# Patient Record
Sex: FEMALE | Race: BLACK OR AFRICAN AMERICAN | Employment: OTHER | URBAN - METROPOLITAN AREA
[De-identification: names, ages, dates, MRNs, and addresses within clinical notes are randomized per-mention and may not be internally consistent; named-entity substitution may affect disease eponyms.]

---

## 2017-01-30 ENCOUNTER — HOSPITAL ENCOUNTER (OUTPATIENT)
Dept: LAB | Age: 52
Discharge: HOME | End: 2017-01-30
Attending: INTERNAL MEDICINE
Payer: COMMERCIAL

## 2017-01-30 DIAGNOSIS — I10 ESSENTIAL HYPERTENSION, BENIGN: ICD-10-CM

## 2017-01-30 DIAGNOSIS — R21 RASH, SKIN: ICD-10-CM

## 2017-01-30 DIAGNOSIS — I06.1 RHEUMATIC AORTIC INSUFFICIENCY: ICD-10-CM

## 2017-01-30 LAB
ANION GAP BLD CALC-SCNC: 7 MMOL/L
BASOPHILS # BLD AUTO: 0 K/UL (ref 0–0.2)
BASOPHILS # BLD: 0 % (ref 0–2)
BNP SERPL-MCNC: 156 PG/ML
BUN SERPL-MCNC: 5 MG/DL (ref 6–23)
CALCIUM SERPL-MCNC: 8.5 MG/DL (ref 8.3–10.4)
CHLORIDE SERPL-SCNC: 107 MMOL/L (ref 98–107)
CO2 SERPL-SCNC: 26 MMOL/L (ref 23–32)
CREAT SERPL-MCNC: 0.8 MG/DL (ref 0.6–1)
DIFFERENTIAL METHOD BLD: ABNORMAL
EOSINOPHIL # BLD: 0.1 K/UL (ref 0–0.8)
EOSINOPHIL NFR BLD: 2 % (ref 0.5–7.8)
ERYTHROCYTE [DISTWIDTH] IN BLOOD BY AUTOMATED COUNT: 12.4 % (ref 11.9–14.6)
GLUCOSE SERPL-MCNC: 104 MG/DL (ref 65–100)
HCT VFR BLD AUTO: 33.9 % (ref 35.8–46.3)
HGB BLD-MCNC: 11.3 G/DL (ref 11.7–15.4)
LYMPHOCYTES # BLD AUTO: 44 % (ref 13–44)
LYMPHOCYTES # BLD: 2.1 K/UL (ref 0.5–4.6)
MCH RBC QN AUTO: 32 PG (ref 26.1–32.9)
MCHC RBC AUTO-ENTMCNC: 33.3 G/DL (ref 31.4–35)
MCV RBC AUTO: 96 FL (ref 79.6–97.8)
MONOCYTES # BLD: 0.5 K/UL (ref 0.1–1.3)
MONOCYTES NFR BLD AUTO: 10 % (ref 4–12)
NEUTS SEG # BLD: 2.1 K/UL (ref 1.7–8.2)
NEUTS SEG NFR BLD AUTO: 44 % (ref 43–78)
PLATELET # BLD AUTO: 234 K/UL (ref 150–450)
PMV BLD AUTO: 10.1 FL (ref 10.8–14.1)
POTASSIUM SERPL-SCNC: 3.6 MMOL/L (ref 3.5–5.1)
RBC # BLD AUTO: 3.53 M/UL (ref 4.05–5.25)
SODIUM SERPL-SCNC: 140 MMOL/L (ref 136–145)
WBC # BLD AUTO: 4.8 K/UL (ref 4.3–11.1)

## 2017-01-30 PROCEDURE — 85025 COMPLETE CBC W/AUTO DIFF WBC: CPT | Performed by: INTERNAL MEDICINE

## 2017-01-30 PROCEDURE — 83880 ASSAY OF NATRIURETIC PEPTIDE: CPT | Performed by: INTERNAL MEDICINE

## 2017-01-30 PROCEDURE — 80048 BASIC METABOLIC PNL TOTAL CA: CPT | Performed by: INTERNAL MEDICINE

## 2017-01-30 PROCEDURE — 36415 COLL VENOUS BLD VENIPUNCTURE: CPT | Performed by: INTERNAL MEDICINE

## 2017-03-02 ENCOUNTER — HOSPITAL ENCOUNTER (OUTPATIENT)
Dept: SURGERY | Age: 52
Discharge: HOME | End: 2017-03-02
Payer: COMMERCIAL

## 2017-03-02 VITALS
HEIGHT: 64 IN | WEIGHT: 149 LBS | DIASTOLIC BLOOD PRESSURE: 73 MMHG | SYSTOLIC BLOOD PRESSURE: 120 MMHG | BODY MASS INDEX: 25.44 KG/M2 | TEMPERATURE: 97.7 F | OXYGEN SATURATION: 100 % | HEART RATE: 83 BPM | RESPIRATION RATE: 16 BRPM

## 2017-03-02 LAB
ANION GAP BLD CALC-SCNC: 11 MMOL/L (ref 7–16)
BUN SERPL-MCNC: 9 MG/DL (ref 6–23)
CALCIUM SERPL-MCNC: 8.9 MG/DL (ref 8.3–10.4)
CHLORIDE SERPL-SCNC: 102 MMOL/L (ref 98–107)
CO2 SERPL-SCNC: 28 MMOL/L (ref 21–32)
CREAT SERPL-MCNC: 0.79 MG/DL (ref 0.6–1)
ERYTHROCYTE [DISTWIDTH] IN BLOOD BY AUTOMATED COUNT: 12.8 % (ref 11.9–14.6)
GLUCOSE SERPL-MCNC: 84 MG/DL (ref 65–100)
HCT VFR BLD AUTO: 36.9 % (ref 35.8–46.3)
HGB BLD-MCNC: 12.1 G/DL (ref 11.7–15.4)
MCH RBC QN AUTO: 30.8 PG (ref 26.1–32.9)
MCHC RBC AUTO-ENTMCNC: 32.8 G/DL (ref 31.4–35)
MCV RBC AUTO: 93.9 FL (ref 79.6–97.8)
PLATELET # BLD AUTO: 297 K/UL (ref 150–450)
PMV BLD AUTO: 10.1 FL (ref 10.8–14.1)
POTASSIUM SERPL-SCNC: 3.8 MMOL/L (ref 3.5–5.1)
RBC # BLD AUTO: 3.93 M/UL (ref 4.05–5.25)
SODIUM SERPL-SCNC: 141 MMOL/L (ref 136–145)
WBC # BLD AUTO: 5 K/UL (ref 4.3–11.1)

## 2017-03-02 PROCEDURE — 80048 BASIC METABOLIC PNL TOTAL CA: CPT | Performed by: UROLOGY

## 2017-03-02 PROCEDURE — 85027 COMPLETE CBC AUTOMATED: CPT | Performed by: UROLOGY

## 2017-03-02 RX ORDER — HYDROCODONE BITARTRATE AND ACETAMINOPHEN 7.5; 325 MG/1; MG/1
1 TABLET ORAL
COMMUNITY
End: 2017-04-12 | Stop reason: ALTCHOICE

## 2017-03-02 RX ORDER — GABAPENTIN 400 MG/1
400 CAPSULE ORAL 3 TIMES DAILY
COMMUNITY
End: 2018-05-10 | Stop reason: SDUPTHER

## 2017-03-02 RX ORDER — BUDESONIDE AND FORMOTEROL FUMARATE DIHYDRATE 160; 4.5 UG/1; UG/1
1 AEROSOL RESPIRATORY (INHALATION) 2 TIMES DAILY
COMMUNITY
End: 2018-12-21 | Stop reason: SDUPTHER

## 2017-03-02 NOTE — PERIOP NOTES
Patient verified name, , and surgery as listed in [facility]. TYPE  CASE:1B  Orders per surgeon: were Received  Labs per surgeon:cbc, bmp  Labs per anesthesia protocol: no additional  EKG  :  Ekg (reviewed with Dr Georgean Gottron), echo and cardiac clearance and last office note all on chart for review. Patient with hx of moderate mitral valve regurge and abnormal ekg that is being followed by Dr Lynda Garay. Dr Lynda Garay gave cardiac clearance for this procedure. Patient provided with handouts including guide to surgery , transfusions, pain management and hand hygiene for the family and community. Pt verbalizes understanding of all pre-op instructions . Instructed that family must be present in building at all times. Hibiclens and instructions given per hospital policy. Instructed patient to continue  previous medications as prescribed prior to surgery and  to take the following medications the day of surgery according to anesthesia guidelines : symbicort, klonopin if needed, prozac, gabapentin, hydrocodone if needed, nifedipine, omeprazole, spiriva       Original medication prescription bottles were not  visualized during patient appointment. Continue all previous medications unless otherwise directed. Instructed patient to hold  the following medications prior to surgery: lasix and aldactone on am of surgery only. Patient verbalized understanding of all instructions and provided all medical/health information to the best of their ability.

## 2017-03-05 ENCOUNTER — ANESTHESIA EVENT (OUTPATIENT)
Dept: SURGERY | Age: 52
End: 2017-03-05
Payer: COMMERCIAL

## 2017-03-06 ENCOUNTER — HOSPITAL ENCOUNTER (OUTPATIENT)
Age: 52
Setting detail: OUTPATIENT SURGERY
Discharge: HOME | End: 2017-03-06
Attending: UROLOGY | Admitting: UROLOGY
Payer: COMMERCIAL

## 2017-03-06 ENCOUNTER — ANESTHESIA (OUTPATIENT)
Dept: SURGERY | Age: 52
End: 2017-03-06
Payer: COMMERCIAL

## 2017-03-06 VITALS
TEMPERATURE: 98.3 F | RESPIRATION RATE: 16 BRPM | BODY MASS INDEX: 25.44 KG/M2 | HEART RATE: 85 BPM | DIASTOLIC BLOOD PRESSURE: 80 MMHG | SYSTOLIC BLOOD PRESSURE: 141 MMHG | HEIGHT: 64 IN | WEIGHT: 149 LBS | OXYGEN SATURATION: 92 %

## 2017-03-06 LAB — POTASSIUM BLD-SCNC: 3.7 MMOL/L (ref 3.5–5.1)

## 2017-03-06 PROCEDURE — 84132 ASSAY OF SERUM POTASSIUM: CPT

## 2017-03-06 PROCEDURE — 74011000250 HC RX REV CODE- 250: Performed by: UROLOGY

## 2017-03-06 PROCEDURE — 77030008703 HC TU ET UNCUF COVD -A: Performed by: NURSE ANESTHETIST, CERTIFIED REGISTERED

## 2017-03-06 PROCEDURE — 74011250636 HC RX REV CODE- 250/636: Performed by: ANESTHESIOLOGY

## 2017-03-06 PROCEDURE — 77030008771 HC TU NG SALEM SUMP -A: Performed by: NURSE ANESTHETIST, CERTIFIED REGISTERED

## 2017-03-06 PROCEDURE — 76210000006 HC OR PH I REC 0.5 TO 1 HR: Performed by: UROLOGY

## 2017-03-06 PROCEDURE — 74011250636 HC RX REV CODE- 250/636

## 2017-03-06 PROCEDURE — 74011250637 HC RX REV CODE- 250/637: Performed by: UROLOGY

## 2017-03-06 PROCEDURE — 74011250637 HC RX REV CODE- 250/637: Performed by: ANESTHESIOLOGY

## 2017-03-06 PROCEDURE — 76060000033 HC ANESTHESIA 1 TO 1.5 HR: Performed by: UROLOGY

## 2017-03-06 PROCEDURE — 77030018832 HC SOL IRR H20 ICUM -A: Performed by: UROLOGY

## 2017-03-06 PROCEDURE — 74011250636 HC RX REV CODE- 250/636: Performed by: UROLOGY

## 2017-03-06 PROCEDURE — 77030020782 HC GWN BAIR PAWS FLX 3M -B: Performed by: NURSE ANESTHETIST, CERTIFIED REGISTERED

## 2017-03-06 PROCEDURE — 74011000250 HC RX REV CODE- 250

## 2017-03-06 PROCEDURE — 76210000021 HC REC RM PH II 0.5 TO 1 HR: Performed by: UROLOGY

## 2017-03-06 PROCEDURE — 77030019927 HC TBNG IRR CYSTO BAXT -A: Performed by: UROLOGY

## 2017-03-06 PROCEDURE — 76010000149 HC OR TIME 1 TO 1.5 HR: Performed by: UROLOGY

## 2017-03-06 PROCEDURE — 77030032490 HC SLV COMPR SCD KNE COVD -B: Performed by: UROLOGY

## 2017-03-06 RX ORDER — ATROPA BELLADONNA AND OPIUM 16.2; 6 MG/1; MG/1
SUPPOSITORY RECTAL AS NEEDED
Status: DISCONTINUED | OUTPATIENT
Start: 2017-03-06 | End: 2017-03-06 | Stop reason: HOSPADM

## 2017-03-06 RX ORDER — SUCCINYLCHOLINE CHLORIDE 20 MG/ML
INJECTION INTRAMUSCULAR; INTRAVENOUS AS NEEDED
Status: DISCONTINUED | OUTPATIENT
Start: 2017-03-06 | End: 2017-03-06 | Stop reason: HOSPADM

## 2017-03-06 RX ORDER — GLYCOPYRROLATE 0.2 MG/ML
INJECTION INTRAMUSCULAR; INTRAVENOUS AS NEEDED
Status: DISCONTINUED | OUTPATIENT
Start: 2017-03-06 | End: 2017-03-06 | Stop reason: HOSPADM

## 2017-03-06 RX ORDER — FENTANYL CITRATE 50 UG/ML
100 INJECTION, SOLUTION INTRAMUSCULAR; INTRAVENOUS ONCE
Status: DISCONTINUED | OUTPATIENT
Start: 2017-03-06 | End: 2017-03-06 | Stop reason: HOSPADM

## 2017-03-06 RX ORDER — OXYCODONE HYDROCHLORIDE 5 MG/1
5 TABLET ORAL
Status: DISCONTINUED | OUTPATIENT
Start: 2017-03-06 | End: 2017-03-07 | Stop reason: HOSPADM

## 2017-03-06 RX ORDER — BUPIVACAINE HYDROCHLORIDE 5 MG/ML
INJECTION, SOLUTION EPIDURAL; INTRACAUDAL AS NEEDED
Status: DISCONTINUED | OUTPATIENT
Start: 2017-03-06 | End: 2017-03-06 | Stop reason: HOSPADM

## 2017-03-06 RX ORDER — SODIUM CHLORIDE, SODIUM LACTATE, POTASSIUM CHLORIDE, CALCIUM CHLORIDE 600; 310; 30; 20 MG/100ML; MG/100ML; MG/100ML; MG/100ML
75 INJECTION, SOLUTION INTRAVENOUS CONTINUOUS
Status: DISCONTINUED | OUTPATIENT
Start: 2017-03-06 | End: 2017-03-07 | Stop reason: HOSPADM

## 2017-03-06 RX ORDER — FAMOTIDINE 20 MG/1
20 TABLET, FILM COATED ORAL ONCE
Status: COMPLETED | OUTPATIENT
Start: 2017-03-06 | End: 2017-03-06

## 2017-03-06 RX ORDER — HYDROMORPHONE HYDROCHLORIDE 1 MG/ML
1 INJECTION, SOLUTION INTRAMUSCULAR; INTRAVENOUS; SUBCUTANEOUS
Status: COMPLETED | OUTPATIENT
Start: 2017-03-06 | End: 2017-03-06

## 2017-03-06 RX ORDER — NEOSTIGMINE METHYLSULFATE 1 MG/ML
INJECTION, SOLUTION INTRAVENOUS AS NEEDED
Status: DISCONTINUED | OUTPATIENT
Start: 2017-03-06 | End: 2017-03-06 | Stop reason: HOSPADM

## 2017-03-06 RX ORDER — SCOLOPAMINE TRANSDERMAL SYSTEM 1 MG/1
1.5 PATCH, EXTENDED RELEASE TRANSDERMAL ONCE
Status: DISCONTINUED | OUTPATIENT
Start: 2017-03-06 | End: 2017-03-07 | Stop reason: HOSPADM

## 2017-03-06 RX ORDER — HYDROCODONE BITARTRATE AND ACETAMINOPHEN 5; 325 MG/1; MG/1
1-2 TABLET ORAL
Qty: 25 TAB | Refills: 0 | Status: SHIPPED | OUTPATIENT
Start: 2017-03-06 | End: 2017-04-12 | Stop reason: ALTCHOICE

## 2017-03-06 RX ORDER — HYDROCODONE BITARTRATE AND ACETAMINOPHEN 5; 325 MG/1; MG/1
2 TABLET ORAL AS NEEDED
Status: DISCONTINUED | OUTPATIENT
Start: 2017-03-06 | End: 2017-03-07 | Stop reason: HOSPADM

## 2017-03-06 RX ORDER — CEFAZOLIN SODIUM IN 0.9 % NACL 2 G/50 ML
2 INTRAVENOUS SOLUTION, PIGGYBACK (ML) INTRAVENOUS ONCE
Status: COMPLETED | OUTPATIENT
Start: 2017-03-06 | End: 2017-03-06

## 2017-03-06 RX ORDER — LIDOCAINE HYDROCHLORIDE 10 MG/ML
0.1 INJECTION INFILTRATION; PERINEURAL AS NEEDED
Status: DISCONTINUED | OUTPATIENT
Start: 2017-03-06 | End: 2017-03-06 | Stop reason: HOSPADM

## 2017-03-06 RX ORDER — PROMETHAZINE HYDROCHLORIDE 25 MG/ML
INJECTION, SOLUTION INTRAMUSCULAR; INTRAVENOUS AS NEEDED
Status: DISCONTINUED | OUTPATIENT
Start: 2017-03-06 | End: 2017-03-06 | Stop reason: HOSPADM

## 2017-03-06 RX ORDER — DEXAMETHASONE SODIUM PHOSPHATE 4 MG/ML
INJECTION, SOLUTION INTRA-ARTICULAR; INTRALESIONAL; INTRAMUSCULAR; INTRAVENOUS; SOFT TISSUE AS NEEDED
Status: DISCONTINUED | OUTPATIENT
Start: 2017-03-06 | End: 2017-03-06 | Stop reason: HOSPADM

## 2017-03-06 RX ORDER — PROPOFOL 10 MG/ML
INJECTION, EMULSION INTRAVENOUS AS NEEDED
Status: DISCONTINUED | OUTPATIENT
Start: 2017-03-06 | End: 2017-03-06 | Stop reason: HOSPADM

## 2017-03-06 RX ORDER — LIDOCAINE HYDROCHLORIDE 20 MG/ML
INJECTION, SOLUTION EPIDURAL; INFILTRATION; INTRACAUDAL; PERINEURAL AS NEEDED
Status: DISCONTINUED | OUTPATIENT
Start: 2017-03-06 | End: 2017-03-06 | Stop reason: HOSPADM

## 2017-03-06 RX ORDER — FENTANYL CITRATE 50 UG/ML
INJECTION, SOLUTION INTRAMUSCULAR; INTRAVENOUS AS NEEDED
Status: DISCONTINUED | OUTPATIENT
Start: 2017-03-06 | End: 2017-03-06 | Stop reason: HOSPADM

## 2017-03-06 RX ORDER — HYDROMORPHONE HYDROCHLORIDE 2 MG/ML
0.5 INJECTION, SOLUTION INTRAMUSCULAR; INTRAVENOUS; SUBCUTANEOUS
Status: COMPLETED | OUTPATIENT
Start: 2017-03-06 | End: 2017-03-06

## 2017-03-06 RX ORDER — ROCURONIUM BROMIDE 10 MG/ML
INJECTION, SOLUTION INTRAVENOUS AS NEEDED
Status: DISCONTINUED | OUTPATIENT
Start: 2017-03-06 | End: 2017-03-06 | Stop reason: HOSPADM

## 2017-03-06 RX ORDER — SODIUM CHLORIDE, SODIUM LACTATE, POTASSIUM CHLORIDE, CALCIUM CHLORIDE 600; 310; 30; 20 MG/100ML; MG/100ML; MG/100ML; MG/100ML
75 INJECTION, SOLUTION INTRAVENOUS CONTINUOUS
Status: DISCONTINUED | OUTPATIENT
Start: 2017-03-06 | End: 2017-03-06 | Stop reason: HOSPADM

## 2017-03-06 RX ORDER — MIDAZOLAM HYDROCHLORIDE 1 MG/ML
5 INJECTION, SOLUTION INTRAMUSCULAR; INTRAVENOUS ONCE
Status: DISCONTINUED | OUTPATIENT
Start: 2017-03-06 | End: 2017-03-06 | Stop reason: HOSPADM

## 2017-03-06 RX ORDER — MIDAZOLAM HYDROCHLORIDE 1 MG/ML
2 INJECTION, SOLUTION INTRAMUSCULAR; INTRAVENOUS
Status: DISCONTINUED | OUTPATIENT
Start: 2017-03-06 | End: 2017-03-06 | Stop reason: HOSPADM

## 2017-03-06 RX ADMIN — HYDROCODONE BITARTRATE AND ACETAMINOPHEN 2 TABLET: 5; 325 TABLET ORAL at 18:27

## 2017-03-06 RX ADMIN — PROPOFOL 160 MG: 10 INJECTION, EMULSION INTRAVENOUS at 15:28

## 2017-03-06 RX ADMIN — FENTANYL CITRATE 100 MCG: 50 INJECTION, SOLUTION INTRAMUSCULAR; INTRAVENOUS at 15:43

## 2017-03-06 RX ADMIN — FAMOTIDINE 20 MG: 20 TABLET ORAL at 13:38

## 2017-03-06 RX ADMIN — HYDROMORPHONE HYDROCHLORIDE 0.5 MG: 2 INJECTION, SOLUTION INTRAMUSCULAR; INTRAVENOUS; SUBCUTANEOUS at 17:20

## 2017-03-06 RX ADMIN — PROMETHAZINE HYDROCHLORIDE 6.25 MG: 25 INJECTION, SOLUTION INTRAMUSCULAR; INTRAVENOUS at 15:38

## 2017-03-06 RX ADMIN — CEFAZOLIN 2 G: 1 INJECTION, POWDER, FOR SOLUTION INTRAMUSCULAR; INTRAVENOUS; PARENTERAL at 15:20

## 2017-03-06 RX ADMIN — GLYCOPYRROLATE 0.6 MG: 0.2 INJECTION INTRAMUSCULAR; INTRAVENOUS at 16:07

## 2017-03-06 RX ADMIN — DEXAMETHASONE SODIUM PHOSPHATE 4 MG: 4 INJECTION, SOLUTION INTRA-ARTICULAR; INTRALESIONAL; INTRAMUSCULAR; INTRAVENOUS; SOFT TISSUE at 15:20

## 2017-03-06 RX ADMIN — HYDROMORPHONE HYDROCHLORIDE 0.5 MG: 2 INJECTION, SOLUTION INTRAMUSCULAR; INTRAVENOUS; SUBCUTANEOUS at 17:02

## 2017-03-06 RX ADMIN — SUCCINYLCHOLINE CHLORIDE 160 MG: 20 INJECTION INTRAMUSCULAR; INTRAVENOUS at 15:28

## 2017-03-06 RX ADMIN — HYDROMORPHONE HYDROCHLORIDE 0.5 MG: 2 INJECTION, SOLUTION INTRAMUSCULAR; INTRAVENOUS; SUBCUTANEOUS at 17:27

## 2017-03-06 RX ADMIN — SODIUM CHLORIDE, SODIUM LACTATE, POTASSIUM CHLORIDE, AND CALCIUM CHLORIDE: 600; 310; 30; 20 INJECTION, SOLUTION INTRAVENOUS at 16:00

## 2017-03-06 RX ADMIN — HYDROMORPHONE HYDROCHLORIDE 0.5 MG: 2 INJECTION, SOLUTION INTRAMUSCULAR; INTRAVENOUS; SUBCUTANEOUS at 16:55

## 2017-03-06 RX ADMIN — ROCURONIUM BROMIDE 10 MG: 10 INJECTION, SOLUTION INTRAVENOUS at 15:28

## 2017-03-06 RX ADMIN — ROCURONIUM BROMIDE 15 MG: 10 INJECTION, SOLUTION INTRAVENOUS at 15:44

## 2017-03-06 RX ADMIN — LIDOCAINE HYDROCHLORIDE 100 MG: 20 INJECTION, SOLUTION EPIDURAL; INFILTRATION; INTRACAUDAL; PERINEURAL at 15:28

## 2017-03-06 RX ADMIN — SODIUM CHLORIDE, SODIUM LACTATE, POTASSIUM CHLORIDE, AND CALCIUM CHLORIDE 75 ML/HR: 600; 310; 30; 20 INJECTION, SOLUTION INTRAVENOUS at 13:38

## 2017-03-06 RX ADMIN — NEOSTIGMINE METHYLSULFATE 4 MG: 1 INJECTION, SOLUTION INTRAVENOUS at 16:07

## 2017-03-06 RX ADMIN — FENTANYL CITRATE 100 MCG: 50 INJECTION, SOLUTION INTRAMUSCULAR; INTRAVENOUS at 15:27

## 2017-03-06 RX ADMIN — HYDROMORPHONE HYDROCHLORIDE 1 MG: 1 INJECTION, SOLUTION INTRAMUSCULAR; INTRAVENOUS; SUBCUTANEOUS at 15:06

## 2017-03-06 NOTE — ANESTHESIA PREPROCEDURE EVALUATION
Anesthetic History               Review of Systems / Medical History  Patient summary reviewed, nursing notes reviewed and pertinent labs reviewed    Pulmonary    COPD (Chronic bronchitis)      Smoker  Asthma        Neuro/Psych              Cardiovascular    Hypertension  Valvular problems/murmurs (Aortic regurg secondary to rheumatic heart dz)            Exercise tolerance: >4 METS  Comments: TTE Aug 2016: Moderate aortic regurgitation.   LVEF 60%   GI/Hepatic/Renal     GERD           Endo/Other        Arthritis     Other Findings            Physical Exam    Airway  Mallampati: I  TM Distance: 4 - 6 cm  Neck ROM: normal range of motion   Mouth opening: Normal     Cardiovascular    Rhythm: regular  Rate: normal    Murmur: Grade 2, Aortic area     Dental    Dentition: Edentulous     Pulmonary  Breath sounds clear to auscultation               Abdominal  GI exam deferred       Other Findings            Anesthetic Plan    ASA: 3  Anesthesia type: general            Anesthetic plan and risks discussed with: Patient

## 2017-03-06 NOTE — BRIEF OP NOTE
BRIEF OPERATIVE NOTE    Date of Procedure: 3/6/2017   Preoperative Diagnosis: Cystitis, interstitial [N30.10]  Postoperative Diagnosis: Cystitis, interstitial [N30.10]    Procedure(s):  CYSTOSCOPY WITH HYDRODISTENTION OF BLADDER  Surgeon(s) and Role:     * Lm Eisenberg MD - Primary            Surgical Staff:  Circ-1: Avtar Allison RN  Event Time In   Incision Start 1552   Incision Close 1611     Anesthesia: General   Estimated Blood Loss: none  Specimens: * No specimens in log *   Findings: see op note   Complications: none  Implants: * No implants in log *

## 2017-03-06 NOTE — IP AVS SNAPSHOT
[address] [address] 
[phone] Patient: Javier Lu MRN: [id number] :1965 You are allergic to the following Allergen Reactions Aspirin Other (comments) Inflames IBS per pt Bentyl (Dicyclomine) Itching Toradol (Ketorolac) Hives Ultram (Tramadol) Nausea and Vomiting Zofran (Ondansetron Hcl (Pf)) Nausea and Vomiting Recent Documentation Height Weight BMI OB Status Smoking Status 1.626 m 67.6 kg 25.58 kg/m2 Hysterectomy Former Smoker Emergency Contacts Name Discharge Info Relation Home Work Mobile Anastasia Chapa DISCHARGE CAREGIVER [3] Parent [1] [phone] About your hospitalization You were [facility] on:  2017 [facility] on:  2017 Unit phone number:  [phone] Why you were hospitalized Your primary diagnosis was:  Not on File Providers Seen During Your Hospitalizations Provider Role Specialty Primary office phone Esperanza Jones MD Attending Provider Urology [phone] Your Primary Care Physician (PCP) Primary Care Physician Office Phone Office Fax 611 Henrik Aldana [facility] [phone] Follow-up Information Follow up With Details Comments Contact Jenn Jones MD Call on 3/7/2017 Call tomorrow to schedule a follow-up appointment to see Dr. Sahra Woodruff Nurse Practitioner for ONE MONTH from today. [address] [id number] [phone] Current Discharge Medication List  
  
CONTINUE these medications which have CHANGED Dose & Instructions Dispensing Information Comments Morning Noon Evening Bedtime * HYDROcodone-acetaminophen 7.5-325 mg per tablet Commonly known as:  Lenon Gauze  
 What changed:  Another medication with the same name was added. Make sure you understand how and when to take each. Your next dose is: Today, Tomorrow Other:  _________ Dose:  1 Tab Take 1 Tab by mouth every six (6) hours as needed for Pain. Refills:  0  
     
   
   
   
  
 * HYDROcodone-acetaminophen 5-325 mg per tablet Commonly known as:  Yaneli Mura What changed: You were already taking a medication with the same name, and this prescription was added. Make sure you understand how and when to take each. Your next dose is: Today, Tomorrow Other:  _________ Dose:  1-2 Tab Take 1-2 Tabs by mouth every four (4) hours as needed for Pain. Max Daily Amount: 12 Tabs. Quantity:  25 Tab Refills:  0  
     
   
   
   
  
 NIFEdipine ER 60 mg ER tablet Commonly known as:  PROCARDIA XL What changed:  when to take this Your next dose is: Today, Tomorrow Other:  _________ Dose:  60 mg Take 1 Tab by mouth daily. Quantity:  30 Tab Refills:  11 * Notice: This list has 2 medication(s) that are the same as other medications prescribed for you. Read the directions carefully, and ask your doctor or other care provider to review them with you. CONTINUE these medications which have NOT CHANGED Dose & Instructions Dispensing Information Comments Morning Noon Evening Bedtime  
 amitriptyline 100 mg tablet Commonly known as:  ELAVIL Your next dose is: Today, Tomorrow Other:  _________ Dose:  100 mg Take 1 Tab by mouth nightly. Quantity:  30 Tab Refills:  5  
     
   
   
   
  
 clonazePAM 0.5 mg tablet Commonly known as:  Melburn End Your next dose is: Today, Tomorrow Other:  _________ Dose:  0.5 mg Take 0.5 mg by mouth as needed. Refills:  0  
     
   
   
   
  
 estradiol 2 mg tablet Commonly known as:  ESTRACE  
   
 Your next dose is: Today, Tomorrow Other:  _________ Dose:  2 mg Take 2 mg by mouth every morning. Refills:  0 FLUoxetine 10 mg tablet Commonly known as:  PROzac Your next dose is: Today, Tomorrow Other:  _________ Dose:  10 mg Take 10 mg by mouth every morning. Refills:  0  
     
   
   
   
  
 furosemide 40 mg tablet Commonly known as:  LASIX Your next dose is: Today, Tomorrow Other:  _________ Dose:  40 mg Take 1 Tab by mouth two (2) times a day. Quantity:  180 Tab Refills:  3  
     
   
   
   
  
 gabapentin 400 mg capsule Commonly known as:  NEURONTIN Your next dose is: Today, Tomorrow Other:  _________ Dose:  400 mg Take 400 mg by mouth two (2) times a day. Refills:  0  
     
   
   
   
  
 nitroglycerin 0.4 mg SL tablet Commonly known as:  NITROSTAT Your next dose is: Today, Tomorrow Other:  _________ Dose:  0.4 mg  
1 Tab by SubLINGual route every five (5) minutes as needed for Chest Pain. Quantity:  1 Bottle Refills:  4 PriLOSEC 20 mg capsule Generic drug:  omeprazole Your next dose is: Today, Tomorrow Other:  _________ Dose:  20 mg Take 20 mg by mouth every morning. Refills:  0  
     
   
   
   
  
 promethazine 25 mg tablet Commonly known as:  PHENERGAN Your next dose is: Today, Tomorrow Other:  _________ Dose:  25 mg Take 1 Tab by mouth every six (6) hours as needed. Quantity:  12 Tab Refills:  0 SPIRIVA WITH HANDIHALER 18 mcg inhalation capsule Generic drug:  tiotropium Your next dose is: Today, Tomorrow Other:  _________ Dose:  1 Cap Take 1 Cap by inhalation daily. Refills:  0  
     
   
   
   
  
 spironolactone 25 mg tablet Commonly known as:  ALDACTONE  
   
 Your next dose is: Today, Tomorrow Other:  _________ Dose:  25 mg Take 1 Tab by mouth two (2) times a day. Quantity:  180 Tab Refills:  3 SYMBICORT 160-4.5 mcg/actuation HFA inhaler Generic drug:  budesonide-formoterol Your next dose is: Today, Tomorrow Other:  _________ Dose:  1 Puff Take 1 Puff by inhalation two (2) times a day. Indications: BRONCHOSPASM PREVENTION WITH COPD Refills:  0 Where to Get Your Medications Information on where to get these meds will be given to you by the nurse or doctor. ! Ask your nurse or doctor about these medications HYDROcodone-acetaminophen 5-325 mg per tablet Discharge Instructions Cystoscopy: What to Expect at [facility] Your Recovery A cystoscopy is a procedure that lets a doctor look inside of the bladder and the urethra. The urethra is the tube that carries urine from the bladder to outside the body. The doctor uses a thin, lighted tube called a cystoscope to look for kidney or bladder stones, tumors, bleeding, or infection. After the cystoscopy, your urethra may be sore at first, and it may burn when you urinate for the first few days after the procedure. You may feel the need to urinate more often, and your urine may be pink. These symptoms should get better in 1 or 2 days. You will probably be able to go back to work or most of your usual activities in 1 or 2 days. This care sheet gives you a general idea about how long it will take for you to recover. But each person recovers at a different pace. Follow the steps below to get better as quickly as possible. How can you care for yourself at home? Activity · Rest when you feel tired. Getting enough sleep will help you recover. · Try to walk each day. Start by walking a little more than you did the day before. Bit by bit, increase the amount you walk.  Walking boosts blood flow and helps prevent pneumonia and constipation. · Avoid strenuous activities, such as bicycle riding, jogging, weight lifting, or aerobic exercise, until your doctor says it is okay. · Ask your doctor when you can drive again. · Most people are able to return to work within 1 or 2 days after the procedure. · You may shower and take baths as usual. 
· Ask your doctor when it is okay for you to have sex. Diet · You can eat your normal diet. If your stomach is upset, try bland, low-fat foods like plain rice, broiled chicken, toast, and yogurt. · Drink plenty of fluids (unless your doctor tells you not to). Medicines · Take pain medicines exactly as directed. ¨ If the doctor gave you a prescription medicine for pain, take it as prescribed. ¨ If you are not taking a prescription pain medicine, ask your doctor if you can take an over-the-counter medicine. · If you think your pain medicine is making you sick to your stomach: 
¨ Take your medicine after meals (unless your doctor has told you not to). ¨ Ask your doctor for a different pain medicine. · If your doctor prescribed antibiotics, take them as directed. Do not stop taking them just because you feel better. You need to take the full course of antibiotics. Follow-up care is a key part of your treatment and safety. Be sure to make and go to all appointments, and call your doctor if you are having problems. It's also a good idea to know your test results and keep a list of the medicines you take. When should you call for help? Call 911 anytime you think you may need emergency care. For example, call if: 
· You passed out (lost consciousness). · You have severe trouble breathing. · You have sudden chest pain and shortness of breath, or you cough up blood. · You have severe belly pain. Call your doctor now or seek immediate medical care if: 
· You are sick to your stomach or cannot keep fluids down. · Your urine is still red or you see blood clots after you have urinated several times. · You have trouble passing urine or stool, especially if you have pain or swelling in your lower belly. · You have signs of a blood clot, such as: 
¨ Pain in your calf, back of the knee, thigh, or groin. ¨ Redness and swelling in your leg or groin. · You develop a fever or severe chills. · You have pain in your back just below your rib cage. This is called flank pain. Watch closely for changes in your health, and be sure to contact your doctor if: 
· You have pain or burning when you urinate. A burning feeling is normal for a day or two after the test, but call if it does not get better. · You have a frequent urge to urinate but can pass only small amounts of urine. · Your urine is pink, red, or cloudy, or smells bad. It is normal for the urine to have a pinkish color for a few days after the test, but call if it does not get better. Where can you learn more? Go to http://maria alejandra-nereyda.info/. Enter Z913 in the search box to learn more about \"Cystoscopy: What to Expect at Home. \" Current as of: August 12, 2016 Content Version: 11.1 © 3308-6716 [employer]. Care instructions adapted under license by [employer] (which disclaims liability or warranty for this information). If you have questions about a medical condition or this instruction, always ask your healthcare professional. [place] [address] any warranty or liability for your use of this information. After general anesthesia or intravenous sedation, for 24 hours or while taking prescription Narcotics: · Limit your activities · Do not drive and operate hazardous machinery · Do not make important personal or business decisions · Do  not drink alcoholic beverages · If you have not urinated within 8 hours after discharge, please contact your surgeon on call. *  Please give a list of your current medications to your Primary Care Provider. *  Please update this list whenever your medications are discontinued, doses are 
    changed, or new medications (including over-the-counter products) are added. *  Please carry medication information at all times in case of emergency situations. These are general instructions for a healthy lifestyle: No smoking/ No tobacco products/ Avoid exposure to second hand smoke Surgeon General's Warning:  Quitting smoking now greatly reduces serious risk to your health. Obesity, smoking, and sedentary lifestyle greatly increases your risk for illness A healthy diet, regular physical exercise & weight monitoring are important for maintaining a healthy lifestyle You may be retaining fluid if you have a history of heart failure or if you experience any of the following symptoms:  Weight gain of 3 pounds or more overnight or 5 pounds in a week, increased swelling in our hands or feet or shortness of breath while lying flat in bed. Please call your doctor as soon as you notice any of these symptoms; do not wait until your next office visit. Recognize signs and symptoms of STROKE: 
 
F-face looks uneven A-arms unable to move or move unevenly S-speech slurred or non-existent T-time-call 911 as soon as signs and symptoms begin-DO NOT go Back to bed or wait to see if you get better-TIME IS BRAIN. Discharge Orders None Introducing [facility]! Megan Berry introduces [employer] patient portal. Now you can access parts of your medical record, email your doctor's office, and request medication refills online. 1. In your internet browser, go to [email or web address] 2. Click on the First Time User? Click Here link in the Sign In box. You will see the New Member Sign Up page. 3. Enter your [employer] Access Code exactly as it appears below.  You will not need to use this code after youve completed the sign-up process. If you do not sign up before the expiration date, you must request a new code. · [employer] Access Code: [email or web address] Expires: 4/27/2017  1:33 PM 
 
4. Enter the last four digits of your Social Security Number (xxxx) and Date of Birth (mm/dd/yyyy) as indicated and click Submit. You will be taken to the next sign-up page. 5. Create a [employer] ID ID. This will be your [employer] login ID and cannot be changed, so think of one that is secure and easy to remember. 6. Create a [employer] password. You can change your password at any time. 7. Enter your Password Reset Question and Answer. This can be used at a later time if you forget your password. 8. Enter your e-mail address. You will receive e-mail notification when new information is available in [address] 9. Click Sign Up. You can now view and download portions of your medical record. 10. Click the Download Summary menu link to download a portable copy of your medical information. If you have questions, please visit the Frequently Asked Questions section of the [employer] website. Remember, [employer] is NOT to be used for urgent needs. For medical emergencies, dial 911. Now available from your iPhone and Android! General Information Please provide this summary of care documentation to your next provider. Patient Signature:  ____________________________________________________________ Date:  ____________________________________________________________  
  
Ambrosio Leblanc Provider Signature:  ____________________________________________________________ Date:  ____________________________________________________________

## 2017-03-06 NOTE — ANESTHESIA POSTPROCEDURE EVALUATION
Post-Anesthesia Evaluation and Assessment    Patient: Heather Street MRN: [id number]  SSN: [id number]    YOB: 1965  Age: 54 y.o. Sex: female       Cardiovascular Function/Vital Signs  Visit Vitals    /76    Pulse 84    Temp 36.6 °C (97.9 °F)    Resp 20    Ht 5' 4\" (1.626 m)    Wt 67.6 kg (149 lb)    SpO2 94%    BMI 25.58 kg/m2       Patient is status post general anesthesia for Procedure(s):  CYSTOSCOPY WITH HYDRODISTENTION OF BLADDER. Nausea/Vomiting: None    Postoperative hydration reviewed and adequate. Pain:  Pain Scale 1: FLACC (03/06/17 1629)  Pain Intensity 1: 0 (03/06/17 1629)   Managed    Neurological Status:   Neuro (WDL): Exceptions to WDL (03/06/17 1629)  Neuro  Neurologic State: Lethargic (03/06/17 1629)  LUE Motor Response: Purposeful (03/06/17 1629)  LLE Motor Response: Purposeful (03/06/17 1629)  RUE Motor Response: Purposeful (03/06/17 1629)  RLE Motor Response: Purposeful (03/06/17 1629)   At baseline    Mental Status and Level of Consciousness: Arousable    Pulmonary Status:   O2 Device: Nasal cannula (03/06/17 1629)   Adequate oxygenation and airway patent    Complications related to anesthesia: None    Post-anesthesia assessment completed.  No concerns    Signed By: Garcia Long MD     March 6, 2017

## 2017-03-06 NOTE — DISCHARGE INSTRUCTIONS
Cystoscopy: What to Expect at [address] [facility]    A cystoscopy is a procedure that lets a doctor look inside of the bladder and the urethra. The urethra is the tube that carries urine from the bladder to outside the body. The doctor uses a thin, lighted tube called a cystoscope to look for kidney or bladder stones, tumors, bleeding, or infection. After the cystoscopy, your urethra may be sore at first, and it may burn when you urinate for the first few days after the procedure. You may feel the need to urinate more often, and your urine may be pink. These symptoms should get better in 1 or 2 days. You will probably be able to go back to work or most of your usual activities in 1 or 2 days. This care sheet gives you a general idea about how long it will take for you to recover. But each person recovers at a different pace. Follow the steps below to get better as quickly as possible. How can you care for yourself at home? Activity  · Rest when you feel tired. Getting enough sleep will help you recover. · Try to walk each day. Start by walking a little more than you did the day before. Bit by bit, increase the amount you walk. Walking boosts blood flow and helps prevent pneumonia and constipation. · Avoid strenuous activities, such as bicycle riding, jogging, weight lifting, or aerobic exercise, until your doctor says it is okay. · Ask your doctor when you can drive again. · Most people are able to return to work within 1 or 2 days after the procedure. · You may shower and take baths as usual.  · Ask your doctor when it is okay for you to have sex. Diet  · You can eat your normal diet. If your stomach is upset, try bland, low-fat foods like plain rice, broiled chicken, toast, and yogurt. · Drink plenty of fluids (unless your doctor tells you not to). Medicines  · Take pain medicines exactly as directed. ¨ If the doctor gave you a prescription medicine for pain, take it as prescribed.   ¨ If you are not taking a prescription pain medicine, ask your doctor if you can take an over-the-counter medicine. · If you think your pain medicine is making you sick to your stomach:  ¨ Take your medicine after meals (unless your doctor has told you not to). ¨ Ask your doctor for a different pain medicine. · If your doctor prescribed antibiotics, take them as directed. Do not stop taking them just because you feel better. You need to take the full course of antibiotics. Follow-up care is a key part of your treatment and safety. Be sure to make and go to all appointments, and call your doctor if you are having problems. It's also a good idea to know your test results and keep a list of the medicines you take. When should you call for help? Call 911 anytime you think you may need emergency care. For example, call if:  · You passed out (lost consciousness). · You have severe trouble breathing. · You have sudden chest pain and shortness of breath, or you cough up blood. · You have severe belly pain. Call your doctor now or seek immediate medical care if:  · You are sick to your stomach or cannot keep fluids down. · Your urine is still red or you see blood clots after you have urinated several times. · You have trouble passing urine or stool, especially if you have pain or swelling in your lower belly. · You have signs of a blood clot, such as:  ¨ Pain in your calf, back of the knee, thigh, or groin. ¨ Redness and swelling in your leg or groin. · You develop a fever or severe chills. · You have pain in your back just below your rib cage. This is called flank pain. Watch closely for changes in your health, and be sure to contact your doctor if:  · You have pain or burning when you urinate. A burning feeling is normal for a day or two after the test, but call if it does not get better. · You have a frequent urge to urinate but can pass only small amounts of urine. · Your urine is pink, red, or cloudy, or smells bad. It is normal for the urine to have a pinkish color for a few days after the test, but call if it does not get better. Where can you learn more? Go to http://maria alejandra-nereyda.info/. Enter T650 in the search box to learn more about \"Cystoscopy: What to Expect at Home. \"  Current as of: August 12, 2016  Content Version: 11.1  © 0239-6611 [employer]. Care instructions adapted under license by [employer] (which disclaims liability or warranty for this information). If you have questions about a medical condition or this instruction, always ask your healthcare professional. [address] any warranty or liability for your use of this information. After general anesthesia or intravenous sedation, for 24 hours or while taking prescription Narcotics:  · Limit your activities  · Do not drive and operate hazardous machinery  · Do not make important personal or business decisions  · Do  not drink alcoholic beverages  · If you have not urinated within 8 hours after discharge, please contact your surgeon on call. *  Please give a list of your current medications to your Primary Care Provider. *  Please update this list whenever your medications are discontinued, doses are      changed, or new medications (including over-the-counter products) are added. *  Please carry medication information at all times in case of emergency situations. These are general instructions for a healthy lifestyle:  No smoking/ No tobacco products/ Avoid exposure to second hand smoke  Surgeon General's Warning:  Quitting smoking now greatly reduces serious risk to your health.   Obesity, smoking, and sedentary lifestyle greatly increases your risk for illness  A healthy diet, regular physical exercise & weight monitoring are important for maintaining a healthy lifestyle    You may be retaining fluid if you have a history of heart failure or if you experience any of the following symptoms:  Weight gain of 3 pounds or more overnight or 5 pounds in a week, increased swelling in our hands or feet or shortness of breath while lying flat in bed. Please call your doctor as soon as you notice any of these symptoms; do not wait until your next office visit. Recognize signs and symptoms of STROKE:    F-face looks uneven    A-arms unable to move or move unevenly    S-speech slurred or non-existent    T-time-call 911 as soon as signs and symptoms begin-DO NOT go       Back to bed or wait to see if you get better-TIME IS BRAIN.

## 2017-03-07 NOTE — OP NOTES
[address] [address]   OPERATIVE REPORT       Name:  Kimani Oshea   MR#:  [id number]   :  1965   Account #:  [de-identified]   Date of Adm:  2017       DATE OF SURGERY: 2017    PREOPERATIVE DIAGNOSIS: Pelvic pain. POSTOPERATIVE DIAGNOSIS: Interstitial cystitis. NAME OF PROCEDURE: Cystoscopy and hydrodistention of the   bladder. SURGEON: Zenon Augustine. Leslie Walls MD.    ANESTHESIA: General.    FINDINGS: Moderate glomerulations noted on the floor and lateral   walls after hydrodistention. DESCRIPTION OF PROCEDURE: The patient was given a general   anesthetic, placed in the dorsal lithotomy position. A B and O   suppository was placed in the rectum. She was prepped and draped   in sterile fashion. Examination shows normal external genitalia. A 25-Urdu cystoscope was passed in the bladder using video   camera and 30 degree lens. The bladder appears normal. The mucosa showed no abnormalities. Both ureteral orifices appeared normal.      The bladder was distended with the irrigation bag held 80 cm   anterior to the level of the bladder. It was held distended for   8 minutes and then allowed to drain. The capacity was   approximately 900 mL under anesthesia. The bladder was inspected after drainage. There was no evidence   of any trauma. There were glomerulations noted on the floor and   the posterolateral walls. This is consistent with interstitial   cystitis. Marcaine 30 mL was instilled in the bladder and the scope was   removed. She was taken to the recovery room in stable condition. PLAN: She will be discharged home and return to the office in 1   month for followup.         MD Liliana Gandhi / Vy Medina   D:  2017   16:28   T:  2017   11:23   Job #:  [id number]

## 2017-04-12 ENCOUNTER — HOSPITAL ENCOUNTER (OUTPATIENT)
Age: 52
Setting detail: OBSERVATION
Discharge: HOME | End: 2017-04-15
Attending: EMERGENCY MEDICINE | Admitting: INTERNAL MEDICINE
Payer: COMMERCIAL

## 2017-04-12 ENCOUNTER — APPOINTMENT (OUTPATIENT)
Dept: GENERAL RADIOLOGY | Age: 52
End: 2017-04-12
Attending: EMERGENCY MEDICINE
Payer: COMMERCIAL

## 2017-04-12 DIAGNOSIS — I21.4 NON-ST ELEVATION MYOCARDIAL INFARCTION (NSTEMI) (HCC): Primary | ICD-10-CM

## 2017-04-12 LAB
ALBUMIN SERPL BCP-MCNC: 3.5 G/DL (ref 3.5–5)
ALBUMIN/GLOB SERPL: 0.9 {RATIO} (ref 1.2–3.5)
ALP SERPL-CCNC: 62 U/L (ref 50–136)
ALT SERPL-CCNC: 24 U/L (ref 12–65)
ANION GAP BLD CALC-SCNC: 7 MMOL/L (ref 7–16)
AST SERPL W P-5'-P-CCNC: 40 U/L (ref 15–37)
BASOPHILS # BLD AUTO: 0 K/UL (ref 0–0.2)
BASOPHILS # BLD: 0 % (ref 0–2)
BILIRUB SERPL-MCNC: 0.4 MG/DL (ref 0.2–1.1)
BNP SERPL-MCNC: 31 PG/ML
BUN SERPL-MCNC: 5 MG/DL (ref 6–23)
CALCIUM SERPL-MCNC: 9.1 MG/DL (ref 8.3–10.4)
CHLORIDE SERPL-SCNC: 106 MMOL/L (ref 98–107)
CO2 SERPL-SCNC: 26 MMOL/L (ref 21–32)
CREAT SERPL-MCNC: 0.88 MG/DL (ref 0.6–1)
D DIMER PPP FEU-MCNC: <0.19 UG/ML(FEU)
DIFFERENTIAL METHOD BLD: ABNORMAL
EOSINOPHIL # BLD: 0.1 K/UL (ref 0–0.8)
EOSINOPHIL NFR BLD: 1 % (ref 0.5–7.8)
ERYTHROCYTE [DISTWIDTH] IN BLOOD BY AUTOMATED COUNT: 12.5 % (ref 11.9–14.6)
GLOBULIN SER CALC-MCNC: 3.7 G/DL (ref 2.3–3.5)
GLUCOSE SERPL-MCNC: 77 MG/DL (ref 65–100)
HCT VFR BLD AUTO: 34.2 % (ref 35.8–46.3)
HGB BLD-MCNC: 11.6 G/DL (ref 11.7–15.4)
IMM GRANULOCYTES # BLD: 0 K/UL (ref 0–0.5)
IMM GRANULOCYTES NFR BLD AUTO: 0.2 % (ref 0–5)
LYMPHOCYTES # BLD AUTO: 36 % (ref 13–44)
LYMPHOCYTES # BLD: 2.3 K/UL (ref 0.5–4.6)
MCH RBC QN AUTO: 31.8 PG (ref 26.1–32.9)
MCHC RBC AUTO-ENTMCNC: 33.9 G/DL (ref 31.4–35)
MCV RBC AUTO: 93.7 FL (ref 79.6–97.8)
MONOCYTES # BLD: 0.4 K/UL (ref 0.1–1.3)
MONOCYTES NFR BLD AUTO: 6 % (ref 4–12)
NEUTS SEG # BLD: 3.5 K/UL (ref 1.7–8.2)
NEUTS SEG NFR BLD AUTO: 57 % (ref 43–78)
PLATELET # BLD AUTO: 238 K/UL (ref 150–450)
PMV BLD AUTO: 10 FL (ref 10.8–14.1)
POTASSIUM SERPL-SCNC: 4.1 MMOL/L (ref 3.5–5.1)
PROT SERPL-MCNC: 7.2 G/DL (ref 6.3–8.2)
RBC # BLD AUTO: 3.65 M/UL (ref 4.05–5.25)
SODIUM SERPL-SCNC: 139 MMOL/L (ref 136–145)
TROPONIN I BLD-MCNC: 0.01 NG/ML (ref 0–0.08)
TROPONIN I BLD-MCNC: 0.45 NG/ML (ref 0–0.08)
TROPONIN I SERPL-MCNC: 0.02 NG/ML (ref 0.02–0.05)
WBC # BLD AUTO: 6.2 K/UL (ref 4.3–11.1)

## 2017-04-12 PROCEDURE — 85379 FIBRIN DEGRADATION QUANT: CPT | Performed by: EMERGENCY MEDICINE

## 2017-04-12 PROCEDURE — 96375 TX/PRO/DX INJ NEW DRUG ADDON: CPT | Performed by: EMERGENCY MEDICINE

## 2017-04-12 PROCEDURE — 96366 THER/PROPH/DIAG IV INF ADDON: CPT | Performed by: EMERGENCY MEDICINE

## 2017-04-12 PROCEDURE — 74011250636 HC RX REV CODE- 250/636: Performed by: EMERGENCY MEDICINE

## 2017-04-12 PROCEDURE — 93005 ELECTROCARDIOGRAM TRACING: CPT | Performed by: EMERGENCY MEDICINE

## 2017-04-12 PROCEDURE — 96365 THER/PROPH/DIAG IV INF INIT: CPT | Performed by: EMERGENCY MEDICINE

## 2017-04-12 PROCEDURE — 71020 XR CHEST PA LAT: CPT

## 2017-04-12 PROCEDURE — 96376 TX/PRO/DX INJ SAME DRUG ADON: CPT | Performed by: EMERGENCY MEDICINE

## 2017-04-12 PROCEDURE — 99285 EMERGENCY DEPT VISIT HI MDM: CPT | Performed by: EMERGENCY MEDICINE

## 2017-04-12 PROCEDURE — 84484 ASSAY OF TROPONIN QUANT: CPT | Performed by: EMERGENCY MEDICINE

## 2017-04-12 PROCEDURE — 83880 ASSAY OF NATRIURETIC PEPTIDE: CPT | Performed by: EMERGENCY MEDICINE

## 2017-04-12 PROCEDURE — 84484 ASSAY OF TROPONIN QUANT: CPT

## 2017-04-12 PROCEDURE — 96374 THER/PROPH/DIAG INJ IV PUSH: CPT | Performed by: EMERGENCY MEDICINE

## 2017-04-12 PROCEDURE — 80053 COMPREHEN METABOLIC PANEL: CPT | Performed by: EMERGENCY MEDICINE

## 2017-04-12 PROCEDURE — 81003 URINALYSIS AUTO W/O SCOPE: CPT | Performed by: EMERGENCY MEDICINE

## 2017-04-12 PROCEDURE — 85025 COMPLETE CBC W/AUTO DIFF WBC: CPT | Performed by: EMERGENCY MEDICINE

## 2017-04-12 PROCEDURE — 94760 N-INVAS EAR/PLS OXIMETRY 1: CPT | Performed by: EMERGENCY MEDICINE

## 2017-04-12 RX ORDER — MORPHINE SULFATE 4 MG/ML
4 INJECTION, SOLUTION INTRAMUSCULAR; INTRAVENOUS
Status: COMPLETED | OUTPATIENT
Start: 2017-04-12 | End: 2017-04-12

## 2017-04-12 RX ORDER — ONDANSETRON 2 MG/ML
4 INJECTION INTRAMUSCULAR; INTRAVENOUS
Status: COMPLETED | OUTPATIENT
Start: 2017-04-12 | End: 2017-04-12

## 2017-04-12 RX ORDER — HEPARIN SODIUM 5000 [USP'U]/ML
4000 INJECTION, SOLUTION INTRAVENOUS; SUBCUTANEOUS
Status: COMPLETED | OUTPATIENT
Start: 2017-04-12 | End: 2017-04-12

## 2017-04-12 RX ORDER — DIAZEPAM 10 MG/2ML
5 INJECTION INTRAMUSCULAR
Status: COMPLETED | OUTPATIENT
Start: 2017-04-12 | End: 2017-04-12

## 2017-04-12 RX ORDER — HEPARIN SODIUM 5000 [USP'U]/100ML
12-25 INJECTION, SOLUTION INTRAVENOUS
Status: DISCONTINUED | OUTPATIENT
Start: 2017-04-12 | End: 2017-04-13

## 2017-04-12 RX ORDER — OXYCODONE AND ACETAMINOPHEN 7.5; 325 MG/1; MG/1
1 TABLET ORAL
Qty: 18 TAB | Refills: 0 | Status: SHIPPED | OUTPATIENT
Start: 2017-04-12 | End: 2017-10-06

## 2017-04-12 RX ADMIN — DIAZEPAM 5 MG: 5 INJECTION, SOLUTION INTRAMUSCULAR; INTRAVENOUS at 19:15

## 2017-04-12 RX ADMIN — HEPARIN SODIUM 4000 UNITS: 5000 INJECTION, SOLUTION INTRAVENOUS; SUBCUTANEOUS at 23:27

## 2017-04-12 RX ADMIN — ONDANSETRON 4 MG: 2 INJECTION INTRAMUSCULAR; INTRAVENOUS at 23:27

## 2017-04-12 RX ADMIN — MORPHINE SULFATE 4 MG: 4 INJECTION, SOLUTION INTRAMUSCULAR; INTRAVENOUS at 18:05

## 2017-04-12 RX ADMIN — HEPARIN SODIUM AND DEXTROSE 12 UNITS/KG/HR: 5000; 5 INJECTION INTRAVENOUS at 23:28

## 2017-04-12 RX ADMIN — MORPHINE SULFATE 4 MG: 4 INJECTION, SOLUTION INTRAMUSCULAR; INTRAVENOUS at 23:28

## 2017-04-12 RX ADMIN — ONDANSETRON 4 MG: 2 INJECTION INTRAMUSCULAR; INTRAVENOUS at 19:50

## 2017-04-12 NOTE — ED TRIAGE NOTES
Per patient chest pain for the past three days that radiates through her left arm that is intermittent in nature. Patient complains of nausea, and shortness of breath upon laying back.

## 2017-04-12 NOTE — DISCHARGE INSTRUCTIONS
Chest Pain: Care Instructions  Your Care Instructions  There are many things that can cause chest pain. Some are not serious and will get better on their own in a few days. But some kinds of chest pain need more testing and treatment. Your doctor may have recommended a follow-up visit in the next 8 to 12 hours. If you are not getting better, you may need more tests or treatment. Even though your doctor has released you, you still need to watch for any problems. The doctor carefully checked you, but sometimes problems can develop later. If you have new symptoms or if your symptoms do not get better, get medical care right away. If you have worse or different chest pain or pressure that lasts more than 5 minutes or you passed out (lost consciousness), call 911 or seek other emergency help right away. A medical visit is only one step in your treatment. Even if you feel better, you still need to do what your doctor recommends, such as going to all suggested follow-up appointments and taking medicines exactly as directed. This will help you recover and help prevent future problems. How can you care for yourself at home? · Rest until you feel better. · Take your medicine exactly as prescribed. Call your doctor if you think you are having a problem with your medicine. · Do not drive after taking a prescription pain medicine. When should you call for help? Call 911 if:  · You passed out (lost consciousness). · You have severe difficulty breathing. · You have symptoms of a heart attack. These may include:  ¨ Chest pain or pressure, or a strange feeling in your chest.  ¨ Sweating. ¨ Shortness of breath. ¨ Nausea or vomiting. ¨ Pain, pressure, or a strange feeling in your back, neck, jaw, or upper belly or in one or both shoulders or arms. ¨ Lightheadedness or sudden weakness. ¨ A fast or irregular heartbeat.   After you call 911, the  may tell you to chew 1 adult-strength or 2 to 4 low-dose aspirin. Wait for an ambulance. Do not try to drive yourself. Call your doctor today if:  · You have any trouble breathing. · Your chest pain gets worse. · You are dizzy or lightheaded, or you feel like you may faint. · You are not getting better as expected. · You are having new or different chest pain. Where can you learn more? Go to http://maria alejandra-nereyda.info/. Enter A120 in the search box to learn more about \"Chest Pain: Care Instructions. \"  Current as of: May 27, 2016  Content Version: 11.2  © 7135-7326 [employer]. Care instructions adapted under license by [employer] (which disclaims liability or warranty for this information). If you have questions about a medical condition or this instruction, always ask your healthcare professional. [address] any warranty or liability for your use of this information.

## 2017-04-12 NOTE — ED PROVIDER NOTES
HPI Comments: Presents with complaint of left arm pain that radiates into her left-sided chest for 3 days. Patient is tearful and holding her left arm. She reports she had to lay flat on the ground to get comfortable. She states that she called her cardiologist's office and was told to come here. She reports that she took some nitroglycerin but it did not help. She reports nothing makes it better or worse. Patient is a 54 y.o. female presenting with chest pain. The history is provided by the patient. Chest Pain (Angina)    This is a new problem. The current episode started more than 2 days ago. The problem has been gradually worsening. The problem occurs constantly. The pain is associated with normal activity. The pain is present in the left side. The pain is severe. The quality of the pain is described as stabbing and sharp. The pain radiates to the left arm. The symptoms are aggravated by certain positions and palpation. Associated symptoms include nausea and shortness of breath. Pertinent negatives include no cough, no diaphoresis, no exertional chest pressure, no fever, no leg pain and no vomiting. She has tried nitroglycerin for the symptoms. The treatment provided no relief. Risk factors include smoking/tobacco exposure and cardiac disease. Procedural history includes cardiac catheterization. Pertinent negatives include no cardiac stents.        Past Medical History:   Diagnosis Date    Anemia     Arrhythmia     palpitations, moderate mitral valve regurge    Arthritis     lower back osteo    Asthma     uses inhalers    Cardiomyopathy     due to murmur (patient states leaky valve)    Chronic pain     stomach 8 yrs    COPD     bronchitis chronic controlled by inhalers    Depression     controlled      Diverticulitis     GERD (gastroesophageal reflux disease)     fair control with med    Heart murmur     Hypertension     IBS (irritable bowel syndrome)     IC (interstitial cystitis)     Insomnia     Mitral valve regurgitation, rheumatic     followed Dr. Mita Roche Palpitations 5/16/2016    Psychiatric disorder     anxiety    Rheumatic aortic insufficiency 5/16/2016    Smoker     has been quit for 2 months    Tobacco abuse disorder 5/16/2016    Vitamin D deficiency        Past Surgical History:   Procedure Laterality Date    BIOPSY OF BREAST, INCISIONAL      lymph node , left, patient states her bx neg, but high risk    COLONOSCOPY      8 polyps removed, diverticulitis    CYSTOSCOPY  8-23-11    bladder dilitation    EGD      HX APPENDECTOMY  2006    HX HEART CATHETERIZATION  5/27/2011    no stents    HX LAP CHOLECYSTECTOMY  6/6/2011    HX NATASHA AND BSO  2004    fibroid tumors, hyst    HX UROLOGICAL      cystoscopy         Family History:   Problem Relation Age of Onset    Heart Failure Father 76     chf    Heart Disease Father     Diabetes Sister     Heart Disease Maternal Grandfather     Diabetes Maternal Grandfather     Heart Disease Paternal Grandfather     Cancer Maternal Uncle      prostate       Social History     Social History    Marital status:      Spouse name: N/A    Number of children: N/A    Years of education: N/A     Occupational History    Not on file. Social History Main Topics    Smoking status: Former Smoker     Packs/day: 0.25     Years: 25.00     Quit date: 11/30/2016    Smokeless tobacco: Never Used    Alcohol use No    Drug use: No    Sexual activity: Not on file     Other Topics Concern    Not on file     Social History Narrative         ALLERGIES: Aspirin; Bentyl [dicyclomine]; Toradol [ketorolac]; Ultram [tramadol]; and Zofran [ondansetron hcl (pf)]    Review of Systems   Constitutional: Negative for diaphoresis and fever. Respiratory: Positive for shortness of breath. Negative for cough. Cardiovascular: Positive for chest pain. Gastrointestinal: Positive for nausea. Negative for vomiting.    All other systems reviewed and are negative. Vitals:    04/12/17 1638   BP: 139/90   Pulse: (!) 107   Resp: 16   Temp: 98 °F (36.7 °C)   SpO2: 100%   Weight: 68.9 kg (152 lb)   Height: 5' 4\" (1.626 m)            Physical Exam   Constitutional: She is oriented to person, place, and time. She appears well-developed and well-nourished. She appears distressed. HENT:   Head: Normocephalic and atraumatic. Neck: Normal range of motion. Neck supple. Cardiovascular: Normal rate and regular rhythm. Pulmonary/Chest: Effort normal and breath sounds normal. No respiratory distress. She has no wheezes. She has no rales. She exhibits tenderness. Abdominal: Soft. She exhibits no distension. There is no tenderness. There is no rebound and no guarding. Musculoskeletal: Normal range of motion. She exhibits no edema. Neurological: She is alert and oriented to person, place, and time. No cranial nerve deficit. Skin: Skin is warm and dry. No rash noted. She is not diaphoretic. No erythema. Psychiatric: She has a normal mood and affect. Her behavior is normal.   Nursing note and vitals reviewed. MDM  Number of Diagnoses or Management Options  Non-ST elevation myocardial infarction (NSTEMI) [facility]): new and requires workup  Diagnosis management comments: EKG shows some worsening T-wave inversion. She felt somewhat better after 4 of morphine. Given all of her lab work up is negative, her symptoms are atypical (constant, worse with position, reproducible) and her heart cath in 2011 showed no disease and she sees cardiology because of rheumatic fever and aortic valve disease I think that she can go home with pain medication if her second enzyme is negative and that this pain is likely musculoskeletal.  She requested Percocet and not hydrocodone for discharge. Patient had increased chest pain and EKG is obtained demonstrating some dynamic changes in the lateral and inferior leads.   Discussed with cardiology who evaluated the EKGs and believes this secondary to LVH. They recommend a third troponin. If negative will discharge    Third troponin positive with continued EKG changes and worsening chest pain. We'll admit to cardiology for further evaluation and treatment. Heparin started in the emergency department. Amount and/or Complexity of Data Reviewed  Clinical lab tests: ordered and reviewed  Tests in the radiology section of CPT®: ordered and reviewed (Xr Chest Pa Lat    Result Date: 4/12/2017  History: chest pain Two views chest Findings: The lungs are well expanded and clear.  The cardiac silhouette, and mediastinal contour, and osseous structures are normal.     Impression: Unremarkable two-view chest.     )  Review and summarize past medical records: yes  Independent visualization of images, tracings, or specimens: yes    Risk of Complications, Morbidity, and/or Mortality  Presenting problems: high  Diagnostic procedures: moderate  Management options: moderate    Patient Progress  Patient progress: improved    ED Course       Procedures

## 2017-04-13 LAB
APTT PPP: 99.1 SEC (ref 23.5–31.7)
ATRIAL RATE: 85 BPM
ATRIAL RATE: 95 BPM
CALCULATED P AXIS, ECG09: 58 DEGREES
CALCULATED P AXIS, ECG09: 78 DEGREES
CALCULATED R AXIS, ECG10: 66 DEGREES
CALCULATED R AXIS, ECG10: 73 DEGREES
CALCULATED T AXIS, ECG11: -33 DEGREES
CALCULATED T AXIS, ECG11: -99 DEGREES
CK SERPL-CCNC: 2411 U/L (ref 21–215)
CK SERPL-CCNC: 880 U/L (ref 21–215)
DIAGNOSIS, 93000: NORMAL
DIAGNOSIS, 93000: NORMAL
P-R INTERVAL, ECG05: 128 MS
P-R INTERVAL, ECG05: 130 MS
Q-T INTERVAL, ECG07: 374 MS
Q-T INTERVAL, ECG07: 384 MS
QRS DURATION, ECG06: 78 MS
QRS DURATION, ECG06: 86 MS
QTC CALCULATION (BEZET), ECG08: 445 MS
QTC CALCULATION (BEZET), ECG08: 482 MS
TROPONIN I SERPL-MCNC: 5.16 NG/ML (ref 0.02–0.05)
TROPONIN I SERPL-MCNC: >40 NG/ML (ref 0.02–0.05)
VENTRICULAR RATE, ECG03: 85 BPM
VENTRICULAR RATE, ECG03: 95 BPM

## 2017-04-13 PROCEDURE — 94760 N-INVAS EAR/PLS OXIMETRY 1: CPT

## 2017-04-13 PROCEDURE — 74011000250 HC RX REV CODE- 250: Performed by: INTERNAL MEDICINE

## 2017-04-13 PROCEDURE — 74011250636 HC RX REV CODE- 250/636

## 2017-04-13 PROCEDURE — 36415 COLL VENOUS BLD VENIPUNCTURE: CPT | Performed by: INTERNAL MEDICINE

## 2017-04-13 PROCEDURE — 77030015766

## 2017-04-13 PROCEDURE — 94640 AIRWAY INHALATION TREATMENT: CPT

## 2017-04-13 PROCEDURE — 74011250636 HC RX REV CODE- 250/636: Performed by: INTERNAL MEDICINE

## 2017-04-13 PROCEDURE — 99218 HC RM OBSERVATION: CPT

## 2017-04-13 PROCEDURE — 93454 CORONARY ARTERY ANGIO S&I: CPT

## 2017-04-13 PROCEDURE — 77030004534 HC CATH ANGI DX INFN CARD -A

## 2017-04-13 PROCEDURE — C8929 TTE W OR WO FOL WCON,DOPPLER: HCPCS

## 2017-04-13 PROCEDURE — 99152 MOD SED SAME PHYS/QHP 5/>YRS: CPT

## 2017-04-13 PROCEDURE — 77030029997 HC DEV COM RDL R BND TELE -B

## 2017-04-13 PROCEDURE — 74011636320 HC RX REV CODE- 636/320: Performed by: INTERNAL MEDICINE

## 2017-04-13 PROCEDURE — 99153 MOD SED SAME PHYS/QHP EA: CPT

## 2017-04-13 PROCEDURE — 96366 THER/PROPH/DIAG IV INF ADDON: CPT

## 2017-04-13 PROCEDURE — 84484 ASSAY OF TROPONIN QUANT: CPT | Performed by: INTERNAL MEDICINE

## 2017-04-13 PROCEDURE — 74011250637 HC RX REV CODE- 250/637: Performed by: NURSE PRACTITIONER

## 2017-04-13 PROCEDURE — 96376 TX/PRO/DX INJ SAME DRUG ADON: CPT

## 2017-04-13 PROCEDURE — 82550 ASSAY OF CK (CPK): CPT | Performed by: INTERNAL MEDICINE

## 2017-04-13 PROCEDURE — C1769 GUIDE WIRE: HCPCS

## 2017-04-13 PROCEDURE — 74011250637 HC RX REV CODE- 250/637: Performed by: INTERNAL MEDICINE

## 2017-04-13 PROCEDURE — C1894 INTRO/SHEATH, NON-LASER: HCPCS

## 2017-04-13 PROCEDURE — 85730 THROMBOPLASTIN TIME PARTIAL: CPT | Performed by: INTERNAL MEDICINE

## 2017-04-13 RX ORDER — HEPARIN SODIUM 200 [USP'U]/100ML
25 INJECTION, SOLUTION INTRAVENOUS CONTINUOUS
Status: DISCONTINUED | OUTPATIENT
Start: 2017-04-13 | End: 2017-04-13

## 2017-04-13 RX ORDER — PROMETHAZINE HYDROCHLORIDE 25 MG/1
25 TABLET ORAL
Status: DISCONTINUED | OUTPATIENT
Start: 2017-04-13 | End: 2017-04-15 | Stop reason: HOSPADM

## 2017-04-13 RX ORDER — FLUOXETINE 10 MG/1
10 CAPSULE ORAL DAILY
Status: DISCONTINUED | OUTPATIENT
Start: 2017-04-13 | End: 2017-04-15 | Stop reason: HOSPADM

## 2017-04-13 RX ORDER — SODIUM CHLORIDE 0.9 % (FLUSH) 0.9 %
5-10 SYRINGE (ML) INJECTION EVERY 8 HOURS
Status: DISCONTINUED | OUTPATIENT
Start: 2017-04-13 | End: 2017-04-15 | Stop reason: HOSPADM

## 2017-04-13 RX ORDER — LIDOCAINE HYDROCHLORIDE 20 MG/ML
1-20 INJECTION, SOLUTION INFILTRATION; PERINEURAL ONCE
Status: COMPLETED | OUTPATIENT
Start: 2017-04-13 | End: 2017-04-13

## 2017-04-13 RX ORDER — ACETAMINOPHEN 325 MG/1
650 TABLET ORAL
Status: DISCONTINUED | OUTPATIENT
Start: 2017-04-13 | End: 2017-04-15 | Stop reason: HOSPADM

## 2017-04-13 RX ORDER — ALBUTEROL SULFATE 2.5 MG/.5ML
2.5 SOLUTION RESPIRATORY (INHALATION)
Status: DISCONTINUED | OUTPATIENT
Start: 2017-04-13 | End: 2017-04-15 | Stop reason: HOSPADM

## 2017-04-13 RX ORDER — MIDAZOLAM HYDROCHLORIDE 1 MG/ML
1-6 INJECTION, SOLUTION INTRAMUSCULAR; INTRAVENOUS
Status: DISCONTINUED | OUTPATIENT
Start: 2017-04-13 | End: 2017-04-13

## 2017-04-13 RX ORDER — AMITRIPTYLINE HYDROCHLORIDE 25 MG/1
100 TABLET, FILM COATED ORAL
Status: DISCONTINUED | OUTPATIENT
Start: 2017-04-13 | End: 2017-04-15 | Stop reason: HOSPADM

## 2017-04-13 RX ORDER — GABAPENTIN 400 MG/1
400 CAPSULE ORAL 2 TIMES DAILY
Status: DISCONTINUED | OUTPATIENT
Start: 2017-04-13 | End: 2017-04-15 | Stop reason: HOSPADM

## 2017-04-13 RX ORDER — SODIUM CHLORIDE 0.9 % (FLUSH) 0.9 %
5-10 SYRINGE (ML) INJECTION AS NEEDED
Status: DISCONTINUED | OUTPATIENT
Start: 2017-04-13 | End: 2017-04-15 | Stop reason: HOSPADM

## 2017-04-13 RX ORDER — MORPHINE SULFATE 2 MG/ML
1 INJECTION, SOLUTION INTRAMUSCULAR; INTRAVENOUS
Status: DISCONTINUED | OUTPATIENT
Start: 2017-04-13 | End: 2017-04-14

## 2017-04-13 RX ORDER — NITROGLYCERIN 0.4 MG/1
0.4 TABLET SUBLINGUAL
Status: DISCONTINUED | OUTPATIENT
Start: 2017-04-13 | End: 2017-04-15 | Stop reason: HOSPADM

## 2017-04-13 RX ORDER — BUDESONIDE 0.5 MG/2ML
500 INHALANT ORAL
Status: DISCONTINUED | OUTPATIENT
Start: 2017-04-13 | End: 2017-04-15 | Stop reason: HOSPADM

## 2017-04-13 RX ORDER — SODIUM CHLORIDE 9 MG/ML
75 INJECTION, SOLUTION INTRAVENOUS CONTINUOUS
Status: DISCONTINUED | OUTPATIENT
Start: 2017-04-13 | End: 2017-04-14

## 2017-04-13 RX ORDER — CLONAZEPAM 1 MG/1
0.5 TABLET ORAL
Status: DISCONTINUED | OUTPATIENT
Start: 2017-04-13 | End: 2017-04-15 | Stop reason: HOSPADM

## 2017-04-13 RX ORDER — FENTANYL CITRATE 50 UG/ML
25-100 INJECTION, SOLUTION INTRAMUSCULAR; INTRAVENOUS
Status: DISCONTINUED | OUTPATIENT
Start: 2017-04-13 | End: 2017-04-13

## 2017-04-13 RX ORDER — SPIRONOLACTONE 25 MG/1
25 TABLET ORAL 2 TIMES DAILY
Status: DISCONTINUED | OUTPATIENT
Start: 2017-04-13 | End: 2017-04-14

## 2017-04-13 RX ORDER — OXYCODONE AND ACETAMINOPHEN 7.5; 325 MG/1; MG/1
1 TABLET ORAL
Status: DISCONTINUED | OUTPATIENT
Start: 2017-04-13 | End: 2017-04-15 | Stop reason: HOSPADM

## 2017-04-13 RX ORDER — NIFEDIPINE 30 MG/1
60 TABLET, EXTENDED RELEASE ORAL DAILY
Status: DISCONTINUED | OUTPATIENT
Start: 2017-04-13 | End: 2017-04-14

## 2017-04-13 RX ORDER — LOSARTAN POTASSIUM 25 MG/1
25 TABLET ORAL DAILY
COMMUNITY
End: 2017-04-15

## 2017-04-13 RX ORDER — PANTOPRAZOLE SODIUM 40 MG/1
40 TABLET, DELAYED RELEASE ORAL
Status: DISCONTINUED | OUTPATIENT
Start: 2017-04-13 | End: 2017-04-15 | Stop reason: HOSPADM

## 2017-04-13 RX ADMIN — Medication 1 MG: at 11:16

## 2017-04-13 RX ADMIN — Medication 1 MG: at 06:54

## 2017-04-13 RX ADMIN — HEPARIN SODIUM 2 ML: 10000 INJECTION, SOLUTION INTRAVENOUS; SUBCUTANEOUS at 08:56

## 2017-04-13 RX ADMIN — Medication 10 ML: at 02:14

## 2017-04-13 RX ADMIN — AMITRIPTYLINE HYDROCHLORIDE 100 MG: 25 TABLET, FILM COATED ORAL at 02:12

## 2017-04-13 RX ADMIN — FLUOXETINE 10 MG: 10 CAPSULE ORAL at 11:19

## 2017-04-13 RX ADMIN — IOPAMIDOL 50 ML: 755 INJECTION, SOLUTION INTRAVENOUS at 08:59

## 2017-04-13 RX ADMIN — ALBUTEROL SULFATE 2.5 MG: 2.5 SOLUTION RESPIRATORY (INHALATION) at 04:34

## 2017-04-13 RX ADMIN — HEPARIN SODIUM 25 ML/HR: 200 INJECTION, SOLUTION INTRAVENOUS at 08:16

## 2017-04-13 RX ADMIN — AMITRIPTYLINE HYDROCHLORIDE 100 MG: 25 TABLET, FILM COATED ORAL at 22:56

## 2017-04-13 RX ADMIN — ALBUTEROL SULFATE 2.5 MG: 2.5 SOLUTION RESPIRATORY (INHALATION) at 20:04

## 2017-04-13 RX ADMIN — SPIRONOLACTONE 25 MG: 25 TABLET, FILM COATED ORAL at 16:47

## 2017-04-13 RX ADMIN — HEPARIN SODIUM 2 ML: 10000 INJECTION, SOLUTION INTRAVENOUS; SUBCUTANEOUS at 08:41

## 2017-04-13 RX ADMIN — Medication 10 ML: at 16:50

## 2017-04-13 RX ADMIN — MIDAZOLAM HYDROCHLORIDE 1 MG: 1 INJECTION, SOLUTION INTRAMUSCULAR; INTRAVENOUS at 08:35

## 2017-04-13 RX ADMIN — BUDESONIDE 500 MCG: 0.5 INHALANT RESPIRATORY (INHALATION) at 20:04

## 2017-04-13 RX ADMIN — GABAPENTIN 400 MG: 400 CAPSULE ORAL at 11:19

## 2017-04-13 RX ADMIN — FENTANYL CITRATE 25 MCG: 50 INJECTION, SOLUTION INTRAMUSCULAR; INTRAVENOUS at 08:35

## 2017-04-13 RX ADMIN — GABAPENTIN 400 MG: 400 CAPSULE ORAL at 16:47

## 2017-04-13 RX ADMIN — LIDOCAINE HYDROCHLORIDE 60 MG: 20 INJECTION, SOLUTION INFILTRATION; PERINEURAL at 08:41

## 2017-04-13 RX ADMIN — OXYCODONE HYDROCHLORIDE AND ACETAMINOPHEN 1 TABLET: 7.5; 325 TABLET ORAL at 16:47

## 2017-04-13 RX ADMIN — PERFLUTREN 1 ML: 6.52 INJECTION, SUSPENSION INTRAVENOUS at 08:00

## 2017-04-13 RX ADMIN — PROMETHAZINE HYDROCHLORIDE 25 MG: 25 TABLET ORAL at 02:12

## 2017-04-13 RX ADMIN — OXYCODONE HYDROCHLORIDE AND ACETAMINOPHEN 1 TABLET: 7.5; 325 TABLET ORAL at 22:56

## 2017-04-13 RX ADMIN — SODIUM CHLORIDE 75 ML/HR: 900 INJECTION, SOLUTION INTRAVENOUS at 07:29

## 2017-04-13 RX ADMIN — NITROGLYCERIN 1 INCH: 20 OINTMENT TOPICAL at 06:51

## 2017-04-13 RX ADMIN — Medication 1 MG: at 02:13

## 2017-04-13 RX ADMIN — SPIRONOLACTONE 25 MG: 25 TABLET, FILM COATED ORAL at 11:19

## 2017-04-13 RX ADMIN — NIFEDIPINE 60 MG: 30 TABLET, FILM COATED, EXTENDED RELEASE ORAL at 11:19

## 2017-04-13 NOTE — ED NOTES
TRANSFER - OUT REPORT:    Verbal report given to [place], RN  on Irieno Oh  being transferred to [facility] for routine progression of care       Report consisted of patients Situation, Background, Assessment and   Recommendations(SBAR). Information from the following report(s) SBAR, ED Summary, [place] and Recent Results was reviewed with the receiving nurse. Lines:       Opportunity for questions and clarification was provided.       Patient transported with:   Monitor  O2 @ 2 liters  Registered Nurse

## 2017-04-13 NOTE — ED NOTES
Patient awake, A&O x3. Patient states she has had left sided chest pain that radiates down the left since Saturday. She states left arm is numb/tingling near her hand. Pain is 10/10, patient remains tearful. Patient complains of some SOB. Also complains of nausea, denies vomiting and diarrhea.

## 2017-04-13 NOTE — PROCEDURES
[address] 46       Name:  Romy Vincent   MR#:  [id number]   :  1965   Account #:  [de-identified]   Date of Adm:  2017       DATE OF PROCEDURE: 2017    REFERRING PHYSICIAN: Kristie Oviedo MD    PRIMARY CARE PHYSICIAN: Gini King MD    REASON FOR PROCEDURE: Substernal chest pain with ST segment   depression inferolaterally, T-wave inversions, and an elevated   troponin. PROCEDURE PERFORMED: Left heart catheterization with coronary   angiography. TOTAL CONTRAST: 50 mL of Isovue. CONSCIOUS SEDATION: The patient was sedated with 1 mg Versed and   25 mcg fentanyl, and monitored throughout the procedure for   about 30 minutes. PROCEDURE TECHNIQUE: After informed consent was obtained, the   patient was brought to the cath lab, prepped and draped in the   usual fashion. A 6-Monegasque [facility] Marcos was inserted in the right   radial artery using a micropuncture modified Seldinger   technique. Left heart catheterization was performed using   standard 5-Monegasque JR4 and Tiger catheters. Upon attempting to   cross the aortic valve, the patient had severe radial artery   spasm with severe pain in her forearm. We crossed the valve with   the wire; however, we were unable to advance the pigtail due to   severe pain due to spasm and immobility of the catheter. We   slowly withdrew the catheter without complication, placed a   multipurpose catheter into the aortic root; however, again, we   were unable to advance the catheter due to spasm and severe   pain. The procedure was aborted with persistent radial spasm,   despite 2 cocktail administrations. The catheters were withdrawn   without complication. Echocardiography will be obtained to   further assess the patient's aortic valve, aortic regurgitation,   left ventricular size and function, and aortic root pathology. PRESSURE RESULTS: Left ventricle 130/78.     CORONARY ANATOMY: The left main is normal, dividing into an LAD   and circumflex in the usual fashion. The LAD wraps around the apex of the left ventricle and supplies   a moderate-sized first diagonal and a couple of very small mid   vessel diagonals. There are minimal luminal irregularities at   most in the mid LAD contained within a segment of   intramyocardial muscle bridge. However, in systole there is only   a 30% compression and in diastole there is less than 10%   residual compression. The remainder of the LAD and diagonals   have, at most, minimal irregularities. The circumflex is a large, but nondominant system consisting of   several small obtuse marginal branches and a large   posterolateral obtuse marginal branch. The entire circumflex   appears angiographically normal.    The right coronary is a moderate caliber, anatomically dominant   vessel supplying a moderate caliber posterior descending branch   and a very small rudimentary posterolateral system. The entire   right coronary appears angiographically normal.    CONCLUSIONS   1. Minimal coronary irregularities at most.   2. Normal variant mid left anterior descending intramyocardial   muscle bridge without hemodynamic compromise. 3. Radial artery spasm and significant discomfort prevented   assessment of left ventricle or aortic size due to spasm. Echocardiography will be ordered.         Teto Denny MD      ATS / LE   D:  04/13/2017   09:08   T:  04/13/2017   09:27   Job #:  [id number]

## 2017-04-13 NOTE — ROUTINE PROCESS
TRANSFER - OUT REPORT:    Verbal report given to RN (name) on Dung Vernon  being transferred to [facility]U (unit) for routine progression of care       Report consisted of patients Situation, Background, Assessment and   Recommendations(SBAR). Information from the following report(s) Procedure Summary, MAR and Recent Results was reviewed with the receiving nurse. Lines:   Peripheral IV 04/13/17 Left Antecubital (Active)   Site Assessment Clean, dry, & intact 4/13/2017  7:15 AM   Phlebitis Assessment 0 4/13/2017  7:15 AM   Infiltration Assessment 0 4/13/2017  7:15 AM   Dressing Status Clean, dry, & intact 4/13/2017  7:15 AM   Dressing Type Transparent;Tape 4/13/2017  7:15 AM   Hub Color/Line Status Patent; Infusing 4/13/2017  7:15 AM   Alcohol Cap Used No 4/13/2017  7:15 AM        Opportunity for questions and clarification was provided.       Patient transported with:   [facility] w/ Dr David Bui  No interventions  R band to right radial w/ 8 mls at 0900  Versed 1 mg IV  Fentanyl 25 mcg IV

## 2017-04-13 NOTE — PROGRESS NOTES
TRANSFER - IN REPORT:    Verbal report received from NATE Tavarez on Eh Calderon being received from ER for routine progression of care      Report consisted of patients Situation, Background, Assessment and Recommendations(SBAR). Information from the following report(s) ED Summary, MAR, Recent Results and Cardiac Rhythm SR was reviewed with the receiving nurse. Opportunity for questions and clarification was provided. Assessment completed upon patients arrival to unit and care assumed. Heart monitor placed. VS and weight taken.

## 2017-04-13 NOTE — H&P
[facility] 67   HISTORY AND PHYSICAL       Name:  Minesh Mckeon   MR#:  [id number]   :  1965   Account #:  [de-identified]   Date of Adm:  2017       REASON FOR ADMISSION: Chest discomfort. HISTORY OF PRESENT ILLNESS: This is a 70-year-old [facility]   American female with no significant prior history of coronary   disease, who presented to the emergency room with chest   discomfort that she states started earlier this morning. Initially noted over the weekend intermittently, but since   she presented at this time, it has been ongoing for over the   last 12 hours. Was noted to have somewhat dynamic ST-segment   changes on her EKG and her troponin trend was mildly elevated,   and hence a cardiology evaluation was requested. She does have a   reported history of possible rheumatic mitral valve disease;   however, last noted echo from  reports normal mitral valve   and some moderate aortic regurgitation along with preserved left   ventricular function. The patient reports the pain is a   combination of sharp and stabbing sensation that radiates up to   her shoulder and the left arm. Denies any associated symptoms. Denies any definite exacerbating or alleviating factors. She   does have a prior history of chronic chest discomfort, per   records, but reports that this is somewhat different than her   prior chest discomfort. Denies any dyspnea at this time. PAST MEDICAL HISTORY   1. History of preserved EF and moderate aortic insufficiency,   echo also noted with left ventricular hypertrophy in  at   [facility]. 2. Noted normal coronary angiogram in . 3. Hypertension. 4. Hyperlipidemia. 5. History of prior chronic chest pain, per records. 6. Prior history of cocaine abuse. 7. Prior history of tobacco abuse. HOME MEDICATIONS   From last outpatient evaluation:   1. Fluoxetine 10 mg daily. 2. Nitroglycerin as needed. 3. Nifedipine extended release 60 mg daily. 4. Nadolol 40 mg daily. 5. Aldactone 25 mg twice a day. 6. Lasix 40 mg twice a day. 7. Klonopin 0.5 mg as needed. 8. Norco 5/325 mg every 6 hours as needed. 9. Phenergan 25 mg every 6 hours as needed. 10. Elavil 100 mg nightly. 11. Neurontin 100 mg 3 times a day. 12. Ditropan 5 mg 3 times a day. 13. Prilosec 20 mg daily. 14. Elmiron 100 mg capsule 3 times a day. 15. Spiriva 18 mcg inhalational daily. 16. Estradiol 2 mg every morning. ALLERGIES   LISTED TO:   1. ASPIRIN. 2. BENTYL. 3. TORADOL. R Barbara Anthony 89. FAMILY HISTORY: Positive for coronary disease in her dad at the   age of 61. Otherwise, no history of sudden cardiac death or   cardiomyopathy. SOCIAL HISTORY: Positive for a prior history of tobacco abuse;   the patient states she quit smoking about 3-4 months ago; prior   to that, has about a 20-pack-year smoking history. Also, a prior   history of cocaine abuse, but she states she has not used it   since 2005. REVIEW OF SYSTEMS: No fevers or chills. No visual changes or any   hearing abnormalities. Chest discomfort as stated in the HPI. No   bowel or bladder issues. No neurological symptoms. Rest per HPI. All other systems reviewed and are negative. PHYSICAL EXAMINATION   VITAL SIGNS: Temperature 98, heart rate 82, blood pressure   140/78, respiratory rate 18, saturating 100% on 2 liters nasal   cannula. GENERAL: Alert and oriented, in no acute respiratory distress. NECK: Supple with no lymphadenopathy, no JVD appreciated. HEENT: Atraumatic, normocephalic. Mucous membranes moist.   Oropharynx clear. CARDIOVASCULAR: S1 S2. Regular rate and rhythm. Low-grade   diastolic murmur heard at the sternal border. LUNGS: Clear to auscultation bilaterally. ABDOMEN: Soft and nondistended. Bowel sounds present. EXTREMITIES: No clubbing, cyanosis, or edema. NEUROLOGIC: No focal cranial nerve deficits.    VASCULAR: Pulses 2+ and symmetrical in her dorsalis pedis and   radial artery. No carotid bruits heard. LABORATORY DATA: Troponin first set 0.01, second set at 0.45. WBC 6.2, hemoglobin 11.6, hematocrit 34.2, platelets 348. D-  dimer negative at less than 0.19. Sodium 139, potassium 4.1,   chloride 106, CO2 is 26, BUN 5, creatinine 0.8. AST mildly   elevated at 40, ALT 24. EKG is reviewed with noted LVH criteria   and secondary ST-T wave changes; however, one of the patient's   EKGs with some more pronounced ST segment depression in the   inferior and anterolateral leads. IMPRESSION   1. Chest discomfort with some more pronounced transient ST-  segment changes and mildly elevated troponin. 2. Prior reported history of some chronic chest pain. 3. History of moderate aortic regurgitation. 4. Hypertension with hypertensive heart disease. 5. Hyperlipidemia. 6. Prior history of tobacco abuse. 7. Prior history of cocaine abuse. RECOMMENDATIONS: The patient's symptoms are somewhat atypical;   however, she is noted to have somewhat dynamic ST-segment   changes along with a mildly elevated troponin. She has currently   been started on a heparin drip. Will continue to trend cardiac   enzymes. Likely plan on a coronary angiogram in the morning. Some of her EKG changes are likely LVH related; however, there   was noted to be some ST-segment changes that were more   pronounced transiently as stated. Plan on checking a   transthoracic echo in the morning to assess her aortic   regurgitation as well. Otherwise, will continue her risk factor   modification, plan further recommendations pending clinical   course.         MD IVAN Motta / YOSSI   D:  04/13/2017   01:07   T:  04/13/2017   01:42   Job #:  [id number]

## 2017-04-13 NOTE — PROGRESS NOTES
Bedside and Verbal shift change report given to Rosemary Gamble RN (oncoming nurse) by self (offgoing nurse). Report included the following information SBAR, Kardex, MAR and Recent Results.

## 2017-04-13 NOTE — PROGRESS NOTES
Radial compression band removed at 1110 after slowly reducing air from 4 cc to zero as per hospital protocol. No bleeding or hematoma noted. 2 x 2 gauze with tegaderm placed over puncture site. The affected extremity is warm and dry to the touch. Frequent vital signs documented per flowheet. Patient instructed to call if any bleeding noted on gauze. Patient verbalized understanding the nursing instructions.

## 2017-04-13 NOTE — PROGRESS NOTES
Critical Result Troponin >40.00 and CK 2411 have been reviewed by Jigar Samuels NP. Pt has had LHC with no intervention and no new orders have been received at this time.

## 2017-04-13 NOTE — PROCEDURES
Brief Cardiac Procedure Note    Patient: Corey Abraham MRN: [id number]  SSN: [id number]    YOB: 1965  Age: 54 y.o. Sex: female      Date of Procedure: 4/13/2017     Pre-procedure Diagnosis: Chest pain CCS Class III    Post-procedure Diagnosis: Non-cardiac Chest Pain    Procedure: Left Heart Catheterization    Brief Description of Procedure: CORS ONLY- RADIAL SPASM SEVERE, NO V-GRAM     Performed By: Michael Chavez MD     Assistants: NONE    Anesthesia: Moderate Sedation    Estimated Blood Loss: Less than 10 mL      Specimens: None    Implants: None    Findings:   NO LV- RADIAL SPASM DESPITE 2 COCKTAILS, COULDN'T MOVE CATHETER  CORS NORMAL    CHECK ECHO FOR TAKOTSUBO, ASSESS AV/ASC AO.  IF NORMAL, CHECK CTA CHEST FOR PE. Complications: None    Recommendations: Continue medical therapy.     Signed By: Michael Chavez MD     April 13, 2017

## 2017-04-14 LAB
AMPHET UR QL SCN: NEGATIVE
ATRIAL RATE: 90 BPM
BARBITURATES UR QL SCN: NEGATIVE
BENZODIAZ UR QL: POSITIVE
CALCULATED P AXIS, ECG09: 70 DEGREES
CALCULATED R AXIS, ECG10: 51 DEGREES
CALCULATED T AXIS, ECG11: -129 DEGREES
CANNABINOIDS UR QL SCN: NEGATIVE
CHOLEST SERPL-MCNC: 145 MG/DL
COCAINE UR QL SCN: POSITIVE
DIAGNOSIS, 93000: NORMAL
HDLC SERPL-MCNC: 104 MG/DL (ref 40–60)
HDLC SERPL: 1.4 {RATIO}
LDLC SERPL CALC-MCNC: 28.8 MG/DL
LIPID PROFILE,FLP: ABNORMAL
METHADONE UR QL: NEGATIVE
OPIATES UR QL: POSITIVE
P-R INTERVAL, ECG05: 130 MS
PCP UR QL: NEGATIVE
Q-T INTERVAL, ECG07: 446 MS
QRS DURATION, ECG06: 78 MS
QTC CALCULATION (BEZET), ECG08: 545 MS
TRIGL SERPL-MCNC: 61 MG/DL (ref 35–150)
VENTRICULAR RATE, ECG03: 90 BPM
VLDLC SERPL CALC-MCNC: 12.2 MG/DL (ref 6–23)

## 2017-04-14 PROCEDURE — 94640 AIRWAY INHALATION TREATMENT: CPT

## 2017-04-14 PROCEDURE — 80307 DRUG TEST PRSMV CHEM ANLYZR: CPT | Performed by: INTERNAL MEDICINE

## 2017-04-14 PROCEDURE — 74011000250 HC RX REV CODE- 250: Performed by: INTERNAL MEDICINE

## 2017-04-14 PROCEDURE — 74011250636 HC RX REV CODE- 250/636: Performed by: INTERNAL MEDICINE

## 2017-04-14 PROCEDURE — 74011250637 HC RX REV CODE- 250/637: Performed by: NURSE PRACTITIONER

## 2017-04-14 PROCEDURE — 99218 HC RM OBSERVATION: CPT

## 2017-04-14 PROCEDURE — 93005 ELECTROCARDIOGRAM TRACING: CPT | Performed by: INTERNAL MEDICINE

## 2017-04-14 PROCEDURE — 36415 COLL VENOUS BLD VENIPUNCTURE: CPT | Performed by: INTERNAL MEDICINE

## 2017-04-14 PROCEDURE — 94760 N-INVAS EAR/PLS OXIMETRY 1: CPT

## 2017-04-14 PROCEDURE — 74011250637 HC RX REV CODE- 250/637: Performed by: INTERNAL MEDICINE

## 2017-04-14 PROCEDURE — 96361 HYDRATE IV INFUSION ADD-ON: CPT

## 2017-04-14 PROCEDURE — 80061 LIPID PANEL: CPT | Performed by: INTERNAL MEDICINE

## 2017-04-14 RX ORDER — LISINOPRIL 5 MG/1
2.5 TABLET ORAL DAILY
Status: DISCONTINUED | OUTPATIENT
Start: 2017-04-14 | End: 2017-04-14

## 2017-04-14 RX ORDER — CARVEDILOL 3.12 MG/1
3.12 TABLET ORAL 2 TIMES DAILY WITH MEALS
Status: DISCONTINUED | OUTPATIENT
Start: 2017-04-14 | End: 2017-04-14

## 2017-04-14 RX ADMIN — AMITRIPTYLINE HYDROCHLORIDE 100 MG: 25 TABLET, FILM COATED ORAL at 21:27

## 2017-04-14 RX ADMIN — OXYCODONE HYDROCHLORIDE AND ACETAMINOPHEN 1 TABLET: 7.5; 325 TABLET ORAL at 23:24

## 2017-04-14 RX ADMIN — SPIRONOLACTONE 25 MG: 25 TABLET, FILM COATED ORAL at 08:52

## 2017-04-14 RX ADMIN — NIFEDIPINE 60 MG: 30 TABLET, FILM COATED, EXTENDED RELEASE ORAL at 08:52

## 2017-04-14 RX ADMIN — ALBUTEROL SULFATE 2.5 MG: 2.5 SOLUTION RESPIRATORY (INHALATION) at 08:56

## 2017-04-14 RX ADMIN — BUDESONIDE 500 MCG: 0.5 INHALANT RESPIRATORY (INHALATION) at 20:30

## 2017-04-14 RX ADMIN — ALBUTEROL SULFATE 2.5 MG: 2.5 SOLUTION RESPIRATORY (INHALATION) at 14:57

## 2017-04-14 RX ADMIN — GABAPENTIN 400 MG: 400 CAPSULE ORAL at 08:52

## 2017-04-14 RX ADMIN — ALBUTEROL SULFATE 2.5 MG: 2.5 SOLUTION RESPIRATORY (INHALATION) at 02:04

## 2017-04-14 RX ADMIN — GABAPENTIN 400 MG: 400 CAPSULE ORAL at 17:18

## 2017-04-14 RX ADMIN — LISINOPRIL 2.5 MG: 5 TABLET ORAL at 09:01

## 2017-04-14 RX ADMIN — OXYCODONE HYDROCHLORIDE AND ACETAMINOPHEN 1 TABLET: 7.5; 325 TABLET ORAL at 18:41

## 2017-04-14 RX ADMIN — OXYCODONE HYDROCHLORIDE AND ACETAMINOPHEN 1 TABLET: 7.5; 325 TABLET ORAL at 09:00

## 2017-04-14 RX ADMIN — Medication 10 ML: at 13:34

## 2017-04-14 RX ADMIN — TIOTROPIUM BROMIDE 18 MCG: 18 CAPSULE ORAL; RESPIRATORY (INHALATION) at 08:57

## 2017-04-14 RX ADMIN — CARVEDILOL 3.12 MG: 3.12 TABLET, FILM COATED ORAL at 09:01

## 2017-04-14 RX ADMIN — FLUOXETINE 10 MG: 10 CAPSULE ORAL at 08:52

## 2017-04-14 RX ADMIN — CLONAZEPAM 0.5 MG: 1 TABLET ORAL at 12:54

## 2017-04-14 RX ADMIN — SODIUM CHLORIDE 250 ML: 900 INJECTION, SOLUTION INTRAVENOUS at 19:00

## 2017-04-14 RX ADMIN — BUDESONIDE 500 MCG: 0.5 INHALANT RESPIRATORY (INHALATION) at 08:56

## 2017-04-14 RX ADMIN — ALBUTEROL SULFATE 2.5 MG: 2.5 SOLUTION RESPIRATORY (INHALATION) at 20:30

## 2017-04-14 RX ADMIN — Medication 5 ML: at 21:29

## 2017-04-14 RX ADMIN — SPIRONOLACTONE 25 MG: 25 TABLET, FILM COATED ORAL at 17:18

## 2017-04-14 RX ADMIN — SODIUM CHLORIDE 500 ML: 900 INJECTION, SOLUTION INTRAVENOUS at 13:20

## 2017-04-14 NOTE — PROGRESS NOTES
Patient again requesting to see MD due to not receiving pain medication because of low BP (see previous progress notes). Explicate orders to hold narcotic pain medication for SBP<90. Patient current BP 77/48. M.  Tarsha Melgar NP notified of patient request.

## 2017-04-14 NOTE — PROGRESS NOTES
BP 79/41. Pain medications still being held per MD order. Patient not happy. Stated, \"you need to do something so I can get my pain medications. \"  Patient refusing all non-narcotic pain medications.

## 2017-04-14 NOTE — PROGRESS NOTES
Verbal order from Reji Contreras RN to d/c all IV pain medications due to condition of patient heart.   Ok to give PO pain medication if SBP >90

## 2017-04-14 NOTE — PROGRESS NOTES
Patient reported to nurse by PCT that she was crying in bed. Patient stated that her heart feels like it is bursting from. her chest.  Patient was given 0.5mg of Klonopin. Patient stated she didn't need anything for anxiety, she wants IV pain medication. Patient SBP in the mid 80's and it was explained to her that she can't have any pain medication at this time due to her low blood pressure. Patient stated that she wants to see her doctor now. Dr. Misael La paged.

## 2017-04-14 NOTE — PROGRESS NOTES
Bedside and Verbal shift change report given to Viviane Pineda RN (oncoming nurse) by self Micheline rendon). Report included the following information SBAR, Kardex, MAR and Recent Results.

## 2017-04-14 NOTE — PROGRESS NOTES
[facility] CARDIOLOGY PROGRESS NOTE           4/14/2017 8:13 AM    Admit Date: 4/12/2017      Subjective:   She complains of low level chest pain today. Cath showed minimal nonobstructive CAD, no LV gram due to radial artery spasm. Echo confirms picture of Takotsubo CM with apical akinesis. LV EF 25-30% with moderate to severe AR. There is a history of cocaine abuse, so beta blockers were not given. Drug screen positive for cocaine. ROS:  Cardiovascular:  As noted above    Objective:      Vitals:    04/13/17 2155 04/14/17 0140 04/14/17 0204 04/14/17 0603   BP: 104/66 95/59  93/57   Pulse: (!) 110 100  99   Resp: 19 18  19   Temp: 98.6 °F (37 °C) 98.2 °F (36.8 °C)  97 °F (36.1 °C)   SpO2: 98% 97% 98% 97%   Weight:  64.1 kg (141 lb 3.3 oz)     Height:           Physical Exam:  General-No Acute Distress  Neck- supple, no JVD  CV- regular rate and rhythm no MRG  Lung- clear bilaterally  Abd- soft, nontender, nondistended  Ext- no edema bilaterally. Skin- warm and dry    Data Review:   Recent Labs      04/14/17   0455  04/13/17   1049  04/13/17   0531  04/12/17   1640   NA   --    --    --   139   K   --    --    --   4.1   BUN   --    --    --   5*   CREA   --    --    --   0.88   GLU   --    --    --   77   WBC   --    --    --   6.2   HGB   --    --    --   11.6*   HCT   --    --    --   34.2*   PLT   --    --    --   238   TROIQ   --   >40.00*  5.16*  0.02   CHOL  145   --    --    --    TGL  61   --    --    --    LDLC  28.8   --    --    --    HDL  104*   --    --    --        Assessment/Plan:   Takotsubo CM with severe LV dysfunction. Hx of drug abuse. Moderate to severe AR. Comment - Add Lisinopril. Drug screen positive for cocaine - so no beta blocker for now.          Tony Vicente MD  4/14/2017 8:13 AM

## 2017-04-14 NOTE — PROGRESS NOTES
Spoke with Dr. Fay Amezcua, he is in [facility]. Stated klonopin was appropriate and no IV pain meds for now. Juan Miguel Herrmann NP to see the patient. Get a 12 lead ekg.

## 2017-04-15 VITALS
TEMPERATURE: 97 F | DIASTOLIC BLOOD PRESSURE: 61 MMHG | WEIGHT: 155.1 LBS | HEART RATE: 90 BPM | SYSTOLIC BLOOD PRESSURE: 98 MMHG | OXYGEN SATURATION: 95 % | RESPIRATION RATE: 17 BRPM | HEIGHT: 64 IN | BODY MASS INDEX: 26.48 KG/M2

## 2017-04-15 PROBLEM — I51.81 TAKOTSUBO CARDIOMYOPATHY: Status: ACTIVE | Noted: 2017-04-15

## 2017-04-15 LAB — TROPONIN I SERPL-MCNC: 15.2 NG/ML (ref 0.02–0.05)

## 2017-04-15 PROCEDURE — 94760 N-INVAS EAR/PLS OXIMETRY 1: CPT

## 2017-04-15 PROCEDURE — 74011250637 HC RX REV CODE- 250/637: Performed by: INTERNAL MEDICINE

## 2017-04-15 PROCEDURE — 99218 HC RM OBSERVATION: CPT

## 2017-04-15 PROCEDURE — 74011250637 HC RX REV CODE- 250/637: Performed by: NURSE PRACTITIONER

## 2017-04-15 PROCEDURE — 74011000250 HC RX REV CODE- 250: Performed by: INTERNAL MEDICINE

## 2017-04-15 PROCEDURE — 84484 ASSAY OF TROPONIN QUANT: CPT | Performed by: INTERNAL MEDICINE

## 2017-04-15 PROCEDURE — 36415 COLL VENOUS BLD VENIPUNCTURE: CPT | Performed by: INTERNAL MEDICINE

## 2017-04-15 PROCEDURE — 94640 AIRWAY INHALATION TREATMENT: CPT

## 2017-04-15 RX ADMIN — ALBUTEROL SULFATE 2.5 MG: 2.5 SOLUTION RESPIRATORY (INHALATION) at 07:56

## 2017-04-15 RX ADMIN — BUDESONIDE 500 MCG: 0.5 INHALANT RESPIRATORY (INHALATION) at 07:56

## 2017-04-15 RX ADMIN — OXYCODONE HYDROCHLORIDE AND ACETAMINOPHEN 1 TABLET: 7.5; 325 TABLET ORAL at 04:39

## 2017-04-15 RX ADMIN — OXYCODONE HYDROCHLORIDE AND ACETAMINOPHEN 1 TABLET: 7.5; 325 TABLET ORAL at 09:03

## 2017-04-15 RX ADMIN — FLUOXETINE 10 MG: 10 CAPSULE ORAL at 09:03

## 2017-04-15 RX ADMIN — GABAPENTIN 400 MG: 400 CAPSULE ORAL at 09:03

## 2017-04-15 RX ADMIN — TIOTROPIUM BROMIDE 18 MCG: 18 CAPSULE ORAL; RESPIRATORY (INHALATION) at 07:59

## 2017-04-15 RX ADMIN — PANTOPRAZOLE SODIUM 40 MG: 40 TABLET, DELAYED RELEASE ORAL at 09:06

## 2017-04-15 NOTE — PROGRESS NOTES
Spoke with Dr. Nabeel Quick about patient requesting estradiol that she takes at home. No orders received for medication at this time.

## 2017-04-15 NOTE — PROGRESS NOTES
Patient stated that she has a ride that is coming for her and when it gets here she is leaving if discharge instructions are ready or not. Feng Payton NP notified. Telephone instructions that if patient leaves before discharge instructions that she may sign out AMA.

## 2017-04-15 NOTE — PROGRESS NOTES
Patient at the nurses station dressed with all her belongings and stated that she is leaving. Patient again instructed that her discharge order has not been placed by the doctor and that if she leaves, she will be leaving against medical device. Patient again stated that she is leaving. Patient was given [facility] paperwork which she signed and then left the floor. Patient removed her own peripheral IV and would not permit the nurse to assess.

## 2017-04-15 NOTE — PROGRESS NOTES
[facility] CARDIOLOGY PROGRESS NOTE           4/15/2017 10:10 AM    Admit Date: 4/12/2017      Subjective:   No o/e; no CP. Wants to go home    ROS:  Cardiovascular:  As noted above    Objective:      Vitals:    04/14/17 2320 04/15/17 0119 04/15/17 0433 04/15/17 0908   BP: 92/43 90/51 105/57 98/61   Pulse:  93 89 90   Resp:  18 18 17   Temp:  97.4 °F (36.3 °C) 97.1 °F (36.2 °C) 97 °F (36.1 °C)   SpO2:  95% 96% 95%   Weight:   70.4 kg (155 lb 1.6 oz)    Height:           Physical Exam:  General-No Acute Distress  Neck- supple, no JVD  CV- regular rate and rhythm no MRG  Lung- clear bilaterally  Abd- soft, nontender, nondistended  Ext- no edema bilaterally. Skin- warm and dry    Data Review:   Recent Labs      04/15/17   0523  04/14/17   0455  04/13/17   1049   04/12/17   1640   NA   --    --    --    --   139   K   --    --    --    --   4.1   BUN   --    --    --    --   5*   CREA   --    --    --    --   0.88   GLU   --    --    --    --   77   WBC   --    --    --    --   6.2   HGB   --    --    --    --   11.6*   HCT   --    --    --    --   34.2*   PLT   --    --    --    --   238   TROIQ  15.20*   --   >40.00*   < >  0.02   CHOL   --   145   --    --    --    TGL   --   61   --    --    --    LDLC   --   28.8   --    --    --    HDL   --   104*   --    --    --     < > = values in this interval not displayed. Assessment/Plan:     Active Problems:    Chest pain (2/4/2012)      Cocaine abuse (11/11/2013)      Takotsubo cardiomyopathy (4/15/2017)      Counseled on drug use; denies any cocaine use since 2005 although UDS positive for cocaine. Hold of BB for now with uncertain duration of last use with cocaine. Ideally low dose ACEI but defer with lower BPs and can re-assess as outpt. Plan home today. Would not restart nifedipine from home list and plan LV dysfn meds as tolerated. Plan TC visit with Dr Anusha Cruz as outpt to re-assess options.      Maddison Miller MD  4/15/2017 10:10 AM

## 2017-04-15 NOTE — DISCHARGE SUMMARY
[facility] Cardiology Discharge Summary     Patient ID:  Reji Linares  [id number]  29 y.o.  1965    Admit date: 4/12/2017    Discharge date:  04/15/2017     Admitting Physician: Tavo Nolasco MD     Discharge Physician: Lisa Le NP/Dr. Flory Moreno     Admission Diagnoses: chest pain    Discharge Diagnoses:    Diagnosis    Takotsubo cardiomyopathy    Blood in stool    Cough    Rheumatic aortic insufficiency    Palpitations    Cocaine abuse    Esophageal reflux    Depression    Rheumatic disease of mitral valve    DDD (degenerative disc disease)    Interstitial cystitis    Chest pain    Essential hypertension, benign       Cardiology Procedures this admission:  Diagnostic left heart catheterization  EchoCardiogram  Consults: None    Hospital Course: Patient presented to the ER with c/o intermittent chest pain x 12 hours. Troponin levels were elevated peaking at >40. Urine tox screen was possible for benzos. Cocaine and opiates. EKG showed ST depression inferolaterally and t wave inversion. Patient underwent cardiac catheterization by Dr. Sania Rosales. Patient was found to have minimal coronary irregularities. Patient tolerated the procedure well and was taken to the telemetry floor for recovery. Echocardiogram showed EF of 25-30% with moderate to severe AR confirming Takotsubo CM. The morning of discharge, patient was up feeling well without any complaints of chest pain or shortness of breath. Patient's right radial cath site was clean, dry and intact without hematoma or bruit. Patient's labs were WNL. Patient was seen and examined by Dr. Flory Moreno and determined stable and ready for discharge. Patient counciled on the need for drug cessation. NO BB at discharge with UDS +cocaine, and no ACE with low BP  The patient will follow up with [facility] Cardiology -- Dr. Moni Ferrara as TC 7.     **Patient refused to wait to be given discharge instructions stating she was leaving and didn't have time to wait. Advised that she has medication changes, but she insists she is leaving. She signed AMA papers prior to discharge. She was not given script for percocet. The script was shredded. Echo results are as follows:    Left ventricle: Systolic function was moderately to markedly reduced. Ejection fraction was estimated in the range of 25 % to 30 %. LV wall motion  consistent with Takotsubo Stress Induced Cardiomyopathy pattern. There was  akinesis of the apical anterior, mid anteroseptal, mid inferoseptal, apical  inferior, mid anterolateral, apical septal, apical lateral, and apical   Wall(s). Wall thickness was at the upper limits of normal. Doppler parameters were  consistent with mild diastolic dysfunction (grade 1). -  Right ventricle: There was no pulmonary artery hypertension.  -  Inferior vena cava, hepatic veins: Respirophasic changes in dimension were  absent. -  Aortic valve: There was moderate to severe regurgitation. -  Mitral valve: There was mild regurgitation. -  Tricuspid valve: There was mild regurgitation. DISPOSITION: The patient is being discharged home in stable condition on a low saturated fat, low cholesterol and low salt diet. The patient is instructed to advance activities as tolerated to the limit of fatigue or shortness of breath. The patient is instructed to avoid all heavy lifting, straining, stooping or squatting for 3-5 days. The patient is instructed to watch the cath site for bleeding/oozing; if seen, the patient is instructed to apply firm pressure with a clean cloth and call [facility]iology at [phone]. The patient is instructed to watch for signs of infection which include: increasing area of redness, fever/hot to touch or purulent drainage at the catheterization site. The patient is instructed not to soak in a bathtub for 7-10 days, but is cleared to shower.  The patient is instructed to call the office or return to the ER for immediate evaluation for any shortness of breath or chest pain not relieved by NTG. Discharge Exam:   Visit Vitals    BP 98/61 (BP 1 Location: Right arm, BP Patient Position: At rest)    Pulse 90    Temp 97 °F (36.1 °C)    Resp 17    Ht 5' 4\" (1.626 m)    Wt 70.4 kg (155 lb 1.6 oz)    SpO2 95%    BMI 26.62 kg/m2     Patient has been seen by Dr. Nettie Robles: see his progress note for exam details. Recent Results (from the past 24 hour(s))   EKG, 12 LEAD, SUBSEQUENT    Collection Time: 04/14/17  1:18 PM   Result Value Ref Range    Ventricular Rate 90 BPM    Atrial Rate 90 BPM    P-R Interval 130 ms    QRS Duration 78 ms    Q-T Interval 446 ms    QTC Calculation (Bezet) 545 ms    Calculated P Axis 70 degrees    Calculated R Axis 51 degrees    Calculated T Axis -129 degrees    Diagnosis       Normal sinus rhythm  Possible Left atrial enlargement  Anterolateral infarct , age undetermined  Marked T wave abnormality, consider inferior ischemia  Prolonged QT  Abnormal ECG    Confirmed by ST DONALD LEE MD (), ARNOLD SOTELO ([id number]) on 4/14/2017 4:42:27 PM     TROPONIN I    Collection Time: 04/15/17  5:23 AM   Result Value Ref Range    Troponin-I, Qt. 15.20 (HH) 0.02 - 0.05 NG/ML         Patient Instructions:     Discharge Medication List as of 4/15/2017 10:53 AM      START taking these medications    Details   oxyCODONE-acetaminophen (PERCOCET) 7.5-325 mg per tablet Take 1 Tab by mouth every four (4) hours as needed for Pain. Max Daily Amount: 6 Tabs., Print, Disp-18 Tab, R-0         CONTINUE these medications which have NOT CHANGED    Details   gabapentin (NEURONTIN) 400 mg capsule Take 400 mg by mouth two (2) times a day., Historical Med      budesonide-formoterol (SYMBICORT) 160-4.5 mcg/actuation HFA inhaler Take 1 Puff by inhalation two (2) times a day.  Indications: BRONCHOSPASM PREVENTION WITH COPD, Historical Med      FLUoxetine (PROZAC) 10 mg tablet Take 10 mg by mouth every morning., Historical Med nitroglycerin (NITROSTAT) 0.4 mg SL tablet 1 Tab by SubLINGual route every five (5) minutes as needed for Chest Pain., Normal, Disp-1 Bottle, R-4      NIFEdipine ER (PROCARDIA XL) 60 mg ER tablet Take 1 Tab by mouth daily. , Normal, Disp-30 Tab, R-11      spironolactone (ALDACTONE) 25 mg tablet Take 1 Tab by mouth two (2) times a day., Print, Disp-180 Tab, R-3      furosemide (LASIX) 40 mg tablet Take 1 Tab by mouth two (2) times a day., Print, Disp-180 Tab, R-3      clonazePAM (KLONOPIN) 0.5 mg tablet Take 0.5 mg by mouth as needed., Historical Med      promethazine (PHENERGAN) 25 mg tablet Take 1 Tab by mouth every six (6) hours as needed. , Print, Disp-12 Tab, R-0      amitriptyline (ELAVIL) 100 mg tablet Take 1 Tab by mouth nightly., Normal, Disp-30 Tab, R-5      omeprazole (PRILOSEC) 20 mg capsule Take 20 mg by mouth every morning. Historical Med, 20 mg      tiotropium (SPIRIVA WITH HANDIHALER) 18 mcg inhalation capsule Not intended for PRN useTake 1 Cap by inhalation daily. Historical Med, 1 Cap      estradiol (ESTRACE) 2 mg tablet Take 2 mg by mouth every morning. Historical Med, 2 mg         STOP taking these medications       losartan (COZAAR) 25 mg tablet Comments:   Reason for Stopping:         HYDROcodone-acetaminophen (NORCO) 5-325 mg per tablet Comments:   Reason for Stopping:         HYDROcodone-acetaminophen (Raina Gowers) 7.5-325 mg per tablet Comments:   Reason for Stopping:                   Signed:  Dayana Fernandez NP  4/15/2017  10:46 AM    I have personally seen and examined patient and agree with above assessment. I agree and confirm with findings.  See my progress note for more details     Faustino Sanchez MD  4/16/2017  11:23 AM

## 2017-08-09 ENCOUNTER — HOSPITAL ENCOUNTER (EMERGENCY)
Age: 52
Discharge: HOME | End: 2017-08-09
Attending: STUDENT IN AN ORGANIZED HEALTH CARE EDUCATION/TRAINING PROGRAM
Payer: COMMERCIAL

## 2017-08-09 VITALS
OXYGEN SATURATION: 100 % | HEIGHT: 65 IN | WEIGHT: 150 LBS | SYSTOLIC BLOOD PRESSURE: 106 MMHG | TEMPERATURE: 98.6 F | RESPIRATION RATE: 18 BRPM | DIASTOLIC BLOOD PRESSURE: 78 MMHG | BODY MASS INDEX: 24.99 KG/M2 | HEART RATE: 89 BPM

## 2017-08-09 DIAGNOSIS — T14.8XXA BRUISING: Primary | ICD-10-CM

## 2017-08-09 LAB
BASOPHILS # BLD AUTO: 0 K/UL (ref 0–0.2)
BASOPHILS # BLD: 1 % (ref 0–2)
DIFFERENTIAL METHOD BLD: ABNORMAL
EOSINOPHIL # BLD: 0 K/UL (ref 0–0.8)
EOSINOPHIL NFR BLD: 1 % (ref 0.5–7.8)
ERYTHROCYTE [DISTWIDTH] IN BLOOD BY AUTOMATED COUNT: 13 % (ref 11.9–14.6)
HCT VFR BLD AUTO: 29.7 % (ref 35.8–46.3)
HGB BLD-MCNC: 10 G/DL (ref 11.7–15.4)
IMM GRANULOCYTES # BLD: 0 K/UL (ref 0–0.5)
IMM GRANULOCYTES NFR BLD AUTO: 0 % (ref 0–5)
LYMPHOCYTES # BLD AUTO: 46 % (ref 13–44)
LYMPHOCYTES # BLD: 1.9 K/UL (ref 0.5–4.6)
MCH RBC QN AUTO: 31.3 PG (ref 26.1–32.9)
MCHC RBC AUTO-ENTMCNC: 33.7 G/DL (ref 31.4–35)
MCV RBC AUTO: 92.8 FL (ref 79.6–97.8)
MONOCYTES # BLD: 0.3 K/UL (ref 0.1–1.3)
MONOCYTES NFR BLD AUTO: 7 % (ref 4–12)
NEUTS SEG # BLD: 1.8 K/UL (ref 1.7–8.2)
NEUTS SEG NFR BLD AUTO: 45 % (ref 43–78)
PLATELET # BLD AUTO: 212 K/UL (ref 150–450)
PMV BLD AUTO: 9.9 FL (ref 10.8–14.1)
RBC # BLD AUTO: 3.2 M/UL (ref 4.05–5.25)
WBC # BLD AUTO: 4 K/UL (ref 4.3–11.1)

## 2017-08-09 PROCEDURE — 85025 COMPLETE CBC W/AUTO DIFF WBC: CPT | Performed by: STUDENT IN AN ORGANIZED HEALTH CARE EDUCATION/TRAINING PROGRAM

## 2017-08-09 PROCEDURE — 99283 EMERGENCY DEPT VISIT LOW MDM: CPT | Performed by: STUDENT IN AN ORGANIZED HEALTH CARE EDUCATION/TRAINING PROGRAM

## 2017-08-09 RX ORDER — CLOPIDOGREL BISULFATE 75 MG/1
TABLET ORAL
COMMUNITY
End: 2017-10-06

## 2017-08-09 RX ORDER — PRAVASTATIN SODIUM 10 MG/1
40 TABLET ORAL
COMMUNITY
End: 2018-08-07 | Stop reason: CLARIF

## 2017-08-09 NOTE — ED TRIAGE NOTES
\"I have 3 spots on my legs. They are black. I take Plavix and they have scared me .  I looked at them and thought what the world\"

## 2017-08-09 NOTE — ED PROVIDER NOTES
HPI Comments: 53-year-old female patient presents with reports of spots on her right lower extremity associated with pain to palpation. ppatient states these areas popped up earlier this evening. She denies any obvious trauma to the area. She associates some discomfort with palpation of the area and states \"there is a not there\". 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 She voices concern that the Plavix may have caused these areas deformed. She denies any swelling in the lower extremities or similar areas elsewhere in her body at this time. She denies history of similar symptoms in the past.    Patient is a 54 y.o. female presenting with leg pain. The history is provided by the patient. No  was used. Leg Pain    This is a new problem. The current episode started yesterday. The problem occurs constantly. The problem has not changed since onset. The pain is present in the right upper leg and right lower leg. The quality of the pain is described as aching. The pain is at a severity of 3/10. The pain is mild. Pertinent negatives include no numbness, full range of motion, no stiffness, no tingling, no itching, no back pain and no neck pain. She has tried nothing for the symptoms. There has been no history of extremity trauma.         Past Medical History:   Diagnosis Date    Anemia     Arrhythmia     palpitations, moderate mitral valve regurge    Arthritis     lower back osteo    Asthma     uses inhalers    Cardiomyopathy     due to murmur (patient states leaky valve)    Chronic pain     stomach 8 yrs    COPD     bronchitis chronic controlled by inhalers    Depression     controlled      Diverticulitis     GERD (gastroesophageal reflux disease)     fair control with med    Heart murmur     Hypertension     IBS (irritable bowel syndrome)     IC (interstitial cystitis)     Insomnia  Mitral valve regurgitation, rheumatic     followed Dr. Anna Baumann Palpitations 5/16/2016    Psychiatric disorder     anxiety    Rheumatic aortic insufficiency 5/16/2016    Smoker     has been quit for 2 months    Tobacco abuse disorder 5/16/2016    Vitamin D deficiency        Past Surgical History:   Procedure Laterality Date    BIOPSY OF BREAST, INCISIONAL      lymph node , left, patient states her bx neg, but high risk    COLONOSCOPY      8 polyps removed, diverticulitis    CYSTOSCOPY  8-23-11    bladder dilitation    EGD      HX APPENDECTOMY  2006    HX HEART CATHETERIZATION  5/27/2011    no stents    HX LAP CHOLECYSTECTOMY  6/6/2011    HX NATASHA AND BSO  2004    fibroid tumors, hyst    HX UROLOGICAL      cystoscopy         Family History:   Problem Relation Age of Onset    Heart Failure Father 76     chf    Heart Disease Father     Diabetes Sister     Heart Disease Maternal Grandfather     Diabetes Maternal Grandfather     Heart Disease Paternal Grandfather     Cancer Maternal Uncle      prostate       Social History     Social History    Marital status:      Spouse name: N/A    Number of children: N/A    Years of education: N/A     Occupational History    Not on file. Social History Main Topics    Smoking status: Former Smoker     Packs/day: 0.25     Years: 25.00     Quit date: 11/30/2016    Smokeless tobacco: Never Used    Alcohol use No    Drug use: No    Sexual activity: Not on file     Other Topics Concern    Not on file     Social History Narrative         ALLERGIES: Aspirin; Bentyl [dicyclomine]; Toradol [ketorolac]; Ultram [tramadol]; and Zofran [ondansetron hcl (pf)]    Review of Systems   Constitutional: Negative for chills, diaphoresis and fever. HENT: Negative for congestion, sneezing and sore throat. Eyes: Negative for visual disturbance. Respiratory: Negative for cough, chest tightness, shortness of breath and wheezing.     Cardiovascular: Negative for chest pain and leg swelling. Gastrointestinal: Negative for abdominal pain, blood in stool, diarrhea, nausea and vomiting. Endocrine: Negative for polyuria. Genitourinary: Negative for difficulty urinating, dysuria, flank pain, hematuria and urgency. Musculoskeletal: Negative for back pain, myalgias, neck pain, neck stiffness and stiffness. Skin: Negative for color change, itching and rash. Neurological: Negative for dizziness, tingling, syncope, speech difficulty, weakness, light-headedness, numbness and headaches. Psychiatric/Behavioral: Negative for behavioral problems. All other systems reviewed and are negative. Vitals:    08/09/17 0224   BP: 101/56   Pulse: 100   Resp: 16   Temp: 98 °F (36.7 °C)   SpO2: 100%   Weight: 68 kg (150 lb)   Height: 5' 5\" (1.651 m)            Physical Exam   Constitutional: She is oriented to person, place, and time. She appears well-developed and well-nourished. No distress. Alert and oriented to person, place and time. No acute distress. Speaks in clear, fluent sentences. HENT:   Head: Normocephalic and atraumatic. Right Ear: External ear normal.   Nose: Nose normal.   Eyes: Conjunctivae and EOM are normal. Pupils are equal, round, and reactive to light. Neck: Normal range of motion. Neck supple. No JVD present. No tracheal deviation present. Cardiovascular: Normal rate, regular rhythm, S1 normal, S2 normal, normal heart sounds and intact distal pulses. Exam reveals no gallop, no distant heart sounds and no friction rub. No murmur heard. Pulmonary/Chest: Effort normal and breath sounds normal. No accessory muscle usage or stridor. No tachypnea and no bradypnea. No respiratory distress. She has no decreased breath sounds. She has no wheezes. She has no rhonchi. She has no rales. She exhibits no tenderness. Abdominal: Soft. Normal appearance. She exhibits no distension and no mass. There is no hepatosplenomegaly, splenomegaly or hepatomegaly. There is no tenderness. There is no rigidity, no rebound, no guarding, no CVA tenderness, no tenderness at McBurney's point and negative Robles's sign. Musculoskeletal: Normal range of motion. She exhibits no edema, tenderness or deformity. Neurological: She is alert and oriented to person, place, and time. No cranial nerve deficit. Skin: Skin is warm and dry. No rash noted. She is not diaphoretic. There are several oval-appearing areas of ecchymosis noted to the medial aspect of the right thigh and  Right shin. Subjective pain on palpation though there is no visible or palpable fluid collection, abscess or area of fluctuance. No other focal findings. Psychiatric: She has a normal mood and affect. Her behavior is normal.   Nursing note and vitals reviewed. MDM  Number of Diagnoses or Management Options  Bruising: new and requires workup  Diagnosis management comments: CBC results are unremarkable. Appear similar to previous results with review of patient's chart. No evidence of infectious etiology. Lesions consistent with ecchymosis, I see no indication for further  Imaging or laboratory evaluation at this time and advised patient to monitor the area and follow-up with her primary care physician.        Amount and/or Complexity of Data Reviewed  Clinical lab tests: ordered and reviewed  Review and summarize past medical records: yes    Risk of Complications, Morbidity, and/or Mortality  Presenting problems: moderate  Diagnostic procedures: low  Management options: moderate    Patient Progress  Patient progress: stable    ED Course       Procedures

## 2017-08-09 NOTE — DISCHARGE INSTRUCTIONS
Bruises: Care Instructions  Your Care Instructions    Bruises occur when small blood vessels under the skin tear or rupture, most often from a twist, bump, or fall. Blood leaks into tissues under the skin and causes a black-and-blue spot that often turns colors, including purplish black, reddish blue, or yellowish green, as the bruise heals. Bruises hurt, but most are not serious and will go away on their own within 2 to 4 weeks. Sometimes, gravity causes them to spread down the body. A leg bruise usually will take longer to heal than a bruise on the face or arms. Follow-up care is a key part of your treatment and safety. Be sure to make and go to all appointments, and call your doctor if you are having problems. Its also a good idea to know your test results and keep a list of the medicines you take. How can you care for yourself at home? · Take pain medicines exactly as directed. ¨ If the doctor gave you a prescription medicine for pain, take it as prescribed. ¨ If you are not taking a prescription pain medicine, ask your doctor if you can take an over-the-counter medicine. · Put ice or a cold pack on the area for 10 to 20 minutes at a time. Put a thin cloth between the ice and your skin. · If you can, prop up the bruised area on pillows as much as possible for the next few days. Try to keep the bruise above the level of your heart. When should you call for help? Call your doctor now or seek immediate medical care if:  · You have signs of infection, such as:  ¨ Increased pain, swelling, warmth, or redness. ¨ Red streaks leading from the bruise. ¨ Pus draining from the bruise. ¨ A fever. · You have a bruise on your leg and signs of a blood clot, such as:  ¨ Increasing redness and swelling along with warmth, tenderness, and pain in the bruised area. ¨ Pain in your calf, back of the knee, thigh, or groin. ¨ Redness and swelling in your leg or groin. · Your pain gets worse.   Watch closely for changes in your health, and be sure to contact your doctor if:  · You do not get better as expected. Where can you learn more? Go to http://maria alejandra-nereyda.info/. Enter (36) 824-888 in the search box to learn more about \"Bruises: Care Instructions. \"  Current as of: March 20, 2017  Content Version: 11.3  © 8557-0577 [employer]. Care instructions adapted under license by [employer] (which disclaims liability or warranty for this information). If you have questions about a medical condition or this instruction, always ask your healthcare professional. [place] [address] any warranty or liability for your use of this information.

## 2017-10-06 ENCOUNTER — HOSPITAL ENCOUNTER (OUTPATIENT)
Dept: LAB | Age: 52
Discharge: HOME | End: 2017-10-06
Payer: COMMERCIAL

## 2017-10-06 DIAGNOSIS — G90.01 CAROTIDYNIA: ICD-10-CM

## 2017-10-06 LAB — ERYTHROCYTE [SEDIMENTATION RATE] IN BLOOD: 29 MM/HR

## 2017-10-06 PROCEDURE — 85652 RBC SED RATE AUTOMATED: CPT | Performed by: INTERNAL MEDICINE

## 2017-10-06 PROCEDURE — 36415 COLL VENOUS BLD VENIPUNCTURE: CPT | Performed by: INTERNAL MEDICINE

## 2017-11-02 VITALS
RESPIRATION RATE: 20 BRPM | HEART RATE: 100 BPM | WEIGHT: 150 LBS | OXYGEN SATURATION: 98 % | DIASTOLIC BLOOD PRESSURE: 68 MMHG | TEMPERATURE: 97.8 F | BODY MASS INDEX: 25.61 KG/M2 | HEIGHT: 64 IN | SYSTOLIC BLOOD PRESSURE: 104 MMHG

## 2017-11-02 PROCEDURE — 75810000275 HC EMERGENCY DEPT VISIT NO LEVEL OF CARE: Performed by: EMERGENCY MEDICINE

## 2017-11-03 ENCOUNTER — HOSPITAL ENCOUNTER (EMERGENCY)
Age: 52
Discharge: HOME | End: 2017-11-03
Attending: EMERGENCY MEDICINE
Payer: COMMERCIAL

## 2017-11-03 NOTE — ED TRIAGE NOTES
Pt arrives to ED c/o increased leg swelling. States hx of chf. States increased swelling over last week. Denies diuretic use at home. Denies sob, chest pain.

## 2017-11-25 ENCOUNTER — APPOINTMENT (OUTPATIENT)
Dept: GENERAL RADIOLOGY | Age: 52
DRG: 194 | End: 2017-11-25
Attending: EMERGENCY MEDICINE
Payer: COMMERCIAL

## 2017-11-25 ENCOUNTER — HOSPITAL ENCOUNTER (INPATIENT)
Age: 52
LOS: 9 days | Discharge: HOME | DRG: 194 | End: 2017-12-04
Attending: EMERGENCY MEDICINE | Admitting: INTERNAL MEDICINE
Payer: COMMERCIAL

## 2017-11-25 DIAGNOSIS — R05.9 COUGH: ICD-10-CM

## 2017-11-25 DIAGNOSIS — I05.9 RHEUMATIC DISEASE OF MITRAL VALVE: ICD-10-CM

## 2017-11-25 DIAGNOSIS — J18.9 PNEUMONIA OF RIGHT MIDDLE LOBE DUE TO INFECTIOUS ORGANISM: Primary | ICD-10-CM

## 2017-11-25 DIAGNOSIS — J18.9 COMMUNITY ACQUIRED PNEUMONIA, UNSPECIFIED LATERALITY: ICD-10-CM

## 2017-11-25 DIAGNOSIS — J81.0 ACUTE PULMONARY EDEMA (HCC): ICD-10-CM

## 2017-11-25 DIAGNOSIS — F17.210 CIGARETTE NICOTINE DEPENDENCE WITHOUT COMPLICATION: Chronic | ICD-10-CM

## 2017-11-25 DIAGNOSIS — F17.213 CIGARETTE NICOTINE DEPENDENCE WITH WITHDRAWAL: Chronic | ICD-10-CM

## 2017-11-25 DIAGNOSIS — I06.1 RHEUMATIC AORTIC INSUFFICIENCY: ICD-10-CM

## 2017-11-25 DIAGNOSIS — J44.1 COPD EXACERBATION (HCC): ICD-10-CM

## 2017-11-25 DIAGNOSIS — R91.8 BILATERAL PULMONARY INFILTRATES ON CXR: ICD-10-CM

## 2017-11-25 DIAGNOSIS — J96.01 ACUTE RESPIRATORY FAILURE WITH HYPOXIA (HCC): ICD-10-CM

## 2017-11-25 DIAGNOSIS — I51.81 TAKOTSUBO CARDIOMYOPATHY: ICD-10-CM

## 2017-11-25 DIAGNOSIS — F14.11 HX OF COCAINE ABUSE (HCC): ICD-10-CM

## 2017-11-25 PROBLEM — F17.200 NICOTINE DEPENDENCE: Status: ACTIVE | Noted: 2017-11-25

## 2017-11-25 LAB
ALBUMIN SERPL-MCNC: 3 G/DL (ref 3.5–5)
ALBUMIN/GLOB SERPL: 0.6 {RATIO} (ref 1.2–3.5)
ALP SERPL-CCNC: 160 U/L (ref 50–136)
ALT SERPL-CCNC: 38 U/L (ref 12–65)
ANION GAP SERPL CALC-SCNC: 12 MMOL/L (ref 7–16)
AST SERPL-CCNC: 70 U/L (ref 15–37)
BASOPHILS # BLD: 0 K/UL (ref 0–0.2)
BASOPHILS NFR BLD: 0 % (ref 0–2)
BILIRUB SERPL-MCNC: 0.4 MG/DL (ref 0.2–1.1)
BNP SERPL-MCNC: 151 PG/ML
BUN SERPL-MCNC: 7 MG/DL (ref 6–23)
CALCIUM SERPL-MCNC: 8.9 MG/DL (ref 8.3–10.4)
CHLORIDE SERPL-SCNC: 104 MMOL/L (ref 98–107)
CO2 SERPL-SCNC: 25 MMOL/L (ref 21–32)
CREAT SERPL-MCNC: 0.72 MG/DL (ref 0.6–1)
DIFFERENTIAL METHOD BLD: ABNORMAL
EOSINOPHIL # BLD: 0 K/UL (ref 0–0.8)
EOSINOPHIL NFR BLD: 0 % (ref 0.5–7.8)
ERYTHROCYTE [DISTWIDTH] IN BLOOD BY AUTOMATED COUNT: 13 % (ref 11.9–14.6)
GLOBULIN SER CALC-MCNC: 4.9 G/DL (ref 2.3–3.5)
GLUCOSE SERPL-MCNC: 133 MG/DL (ref 65–100)
HCT VFR BLD AUTO: 30.7 % (ref 35.8–46.3)
HGB BLD-MCNC: 10.2 G/DL (ref 11.7–15.4)
IMM GRANULOCYTES # BLD: 0 K/UL (ref 0–0.5)
IMM GRANULOCYTES NFR BLD AUTO: 0 % (ref 0–5)
LACTATE BLD-SCNC: 2 MMOL/L (ref 0.5–1.9)
LYMPHOCYTES # BLD: 0.7 K/UL (ref 0.5–4.6)
LYMPHOCYTES NFR BLD: 6 % (ref 13–44)
MCH RBC QN AUTO: 30.5 PG (ref 26.1–32.9)
MCHC RBC AUTO-ENTMCNC: 33.2 G/DL (ref 31.4–35)
MCV RBC AUTO: 91.9 FL (ref 79.6–97.8)
MONOCYTES # BLD: 0.4 K/UL (ref 0.1–1.3)
MONOCYTES NFR BLD: 4 % (ref 4–12)
NEUTS SEG # BLD: 9.9 K/UL (ref 1.7–8.2)
NEUTS SEG NFR BLD: 90 % (ref 43–78)
PLATELET # BLD AUTO: 481 K/UL (ref 150–450)
PMV BLD AUTO: 8.9 FL (ref 10.8–14.1)
POTASSIUM SERPL-SCNC: 2.9 MMOL/L (ref 3.5–5.1)
PROCALCITONIN SERPL-MCNC: 0.1 NG/ML
PROT SERPL-MCNC: 7.9 G/DL (ref 6.3–8.2)
RBC # BLD AUTO: 3.34 M/UL (ref 4.05–5.25)
SODIUM SERPL-SCNC: 141 MMOL/L (ref 136–145)
TROPONIN I SERPL-MCNC: <0.02 NG/ML (ref 0.02–0.05)
WBC # BLD AUTO: 11 K/UL (ref 4.3–11.1)

## 2017-11-25 PROCEDURE — 74011250636 HC RX REV CODE- 250/636: Performed by: EMERGENCY MEDICINE

## 2017-11-25 PROCEDURE — 74011250637 HC RX REV CODE- 250/637: Performed by: INTERNAL MEDICINE

## 2017-11-25 PROCEDURE — 85025 COMPLETE CBC W/AUTO DIFF WBC: CPT | Performed by: EMERGENCY MEDICINE

## 2017-11-25 PROCEDURE — 65270000029 HC RM PRIVATE

## 2017-11-25 PROCEDURE — 71010 XR CHEST PORT: CPT

## 2017-11-25 PROCEDURE — 83605 ASSAY OF LACTIC ACID: CPT

## 2017-11-25 PROCEDURE — 80053 COMPREHEN METABOLIC PANEL: CPT | Performed by: EMERGENCY MEDICINE

## 2017-11-25 PROCEDURE — 74011000258 HC RX REV CODE- 258: Performed by: EMERGENCY MEDICINE

## 2017-11-25 PROCEDURE — 83880 ASSAY OF NATRIURETIC PEPTIDE: CPT | Performed by: EMERGENCY MEDICINE

## 2017-11-25 PROCEDURE — 74011250636 HC RX REV CODE- 250/636: Performed by: INTERNAL MEDICINE

## 2017-11-25 PROCEDURE — 96365 THER/PROPH/DIAG IV INF INIT: CPT | Performed by: EMERGENCY MEDICINE

## 2017-11-25 PROCEDURE — 96375 TX/PRO/DX INJ NEW DRUG ADDON: CPT | Performed by: EMERGENCY MEDICINE

## 2017-11-25 PROCEDURE — 84484 ASSAY OF TROPONIN QUANT: CPT | Performed by: EMERGENCY MEDICINE

## 2017-11-25 PROCEDURE — 87040 BLOOD CULTURE FOR BACTERIA: CPT | Performed by: EMERGENCY MEDICINE

## 2017-11-25 PROCEDURE — 74011250637 HC RX REV CODE- 250/637: Performed by: EMERGENCY MEDICINE

## 2017-11-25 PROCEDURE — 84484 ASSAY OF TROPONIN QUANT: CPT | Performed by: INTERNAL MEDICINE

## 2017-11-25 PROCEDURE — 93005 ELECTROCARDIOGRAM TRACING: CPT | Performed by: EMERGENCY MEDICINE

## 2017-11-25 PROCEDURE — 84145 PROCALCITONIN (PCT): CPT | Performed by: EMERGENCY MEDICINE

## 2017-11-25 PROCEDURE — 99284 EMERGENCY DEPT VISIT MOD MDM: CPT | Performed by: EMERGENCY MEDICINE

## 2017-11-25 RX ORDER — GABAPENTIN 400 MG/1
400 CAPSULE ORAL 2 TIMES DAILY
Status: DISCONTINUED | OUTPATIENT
Start: 2017-11-26 | End: 2017-12-04 | Stop reason: HOSPADM

## 2017-11-25 RX ORDER — MORPHINE SULFATE 10 MG/ML
2 INJECTION, SOLUTION INTRAMUSCULAR; INTRAVENOUS
Status: DISPENSED | OUTPATIENT
Start: 2017-11-25 | End: 2017-11-26

## 2017-11-25 RX ORDER — LEVOFLOXACIN 5 MG/ML
750 INJECTION, SOLUTION INTRAVENOUS EVERY 24 HOURS
Status: DISCONTINUED | OUTPATIENT
Start: 2017-11-25 | End: 2017-11-27

## 2017-11-25 RX ORDER — ENOXAPARIN SODIUM 100 MG/ML
40 INJECTION SUBCUTANEOUS EVERY 24 HOURS
Status: DISCONTINUED | OUTPATIENT
Start: 2017-11-25 | End: 2017-12-04 | Stop reason: HOSPADM

## 2017-11-25 RX ORDER — HYDROCODONE BITARTRATE AND ACETAMINOPHEN 5; 325 MG/1; MG/1
1 TABLET ORAL
Status: DISCONTINUED | OUTPATIENT
Start: 2017-11-25 | End: 2017-11-27

## 2017-11-25 RX ORDER — IBUPROFEN 200 MG
1 TABLET ORAL EVERY 24 HOURS
Status: DISCONTINUED | OUTPATIENT
Start: 2017-11-25 | End: 2017-12-04 | Stop reason: HOSPADM

## 2017-11-25 RX ORDER — ONDANSETRON 2 MG/ML
4 INJECTION INTRAMUSCULAR; INTRAVENOUS
Status: COMPLETED | OUTPATIENT
Start: 2017-11-25 | End: 2017-11-25

## 2017-11-25 RX ORDER — PANTOPRAZOLE SODIUM 40 MG/1
40 TABLET, DELAYED RELEASE ORAL DAILY
Status: ON HOLD | COMMUNITY
End: 2017-12-04

## 2017-11-25 RX ORDER — SODIUM CHLORIDE 0.9 % (FLUSH) 0.9 %
5-10 SYRINGE (ML) INJECTION EVERY 8 HOURS
Status: DISCONTINUED | OUTPATIENT
Start: 2017-11-25 | End: 2017-12-04 | Stop reason: HOSPADM

## 2017-11-25 RX ORDER — ACETAMINOPHEN 325 MG/1
650 TABLET ORAL
Status: DISCONTINUED | OUTPATIENT
Start: 2017-11-25 | End: 2017-12-04 | Stop reason: HOSPADM

## 2017-11-25 RX ORDER — POTASSIUM CHLORIDE 20 MEQ/1
40 TABLET, EXTENDED RELEASE ORAL
Status: COMPLETED | OUTPATIENT
Start: 2017-11-25 | End: 2017-11-25

## 2017-11-25 RX ORDER — AMITRIPTYLINE HYDROCHLORIDE 50 MG/1
100 TABLET, FILM COATED ORAL
Status: DISCONTINUED | OUTPATIENT
Start: 2017-11-26 | End: 2017-12-04 | Stop reason: HOSPADM

## 2017-11-25 RX ORDER — SODIUM CHLORIDE 0.9 % (FLUSH) 0.9 %
5-10 SYRINGE (ML) INJECTION AS NEEDED
Status: DISCONTINUED | OUTPATIENT
Start: 2017-11-25 | End: 2017-12-04 | Stop reason: HOSPADM

## 2017-11-25 RX ORDER — HYDROCODONE BITARTRATE AND HOMATROPINE METHYLBROMIDE 1.5; 5 MG/5ML; MG/5ML
5 SYRUP ORAL
Status: DISCONTINUED | OUTPATIENT
Start: 2017-11-25 | End: 2017-11-26

## 2017-11-25 RX ORDER — MORPHINE SULFATE 2 MG/ML
2 INJECTION, SOLUTION INTRAMUSCULAR; INTRAVENOUS
Status: DISCONTINUED | OUTPATIENT
Start: 2017-11-25 | End: 2017-11-25 | Stop reason: SDUPTHER

## 2017-11-25 RX ORDER — GABAPENTIN 400 MG/1
400 CAPSULE ORAL 2 TIMES DAILY
Status: DISCONTINUED | OUTPATIENT
Start: 2017-11-26 | End: 2017-11-25

## 2017-11-25 RX ADMIN — CEFTRIAXONE SODIUM 2 G: 2 INJECTION, POWDER, FOR SOLUTION INTRAMUSCULAR; INTRAVENOUS at 17:59

## 2017-11-25 RX ADMIN — ENOXAPARIN SODIUM 40 MG: 40 INJECTION SUBCUTANEOUS at 21:04

## 2017-11-25 RX ADMIN — MORPHINE SULFATE 2 MG: 2 INJECTION, SOLUTION INTRAMUSCULAR; INTRAVENOUS at 18:12

## 2017-11-25 RX ADMIN — METHYLPREDNISOLONE SODIUM SUCCINATE 60 MG: 125 INJECTION, POWDER, FOR SOLUTION INTRAMUSCULAR; INTRAVENOUS at 21:29

## 2017-11-25 RX ADMIN — HYDROCODONE BITARTRATE AND ACETAMINOPHEN 1 TABLET: 5; 325 TABLET ORAL at 21:02

## 2017-11-25 RX ADMIN — ONDANSETRON 4 MG: 2 INJECTION INTRAMUSCULAR; INTRAVENOUS at 18:12

## 2017-11-25 RX ADMIN — GABAPENTIN 400 MG: 400 CAPSULE ORAL at 23:44

## 2017-11-25 RX ADMIN — Medication 10 ML: at 21:05

## 2017-11-25 RX ADMIN — HYDROCODONE BITARTRATE AND HOMATROPINE METHYLBROMIDE 5 ML: 5; 1.5 SOLUTION ORAL at 21:28

## 2017-11-25 RX ADMIN — AZITHROMYCIN MONOHYDRATE 500 MG: 500 INJECTION, POWDER, LYOPHILIZED, FOR SOLUTION INTRAVENOUS at 19:58

## 2017-11-25 RX ADMIN — LEVOFLOXACIN 750 MG: 5 INJECTION, SOLUTION INTRAVENOUS at 21:05

## 2017-11-25 RX ADMIN — AMITRIPTYLINE HYDROCHLORIDE 100 MG: 50 TABLET, FILM COATED ORAL at 23:44

## 2017-11-25 RX ADMIN — POTASSIUM CHLORIDE 40 MEQ: 20 TABLET, EXTENDED RELEASE ORAL at 19:16

## 2017-11-25 NOTE — IP AVS SNAPSHOT
[address] 
[phone] Patient: Godfrey Amato MRN: [id number] :[id number] About your hospitalization You were admitted on:  2017 You last received care in the:  [facility] You were discharged on:  2017 Why you were hospitalized Your primary diagnosis was:  Acute Respiratory Failure With Hypoxia (Hcc) Your diagnoses also included:  Copd Exacerbation (Hcc), Rheumatic Disease Of Mitral Valve, Rheumatic Aortic Insufficiency, Cough, Bilateral Pulmonary Infiltrates On Cxr, Nicotine Dependence, Takotsubo Cardiomyopathy, Hx Of Cocaine Abuse, Acute Pulmonary Edema (Hcc), Chronic Respiratory Failure (Hcc) Things You Need To Do (next 8 weeks) Tuesday Dec 05, 2017 ECHOCARDIOGRAM with JEAN ECHO 65 at  8:30 AM  
Where:  [facility] ([address] [place] [address]) Wednesday Dec 06, 2017 Follow up with Monie Burks MD  
at 28 at the [facility] location Phone:  [phone] Where:  [address] [place] [address], [address] [place] [address] Way [address] Friday Dec 15, 2017 Office Visit with Rabia Kang MD at  3:30 PM  
Where:  [address] ([address] [place] [address]) Discharge Orders None A check sabas indicates which time of day the medication should be taken. My Medications STOP taking these medications ALDACTONE 25 mg tablet Generic drug:  spironolactone  
   
  
 lisinopril 40 mg tablet Commonly known as:  PRINIVIL, ZESTRIL  
   
  
 nadolol 80 mg tablet Commonly known as:  CORGARD TAKE these medications as instructed Instructions Each Dose to Equal  
 Morning Noon Evening Bedtime  
 albuterol 1.25 mg/3 mL Nebu Commonly known as:  ACCUNEB  
   
 1.25 mg by Nebulization route three (3) times daily.   
 1.25 mg  
    
   
   
   
  
 amitriptyline 100 mg tablet Commonly known as:  ELAVIL Take 1 Tab by mouth nightly. 100 mg  
    
   
   
   
  
  
 ammonium lactate 12 % lotion Commonly known as:  LAC-HYDRIN Apply  to affected area as needed. rub in to affected area well  
     
   
   
   
  
 clonazePAM 0.5 mg tablet Commonly known as:  Elta Halsted Take 0.5 mg by mouth as needed. 0.5 mg  
    
   
   
   
  
 cyclobenzaprine 10 mg tablet Commonly known as:  FLEXERIL Take  by mouth two (2) times daily as needed for Muscle Spasm(s). estradiol 2 mg tablet Commonly known as:  ESTRACE Take 2 mg by mouth every morning. Indications: one tablet daily for three weeks, 1 week off  
 2 mg FLUoxetine 10 mg tablet Commonly known as:  PROzac Take 20 mg by mouth every morning. 20 mg  
    
   
   
   
  
 furosemide 40 mg tablet Commonly known as:  LASIX Take 1 Tab by mouth daily. 40 mg  
    
  
   
   
   
  
 gabapentin 400 mg capsule Commonly known as:  NEURONTIN Take 400 mg by mouth three (3) times daily. 400 mg  
    
   
   
   
  
 * NORCO 7.5-325 mg per tablet Generic drug:  HYDROcodone-acetaminophen Take 1 Tab by mouth once over twenty-four (24) hours. 1 Tab  
    
   
   
   
  
 * HYDROcodone-acetaminophen 7.5-325 mg per tablet Commonly known as:  Castillo Micky Take 1 Tab by mouth every six (6) hours as needed. Max Daily Amount: 4 Tabs. 1 Tab NIFEdipine ER 60 mg ER tablet Commonly known as:  PROCARDIA XL Take 1 Tab by mouth daily. 60 mg  
    
   
   
   
  
 nitroglycerin 0.4 mg SL tablet Commonly known as:  NITROSTAT  
   
 1 Tab by SubLINGual route every five (5) minutes as needed for Chest Pain. 0.4 mg  
    
   
   
   
  
 oxybutynin 5 mg tablet Commonly known as:  MWARBWKF Take 5 mg by mouth two (2) times a day. 5 mg PRADAXA 150 mg capsule Generic drug:  dabigatran etexilate Take 150 mg by mouth two (2) times a day. 150 mg  
    
   
   
   
  
 pravastatin 10 mg tablet Commonly known as:  PRAVACHOL Take  by mouth nightly. PriLOSEC 20 mg capsule Generic drug:  omeprazole Take 20 mg by mouth daily. 20 mg  
    
   
   
   
  
 promethazine 25 mg tablet Commonly known as:  PHENERGAN Take 1 Tab by mouth every six (6) hours as needed. 25 mg  
    
   
   
   
  
 SPIRIVA WITH HANDIHALER 18 mcg inhalation capsule Generic drug:  tiotropium Take 1 Cap by inhalation daily. 1 Cap SYMBICORT 160-4.5 mcg/actuation Hfaa Generic drug:  budesonide-formoterol Take 1 Puff by inhalation two (2) times a day. Indications: BRONCHOSPASM PREVENTION WITH COPD  
 1 Puff * Notice: This list has 2 medication(s) that are the same as other medications prescribed for you. Read the directions carefully, and ask your doctor or other care provider to review them with you. Where to Get Your Medications Information on where to get these meds will be given to you by the nurse or doctor. ! Ask your nurse or doctor about these medications  
  furosemide 40 mg tablet HYDROcodone-acetaminophen 7.5-325 mg per tablet Discharge Instructions Oxygen Therapy: Care Instructions Your Care Instructions Oxygen therapy helps you get more oxygen into your lungs and bloodstream. You may use it if you have a disease that makes it hard to breathe, such as COPD, pulmonary fibrosis (scarring of the lungs), or heart failure. Oxygen therapy can make it easier for you to breathe and can reduce your heart's workload. Some people need extra oxygen all the time. Others need it from time to time throughout the day or overnight.  A doctor will prescribe how much oxygen you need and how often to use it. To breathe the oxygen, most people use a nasal cannula (say \"TRUMAN-rosalesh-angelic\"). This is a thin tube with two prongs that fit just inside your nose. People who need a lot of oxygen may need to use a mask that fits over the nose and mouth. Follow-up care is a key part of your treatment and safety. Be sure to make and go to all appointments, and call your doctor if you are having problems. It's also a good idea to know your test results and keep a list of the medicines you take. How can you care for yourself at home? To help yourself · Using oxygen may dry out your nose or lips. Use water-based lubricants on your lips or nostrils. Do not use an oil-based product like petroleum jelly. · If you use a nasal cannula, the tubing may rub under your nostrils and around your ears. To keep your skin from getting sore, tuck some gauze under the tubing. Use a water-based lotion on rubbed areas. · Do not use alcohol or take drugs that relax you, because they will slow your breathing rate. · Keep track of how much oxygen is in the tank, and reorder before it runs out. If a holiday is coming up or you expect bad weather, order in advance or make your regular order larger. · You may need extra oxygen when you travel to high altitudes or travel by plane. Ask your doctor about this. · If you are getting oxygen directly to your windpipe through an opening in your neck, your doctor will teach you how to care for the equipment. To make sure oxygen is flowing · Check the flow by holding your mask or cannula up to your ear and listening for the \"hiss\" of airflow. · If you have a nasal cannula, dip the prongs in a glass of water. If you see bubbles, oxygen is coming through. · Check your pressure gauge or contents indicator. · If you use an oxygen concentrator, make sure it is turned on and plugged in. If you use a cylinder, make sure the valve is open. · Look for kinks, blockages, or water in the tubing. Be sure the tubing is connected to the oxygen source. · Do not change your oxygen flow rate. Your doctor sets this at the correct level. Higher flow rates usually do not help and can increase the risk of harmful carbon dioxide buildup in the blood. To be safe · Do not leave cords or tubing running across an area where you or someone else may trip on it. · Do not let oxygen containers get hot. Store them in a cool place where there is airflow. Do not leave them in a car trunk or a hot vehicle. · Keep oxygen containers upright. Make sure they do not fall over and get damaged. Try securing the tanks in a sturdy container or securing them with a rope or a chain. · Watch for signs of oxygen leaks. If you hear a loud hissing from your container or if it empties too fast, stay away from the container. Open windows right away and call the company that brought the oxygen system to your home. · Do not use oxygen around anything that could spark or easily cause a fire. ¨ Do not smoke or let others smoke while you are using oxygen. Put up \"no smoking\" signs in your home. ¨ Do not use oxygen near open flames, such as candles, fireplaces, gas stoves, or hot water heaters. Do not use it near electric razors, hair dryers, heating pads, or anything that may spark. ¨ Keep a working fire extinguisher in your home where it is easy to get to. ¨ If a fire starts, turn off the oxygen right away and leave the house. ¨ If you have an oxygen concentrator, do not use it if the cord looks damaged. Do not use an extension cord to plug it in. Do not plug it into an outlet that has other appliances plugged into it. To care for the equipment · Follow the directions that come with the equipment for using and caring for it. · Wash your cannula or mask with a liquid soap and warm water 1 or 2 times a week. Replace them every 2 to 4 weeks. · If you have a cold, change the nasal prongs when your cold symptoms are done. · If you have an oxygen concentrator, unplug the unit and wipe down the cabinet with a damp cloth daily. Clean the air filter at least 2 times a week. Where can you learn more? Go to http://maria alejandra-nereyda.info/. Enter E117 in the search box to learn more about \"Oxygen Therapy: Care Instructions. \" Current as of: May 12, 2017 Content Version: 11.4 © 4924-5290 [employer]. Care instructions adapted under license by [employer] (which disclaims liability or warranty for this information). If you have questions about a medical condition or this instruction, always ask your healthcare professional. [address] any warranty or liability for your use of this information. Pulmonary Edema: Care Instructions Your Care Instructions Pulmonary edema is the buildup of fluid in the lungs. It usually occurs when the heart does not pump blood through the body properly. Pulmonary edema can also be caused by another disease, such as liver or kidney failure. It can also happen at high altitudes, from a poisoning, or as a result of a near-drowning. If you have fluid in your lungs, you may have trouble breathing, be restless, have a fast heart rate, or cough up foamy pink fluid. Breathing problems may be worse when you lie down. Follow-up care is a key part of your treatment and safety. Be sure to make and go to all appointments, and call your doctor if you are having problems. It's also a good idea to know your test results and keep a list of the medicines you take. How can you care for yourself at home? Medicines ? · Take your medicines exactly as prescribed. Call your doctor if you think you are having a problem with your medicine. ? · Review all of your regular medicines with your doctor.  Do not take any vitamins, over-the-counter medicines, or herbal products without talking to your doctor first.  
Diet ? · Eat a balanced diet. Make an appointment with a dietitian if you have questions about what type of diet might be best for you. ? · Do not eat more than 2,000 milligrams (mg) of sodium each day. That is less than 1 teaspoon of salt a day, including all the salt you eat in prepared or packaged foods. ¨ Do not add salt while you are cooking or at the table. Flavor with garlic, lemon juice, onion, vinegar, herbs, and spices instead of salt. ¨ Eat fewer processed foods and foods from restaurants, including fast food. ¨ Use fresh or frozen foods instead of canned. ¨ Count and record how much sodium you eat each day. Check food labels for sodium. ¨ Ask your doctor before using salt substitutes that have potassium, such as Lite Salt. ? Lifestyle ? · Stay out of air pollution; smog; cold, dry air; hot, humid air; and high altitudes. ? · Learn breathing methods that help the airflow in and out of your lungs. ? · Take rest breaks often. Schedule short rest breaks when doing housework and other activities. An occupational or physical therapist can help you find ways to do everyday activities with less effort. ? · Start light exercise if your doctor says it is okay. Try to stay as active as possible. If you have not exercised in the past, start out slowly. Walking is a good way to start. ? · Get enough rest at night. Sleeping with 1 or 2 pillows under your upper body and head may help you breathe easier at night. ? · Discuss rehabilitation with your doctor. Find out what programs are available in your area. ? · Do not smoke or use other tobacco products. Smoking can make your condition worse. If you need help quitting, talk to your doctor about stop-smoking programs and medicines. These can increase your chances of quitting for good. ? · Do not use alcohol or illegal drugs. When should you call for help? Call 911 anytime you think you may need emergency care. For example, call if: 
? · You have severe trouble breathing. ? · You passed out (lost consciousness). ? · You have symptoms of a heart attack. These may include: ¨ Chest pain or pressure, or a strange feeling in the chest. 
¨ Sweating. ¨ Shortness of breath. ¨ Nausea or vomiting. ¨ Pain, pressure, or a strange feeling in the back, neck, jaw, or upper belly or in one or both shoulders or arms. ¨ Lightheadedness or sudden weakness. ¨ A fast or irregular heartbeat. Pain that spreads from the chest to the neck, jaw, or one or both shoulders or arms. ? After you call 911, the  may tell you to chew 1 adult-strength or 2 to 4 low-dose aspirin. Wait for an ambulance. Do not try to drive yourself. ?Call your doctor now or seek immediate medical care if: 
? · You have trouble breathing or have wheezing that is getting worse. ? · You are coughing more deeply or more often. ? · You cough up blood. ? · You get a fever. ? · You have more swelling in your legs or belly. ? · Your symptoms are getting worse. ? Watch closely for changes in your health, and be sure to contact your doctor if you have any problems. Where can you learn more? Go to http://maria alejandra-nereyda.info/. Enter H710 in the search box to learn more about \"Pulmonary Edema: Care Instructions. \" Current as of: May 12, 2017 Content Version: 11.4 © 1733-8388 [employer] tweens [employer]td. Care instructions adapted under license by [employer] (which disclaims liability or warranty for this information). If you have questions about a medical condition or this instruction, always ask your healthcare professional. [address] any warranty or liability for your use of this information. Learning About Broken Heart Syndrome What is broken heart syndrome? With broken heart syndrome, the heart has trouble pumping blood normally. A chamber of the heart swells up like a small balloon. Broken heart syndrome is also called stress-induced cardiomyopathy or takotsubo cardiomyopathy (say \"Nadine cordeor-oh-sm-GMD-hb-thee\"). Broken heart syndrome causes the same symptoms as a heart attack, but it's not a heart attack. Some of the most common symptoms are: 
· Sudden chest pain. · Shortness of breath. · Fainting. Other symptoms may include: · A pounding or fast heartbeat. · Nausea. · Vomiting. A heart attack happens when one or more of the coronary arteries is blocked. These arteries supply the heart muscle with blood. When blood flow is blocked, part of the heart muscle may be permanently damaged. But in broken heart syndrome, the arteries are not blocked. There is usually no permanent damage to the heart. Broken heart syndrome is often triggered by great emotional stress, such as grief after losing a loved one. It can also be triggered by physical stress, such as being in the hospital for surgery. Sometimes it's not known what triggers broken heart syndrome. How is broken heart syndrome diagnosed? Because symptoms are the same as a heart attack, you probably had tests to make sure you did not have a heart attack. These tests include: · Blood tests, to look for damage to the heart muscle. · Imaging tests such as an X-ray or an echocardiogram (ultrasound). These tests can show if a heart chamber has swelled up. They can also show if your heart is pumping normally. · An electrocardiogram (EKG), to measure your heart's electrical activity. · A cardiac catheterization. Results from the other tests may have looked like you had a heart attack. If so, you probably had a cardiac catheterization. This test lets your doctor look at the coronary arteries that supply blood to your heart.  It helps to confirm that your coronary arteries are not blocked and that you did not have a heart attack. How is it treated? You may be in intensive care for a short time. You may stay in the hospital for a few days. After you leave the hospital, you may have some more tests. These tests are to check how well your heart is pumping blood. You will likely take medicines for a short time to help your heart muscle recover. These may include medicines that make it easier for your heart to pump blood. Some people may need to take medicines long-term. What can you expect when you have broken heart syndrome? In most people, the heart starts pumping normally again within a few days or weeks. For some people, it can take several months to return to normal. 
There is usually no permanent damage to the heart. Most people who have an episode of broken heart syndrome don't have another. But there is a small chance that broken heart syndrome can happen again. Sometimes the condition can lead to more serious problems such as heart failure or heart rhythm problems. Follow-up care is a key part of your treatment and safety. Be sure to make and go to all appointments, and call your doctor if you are having problems. It's also a good idea to know your test results and keep a list of the medicines you take. Where can you learn more? Go to http://maria alejandra-nereyda.info/. Enter V810 in the search box to learn more about \"Learning About Broken Heart Syndrome. \" Current as of: September 21, 2016 Content Version: 11.4 © 0951-0366 [employer]street. Care instructions adapted under license by [employer] (which disclaims liability or warranty for this information). If you have questions about a medical condition or this instruction, always ask your healthcare professional. [place] [address] any warranty or liability for your use of this information. DISCHARGE SUMMARY from Nurse PATIENT INSTRUCTIONS: 
 
 
F-face looks uneven A-arms unable to move or move unevenly S-speech slurred or non-existent T-time-call 911 as soon as signs and symptoms begin-DO NOT go Back to bed or wait to see if you get better-TIME IS BRAIN. Warning Signs of HEART ATTACK Call 911 if you have these symptoms: 
? Chest discomfort. Most heart attacks involve discomfort in the center of the chest that lasts more than a few minutes, or that goes away and comes back. It can feel like uncomfortable pressure, squeezing, fullness, or pain. ? Discomfort in other areas of the upper body. Symptoms can include pain or discomfort in one or both arms, the back, neck, jaw, or stomach. ? Shortness of breath with or without chest discomfort. ? Other signs may include breaking out in a cold sweat, nausea, or lightheadedness. Don't wait more than five minutes to call [address]! Fast action can save your life. Calling 911 is almost always the fastest way to get lifesaving treatment. Emergency Medical Services staff can begin treatment when they arrive  up to an hour sooner than if someone gets to the hospital by car. The discharge information has been reviewed with the patient. The patient verbalized understanding. Discharge medications reviewed with the patient and appropriate educational materials and side effects teaching were provided. ___________________________________________________________________________________________________________________________________ HotPadshart Announcement We are excited to announce that we are making your provider's discharge notes available to you in [employer]. You will see these notes when they are completed and signed by the physician that discharged you from your recent hospital stay.   If you have any questions or concerns about any information you see in HotPadshart, please call the [facility] Department where you were seen or reach out to your Primary Care Provider for more information about your plan of care. Introducing [facility]! [facility] introduces XRONet patient portal. Now you can access parts of your medical record, email your doctor's office, and request medication refills online. 1. In your internet browser, go to [email or web address]/Gro [email or web address] 2. Click on the First Time User? Click Here link in the Sign In box. You will see the New Member Sign Up page. 3. Enter your XRONet Access Code exactly as it appears below. You will not need to use this code after youve completed the sign-up process. If you do not sign up before the expiration date, you must request a new code. · XRONet Access Code: [email or web address] Expires: 1/31/2018  7:39 AM 
 
4. Enter the last four digits of your Social Security Number (xxxx) and Date of Birth (mm/dd/yyyy) as indicated and click Submit. You will be taken to the next sign-up page. 5. Create a XRONet ID. This will be your XRONet login ID and cannot be changed, so think of one that is secure and easy to remember. 6. Create a XRONet password. You can change your password at any time. 7. Enter your Password Reset Question and Answer. This can be used at a later time if you forget your password. 8. Enter your e-mail address. You will receive e-mail notification when new information is available in [address] 9. Click Sign Up. You can now view and download portions of your medical record. 10. Click the Download Summary menu link to download a portable copy of your medical information. If you have questions, please visit the Frequently Asked Questions section of the XRONet website. Remember, XRONet is NOT to be used for urgent needs. For medical emergencies, dial 911. Now available from your iPhone and Android! Providers Seen During Your Hospitalization Provider Specialty Primary office phone Rui Fernando MD Emergency Medicine [phone] Chung Slater MD Pulmonary Disease [phone] Your Primary Care Physician (PCP) Primary Care Physician Office Phone Office Fax Mikki Treviño  You are allergic to the following Allergen Reactions Aspirin Other (comments) Inflames IBS per pt Bentyl (Dicyclomine) Itching Oxycodone-Acetaminophen Itching Pt states not allergic. 5/22/2011 Allergy removed per request of NATE Olvera Toradol (Ketorolac) Hives Ultram (Tramadol) Nausea and Vomiting Zofran (Ondansetron Hcl (Pf)) Nausea and Vomiting Recent Documentation Height Weight BMI OB Status Smoking Status 1.626 m 63.5 kg 24.03 kg/m2 Hysterectomy Former Smoker Emergency Contacts Name Discharge Info Relation Home Work Mobile Anastasia Chpaa DISCHARGE CAREGIVER [3] Parent [1] [phone] Patient Belongings The following personal items are in your possession at time of discharge: 
  Dental Appliances: Lowers, Uppers, With patient  Visual Aid: None      Home Medications: Kept at bedside   Jewelry: None  Clothing: None    Other Valuables: None Please provide this summary of care documentation to your next provider. Signatures-by signing, you are acknowledging that this After Visit Summary has been reviewed with you and you have received a copy. Patient Signature:  ____________________________________________________________ Date:  ____________________________________________________________  
  
Jackie Griffin Provider Signature:  ____________________________________________________________ Date:  ____________________________________________________________

## 2017-11-25 NOTE — ED PROVIDER NOTES
HPI Comments: Patient is a 49 yo female with SOB. States cough and SOB gradually worsening over the past month. States cough productive of yellow/green flem (non-bloody). No chest pain, no abdominal pain, no further complaints. Sent from urgent care for pneumonia by CXR. Patient is a 50 y.o. female presenting with shortness of breath. The history is provided by the patient. No  was used. Shortness of Breath   Associated symptoms include cough. Pertinent negatives include no fever, no headaches, no rhinorrhea, no sore throat, no neck pain, no chest pain, no vomiting, no abdominal pain, no rash and no leg swelling.         Past Medical History:   Diagnosis Date    Anemia     Arrhythmia     palpitations, moderate mitral valve regurge    Arthritis     lower back osteo    Asthma     uses inhalers    Cardiomyopathy     due to murmur (patient states leaky valve)    Chronic pain     stomach 8 yrs    COPD     bronchitis chronic controlled by inhalers    Depression     controlled      Diverticulitis     GERD (gastroesophageal reflux disease)     fair control with med    Heart murmur     Hypertension     IBS (irritable bowel syndrome)     IC (interstitial cystitis)     Insomnia     Mitral valve regurgitation, rheumatic     followed Dr. Naldo Babcock Palpitations 5/16/2016    Psychiatric disorder     anxiety    Rheumatic aortic insufficiency 5/16/2016    Smoker     has been quit for 2 months    Tobacco abuse disorder 5/16/2016    Vitamin D deficiency        Past Surgical History:   Procedure Laterality Date    BIOPSY OF BREAST, INCISIONAL      lymph node , left, patient states her bx neg, but high risk    COLONOSCOPY      8 polyps removed, diverticulitis    CYSTOSCOPY  8-23-11    bladder dilitation    EGD      HX APPENDECTOMY  2006    HX HEART CATHETERIZATION  5/27/2011    no stents    HX LAP CHOLECYSTECTOMY  6/6/2011    HX NATASHA AND BSO  2004    fibroid tumors, hyst    HX UROLOGICAL      cystoscopy         Family History:   Problem Relation Age of Onset    Heart Failure Father 76     chf    Heart Disease Father     Diabetes Sister     Heart Disease Maternal Grandfather     Diabetes Maternal Grandfather     Heart Disease Paternal Grandfather     Cancer Maternal Uncle      prostate       Social History     Social History    Marital status:      Spouse name: N/A    Number of children: N/A    Years of education: N/A     Occupational History    Not on file. Social History Main Topics    Smoking status: Former Smoker     Packs/day: 0.25     Years: 25.00     Quit date: 11/30/2016    Smokeless tobacco: Never Used    Alcohol use No    Drug use: No    Sexual activity: Not on file     Other Topics Concern    Not on file     Social History Narrative         ALLERGIES: Aspirin; Bentyl [dicyclomine]; Toradol [ketorolac]; Ultram [tramadol]; and Zofran [ondansetron hcl (pf)]    Review of Systems   Constitutional: Negative for chills and fever. HENT: Negative for rhinorrhea and sore throat. Eyes: Negative for visual disturbance. Respiratory: Positive for cough and shortness of breath. Cardiovascular: Negative for chest pain and leg swelling. Gastrointestinal: Negative for abdominal pain, diarrhea, nausea and vomiting. Genitourinary: Negative for dysuria. Musculoskeletal: Negative for back pain and neck pain. Skin: Negative for rash. Neurological: Negative for weakness and headaches. Psychiatric/Behavioral: The patient is not nervous/anxious. Vitals:    11/25/17 1710 11/25/17 1733   BP: 129/67 127/75   Pulse: (!) 120 (!) 113   Resp: 26    Temp: 99 °F (37.2 °C)    SpO2: (!) 85% 95%   Weight: 68 kg (150 lb)    Height: 5' 4\" (1.626 m)             Physical Exam   Constitutional: She is oriented to person, place, and time. She appears well-developed and well-nourished. HENT:   Head: Normocephalic.    Right Ear: External ear normal.   Left Ear: External ear normal.   Eyes: Conjunctivae and EOM are normal. Pupils are equal, round, and reactive to light. Neck: Normal range of motion. Neck supple. No tracheal deviation present. Cardiovascular: Normal rate, regular rhythm, normal heart sounds and intact distal pulses. No murmur heard. Pulmonary/Chest: She is in respiratory distress. She has wheezes. She has rales. Abdominal: Soft. There is no tenderness. Musculoskeletal: Normal range of motion. Neurological: She is alert and oriented to person, place, and time. No cranial nerve deficit. Skin: No rash noted. Nursing note and vitals reviewed.        MDM  Number of Diagnoses or Management Options     Amount and/or Complexity of Data Reviewed  Clinical lab tests: ordered and reviewed  Tests in the radiology section of CPT®: ordered and reviewed  Tests in the medicine section of CPT®: ordered and reviewed  Review and summarize past medical records: yes  Discuss the patient with other providers: yes (Pulmonology, Dr. Rosa M Jones)    Risk of Complications, Morbidity, and/or Mortality  Presenting problems: high  Diagnostic procedures: high  Management options: high    Patient Progress  Patient progress: stable    ED Course       Procedures  Recent Results (from the past 12 hour(s))   POC LACTIC ACID    Collection Time: 11/25/17  5:24 PM   Result Value Ref Range    Lactic Acid (POC) 2.0 (H) 0.5 - 1.9 mmol/L   CBC WITH AUTOMATED DIFF    Collection Time: 11/25/17  5:25 PM   Result Value Ref Range    WBC 11.0 4.3 - 11.1 K/uL    RBC 3.34 (L) 4.05 - 5.25 M/uL    HGB 10.2 (L) 11.7 - 15.4 g/dL    HCT 30.7 (L) 35.8 - 46.3 %    MCV 91.9 79.6 - 97.8 FL    MCH 30.5 26.1 - 32.9 PG    MCHC 33.2 31.4 - 35.0 g/dL    RDW 13.0 11.9 - 14.6 %    PLATELET 305 (H) 965 - 450 K/uL    MPV 8.9 (L) 10.8 - 14.1 FL    DF AUTOMATED      NEUTROPHILS 90 (H) 43 - 78 %    LYMPHOCYTES 6 (L) 13 - 44 %    MONOCYTES 4 4.0 - 12.0 %    EOSINOPHILS 0 (L) 0.5 - 7.8 %    BASOPHILS 0 0.0 - 2.0 % IMMATURE GRANULOCYTES 0 0.0 - 5.0 %    ABS. NEUTROPHILS 9.9 (H) 1.7 - 8.2 K/UL    ABS. LYMPHOCYTES 0.7 0.5 - 4.6 K/UL    ABS. MONOCYTES 0.4 0.1 - 1.3 K/UL    ABS. EOSINOPHILS 0.0 0.0 - 0.8 K/UL    ABS. BASOPHILS 0.0 0.0 - 0.2 K/UL    ABS. IMM.  GRANS. 0.0 0.0 - 0.5 K/UL     CXR: Right sided infiltrate      49 yo female with pneumonia:     O2, cultures, abx, admit pulmonary

## 2017-11-25 NOTE — ED NOTES
Patient sent from urgent care with Dx RLL pneumonia w/ pleural effusion. Patient reports 2 week history of cough with rust colored sputum. Patient very anxious at time of triage with pressured speech. Patient RA O2 sat 85%. Patient with barking cough. Patient to ED with CD of CXR from urgent care. BHARAT = 2 due to diagnosed pneumonia, hypoxia on RA.

## 2017-11-26 LAB
ALBUMIN SERPL-MCNC: 2.2 G/DL (ref 3.5–5)
ALBUMIN/GLOB SERPL: 0.5 {RATIO} (ref 1.2–3.5)
ALP SERPL-CCNC: 152 U/L (ref 50–136)
ALT SERPL-CCNC: 37 U/L (ref 12–65)
ANION GAP SERPL CALC-SCNC: 7 MMOL/L (ref 7–16)
AST SERPL-CCNC: 59 U/L (ref 15–37)
ATRIAL RATE: 115 BPM
BASOPHILS # BLD: 0 K/UL (ref 0–0.2)
BASOPHILS NFR BLD: 0 % (ref 0–2)
BILIRUB SERPL-MCNC: 0.2 MG/DL (ref 0.2–1.1)
BNP SERPL-MCNC: 277 PG/ML
BUN SERPL-MCNC: 9 MG/DL (ref 6–23)
CALCIUM SERPL-MCNC: 8.5 MG/DL (ref 8.3–10.4)
CALCULATED P AXIS, ECG09: 71 DEGREES
CALCULATED R AXIS, ECG10: 12 DEGREES
CALCULATED T AXIS, ECG11: 41 DEGREES
CHLORIDE SERPL-SCNC: 107 MMOL/L (ref 98–107)
CO2 SERPL-SCNC: 27 MMOL/L (ref 21–32)
CREAT SERPL-MCNC: 0.58 MG/DL (ref 0.6–1)
DIAGNOSIS, 93000: NORMAL
DIFFERENTIAL METHOD BLD: ABNORMAL
EOSINOPHIL # BLD: 0 K/UL (ref 0–0.8)
EOSINOPHIL NFR BLD: 0 % (ref 0.5–7.8)
ERYTHROCYTE [DISTWIDTH] IN BLOOD BY AUTOMATED COUNT: 12.9 % (ref 11.9–14.6)
GLOBULIN SER CALC-MCNC: 4.3 G/DL (ref 2.3–3.5)
GLUCOSE SERPL-MCNC: 134 MG/DL (ref 65–100)
HCT VFR BLD AUTO: 28.7 % (ref 35.8–46.3)
HGB BLD-MCNC: 9.3 G/DL (ref 11.7–15.4)
IMM GRANULOCYTES # BLD: 0 K/UL (ref 0–0.5)
IMM GRANULOCYTES NFR BLD AUTO: 0 % (ref 0–5)
LYMPHOCYTES # BLD: 0.5 K/UL (ref 0.5–4.6)
LYMPHOCYTES NFR BLD: 6 % (ref 13–44)
MCH RBC QN AUTO: 30 PG (ref 26.1–32.9)
MCHC RBC AUTO-ENTMCNC: 32.4 G/DL (ref 31.4–35)
MCV RBC AUTO: 92.6 FL (ref 79.6–97.8)
MONOCYTES # BLD: 0.2 K/UL (ref 0.1–1.3)
MONOCYTES NFR BLD: 2 % (ref 4–12)
NEUTS SEG # BLD: 8.8 K/UL (ref 1.7–8.2)
NEUTS SEG NFR BLD: 92 % (ref 43–78)
P-R INTERVAL, ECG05: 154 MS
PLATELET # BLD AUTO: 450 K/UL (ref 150–450)
PMV BLD AUTO: 8.9 FL (ref 10.8–14.1)
POTASSIUM SERPL-SCNC: 4 MMOL/L (ref 3.5–5.1)
PROT SERPL-MCNC: 6.5 G/DL (ref 6.3–8.2)
Q-T INTERVAL, ECG07: 384 MS
QRS DURATION, ECG06: 78 MS
QTC CALCULATION (BEZET), ECG08: 531 MS
RBC # BLD AUTO: 3.1 M/UL (ref 4.05–5.25)
SODIUM SERPL-SCNC: 141 MMOL/L (ref 136–145)
TROPONIN I SERPL-MCNC: <0.02 NG/ML (ref 0.02–0.05)
VENTRICULAR RATE, ECG03: 115 BPM
WBC # BLD AUTO: 9.5 K/UL (ref 4.3–11.1)

## 2017-11-26 PROCEDURE — 77010033678 HC OXYGEN DAILY

## 2017-11-26 PROCEDURE — 85025 COMPLETE CBC W/AUTO DIFF WBC: CPT | Performed by: INTERNAL MEDICINE

## 2017-11-26 PROCEDURE — 74011250636 HC RX REV CODE- 250/636: Performed by: INTERNAL MEDICINE

## 2017-11-26 PROCEDURE — 83880 ASSAY OF NATRIURETIC PEPTIDE: CPT | Performed by: INTERNAL MEDICINE

## 2017-11-26 PROCEDURE — 74011000250 HC RX REV CODE- 250: Performed by: INTERNAL MEDICINE

## 2017-11-26 PROCEDURE — 94760 N-INVAS EAR/PLS OXIMETRY 1: CPT

## 2017-11-26 PROCEDURE — 74011250637 HC RX REV CODE- 250/637: Performed by: NURSE PRACTITIONER

## 2017-11-26 PROCEDURE — 74011250637 HC RX REV CODE- 250/637: Performed by: INTERNAL MEDICINE

## 2017-11-26 PROCEDURE — 36415 COLL VENOUS BLD VENIPUNCTURE: CPT | Performed by: INTERNAL MEDICINE

## 2017-11-26 PROCEDURE — 77030013140 HC MSK NEB VYRM -A

## 2017-11-26 PROCEDURE — 94640 AIRWAY INHALATION TREATMENT: CPT

## 2017-11-26 PROCEDURE — 99232 SBSQ HOSP IP/OBS MODERATE 35: CPT | Performed by: INTERNAL MEDICINE

## 2017-11-26 PROCEDURE — 65270000029 HC RM PRIVATE

## 2017-11-26 PROCEDURE — 80053 COMPREHEN METABOLIC PANEL: CPT | Performed by: INTERNAL MEDICINE

## 2017-11-26 RX ORDER — CLONAZEPAM 0.5 MG/1
0.5 TABLET ORAL
Status: DISCONTINUED | OUTPATIENT
Start: 2017-11-26 | End: 2017-11-27

## 2017-11-26 RX ORDER — ONDANSETRON 2 MG/ML
4 INJECTION INTRAMUSCULAR; INTRAVENOUS
Status: DISCONTINUED | OUTPATIENT
Start: 2017-11-26 | End: 2017-12-04 | Stop reason: HOSPADM

## 2017-11-26 RX ORDER — ALBUTEROL SULFATE 0.83 MG/ML
2.5 SOLUTION RESPIRATORY (INHALATION)
Status: DISCONTINUED | OUTPATIENT
Start: 2017-11-26 | End: 2017-11-27

## 2017-11-26 RX ORDER — LORAZEPAM 2 MG/ML
0.5 INJECTION INTRAMUSCULAR ONCE
Status: COMPLETED | OUTPATIENT
Start: 2017-11-26 | End: 2017-11-26

## 2017-11-26 RX ORDER — GUAIFENESIN 600 MG/1
1200 TABLET, EXTENDED RELEASE ORAL EVERY 12 HOURS
Status: DISCONTINUED | OUTPATIENT
Start: 2017-11-26 | End: 2017-12-04 | Stop reason: HOSPADM

## 2017-11-26 RX ADMIN — GUAIFENESIN 1200 MG: 600 TABLET, EXTENDED RELEASE ORAL at 15:15

## 2017-11-26 RX ADMIN — Medication 5 ML: at 05:51

## 2017-11-26 RX ADMIN — METHYLPREDNISOLONE SODIUM SUCCINATE 60 MG: 125 INJECTION, POWDER, FOR SOLUTION INTRAMUSCULAR; INTRAVENOUS at 15:14

## 2017-11-26 RX ADMIN — ONDANSETRON 4 MG: 2 INJECTION INTRAMUSCULAR; INTRAVENOUS at 03:31

## 2017-11-26 RX ADMIN — HYDROCODONE BITARTRATE AND HOMATROPINE METHYLBROMIDE 5 ML: 5; 1.5 SOLUTION ORAL at 03:21

## 2017-11-26 RX ADMIN — HYDROCODONE BITARTRATE AND ACETAMINOPHEN 1 TABLET: 5; 325 TABLET ORAL at 20:57

## 2017-11-26 RX ADMIN — ALBUTEROL SULFATE 2.5 MG: 2.5 SOLUTION RESPIRATORY (INHALATION) at 16:11

## 2017-11-26 RX ADMIN — GABAPENTIN 400 MG: 400 CAPSULE ORAL at 07:27

## 2017-11-26 RX ADMIN — HYDROCODONE BITARTRATE AND HOMATROPINE METHYLBROMIDE 5 ML: 5; 1.5 SOLUTION ORAL at 07:26

## 2017-11-26 RX ADMIN — HYDROCODONE BITARTRATE AND ACETAMINOPHEN 1 TABLET: 5; 325 TABLET ORAL at 07:26

## 2017-11-26 RX ADMIN — HYDROCODONE BITARTRATE AND ACETAMINOPHEN 1 TABLET: 5; 325 TABLET ORAL at 11:26

## 2017-11-26 RX ADMIN — HYDROCODONE BITARTRATE AND HOMATROPINE METHYLBROMIDE 5 ML: 5; 1.5 SOLUTION ORAL at 11:26

## 2017-11-26 RX ADMIN — Medication 10 ML: at 20:33

## 2017-11-26 RX ADMIN — ALBUTEROL SULFATE 2.5 MG: 2.5 SOLUTION RESPIRATORY (INHALATION) at 20:03

## 2017-11-26 RX ADMIN — LORAZEPAM 0.5 MG: 2 INJECTION INTRAMUSCULAR; INTRAVENOUS at 20:28

## 2017-11-26 RX ADMIN — ENOXAPARIN SODIUM 40 MG: 40 INJECTION SUBCUTANEOUS at 20:27

## 2017-11-26 RX ADMIN — AMITRIPTYLINE HYDROCHLORIDE 100 MG: 50 TABLET, FILM COATED ORAL at 20:57

## 2017-11-26 RX ADMIN — GABAPENTIN 400 MG: 400 CAPSULE ORAL at 15:16

## 2017-11-26 RX ADMIN — Medication 10 ML: at 07:27

## 2017-11-26 RX ADMIN — LEVOFLOXACIN 750 MG: 5 INJECTION, SOLUTION INTRAVENOUS at 20:29

## 2017-11-26 RX ADMIN — METHYLPREDNISOLONE SODIUM SUCCINATE 60 MG: 125 INJECTION, POWDER, FOR SOLUTION INTRAMUSCULAR; INTRAVENOUS at 05:51

## 2017-11-26 RX ADMIN — HYDROCODONE BITARTRATE AND ACETAMINOPHEN 1 TABLET: 5; 325 TABLET ORAL at 15:14

## 2017-11-26 RX ADMIN — GUAIFENESIN 1200 MG: 600 TABLET, EXTENDED RELEASE ORAL at 20:28

## 2017-11-26 NOTE — PROGRESS NOTES
.Primary Nurse Richie Alonso RN and Jason Gaming RN performed a dual skin assessment on this patient No impairment noted  Silverio score is 22

## 2017-11-26 NOTE — PROGRESS NOTES
Patient continually calling for cough and pain medication. Informed patient medication as needed, every 4 hours. Patient O2 on 2LNC 91%. Patient very talkative, appears anxious. NAD noted. Will continue to monitor.

## 2017-11-26 NOTE — H&P
HISTORY AND PHYSICAL      Nicolas Maurizio Oh    11/25/2017    Date of Admission:  11/25/2017    The patient's chart is reviewed and the patient is discussed with the staff. Subjective:     Patient is a 50 y.o. female presents with complaints of a frequent productive cough and increasing SOB of approximately one week duration. She is unable to expectorate the sputum. She also has associated wheezing with her symptoms frequently. She admits to smoking up to one pack per day of cigarettes for 20 years. She admits to pleurtic chest and back pain. She presently denies fevers, chills, and hemoptysis. Review of Systems: Negative except as per HPI. Patient Active Problem List   Diagnosis Code    Chest pain R07.9    Essential hypertension, benign I10    Esophageal reflux K21.9    Depression F32.9    Rheumatic disease of mitral valve I05.9    DDD (degenerative disc disease) [id number]    Interstitial cystitis N30.10    Cocaine abuse F14.10    Rheumatic aortic insufficiency I06.1    Palpitations R00.2    Cough R05    Bronchitis, chronic, mucopurulent ([address] J41.1    Blood in stool K92.1    Takotsubo cardiomyopathy I51.81    Carotidynia G90.01    COPD exacerbation ([address] J44.1    CAP (community acquired pneumonia) J18.9    Nicotine dependence F17.200       Prior to Admission Medications   Prescriptions Last Dose Informant Patient Reported? Taking? FLUoxetine (PROZAC) 10 mg tablet   Yes No   Sig: Take 10 mg by mouth every morning. NIFEdipine ER (PROCARDIA XL) 60 mg ER tablet   No No   Sig: Take 1 Tab by mouth daily. Patient taking differently: Take 60 mg by mouth every morning. amitriptyline (ELAVIL) 100 mg tablet   No No   Sig: Take 1 Tab by mouth nightly. budesonide-formoterol (SYMBICORT) 160-4.5 mcg/actuation HFA inhaler   Yes No   Sig: Take 1 Puff by inhalation two (2) times a day.  Indications: BRONCHOSPASM PREVENTION WITH COPD   clonazePAM (KLONOPIN) 0.5 mg tablet   Yes No   Sig: Take 0.5 mg by mouth as needed. estradiol (ESTRACE) 2 mg tablet  Self Yes No   Sig: Take 2 mg by mouth every morning.   gabapentin (NEURONTIN) 400 mg capsule   Yes No   Sig: Take 400 mg by mouth two (2) times a day. nitroglycerin (NITROSTAT) 0.4 mg SL tablet   No No   Si Tab by SubLINGual route every five (5) minutes as needed for Chest Pain.   pantoprazole (PROTONIX) 40 mg tablet   Yes Yes   Sig: Take 40 mg by mouth daily. pravastatin (PRAVACHOL) 10 mg tablet   Yes No   Sig: Take  by mouth nightly. promethazine (PHENERGAN) 25 mg tablet   No No   Sig: Take 1 Tab by mouth every six (6) hours as needed. tiotropium (SPIRIVA WITH HANDIHALER) 18 mcg inhalation capsule  Self Yes No   Sig: Take 1 Cap by inhalation daily.       Facility-Administered Medications: None       Past Medical History:   Diagnosis Date    Anemia     Arrhythmia     palpitations, moderate mitral valve regurge    Arthritis     lower back osteo    Asthma     uses inhalers    Cardiomyopathy     due to murmur (patient states leaky valve)    Chronic pain     stomach 8 yrs    COPD     bronchitis chronic controlled by inhalers    Depression     controlled      Diverticulitis     GERD (gastroesophageal reflux disease)     fair control with med    Heart murmur     Hypertension     IBS (irritable bowel syndrome)     IC (interstitial cystitis)     Insomnia     Mitral valve regurgitation, rheumatic     followed Dr. Zoya Moreno Palpitations 2016    Psychiatric disorder     anxiety    Rheumatic aortic insufficiency 2016    Smoker     has been quit for 2 months    Tobacco abuse disorder 2016    Vitamin D deficiency      Past Surgical History:   Procedure Laterality Date    BIOPSY OF BREAST, INCISIONAL      lymph node , left, patient states her bx neg, but high risk    COLONOSCOPY      8 polyps removed, diverticulitis    CYSTOSCOPY  11    bladder dilitation    EGD      HX APPENDECTOMY 2006    HX HEART CATHETERIZATION  5/27/2011    no stents    HX LAP CHOLECYSTECTOMY  6/6/2011    HX NATASHA AND BSO  2004    fibroid tumors, hyst    HX UROLOGICAL      cystoscopy     Social History     Social History    Marital status:      Spouse name: N/A    Number of children: N/A    Years of education: N/A     Occupational History    Not on file.      Social History Main Topics    Smoking status: Former Smoker     Packs/day: 0.25     Years: 25.00     Quit date: 11/30/2016    Smokeless tobacco: Never Used    Alcohol use No    Drug use: No    Sexual activity: Not on file     Other Topics Concern    Not on file     Social History Narrative     Family History   Problem Relation Age of Onset    Heart Failure Father 76     chf    Heart Disease Father     Diabetes Sister     Heart Disease Maternal Grandfather     Diabetes Maternal Grandfather     Heart Disease Paternal Grandfather     Cancer Maternal Uncle      prostate     Allergies   Allergen Reactions    Aspirin Other (comments)     Inflames IBS per pt    Bentyl [Dicyclomine] Itching    Toradol [Ketorolac] Hives    Ultram [Tramadol] Nausea and Vomiting    Zofran [Ondansetron Hcl (Pf)] Nausea and Vomiting       Current Facility-Administered Medications   Medication Dose Route Frequency    sodium chloride 0.9 % bolus infusion 1,000 mL  1,000 mL IntraVENous ONCE    morphine injection 2 mg  2 mg IntraVENous NOW    sodium chloride (NS) flush 5-10 mL  5-10 mL IntraVENous Q8H    sodium chloride (NS) flush 5-10 mL  5-10 mL IntraVENous PRN    methylPREDNISolone (PF) (SOLU-MEDROL) injection 60 mg  60 mg IntraVENous Q8H    levoFLOXacin (LEVAQUIN) 750 mg in D5W IVPB  750 mg IntraVENous Q24H    acetaminophen (TYLENOL) tablet 650 mg  650 mg Oral Q4H PRN    HYDROcodone-acetaminophen (NORCO) 5-325 mg per tablet 1 Tab  1 Tab Oral Q4H PRN    nicotine (NICODERM CQ) 21 mg/24 hr patch 1 Patch  1 Patch TransDERmal Q24H    enoxaparin (LOVENOX) injection 40 mg  40 mg SubCUTAneous Q24H    HYDROcodone-homatropine (HYCODAN) 5-1.5 mg/5 mL (5 mL) syrup 5 mL  5 mL Oral Q4H PRN           Objective:     Vitals:    11/25/17 1733 11/25/17 1900 11/25/17 1930 11/25/17 2049   BP: 127/75 122/77  119/72   Pulse: (!) 113  (!) 113 (!) 102   Resp:   22 20   Temp:    97.6 °F (36.4 °C)   SpO2: 95% 91% 93% 95%   Weight:       Height:           PHYSICAL EXAM     Constitutional:  the patient is well developed and in no acute distress  EENMT:  Sclera clear, pupils equal, oral mucosa moist  Respiratory: Right basilar crackles; diminished breath sounds bilaterally. Cardiovascular:  RRR without M,G,R  Gastrointestinal: soft and non-tender; with positive bowel sounds. Musculoskeletal: warm without cyanosis. There is 2+ bilateral lower leg edema. Skin:  no jaundice or rashes, no wounds   Neurologic: no gross neuro deficits     Psychiatric:  alert and oriented x 3    CXR:      Recent Labs      11/25/17   1725   WBC  11.0   HGB  10.2*   HCT  30.7*   PLT  481*     Recent Labs      11/25/17   1725   NA  141   K  2.9*   CL  104   GLU  133*   CO2  25   BUN  7   CREA  0.72   CA  8.9   TROIQ  <0.02*   ALB  3.0*   TBILI  0.4   ALT  38   SGOT  70*     No results for input(s): PH, PCO2, PO2, HCO3 in the last 72 hours. No results for input(s): LCAD, LAC in the last 72 hours.     Assessment:  (Medical Decision Making)     Hospital Problems  Date Reviewed: 2/24/2017          Codes Class Noted POA    * (Principal)COPD exacerbation ([facility] [address] ICD-10-CM: J44.1  ICD-9-CM: 491.21  11/25/2017 Unknown        CAP (community acquired pneumonia) ICD-10-CM: J18.9  ICD-9-CM: 486  11/25/2017 Unknown        Nicotine dependence ICD-10-CM: F17.200  ICD-9-CM: 305.1  11/25/2017 Unknown        Cough ICD-10-CM: R05  ICD-9-CM: 786.2  6/7/2016 Yes        Rheumatic aortic insufficiency ICD-10-CM: I06.1  ICD-9-CM: 395.1  5/16/2016 Yes        Esophageal reflux ICD-10-CM: K21.9  ICD-9-CM: 530.81  9/3/2013 Yes Rheumatic disease of mitral valve ICD-10-CM: I05.9  ICD-9-CM: 394.9  9/3/2013 Yes              Plan:  (Medical Decision Making)     --Will admit for further medical management  --Start supplemental O2; adjust rate as needed to keep O2 sats > 90%  --Start Duonebs q 6 hours  --Start IV solumedrol 60mg q 8 hours  --Start levofloxacin IV  --Check serial cardiac isoenzymes, BNP    More than 50% of the time documented was spent in face-to-face contact with the patient and in the care of the patient on the floor/unit where the patient is located.     Denisha Lawrence MD

## 2017-11-26 NOTE — PROGRESS NOTES
Pt. Arrived on 8th flr accompanied by staff, pt alert and oriented x4, pt requesting pain and cough medicine. Pt has been oriented to her new environment. Respirations even and unlabored. No c/o CP, palpitations or SOB. Encouraged to call for needs. Assessment completed. Will continue to monitor.

## 2017-11-26 NOTE — PROGRESS NOTES
Problem: Nutrition Deficit  Goal: *Optimize nutritional status  Nutrition  Reason for assessment: Referral received from nursing admission Malnutrition Screening Tool for recently lost unsure amount of weight without trying and eating poorly due to decreased appetite. Assessment:   Diet order(s): cardiac  Food/Nutrition Patient History:  Patient reports that her weight fluctuates greatly, stating that it gets as low as < 140 pounds and will get up to ~157 pounds. She states that when she feels sick she is unable to eat as much but when she is on steroids she has a great appetite. She reports eating 100% of breakfast this morning and is seen eating a breakfast sandwich from [employer] (?) and multiple packs of anayeli crackers and a large jar of peanutbutter at bedside. She states that she is going to eat that later. Poor dentition noted but she denies needing softer items. Anthropometrics:Height: 5' 4\" (162.6 cm),  Weight: 66.2 kg (146 lb), Weight Source: Standing scale (comment), Body mass index is 25.06 kg/(m^2). BMI class of overweight. WT / BMI 11/26/2017 11/2/2017 10/6/2017 8/9/2017   WEIGHT 146 lb 150 lb 140 lb 150 lb     WT / BMI 4/24/2017 4/15/2017 3/6/2017 3/2/2017   WEIGHT 150 lb 155 lb 1.6 oz 149 lb 149 lb     WT / BMI 2/24/2017 1/30/2017 10/24/2016   WEIGHT 151 lb 154 lb 155 lb   Patient's weight ranges between 140 pounds to 155 pounds over the past ~1 year. She has had a recent 4 pound, 2.6% clinically insignificant weight loss this month. Macronutrient needs:  EER:  0827-0810 kcal /day (20-25 kcal/kg listed BW)  EPR:  44-55 grams protein/day (0.8-1 grams/kg IBW)  Intake/Comparative Standards: Per RD meal rounds: eating a breakfast biscuit from [employer] (?). Average intake for past 1 day(s)/1 recorded meal(s): 75%. This potentially meets ~100% of kcal and ~100% of protein needs    Nutrition Diagnosis: No nutrition diagnosis at this time.      Intervention:  Meals and snacks: Continue current diet. May have double protein portions. Nutrition Supplement Therapy: None at this time. Discharge nutrition plan: Too soon to determine.     Kimberly Feldman [address], [address] [place] [address], [address] [place] [address], [phone]

## 2017-11-26 NOTE — PROGRESS NOTES
Problem: Falls - Risk of  Goal: *Absence of Falls  Document Joselito Fall Risk and appropriate interventions in the flowsheet.    Outcome: Progressing Towards Goal  Fall Risk Interventions:  Mobility Interventions: Patient to call before getting OOB    Mentation Interventions: Adequate sleep, hydration, pain control    Medication Interventions: Patient to call before getting OOB    Elimination Interventions: Call light in reach    History of Falls Interventions: Evaluate medications/consider consulting pharmacy, Consult care management for discharge planning

## 2017-11-26 NOTE — PROGRESS NOTES
Reassessment unchanged. Patient sitting up in bed, lab personnel present. NAD noted. Will continue to monitor.

## 2017-11-26 NOTE — ED NOTES
TRANSFER - OUT REPORT:    Verbal report given to Anushka (name) on Darby Gomes  being transferred to [phone] (unit) for routine progression of care       Report consisted of patients Situation, Background, Assessment and   Recommendations(SBAR). Information from the following report(s) SBAR was reviewed with the receiving nurse. Lines:   Peripheral IV 11/25/17 Right Wrist (Active)   Site Assessment Clean, dry, & intact 11/25/2017  5:34 PM   Phlebitis Assessment 0 11/25/2017  5:34 PM   Infiltration Assessment 0 11/25/2017  5:34 PM   Hub Color/Line Status Pink 11/25/2017  5:34 PM   Alcohol Cap Used No 11/25/2017  5:34 PM        Opportunity for questions and clarification was provided.       Patient transported with:   [employer]

## 2017-11-26 NOTE — PROGRESS NOTES
AM assessment completed and documented, see docflow. Patient A/Ox3, resting quietly in bed. NAD noted at present. Respirations even and non labored on 2LNC. Patient verbalized understanding to call for needs. Call light within reach. Will continue to monitor.

## 2017-11-26 NOTE — PROGRESS NOTES
Latisha Chapa-Lewis  Admission Date: 11/25/2017             Daily Progress Note: 11/26/2017    The patient's chart is reviewed and the patient is discussed with the staff. Patient is a 50 y.o. female presents with complaints of a frequent productive cough and increasing SOB of approximately one week duration. She is unable to expectorate the sputum. She also has associated wheezing with her symptoms frequently. She admits to smoking up to one pack per day of cigarettes for 20 years. She admits to pleurtic chest and back pain. She presently denies fevers, chills, and hemoptysis    Subjective:     Pt reports she has coughing \"fits\" that may last up to 30 minutes. Feels chest congestion but having a hard time producing sputum. If productive, it's thick \"tamera\" colored. Still feels SOB  On 3L/nc    Pleuritic CP ongoing.       Current Facility-Administered Medications   Medication Dose Route Frequency    ondansetron (ZOFRAN) injection 4 mg  4 mg IntraVENous Q6H PRN    sodium chloride (NS) flush 5-10 mL  5-10 mL IntraVENous Q8H    sodium chloride (NS) flush 5-10 mL  5-10 mL IntraVENous PRN    methylPREDNISolone (PF) (SOLU-MEDROL) injection 60 mg  60 mg IntraVENous Q8H    levoFLOXacin (LEVAQUIN) 750 mg in D5W IVPB  750 mg IntraVENous Q24H    acetaminophen (TYLENOL) tablet 650 mg  650 mg Oral Q4H PRN    HYDROcodone-acetaminophen (NORCO) 5-325 mg per tablet 1 Tab  1 Tab Oral Q4H PRN    nicotine (NICODERM CQ) 21 mg/24 hr patch 1 Patch  1 Patch TransDERmal Q24H    enoxaparin (LOVENOX) injection 40 mg  40 mg SubCUTAneous Q24H    HYDROcodone-homatropine (HYCODAN) 5-1.5 mg/5 mL (5 mL) syrup 5 mL  5 mL Oral Q4H PRN    amitriptyline (ELAVIL) tablet 100 mg  100 mg Oral QHS    gabapentin (NEURONTIN) capsule 400 mg  400 mg Oral BID       Review of Systems  Constitutional: negative for fever, chills, sweats  +malaise  Cardiovascular: negative for palpitations, syncope, edema  +chest pain  Gastrointestinal:  negative for dysphagia, reflux, vomiting, diarrhea, abdominal pain, or melena  Neurologic:  negative for focal weakness, numbness, headache    Objective:     Vitals:    11/26/17 0627 11/26/17 0734 11/26/17 1045 11/26/17 1143   BP:  112/69  118/71   Pulse:  (!) 102  100   Resp:  20 20 24   Temp:  98.2 °F (36.8 °C)  97.9 °F (36.6 °C)   SpO2:  90% 91% 90%   Weight: 146 lb (66.2 kg)      Height:         Intake and Output:   11/24 1901 - 11/26 0700  In: 1000 [P.O.:1000]  Out: 400 [Urine:400]       Physical Exam:   Constitution:  the patient is well developed and in no acute distress  EENMT:  Sclera clear, pupils equal, oral mucosa moist  Respiratory: Coarse; Bibasilar crackles, diminished in the bases. Decreased air entry. O2 3L/nc. Frequent moist cough noted  Cardiovascular:  RRR without M,G,R  Gastrointestinal: soft and non-tender; with positive bowel sounds. Musculoskeletal: warm without cyanosis. There is no lower leg edema.   Skin:  no jaundice or rashes, no wounds   Neurologic: no gross neuro deficits     Psychiatric:  alert and oriented x 3    CXR:       LAB     Recent Labs      11/26/17   0526  11/25/17   1725   WBC  9.5  11.0   HGB  9.3*  10.2*   HCT  28.7*  30.7*   PLT  450  481*     Recent Labs      11/26/17   0526  11/25/17   2350  11/25/17   1725   NA  141   --   141   K  4.0   --   2.9*   CL  107   --   104   CO2  27   --   25   GLU  134*   --   133*   BUN  9   --   7   CREA  0.58*   --   0.72   CA  8.5   --   8.9   TROIQ  <0.02*  <0.02*  <0.02*   ALB  2.2*   --   3.0*   TBILI  0.2   --   0.4   ALT  37   --   38   SGOT  59*   --   70*             Assessment:  (Medical Decision Making)     Hospital Problems  Date Reviewed: 2/24/2017          Codes Class Noted POA    * (Principal)[facility] ICD-10-CM: J44.1  ICD-9-CM: 491.21  11/25/2017 Unknown    IV steroids, nebs, O2    CAP (community acquired pneumonia) ICD-10-CM: J18.9  ICD-9-CM: 585  11/25/2017 Unknown    IV levaquin    Nicotine dependence ICD-10-CM: F17.200  ICD-9-CM: 305.1  11/25/2017 Unknown    Smoking cessation recommended    Cough ICD-10-CM: R05  ICD-9-CM: 786.2  6/7/2016 Yes        Rheumatic aortic insufficiency ICD-10-CM: I06.1  ICD-9-CM: 395.1  5/16/2016 Yes        Esophageal reflux ICD-10-CM: K21.9  ICD-9-CM: 530.81  9/3/2013 Yes        Rheumatic disease of mitral valve ICD-10-CM: I05.9  ICD-9-CM: 394.9  9/3/2013 Yes              Plan:  (Medical Decision Making)     --Blood cultures x2 negative x11 hours  --Wean O2 as tolerated  --Duonebs  --IV solumedrol 60mg q 8 hours;  --Levofloxacin IV Day 2  --Troponins neg x3; Cp is pleuritic in nature  --Add mucinex    More than 50% of the time documented was spent in face-to-face contact with the patient and in the care of the patient on the floor/unit where the patient is located. [address] Vora NP     Lungs:  Fairly clear  Heart:  RRR with no Murmur/Rubs/Gallops    Additional Comments:  Says she is unable to expectorate sputum. Wanting pain meds frequently per nurse. Change Duoneb to albuterol and add mucinex. Also try Vibralung. Can wean steroids. I have spoken with and examined the patient. I agree with the above assessment and plan as documented.     Darren Collins MD

## 2017-11-26 NOTE — PROGRESS NOTES
[facility] TRANSFER - IN REPORT:    Verbal report received from [address] on Eamon Oh  being received from ER for routine progression of care      Report consisted of patients Situation, Background, Assessment and   Recommendations(SBAR). Information from the following report(s) SBAR, Kardex, ED Summary and MAR was reviewed with the receiving nurse. Opportunity for questions and clarification was provided. Assessment completed upon patients arrival to unit and care assumed.

## 2017-11-26 NOTE — PROGRESS NOTES
Hycodan 5ml given PO and Norco 5mg given PO for c/o chronic left side headache 08/10 pain and  for cough. Will continue to monitor.

## 2017-11-27 ENCOUNTER — APPOINTMENT (OUTPATIENT)
Dept: GENERAL RADIOLOGY | Age: 52
DRG: 194 | End: 2017-11-27
Attending: NURSE PRACTITIONER
Payer: COMMERCIAL

## 2017-11-27 PROBLEM — F17.200 NICOTINE DEPENDENCE: Chronic | Status: ACTIVE | Noted: 2017-11-25

## 2017-11-27 PROBLEM — R09.02 HYPOXEMIA: Status: ACTIVE | Noted: 2017-11-27

## 2017-11-27 LAB
ARTERIAL PATENCY WRIST A: ABNORMAL
ARTERIAL PATENCY WRIST A: POSITIVE
BASE EXCESS BLDA CALC-SCNC: 2.4 MMOL/L (ref 0–3)
BASE EXCESS BLDA CALC-SCNC: 3 MMOL/L (ref 0–3)
BDY SITE: ABNORMAL
BDY SITE: ABNORMAL
COHGB MFR BLD: 0.6 % (ref 0.5–1.5)
COHGB MFR BLD: 0.7 % (ref 0.5–1.5)
DO-HGB BLD-MCNC: 10 % (ref 0–5)
DO-HGB BLD-MCNC: 12 % (ref 0–5)
FIO2 ON VENT: 80 %
GAS FLOW.O2 O2 DELIVERY SYS: 15 L/MIN
GAS FLOW.O2 O2 DELIVERY SYS: 15 L/MIN
HCO3 BLDA-SCNC: 27 MMOL/L (ref 22–26)
HCO3 BLDA-SCNC: 27 MMOL/L (ref 22–26)
HGB BLDMV-MCNC: 10.3 GM/DL (ref 11.7–15)
HGB BLDMV-MCNC: 10.4 GM/DL (ref 11.7–15)
METHGB MFR BLD: 0.1 % (ref 0–1.5)
METHGB MFR BLD: 0.2 % (ref 0–1.5)
OXYHGB MFR BLDA: 87.1 % (ref 94–97)
OXYHGB MFR BLDA: 89.3 % (ref 94–97)
PCO2 BLDA: 38 MMHG (ref 35–45)
PCO2 BLDA: 39 MMHG (ref 35–45)
PH BLDA: 7.45 [PH] (ref 7.35–7.45)
PH BLDA: 7.47 [PH] (ref 7.35–7.45)
PO2 BLDA: 53 MMHG (ref 80–105)
PO2 BLDA: 56 MMHG (ref 80–105)
SAO2 % BLD: 88 % (ref 92–98.5)
SAO2 % BLD: 90 % (ref 92–98.5)
SERVICE CMNT-IMP: ABNORMAL
VENTILATION MODE VENT: ABNORMAL
VENTILATION MODE VENT: ABNORMAL

## 2017-11-27 PROCEDURE — 74011250636 HC RX REV CODE- 250/636: Performed by: INTERNAL MEDICINE

## 2017-11-27 PROCEDURE — 82803 BLOOD GASES ANY COMBINATION: CPT

## 2017-11-27 PROCEDURE — 36600 WITHDRAWAL OF ARTERIAL BLOOD: CPT

## 2017-11-27 PROCEDURE — 74011000250 HC RX REV CODE- 250: Performed by: INTERNAL MEDICINE

## 2017-11-27 PROCEDURE — 94760 N-INVAS EAR/PLS OXIMETRY 1: CPT

## 2017-11-27 PROCEDURE — 71010 XR CHEST SNGL V: CPT

## 2017-11-27 PROCEDURE — 94640 AIRWAY INHALATION TREATMENT: CPT

## 2017-11-27 PROCEDURE — 77010033678 HC OXYGEN DAILY

## 2017-11-27 PROCEDURE — 74011250637 HC RX REV CODE- 250/637: Performed by: NURSE PRACTITIONER

## 2017-11-27 PROCEDURE — 65270000029 HC RM PRIVATE

## 2017-11-27 PROCEDURE — 74011250637 HC RX REV CODE- 250/637: Performed by: INTERNAL MEDICINE

## 2017-11-27 PROCEDURE — 93306 TTE W/DOPPLER COMPLETE: CPT

## 2017-11-27 PROCEDURE — 99232 SBSQ HOSP IP/OBS MODERATE 35: CPT | Performed by: INTERNAL MEDICINE

## 2017-11-27 RX ORDER — FAMOTIDINE 20 MG/1
20 TABLET, FILM COATED ORAL 2 TIMES DAILY
Status: DISCONTINUED | OUTPATIENT
Start: 2017-11-27 | End: 2017-11-27

## 2017-11-27 RX ORDER — CLONAZEPAM 0.5 MG/1
0.5 TABLET ORAL 3 TIMES DAILY
Status: DISCONTINUED | OUTPATIENT
Start: 2017-11-28 | End: 2017-11-28

## 2017-11-27 RX ORDER — PANTOPRAZOLE SODIUM 40 MG/1
40 TABLET, DELAYED RELEASE ORAL
Status: DISCONTINUED | OUTPATIENT
Start: 2017-11-27 | End: 2017-12-04 | Stop reason: HOSPADM

## 2017-11-27 RX ORDER — CLONAZEPAM 0.5 MG/1
0.5 TABLET ORAL
Status: DISCONTINUED | OUTPATIENT
Start: 2017-11-27 | End: 2017-11-27

## 2017-11-27 RX ORDER — FLUOXETINE 10 MG/1
10 CAPSULE ORAL DAILY
Status: DISCONTINUED | OUTPATIENT
Start: 2017-11-27 | End: 2017-12-04 | Stop reason: HOSPADM

## 2017-11-27 RX ORDER — HYDROCODONE BITARTRATE AND HOMATROPINE METHYLBROMIDE 1.5; 5 MG/5ML; MG/5ML
5 SYRUP ORAL
Status: DISCONTINUED | OUTPATIENT
Start: 2017-11-27 | End: 2017-11-28

## 2017-11-27 RX ORDER — ALBUTEROL SULFATE 0.83 MG/ML
2.5 SOLUTION RESPIRATORY (INHALATION)
Status: DISCONTINUED | OUTPATIENT
Start: 2017-11-28 | End: 2017-11-30

## 2017-11-27 RX ORDER — GUAIFENESIN 100 MG/5ML
100 SOLUTION ORAL
Status: DISCONTINUED | OUTPATIENT
Start: 2017-11-27 | End: 2017-12-04 | Stop reason: HOSPADM

## 2017-11-27 RX ADMIN — HYDROCODONE BITARTRATE AND HOMATROPINE METHYLBROMIDE 5 ML: 5; 1.5 SOLUTION ORAL at 12:28

## 2017-11-27 RX ADMIN — ALBUTEROL SULFATE 2.5 MG: 2.5 SOLUTION RESPIRATORY (INHALATION) at 11:10

## 2017-11-27 RX ADMIN — LEVOFLOXACIN 750 MG: 500 TABLET, FILM COATED ORAL at 20:52

## 2017-11-27 RX ADMIN — AMITRIPTYLINE HYDROCHLORIDE 100 MG: 50 TABLET, FILM COATED ORAL at 20:53

## 2017-11-27 RX ADMIN — CLONAZEPAM 0.5 MG: 0.5 TABLET ORAL at 23:41

## 2017-11-27 RX ADMIN — METHYLPREDNISOLONE SODIUM SUCCINATE 60 MG: 125 INJECTION, POWDER, FOR SOLUTION INTRAMUSCULAR; INTRAVENOUS at 23:40

## 2017-11-27 RX ADMIN — GUAIFENESIN 1200 MG: 600 TABLET, EXTENDED RELEASE ORAL at 08:20

## 2017-11-27 RX ADMIN — HYDROCODONE BITARTRATE AND HOMATROPINE METHYLBROMIDE 5 ML: 5; 1.5 SOLUTION ORAL at 21:59

## 2017-11-27 RX ADMIN — Medication 10 ML: at 04:34

## 2017-11-27 RX ADMIN — HYDROCODONE BITARTRATE AND ACETAMINOPHEN 1 TABLET: 5; 325 TABLET ORAL at 04:33

## 2017-11-27 RX ADMIN — CLONAZEPAM 0.5 MG: 0.5 TABLET ORAL at 08:20

## 2017-11-27 RX ADMIN — ALBUTEROL SULFATE 2.5 MG: 2.5 SOLUTION RESPIRATORY (INHALATION) at 23:00

## 2017-11-27 RX ADMIN — METHYLPREDNISOLONE SODIUM SUCCINATE 60 MG: 125 INJECTION, POWDER, FOR SOLUTION INTRAMUSCULAR; INTRAVENOUS at 07:25

## 2017-11-27 RX ADMIN — FLUOXETINE 10 MG: 10 CAPSULE ORAL at 08:20

## 2017-11-27 RX ADMIN — ALBUTEROL SULFATE 2.5 MG: 2.5 SOLUTION RESPIRATORY (INHALATION) at 07:20

## 2017-11-27 RX ADMIN — GABAPENTIN 400 MG: 400 CAPSULE ORAL at 08:20

## 2017-11-27 RX ADMIN — Medication 10 ML: at 17:49

## 2017-11-27 RX ADMIN — GABAPENTIN 400 MG: 400 CAPSULE ORAL at 17:45

## 2017-11-27 RX ADMIN — Medication 10 ML: at 20:57

## 2017-11-27 RX ADMIN — HYDROCODONE BITARTRATE AND HOMATROPINE METHYLBROMIDE 5 ML: 5; 1.5 SOLUTION ORAL at 08:20

## 2017-11-27 RX ADMIN — METHYLPREDNISOLONE SODIUM SUCCINATE 60 MG: 125 INJECTION, POWDER, FOR SOLUTION INTRAMUSCULAR; INTRAVENOUS at 17:45

## 2017-11-27 RX ADMIN — ALBUTEROL SULFATE 2.5 MG: 2.5 SOLUTION RESPIRATORY (INHALATION) at 15:10

## 2017-11-27 RX ADMIN — PANTOPRAZOLE SODIUM 40 MG: 40 TABLET, DELAYED RELEASE ORAL at 21:59

## 2017-11-27 RX ADMIN — ENOXAPARIN SODIUM 40 MG: 40 INJECTION SUBCUTANEOUS at 20:52

## 2017-11-27 RX ADMIN — HYDROCODONE BITARTRATE AND HOMATROPINE METHYLBROMIDE 5 ML: 5; 1.5 SOLUTION ORAL at 17:44

## 2017-11-27 RX ADMIN — GUAIFENESIN 100 MG: 200 SOLUTION ORAL at 04:52

## 2017-11-27 RX ADMIN — CLONAZEPAM 0.5 MG: 0.5 TABLET ORAL at 17:44

## 2017-11-27 NOTE — PROGRESS NOTES
Patient's mother request that patient have a [facility] Consult and to follow up at [address].

## 2017-11-27 NOTE — PROGRESS NOTES
Called to pt's room due to sob. Pt sat was 81% on 10LPM HFNC. I increased her to 15LPM sat increased to 95%. Pt was crying and seemed anxious.

## 2017-11-27 NOTE — PROGRESS NOTES
Problem: Gas Exchange - Impaired  Goal: *Absence of hypoxia  Outcome: Not Progressing Towards Goal  60% O2 sat on 2L NC placed on NRB, O2 sat now 90%

## 2017-11-27 NOTE — PROGRESS NOTES
Norco 5/325 po given for back pain. Patient decided to take Robitussin 100mg/5ml po PRN for cough.  (had previously refused)

## 2017-11-27 NOTE — PROGRESS NOTES
Received call from MD regarding patient's request. Jerry Parks to tell patient that CXR looks a little worse, but a Bronchoscopy is planned for the AM.

## 2017-11-27 NOTE — PROGRESS NOTES
Patient heard from nurses station crying and yelling out various words. Observed patient sitting on side of bed crying. Patient yelling loudly \"they discontinued my Hycodan, what is wrong with with them, I need new doctors now! I'm just sitting here not coughing up anything, I could be at home doing this. I'm sick of these doctors and nurses. I need something done now! \" Patient observed to be in an anxious state with bilateral hand tremors. Exploratory wheezing noted with a productive cough of which she swallows secretions but denies coughing up secretions. Informed patient that the on call MD would be paged. As this nurse left the room patient got up and slammed the door. On call MD paged.

## 2017-11-27 NOTE — PROGRESS NOTES
Returned call received from Dr. Tl Smith. New order for one time dose of Ativan 0.5mg IV. Nurse to update Dr. Tl Smith with effectiveness of medication.

## 2017-11-27 NOTE — PROGRESS NOTES
Patient complaining of SOB, coughing. RT giving a breathing TX and Vibralung. Informed Renetta,NP and received orders for a blood gas and CXR.

## 2017-11-27 NOTE — PROGRESS NOTES
Nick Oh  Admission Date: 11/25/2017             Daily Progress Note: 11/27/2017     Patient is a 50 y.o. female presents with complaints of a frequent productive cough and increasing SOB of approximately one week duration. She is unable to expectorate the sputum. She also has associated wheezing with her symptoms frequently. She admits to smoking up to one pack per day of cigarettes for 20 years. She admits to pleurtic chest and back pain. She presently denies fevers, chills, and hemoptysis. + drug screen in April with cocaine, benzos and opiates. No screen ordered this admission.     Subjective:     Hycodan stopped yesterday and having coughing episode  Asking for hycodan, muscle relaxer, home prozac, home klonopin and home phenergan supp     Review of Systems  Constitutional: negative for fevers, chills and sweats  Respiratory: positive for cough or wheezing    Current Facility-Administered Medications   Medication Dose Route Frequency    guaiFENesin (ROBITUSSIN) 100 mg/5 mL oral liquid 100 mg  100 mg Oral Q4H PRN    ondansetron (ZOFRAN) injection 4 mg  4 mg IntraVENous Q6H PRN    guaiFENesin ER (MUCINEX) tablet 1,200 mg  1,200 mg Oral Q12H    albuterol (PROVENTIL VENTOLIN) nebulizer solution 2.5 mg  2.5 mg Nebulization QID RT    methylPREDNISolone (PF) (SOLU-MEDROL) injection 60 mg  60 mg IntraVENous BID    clonazePAM (KlonoPIN) tablet 0.5 mg  0.5 mg Oral QHS PRN    sodium chloride (NS) flush 5-10 mL  5-10 mL IntraVENous Q8H    sodium chloride (NS) flush 5-10 mL  5-10 mL IntraVENous PRN    levoFLOXacin (LEVAQUIN) 750 mg in D5W IVPB  750 mg IntraVENous Q24H    acetaminophen (TYLENOL) tablet 650 mg  650 mg Oral Q4H PRN    HYDROcodone-acetaminophen (NORCO) 5-325 mg per tablet 1 Tab  1 Tab Oral Q4H PRN    nicotine (NICODERM CQ) 21 mg/24 hr patch 1 Patch  1 Patch TransDERmal Q24H    enoxaparin (LOVENOX) injection 40 mg  40 mg SubCUTAneous Q24H    amitriptyline (ELAVIL) tablet 100 mg 100 mg Oral QHS    gabapentin (NEURONTIN) capsule 400 mg  400 mg Oral BID         Objective:     Vitals:    11/26/17 1947 11/27/17 0027 11/27/17 0720 11/27/17 0727   BP: 115/72 129/77     Pulse: 89 (!) 109     Resp: 22      Temp: 98.2 °F (36.8 °C)      SpO2:   (!) 60% (!) 80%   Weight:       Height:         Intake and Output:   11/25 1901 - 11/27 0700  In: 1370 [P.O.:1370]  Out: 400 [Urine:400]       Physical Exam:          Constitutional: the patient is well develop, anxious  HEENT: no teeth  Lungs: diminished bilateral bases on NC  Cardiovascular: RRR without M,G,R  Abd/GI: soft and non-tender; with positive bowel sounds. Ext: warm without cyanosis. There is no lower leg edema. Musculoskeletal: moves all four extremities with equal strength  Skin: no jaundice or rashes, no wounds   Neuro: no gross neuro deficits       Lines/Drains: IV  Nutrition: regular    CHEST XRAY:       LAB  Recent Labs      11/26/17   0526  11/25/17   1725   WBC  9.5  11.0   HGB  9.3*  10.2*   HCT  28.7*  30.7*   PLT  450  481*     Recent Labs      11/26/17   1406  11/26/17   0526  11/25/17   2350  11/25/17   1725   NA   --   141   --   141   K   --   4.0   --   2.9*   CL   --   107   --   104   CO2   --   27   --   25   GLU   --   134*   --   133*   BUN   --   9   --   7   CREA   --   0.58*   --   0.72   TROIQ  <0.02*  <0.02*  <0.02*  <0.02*     Echocardiogram  SUMMARY:    -  Left ventricle: Systolic function was moderately to markedly reduced. Ejection fraction was estimated in the range of 25 % to 30 %. LV wall motion  consistent with Takotsubo Stress Induced Cardiomyopathy pattern. There was  akinesis of the apical anterior, mid anteroseptal, mid inferoseptal, apical  inferior, mid anterolateral, apical septal, apical lateral, and apical   Wall(s). Wall thickness was at the upper limits of normal. Doppler parameters were  consistent with mild diastolic dysfunction (grade 1). -  Right ventricle:  There was no pulmonary artery hypertension.    -  Inferior vena cava, hepatic veins: Respirophasic changes in dimension were  absent. -  Aortic valve: There was moderate to severe regurgitation. -  Mitral valve: There was mild regurgitation. -  Tricuspid valve: There was mild regurgitation. Assessment:     Patient Active Problem List   Diagnosis Code    Chest pain R07.9    Essential hypertension, benign I10    Esophageal reflux K21.9    Depression F32.9    Rheumatic disease of mitral valve I05.9    DDD (degenerative disc disease) [id number]    Interstitial cystitis N30.10    Cocaine abuse F14.10    Rheumatic aortic insufficiency I06.1    Palpitations R00.2    Cough R05    Bronchitis, chronic, mucopurulent ([facility] J41.1    Blood in stool K92.1    Takotsubo cardiomyopathy I51.81    Carotidynia G90.01    COPD exacerbation ([facility] J44.1    CAP (community acquired pneumonia) J18.9    Nicotine dependence F17.200       Plan     Hospital Problems  Date Reviewed: 2/24/2017          Codes Class Noted POA    * (Principal)COPD exacerbation ([facility] ICD-10-CM: J44.1  ICD-9-CM: 491.21  11/25/2017 Unknown    On spriva, albuterol and symbicort O/P    CAP (community acquired pneumonia) ICD-10-CM: J18.9  ICD-9-CM: 486  11/25/2017 Unknown    On abx    Nicotine dependence ICD-10-CM: F17.200  ICD-9-CM: 305.1  11/25/2017 Unknown    1 ppd    Cough ICD-10-CM: R05  ICD-9-CM: 786.2  6/7/2016 Yes    No cough at bedside    Rheumatic aortic insufficiency ICD-10-CM: I06.1  ICD-9-CM: 395.1  5/16/2016 Yes        Esophageal reflux ICD-10-CM: K21.9  ICD-9-CM: 530.81  9/3/2013 Yes        Rheumatic disease of mitral valve ICD-10-CM: I05.9  ICD-9-CM: 394.9  9/3/2013 Yes              -- Breathing treatment given, oxygen increased with low sats  -- EF in April was 25-30% with Aortic regurgitation- moderate to severe.  Repeat here  -- nicoderm, on solumedrol 60 mg Q 12 hours  -- followed by pulmonology at [facility]  -- review home meds and restart home prozac and klonopin. Stop Norco and add hycodan back. zofran ordered. -- she takes her O2 off and is watching TV/Eating comfortably     Mono Chance NP    More than 50% of time documented was spent in face-to-face contact with the patient and in the care of the patient on the floor/unit where the patient is located. Lungs:  Exp. wheezes  Heart:  RRR with no Murmur/Rubs/Gallops    Additional Comments:  Anxious,  Check cxr, she keeps taking O2 off    I have spoken with and examined the patient. I agree with the above assessment and plan as documented.     Yumiko Rodriguez MD

## 2017-11-27 NOTE — PROGRESS NOTES
Patient requesting hycodan for c/o cough. Patient is aware that hycodan is no longer ordered. Voices that she needs a new set of doctors and hates this hospital. Patient stated \"I'm going to call medicaid and tell them to not pay this place. I should have went to [facility] and if I don't get what I want I will. \"  Dr. Neena rodriguez.

## 2017-11-27 NOTE — PHYSICIAN ADVISORY
Letter of Determination: Inpatient Status Appropriate    This patient was originally hospitalized as Inpatient status on 11/25/2017 for acute exacerbation of chronic obstructive pulmonary disease. This patient is appropriate for Inpatient Admission in accordance with CMS regulation Section 43 .3. Specifically, patient's stay is expected to be more than Two Midnights and was medically necessary. The patient's stay was medically necessary based on continued hypoxemia with oxygen saturation to 90% and respiratory rate to 24 breaths per minute despite 12 liters of oxygen by nasal canula. Laboratory studies were significant for an Arterial Blood Gas demonstrating a PO2 of 56 mmHg, and oxygen saturation of 89% on 15 liters of oxygen. Consistent with CMS guidelines, patient meets for inpatient status. It is our recommendation that this patient's hospitalization status should be INPATIENT status.      The final decision regarding the patient's hospitalization status depends on the attending physician's judgement.     Christiana Eubanks MD, TIFFANY,   Physician Andrea Katz.

## 2017-11-27 NOTE — PROGRESS NOTES
Brief visit in [facility].     Kodi Wahl, staff [address], [address] [facility]  /   [email or web address]

## 2017-11-27 NOTE — PROGRESS NOTES
Spoke with Dr. Naye Stephenson regarding follow-up of effectiveness of Ativan 0.5 mg IV of which was effective. Informed him that patient has a history of taking klonopin 0.5 mg po PRN (according to hospital records). Patient voiced that she has mental health issues and has to go to [facility] and takes klonopin 0.5 mg po on a daily basis. New order received for klonopin 0.5 mg po every HS PRN.

## 2017-11-27 NOTE — PROGRESS NOTES
Paged MD per patient's request. Patient wanting to know about results of CXR and \"feels that she is not improving\".

## 2017-11-27 NOTE — PROGRESS NOTES
Care Management Interventions  PCP Verified by CM: Yes  Current Support Network: Lives Alone  Confirm Follow Up Transport: Friends  Plan discussed with Pt/Family/Caregiver: Yes  Freedom of Choice Offered: Yes  Discharge Location  Discharge Placement: Home   SW dc screening. Pt is alert and oriented in all spheres. Lives alone. Independent with ambulation and ADLs. No home 02. Friends drive to medical appts. SW doesn't anticipate any dc needs but will continue to follow.

## 2017-11-27 NOTE — PROGRESS NOTES
RN called to bedside. Patient upset, stating \"I need a doctor up here now. I hurt. I am out of breath, he needs to see me. I need my Hycodan. I need to get this stuff out of me\". Informed patient doctors had already made rounds and she was seen. Attempted to educate patient of why Mucinex was added and Hycodan was discontinued. Patient's O2 on 2LNC 92%. Patient states, she is swelling. Noted no edema in BUE/BLE at present. Patient appears to be in no distress, despite what patient states. Will continue to monitor.

## 2017-11-27 NOTE — PROGRESS NOTES
A couple of staff members came to me with concerns that this patient was filming them as they were working with her during an episode of respiratory distress. I explained to this patient that this was against hospital policy and a HIPPA violation. She agreed to pull up her photos for me to view and she did indeed have a video of herself, mostly, but I could see staff members in the video, too. She agreed to delete this video and I watched her do so. She verbalized understanding that her care during her stay here is top priority and she agreed not to film staff members in the future.

## 2017-11-27 NOTE — PROGRESS NOTES
Patient has been resting peacefully with no further agitation or c/o discomfort. No c/o coughing or shortness of breath.

## 2017-11-28 ENCOUNTER — APPOINTMENT (OUTPATIENT)
Dept: GENERAL RADIOLOGY | Age: 52
DRG: 194 | End: 2017-11-28
Attending: INTERNAL MEDICINE
Payer: COMMERCIAL

## 2017-11-28 PROBLEM — J96.01 ACUTE RESPIRATORY FAILURE WITH HYPOXIA (HCC): Status: ACTIVE | Noted: 2017-11-27

## 2017-11-28 PROBLEM — R91.8 BILATERAL PULMONARY INFILTRATES ON CXR: Status: ACTIVE | Noted: 2017-11-25

## 2017-11-28 PROBLEM — F14.11 HX OF COCAINE ABUSE (HCC): Status: ACTIVE | Noted: 2017-11-28

## 2017-11-28 LAB
AMPHET UR QL SCN: NEGATIVE
ANION GAP SERPL CALC-SCNC: 12 MMOL/L (ref 7–16)
ARTERIAL PATENCY WRIST A: POSITIVE
BARBITURATES UR QL SCN: NEGATIVE
BASE EXCESS BLDA CALC-SCNC: 3.7 MMOL/L (ref 0–3)
BDY SITE: ABNORMAL
BENZODIAZ UR QL: NEGATIVE
BNP SERPL-MCNC: 330 PG/ML
BUN SERPL-MCNC: 14 MG/DL (ref 6–23)
CALCIUM SERPL-MCNC: 8.7 MG/DL (ref 8.3–10.4)
CANNABINOIDS UR QL SCN: NEGATIVE
CHLORIDE SERPL-SCNC: 104 MMOL/L (ref 98–107)
CO2 SERPL-SCNC: 27 MMOL/L (ref 21–32)
COCAINE UR QL SCN: NEGATIVE
COHGB MFR BLD: 0 % (ref 0.5–1.5)
CREAT SERPL-MCNC: 0.74 MG/DL (ref 0.6–1)
DO-HGB BLD-MCNC: 8 % (ref 0–5)
ERYTHROCYTE [SEDIMENTATION RATE] IN BLOOD: 120 MM/HR (ref 0–30)
FIO2 ON VENT: 100 %
GAS FLOW.O2 O2 DELIVERY SYS: 50 L/MIN
GLUCOSE SERPL-MCNC: 107 MG/DL (ref 65–100)
HCO3 BLDA-SCNC: 27 MMOL/L (ref 22–26)
HGB BLDMV-MCNC: 10.6 GM/DL (ref 11.7–15)
HIV1 P24 AG SERPL QL IA: NONREACTIVE
HIV1+2 AB SERPL QL IA: NONREACTIVE
LDH SERPL L TO P-CCNC: 686 U/L (ref 100–190)
MAGNESIUM SERPL-MCNC: 1.9 MG/DL (ref 1.8–2.4)
METHADONE UR QL: NEGATIVE
METHGB MFR BLD: 0.9 % (ref 0–1.5)
OPIATES UR QL: POSITIVE
OXYHGB MFR BLDA: 91.6 % (ref 94–97)
PCO2 BLDA: 36 MMHG (ref 35–45)
PCP UR QL: NEGATIVE
PH BLDA: 7.49 [PH] (ref 7.35–7.45)
PHOSPHATE SERPL-MCNC: 4 MG/DL (ref 2.5–4.5)
PO2 BLDA: 64 MMHG (ref 80–105)
POTASSIUM SERPL-SCNC: 3.5 MMOL/L (ref 3.5–5.1)
PROCALCITONIN SERPL-MCNC: 0.2 NG/ML
SAO2 % BLD: 92 % (ref 92–98.5)
SODIUM SERPL-SCNC: 143 MMOL/L (ref 136–145)

## 2017-11-28 PROCEDURE — 74011250636 HC RX REV CODE- 250/636: Performed by: INTERNAL MEDICINE

## 2017-11-28 PROCEDURE — 84100 ASSAY OF PHOSPHORUS: CPT | Performed by: INTERNAL MEDICINE

## 2017-11-28 PROCEDURE — 94660 CPAP INITIATION&MGMT: CPT

## 2017-11-28 PROCEDURE — 99292 CRITICAL CARE ADDL 30 MIN: CPT | Performed by: INTERNAL MEDICINE

## 2017-11-28 PROCEDURE — 87389 HIV-1 AG W/HIV-1&-2 AB AG IA: CPT | Performed by: INTERNAL MEDICINE

## 2017-11-28 PROCEDURE — 65620000000 HC RM CCU GENERAL

## 2017-11-28 PROCEDURE — 74011250637 HC RX REV CODE- 250/637: Performed by: NURSE PRACTITIONER

## 2017-11-28 PROCEDURE — 84145 PROCALCITONIN (PCT): CPT | Performed by: INTERNAL MEDICINE

## 2017-11-28 PROCEDURE — 74011250637 HC RX REV CODE- 250/637: Performed by: INTERNAL MEDICINE

## 2017-11-28 PROCEDURE — 80307 DRUG TEST PRSMV CHEM ANLYZR: CPT | Performed by: INTERNAL MEDICINE

## 2017-11-28 PROCEDURE — 74011000258 HC RX REV CODE- 258: Performed by: INTERNAL MEDICINE

## 2017-11-28 PROCEDURE — 83735 ASSAY OF MAGNESIUM: CPT | Performed by: INTERNAL MEDICINE

## 2017-11-28 PROCEDURE — P9047 ALBUMIN (HUMAN), 25%, 50ML: HCPCS | Performed by: NURSE PRACTITIONER

## 2017-11-28 PROCEDURE — 77030019605

## 2017-11-28 PROCEDURE — 74011250636 HC RX REV CODE- 250/636: Performed by: NURSE PRACTITIONER

## 2017-11-28 PROCEDURE — 80048 BASIC METABOLIC PNL TOTAL CA: CPT | Performed by: INTERNAL MEDICINE

## 2017-11-28 PROCEDURE — 94640 AIRWAY INHALATION TREATMENT: CPT

## 2017-11-28 PROCEDURE — 74011000250 HC RX REV CODE- 250: Performed by: INTERNAL MEDICINE

## 2017-11-28 PROCEDURE — 36600 WITHDRAWAL OF ARTERIAL BLOOD: CPT

## 2017-11-28 PROCEDURE — 99291 CRITICAL CARE FIRST HOUR: CPT | Performed by: INTERNAL MEDICINE

## 2017-11-28 PROCEDURE — 77030034849

## 2017-11-28 PROCEDURE — 71010 XR CHEST PORT: CPT

## 2017-11-28 PROCEDURE — 83880 ASSAY OF NATRIURETIC PEPTIDE: CPT | Performed by: INTERNAL MEDICINE

## 2017-11-28 PROCEDURE — 85652 RBC SED RATE AUTOMATED: CPT | Performed by: INTERNAL MEDICINE

## 2017-11-28 PROCEDURE — 36415 COLL VENOUS BLD VENIPUNCTURE: CPT | Performed by: INTERNAL MEDICINE

## 2017-11-28 PROCEDURE — 83615 LACTATE (LD) (LDH) ENZYME: CPT | Performed by: INTERNAL MEDICINE

## 2017-11-28 PROCEDURE — 82803 BLOOD GASES ANY COMBINATION: CPT

## 2017-11-28 RX ORDER — ALBUMIN HUMAN 250 G/1000ML
12.5 SOLUTION INTRAVENOUS EVERY 12 HOURS
Status: DISCONTINUED | OUTPATIENT
Start: 2017-11-28 | End: 2017-11-29

## 2017-11-28 RX ORDER — MORPHINE SULFATE 2 MG/ML
1 INJECTION, SOLUTION INTRAMUSCULAR; INTRAVENOUS
Status: DISCONTINUED | OUTPATIENT
Start: 2017-11-28 | End: 2017-12-04 | Stop reason: HOSPADM

## 2017-11-28 RX ORDER — LIDOCAINE HYDROCHLORIDE 40 MG/ML
SOLUTION TOPICAL AS NEEDED
Status: DISCONTINUED | OUTPATIENT
Start: 2017-11-28 | End: 2017-11-28

## 2017-11-28 RX ORDER — FUROSEMIDE 10 MG/ML
40 INJECTION INTRAMUSCULAR; INTRAVENOUS EVERY 12 HOURS
Status: COMPLETED | OUTPATIENT
Start: 2017-11-28 | End: 2017-11-29

## 2017-11-28 RX ORDER — HYDROCODONE BITARTRATE AND HOMATROPINE METHYLBROMIDE 1.5; 5 MG/5ML; MG/5ML
5 SYRUP ORAL
Status: DISCONTINUED | OUTPATIENT
Start: 2017-11-28 | End: 2017-12-04 | Stop reason: HOSPADM

## 2017-11-28 RX ORDER — FUROSEMIDE 10 MG/ML
40 INJECTION INTRAMUSCULAR; INTRAVENOUS EVERY 12 HOURS
Status: DISCONTINUED | OUTPATIENT
Start: 2017-11-28 | End: 2017-11-28 | Stop reason: SDUPTHER

## 2017-11-28 RX ORDER — MORPHINE SULFATE 2 MG/ML
2 INJECTION, SOLUTION INTRAMUSCULAR; INTRAVENOUS
Status: DISCONTINUED | OUTPATIENT
Start: 2017-11-28 | End: 2017-11-28

## 2017-11-28 RX ORDER — LIDOCAINE HYDROCHLORIDE 40 MG/ML
SOLUTION TOPICAL AS NEEDED
Status: DISCONTINUED | OUTPATIENT
Start: 2017-11-28 | End: 2017-12-04 | Stop reason: HOSPADM

## 2017-11-28 RX ORDER — FUROSEMIDE 10 MG/ML
40 INJECTION INTRAMUSCULAR; INTRAVENOUS ONCE
Status: COMPLETED | OUTPATIENT
Start: 2017-11-28 | End: 2017-11-28

## 2017-11-28 RX ORDER — MORPHINE SULFATE 2 MG/ML
2 INJECTION, SOLUTION INTRAMUSCULAR; INTRAVENOUS ONCE
Status: COMPLETED | OUTPATIENT
Start: 2017-11-28 | End: 2017-11-28

## 2017-11-28 RX ORDER — CLONAZEPAM 0.5 MG/1
0.5 TABLET ORAL
Status: DISCONTINUED | OUTPATIENT
Start: 2017-11-28 | End: 2017-12-04 | Stop reason: HOSPADM

## 2017-11-28 RX ADMIN — MORPHINE SULFATE 2 MG: 2 INJECTION, SOLUTION INTRAMUSCULAR; INTRAVENOUS at 08:53

## 2017-11-28 RX ADMIN — Medication 10 ML: at 21:29

## 2017-11-28 RX ADMIN — GUAIFENESIN 100 MG: 200 SOLUTION ORAL at 17:06

## 2017-11-28 RX ADMIN — FUROSEMIDE 40 MG: 10 INJECTION, SOLUTION INTRAMUSCULAR; INTRAVENOUS at 21:28

## 2017-11-28 RX ADMIN — METHYLPREDNISOLONE SODIUM SUCCINATE 60 MG: 125 INJECTION, POWDER, FOR SOLUTION INTRAMUSCULAR; INTRAVENOUS at 23:31

## 2017-11-28 RX ADMIN — FUROSEMIDE 40 MG: 10 INJECTION, SOLUTION INTRAMUSCULAR; INTRAVENOUS at 00:44

## 2017-11-28 RX ADMIN — Medication 10 ML: at 05:26

## 2017-11-28 RX ADMIN — GABAPENTIN 400 MG: 400 CAPSULE ORAL at 08:31

## 2017-11-28 RX ADMIN — METHYLPREDNISOLONE SODIUM SUCCINATE 60 MG: 125 INJECTION, POWDER, FOR SOLUTION INTRAMUSCULAR; INTRAVENOUS at 05:26

## 2017-11-28 RX ADMIN — ALBUMIN (HUMAN) 12.5 G: 0.25 INJECTION, SOLUTION INTRAVENOUS at 11:08

## 2017-11-28 RX ADMIN — ALBUMIN (HUMAN) 12.5 G: 0.25 INJECTION, SOLUTION INTRAVENOUS at 21:33

## 2017-11-28 RX ADMIN — FLUOXETINE 10 MG: 10 CAPSULE ORAL at 08:31

## 2017-11-28 RX ADMIN — ALBUTEROL SULFATE 2.5 MG: 2.5 SOLUTION RESPIRATORY (INHALATION) at 20:25

## 2017-11-28 RX ADMIN — FUROSEMIDE 40 MG: 10 INJECTION, SOLUTION INTRAMUSCULAR; INTRAVENOUS at 10:11

## 2017-11-28 RX ADMIN — GUAIFENESIN 1200 MG: 600 TABLET, EXTENDED RELEASE ORAL at 21:28

## 2017-11-28 RX ADMIN — SULFAMETHOXAZOLE AND TRIMETHOPRIM 340 MG: 80; 16 INJECTION, SOLUTION, CONCENTRATE INTRAVENOUS at 16:35

## 2017-11-28 RX ADMIN — GUAIFENESIN 1200 MG: 600 TABLET, EXTENDED RELEASE ORAL at 08:31

## 2017-11-28 RX ADMIN — SULFAMETHOXAZOLE AND TRIMETHOPRIM 340 MG: 80; 16 INJECTION, SOLUTION, CONCENTRATE INTRAVENOUS at 10:11

## 2017-11-28 RX ADMIN — GABAPENTIN 400 MG: 400 CAPSULE ORAL at 18:13

## 2017-11-28 RX ADMIN — ALBUTEROL SULFATE 2.5 MG: 2.5 SOLUTION RESPIRATORY (INHALATION) at 04:48

## 2017-11-28 RX ADMIN — ALBUTEROL SULFATE 2.5 MG: 2.5 SOLUTION RESPIRATORY (INHALATION) at 23:51

## 2017-11-28 RX ADMIN — HYDROCODONE BITARTRATE AND HOMATROPINE METHYLBROMIDE 5 ML: 5; 1.5 SOLUTION ORAL at 11:13

## 2017-11-28 RX ADMIN — METHYLPREDNISOLONE SODIUM SUCCINATE 60 MG: 125 INJECTION, POWDER, FOR SOLUTION INTRAMUSCULAR; INTRAVENOUS at 18:13

## 2017-11-28 RX ADMIN — HYDROCODONE BITARTRATE AND HOMATROPINE METHYLBROMIDE 5 ML: 5; 1.5 SOLUTION ORAL at 07:26

## 2017-11-28 RX ADMIN — ENOXAPARIN SODIUM 40 MG: 40 INJECTION SUBCUTANEOUS at 19:45

## 2017-11-28 RX ADMIN — SULFAMETHOXAZOLE AND TRIMETHOPRIM 340 MG: 80; 16 INJECTION, SOLUTION, CONCENTRATE INTRAVENOUS at 21:46

## 2017-11-28 RX ADMIN — MORPHINE SULFATE 2 MG: 2 INJECTION, SOLUTION INTRAMUSCULAR; INTRAVENOUS at 00:49

## 2017-11-28 RX ADMIN — CLONAZEPAM 0.5 MG: 0.5 TABLET ORAL at 21:42

## 2017-11-28 RX ADMIN — HYDROCODONE BITARTRATE AND HOMATROPINE METHYLBROMIDE 5 ML: 5; 1.5 SOLUTION ORAL at 19:45

## 2017-11-28 RX ADMIN — ALBUTEROL SULFATE 2.5 MG: 2.5 SOLUTION RESPIRATORY (INHALATION) at 11:27

## 2017-11-28 RX ADMIN — PANTOPRAZOLE SODIUM 40 MG: 40 TABLET, DELAYED RELEASE ORAL at 08:31

## 2017-11-28 RX ADMIN — METHYLPREDNISOLONE SODIUM SUCCINATE 60 MG: 125 INJECTION, POWDER, FOR SOLUTION INTRAMUSCULAR; INTRAVENOUS at 11:08

## 2017-11-28 RX ADMIN — ALBUTEROL SULFATE 2.5 MG: 2.5 SOLUTION RESPIRATORY (INHALATION) at 07:34

## 2017-11-28 RX ADMIN — MORPHINE SULFATE 1 MG: 2 INJECTION, SOLUTION INTRAMUSCULAR; INTRAVENOUS at 22:35

## 2017-11-28 RX ADMIN — AMITRIPTYLINE HYDROCHLORIDE 100 MG: 50 TABLET, FILM COATED ORAL at 21:28

## 2017-11-28 RX ADMIN — ALBUTEROL SULFATE 2.5 MG: 2.5 SOLUTION RESPIRATORY (INHALATION) at 15:37

## 2017-11-28 RX ADMIN — Medication 10 ML: at 13:48

## 2017-11-28 RX ADMIN — MORPHINE SULFATE 1 MG: 2 INJECTION, SOLUTION INTRAMUSCULAR; INTRAVENOUS at 18:24

## 2017-11-28 RX ADMIN — SULFAMETHOXAZOLE AND TRIMETHOPRIM 340 MG: 80; 16 INJECTION, SOLUTION, CONCENTRATE INTRAVENOUS at 02:43

## 2017-11-28 NOTE — PROGRESS NOTES
Patient now agreeable to BiPAP. Dr. Mai Cedillo and RT notified. RT to place patient on BiPAP per Dr. Mai Cedillo.

## 2017-11-28 NOTE — PROGRESS NOTES
Patient was on 100% optiflow earlier and ABG with PO2 at 64. Since then agrees to use BIPAP and now down to 50% and looks comfortable. Is diuresing well currently. Will check another BNP. Likely can change back to optiflow later today with meals. HIV is negative currently. Will check BNP today. Check urine drug screen. Denies use of illicit meds and previously was positive. procal previously was 0.1 will check again today. EF yesterday was 45-50%. Did see Dr. Carlitos Schmidt in the past, will f/u labs for now and see how condition is. Hold off for cardiology for now. Blood pressure 125/68, pulse (!) 105, temperature 98.1 °F (36.7 °C), resp. rate (!) 60, height 5' 4\" (1.626 m), weight 154 lb 5.2 oz (70 kg), SpO2 93 %. CV: S1 and S2 audible  Lung noted only slightly coarse, no crackles.  No wheezing    Discussed care with respiratory, nursing as well  Time spent -- 20 minutes    Alejandrina Ball MD

## 2017-11-28 NOTE — PROGRESS NOTES
Pt refusing all respiratory options. Called Dr. Choudhary Last he is coming to talk to her. Pt current O2 sat 87%. Will continue to monitor pt.

## 2017-11-28 NOTE — ADT AUTH CERT NOTES
LOC:Acute Adult-General Medical (11/26/2017) by Radha Martin RN        Review Entered Review Status       11/27/2017 In Primary       Details         REVIEW SUMMARY     Patient: Ivan Tang  Review Number: [id number]  Review Status: In Primary     Condition Specific: Yes     Condition Level Of Care Code: ACUTE  Condition Level Of Care Description: Acute        OUTCOMES  Outcome Type: Primary           REVIEW DETAILS     Service Date: 11/26/2017  Admit Date: 11/25/2017  Product: [facility] Rom[facility] Adult  Subset: General Medical      (Symptom or finding within 24h)         (Excludes PO medications unless noted)          [X] Select Day, One:              [X] Episode Day 2,  One:                  [X] ACUTE, >= One:                      [X] Respiratory, One:                          [X] Partial responder, not clinically stable for discharge and requires continued stay, >= One:                          ~--Admin, IQ Admin Admin on 11- 03:32 PM--~                          Pt reports she has coughing \"fits\" that may last up to 30 minutes. Feels chest congestion but having a hard time producing sputum.    If productive, it's thick \"tamera\" colored.                              Still feels SOB                          On 3L/nc                              Pleuritic CP ongoing.                              Plan:  (Medical Decision Making)                              --Blood cultures x2 negative x11 hours                          --Wean O2 as tolerated                          --Duonebs                          --IV solumedrol 60mg q 8 hours;                          --Levofloxacin IV Day 2                          --Troponins neg x3; Cp is pleuritic in nature                          --Add mucinex                              Temp 98.1, , /73, RR 24, spo2 96% 3L O2 NC,                               Troponin <0.02, Glu 134, Crit 0.58, AST 59, Alk Phos 152, H/H 9.3/28.7Neut 92,      Albuterol neb tx x2, Elavil po, lovenox, Neurontin po, mucinex po, Levaquin IV, solu-medrol IV x2, Zofran IV, Ativan IV, norco po x4, hycodan susp x3,                                               [X] T > 99.4°F(37.4°C) PO and new onset, >= One:                                  [X] Culture pending <= 2d                                  ~--Admin, IQ Admin Admin on 11- 03:32 PM--~                                  Blood culture drawn, sent and pending results.                 Version: InterQual® 2016. 3  InterQual® and [employer]  © [employer] and/or one of its [place]. All Rights Reserved. CPT only © 2015 American Medical Association. All Rights Reserved.

## 2017-11-28 NOTE — PROGRESS NOTES
TRANSFER - IN REPORT:    Verbal report received from Chapito SULLIVAN(name) on Kacy Oh  being received from 822(unit) for routine progression of care   Report consisted of patients Situation, Background, Assessment and   Recommendations(SBAR). Information from the following report(s) Kardex, Intake/Output, MAR and Recent Results was reviewed with the receiving nurse. Opportunity for questions and clarification was provided. Assessment completed upon patients arrival to unit and care assumed.

## 2017-11-28 NOTE — PROGRESS NOTES
Patient placed on BIPAP, 18/8. FIO2 50% per Dr. Mayito Pennington. Patient tolerating well and will wean as tolerated.

## 2017-11-28 NOTE — PROGRESS NOTES
Initial visit was made by .  welcomed pt to DT and provided pastoral greetings and conversation. Pt appeared anxious and was speaking with nurse when  arrived. Prayer was offered and given.

## 2017-11-28 NOTE — PROGRESS NOTES
Pt has been resting quietly since admission to the Unit. Pt had one coughing spell presently. Sat sis not drop, but pt was anxious. Easily clams. CXR done and am labs.

## 2017-11-28 NOTE — PROGRESS NOTES
TRANSFER - OUT REPORT:    Verbal report given to Gilberto Ingram RN (name) on Ward Fraser  being transferred to CCU (unit) for change in patient condition(pt hypoxic)       Report consisted of patients Situation, Background, Assessment and   Recommendations(SBAR). Information from the following report(s) SBAR, Kardex, ED Summary, Intake/Output and MAR was reviewed with the receiving nurse. Lines:   Peripheral IV 11/27/17 Left; Lower Forearm (Active)   Site Assessment Clean, dry, & intact 11/27/2017  7:09 PM   Phlebitis Assessment 0 11/27/2017  7:09 PM   Infiltration Assessment 0 11/27/2017  7:09 PM   Dressing Status Clean, dry, & intact 11/27/2017  7:09 PM   Dressing Type Transparent 11/27/2017  7:09 PM   Hub Color/Line Status Pink 11/27/2017  7:09 PM   Alcohol Cap Used No 11/27/2017  7:09 PM        Opportunity for questions and clarification was provided.       Patient transported with:   O2 @ 15 liters  Patients medications from home  Registered Nurse

## 2017-11-28 NOTE — PROGRESS NOTES
Orders received for 2 mg IV morphine 40 mg of IV lasix and andino insertion from Dr. Radha Flores. Will continue to monitor pt.

## 2017-11-28 NOTE — INTERDISCIPLINARY ROUNDS
Interdisciplinary team rounds were held 11/28/2017 with the following team members:Care Management, Nursing, Nurse Practitioner, Nutrition, Pharmacy, Physician, Respiratory Therapy and Clinical Coordinator and the patient. Plan of care discussed. See clinical pathway and/or care plan for interventions and desired outcomes.

## 2017-11-28 NOTE — PROGRESS NOTES
Date of Outreach Update:  Joseph Steward was seen and assessed. Respiratory distress and tachypnea/labored breathing with O2 saturation of 84%. Respiratory therapist present. Per Dr. Chilango Espana verbal order place andino and administer IV morphine and IV lasix now. Primary RN at bedside. MEWS Score: 2 (11/27/17 1919)  Vitals:    11/28/17 0045 11/28/17 0109 11/28/17 0115 11/28/17 0129   BP:    135/85   Pulse:    (!) 119   Resp:    22   Temp:       SpO2: (!) 86% (!) 88% 90% 94%   Weight:       Height:             Pain Assessment  Pain Intensity 1: 0 (11/27/17 1909)  Pain Location 1: Back, Chest  Pain Intervention(s) 1: Medication (see MAR)  Patient Stated Pain Goal: 0    Will continue to follow up per outreach protocol.     Signed By:   Michael Schultz RN    November 28, 2017 1:37 AM

## 2017-11-28 NOTE — PROGRESS NOTES
Bedside report  received from [facility]. Assessment completed. Bed is in low and locked position with floor free of objects. Patient AxOx3, and resting quietly in bed. No needs noted at present. Respirations even and non labored. Verbalized understanding to call for needs. Call light within reach. Will continue to monitor pt.

## 2017-11-28 NOTE — PROGRESS NOTES
Critical Care Daily Progress Note: 11/28/2017    Terence Oh   Admission Date: 11/25/2017         The patient's chart is reviewed and the patient is discussed with the staff. Patient is a 52 y.o. female presents with complaints of a frequent productive cough and increasing SOB of approximately one week duration. She is unable to expectorate the sputum. She also has associated wheezing with her symptoms frequently. She admits to smoking up to one pack per day of cigarettes for 20 years. She admits to pleurtic chest and back pain. She presently denies fevers, chills, and hemoptysis. + drug screen in April with cocaine, benzos and opiates. No screen ordered this admission. Subjective:     I was called to see the patient because of increasing oxygen requirement and ongoing oxygen desaturation. The patient also getting agitated and refused to keep her mask on or accept any trial of any other method such as OptiFlow or even CPAP. Her symptoms and respiratory status seems to be worsening throughout the day since admission. Her chest x-ray today showed definite worsening in diffuse bilateral infiltrate. Her echo findings also was noted and suggests significant cardiomyopathy.     Current Facility-Administered Medications   Medication Dose Route Frequency    lidocaine (XYLOCAINE) 4 % (40 mg/mL) topical solution   Aerosolization PRN    clonazePAM (KlonoPIN) tablet 0.5 mg  0.5 mg Oral BID PRN    morphine injection 2 mg  2 mg IntraVENous Q4H PRN    trimethoprim-sulfamethoxazole (BACTRIM) 340 mg in dextrose 5% 250 mL  340 mg IntraVENous Q6H    guaiFENesin (ROBITUSSIN) 100 mg/5 mL oral liquid 100 mg  100 mg Oral Q4H PRN    FLUoxetine (PROzac) capsule 10 mg  10 mg Oral DAILY    HYDROcodone-homatropine (HYCODAN) 5-1.5 mg/5 mL (5 mL) syrup 5 mL  5 mL Oral Q4H PRN    pantoprazole (PROTONIX) tablet 40 mg  40 mg Oral ACB    albuterol (PROVENTIL VENTOLIN) nebulizer solution 2.5 mg  2.5 mg Nebulization Q4H RT    methylPREDNISolone (PF) (SOLU-MEDROL) injection 60 mg  60 mg IntraVENous Q6H    ondansetron (ZOFRAN) injection 4 mg  4 mg IntraVENous Q6H PRN    guaiFENesin ER (MUCINEX) tablet 1,200 mg  1,200 mg Oral Q12H    sodium chloride (NS) flush 5-10 mL  5-10 mL IntraVENous Q8H    sodium chloride (NS) flush 5-10 mL  5-10 mL IntraVENous PRN    acetaminophen (TYLENOL) tablet 650 mg  650 mg Oral Q4H PRN    nicotine (NICODERM CQ) 21 mg/24 hr patch 1 Patch  1 Patch TransDERmal Q24H    enoxaparin (LOVENOX) injection 40 mg  40 mg SubCUTAneous Q24H    amitriptyline (ELAVIL) tablet 100 mg  100 mg Oral QHS    gabapentin (NEURONTIN) capsule 400 mg  400 mg Oral BID       Review of Systems   Unobtainable due to patient status. Objective:     Vitals:    11/28/17 0115 11/28/17 0129 11/28/17 0157 11/28/17 0218   BP:  135/85 132/82    Pulse:  (!) 119 (!) 118    Resp:  22 (!) 34    Temp:   98 °F (36.7 °C)    SpO2: 90% 94% 93% 94%   Weight:       Height:           Intake and Output:   11/26 0701 - 11/27 1900  In: 850 [P.O.:850]  Out: -   11/27 1901 - 11/28 0700  In: 510 [P.O.:510]  Out: 1025 [Urine:1025]    Physical Exam:          Constitutional:  the patient is well developed and in moderate acute distress  EENMT:  Sclera clear, pupils equal, oral mucosa moist  Respiratory: Bilateral crackles and wheezing  Cardiovascular: Tachycardic  Gastrointestinal: soft and non-tender; with positive bowel sounds. Musculoskeletal: warm without cyanosis. There is no lower leg edema. Skin:  no jaundice or rashes, no wounds   Neurologic: no gross neuro deficits     Psychiatric:  alert and agitated    LINES: None    DRIPS:   None    CXR:           LAB  No results for input(s): GLUCPOC in the last 72 hours.     No lab exists for component: [address]      11/26/17   0526  11/25/17   1725   WBC  9.5  11.0   HGB  9.3*  10.2*   HCT  28.7*  30.7*   PLT  450  481*     Recent Labs      11/26/17   1406  11/26/17   0526 11/25/17   2350  11/25/17   1725   NA   --   141   --   141   K   --   4.0   --   2.9*   CL   --   107   --   104   CO2   --   27   --   25   GLU   --   134*   --   133*   BUN   --   9   --   7   CREA   --   0.58*   --   0.72   CA   --   8.5   --   8.9   TROIQ  <0.02*  <0.02*  <0.02*  <0.02*   ALB   --   2.2*   --   3.0*   TBILI   --   0.2   --   0.4   ALT   --   37   --   38   SGOT   --   59*   --   70*     Recent Labs      11/27/17   2318  11/27/17   0740   PH  7.47*  7.45   PCO2  38  39   PO2  53*  56*   HCO3  27*  27*     No results for input(s): LCAD, LAC in the last 72 hours. Assessment:  (Medical Decision Making)     Hospital Problems  Date Reviewed: 2/24/2017          Codes Class Noted POA    Hx of cocaine abuse ICD-10-CM: Z87.898  ICD-9-CM: 305.63  11/28/2017 Yes    No history of HIV testing is noted in the chart    * (Principal)Acute respiratory failure with hypoxia ([facility] [address] ICD-10-CM: J96.01  ICD-9-CM: 518.81  11/27/2017 Yes    Significantly worse with profound hypoxemia. This is almost certainly related to her worsening bilateral infiltrates    COPD exacerbation ([address] ICD-10-CM: J44.1  ICD-9-CM: 491.21  11/25/2017 Yes    Need to optimize her treatment with IV steroid and bronchodilators    Bilateral pulmonary infiltrates on CXR ICD-10-CM: R91.8  ICD-9-CM: 793.19  11/25/2017 Yes    Etiology is not definitely clear. We need to rule out acute pulmonary edema or opportunistic infection such as pneumocystis pneumonia in the setting of HIV infection.     Nicotine dependence (Chronic) ICD-10-CM: R56.119  ICD-9-CM: 305.1  11/25/2017 Yes    Currently on nicotine replacement    Takotsubo cardiomyopathy ICD-10-CM: I51.81  ICD-9-CM: 429.83  4/15/2017 Yes    Suspect she has an element of pulmonary edema based on her x-ray    Cough ICD-10-CM: R05  ICD-9-CM: 786.2  6/7/2016 Yes    Related to her parenchymal disease or CHF    Rheumatic aortic insufficiency ICD-10-CM: I06.1  ICD-9-CM: 395.1  5/16/2016 Yes Esophageal reflux (Chronic) ICD-10-CM: K21.9  ICD-9-CM: 530.81  9/3/2013 Yes        Rheumatic disease of mitral valve ICD-10-CM: I05.9  ICD-9-CM: 394.9  9/3/2013 Yes              Plan:  (Medical Decision Making)     --IV Lasix  --Optimize oxygenation by 100% double flow since she has refused CPAP or BiPAP at this point. If the patient agreed then CPAP or BiPAP would be equally effective  --Increase IV steroid  --Increased bronchodilators  --Check serum HIV, LDH, sed rate  --Stop oral Levaquin  --Start IV Bactrim empirically for pneumocystis pneumonia  --Cancel bronchoscopy at this point due to progressive respiratory failure and profound hypoxemia    Patient clearly much more critical at this point    Total critical care time spent with the patient is 75 minutes so far    More than 50% of the time documented was spent in face-to-face contact with the patient and in the care of the patient on the floor/unit where the patient is located.     Ana Canchola MD

## 2017-11-28 NOTE — PROGRESS NOTES
Date of Outreach Update:  Brandin Oh O2 saturation has improved to 92%. Tachypnea and tachycardia continues.  notified and verbal order to transfer to CCU asap. Clinical coordinator Luis A Cota RN notified. Mrs. Oh to be transferred to room 3301. MEWS Score: 2 (11/27/17 1919)  Vitals:    11/28/17 0045 11/28/17 0109 11/28/17 0115 11/28/17 0129   BP:    135/85   Pulse:    (!) 119   Resp:    22   Temp:       SpO2: (!) 86% (!) 88% 90% 94%   Weight:       Height:             Pain Assessment  Pain Intensity 1: 0 (11/27/17 1909)  Pain Location 1: Back, Chest  Pain Intervention(s) 1: Medication (see MAR)  Patient Stated Pain Goal: 0      Previous Outreach assessment has been reviewed. There have been no significant clinical changes since the completion of the last dated Outreach assessment. Will continue to follow up per outreach protocol.     Signed By:   Kia Bates RN    November 28, 2017 1:40 AM

## 2017-11-28 NOTE — PROGRESS NOTES
Pt's SAT's are in the mid 80's on NRB/flushed. Pt refused CPAP and HHFNC. Talked with  and he is aware with the pts refusal for all respiratory options.

## 2017-11-28 NOTE — PROGRESS NOTES
Dr. Mayito Pennington notified of patient's ABG results. Per, Dr. Mayito Pennington, patient needs to be placed on BiPAP. Patient has told RT and this RN that she does not want BiPAP and \"would rather be intubated. \" Discussed with Dr. Mayito Pennington and orders received. Dr. Mayito Pennington to discuss with patient shortly. Patient continues to wake easily and is frequently seen playing on cell phone and taking photos of herself.

## 2017-11-28 NOTE — PROGRESS NOTES
Patient with c/o headache. When offered tylenol, patient states \"I don't take any over-the-counter meds because they upset my IBS. \" When asked what she takes for headaches at home, she states that she uses morphine patches for pain control at home and that sometime takes lortabs but they don't work. Patient then reports pleuritic chest pain 8/10. PRN morphine given as ordered.

## 2017-11-28 NOTE — PROGRESS NOTES
Pt having trouble breathing O2 sats in 80's on 12L hi flow nasal canula called respiratory and placed pt on NRB 15L O2 sats initially went up to 98% but when RT rechecked the pt a few minutes later sats had dropped back down. Called Dr. Vitaliy Roche orders placed by Dr. Vitaliy Roche. Will continue to monitor pt.

## 2017-11-28 NOTE — PROGRESS NOTES
Pt transferred to 3301 per bed from 822. Rt at bedside placed pt on opti ashely with a mask. CHG bath given, no skin breakdown noted. allevyn placed on sacrum. Pt placed on monitor. Pt reminded not to get up without calling. Call bell in reach. Good blood return from iv site to left wrist. Don draining pale yellow urine. HR in 110's. Pt anxious. Given reassurance. Pt given her cell phone. Afebrile.

## 2017-11-28 NOTE — PROGRESS NOTES
Pt extremely agitated refused some of her medications pt wants Protonix instead of Pepcid will get order changed.

## 2017-11-29 ENCOUNTER — APPOINTMENT (OUTPATIENT)
Dept: GENERAL RADIOLOGY | Age: 52
DRG: 194 | End: 2017-11-29
Attending: NURSE PRACTITIONER
Payer: COMMERCIAL

## 2017-11-29 LAB
ANION GAP SERPL CALC-SCNC: 10 MMOL/L (ref 7–16)
ARTERIAL PATENCY WRIST A: POSITIVE
BASE EXCESS BLDA CALC-SCNC: 5.3 MMOL/L (ref 0–3)
BDY SITE: ABNORMAL
BUN SERPL-MCNC: 22 MG/DL (ref 6–23)
CALCIUM SERPL-MCNC: 9.3 MG/DL (ref 8.3–10.4)
CHLORIDE SERPL-SCNC: 99 MMOL/L (ref 98–107)
CO2 SERPL-SCNC: 29 MMOL/L (ref 21–32)
COHGB MFR BLD: 0.3 % (ref 0.5–1.5)
CREAT SERPL-MCNC: 0.95 MG/DL (ref 0.6–1)
DO-HGB BLD-MCNC: 9 % (ref 0–5)
FIO2 ON VENT: 50 %
GLUCOSE SERPL-MCNC: 162 MG/DL (ref 65–100)
HCO3 BLDA-SCNC: 29 MMOL/L (ref 22–26)
HGB BLDMV-MCNC: 10.8 GM/DL (ref 11.7–15)
MAGNESIUM SERPL-MCNC: 2.1 MG/DL (ref 1.8–2.4)
METHGB MFR BLD: 1 % (ref 0–1.5)
OXYHGB MFR BLDA: 89.5 % (ref 94–97)
PCO2 BLDA: 41 MMHG (ref 35–45)
PH BLDA: 7.48 [PH] (ref 7.35–7.45)
PHOSPHATE SERPL-MCNC: 3.1 MG/DL (ref 2.5–4.5)
PO2 BLDA: 60 MMHG (ref 80–105)
POTASSIUM SERPL-SCNC: 4 MMOL/L (ref 3.5–5.1)
SAO2 % BLD: 91 % (ref 92–98.5)
SODIUM SERPL-SCNC: 138 MMOL/L (ref 136–145)
VENTILATION MODE VENT: ABNORMAL

## 2017-11-29 PROCEDURE — 74011250636 HC RX REV CODE- 250/636: Performed by: INTERNAL MEDICINE

## 2017-11-29 PROCEDURE — 74011250636 HC RX REV CODE- 250/636: Performed by: NURSE PRACTITIONER

## 2017-11-29 PROCEDURE — 84100 ASSAY OF PHOSPHORUS: CPT | Performed by: INTERNAL MEDICINE

## 2017-11-29 PROCEDURE — 74011250637 HC RX REV CODE- 250/637: Performed by: INTERNAL MEDICINE

## 2017-11-29 PROCEDURE — 36600 WITHDRAWAL OF ARTERIAL BLOOD: CPT

## 2017-11-29 PROCEDURE — 74011000250 HC RX REV CODE- 250: Performed by: INTERNAL MEDICINE

## 2017-11-29 PROCEDURE — P9047 ALBUMIN (HUMAN), 25%, 50ML: HCPCS | Performed by: NURSE PRACTITIONER

## 2017-11-29 PROCEDURE — 77010033711 HC HIGH FLOW OXYGEN

## 2017-11-29 PROCEDURE — 80048 BASIC METABOLIC PNL TOTAL CA: CPT | Performed by: INTERNAL MEDICINE

## 2017-11-29 PROCEDURE — 74011250637 HC RX REV CODE- 250/637: Performed by: NURSE PRACTITIONER

## 2017-11-29 PROCEDURE — 94640 AIRWAY INHALATION TREATMENT: CPT

## 2017-11-29 PROCEDURE — 82803 BLOOD GASES ANY COMBINATION: CPT

## 2017-11-29 PROCEDURE — 65620000000 HC RM CCU GENERAL

## 2017-11-29 PROCEDURE — 71010 XR CHEST SNGL V: CPT

## 2017-11-29 PROCEDURE — 74011000258 HC RX REV CODE- 258: Performed by: INTERNAL MEDICINE

## 2017-11-29 PROCEDURE — 83735 ASSAY OF MAGNESIUM: CPT | Performed by: INTERNAL MEDICINE

## 2017-11-29 PROCEDURE — 94660 CPAP INITIATION&MGMT: CPT

## 2017-11-29 PROCEDURE — 99233 SBSQ HOSP IP/OBS HIGH 50: CPT | Performed by: INTERNAL MEDICINE

## 2017-11-29 PROCEDURE — 77030020120 HC VLV RESP PEP HI -B

## 2017-11-29 RX ADMIN — HYDROCODONE BITARTRATE AND HOMATROPINE METHYLBROMIDE 5 ML: 5; 1.5 SOLUTION ORAL at 04:00

## 2017-11-29 RX ADMIN — ALBUMIN (HUMAN) 12.5 G: 0.25 INJECTION, SOLUTION INTRAVENOUS at 09:01

## 2017-11-29 RX ADMIN — METHYLPREDNISOLONE SODIUM SUCCINATE 60 MG: 125 INJECTION, POWDER, FOR SOLUTION INTRAMUSCULAR; INTRAVENOUS at 11:47

## 2017-11-29 RX ADMIN — GUAIFENESIN 1200 MG: 600 TABLET, EXTENDED RELEASE ORAL at 21:20

## 2017-11-29 RX ADMIN — ALBUTEROL SULFATE 2.5 MG: 2.5 SOLUTION RESPIRATORY (INHALATION) at 08:33

## 2017-11-29 RX ADMIN — MORPHINE SULFATE 1 MG: 2 INJECTION, SOLUTION INTRAMUSCULAR; INTRAVENOUS at 05:47

## 2017-11-29 RX ADMIN — SULFAMETHOXAZOLE AND TRIMETHOPRIM 340 MG: 80; 16 INJECTION, SOLUTION, CONCENTRATE INTRAVENOUS at 10:32

## 2017-11-29 RX ADMIN — GABAPENTIN 400 MG: 400 CAPSULE ORAL at 17:27

## 2017-11-29 RX ADMIN — PANTOPRAZOLE SODIUM 40 MG: 40 TABLET, DELAYED RELEASE ORAL at 09:01

## 2017-11-29 RX ADMIN — FUROSEMIDE 40 MG: 10 INJECTION, SOLUTION INTRAMUSCULAR; INTRAVENOUS at 09:01

## 2017-11-29 RX ADMIN — ONDANSETRON 4 MG: 2 INJECTION INTRAMUSCULAR; INTRAVENOUS at 14:18

## 2017-11-29 RX ADMIN — Medication 10 ML: at 21:21

## 2017-11-29 RX ADMIN — SULFAMETHOXAZOLE AND TRIMETHOPRIM 340 MG: 80; 16 INJECTION, SOLUTION, CONCENTRATE INTRAVENOUS at 05:07

## 2017-11-29 RX ADMIN — MORPHINE SULFATE 1 MG: 2 INJECTION, SOLUTION INTRAMUSCULAR; INTRAVENOUS at 02:05

## 2017-11-29 RX ADMIN — GABAPENTIN 400 MG: 400 CAPSULE ORAL at 09:01

## 2017-11-29 RX ADMIN — MORPHINE SULFATE 1 MG: 2 INJECTION, SOLUTION INTRAMUSCULAR; INTRAVENOUS at 11:01

## 2017-11-29 RX ADMIN — GUAIFENESIN 1200 MG: 600 TABLET, EXTENDED RELEASE ORAL at 09:01

## 2017-11-29 RX ADMIN — ALBUTEROL SULFATE 2.5 MG: 2.5 SOLUTION RESPIRATORY (INHALATION) at 19:10

## 2017-11-29 RX ADMIN — Medication 10 ML: at 05:07

## 2017-11-29 RX ADMIN — FLUOXETINE 10 MG: 10 CAPSULE ORAL at 09:01

## 2017-11-29 RX ADMIN — Medication 10 ML: at 13:26

## 2017-11-29 RX ADMIN — ONDANSETRON 4 MG: 2 INJECTION INTRAMUSCULAR; INTRAVENOUS at 00:14

## 2017-11-29 RX ADMIN — MORPHINE SULFATE 1 MG: 2 INJECTION, SOLUTION INTRAMUSCULAR; INTRAVENOUS at 22:50

## 2017-11-29 RX ADMIN — ONDANSETRON 4 MG: 2 INJECTION INTRAMUSCULAR; INTRAVENOUS at 05:47

## 2017-11-29 RX ADMIN — MORPHINE SULFATE 1 MG: 2 INJECTION, SOLUTION INTRAMUSCULAR; INTRAVENOUS at 15:05

## 2017-11-29 RX ADMIN — HYDROCODONE BITARTRATE AND HOMATROPINE METHYLBROMIDE 5 ML: 5; 1.5 SOLUTION ORAL at 11:46

## 2017-11-29 RX ADMIN — METHYLPREDNISOLONE SODIUM SUCCINATE 60 MG: 125 INJECTION, POWDER, FOR SOLUTION INTRAMUSCULAR; INTRAVENOUS at 05:07

## 2017-11-29 RX ADMIN — ALBUTEROL SULFATE 2.5 MG: 2.5 SOLUTION RESPIRATORY (INHALATION) at 12:06

## 2017-11-29 RX ADMIN — ALBUTEROL SULFATE 2.5 MG: 2.5 SOLUTION RESPIRATORY (INHALATION) at 16:18

## 2017-11-29 RX ADMIN — HYDROCODONE BITARTRATE AND HOMATROPINE METHYLBROMIDE 5 ML: 5; 1.5 SOLUTION ORAL at 21:20

## 2017-11-29 RX ADMIN — FUROSEMIDE 40 MG: 10 INJECTION, SOLUTION INTRAMUSCULAR; INTRAVENOUS at 21:20

## 2017-11-29 RX ADMIN — ENOXAPARIN SODIUM 40 MG: 40 INJECTION SUBCUTANEOUS at 19:16

## 2017-11-29 RX ADMIN — AMITRIPTYLINE HYDROCHLORIDE 100 MG: 50 TABLET, FILM COATED ORAL at 21:20

## 2017-11-29 RX ADMIN — MORPHINE SULFATE 1 MG: 2 INJECTION, SOLUTION INTRAMUSCULAR; INTRAVENOUS at 19:16

## 2017-11-29 RX ADMIN — ALBUTEROL SULFATE 2.5 MG: 2.5 SOLUTION RESPIRATORY (INHALATION) at 23:10

## 2017-11-29 NOTE — PROGRESS NOTES
Critical Care Daily Progress Note: 11/29/2017    Jose Oh   Admission Date: 11/25/2017         The patient's chart is reviewed and the patient is discussed with the staff. Patient is a 52 y.o. female presents with complaints of a frequent productive cough and increasing SOB of approximately one week duration. She is unable to expectorate the sputum. She also has associated wheezing with her symptoms frequently. She admits to smoking up to one pack per day of cigarettes for 20 years. She admits to pleurtic chest and back pain. She presently denies fevers, chills, and hemoptysis. + drug screen in April with cocaine, benzos and opiates. No screen ordered this admission.        Subjective:     Feels better on optiflow    Current Facility-Administered Medications   Medication Dose Route Frequency    lidocaine (XYLOCAINE) 4 % (40 mg/mL) topical solution   Aerosolization PRN    clonazePAM (KlonoPIN) tablet 0.5 mg  0.5 mg Oral BID PRN    trimethoprim-sulfamethoxazole (BACTRIM) 340 mg in dextrose 5% 250 mL  340 mg IntraVENous Q6H    albumin human 25% (BUMINATE) solution 12.5 g  12.5 g IntraVENous Q12H    furosemide (LASIX) injection 40 mg  40 mg IntraVENous Q12H    HYDROcodone-homatropine (HYCODAN) 5-1.5 mg/5 mL (5 mL) syrup 5 mL  5 mL Oral Q8H PRN    morphine injection 1 mg  1 mg IntraVENous Q4H PRN    guaiFENesin (ROBITUSSIN) 100 mg/5 mL oral liquid 100 mg  100 mg Oral Q4H PRN    FLUoxetine (PROzac) capsule 10 mg  10 mg Oral DAILY    pantoprazole (PROTONIX) tablet 40 mg  40 mg Oral ACB    albuterol (PROVENTIL VENTOLIN) nebulizer solution 2.5 mg  2.5 mg Nebulization Q4H RT    methylPREDNISolone (PF) (SOLU-MEDROL) injection 60 mg  60 mg IntraVENous Q6H    ondansetron (ZOFRAN) injection 4 mg  4 mg IntraVENous Q6H PRN    guaiFENesin ER (MUCINEX) tablet 1,200 mg  1,200 mg Oral Q12H    sodium chloride (NS) flush 5-10 mL  5-10 mL IntraVENous Q8H    sodium chloride (NS) flush 5-10 mL  5-10 mL IntraVENous PRN    acetaminophen (TYLENOL) tablet 650 mg  650 mg Oral Q4H PRN    nicotine (NICODERM CQ) 21 mg/24 hr patch 1 Patch  1 Patch TransDERmal Q24H    enoxaparin (LOVENOX) injection 40 mg  40 mg SubCUTAneous Q24H    amitriptyline (ELAVIL) tablet 100 mg  100 mg Oral QHS    gabapentin (NEURONTIN) capsule 400 mg  400 mg Oral BID       Review of Systems   Unobtainable due to patient status. Objective:     Vitals:    11/29/17 1201 11/29/17 1204 11/29/17 1206 11/29/17 1231   BP: 122/70   112/63   Pulse: (!) 101 (!) 101  (!) 108   Resp: (!) 32 25  28   Temp:       SpO2:  93% 93% 97%   Weight:       Height:           Intake and Output:   11/27 1901 - 11/29 0700  In: 8802 [P.O.:1170; I.V.:1600]  Out: 9953 [Urine:5125]  11/29 0701 - 11/29 1900  In: 65 [P.O.:360; I.V.:300]  Out: 1000 [Urine:1000]    Physical Exam:          Constitutional:  the patient is well developed and in moderate acute distress  EENMT:  Sclera clear, pupils equal, oral mucosa moist  Respiratory: Bilateral crackles and wheezing  Cardiovascular: no mm 1+ to trace ankle edema(per patient gets worse in the evenings at home)  Gastrointestinal: soft and non-tender; with positive bowel sounds. Musculoskeletal: warm without cyanosis. There is no lower leg edema. Skin:  no jaundice or rashes, no wounds   Neurologic: no gross neuro deficits     Psychiatric:  alert and agitated    LINES:peripheral IV    DRIPS:   None    CXR:         2D Echo   SUMMARY:    -  Left ventricle: LV wall motion consistent with Takotsubo Stress Induced  Cardiomyopathy pattern (04-13-17). Systolic function was mildly reduced. Ejection fraction was estimated in the range of 45 % to 50 %. There was  akinesis of the apical wall(s). Doppler parameters were consistent with mild  diastolic dysfunction (grade 1). -  Tricuspid valve: There was mild regurgitation. LAB  No results for input(s): GLUCPOC in the last 72 hours.     No lab exists for component: [place] No results for input(s): WBC, HGB, HCT, PLT, INR, HGBEXT, HCTEXT, PLTEXT, HGBEXT, HCTEXT, PLTEXT in the last 72 hours. No lab exists for component: [place]  Recent Labs      11/29/17   0405  11/28/17   0540  11/26/17   1406   NA  138  143   --    K  4.0  3.5   --    CL  99  104   --    CO2  29  27   --    GLU  162*  107*   --    BUN  22  14   --    CREA  0.95  0.74   --    MG  2.1  1.9   --    PHOS  3.1  4.0   --    CA  9.3  8.7   --    TROIQ   --    --   <0.02*     Recent Labs      11/29/17   0845  11/28/17   0855  11/27/17   2318   PH  7.48*  7.49*  7.47*   PCO2  41  36  38   PO2  60*  64*  53*   HCO3  29*  27*  27*     No results for input(s): LCAD, LAC in the last 72 hours. Assessment:  (Medical Decision Making)     Patient Active Problem List   Diagnosis Code    Chest pain R07.9    Essential hypertension, benign I10    Esophageal reflux K21.9    Depression F32.9    Rheumatic disease of mitral valve I05.9    DDD (degenerative disc disease) FWY1997    Interstitial cystitis N30.10    Cocaine abuse F14.10    Rheumatic aortic insufficiency I06.1    Palpitations R00.2    Cough R05    Bronchitis, chronic, mucopurulent (HCC) J41.1    Blood in stool K92.1    Takotsubo cardiomyopathy I51.81    Carotidynia G90.01    COPD exacerbation ([facility] [address] J44.1    Bilateral pulmonary infiltrates on CXR R91.8    Nicotine dependence F17.200    Acute respiratory failure with hypoxia (HCC) J96.01    Hx of cocaine abuse Z87.898           Plan:  (Medical Decision Making)     Hospital Problems  Date Reviewed: 2/24/2017          Codes Class Noted POA    Hx of cocaine abuse ICD-10-CM: Z87.898  ICD-9-CM: 305.63  11/28/2017 Yes    No history of HIV testing is noted in the chart- HIV screen negative- stop bactrim    * (Principal)Acute respiratory failure with hypoxia (HCC) ICD-10-CM: J96.01  ICD-9-CM: 518.81  11/27/2017 Yes    Significantly worse with profound hypoxemia.   This is almost certainly related to her worsening bilateral infiltrates and likely due to CHF and pulmonary edema and has improved on CXR considerably with lasix    COPD exacerbation [facility]) ICD-10-CM: J44.1  ICD-9-CM: 491.21  11/25/2017 Yes    On  IV steroid and bronchodilators   Although a smoker, her current symptoms are more likely related to CHF and cardiac dysfunction rather than COPD- she will need PFT as OP to look for COPD  PFT from 10/16 at [facility]:  No Obstructive ventilatory defect, no BD response  No indication of restrictive physiology via spirometry  However, No formal lung volumes for evaluation  Mild Diffusion Impairement    Marilia Shah, DO   Result Narrative   Pulmonary Function Test Results    Spirometry:  FVC: 2.93 L% Pred: 76 %  FEV1: 2.37 L% Pred: 78 %  FEV1/FVC: 81 %  FEF25%-75%: 2.58 L/sec% Pred: 87 %    Spirometry (post):  FVC (post): 3.14 L (4 puffs of albuterol )% Pred: 82 %  FEV1 (post): 2.33 L% Pred: 76 %  FEV1/FVC (post): 74 %  RNN43-20% (post): 1.99 L/sec% Pred: 67 %    Diffusion Capacity:  DLCO: 16.2 mL/mmHg/min% Pred: 68 %   Will stop steroids and use BD PRN      Bilateral pulmonary infiltrates on CXR ICD-10-CM: R91.8  ICD-9-CM: 793.19  11/25/2017 Yes    Etiology is likely pulmonary edema- see above- continue lasix IV    Nicotine dependence (Chronic) ICD-10-CM: F17.200  ICD-9-CM: 305.1  11/25/2017 Yes    Currently on nicotine replacement    Takotsubo cardiomyopathy ICD-10-CM: I51.81  ICD-9-CM: 429.83  4/15/2017 Yes    Suspect she has  pulmonary edema based on her x-ray    Cough ICD-10-CM: R05  ICD-9-CM: 786.2  6/7/2016 Yes    Related to r CHF    Rheumatic aortic insufficiency ICD-10-CM: I06.1  ICD-9-CM: 395.1  5/16/2016 Yes        Esophageal reflux (Chronic) ICD-10-CM: K21.9  ICD-9-CM: 530.81  9/3/2013 Yes        Rheumatic disease of mitral valve ICD-10-CM: I05.9  ICD-9-CM: 394.9  9/3/2013 Yes                More than 50% of the time documented was spent in face-to-face contact with the patient and in the care of the patient on the floor/unit where the patient is located.     Umu Hammond MD

## 2017-11-29 NOTE — PROGRESS NOTES
Pt coughing and getting herself agitated. Pt given much reassurance. Pt given hycodan for cough. Pt calming. RT made aware that pt would like her non rebreather mask on with the BIPAP. RT to bedside.

## 2017-11-29 NOTE — INTERDISCIPLINARY ROUNDS
Interdisciplinary team rounds were held 11/29/2017 with the following team members:Care Management, Nursing, Nurse Practitioner, Nutrition, Palliative Care, Pastoral Care, Pharmacy, Physician, Respiratory Therapy and Clinical Coordinator and the patient. Plan of care discussed. See clinical pathway and/or care plan for interventions and desired outcomes.

## 2017-11-29 NOTE — PROGRESS NOTES
Bedside shift change report given to Alan RN (oncoming nurse) by Gerald Isbell RN (offgoing nurse). Report included the following information SBAR, Kardex, Procedure Summary, Intake/Output, MAR, Accordion, Recent Results, Med Rec Status, Cardiac Rhythm NSR and Alarm Parameters .

## 2017-11-29 NOTE — PROGRESS NOTES
O2 sat in 90's with BIPAP. Pt asking for cough medication. Hycodan 5 ml po given. Pt c/o being cold. Room temp adjusted and warm blanket placed on pt.

## 2017-11-29 NOTE — PROGRESS NOTES
Pt request pain medication and nausea medication. Pt given morphine sulfate and zofran as requested.

## 2017-11-29 NOTE — PROGRESS NOTES
Pt coughing, pulled off BiPAP mask , c/o pain in her side with coughing. Pt given morphine sulfate 1 mg iv for pain.

## 2017-11-29 NOTE — PROGRESS NOTES
Pt given zofran for nausea. Pt remains awake watching movies on her cell phone. Vital signs stable. On BIPAP.

## 2017-11-29 NOTE — PROGRESS NOTES
Pt not tolerating being off BIPAP. Sat in low 80's when sleeping. Pt having coughing spell. Pt given morphine sulfate 1 mg iv for head and rib pain. RT at bedside to place pt back on BIPAP.

## 2017-11-29 NOTE — PROGRESS NOTES
Pt given CHG bath with linen change. All medications given. Pt given klonopin for anxiety. RT at bedside to place pt on BIPAP.

## 2017-11-30 PROBLEM — J81.0 ACUTE PULMONARY EDEMA (HCC): Status: ACTIVE | Noted: 2017-11-30

## 2017-11-30 PROBLEM — J44.1 COPD EXACERBATION (HCC): Status: RESOLVED | Noted: 2017-11-25 | Resolved: 2017-11-30

## 2017-11-30 LAB
ANION GAP SERPL CALC-SCNC: 10 MMOL/L (ref 7–16)
ARTERIAL PATENCY WRIST A: POSITIVE
ATRIAL RATE: 98 BPM
BACTERIA SPEC CULT: NORMAL
BACTERIA SPEC CULT: NORMAL
BASE EXCESS BLDA CALC-SCNC: 5.3 MMOL/L (ref 0–3)
BDY SITE: ABNORMAL
BUN SERPL-MCNC: 24 MG/DL (ref 6–23)
CALCIUM SERPL-MCNC: 9.3 MG/DL (ref 8.3–10.4)
CALCULATED P AXIS, ECG09: 61 DEGREES
CALCULATED R AXIS, ECG10: 0 DEGREES
CALCULATED T AXIS, ECG11: 41 DEGREES
CHLORIDE SERPL-SCNC: 97 MMOL/L (ref 98–107)
CO2 SERPL-SCNC: 31 MMOL/L (ref 21–32)
COHGB MFR BLD: 0.2 % (ref 0.5–1.5)
CREAT SERPL-MCNC: 0.86 MG/DL (ref 0.6–1)
DIAGNOSIS, 93000: NORMAL
DO-HGB BLD-MCNC: 1 % (ref 0–5)
FIO2 ON VENT: 60 %
GAS FLOW.O2 O2 DELIVERY SYS: 45 L/MIN
GLUCOSE BLD STRIP.AUTO-MCNC: 133 MG/DL (ref 65–100)
GLUCOSE SERPL-MCNC: 109 MG/DL (ref 65–100)
HCO3 BLDA-SCNC: 30 MMOL/L (ref 22–26)
HGB BLDMV-MCNC: 11.2 GM/DL (ref 11.7–15)
MAGNESIUM SERPL-MCNC: 2.1 MG/DL (ref 1.8–2.4)
METHGB MFR BLD: 1.1 % (ref 0–1.5)
OXYHGB MFR BLDA: 98.1 % (ref 94–97)
P-R INTERVAL, ECG05: 110 MS
PCO2 BLDA: 42 MMHG (ref 35–45)
PH BLDA: 7.47 [PH] (ref 7.35–7.45)
PHOSPHATE SERPL-MCNC: 3.2 MG/DL (ref 2.5–4.5)
PO2 BLDA: 181 MMHG (ref 80–105)
POTASSIUM SERPL-SCNC: 3.6 MMOL/L (ref 3.5–5.1)
Q-T INTERVAL, ECG07: 386 MS
QRS DURATION, ECG06: 88 MS
QTC CALCULATION (BEZET), ECG08: 492 MS
SAO2 % BLD: 99 % (ref 92–98.5)
SERVICE CMNT-IMP: NORMAL
SERVICE CMNT-IMP: NORMAL
SODIUM SERPL-SCNC: 138 MMOL/L (ref 136–145)
TROPONIN I SERPL-MCNC: <0.02 NG/ML (ref 0.02–0.05)
VENTILATION MODE VENT: ABNORMAL
VENTRICULAR RATE, ECG03: 98 BPM

## 2017-11-30 PROCEDURE — 65620000000 HC RM CCU GENERAL

## 2017-11-30 PROCEDURE — 99232 SBSQ HOSP IP/OBS MODERATE 35: CPT | Performed by: INTERNAL MEDICINE

## 2017-11-30 PROCEDURE — 82803 BLOOD GASES ANY COMBINATION: CPT

## 2017-11-30 PROCEDURE — 74011250637 HC RX REV CODE- 250/637: Performed by: INTERNAL MEDICINE

## 2017-11-30 PROCEDURE — 77010033711 HC HIGH FLOW OXYGEN

## 2017-11-30 PROCEDURE — 93005 ELECTROCARDIOGRAM TRACING: CPT | Performed by: NURSE PRACTITIONER

## 2017-11-30 PROCEDURE — 84484 ASSAY OF TROPONIN QUANT: CPT | Performed by: INTERNAL MEDICINE

## 2017-11-30 PROCEDURE — 74011250637 HC RX REV CODE- 250/637: Performed by: NURSE PRACTITIONER

## 2017-11-30 PROCEDURE — 74011250636 HC RX REV CODE- 250/636: Performed by: INTERNAL MEDICINE

## 2017-11-30 PROCEDURE — 84100 ASSAY OF PHOSPHORUS: CPT | Performed by: INTERNAL MEDICINE

## 2017-11-30 PROCEDURE — 36600 WITHDRAWAL OF ARTERIAL BLOOD: CPT

## 2017-11-30 PROCEDURE — 82962 GLUCOSE BLOOD TEST: CPT

## 2017-11-30 PROCEDURE — 94640 AIRWAY INHALATION TREATMENT: CPT

## 2017-11-30 PROCEDURE — 74011000250 HC RX REV CODE- 250: Performed by: INTERNAL MEDICINE

## 2017-11-30 PROCEDURE — 80048 BASIC METABOLIC PNL TOTAL CA: CPT | Performed by: INTERNAL MEDICINE

## 2017-11-30 PROCEDURE — 36415 COLL VENOUS BLD VENIPUNCTURE: CPT | Performed by: INTERNAL MEDICINE

## 2017-11-30 PROCEDURE — 94660 CPAP INITIATION&MGMT: CPT

## 2017-11-30 PROCEDURE — 83735 ASSAY OF MAGNESIUM: CPT | Performed by: INTERNAL MEDICINE

## 2017-11-30 RX ORDER — ALBUTEROL SULFATE 0.83 MG/ML
2.5 SOLUTION RESPIRATORY (INHALATION)
Status: DISCONTINUED | OUTPATIENT
Start: 2017-11-30 | End: 2017-12-04 | Stop reason: HOSPADM

## 2017-11-30 RX ORDER — FUROSEMIDE 10 MG/ML
20 INJECTION INTRAMUSCULAR; INTRAVENOUS DAILY
Status: DISCONTINUED | OUTPATIENT
Start: 2017-11-30 | End: 2017-12-02

## 2017-11-30 RX ADMIN — MORPHINE SULFATE 1 MG: 2 INJECTION, SOLUTION INTRAMUSCULAR; INTRAVENOUS at 13:54

## 2017-11-30 RX ADMIN — Medication 10 ML: at 05:45

## 2017-11-30 RX ADMIN — AMITRIPTYLINE HYDROCHLORIDE 100 MG: 50 TABLET, FILM COATED ORAL at 21:00

## 2017-11-30 RX ADMIN — ALBUTEROL SULFATE 2.5 MG: 2.5 SOLUTION RESPIRATORY (INHALATION) at 07:21

## 2017-11-30 RX ADMIN — GUAIFENESIN 1200 MG: 600 TABLET, EXTENDED RELEASE ORAL at 20:55

## 2017-11-30 RX ADMIN — ENOXAPARIN SODIUM 40 MG: 40 INJECTION SUBCUTANEOUS at 18:54

## 2017-11-30 RX ADMIN — MORPHINE SULFATE 1 MG: 2 INJECTION, SOLUTION INTRAMUSCULAR; INTRAVENOUS at 09:10

## 2017-11-30 RX ADMIN — MORPHINE SULFATE 1 MG: 2 INJECTION, SOLUTION INTRAMUSCULAR; INTRAVENOUS at 22:49

## 2017-11-30 RX ADMIN — Medication 10 ML: at 13:58

## 2017-11-30 RX ADMIN — ONDANSETRON 4 MG: 2 INJECTION INTRAMUSCULAR; INTRAVENOUS at 17:10

## 2017-11-30 RX ADMIN — GABAPENTIN 400 MG: 400 CAPSULE ORAL at 09:10

## 2017-11-30 RX ADMIN — HYDROCODONE BITARTRATE AND HOMATROPINE METHYLBROMIDE 5 ML: 5; 1.5 SOLUTION ORAL at 07:51

## 2017-11-30 RX ADMIN — FUROSEMIDE 20 MG: 10 INJECTION, SOLUTION INTRAMUSCULAR; INTRAVENOUS at 13:54

## 2017-11-30 RX ADMIN — GUAIFENESIN 1200 MG: 600 TABLET, EXTENDED RELEASE ORAL at 09:10

## 2017-11-30 RX ADMIN — FLUOXETINE 10 MG: 10 CAPSULE ORAL at 09:10

## 2017-11-30 RX ADMIN — MORPHINE SULFATE 1 MG: 2 INJECTION, SOLUTION INTRAMUSCULAR; INTRAVENOUS at 18:54

## 2017-11-30 RX ADMIN — GABAPENTIN 400 MG: 400 CAPSULE ORAL at 17:10

## 2017-11-30 RX ADMIN — ONDANSETRON 4 MG: 2 INJECTION INTRAMUSCULAR; INTRAVENOUS at 09:49

## 2017-11-30 RX ADMIN — HYDROCODONE BITARTRATE AND HOMATROPINE METHYLBROMIDE 5 ML: 5; 1.5 SOLUTION ORAL at 18:46

## 2017-11-30 RX ADMIN — PANTOPRAZOLE SODIUM 40 MG: 40 TABLET, DELAYED RELEASE ORAL at 09:10

## 2017-11-30 RX ADMIN — ALBUTEROL SULFATE 2.5 MG: 2.5 SOLUTION RESPIRATORY (INHALATION) at 11:05

## 2017-11-30 RX ADMIN — MORPHINE SULFATE 1 MG: 2 INJECTION, SOLUTION INTRAMUSCULAR; INTRAVENOUS at 03:50

## 2017-11-30 RX ADMIN — ALBUTEROL SULFATE 2.5 MG: 2.5 SOLUTION RESPIRATORY (INHALATION) at 03:59

## 2017-11-30 RX ADMIN — Medication 10 ML: at 20:57

## 2017-11-30 NOTE — PROGRESS NOTES
Pt called me to room d/t being \"clammy. \"  BGL assessed. Pt c/o chest pain increasing with inspiration and coughing.

## 2017-11-30 NOTE — CDMP QUERY
Please clarify if this patient is being treated/managed for:    ACUTE DIASTOLIC CHF in the setting of acute pulmonary edema requiring treatment with IV lasix. =>Other Explanation of clinical findings  =>Unable to Determine (no explanation of clinical findings)    The medical record reflects the following:    Risk Factors: h/o cocaine abuse, rheumatic valvular disease    Clinical Indicators: acute pulmonary edema, Grade 1 DD on echo     Treatment: IV Lasix, Optiflow     Please clarify and document your clinical opinion in the progress notes and discharge summary including the definitive and/or presumptive diagnosis, (suspected or probable), related to the above clinical findings. Please include clinical findings supporting your diagnosis.     Thanks,  Albert Ecueda RN, [address] [place] [address] Documentation Management Program  [phone]

## 2017-11-30 NOTE — PROGRESS NOTES
A follow up visit was made to the patient. Support and prayer were provided. Patient was asleep.       [employer]

## 2017-11-30 NOTE — PROGRESS NOTES
Bedside shift change report given to Jolly Stanley RN (oncoming nurse) by NATE Phillips (offgoing nurse). Report included the following information SBAR, Kardex, Procedure Summary, Intake/Output, MAR, Accordion, Recent Results, Med Rec Status, Cardiac Rhythm NSR to ST and Alarm Parameters .

## 2017-11-30 NOTE — PROGRESS NOTES
Sergio ChapaLewis  Admission Date: 11/25/2017             Daily Progress Note: 11/30/2017    The patient's chart is reviewed and the patient is discussed with the staff. Patient is a 50 y.o. female presents with complaints of a frequent productive cough and increasing SOB. She reports issues started with increased LE about a month ago. CXR with B infiltrates. Initially suspected infection vs edema. Placed on bactrim to cover PJP. HIV negative and bactrim off with no immunosuppression. CXR cleared with lasix and overall more c/w pulmonary edema. H/o cocaine + UDS. This time just opioids. BNP mildly elevated, troponin negative. H/o Takosubo. Subjective:     Patient continues to be on optiflow. Currently at 60% but with sats %. I turned down to 40% gradually and maintaining 97-99%. C/o some increased chest pain with cough.      Current Facility-Administered Medications   Medication Dose Route Frequency    albuterol (PROVENTIL VENTOLIN) nebulizer solution 2.5 mg  2.5 mg Nebulization Q4H PRN    furosemide (LASIX) injection 20 mg  20 mg IntraVENous DAILY    lidocaine (XYLOCAINE) 4 % (40 mg/mL) topical solution   Aerosolization PRN    clonazePAM (KlonoPIN) tablet 0.5 mg  0.5 mg Oral BID PRN    HYDROcodone-homatropine (HYCODAN) 5-1.5 mg/5 mL (5 mL) syrup 5 mL  5 mL Oral Q8H PRN    morphine injection 1 mg  1 mg IntraVENous Q4H PRN    guaiFENesin (ROBITUSSIN) 100 mg/5 mL oral liquid 100 mg  100 mg Oral Q4H PRN    FLUoxetine (PROzac) capsule 10 mg  10 mg Oral DAILY    pantoprazole (PROTONIX) tablet 40 mg  40 mg Oral ACB    ondansetron (ZOFRAN) injection 4 mg  4 mg IntraVENous Q6H PRN    guaiFENesin ER (MUCINEX) tablet 1,200 mg  1,200 mg Oral Q12H    sodium chloride (NS) flush 5-10 mL  5-10 mL IntraVENous Q8H    sodium chloride (NS) flush 5-10 mL  5-10 mL IntraVENous PRN    acetaminophen (TYLENOL) tablet 650 mg  650 mg Oral Q4H PRN    nicotine (NICODERM CQ) 21 mg/24 hr patch 1 Patch  1 Patch TransDERmal Q24H    enoxaparin (LOVENOX) injection 40 mg  40 mg SubCUTAneous Q24H    amitriptyline (ELAVIL) tablet 100 mg  100 mg Oral QHS    gabapentin (NEURONTIN) capsule 400 mg  400 mg Oral BID       Review of Systems  Constitutional: negative for fever, chills, sweats  Cardiovascular:  Chest pain, LE edema. negative for palpitations, syncope  Gastrointestinal: negative for dysphagia, reflux, vomiting, diarrhea, abdominal pain, or melena  Neurologic: negative for focal weakness, numbness, headache    Objective:     Vitals:    11/30/17 1201 11/30/17 1210 11/30/17 1221 11/30/17 1301   BP: 111/84  111/64 118/70   Pulse: 98   100   Resp: (!) 31   24   Temp:  97.4 °F (36.3 °C)     SpO2: 94%   96%   Weight:       Height:         Intake and Output:   11/28 1901 - 11/30 0700  In: 2330 [P.O.:1380; I.V.:950]  Out: 3725 [KNZNE:1783]  11/30 0701 - 11/30 1900  In: -   Out: 350 [Urine:350]    Physical Exam:   Constitution:  the patient is well developed and in no acute distress  EENMT:  Sclera clear, pupils equal, oral mucosa moist  Respiratory: CTA B, no w/r/r  Cardiovascular:  RRR without M,G,R  Gastrointestinal: soft and non-tender; with positive bowel sounds. Musculoskeletal: warm without cyanosis. There is trace B lower leg edema. Skin:  no jaundice or rashes, no wounds   Neurologic: no gross neuro deficits     Psychiatric:  alert and oriented x ppt    CHEST XRAY:   11/29/17      CXR 11/28/17      LAB  No lab exists for component: GLPOC   No results for input(s): WBC, HGB, HCT, PLT, INR, HGBEXT, HCTEXT, PLTEXT, HGBEXT, HCTEXT, PLTEXT in the last 72 hours.     No lab exists for component: Makayla Side  Recent Labs      11/30/17   0510  11/29/17   0405  11/28/17   0540   NA  138  138  143   K  3.6  4.0  3.5   CL  97*  99  104   CO2  31  29  27   GLU  109*  162*  107*   BUN  24*  22  14   CREA  0.86  0.95  0.74   MG  2.1  2.1  1.9   CA  9.3  9.3  8.7   PHOS  3.2  3.1  4.0     Recent Labs 11/30/17   0400  11/29/17   0845  11/28/17   0855   PH  7.47*  7.48*  7.49*   PCO2  42  41  36   PO2  181*  60*  64*   HCO3  30*  29*  27*       MICRO:  Blood - negative  Pct - 0.2      Assessment:  (Medical Decision Making)     Hospital Problems  Date Reviewed: 2/24/2017          Codes Class Noted POA    Acute pulmonary edema ([facility].) ICD-10-CM: J81.0  ICD-9-CM: 518.4  11/30/2017 Unknown        Hx of cocaine abuse ICD-10-CM: Z87.898  ICD-9-CM: 305.63  11/28/2017 Yes        * (Principal)Acute respiratory failure with hypoxia ([facility].) ICD-10-CM: J96.01  ICD-9-CM: 518.81  11/27/2017 Yes        Bilateral pulmonary infiltrates on CXR ICD-10-CM: R91.8  ICD-9-CM: 793.19  11/25/2017 Yes        Nicotine dependence (Chronic) ICD-10-CM: F17.200  ICD-9-CM: 305.1  11/25/2017 Yes        Takotsubo cardiomyopathy ICD-10-CM: I51.81  ICD-9-CM: 429.83  4/15/2017 Yes        Cough ICD-10-CM: R05  ICD-9-CM: 786.2  6/7/2016 Yes        Rheumatic aortic insufficiency ICD-10-CM: I06.1  ICD-9-CM: 395.1  5/16/2016 Yes        Esophageal reflux (Chronic) ICD-10-CM: K21.9  ICD-9-CM: 530.81  9/3/2013 Yes        Rheumatic disease of mitral valve ICD-10-CM: I05.9  ICD-9-CM: 394.9  9/3/2013 Yes            Patient admitted with cough and respiratory failure. Unclear cause at first. Now seems most likely due to acute pulmonary edema from heart failure. Off all abx. CXR clearing with diuresis. Ongoing hypoxia      Plan:  (Medical Decision Making)   -weaned to 40% fio2 today.   -will add scheduled lasix, 20mg IV daily and monitor I/O and daily BMP's. -off abx.   -off steroids. No signs of COPD on [facility] PFT's.   -change albuterol from scheduled to prn.   -some increase in chest pain today. Suspect musculoskeletal but will repeat troponin x 1 now.        Jacquelin Pompa MD

## 2017-12-01 LAB
ANION GAP SERPL CALC-SCNC: 10 MMOL/L (ref 7–16)
BUN SERPL-MCNC: 25 MG/DL (ref 6–23)
CALCIUM SERPL-MCNC: 9.3 MG/DL (ref 8.3–10.4)
CHLORIDE SERPL-SCNC: 102 MMOL/L (ref 98–107)
CO2 SERPL-SCNC: 29 MMOL/L (ref 21–32)
CREAT SERPL-MCNC: 0.75 MG/DL (ref 0.6–1)
GLUCOSE SERPL-MCNC: 107 MG/DL (ref 65–100)
POTASSIUM SERPL-SCNC: 3.7 MMOL/L (ref 3.5–5.1)
SODIUM SERPL-SCNC: 141 MMOL/L (ref 136–145)

## 2017-12-01 PROCEDURE — 99232 SBSQ HOSP IP/OBS MODERATE 35: CPT | Performed by: INTERNAL MEDICINE

## 2017-12-01 PROCEDURE — 74011250637 HC RX REV CODE- 250/637: Performed by: INTERNAL MEDICINE

## 2017-12-01 PROCEDURE — 36415 COLL VENOUS BLD VENIPUNCTURE: CPT | Performed by: INTERNAL MEDICINE

## 2017-12-01 PROCEDURE — 97165 OT EVAL LOW COMPLEX 30 MIN: CPT

## 2017-12-01 PROCEDURE — 74011250636 HC RX REV CODE- 250/636: Performed by: INTERNAL MEDICINE

## 2017-12-01 PROCEDURE — 77010033678 HC OXYGEN DAILY

## 2017-12-01 PROCEDURE — 74011250637 HC RX REV CODE- 250/637: Performed by: NURSE PRACTITIONER

## 2017-12-01 PROCEDURE — 65620000000 HC RM CCU GENERAL

## 2017-12-01 PROCEDURE — 80048 BASIC METABOLIC PNL TOTAL CA: CPT | Performed by: INTERNAL MEDICINE

## 2017-12-01 RX ADMIN — PANTOPRAZOLE SODIUM 40 MG: 40 TABLET, DELAYED RELEASE ORAL at 09:06

## 2017-12-01 RX ADMIN — ENOXAPARIN SODIUM 40 MG: 40 INJECTION SUBCUTANEOUS at 19:25

## 2017-12-01 RX ADMIN — MORPHINE SULFATE 1 MG: 2 INJECTION, SOLUTION INTRAMUSCULAR; INTRAVENOUS at 14:33

## 2017-12-01 RX ADMIN — MORPHINE SULFATE 1 MG: 2 INJECTION, SOLUTION INTRAMUSCULAR; INTRAVENOUS at 18:42

## 2017-12-01 RX ADMIN — ONDANSETRON 4 MG: 2 INJECTION INTRAMUSCULAR; INTRAVENOUS at 14:34

## 2017-12-01 RX ADMIN — GUAIFENESIN 100 MG: 200 SOLUTION ORAL at 17:18

## 2017-12-01 RX ADMIN — HYDROCODONE BITARTRATE AND HOMATROPINE METHYLBROMIDE 5 ML: 5; 1.5 SOLUTION ORAL at 01:34

## 2017-12-01 RX ADMIN — GABAPENTIN 400 MG: 400 CAPSULE ORAL at 18:41

## 2017-12-01 RX ADMIN — HYDROCODONE BITARTRATE AND HOMATROPINE METHYLBROMIDE 5 ML: 5; 1.5 SOLUTION ORAL at 14:34

## 2017-12-01 RX ADMIN — MORPHINE SULFATE 1 MG: 2 INJECTION, SOLUTION INTRAMUSCULAR; INTRAVENOUS at 03:31

## 2017-12-01 RX ADMIN — FLUOXETINE 10 MG: 10 CAPSULE ORAL at 09:06

## 2017-12-01 RX ADMIN — Medication 10 ML: at 09:06

## 2017-12-01 RX ADMIN — MORPHINE SULFATE 1 MG: 2 INJECTION, SOLUTION INTRAMUSCULAR; INTRAVENOUS at 23:43

## 2017-12-01 RX ADMIN — MORPHINE SULFATE 1 MG: 2 INJECTION, SOLUTION INTRAMUSCULAR; INTRAVENOUS at 08:41

## 2017-12-01 RX ADMIN — GABAPENTIN 400 MG: 400 CAPSULE ORAL at 09:06

## 2017-12-01 RX ADMIN — Medication 10 ML: at 14:00

## 2017-12-01 RX ADMIN — CLONAZEPAM 0.5 MG: 0.5 TABLET ORAL at 09:58

## 2017-12-01 RX ADMIN — AMITRIPTYLINE HYDROCHLORIDE 100 MG: 50 TABLET, FILM COATED ORAL at 21:11

## 2017-12-01 RX ADMIN — Medication 10 ML: at 21:11

## 2017-12-01 RX ADMIN — FUROSEMIDE 20 MG: 10 INJECTION, SOLUTION INTRAMUSCULAR; INTRAVENOUS at 09:06

## 2017-12-01 RX ADMIN — GUAIFENESIN 1200 MG: 600 TABLET, EXTENDED RELEASE ORAL at 21:11

## 2017-12-01 RX ADMIN — GUAIFENESIN 1200 MG: 600 TABLET, EXTENDED RELEASE ORAL at 09:06

## 2017-12-01 RX ADMIN — HYDROCODONE BITARTRATE AND HOMATROPINE METHYLBROMIDE 5 ML: 5; 1.5 SOLUTION ORAL at 21:11

## 2017-12-01 NOTE — PROGRESS NOTES
Armand ChapaJoshua  Admission Date: 11/25/2017             Daily Progress Note: 12/1/2017    The patient's chart is reviewed and the patient is discussed with the staff. Patient is a 50 y.o. female presents with complaints of a frequent productive cough and increasing SOB. She reports issues started with increased LE about a month ago. CXR with B infiltrates. Initially suspected infection vs edema. Placed on bactrim to cover PJP. HIV negative and bactrim off with no immunosuppression. CXR cleared with lasix and overall more c/w pulmonary edema. H/o cocaine + UDS. This time just opioids. BNP mildly elevated, troponin negative. H/o Takosubo. Subjective:     Patient down to 4L O2 today. Continues to have some cough and chest discomfort. No new issues today.        Current Facility-Administered Medications   Medication Dose Route Frequency    albuterol (PROVENTIL VENTOLIN) nebulizer solution 2.5 mg  2.5 mg Nebulization Q4H PRN    furosemide (LASIX) injection 20 mg  20 mg IntraVENous DAILY    lidocaine (XYLOCAINE) 4 % (40 mg/mL) topical solution   Aerosolization PRN    clonazePAM (KlonoPIN) tablet 0.5 mg  0.5 mg Oral BID PRN    HYDROcodone-homatropine (HYCODAN) 5-1.5 mg/5 mL (5 mL) syrup 5 mL  5 mL Oral Q8H PRN    morphine injection 1 mg  1 mg IntraVENous Q4H PRN    guaiFENesin (ROBITUSSIN) 100 mg/5 mL oral liquid 100 mg  100 mg Oral Q4H PRN    FLUoxetine (PROzac) capsule 10 mg  10 mg Oral DAILY    pantoprazole (PROTONIX) tablet 40 mg  40 mg Oral ACB    ondansetron (ZOFRAN) injection 4 mg  4 mg IntraVENous Q6H PRN    guaiFENesin ER (MUCINEX) tablet 1,200 mg  1,200 mg Oral Q12H    sodium chloride (NS) flush 5-10 mL  5-10 mL IntraVENous Q8H    sodium chloride (NS) flush 5-10 mL  5-10 mL IntraVENous PRN    acetaminophen (TYLENOL) tablet 650 mg  650 mg Oral Q4H PRN    nicotine (NICODERM CQ) 21 mg/24 hr patch 1 Patch  1 Patch TransDERmal Q24H    enoxaparin (LOVENOX) injection 40 mg  40 mg SubCUTAneous Q24H    amitriptyline (ELAVIL) tablet 100 mg  100 mg Oral QHS    gabapentin (NEURONTIN) capsule 400 mg  400 mg Oral BID       Review of Systems  Constitutional: negative for fever, chills, sweats  Cardiovascular:  Chest pain, LE edema. negative for palpitations, syncope  Gastrointestinal: negative for dysphagia, reflux, vomiting, diarrhea, abdominal pain, or melena  Neurologic: negative for focal weakness, numbness, headache    Objective:     Vitals:    12/01/17 0559 12/01/17 0600 12/01/17 0743 12/01/17 1052   BP:  118/75  110/72   Pulse: (!) 106 (!) 106  (!) 105   Resp: (!) 43 (!) 43  (!) 36   Temp:  98.3 °F (36.8 °C) 98.6 °F (37 °C)    SpO2: 94% 97%  96%   Weight:       Height:         Intake and Output:   11/29 1901 - 12/01 0700  In: 360 [P.O.:360]  Out: 2825 [Urine:2825]  12/01 0701 - 12/01 1900  In: -   Out: 1200 [Urine:1200]    Physical Exam:   Constitution:  the patient is well developed and in no acute distress  EENMT:  Sclera clear, pupils equal, oral mucosa moist  Respiratory: CTA B, no w/r/r  Cardiovascular:  RRR without M,G,R  Gastrointestinal: soft and non-tender; with positive bowel sounds. Musculoskeletal: warm without cyanosis. There is trace B lower leg edema. Skin:  no jaundice or rashes, no wounds   Neurologic: no gross neuro deficits     Psychiatric:  alert and oriented x ppt    CHEST XRAY:   11/29/17      CXR 11/28/17      LAB  No lab exists for component: GLPOC   No results for input(s): WBC, HGB, HCT, PLT, INR, HGBEXT, HCTEXT, PLTEXT, HGBEXT, HCTEXT, PLTEXT in the last 72 hours.     No lab exists for component: Wilmar Hernandez  Recent Labs      12/01/17   0719  11/30/17   0510  11/29/17   0405   NA  141  138  138   K  3.7  3.6  4.0   CL  102  97*  99   CO2  29  31  29   GLU  107*  109*  162*   BUN  25*  24*  22   CREA  0.75  0.86  0.95   MG   --   2.1  2.1   CA  9.3  9.3  9.3   PHOS   --   3.2  3.1     Recent Labs      11/30/17   0400  11/29/17   0845   PH 7.47*  7.48*   PCO2  42  41   PO2  181*  60*   HCO3  30*  29*       MICRO:  Blood - negative  Pct - 0.2      Assessment:  (Medical Decision Making)     Hospital Problems  Date Reviewed: 2/24/2017          Codes Class Noted POA    Acute pulmonary edema ([facility] ICD-10-CM: J81.0  ICD-9-CM: 518.4  11/30/2017 Unknown        Hx of cocaine abuse ICD-10-CM: Z87.898  ICD-9-CM: 305.63  11/28/2017 Yes        * (Principal)Acute respiratory failure with hypoxia ([facility] ICD-10-CM: J96.01  ICD-9-CM: 518.81  11/27/2017 Yes        Bilateral pulmonary infiltrates on CXR ICD-10-CM: R91.8  ICD-9-CM: 793.19  11/25/2017 Yes        Nicotine dependence (Chronic) ICD-10-CM: F17.200  ICD-9-CM: 305.1  11/25/2017 Yes        Takotsubo cardiomyopathy ICD-10-CM: I51.81  ICD-9-CM: 429.83  4/15/2017 Yes        Cough ICD-10-CM: R05  ICD-9-CM: 786.2  6/7/2016 Yes        Rheumatic aortic insufficiency ICD-10-CM: I06.1  ICD-9-CM: 395.1  5/16/2016 Yes        Esophageal reflux (Chronic) ICD-10-CM: K21.9  ICD-9-CM: 530.81  9/3/2013 Yes        Rheumatic disease of mitral valve ICD-10-CM: I05.9  ICD-9-CM: 394.9  9/3/2013 Yes            Patient admitted with cough and respiratory failure. Unclear cause at first. Now seems most likely due to acute pulmonary edema from heart failure. Off all abx. CXR clearing with diuresis. Improving hypoxia      Plan:  (Medical Decision Making)   -weaned to NC today  -will continue scheduled lasix, 20mg IV daily and monitor I/O and daily BMP's. -off abx.   -off steroids. No signs of COPD on [facility] PFT's.   -transfer to floor.        Shelley Morrison MD

## 2017-12-01 NOTE — PROGRESS NOTES
Problem: Nutrition Deficit  Goal: *Optimize nutritional status  Nutrition F/U:  Assessment:  Weight 71.1 kg (CCU bed 12/1/17), edema - none reported. The patient has been eating most of her meals and she has a plethora of snacks on her bedside table. She reports that she has no difficulty chewing or swallowing despite her poor dentition. Revised Macronutrient Needs:  EER:  8340-8351 kcal /day (20-25 kcal/kg listed BW)  EPR:  55-65 grams protein/day (1-1.2 grams/kg IBW)  Intake/Comparative Standards: This patient's average meal intake of cardiac diet for the past 4 recorded days/7 meals: 82%. This potentially meets 100% of calorie and >100% of protein needs. Intervention:   Meals and Snacks: Cardiac. Coordination of Nutrition Care by a Nutrition Professional: AM CCU rounds, collaboration with NATE Hoover. Nutrition Discharge Plan: Too soon to determine. Tyler Chaney.  [facility]  [phone]

## 2017-12-01 NOTE — PROGRESS NOTES
Problem: Self Care Deficits Care Plan (Adult)  Goal: *Acute Goals and Plan of Care (Insert Text)    OCCUPATIONAL THERAPY: Initial Assessment and Discharge 12/1/2017  INPATIENT: Hospital Day: 7  Payor: [employer] [place] / Product Type: Managed Care Medicaid /      NAME/AGE/GENDER: Nate Oh is a 50 y.o. female   PRIMARY DIAGNOSIS:  Ineffective airway clearance [R06.89] Acute respiratory failure with hypoxia (HCC) Acute respiratory failure with hypoxia (HCC)  Procedure(s) (LRB):  BRONCHOSCOPY (N/A)  BRONCHIAL ALVEOLAR LAVAGE (N/A)     ICD-10: Treatment Diagnosis:    · Generalized Muscle Weakness (M62.81)   Precautions/Allergies:     Aspirin; Bentyl [dicyclomine]; Oxycodone-acetaminophen; Toradol [ketorolac]; Ultram [tramadol]; and Zofran [ondansetron hcl (pf)]      ASSESSMENT:     Ms. Britni Gonzalez presents supine in bed. Pt is oriented x 4, requires encouragement but is agreeable to OT assessment. Pt demonstrates independent bed mobility, transfers to chair, and is independent with ADLs post setup. She exhibits demanding and manipulative behavior. OT is not indicated in the acute setting, will D/C. Pt left sitting in chair with posey pad and all needs in reach, RN aware. This section established at most recent assessment                 OCCUPATIONAL PROFILE AND HISTORY:   History of Present Injury/Illness (Reason for Referral):  See H&P  Past Medical History/Comorbidities:   Ms. Britni Gonzalez  has a past medical history of Anemia; Arrhythmia; Arthritis; Asthma; Cardiomyopathy; Chronic pain; COPD; Depression; Diverticulitis; GERD (gastroesophageal reflux disease); Heart murmur; Hypertension; IBS (irritable bowel syndrome); IC (interstitial cystitis); Insomnia; Mitral valve regurgitation, rheumatic; Palpitations (5/16/2016); Psychiatric disorder; Rheumatic aortic insufficiency (5/16/2016); Smoker; Tobacco abuse disorder (5/16/2016); and Vitamin D deficiency.  She also has no past medical history of DEMENTIA; Endocrine disease; Infectious disease; Neurological disorder; or Sleep disorder.   Ms. Rosemary Vega  has a past surgical history that includes egd; colonoscopy; cystoscopy (8-23-11); heart catheterization (5/27/2011); appendectomy (2006); lap cholecystectomy (6/6/2011); sara and bso (2004); biopsy of breast, incisional; and urological.  Social History/Living Environment:   Home Environment: Private residence  # Steps to Enter: 5  One/Two Story Residence: One story  Living Alone: No  Support Systems: Family member(s)  Patient Expects to be Discharged to[de-identified] Private residence  Current DME Used/Available at Home: None  Prior Level of Function/Work/Activity:  IND     Number of Personal Factors/Comorbidities that affect the Plan of Care: Brief history (0):  LOW COMPLEXITY   ASSESSMENT OF OCCUPATIONAL PERFORMANCE[de-identified]   Activities of Daily Living:           Basic ADLs (From Assessment) Complex ADLs (From Assessment)   Basic ADL  Feeding: Independent  Oral Facial Hygiene/Grooming: Independent  Bathing: Modified independent  Upper Body Dressing: Independent  Lower Body Dressing: Modified independent  Toileting: Independent     Grooming/Bathing/Dressing Activities of Daily Living     Cognitive Retraining  Safety/Judgement: Fall prevention                 Functional Transfers  Toilet Transfer : Supervision  Tub Transfer: Supervision     Bed/Mat Mobility  Rolling: Independent  Supine to Sit: Independent  Sit to Supine: Independent  Sit to Stand: Modified independent  Bed to Chair: Modified independent  Scooting: Independent       Most Recent Physical Functioning:   Gross Assessment:  AROM: Within functional limits  Strength: Generally decreased, functional               Posture:     Balance:  Sitting: Intact  Standing: Intact Bed Mobility:  Rolling: Independent  Supine to Sit: Independent  Sit to Supine: Independent  Scooting: Independent  Wheelchair Mobility:     Transfers:  Sit to Stand: Modified independent  Stand to Sit: Modified independent  Bed to Chair: Modified independent                Patient Vitals for the past 6 hrs:   BP SpO2 Pulse   17 1052 110/72 96 % (!) 105       Mental Status  Neurologic State: Alert  Orientation Level: Oriented X4  Cognition: Decreased attention/concentration, Decreased command following, Other (comment) (By choice)  Perception: Appears intact  Perseveration: No perseveration noted  Safety/Judgement: Fall prevention                          Physical Skills Involved:  1. Activity Tolerance Cognitive Skills Affected (resulting in the inability to perform in a timely and safe manner):  1. Perception  2. Sustained Attention  3. Divided Attention  4. Comprehension Psychosocial Skills Affected:  1. Habits/Routines  2. Environmental Adaptation  3. Social Interaction  4. Emotional Regulation  5. Self-Awareness  6. Awareness of Others  7. Social Roles   Number of elements that affect the Plan of Care: 5+:  HIGH COMPLEXITY   CLINICAL DECISION MAKIN[address] [place] [id number] AM-PAC 6 Clicks   Daily Activity Inpatient Short Form  How much help from another person does the patient currently need. .. Total A Lot A Little None   1. Putting on and taking off regular lower body clothing? [] 1   [] 2   [] 3   [x] 4   2. Bathing (including washing, rinsing, drying)? [] 1   [] 2   [] 3   [x] 4   3. Toileting, which includes using toilet, bedpan or urinal?   [] 1   [] 2   [] 3   [x] 4   4. Putting on and taking off regular upper body clothing? [] 1   [] 2   [] 3   [x] 4   5. Taking care of personal grooming such as brushing teeth? [] 1   [] 2   [] 3   [x] 4   6. Eating meals? [] 1   [] 2   [] 3   [x] 4   © , Trustees of [address] [place] [address], under license to [employer].  All rights reserved      Score:  Initial: 24 Most Recent: X (Date: -- )    Interpretation of Tool:  Represents activities that are increasingly more difficult (i.e. Bed mobility, Transfers, Gait).   Score 24 23 22-20 19-15 14-10 9-7 6     Modifier CH CI CJ CK CL CM CN      ?  Self Care:     - CURRENT STATUS: CH - 0% impaired, limited or restricted    - GOAL STATUS: CH - 0% impaired, limited or restricted    - D/C STATUS:  CH - 0% impaired, limited or restricted  Payor: [facility] [address] / [place] / Product Type: Managed Care Medicaid /         Use of outcome tool(s) and clinical judgement create a POC that gives a: LOW COMPLEXITY         TREATMENT:   (In addition to Assessment/Re-Assessment sessions the following treatments were rendered)     Pre-treatment Symptoms/Complaints:    Pain: Initial:   Pain Intensity 1: 0  Post Session:  same     Assessment/Reassessment only, no treatment provided today    Braces/Orthotics/Lines/Etc:   · IV  · andino catheter  · O2 Device: Heated, Hi flow nasal cannula, Humidifier  Treatment/Session Assessment:    · Response to Treatment:  D/C  · Interdisciplinary Collaboration:   o Physical Therapist  o Occupational Therapist  o Registered Nurse  · After treatment position/precautions:   o Up in chair  o Bed alarm/tab alert on  o Bed/Chair-wheels locked  o Call light within reach  o RN notified     Total Treatment Duration:  OT Patient Time In/Time Out  Time In: 1018  Time Out: 700 CHARMAINE Darby/L

## 2017-12-01 NOTE — PROGRESS NOTES
Pt coughing. Pt's cough is dry, hacking, and forced. Oxygen sats noted. 1730:  Pt's oxygen increased to 10LHFNC    1750: Pt currently on 15L HFNC. Pt in no distress. Dr. Mikala Lizarraga notified. Transfer cancelled at this time.

## 2017-12-01 NOTE — PROGRESS NOTES
PT Note:  PT orders received and chart review initiated. Patient sitting edge of bed on presentation. Attempted evaluation. Patient refused stating she just wants to go home. Observed patient independently perform sit to supine. Will attempt evaluation again another time/day as schedule permits, if patient continues to refuse will discontinue.   Rajani Solis, PT, DPT

## 2017-12-02 ENCOUNTER — APPOINTMENT (OUTPATIENT)
Dept: GENERAL RADIOLOGY | Age: 52
DRG: 194 | End: 2017-12-02
Attending: INTERNAL MEDICINE
Payer: COMMERCIAL

## 2017-12-02 LAB
ANION GAP SERPL CALC-SCNC: 12 MMOL/L (ref 7–16)
BUN SERPL-MCNC: 21 MG/DL (ref 6–23)
CALCIUM SERPL-MCNC: 8.8 MG/DL (ref 8.3–10.4)
CHLORIDE SERPL-SCNC: 101 MMOL/L (ref 98–107)
CO2 SERPL-SCNC: 26 MMOL/L (ref 21–32)
CREAT SERPL-MCNC: 0.61 MG/DL (ref 0.6–1)
GLUCOSE SERPL-MCNC: 107 MG/DL (ref 65–100)
POTASSIUM SERPL-SCNC: 4 MMOL/L (ref 3.5–5.1)
SODIUM SERPL-SCNC: 139 MMOL/L (ref 136–145)

## 2017-12-02 PROCEDURE — 74011250637 HC RX REV CODE- 250/637: Performed by: INTERNAL MEDICINE

## 2017-12-02 PROCEDURE — 77010033678 HC OXYGEN DAILY

## 2017-12-02 PROCEDURE — 74011250636 HC RX REV CODE- 250/636: Performed by: INTERNAL MEDICINE

## 2017-12-02 PROCEDURE — 97161 PT EVAL LOW COMPLEX 20 MIN: CPT

## 2017-12-02 PROCEDURE — 36415 COLL VENOUS BLD VENIPUNCTURE: CPT | Performed by: INTERNAL MEDICINE

## 2017-12-02 PROCEDURE — 99232 SBSQ HOSP IP/OBS MODERATE 35: CPT | Performed by: INTERNAL MEDICINE

## 2017-12-02 PROCEDURE — 74011250637 HC RX REV CODE- 250/637: Performed by: NURSE PRACTITIONER

## 2017-12-02 PROCEDURE — 80048 BASIC METABOLIC PNL TOTAL CA: CPT | Performed by: INTERNAL MEDICINE

## 2017-12-02 PROCEDURE — 71010 XR CHEST PORT: CPT

## 2017-12-02 PROCEDURE — 65660000000 HC RM CCU STEPDOWN

## 2017-12-02 RX ORDER — FUROSEMIDE 10 MG/ML
40 INJECTION INTRAMUSCULAR; INTRAVENOUS DAILY
Status: DISCONTINUED | OUTPATIENT
Start: 2017-12-03 | End: 2017-12-03

## 2017-12-02 RX ORDER — ESTRADIOL 1 MG/1
2 TABLET ORAL DAILY
Status: DISCONTINUED | OUTPATIENT
Start: 2017-12-02 | End: 2017-12-04 | Stop reason: HOSPADM

## 2017-12-02 RX ORDER — FUROSEMIDE 10 MG/ML
20 INJECTION INTRAMUSCULAR; INTRAVENOUS ONCE
Status: COMPLETED | OUTPATIENT
Start: 2017-12-02 | End: 2017-12-02

## 2017-12-02 RX ADMIN — FUROSEMIDE 20 MG: 10 INJECTION, SOLUTION INTRAMUSCULAR; INTRAVENOUS at 15:38

## 2017-12-02 RX ADMIN — GABAPENTIN 400 MG: 400 CAPSULE ORAL at 08:40

## 2017-12-02 RX ADMIN — Medication 10 ML: at 14:10

## 2017-12-02 RX ADMIN — FLUOXETINE 10 MG: 10 CAPSULE ORAL at 08:40

## 2017-12-02 RX ADMIN — ONDANSETRON 4 MG: 2 INJECTION INTRAMUSCULAR; INTRAVENOUS at 21:04

## 2017-12-02 RX ADMIN — GABAPENTIN 400 MG: 400 CAPSULE ORAL at 20:57

## 2017-12-02 RX ADMIN — MORPHINE SULFATE 1 MG: 2 INJECTION, SOLUTION INTRAMUSCULAR; INTRAVENOUS at 07:39

## 2017-12-02 RX ADMIN — PANTOPRAZOLE SODIUM 40 MG: 40 TABLET, DELAYED RELEASE ORAL at 08:40

## 2017-12-02 RX ADMIN — MORPHINE SULFATE 1 MG: 2 INJECTION, SOLUTION INTRAMUSCULAR; INTRAVENOUS at 20:54

## 2017-12-02 RX ADMIN — GUAIFENESIN 1200 MG: 600 TABLET, EXTENDED RELEASE ORAL at 08:40

## 2017-12-02 RX ADMIN — FUROSEMIDE 20 MG: 10 INJECTION, SOLUTION INTRAMUSCULAR; INTRAVENOUS at 08:40

## 2017-12-02 RX ADMIN — ENOXAPARIN SODIUM 40 MG: 40 INJECTION SUBCUTANEOUS at 20:54

## 2017-12-02 RX ADMIN — AMITRIPTYLINE HYDROCHLORIDE 100 MG: 50 TABLET, FILM COATED ORAL at 20:59

## 2017-12-02 RX ADMIN — ONDANSETRON 4 MG: 2 INJECTION INTRAMUSCULAR; INTRAVENOUS at 14:08

## 2017-12-02 RX ADMIN — HYDROCODONE BITARTRATE AND HOMATROPINE METHYLBROMIDE 5 ML: 5; 1.5 SOLUTION ORAL at 14:08

## 2017-12-02 RX ADMIN — HYDROCODONE BITARTRATE AND HOMATROPINE METHYLBROMIDE 5 ML: 5; 1.5 SOLUTION ORAL at 05:38

## 2017-12-02 RX ADMIN — Medication 10 ML: at 05:38

## 2017-12-02 RX ADMIN — ESTRADIOL 2 MG: 1 TABLET ORAL at 12:23

## 2017-12-02 RX ADMIN — MORPHINE SULFATE 1 MG: 2 INJECTION, SOLUTION INTRAMUSCULAR; INTRAVENOUS at 16:40

## 2017-12-02 RX ADMIN — Medication 10 ML: at 21:13

## 2017-12-02 RX ADMIN — ONDANSETRON 4 MG: 2 INJECTION INTRAMUSCULAR; INTRAVENOUS at 03:35

## 2017-12-02 RX ADMIN — GUAIFENESIN 1200 MG: 600 TABLET, EXTENDED RELEASE ORAL at 20:57

## 2017-12-02 RX ADMIN — GUAIFENESIN 100 MG: 200 SOLUTION ORAL at 03:30

## 2017-12-02 RX ADMIN — MORPHINE SULFATE 1 MG: 2 INJECTION, SOLUTION INTRAMUSCULAR; INTRAVENOUS at 12:23

## 2017-12-02 RX ADMIN — MORPHINE SULFATE 1 MG: 2 INJECTION, SOLUTION INTRAMUSCULAR; INTRAVENOUS at 03:30

## 2017-12-02 NOTE — PROGRESS NOTES
Pt demanding pain medication for pain 9/10 to left chest.  Pt states, \"It's time for my pain medicine. Hurry up, you're late. \"

## 2017-12-02 NOTE — PROGRESS NOTES
Pt found in floor. Pt able to pick herself up and get back in bed without assistance. Pt reports she was going to bathroom and felt like she \"could make it herself. \". Reinforced to patient to ALWAYS use call light and wait for assistance. Pt denies injury. Dr. Juan Lopez notified.

## 2017-12-02 NOTE — PROGRESS NOTES
Pt reports that she has a \"spot\" on her left lower lung that she sees Dr. Cora Cartagena at [facility] for this.   She reports Dr. Cora Cartagena is a thoracic oncologist.

## 2017-12-02 NOTE — PROGRESS NOTES
Dr. Selene Siemens at bedside to see patient, mentioned  possible need for fluid restriction, although not sure how compliant patient will be with restriction. Dr. Selene Siemens ordered 1L-1.5L fluid restriction. Patient educated on fluid restriction. Education will need to be reinforced.

## 2017-12-02 NOTE — PROGRESS NOTES
Pt coughing. Cough is dry, hacking, and forced. Pt anxious and restless. Pt reports that she is sweating but she is not hot. No visible sweating noted. Pt reports nausea. Medicated as ordered.

## 2017-12-02 NOTE — PROGRESS NOTES
Arrives to rm [id number] via [facility] from unit, will orient to staff and primary nursing, encouraged to call for assist, remote tele in place, see strips, pt very anxious and emotional, will observe, 02 in place HF 4L NC

## 2017-12-02 NOTE — PROGRESS NOTES
TRANSFER - OUT REPORT:    Verbal report given to Ashli Ramirez RN(name) on Sharif Oh  being transferred to 8th floor(unit) for routine progression of care       Report consisted of patients Situation, Background, Assessment and   Recommendations(SBAR). Information from the following report(s) SBAR, Kardex, Procedure Summary, Intake/Output, MAR, Accordion, Recent Results, Med Rec Status and Cardiac Rhythm ST was reviewed with the receiving nurse. Lines:   Peripheral IV 11/27/17 Left; Lower Forearm (Active)   Site Assessment Clean, dry, & intact 12/1/2017  7:26 PM   Phlebitis Assessment 0 12/1/2017  7:26 PM   Infiltration Assessment 0 12/1/2017  7:26 PM   Dressing Status Clean, dry, & intact 12/1/2017  7:26 PM   Dressing Type Transparent;Tape 12/1/2017  7:26 PM   Hub Color/Line Status Pink;Patent 12/1/2017  7:26 PM   Alcohol Cap Used No 11/30/2017  7:02 PM       Peripheral IV 11/28/17 Right Arm (Active)   Site Assessment Clean, dry, & intact 12/1/2017  7:26 PM   Phlebitis Assessment 0 12/1/2017  7:26 PM   Infiltration Assessment 0 12/1/2017  7:26 PM   Dressing Status Clean, dry, & intact 12/1/2017  7:26 PM   Dressing Type Transparent;Tape 12/1/2017  7:26 PM   Hub Color/Line Status Pink;Patent 12/1/2017  7:26 PM   Alcohol Cap Used No 11/30/2017  7:02 PM        Opportunity for questions and clarification was provided.       Patient transported with:   O2 @ 5 liters

## 2017-12-02 NOTE — PROGRESS NOTES
[facility]  [facility] Date: 11/25/2017             Daily Progress Note: 12/2/2017    The patient's chart is reviewed and the patient is discussed with the staff. Patient is a 50 y.o. female presents with complaints of a frequent productive cough and increasing SOB. She reports issues started with increased LE about a month ago. CXR with B infiltrates. Initially suspected infection vs edema. Placed on bactrim to cover PJP. HIV negative and bactrim off with no immunosuppression. CXR cleared with lasix and overall more c/w pulmonary edema. H/o cocaine + UDS. This time just opioids. BNP mildly elevated, troponin negative. H/o Takosubo. Subjective:     Patient was to transfer out yesterday but had to be stopped due to worsening hypoxia, up to 10L. Repeat CXR showing some worsening pulmonary edema. Patient back down to 4L O2 today.      Current Facility-Administered Medications   Medication Dose Route Frequency    estradiol (ESTRACE) tablet 2 mg  2 mg Oral DAILY    [START ON 12/3/2017] furosemide (LASIX) injection 40 mg  40 mg IntraVENous DAILY    furosemide (LASIX) injection 20 mg  20 mg IntraVENous ONCE    albuterol (PROVENTIL VENTOLIN) nebulizer solution 2.5 mg  2.5 mg Nebulization Q4H PRN    lidocaine (XYLOCAINE) 4 % (40 mg/mL) topical solution   Aerosolization PRN    clonazePAM (KlonoPIN) tablet 0.5 mg  0.5 mg Oral BID PRN    HYDROcodone-homatropine (HYCODAN) 5-1.5 mg/5 mL (5 mL) syrup 5 mL  5 mL Oral Q8H PRN    morphine injection 1 mg  1 mg IntraVENous Q4H PRN    guaiFENesin (ROBITUSSIN) 100 mg/5 mL oral liquid 100 mg  100 mg Oral Q4H PRN    FLUoxetine (PROzac) capsule 10 mg  10 mg Oral DAILY    pantoprazole (PROTONIX) tablet 40 mg  40 mg Oral ACB    ondansetron (ZOFRAN) injection 4 mg  4 mg IntraVENous Q6H PRN    guaiFENesin ER (MUCINEX) tablet 1,200 mg  1,200 mg Oral Q12H    sodium chloride (NS) flush 5-10 mL  5-10 mL IntraVENous Q8H    sodium chloride (NS) flush 5-10 mL  5-10 mL IntraVENous PRN    acetaminophen (TYLENOL) tablet 650 mg  650 mg Oral Q4H PRN    nicotine (NICODERM CQ) 21 mg/24 hr patch 1 Patch  1 Patch TransDERmal Q24H    enoxaparin (LOVENOX) injection 40 mg  40 mg SubCUTAneous Q24H    amitriptyline (ELAVIL) tablet 100 mg  100 mg Oral QHS    gabapentin (NEURONTIN) capsule 400 mg  400 mg Oral BID       Review of Systems  Constitutional: negative for fever, chills, sweats  Cardiovascular: Pain with cough. LE edema. negative for palpitations, syncope  Gastrointestinal: negative for dysphagia, reflux, vomiting, diarrhea, abdominal pain, or melena  Neurologic: negative for focal weakness, numbness, headache    Objective:     Vitals:    12/02/17 1201 12/02/17 1225 12/02/17 1329 12/02/17 1401   BP: 129/65  125/75 116/72   Pulse: (!) 120  (!) 122 (!) 115   Resp: (!) 33  (!) 48 (!) 34   Temp:  97.6 °F (36.4 °C)     SpO2: 93%  100% 100%   Weight:       Height:         Intake and Output:   11/30 1901 - 12/02 0700  In: 240 [P.O.:240]  Out: 1850 [Urine:1850]       Physical Exam:   Constitution:  the patient is well developed and in no acute distress  EENMT:  Sclera clear, pupils equal, oral mucosa moist  Respiratory: CTA B, no w/r/r  Cardiovascular:  RRR without M,G,R  Gastrointestinal: soft and non-tender; with positive bowel sounds. Musculoskeletal: warm without cyanosis. There is trace B lower leg edema. Skin:  no jaundice or rashes, no wounds   Neurologic: no gross neuro deficits     Psychiatric:  alert and oriented x ppt    CHEST XRAY:   12/2/17  IMPRESSION: Pulmonary edema worse in the interval.          LAB  No lab exists for component: GLPOC   No results for input(s): WBC, HGB, HCT, PLT, INR, HGBEXT, HCTEXT, PLTEXT, HGBEXT, HCTEXT, PLTEXT in the last 72 hours.     No lab exists for component: Yessica Browning  Recent Labs      12/02/17   0820  12/01/17   0719  11/30/17   0510   NA  139  141  138   K  4.0  3.7  3.6   CL  101  102  97*   CO2  26  29 31   GLU  107*  107*  109*   BUN  21  25*  24*   CREA  0.61  0.75  0.86   MG   --    --   2.1   CA  8.8  9.3  9.3   PHOS   --    --   3.2     Recent Labs      11/30/17   0400   PH  7.47*   PCO2  42   PO2  181*   HCO3  30*       MICRO:  Blood - negative  Pct - 0.2      Assessment:  (Medical Decision Making)     Hospital Problems  Date Reviewed: 2/24/2017          Codes Class Noted POA    Acute pulmonary edema (RUST 75.) ICD-10-CM: J81.0  ICD-9-CM: 518.4  11/30/2017 Unknown        Hx of cocaine abuse ICD-10-CM: Z87.898  ICD-9-CM: 305.63  11/28/2017 Yes        * (Principal)Acute respiratory failure with hypoxia (RUST 75.) ICD-10-CM: J96.01  ICD-9-CM: 518.81  11/27/2017 Yes        Bilateral pulmonary infiltrates on CXR ICD-10-CM: R91.8  ICD-9-CM: 793.19  11/25/2017 Yes        Nicotine dependence (Chronic) ICD-10-CM: F17.200  ICD-9-CM: 305.1  11/25/2017 Yes        Takotsubo cardiomyopathy ICD-10-CM: I51.81  ICD-9-CM: 429.83  4/15/2017 Yes        Cough ICD-10-CM: R05  ICD-9-CM: 786.2  6/7/2016 Yes        Rheumatic aortic insufficiency ICD-10-CM: I06.1  ICD-9-CM: 395.1  5/16/2016 Yes        Esophageal reflux (Chronic) ICD-10-CM: K21.9  ICD-9-CM: 530.81  9/3/2013 Yes        Rheumatic disease of mitral valve ICD-10-CM: I05.9  ICD-9-CM: 394.9  9/3/2013 Yes            Patient admitted with cough and respiratory failure. Unclear cause at first. Now seems most likely due to acute pulmonary edema from heart failure. Off all abx. CXR clearing with diuresis. Worse yesterday. Plan:  (Medical Decision Making)   -weaned back to NC.   -increase lasix to 40mg IV daily, additional 20mg now. -follow strict I/o. Patient with multiple drink bottles at bedside. Try to limit to 1000-1500cc intake a day. -off abx.   -off steroids. No signs of COPD on [facility] PFT's.   -transfer to floor.        Jacquelin Pompa MD

## 2017-12-02 NOTE — PROGRESS NOTES
Problem: Mobility Impaired (Adult and Pediatric)  Goal: *Therapy Goal (Edit Goal, Insert Text)  STG:  (1.)Ms. Oh will move from supine to sit and sit to supine , scoot up and down and roll side to side with MODIFIED INDEPENDENCE within 4 day(s). (2.)Ms. Oh will transfer from bed to chair and chair to bed with MODIFIED INDEPENDENCE using the least restrictive device within 4 day(s). (3.)Ms. Oh will ambulate with MODIFIED INDEPENDENCE for 200 feet with the least restrictive device within 4 day(s). LTG:  (1.)Ms. Oh will move from supine to sit and sit to supine , scoot up and down and roll side to side in bed with INDEPENDENT within 7 day(s). (2.)Ms. Oh will transfer from bed to chair and chair to bed with INDEPENDENT using the least restrictive device within 7 day(s). (3.)Ms. Oh will ambulate with INDEPENDENT for 500 feet with the least restrictive device within 7 day(s). ________________________________________________________________________________________________       PHYSICAL THERAPY: Initial Assessment, Treatment Day: Day of Assessment 12/2/2017  INPATIENT: Hospital Day: 8  Payor: ABSOLUTE TOTAL CARE / [place] / Product Type: Managed Care Medicaid /      NAME/AGE/GENDER: Raina Oh is a 50 y.o. female   PRIMARY DIAGNOSIS: Ineffective airway clearance [R06.89] Acute respiratory failure with hypoxia (HCC) Acute respiratory failure with hypoxia (HCC)  Procedure(s) (LRB):  BRONCHOSCOPY (N/A)  BRONCHIAL ALVEOLAR LAVAGE (N/A)     ICD-10: Treatment Diagnosis:   · Other lack of cordination (R27.8)   Precaution/Allergies:  Aspirin; Bentyl [dicyclomine]; Oxycodone-acetaminophen; Toradol [ketorolac]; Ultram [tramadol]; and Zofran [ondansetron hcl (pf)]      ASSESSMENT:     Ms. Yovanny Suarez presents with decreased mobility and transfers with the above diagnosis.   She is presenting with sufficient mobility and strength today for SBA x 1 for all transfers and mobility. She then is able to ambulate with SBA x 1 in the room with close guard assistance returning to bed per her preference for 15 feet in the room without assistive devices. She is impulsive at times with mild decreased safety. Skilled PT is indicated for her functional mobility deficits. This section established at most recent assessment   PROBLEM LIST (Impairments causing functional limitations):  1. Decreased Strength  2. Decreased ADL/Functional Activities  3. Decreased Transfer Abilities  4. Decreased Ambulation Ability/Technique  5. Decreased Balance  6. Decreased Flexibility/Joint Mobility  7. Decreased [place] with Home Exercise Program  8. Decreased Cognition   INTERVENTIONS PLANNED: (Benefits and precautions of physical therapy have been discussed with the patient.)  1. Balance Exercise  2. Bed Mobility  3. Family Education  4. Home Exercise Program (HEP)  5. Neuromuscular Re-education/Strengthening  6. Range of Motion (ROM)  7. Therapeutic Activites  8. Therapeutic Exercise/Strengthening     TREATMENT PLAN: Frequency/Duration: up to 3-5 x's per week for duration of acute care episode while functionally skilled appropriate. Rehabilitation Potential For Stated Goals: Good     RECOMMENDED REHABILITATION/EQUIPMENT: (at time of discharge pending progress): Due to the probability of continued deficits (see above) this patient will likely need continued skilled physical therapy after discharge. Equipment:    None at this time              HISTORY:   History of Present Injury/Illness (Reason for Referral):  Patient is a 50 y.o. female presents with complaints of a frequent productive cough and increasing SOB of approximately one week duration. She is unable to expectorate the sputum. She also has associated wheezing with her symptoms frequently. She admits to smoking up to one pack per day of cigarettes for 20 years.  She admits to pleurtic chest and back pain. She presently denies fevers, chills, and hemoptysis.     Past Medical History/Comorbidities:   Ms. Yelitza Arellano  has a past medical history of Anemia; Arrhythmia; Arthritis; Asthma; Cardiomyopathy; Chronic pain; COPD; Depression; Diverticulitis; GERD (gastroesophageal reflux disease); Heart murmur; Hypertension; IBS (irritable bowel syndrome); IC (interstitial cystitis); Insomnia; Mitral valve regurgitation, rheumatic; Palpitations (5/16/2016); Psychiatric disorder; Rheumatic aortic insufficiency (5/16/2016); Smoker; Tobacco abuse disorder (5/16/2016); and Vitamin D deficiency. She also has no past medical history of DEMENTIA; Endocrine disease; Infectious disease; Neurological disorder; or Sleep disorder. Ms. Yelitza Arellano  has a past surgical history that includes egd; colonoscopy; cystoscopy (8-23-11); heart catheterization (5/27/2011); appendectomy (2006); lap cholecystectomy (6/6/2011); sara and bso (2004); biopsy of breast, incisional; and urological.  Social History/Living Environment:   Home Environment: Private residence  # Steps to Enter: 5  One/Two Story Residence: One story  Living Alone: No  Support Systems: Family member(s)  Patient Expects to be Discharged to[de-identified] Private residence  Current DME Used/Available at Home: None  Prior Level of Function/Work/Activity:  She had been independent per her report with all functional mobility. Dominant Side:         RIGHT  Personal Factors:          Sex:  female        Age:  50 y.o.    Number of Personal Factors/Comorbidities that affect the Plan of Care: 1-2: MODERATE COMPLEXITY   EXAMINATION:   Most Recent Physical Functioning:   Gross Assessment:  AROM: Generally decreased, functional  Strength: Generally decreased, functional  Coordination: Generally decreased, functional  Tone: Normal  Sensation: Intact               Posture:  Posture (WDL): Exceptions to WDL  Posture Assessment: Cervical, Forward head  Balance:  Sitting: Impaired  Sitting - Static: Fair (occasional)  Sitting - Dynamic: Fair (occasional)  Standing: Impaired  Standing - Static: Fair  Standing - Dynamic : Fair Bed Mobility:  Rolling: Stand-by asssistance  Supine to Sit: Stand-by asssistance  Sit to Supine: Stand-by asssistance  Scooting: Stand-by asssistance  Wheelchair Mobility:     Transfers:  Sit to Stand: Stand-by asssistance  Stand to Sit: Stand-by asssistance  Bed to Chair: Stand-by asssistance  Gait:     Base of Support: Center of gravity altered  Speed/[place]: Delayed; Fluctuations; Pace decreased (<100 feet/min); Shuffled; Slow  Step Length: Left shortened;Right shortened  Swing Pattern: Left asymmetrical;Right asymmetrical  Stance: Left decreased;Right decreased;Time;Weight shift  Gait Abnormalities: Decreased step clearance;Shuffling gait; Steppage gait; Step to gait;Trunk sway increased  Distance (ft): 15 Feet (ft)  Assistive Device:  (no assistive device required at this time.  )  Ambulation - Level of Assistance: Stand-by asssistance  Interventions: Manual cues; Safety awareness training; Tactile cues; Verbal cues; Visual/Demos      Body Structures Involved:  1. Bones  2. Joints  3. Muscles  4. Ligaments Body Functions Affected:  1. Neuromusculoskeletal  2. Movement Related  3. Skin Related  4. Metobolic/Endocrine Activities and Participation Affected:  1. Learning and Applying Knowledge  2. General Tasks and Demands  3. Mobility  4. Interpersonal Interactions and Relationships  5. Community, Social and [place]   Number of elements that affect the Plan of Care: 1-2: LOW COMPLEXITY   CLINICAL PRESENTATION:   Presentation: Stable and uncomplicated: LOW COMPLEXITY   CLINICAL DECISION MAKIN [facility] Inpatient Short Form  How much difficulty does the patient currently have. .. Unable A Lot A Little None   1. Turning over in bed (including adjusting bedclothes, sheets and blankets)? [] 1   [] 2   [x] 3   [] 4   2.   Sitting down on and standing up from a chair with arms ( e.g., wheelchair, bedside commode, etc.)   [] 1   [] 2   [x] 3   [] 4   3. Moving from lying on back to sitting on the side of the bed? [] 1   [] 2   [x] 3   [] 4   How much help from another person does the patient currently need. .. Total A Lot A Little None   4. Moving to and from a bed to a chair (including a wheelchair)? [] 1   [] 2   [x] 3   [] 4   5. Need to walk in hospital room? [] 1   [] 2   [x] 3   [] 4   6. Climbing 3-5 steps with a railing? [] 1   [] 2   [x] 3   [] 4   © 2007, Trustees of [address] [place] [address] [address] [id number], under license to [employer]. All rights reserved      Score:  Initial: 18 Most Recent: X (Date: -- )    Interpretation of Tool:  Represents activities that are increasingly more difficult (i.e. Bed mobility, Transfers, Gait). Score 24 23 22-20 19-15 14-10 9-7 6     Modifier CH CI CJ CK CL CM CN      ? Mobility - Walking and Moving Around:     - CURRENT STATUS: CK - 40%-59% impaired, limited or restricted    - GOAL STATUS: CJ - 20%-39% impaired, limited or restricted    - D/C STATUS:  ---------------To be determined---------------  Payor: ABSOLUTE TOTAL CARE / [place] / Product Type: Managed Care Medicaid /      Medical Necessity:     · Patient demonstrates good rehab potential due to higher previous functional level. Reason for Services/Other Comments:  · Patient continues to require skilled intervention due to medical complications, patient unable to attend/participate in therapy as expected and debility and decreased mobility. Use of outcome tool(s) and clinical judgement create a POC that gives a: Clear prediction of patient's progress: LOW COMPLEXITY            TREATMENT:   (In addition to Assessment/Re-Assessment sessions the following treatments were rendered)   Pre-treatment Symptoms/Complaints:  No specific complaints of pain during PT evaluation .   Pain: Initial: 0/10      Post Session:  0/10 Assessment/Reassessment only, no treatment provided today    Braces/Orthotics/Lines/Etc:   · IV  · andino catheter  · O2 Device: Nasal cannula  Treatment/Session Assessment:    · Response to Treatment:  Initial evaluation only with brief and mild coughing post treatment. · Interdisciplinary Collaboration:   o Physical Therapist  o Registered Nurse  · After treatment position/precautions:   o Supine in bed  o Bed alarm/tab alert on  o Bed/Chair-wheels locked  o Bed in low position  o Call light within reach  o RN notified  o Side rails x 3   · Compliance with Program/Exercises: Will assess as treatment progresses. · Recommendations/Intent for next treatment session: \"Next visit will focus on advancements to more challenging activities, reduction in assistance provided and transfers, ambulation and mobility\".   Total Treatment Duration:  PT Patient Time In/Time Out  Time In: 1205  Time Out: [facility]

## 2017-12-02 NOTE — PROGRESS NOTES
Pt agitated because she has not received her Estradiol while in hospital.  Will clarify with Dr. Leonard Mckeon when he rounds. Pt restless and agitated. Brother at bedside.

## 2017-12-03 LAB
ANION GAP SERPL CALC-SCNC: 11 MMOL/L (ref 7–16)
BUN SERPL-MCNC: 22 MG/DL (ref 6–23)
CALCIUM SERPL-MCNC: 9.5 MG/DL (ref 8.3–10.4)
CHLORIDE SERPL-SCNC: 97 MMOL/L (ref 98–107)
CO2 SERPL-SCNC: 29 MMOL/L (ref 21–32)
CREAT SERPL-MCNC: 0.79 MG/DL (ref 0.6–1)
ERYTHROCYTE [DISTWIDTH] IN BLOOD BY AUTOMATED COUNT: 13.3 % (ref 11.9–14.6)
GLUCOSE SERPL-MCNC: 98 MG/DL (ref 65–100)
HCT VFR BLD AUTO: 35.2 % (ref 35.8–46.3)
HGB BLD-MCNC: 11.2 G/DL (ref 11.7–15.4)
MCH RBC QN AUTO: 30.1 PG (ref 26.1–32.9)
MCHC RBC AUTO-ENTMCNC: 31.8 G/DL (ref 31.4–35)
MCV RBC AUTO: 94.6 FL (ref 79.6–97.8)
PLATELET # BLD AUTO: 511 K/UL (ref 150–450)
PMV BLD AUTO: 9.1 FL (ref 10.8–14.1)
POTASSIUM SERPL-SCNC: 4.3 MMOL/L (ref 3.5–5.1)
RBC # BLD AUTO: 3.72 M/UL (ref 4.05–5.25)
SODIUM SERPL-SCNC: 137 MMOL/L (ref 136–145)
WBC # BLD AUTO: 11.8 K/UL (ref 4.3–11.1)

## 2017-12-03 PROCEDURE — 74011250636 HC RX REV CODE- 250/636: Performed by: INTERNAL MEDICINE

## 2017-12-03 PROCEDURE — 85027 COMPLETE CBC AUTOMATED: CPT | Performed by: INTERNAL MEDICINE

## 2017-12-03 PROCEDURE — 74011250637 HC RX REV CODE- 250/637: Performed by: NURSE PRACTITIONER

## 2017-12-03 PROCEDURE — 80048 BASIC METABOLIC PNL TOTAL CA: CPT | Performed by: INTERNAL MEDICINE

## 2017-12-03 PROCEDURE — 77010033678 HC OXYGEN DAILY

## 2017-12-03 PROCEDURE — 99232 SBSQ HOSP IP/OBS MODERATE 35: CPT | Performed by: INTERNAL MEDICINE

## 2017-12-03 PROCEDURE — 74011250637 HC RX REV CODE- 250/637: Performed by: INTERNAL MEDICINE

## 2017-12-03 PROCEDURE — 36415 COLL VENOUS BLD VENIPUNCTURE: CPT | Performed by: INTERNAL MEDICINE

## 2017-12-03 PROCEDURE — 65660000000 HC RM CCU STEPDOWN

## 2017-12-03 PROCEDURE — 77030021668 HC NEB PREFIL KT VYRM -A

## 2017-12-03 PROCEDURE — 94760 N-INVAS EAR/PLS OXIMETRY 1: CPT

## 2017-12-03 RX ORDER — FUROSEMIDE 10 MG/ML
40 INJECTION INTRAMUSCULAR; INTRAVENOUS 2 TIMES DAILY
Status: DISCONTINUED | OUTPATIENT
Start: 2017-12-03 | End: 2017-12-04 | Stop reason: HOSPADM

## 2017-12-03 RX ADMIN — HYDROCODONE BITARTRATE AND HOMATROPINE METHYLBROMIDE 5 ML: 5; 1.5 SOLUTION ORAL at 03:31

## 2017-12-03 RX ADMIN — ENOXAPARIN SODIUM 40 MG: 40 INJECTION SUBCUTANEOUS at 19:53

## 2017-12-03 RX ADMIN — AMITRIPTYLINE HYDROCHLORIDE 100 MG: 50 TABLET, FILM COATED ORAL at 19:56

## 2017-12-03 RX ADMIN — MORPHINE SULFATE 1 MG: 2 INJECTION, SOLUTION INTRAMUSCULAR; INTRAVENOUS at 07:36

## 2017-12-03 RX ADMIN — GUAIFENESIN 1200 MG: 600 TABLET, EXTENDED RELEASE ORAL at 19:55

## 2017-12-03 RX ADMIN — FLUOXETINE 10 MG: 10 CAPSULE ORAL at 08:23

## 2017-12-03 RX ADMIN — Medication 10 ML: at 20:01

## 2017-12-03 RX ADMIN — Medication 5 ML: at 13:45

## 2017-12-03 RX ADMIN — ONDANSETRON 4 MG: 2 INJECTION INTRAMUSCULAR; INTRAVENOUS at 14:45

## 2017-12-03 RX ADMIN — ESTRADIOL 2 MG: 1 TABLET ORAL at 08:23

## 2017-12-03 RX ADMIN — FUROSEMIDE 40 MG: 10 INJECTION, SOLUTION INTRAMUSCULAR; INTRAVENOUS at 08:23

## 2017-12-03 RX ADMIN — GUAIFENESIN 1200 MG: 600 TABLET, EXTENDED RELEASE ORAL at 08:23

## 2017-12-03 RX ADMIN — MORPHINE SULFATE 1 MG: 2 INJECTION, SOLUTION INTRAMUSCULAR; INTRAVENOUS at 19:46

## 2017-12-03 RX ADMIN — Medication 5 ML: at 05:36

## 2017-12-03 RX ADMIN — GABAPENTIN 400 MG: 400 CAPSULE ORAL at 08:22

## 2017-12-03 RX ADMIN — FUROSEMIDE 40 MG: 10 INJECTION, SOLUTION INTRAMUSCULAR; INTRAVENOUS at 17:53

## 2017-12-03 RX ADMIN — MORPHINE SULFATE 1 MG: 2 INJECTION, SOLUTION INTRAMUSCULAR; INTRAVENOUS at 11:36

## 2017-12-03 RX ADMIN — PANTOPRAZOLE SODIUM 40 MG: 40 TABLET, DELAYED RELEASE ORAL at 05:35

## 2017-12-03 RX ADMIN — HYDROCODONE BITARTRATE AND HOMATROPINE METHYLBROMIDE 5 ML: 5; 1.5 SOLUTION ORAL at 15:50

## 2017-12-03 RX ADMIN — MORPHINE SULFATE 1 MG: 2 INJECTION, SOLUTION INTRAMUSCULAR; INTRAVENOUS at 03:11

## 2017-12-03 RX ADMIN — MORPHINE SULFATE 1 MG: 2 INJECTION, SOLUTION INTRAMUSCULAR; INTRAVENOUS at 15:43

## 2017-12-03 RX ADMIN — GABAPENTIN 400 MG: 400 CAPSULE ORAL at 17:52

## 2017-12-03 RX ADMIN — ONDANSETRON 4 MG: 2 INJECTION INTRAMUSCULAR; INTRAVENOUS at 03:14

## 2017-12-03 NOTE — PROGRESS NOTES
[facility] 81 CRITICAL CARE OUTREACH NURSE PROGRESS REPORT      SUBJECTIVE: Called to assess patient secondary to transfer from CCU. MEWS Score: 3 (12/02/17 1923)  Vitals:    12/02/17 1500 12/02/17 1602 12/02/17 1715 12/02/17 1923   BP: 107/83 124/84 115/76 109/75   Pulse: (!) 123 (!) 134 (!) 128 (!) 120   Resp:  24 24 19   Temp:   99 °F (37.2 °C) 99.4 °F (37.4 °C)   SpO2: 92%  91% 90%   Weight:       Height:          EKG: Pt off monitor. LAB DATA:    Recent Labs      12/02/17   0820  12/01/17   0719  11/30/17   0510   NA  139  141  138   K  4.0  3.7  3.6   CL  101  102  97*   CO2  26  29  31   AGAP  12  10  10   GLU  107*  107*  109*   BUN  21  25*  24*   CREA  0.61  0.75  0.86   GFRAA  >60  >60  >60   GFRNA  >60  >60  >60   CA  8.8  9.3  9.3   MG   --    --   2.1   PHOS   --    --   3.2        No results for input(s): WBC, HGB, HCT, PLT, HGBEXT, HCTEXT, PLTEXT in the last 72 hours. OBJECTIVE: On arrival to room, I found patient to be resting in bed, watching videos on cell phone. General appearance: alert, cooperative, no distress, appears stated age. Pt with no complaints, denies pain/sob. Pain Assessment  Pain Intensity 1: 9 (12/02/17 1636)  Pain Location 1: Chest  Pain Intervention(s) 1: Medication (see MAR)  Patient Stated Pain Goal: 0                                 ASSESSMENT:  Pt resting in bed NAD. Respirations even and unlabored. Pt with no complaints at this time. PLAN:  Continue to follow per outreach protocol.

## 2017-12-03 NOTE — PROGRESS NOTES
Pt called the cardiologist on call stating \" I'm in the hospital and I'm not feeling any better\". The MD on call states he doesn't have a stat consult on the patient so he could not understand why pt was calling. Spoke with pt and she states she wants to go to another hospital because she is not getting better and no one is doing anything to fix her. This nurse informed the pt that the rounding MD will be made aware of her concerns in the morning. Will continue to monitor.

## 2017-12-03 NOTE — PROGRESS NOTES
Johan Oh  Admission Date: 11/25/2017             Daily Progress Note: 12/3/2017    The patient's chart is reviewed and the patient is discussed with the staff. Patient is a 50 y.o. female presents with complaints of a frequent productive cough and increasing SOB. She reports issues started with increased LE about a month ago. CXR with B infiltrates. Initially suspected infection vs edema. Placed on bactrim to cover PJP. HIV negative and bactrim off with no immunosuppression. CXR cleared with lasix and overall more c/w pulmonary edema. H/o cocaine + UDS. This time just opioids. BNP mildly elevated, troponin negative. H/o Takosubo. Subjective:     Now on 5lpm of O2. Tearful and wants more medicine for pain. Says she hasn't done cocaine in a year. Wants a bronchoscopy because she has a cough.     Current Facility-Administered Medications   Medication Dose Route Frequency    estradiol (ESTRACE) tablet 2 mg  2 mg Oral DAILY    furosemide (LASIX) injection 40 mg  40 mg IntraVENous DAILY    albuterol (PROVENTIL VENTOLIN) nebulizer solution 2.5 mg  2.5 mg Nebulization Q4H PRN    lidocaine (XYLOCAINE) 4 % (40 mg/mL) topical solution   Aerosolization PRN    clonazePAM (KlonoPIN) tablet 0.5 mg  0.5 mg Oral BID PRN    HYDROcodone-homatropine (HYCODAN) 5-1.5 mg/5 mL (5 mL) syrup 5 mL  5 mL Oral Q8H PRN    morphine injection 1 mg  1 mg IntraVENous Q4H PRN    guaiFENesin (ROBITUSSIN) 100 mg/5 mL oral liquid 100 mg  100 mg Oral Q4H PRN    FLUoxetine (PROzac) capsule 10 mg  10 mg Oral DAILY    pantoprazole (PROTONIX) tablet 40 mg  40 mg Oral ACB    ondansetron (ZOFRAN) injection 4 mg  4 mg IntraVENous Q6H PRN    guaiFENesin ER (MUCINEX) tablet 1,200 mg  1,200 mg Oral Q12H    sodium chloride (NS) flush 5-10 mL  5-10 mL IntraVENous Q8H    sodium chloride (NS) flush 5-10 mL  5-10 mL IntraVENous PRN    acetaminophen (TYLENOL) tablet 650 mg  650 mg Oral Q4H PRN    nicotine (NICODERM CQ) 21 mg/24 hr patch 1 Patch  1 Patch TransDERmal Q24H    enoxaparin (LOVENOX) injection 40 mg  40 mg SubCUTAneous Q24H    amitriptyline (ELAVIL) tablet 100 mg  100 mg Oral QHS    gabapentin (NEURONTIN) capsule 400 mg  400 mg Oral BID       Review of Systems  Constitutional: negative for fever, chills, sweats  Cardiovascular: Pain with cough. LE edema. negative for palpitations, syncope  Gastrointestinal: negative for dysphagia, reflux, vomiting, diarrhea, abdominal pain, or melena  Neurologic: negative for focal weakness, numbness, headache    Objective:     Vitals:    12/03/17 0312 12/03/17 0726 12/03/17 0833 12/03/17 1140   BP: 112/82 104/69  104/67   Pulse: (!) 109 (!) 108  (!) 113   Resp: 18 17 17   Temp: 98.7 °F (37.1 °C) 97.5 °F (36.4 °C)  97.8 °F (36.6 °C)   SpO2: 90% 94% 93% 99%   Weight: 140 lb (63.5 kg)      Height:         Intake and Output:   12/01 1901 - 12/03 0700  In: 480 [P.O.:480]  Out: -   12/03 0701 - 12/03 1900  In: 118 [P.O.:118]  Out: -     Physical Exam:   Constitution:  the patient is well developed and in no acute distress  EENMT:  Sclera clear, pupils equal, oral mucosa moist  Respiratory: CTA B, no w/r/r  Cardiovascular:  RRR without M,G,R  Gastrointestinal: soft and non-tender; with positive bowel sounds. Musculoskeletal: warm without cyanosis. There is trace B lower leg edema.   Skin:  no jaundice or rashes, no wounds   Neurologic: no gross neuro deficits     Psychiatric:  alert and oriented x ppt    CHEST XRAY:   12/2/17  IMPRESSION: Pulmonary edema worse in the interval.          LAB  No lab exists for component: [place]   Recent Labs      12/03/17 0423   WBC  11.8*   HGB  11.2*   HCT  35.2*   PLT  511*     Recent Labs      12/03/17   0423  12/02/17   0820  12/01/17   0719   NA  137  139  141   K  4.3  4.0  3.7   CL  97*  101  102   CO2  29  26  29   GLU  98  107*  107*   BUN  22  21  25*   CREA  0.79  0.61  0.75   CA  9.5  8.8  9.3     No results for input(s): PH, PCO2, PO2, HCO3 in the last 72 hours. MICRO:  Blood - negative  Pct - 0.2      Assessment:  (Medical Decision Making)     Hospital Problems  Date Reviewed: 2/24/2017          Codes Class Noted POA    Acute pulmonary edema ([facility] ICD-10-CM: J81.0  ICD-9-CM: 518.4  11/30/2017 Unknown    Lasix     Hx of cocaine abuse ICD-10-CM: Z87.898  ICD-9-CM: 305.63  11/28/2017 Yes    utox neg this admission    * (Principal)Acute respiratory failure with hypoxia ([facility] ICD-10-CM: J96.01  ICD-9-CM: 518.81  11/27/2017 Yes    Wean O2 as able    Bilateral pulmonary infiltrates on CXR ICD-10-CM: R91.8  ICD-9-CM: 793.19  11/25/2017 Yes    Thought pulmonary edema    Nicotine dependence (Chronic) ICD-10-CM: F17.200  ICD-9-CM: 305.1  11/25/2017 Yes        Takotsubo cardiomyopathy ICD-10-CM: I51.81  ICD-9-CM: 429.83  4/15/2017 Yes    EF now in 45s. Cough ICD-10-CM: R05  ICD-9-CM: 786.2  6/7/2016 Yes        Rheumatic aortic insufficiency ICD-10-CM: I06.1  ICD-9-CM: 395.1  5/16/2016 Yes        Esophageal reflux (Chronic) ICD-10-CM: K21.9  ICD-9-CM: 530.81  9/3/2013 Yes        Rheumatic disease of mitral valve ICD-10-CM: I05.9  ICD-9-CM: 394.9  9/3/2013 Yes            Patient admitted with cough and respiratory failure. Unclear cause at first. Now seems most likely due to acute pulmonary edema from heart failure. Off all abx. CXR clearing with diuresis. Worse yesterday. Plan:  (Medical Decision Making)   -weaned back to NC.   -lasix 40mg bid. F/u BMP tomorrow  -follow strict I/o. Patient with multiple drink bottles at bedside. Try to limit to 1000-1500cc intake a day. -off abx.        Hari Daniels MD

## 2017-12-03 NOTE — PROGRESS NOTES
Bedside report received from [facility], shift assessment completed; pt in bed eating; call light within reach; will continue to monitor

## 2017-12-03 NOTE — PROGRESS NOTES
Date of Outreach Update:  Dale Antunez was seen and assessed. MEWS Score: 3 (12/02/17 2215)  Vitals:    12/02/17 1602 12/02/17 1715 12/02/17 1923 12/02/17 2215   BP: 124/84 115/76 109/75 110/74   Pulse: (!) 134 (!) 128 (!) 120 (!) 111   Resp: 24 24 19 18   Temp:  99 °F (37.2 °C) 99.4 °F (37.4 °C) 99.1 °F (37.3 °C)   SpO2:  91% 90% 99%   Weight:       Height:             Pain Assessment  Pain Intensity 1: 0 (12/02/17 2120)  Pain Location 1: Chest  Pain Intervention(s) 1: Medication (see MAR)  Patient Stated Pain Goal: 0      Previous Outreach assessment has been reviewed. There have been no significant clinical changes since the completion of the last dated Outreach assessment. Will continue to follow up per outreach protocol.     Signed By:   Agnieszka Guajardo RN    December 3, 2017 1:21 AM

## 2017-12-03 NOTE — PROGRESS NOTES
Patient transferred from ICU. She is independent at baseline. Currently on O2 at 4 Lpm. Case Management will follow for discharge planning needs. Care Management Interventions  PCP Verified by CM:  Yes  Transition of Care Consult (CM Consult): Discharge Planning  Discharge Durable Medical Equipment: No  Physical Therapy Consult: Yes  Occupational Therapy Consult: Yes  Speech Therapy Consult: No  Current Support Network: Lives Alone, Own Home  Confirm Follow Up Transport: Friends  Plan discussed with Pt/Family/Caregiver: Yes  Freedom of Choice Offered: Yes  Discharge Location  Discharge Placement: Home

## 2017-12-03 NOTE — PROGRESS NOTES
Assessment completed. Lung sounds are clear, respirations are even and unlabored. Patient is alert and oriented. Safety measures in place.

## 2017-12-03 NOTE — PROGRESS NOTES
Report received from [address] [place] [address] [email or web address].

## 2017-12-04 VITALS
HEIGHT: 64 IN | BODY MASS INDEX: 23.9 KG/M2 | DIASTOLIC BLOOD PRESSURE: 68 MMHG | OXYGEN SATURATION: 97 % | WEIGHT: 140 LBS | TEMPERATURE: 97.7 F | HEART RATE: 110 BPM | SYSTOLIC BLOOD PRESSURE: 105 MMHG | RESPIRATION RATE: 17 BRPM

## 2017-12-04 PROBLEM — I06.1 RHEUMATIC AORTIC INSUFFICIENCY: Chronic | Status: ACTIVE | Noted: 2017-11-25

## 2017-12-04 PROBLEM — J96.10 CHRONIC RESPIRATORY FAILURE (HCC): Status: ACTIVE | Noted: 2017-12-04

## 2017-12-04 PROBLEM — I51.81 TAKOTSUBO CARDIOMYOPATHY: Chronic | Status: ACTIVE | Noted: 2017-11-25

## 2017-12-04 PROBLEM — I05.9 RHEUMATIC DISEASE OF MITRAL VALVE: Chronic | Status: ACTIVE | Noted: 2017-11-25

## 2017-12-04 PROBLEM — R05.9 COUGH: Chronic | Status: ACTIVE | Noted: 2017-11-25

## 2017-12-04 PROBLEM — K21.9 ESOPHAGEAL REFLUX: Chronic | Status: ACTIVE | Noted: 2017-11-25

## 2017-12-04 LAB
ANION GAP SERPL CALC-SCNC: 10 MMOL/L (ref 7–16)
BUN SERPL-MCNC: 23 MG/DL (ref 6–23)
CALCIUM SERPL-MCNC: 9.1 MG/DL (ref 8.3–10.4)
CHLORIDE SERPL-SCNC: 99 MMOL/L (ref 98–107)
CO2 SERPL-SCNC: 28 MMOL/L (ref 21–32)
CREAT SERPL-MCNC: 0.75 MG/DL (ref 0.6–1)
GLUCOSE SERPL-MCNC: 89 MG/DL (ref 65–100)
POTASSIUM SERPL-SCNC: 4.6 MMOL/L (ref 3.5–5.1)
SODIUM SERPL-SCNC: 137 MMOL/L (ref 136–145)

## 2017-12-04 PROCEDURE — 74011250637 HC RX REV CODE- 250/637: Performed by: NURSE PRACTITIONER

## 2017-12-04 PROCEDURE — 74011250636 HC RX REV CODE- 250/636: Performed by: INTERNAL MEDICINE

## 2017-12-04 PROCEDURE — 99239 HOSP IP/OBS DSCHRG MGMT >30: CPT | Performed by: INTERNAL MEDICINE

## 2017-12-04 PROCEDURE — 94761 N-INVAS EAR/PLS OXIMETRY MLT: CPT

## 2017-12-04 PROCEDURE — 77010033678 HC OXYGEN DAILY

## 2017-12-04 PROCEDURE — 74011250637 HC RX REV CODE- 250/637: Performed by: INTERNAL MEDICINE

## 2017-12-04 PROCEDURE — 80048 BASIC METABOLIC PNL TOTAL CA: CPT | Performed by: INTERNAL MEDICINE

## 2017-12-04 PROCEDURE — 36415 COLL VENOUS BLD VENIPUNCTURE: CPT | Performed by: INTERNAL MEDICINE

## 2017-12-04 RX ORDER — NADOLOL 80 MG/1
80 TABLET ORAL DAILY
COMMUNITY
End: 2017-12-04

## 2017-12-04 RX ORDER — OXYBUTYNIN CHLORIDE 5 MG/1
5 TABLET ORAL 2 TIMES DAILY
COMMUNITY
End: 2019-01-25 | Stop reason: SDUPTHER

## 2017-12-04 RX ORDER — CYCLOBENZAPRINE HCL 10 MG
TABLET ORAL
COMMUNITY
End: 2018-03-02

## 2017-12-04 RX ORDER — OMEPRAZOLE 20 MG/1
20 CAPSULE, DELAYED RELEASE ORAL DAILY
COMMUNITY
End: 2017-12-18

## 2017-12-04 RX ORDER — FUROSEMIDE 40 MG/1
40 TABLET ORAL DAILY
Qty: 30 TAB | Refills: 1 | Status: SHIPPED | OUTPATIENT
Start: 2017-12-04 | End: 2018-10-04

## 2017-12-04 RX ORDER — AMMONIUM LACTATE 12 G/100G
LOTION TOPICAL AS NEEDED
COMMUNITY
End: 2019-09-16

## 2017-12-04 RX ORDER — DABIGATRAN ETEXILATE 150 MG/1
150 CAPSULE ORAL 2 TIMES DAILY
COMMUNITY
End: 2017-12-18 | Stop reason: SDUPTHER

## 2017-12-04 RX ORDER — ALBUTEROL SULFATE 1.25 MG/3ML
1.25 SOLUTION RESPIRATORY (INHALATION) 3 TIMES DAILY
COMMUNITY
End: 2018-08-29

## 2017-12-04 RX ORDER — SPIRONOLACTONE 25 MG/1
TABLET ORAL 2 TIMES DAILY
COMMUNITY
End: 2017-12-04

## 2017-12-04 RX ORDER — HYDROCODONE BITARTRATE AND ACETAMINOPHEN 7.5; 325 MG/1; MG/1
1 TABLET ORAL
Status: DISCONTINUED | OUTPATIENT
Start: 2017-12-04 | End: 2017-12-04

## 2017-12-04 RX ORDER — HYDROCODONE BITARTRATE AND ACETAMINOPHEN 7.5; 325 MG/1; MG/1
1 TABLET ORAL
COMMUNITY
End: 2017-12-18 | Stop reason: SDUPTHER

## 2017-12-04 RX ORDER — LISINOPRIL 40 MG/1
40 TABLET ORAL DAILY
COMMUNITY
End: 2017-12-04

## 2017-12-04 RX ORDER — HYDROCODONE BITARTRATE AND ACETAMINOPHEN 7.5; 325 MG/1; MG/1
1 TABLET ORAL
Status: DISCONTINUED | OUTPATIENT
Start: 2017-12-04 | End: 2017-12-04 | Stop reason: HOSPADM

## 2017-12-04 RX ORDER — HYDROCODONE BITARTRATE AND ACETAMINOPHEN 7.5; 325 MG/1; MG/1
1 TABLET ORAL
Qty: 15 TAB | Refills: 0 | Status: SHIPPED | OUTPATIENT
Start: 2017-12-04 | End: 2018-08-07 | Stop reason: CLARIF

## 2017-12-04 RX ADMIN — CLONAZEPAM 0.5 MG: 0.5 TABLET ORAL at 10:49

## 2017-12-04 RX ADMIN — ESTRADIOL 2 MG: 1 TABLET ORAL at 08:22

## 2017-12-04 RX ADMIN — GUAIFENESIN 1200 MG: 600 TABLET, EXTENDED RELEASE ORAL at 08:22

## 2017-12-04 RX ADMIN — ONDANSETRON 4 MG: 2 INJECTION INTRAMUSCULAR; INTRAVENOUS at 04:17

## 2017-12-04 RX ADMIN — GABAPENTIN 400 MG: 400 CAPSULE ORAL at 08:22

## 2017-12-04 RX ADMIN — PANTOPRAZOLE SODIUM 40 MG: 40 TABLET, DELAYED RELEASE ORAL at 05:58

## 2017-12-04 RX ADMIN — MORPHINE SULFATE 1 MG: 2 INJECTION, SOLUTION INTRAMUSCULAR; INTRAVENOUS at 04:17

## 2017-12-04 RX ADMIN — Medication 5 ML: at 05:58

## 2017-12-04 RX ADMIN — FLUOXETINE 10 MG: 10 CAPSULE ORAL at 08:25

## 2017-12-04 RX ADMIN — MORPHINE SULFATE 1 MG: 2 INJECTION, SOLUTION INTRAMUSCULAR; INTRAVENOUS at 08:23

## 2017-12-04 RX ADMIN — MORPHINE SULFATE 1 MG: 2 INJECTION, SOLUTION INTRAMUSCULAR; INTRAVENOUS at 12:06

## 2017-12-04 RX ADMIN — HYDROCODONE BITARTRATE AND HOMATROPINE METHYLBROMIDE 5 ML: 5; 1.5 SOLUTION ORAL at 04:16

## 2017-12-04 RX ADMIN — FUROSEMIDE 40 MG: 10 INJECTION, SOLUTION INTRAMUSCULAR; INTRAVENOUS at 08:22

## 2017-12-04 NOTE — PROGRESS NOTES
Respirations are even and unlabored. Patient is alert and oriented. Bowel sounds are active. Safety measures in place.

## 2017-12-04 NOTE — PROGRESS NOTES
Respirations are even and unlabored. Patient is alert and oriented. Nasal cannula at 5L. Safety measures in place.

## 2017-12-04 NOTE — PROGRESS NOTES
Patient will be discharging home today. She will require home O2 setup for use with exertion. Home oxygen has been arranged through [facility]  and a tank will be delivered to her room prior to discharge for transport home. No other discharge planning needs identified at this time. Case Management will remain available to assist as needed. Care Management Interventions  PCP Verified by CM:  Yes  Transition of Care Consult (CM Consult): Discharge Planning  Discharge Durable Medical Equipment: No  Physical Therapy Consult: Yes  Occupational Therapy Consult: Yes  Speech Therapy Consult: No  Current Support Network: Lives Alone, Own Home  Confirm Follow Up Transport: Friends  Plan discussed with Pt/Family/Caregiver: Yes  Freedom of Choice Offered: Yes  Discharge Location  Discharge Placement: Home

## 2017-12-04 NOTE — ADT AUTH CERT NOTES
LOC:Acute Adult-General Medical (12/3/2017) by Marlon Montoya        Review Entered Review Status       12/4/2017 In Primary       Details         REVIEW SUMMARY     Patient: Dee Mitchell  Review Number: [id number]  Review Status: In Primary     Condition Specific: Yes     Condition Level Of Care Code: ACUTE  Condition Level Of Care Description: Acute        OUTCOMES  Outcome Type: Primary           REVIEW DETAILS     Service Date: 12/03/2017  Admit Date: 11/25/2017  Product: Dierdre Redo Adult  Subset: General Medical      (Symptom or finding within 24h)         (Excludes PO medications unless noted)          [X] Select Day, One:              [X] Episode Day 3-X, One:                  [X] ACUTE, >= One:                      [X] Respiratory, One:                          [X] Partial responder, not clinically stable for discharge and requires continued stay, >= One:                              [X] O2 sat <= 91%(0.91) and < baseline requiring supplemental oxygen                              ~--Admin, IQ Admin Admin on 12- 02:05 PM--~                              Daily Progress Note: 12/3/2017                              Now on 5lpm of O2. Tearful and wants more medicine for pain. Says she hasn't done cocaine in a year. Wants a bronchoscopy because she has a cough. EXAM: Respiratory: CTA B, no w/r/r. Musculoskeletal: warm without cyanosis. There is trace B lower leg edema. T 97.8, /67, P 113-118, R 18, 02 SAT 93% 5L HI FLOW NC                                  WBC 11.8, RBC 3.72, HGB 11.2, HCT 35.2, , CL 97,                                       Plan:  (Medical Decision Making)                              -weaned back to NC.                               -lasix 40mg bid. F/u BMP tomorrow                              -follow strict I/o. Patient with multiple drink bottles at bedside. Try to limit to 1000-1500cc intake a day.                                -off abx.                                   RT--RESPIRATORY THERAPY MISCELLANEOUS Vibralung w/ neb tx Routine TID RT,  LOVENOX SC Q 2 4HRS, LASIX 40 MG IV BID, MORPHINE IV Q 4 HRS PRN X 5 (10X/24 HRS), ZOFRAN IV Q 4 HRS PRN X 2 (5X/24 HRS),                      Version: InterQual® 2016. 3  InterQual® and [employer]  © The [facility] and/or one of its [place]. All Rights Reserved. CPT only © 2015 American Medical Association. All Rights Reserved.                  LOC:Acute Adult-General Medical (12/2/2017) by Dallin Hogan        Review Entered Review Status       12/4/2017 In Primary       Details         REVIEW SUMMARY     Patient: Del Gamboa  Review Number: [id number]  Review Status: In Primary     Condition Specific: Yes     Condition Level Of Care Code: ACUTE  Condition Level Of Care Description: Acute        OUTCOMES  Outcome Type: Primary           REVIEW DETAILS     Service Date: 12/02/2017  Admit Date: 11/25/2017  Product: Gabrielle Dafne Adult  Subset: General Medical      (Symptom or finding within 24h)         (Excludes PO medications unless noted)          [X] Select Day, One:              [X] Episode Day 3-X, One:                  [X] ACUTE, >= One:                      [X] Respiratory, One:                          [X] Partial responder, not clinically stable for discharge and requires continued stay, >= One:                              [X] O2 sat <= 91%(0.91) and < baseline requiring supplemental oxygen                              ~--Admin, IQ Admin Admin on 12- 01:25 PM--~                              Daily Progress Note: 12/2/2017                                  Patient was to transfer out yesterday but had to be stopped due to worsening hypoxia, up to 10L. Repeat CXR showing some worsening pulmonary edema. Patient back down to 4L O2 today.   T 97, /68, P 121, R 24-48, 02 SAT down to 90%                                  GLUC 107 CXR: Pulmonary edema worse in the interval.                                      Plan:  (Medical Decision Making)                              -weaned back to NC.                               -increase lasix to 40mg IV daily, additional 20mg now. -follow strict I/o. Patient with multiple drink bottles at bedside. Try to limit to 1000-1500cc intake a day. -off abx.                               -off steroids. No signs of COPD on [facility] PFT's.                               -transfer to floor.                                       LOVENBOX SC Q 24 HRS, MORPHINE  IV Q 4 HRS PRN X5, ZOFRAN IV Q 6 HRS PRN X3, LASIX 20 MG IV X 2                     Version: InterQual® 2016. 3  InterQual® and [employer]  © The [employer] and/or one of its [place]. All Rights Reserved. CPT only © 2015 American Medical Association. All Rights Reserved.                  LOC:Acute Adult-General Medical (12/1/2017) by Fili Devlin        Review Entered Review Status       12/4/2017 In Primary       Details         REVIEW SUMMARY     Patient: Naresh Sparks  Review Number: [id number]  Review Status:  In Primary     Condition Specific: Yes     Condition Level Of Care Code: ACUTE  Condition Level Of Care Description: Acute        OUTCOMES  Outcome Type: Primary           REVIEW DETAILS     Service Date: 12/01/2017  Admit Date: 11/25/2017  Product: Sue Duran Adult  Subset: General Medical      (Symptom or finding within 24h)         (Excludes PO medications unless noted)          [X] Select Day, One:              [X] Episode Day 3-X, One:                  [X] ACUTE, >= One:                      [X] Respiratory, One:                          [X] Partial responder, not clinically stable for discharge and requires continued stay, >= One:                              [X] O2 sat <= 91%(0.91) and < baseline requiring supplemental oxygen ~--Admin,  Pin Randolph Thanh on 12- 01:19 PM--~                               Daily Progress Note: 12/1/2017                              Patient down to 4L O2 today. Continues to have some cough and chest discomfort. No new issues today. EXAM:  Musculoskeletal: warm without cyanosis. There is trace B lower leg edema. T 98.3, /75, P 106, R 43, 02 SAT 97% 4L NC                              GLUC 107, BUN 25,                                   Assessment:                              ACUTE RESP FAILURE W/HYPOXIA, ACUTE  PULM EDEMA, BILAT PULM INFILTRATES ON CXR, HX COCAINE ABUSE, RHEUMAIC AORTIC INSUFFICIENCY, ESOPHAGEAL REFLUX, RHEUMATIC DISEASE MITRAL VALVE                              Plan:  (Medical Decision Making)                              -weaned to NC today                              -will continue scheduled lasix, 20mg IV daily and monitor I/O and daily BMP's. -off abx.                               -off steroids. No signs of COPD on [facility] PFT's.                               -transfer to floor.                          Version: [employer] (EDC) 2016. 3  [employer] ([employer]) and [employer]  © The [facility] and/or one of its [place]. All Rights Reserved. CPT only © 2015 American Medical Association. All Rights Reserved.                  LOC:Acute Adult-General Medical (11/30/2017) by Mariana Ellison RN        Review Entered Review Status       11/30/2017 In Primary       Details         REVIEW SUMMARY     Patient: Phill Meyers  Review Number: [id number]  Review Status:  In Primary     Condition Specific: Yes     Condition Level Of Care Code: ACUTE  Condition Level Of Care Description: Acute        OUTCOMES  Outcome Type: Primary           REVIEW DETAILS     Service Date: 11/30/2017  Product: Knome Adult  Subset: General Medical      (Symptom or finding within 24h)         (Excludes PO medications unless noted)          [X] Select Day, One:              [X] Episode Day 3-X, One:                  [X] ACUTE, >= One:                      [X] Respiratory, One:                          [X] Partial responder, not clinically stable for discharge and requires continued stay, >= One:                              [X] O2 sat <= 91%(0.91) and < baseline requiring supplemental oxygen                              ~--Admin, IQ Admin Admin on 11- 03:19 PM--~                              BP:                111/84                                   111/64            118/70                              Pulse:           98                                                                 100                              Resp:            (!) 31                                                             24                              Temp:                                  97.4 °F (36.3 °C)                                                                            SpO2:           94%                                                                                                    Subjective:                                                Patient continues to be on optiflow. Currently at 60% but with sats %. I turned down to 40% gradually and maintaining 97-99%. C/o some increased chest pain with cough.                                   Plan:  (Medical Decision Making)                                                    -weaned to 40% fio2 today.                               -will add scheduled lasix, 20mg IV daily and monitor I/O and daily BMP's. -off abx.                               -off steroids. No signs of COPD on [facility] PFT's.                               -change albuterol from scheduled to prn.                               -some increase in chest pain today.  Suspect musculoskeletal but will repeat troponin x 1 now.                                   MEDS:  ELEVAL 100MG PO QHS, LOVENOX 40MG SC Q24H, PROZAC 10MG PO QD, LASIX 20MG IV QD, MUCINEX 1200MG PO Q12H, HYCODAN5 PO X 1, MS 1MG IV X 3, ZOFRAN 4MG IV X 1, PROTONIX 40MG PO QAM, ALBUTEROL NEBS Q4H                 Version: InterQual® 2016. 3  InterQual® and [employer]  © The [employer] and/or one of its [place]. All Rights Reserved. CPT only © 2015 American Medical Association. All Rights Reserved.                  LOC:Acute Adult-General Medical (11/29/2017) by Sandra Bergman RN        Review Entered Review Status       11/29/2017 In Primary       Details         REVIEW SUMMARY     Patient: Sascha rCaig  Review Number: [id number]  Review Status:  In Primary     Condition Specific: Yes     Condition Level Of Care Code: ACUTE  Condition Level Of Care Description: Acute        OUTCOMES  Outcome Type: Primary           REVIEW DETAILS     Service Date: 11/29/2017  Admit Date: 11/25/2017  Product: Michaela Reyes Adult  Subset: General Medical      (Symptom or finding within 24h)         (Excludes PO medications unless noted)          [X] Select Day, One:              [X] Episode Day 3-X, One:                  [X] ACUTE, >= One:                      [X] Respiratory, One:                          [X] Partial responder, not clinically stable for discharge and requires continued stay, >= One:                          ~--Admin, IQ Admin Admin on 11- 03:04 PM--~                          PULM                          Plan:  (Medical Decision Making)  [facility] Problems                      Date Reviewed: 2/24/2017                                                                                                           Codes             Class              Noted              POA                                      Hx of cocaine abuse                 ICD-10-CM: Z87.898                          ICD-9-CM: 305.63                                             11/28/2017                  Yes                            No history of HIV testing is noted in the chart- HIV screen negative- stop bactrim                                      * (Principal)Acute respiratory failure with hypoxia (HCC)             ICD-10-CM: J96.01                          ICD-9-CM: 518.81                                             11/27/2017                  Yes                                      Significantly worse with profound hypoxemia.   This is almost certainly related to her worsening bilateral infiltrates and likely due to CHF and pulmonary edema and has improved on CXR considerably with lasix                                      COPD exacerbation ([address]                   ICD-10-CM: J44.1                          ICD-9-CM: 491.21                                             11/25/2017                  Yes                                      On  IV steroid and bronchodilators                           Although a smoker, her current symptoms are more likely related to CHF and cardiac dysfunction rather than COPD- she will need PFT as OP to look for COPD                          P[facility] 10/[facility]:                              No Obstructive ventilatory defect, no BD response                          No indication of restrictive physiology via spirometry                          However, No formal lung volumes for evaluation                          Mild Diffusion Impairement                                               ~--Admin, IQ Admin Admin on 11- 03:04 PM--~                          CXR: Improving consolidation                                  ~--Admin, IQ Admin Admin on 11- 03:03 PM--~                          Labs: ABG: ph 7.48, po2 60, bicarb 29, 50% FIO2                                  ~--Admin, IQ Admin Admin on 11- 03:02 PM--~                          VS: , RR 28, 97%, 112/63                                          [X] T > 99.4°F(37.4°C) PO and new onset, >= One:                                  [X] Culture pending <= 2d                                  ~--Admin, IQ Admin Admin on 11- 03:07 PM--~                                  cult pending                 Version: InterQual® 2016. 3  InterQual® and [employer]  © [address] and/or one of its [place]. All Rights Reserved. CPT only © 2015 American Medical Association. All Rights Reserved.                  LOC:Acute Adult-Extended Stay (11/28/2017) by Mitra Angel RN        Review Entered Review Status       11/28/2017 In Primary       Details         REVIEW SUMMARY     Patient: Mary Shultz  Review Number: [id number]  Review Status: In Primary     Condition Specific: Yes     Condition Level Of Care Code: ACUTE  Condition Level Of Care Description: Acute        OUTCOMES  Outcome Type: Primary           REVIEW DETAILS     Service Date: 11/28/2017  Admit Date: 11/25/2017  Product: Quincy Sable Adult  Subset: Extended Stay      (Symptom or finding within 24h)         (Excludes PO medications unless noted)          Select Level of Care, One:              [ ] ACUTE, >= One:                  [ ] Respiratory, One:                      [X] Non-responder, not clinically stable for discharge and does not meet partial responder criteria (refer for secondary review)                      ~--Admin, IQ Admin Admin on 11- 01:58 PM--~                      PULMONARY:                      BIPAP and now down to 50% and looks comfortable. Is diuresing well currently. Will check another BNP. Likely can change back to optiflow later today with meals. HIV is negative currently. Will check BNP today. Check urine drug screen. Denies use of illicit meds and previously was positive. procal previously was 0.1 will check again today. EF yesterday was 45-50%. Did see Dr. Beulah Howell in the past, will f/u labs for now and see how condition is.  Hold off for cardiology for now.                          Blood pressure 125/68, pulse (!) 105, temperature 98.1 °F (36.7 °C), resp. rate (!) 60, height 5' 4\" (1.626 m), weight 154 lb 5.2 oz (70 kg), SpO2 93 %.                                 ~--Admin, IQ Admin Admin on 11- 01:58 PM--~                      Meds: buminate 12.5 g iv q 12 h, albuterol neb q 4 h, Elavil q hs, lovenox sc, Lasix 40 mg iv q 12 h, mucinex q 12 h, solumedrol 60 mg iv q 6 h, Lasix 40 mg iv once, morphine 2 mg iv once & prn x 1                              ~--Admin, IQ Admin Admin on 11- 01:56 PM--~                      VS: HR 96, RR 33, 96% on bipap @ 100% FIO2, 137/78                              ~--Admin, IQ Admin Admin on 11- 01:55 PM--~                      Labs: ABG: ph 7.49, po2 64, bicarb 27, 92% on FIO2 100, sed rate, auto 120,                  Version: [employer] 2016. 3  [employer]® and [employer]  © The [employer] and/or one of its [place]. All Rights Reserved. CPT only © 2015 American Medical Association. All Rights Reserved.                  LOC:Acute Adult-General Medical (11/27/2017) by Teena Ramírez RN        Review Entered Review Status       11/27/2017 In Primary       Details         REVIEW SUMMARY     Patient: Lee Ann Nolasco  Review Number: [id number]  Review Status:  In Primary     Condition Specific: Yes     Condition Level Of Care Code: ACUTE  Condition Level Of Care Description: Acute        OUTCOMES  Outcome Type: Primary           REVIEW DETAILS     Service Date: 11/27/2017  Admit Date: 11/25/2017  Product: Mara Romero Adult  Subset: General Medical      (Symptom or finding within 24h)         (Excludes PO medications unless noted)          [X] Select Day, One:              [X] Episode Day 3-X, One:                  [X] ACUTE, >= One:                      [X] Respiratory, One:                      ~--Admin, IQ Admin Admin on 11- 03:49 PM--~                      Pulmonology Note:                       Patient is a 50 y.o. female presents with complaints of a frequent productive cough and increasing SOB of approximately one week duration. She is unable to expectorate the sputum. She also has associated wheezing with her symptoms frequently. She admits to smoking up to one pack per day of cigarettes for 20 years. She admits to pleurtic chest and back pain. She presently denies fevers, chills, and hemoptysis. + drug screen in April with cocaine, benzos and opiates. No screen ordered this admission.                         Subjective:                          Hycodan stopped yesterday and having coughing episode                      Asking for hycodan, muscle relaxer, home prozac, home klonopin and home phenergan supp                           -- Breathing treatment given, oxygen increased with low sats                      -- EF in April was 25-30% with Aortic regurgitation- moderate to severe. Repeat here                      -- nicoderm, on solumedrol 60 mg Q 12 hours                      -- followed by pulmonology at [facility]                      -- review home meds and restart home prozac and klonopin. Stop Norco and add hycodan back. zofran ordered.                        -- she takes her O2 off and is watching TV/Eating comfortably                                   T 98, , /80, RR 22, O2 60% ON 2L NC, 80% ON 6L NC, 93% ON 12L NC                          ABG PO2 56, O2 90, ART HGB O2 89,                               Albuterol neb tx x3, Klonopin po, Prozac po, Neurontin po, mucinex po, solu-medrol IV, norco po, hycodan susp x2,                                               [X] Partial responder, not clinically stable for discharge and requires continued stay, >= One:                              [X] O2 sat <= 91%(0.91) and < baseline requiring supplemental oxygen                              ~--Admin, IQ Admin Admin on 11- 03:49 PM--~                              Respiratory Note:     Called to pt's room due to sob. Pt sat was 81% on 10LPM HFNC. I increased her to 15LPM sat increased to 95%. Pt was crying and seemed anxious.                    Version: InterCrestockal® 2016. 3  InterCrest[employer]® and [address]  © The [employer] and/or one of its [place]. All Rights Reserved. CPT only © 2015 American Medical Association. All Rights Reserved.

## 2017-12-04 NOTE — PROGRESS NOTES
[facility] 81 CRITICAL CARE OUTREACH NURSE PROGRESS REPORT      SUBJECTIVE: Called to assess patient secondary to outreach protocol. MEWS Score: 3 (12/03/17 2024)  Vitals:    12/03/17 1140 12/03/17 1459 12/03/17 2024 12/04/17 0727   BP: 104/67 117/66 96/67 109/71   Pulse: (!) 113 (!) 118 (!) 110 (!) 111   Resp: 17 18 18 17   Temp: 97.8 °F (36.6 °C) 98.8 °F (37.1 °C) 98.3 °F (36.8 °C) 98.2 °F (36.8 °C)   SpO2: 99% 97% 96% 96%   Weight:       Height:              LAB DATA:    Recent Labs      12/04/17   0517  12/03/17   0423  12/02/17   0820   NA  137  137  139   K  4.6  4.3  4.0   CL  99  97*  101   CO2  28  29  26   AGAP  10  11  12   GLU  89  98  107*   BUN  23  22  21   CREA  0.75  0.79  0.61   GFRAA  >60  >60  >60   GFRNA  >60  >60  >60   CA  9.1  9.5  8.8        Recent Labs      12/03/17   0423   WBC  11.8*   HGB  11.2*   HCT  35.2*   PLT  511*          OBJECTIVE: On arrival to room, I found patient to be resting in bed. Pain Assessment  Pain Intensity 1: 0 (12/04/17 0200)  Pain Location 1: Chest  Pain Intervention(s) 1: Medication (see MAR)  Patient Stated Pain Goal: 0                                 ASSESSMENT:  Pt in bed, AXO, family at bedside. O2 sat acceptable, no respiratory distress observed. Pt currently in NAD. No immediate concerns at this time. PLAN:        Will continue to follow up per outreach protocol.     Signed By:   Jayme Vivar RN    December 4, 2017 8:51 AM

## 2017-12-04 NOTE — DISCHARGE SUMMARY
Discharge Note    Tg Oh  Admission date:  11/25/2017  Discharge date:  12/4/2017     Admitting Diagnosis:  Ineffective airway clearance [R06.89]    Discharge Diagnoses:   Hospital Problems  Date Reviewed: 12/4/2017          Codes Class Noted POA    Chronic respiratory failure ([facility] ICD-10-CM: J96.10  ICD-9-CM: 518.83  12/4/2017 Unknown    Overview Signed 12/4/2017 10:40 AM by Benjie Renee NP     Secondary to CHF and Takotsubo              Acute pulmonary edema ([facility] ICD-10-CM: J81.0  ICD-9-CM: 518.4  11/30/2017 No        Hx of cocaine abuse ICD-10-CM: Z87.898  ICD-9-CM: 305.63  11/28/2017 Yes        * (Principal)Acute respiratory failure with hypoxia ([facility] ICD-10-CM: J96.01  ICD-9-CM: 518.81  11/27/2017 Yes        Rheumatic disease of mitral valve (Chronic) ICD-10-CM: I05.9  ICD-9-CM: 394.9  11/25/2017 Yes        Rheumatic aortic insufficiency (Chronic) ICD-10-CM: I06.1  ICD-9-CM: 395.1  11/25/2017 Yes        Cough (Chronic) ICD-10-CM: R05  ICD-9-CM: 786.2  11/25/2017 Yes        Takotsubo cardiomyopathy (Chronic) ICD-10-CM: I51.81  ICD-9-CM: 429.83  11/25/2017 Yes        Bilateral pulmonary infiltrates on CXR ICD-10-CM: R91.8  ICD-9-CM: 793.19  11/25/2017 Yes        Nicotine dependence (Chronic) ICD-10-CM: D64.045  ICD-9-CM: 305.1  11/25/2017 Yes              Consultants:    Studies/Procedures:  CXR    ECHO:   -  Left ventricle: LV wall motion consistent with Takotsubo Stress Induced Cardiomyopathy pattern (04-13-17). Systolic function was mildly reduced. EF estimated 45 % to 50 %. There was akinesis of the apical wall(s). Doppler parameters were consistent with mild diastolic dysfunction (grade 1). -  Tricuspid valve: There was mild regurgitation. Condition on Discharge:  stable    Disposition:  home      Hospital course:  52 y.o. female admitted the floor with complaints of a frequent productive cough and increasing SOB. Reported issues started with increased LE about a month ago. H/o cocaine + UDS. This time just opioids. BNP mildly elevated, troponin negative. H/o Takosubo. Was treated with IV steroids, nebs and given narcotics for musculoskeletal chest pain and Klonopin for anxiety. She developed worsening hypoxia, CXR with B infiltrates initially suspected infection vs edema. Was placed on Bactrim to cover PJP. HIV was negative and Bactrim stopped with no immunosuppression. CXR cleared with lasix and overall more c/w pulmonary edema. with diffuse bilateral infiltrates and was moved to the ICU for BIPAP. Repeat ECHO with EF 45-50%. Improved and was movign tot he floor but again developed hypoxia. CXR worsening and restarted on IV Lasix with improvement. Was moved to the floor and is now ready for discharge home. Has required narcotics for ongoing pain. Room air ambulating sat dropped and now requiring NC 3L with exertion. Fluid restriction was stressed to her but despite had multiple drink containers on her bedside table. Will follow up with cardiology and her PCP in 1 week. Will prescribe Norco 7.5 #15 tablets with no refills and further prescriptions to be obtained with PCP. Physical Exam:   Constitution:  the patient is well developed and in no acute distress  EENMT:  Sclera clear, pupils equal, oral mucosa moist  Respiratory: rare crackles, no wheezing  Cardiovascular:  RRR without M,G,R  Gastrointestinal: soft and non-tender; with positive bowel sounds. Musculoskeletal: warm without cyanosis. There is no lower leg edema.   Skin:  no jaundice or rashes, no wounds   Neurologic: no gross neuro deficits     Psychiatric:  alert and oriented x 3    LAB  Recent Labs      12/03/17   0423   WBC  11.8*   HGB  11.2*   HCT  35.2*   PLT  511*     Recent Labs      12/04/17   0517  12/03/17   0423  12/02/17   0820   NA  137  137  139   K  4.6  4.3  4.0   CL  99  97*  101   CO2  28  29  26   BUN  23  22  21   CREA  0.75  0.79  0.61     No results for input(s): PH, PCO2, PO2, HCO3 in the last 72 hours. Discharge Medications:   Current Discharge Medication List      START taking these medications    Details   furosemide (LASIX) 40 mg tablet Take 1 Tab by mouth daily. Qty: 30 Tab, Refills: 1      !! HYDROcodone-acetaminophen (NORCO) 7.5-325 mg per tablet Take 1 Tab by mouth every six (6) hours as needed. Max Daily Amount: 4 Tabs. Qty: 15 Tab, Refills: 0       !! - Potential duplicate medications found. Please discuss with provider. CONTINUE these medications which have NOT CHANGED    Details   omeprazole (PRILOSEC) 20 mg capsule Take 20 mg by mouth daily. albuterol (ACCUNEB) 1.25 mg/3 mL nebu 1.25 mg by Nebulization route three (3) times daily. oxybutynin (DITROPAN) 5 mg tablet Take 5 mg by mouth two (2) times a day. dabigatran etexilate (PRADAXA) 150 mg capsule Take 150 mg by mouth two (2) times a day.      !! HYDROcodone-acetaminophen (NORCO) 7.5-325 mg per tablet Take 1 Tab by mouth once over twenty-four (24) hours. ammonium lactate (LAC-HYDRIN) 12 % lotion Apply  to affected area as needed. rub in to affected area well      cyclobenzaprine (FLEXERIL) 10 mg tablet Take  by mouth two (2) times daily as needed for Muscle Spasm(s). pravastatin (PRAVACHOL) 10 mg tablet Take  by mouth nightly.      gabapentin (NEURONTIN) 400 mg capsule Take 400 mg by mouth three (3) times daily. budesonide-formoterol (SYMBICORT) 160-4.5 mcg/actuation HFA inhaler Take 1 Puff by inhalation two (2) times a day. Indications: BRONCHOSPASM PREVENTION WITH COPD      FLUoxetine (PROZAC) 10 mg tablet Take 20 mg by mouth every morning. nitroglycerin (NITROSTAT) 0.4 mg SL tablet 1 Tab by SubLINGual route every five (5) minutes as needed for Chest Pain. Qty: 1 Bottle, Refills: 4    Associated Diagnoses: Essential hypertension, benign      NIFEdipine ER (PROCARDIA XL) 60 mg ER tablet Take 1 Tab by mouth daily.   Qty: 30 Tab, Refills: 11    Associated Diagnoses: Essential hypertension, benign; Rheumatic aortic insufficiency      clonazePAM (KLONOPIN) 0.5 mg tablet Take 0.5 mg by mouth as needed. promethazine (PHENERGAN) 25 mg tablet Take 1 Tab by mouth every six (6) hours as needed. Qty: 12 Tab, Refills: 0      amitriptyline (ELAVIL) 100 mg tablet Take 1 Tab by mouth nightly. Qty: 30 Tab, Refills: 5      tiotropium (SPIRIVA WITH HANDIHALER) 18 mcg inhalation capsule Take 1 Cap by inhalation daily. estradiol (ESTRACE) 2 mg tablet Take 2 mg by mouth every morning. Indications: one tablet daily for three weeks, 1 week off       !! - Potential duplicate medications found. Please discuss with provider. STOP taking these medications       lisinopril (PRINIVIL, ZESTRIL) 40 mg tablet Comments:   Reason for Stopping:         spironolactone (ALDACTONE) 25 mg tablet Comments:   Reason for Stopping:         nadolol (CORGARD) 80 mg tablet Comments:   Reason for Stopping: Followup/Outpt Studies:  --Follow up appointment with [address] [place] [address] [facility] in 1 week. Continue lasix 40mg daily. --Follow up with PCP in 1 week at [address] [place] [id number] arranged with [facility] for 3L with activity. --Total discharge greater than 30 minutes in duration. More than 50% of the time documented was spent in face-to-face contact with the patient and in the care of the patient on the floor/unit where the patient is located. Karthik Márquez NP  Lungs:  clear  Heart:  RRR with no Murmur/Rubs/Gallops    Additional Comments:  Needs O2  3 L with ambulation, none at rest    I have spoken with and examined the patient. I agree with the above assessment and plan as documented.     Susu Zhu MD

## 2017-12-04 NOTE — DISCHARGE INSTRUCTIONS
Oxygen Therapy: Care Instructions  Your Care Instructions    Oxygen therapy helps you get more oxygen into your lungs and bloodstream. You may use it if you have a disease that makes it hard to breathe, such as COPD, pulmonary fibrosis (scarring of the lungs), or heart failure. Oxygen therapy can make it easier for you to breathe and can reduce your heart's workload. Some people need extra oxygen all the time. Others need it from time to time throughout the day or overnight. A doctor will prescribe how much oxygen you need and how often to use it. To breathe the oxygen, most people use a nasal cannula (say \"TRUMAN-yuh-angelic\"). This is a thin tube with two prongs that fit just inside your nose. People who need a lot of oxygen may need to use a mask that fits over the nose and mouth. Follow-up care is a key part of your treatment and safety. Be sure to make and go to all appointments, and call your doctor if you are having problems. It's also a good idea to know your test results and keep a list of the medicines you take. How can you care for yourself at home? To help yourself  · Using oxygen may dry out your nose or lips. Use water-based lubricants on your lips or nostrils. Do not use an oil-based product like petroleum jelly. · If you use a nasal cannula, the tubing may rub under your nostrils and around your ears. To keep your skin from getting sore, tuck some gauze under the tubing. Use a water-based lotion on rubbed areas. · Do not use alcohol or take drugs that relax you, because they will slow your breathing rate. · Keep track of how much oxygen is in the tank, and reorder before it runs out. If a holiday is coming up or you expect bad weather, order in advance or make your regular order larger. · You may need extra oxygen when you travel to high altitudes or travel by plane. Ask your doctor about this.   · If you are getting oxygen directly to your windpipe through an opening in your neck, your doctor will teach you how to care for the equipment. To make sure oxygen is flowing  · Check the flow by holding your mask or cannula up to your ear and listening for the \"hiss\" of airflow. · If you have a nasal cannula, dip the prongs in a glass of water. If you see bubbles, oxygen is coming through. · Check your pressure gauge or contents indicator. · If you use an oxygen concentrator, make sure it is turned on and plugged in. If you use a cylinder, make sure the valve is open. · Look for kinks, blockages, or water in the tubing. Be sure the tubing is connected to the oxygen source. · Do not change your oxygen flow rate. Your doctor sets this at the correct level. Higher flow rates usually do not help and can increase the risk of harmful carbon dioxide buildup in the blood. To be safe  · Do not leave cords or tubing running across an area where you or someone else may trip on it. · Do not let oxygen containers get hot. Store them in a cool place where there is airflow. Do not leave them in a car trunk or a hot vehicle. · Keep oxygen containers upright. Make sure they do not fall over and get damaged. Try securing the tanks in a sturdy container or securing them with a rope or a chain. · Watch for signs of oxygen leaks. If you hear a loud hissing from your container or if it empties too fast, stay away from the container. Open windows right away and call the company that brought the oxygen system to your home. · Do not use oxygen around anything that could spark or easily cause a fire. ¨ Do not smoke or let others smoke while you are using oxygen. Put up \"no smoking\" signs in your home. ¨ Do not use oxygen near open flames, such as candles, fireplaces, gas stoves, or hot water heaters. Do not use it near electric razors, hair dryers, heating pads, or anything that may spark. ¨ Keep a working fire extinguisher in your home where it is easy to get to.   ¨ If a fire starts, turn off the oxygen right away and leave the house. ¨ If you have an oxygen concentrator, do not use it if the cord looks damaged. Do not use an extension cord to plug it in. Do not plug it into an outlet that has other appliances plugged into it. To care for the equipment  · Follow the directions that come with the equipment for using and caring for it. · Wash your cannula or mask with a liquid soap and warm water 1 or 2 times a week. Replace them every 2 to 4 weeks. · If you have a cold, change the nasal prongs when your cold symptoms are done. · If you have an oxygen concentrator, unplug the unit and wipe down the cabinet with a damp cloth daily. Clean the air filter at least 2 times a week. Where can you learn more? Go to http://maria alejandra-nereyda.info/. Enter E117 in the search box to learn more about \"Oxygen Therapy: Care Instructions. \"  Current as of: May 12, 2017  Content Version: 11.4  © 5904-0821 [employer]. Care instructions adapted under license by [employer] (which disclaims liability or warranty for this information). If you have questions about a medical condition or this instruction, always ask your healthcare professional. [place] [address] any warranty or liability for your use of this information. Pulmonary Edema: Care Instructions  Your Care Instructions    Pulmonary edema is the buildup of fluid in the lungs. It usually occurs when the heart does not pump blood through the body properly. Pulmonary edema can also be caused by another disease, such as liver or kidney failure. It can also happen at high altitudes, from a poisoning, or as a result of a near-drowning. If you have fluid in your lungs, you may have trouble breathing, be restless, have a fast heart rate, or cough up foamy pink fluid. Breathing problems may be worse when you lie down. Follow-up care is a key part of your treatment and safety.  Be sure to make and go to all appointments, and call your doctor if you are having problems. It's also a good idea to know your test results and keep a list of the medicines you take. How can you care for yourself at home? Medicines  ? · Take your medicines exactly as prescribed. Call your doctor if you think you are having a problem with your medicine. ? · Review all of your regular medicines with your doctor. Do not take any vitamins, over-the-counter medicines, or herbal products without talking to your doctor first.   Diet  ? · Eat a balanced diet. Make an appointment with a dietitian if you have questions about what type of diet might be best for you. ? · Do not eat more than 2,000 milligrams (mg) of sodium each day. That is less than 1 teaspoon of salt a day, including all the salt you eat in prepared or packaged foods. ¨ Do not add salt while you are cooking or at the table. Flavor with garlic, lemon juice, onion, vinegar, herbs, and spices instead of salt. ¨ Eat fewer processed foods and foods from restaurants, including fast food. ¨ Use fresh or frozen foods instead of canned. ¨ Count and record how much sodium you eat each day. Check food labels for sodium. ¨ Ask your doctor before using salt substitutes that have potassium, such as Lite Salt. ? Lifestyle  ? · Stay out of air pollution; smog; cold, dry air; hot, humid air; and high altitudes. ? · Learn breathing methods that help the airflow in and out of your lungs. ? · Take rest breaks often. Schedule short rest breaks when doing housework and other activities. An occupational or physical therapist can help you find ways to do everyday activities with less effort. ? · Start light exercise if your doctor says it is okay. Try to stay as active as possible. If you have not exercised in the past, start out slowly. Walking is a good way to start. ? · Get enough rest at night. Sleeping with 1 or 2 pillows under your upper body and head may help you breathe easier at night.    ? · Discuss rehabilitation with your doctor. Find out what programs are available in your area. ? · Do not smoke or use other tobacco products. Smoking can make your condition worse. If you need help quitting, talk to your doctor about stop-smoking programs and medicines. These can increase your chances of quitting for good. ? · Do not use alcohol or illegal drugs. When should you call for help? Call 911 anytime you think you may need emergency care. For example, call if:  ? · You have severe trouble breathing. ? · You passed out (lost consciousness). ? · You have symptoms of a heart attack. These may include:  ¨ Chest pain or pressure, or a strange feeling in the chest.  ¨ Sweating. ¨ Shortness of breath. ¨ Nausea or vomiting. ¨ Pain, pressure, or a strange feeling in the back, neck, jaw, or upper belly or in one or both shoulders or arms. ¨ Lightheadedness or sudden weakness. ¨ A fast or irregular heartbeat. Pain that spreads from the chest to the neck, jaw, or one or both shoulders or arms. ? After you call 911, the  may tell you to chew 1 adult-strength or 2 to 4 low-dose aspirin. Wait for an ambulance. Do not try to drive yourself. ?Call your doctor now or seek immediate medical care if:  ? · You have trouble breathing or have wheezing that is getting worse. ? · You are coughing more deeply or more often. ? · You cough up blood. ? · You get a fever. ? · You have more swelling in your legs or belly. ? · Your symptoms are getting worse. ? Watch closely for changes in your health, and be sure to contact your doctor if you have any problems. Where can you learn more? Go to http://maria alejandra-nereyda.info/. Enter O896 in the search box to learn more about \"Pulmonary Edema: Care Instructions. \"  Current as of: May 12, 2017  Content Version: 11.4  © 3588-8495 [employer].  Care instructions adapted under license by [employer] (which disclaims liability or warranty for this information). If you have questions about a medical condition or this instruction, always ask your healthcare professional. [address] any warranty or liability for your use of this information. Learning About Broken Heart Syndrome  What is broken heart syndrome? With broken heart syndrome, the heart has trouble pumping blood normally. A chamber of the heart swells up like a small balloon. Broken heart syndrome is also called stress-induced cardiomyopathy or takotsubo cardiomyopathy (say \"JZWJ-im-fwg-micaela doveymk-rpb-fk-gx-RPT-ii-thee\"). Broken heart syndrome causes the same symptoms as a heart attack, but it's not a heart attack. Some of the most common symptoms are:  · Sudden chest pain. · Shortness of breath. · Fainting. Other symptoms may include:  · A pounding or fast heartbeat. · Nausea. · Vomiting. A heart attack happens when one or more of the coronary arteries is blocked. These arteries supply the heart muscle with blood. When blood flow is blocked, part of the heart muscle may be permanently damaged. But in broken heart syndrome, the arteries are not blocked. There is usually no permanent damage to the heart. Broken heart syndrome is often triggered by great emotional stress, such as grief after losing a loved one. It can also be triggered by physical stress, such as being in the hospital for surgery. Sometimes it's not known what triggers broken heart syndrome. How is broken heart syndrome diagnosed? Because symptoms are the same as a heart attack, you probably had tests to make sure you did not have a heart attack. These tests include:  · Blood tests, to look for damage to the heart muscle. · Imaging tests such as an X-ray or an echocardiogram (ultrasound). These tests can show if a heart chamber has swelled up. They can also show if your heart is pumping normally. · An electrocardiogram (EKG), to measure your heart's electrical activity.   · A cardiac catheterization. Results from the other tests may have looked like you had a heart attack. If so, you probably had a cardiac catheterization. This test lets your doctor look at the coronary arteries that supply blood to your heart. It helps to confirm that your coronary arteries are not blocked and that you did not have a heart attack. How is it treated? You may be in intensive care for a short time. You may stay in the hospital for a few days. After you leave the hospital, you may have some more tests. These tests are to check how well your heart is pumping blood. You will likely take medicines for a short time to help your heart muscle recover. These may include medicines that make it easier for your heart to pump blood. Some people may need to take medicines long-term. What can you expect when you have broken heart syndrome? In most people, the heart starts pumping normally again within a few days or weeks. For some people, it can take several months to return to normal.  There is usually no permanent damage to the heart. Most people who have an episode of broken heart syndrome don't have another. But there is a small chance that broken heart syndrome can happen again. Sometimes the condition can lead to more serious problems such as heart failure or heart rhythm problems. Follow-up care is a key part of your treatment and safety. Be sure to make and go to all appointments, and call your doctor if you are having problems. It's also a good idea to know your test results and keep a list of the medicines you take. Where can you learn more? Go to http://maria alejandra-nereyda.info/. Enter V810 in the search box to learn more about \"Learning About Broken Heart Syndrome. \"  Current as of: September 21, 2016  Content Version: 11.4  © 1118-6463 [employer] Care instructions adapted under license by [employer] (which disclaims liability or warranty for this information). If you have questions about a medical condition or this instruction, always ask your healthcare professional. [address] any warranty or liability for your use of this information. DISCHARGE SUMMARY from Nurse    PATIENT INSTRUCTIONS:    After general anesthesia or intravenous sedation, for 24 hours or while taking prescription Narcotics:  · Limit your activities  · Do not drive and operate hazardous machinery  · Do not make important personal or business decisions  · Do  not drink alcoholic beverages  · If you have not urinated within 8 hours after discharge, please contact your surgeon on call. Report the following to your surgeon:  · Excessive pain, swelling, redness or odor of or around the surgical area  · Temperature over 100.5  · Nausea and vomiting lasting longer than 4 hours or if unable to take medications  · Any signs of decreased circulation or nerve impairment to extremity: change in color, persistent  numbness, tingling, coldness or increase pain  · Any questions    What to do at Home:*  Please give a list of your current medications to your Primary Care Provider. *  Please update this list whenever your medications are discontinued, doses are      changed, or new medications (including over-the-counter products) are added. *  Please carry medication information at all times in case of emergency situations. These are general instructions for a healthy lifestyle:    No smoking/ No tobacco products/ Avoid exposure to second hand smoke  Surgeon General's Warning:  Quitting smoking now greatly reduces serious risk to your health.     Obesity, smoking, and sedentary lifestyle greatly increases your risk for illness    A healthy diet, regular physical exercise & weight monitoring are important for maintaining a healthy lifestyle    You may be retaining fluid if you have a history of heart failure or if you experience any of the following symptoms:  Weight gain of 3 pounds or more overnight or 5 pounds in a week, increased swelling in our hands or feet or shortness of breath while lying flat in bed. Please call your doctor as soon as you notice any of these symptoms; do not wait until your next office visit. Recognize signs and symptoms of STROKE:    F-face looks uneven    A-arms unable to move or move unevenly    S-speech slurred or non-existent    T-time-call 911 as soon as signs and symptoms begin-DO NOT go       Back to bed or wait to see if you get better-TIME IS BRAIN. Warning Signs of HEART ATTACK     Call 911 if you have these symptoms:   Chest discomfort. Most heart attacks involve discomfort in the center of the chest that lasts more than a few minutes, or that goes away and comes back. It can feel like uncomfortable pressure, squeezing, fullness, or pain.  Discomfort in other areas of the upper body. Symptoms can include pain or discomfort in one or both arms, the back, neck, jaw, or stomach.  Shortness of breath with or without chest discomfort.  Other signs may include breaking out in a cold sweat, nausea, or lightheadedness. Don't wait more than five minutes to call 911 - MINUTES MATTER! Fast action can save your life. Calling 911 is almost always the fastest way to get lifesaving treatment. Emergency Medical Services staff can begin treatment when they arrive -- up to an hour sooner than if someone gets to the hospital by car. The discharge information has been reviewed with the patient. The patient verbalized understanding. Discharge medications reviewed with the patient and appropriate educational materials and side effects teaching were provided.   ___________________________________________________________________________________________________________________________________

## 2017-12-04 NOTE — PROGRESS NOTES
Assessment completed. Pt sitting up in chair. C/o of generalized pain and requesting morphine. No s/s of distress. Able to carry on conversation without having to stop w/o any grimacing of SOB. RR even and unlabored. On 2.5 L via NC. On IV lasixs and aware of strict I&O's. AAO x 4. Aware to call for assistance when needed. Bed locked, in low position, call light in reach. Will monitor.

## 2017-12-04 NOTE — PROGRESS NOTES
Discharge instructions and prescriptions provided and explained to the pt. Med side effect sheet reviewed. Opportunity for questions provided. Pt leaving floor via wheelchair.

## 2017-12-04 NOTE — PROGRESS NOTES
RA sat fluctuating from 88%-91% while sitting in bed. Pt required 5 L while ambulating in hallway to maintain a sat >90%. Pt denies SOB and does not appear to be in any distress. Dr. Angela Irving made aware.

## 2017-12-04 NOTE — PROGRESS NOTES
PT note: PT checked with RN regarding PT treatment. RN relates ambulated with pt this am on 5 L/min and did well with activity. Per chart and RN, pt to be discharged later this afternoon to home. PT will check back later as time allows. Thank you.   Kesha Hodges, PT  12/4/2017

## 2017-12-04 NOTE — ADT AUTH CERT NOTES
LOC:Acute Adult-General Medical (11/29/2017) by Dino Chaidez RN        Review Entered Review Status       11/29/2017 In Primary       Details         REVIEW SUMMARY     Patient: Abhinav Espana  Review Number: [id number]  Review Status:  In Primary     Condition Specific: Yes     Condition Level Of Care Code: ACUTE  Condition Level Of Care Description: Acute        OUTCOMES  Outcome Type: Primary           REVIEW DETAILS     Service Date: 11/29/2017  Admit Date: 11/25/2017  Product: [facility] Adult  Subset: General Medical      (Symptom or finding within 24h)         (Excludes PO medications unless noted)          [X] Select Day, One:              [X] Episode Day 3-X, One:                  [X] ACUTE, >= One:                      [X] Respiratory, One:                          [X] Partial responder, not clinically stable for discharge and requires continued stay, >= One:                          ~--Admin, IQ Admin Admin on 11- 03:04 PM--~                          PULM                          Plan:  (Medical Decision Making)  [facility] Problems                      Date Reviewed: 2/24/2017                                      Sonia Edwards                                            Codes             Class              Noted              POA                                      Hx of cocaine abuse                 ICD-10-CM: Z87.898                          ICD-9-CM: 305.63                                             11/28/2017                  Yes                                      No history of HIV testing is noted in the chart- HIV screen negative- stop bactrim                                      * (Principal)Acute respiratory failure with hypoxia ([facility] [address]             ICD-10-CM: J96.01                          ICD-9-CM: 518.81                                             11/27/2017                  Yes                                      Significantly worse with profound hypoxemia. This is almost certainly related to her worsening bilateral infiltrates and likely due to CHF and pulmonary edema and has improved on CXR considerably with lasix                                      COPD exacerbation ([address]                   ICD-10-CM: J44.1                          ICD-9-CM: 491.21                                             11/25/2017                  Yes                                      On  IV steroid and bronchodilators                           Although a smoker, her current symptoms are more likely related to CHF and cardiac dysfunction rather than COPD- she will need PFT as OP to look for COPD                          PFT [facility] 10/[facility] at [facility]:                              No Obstructive ventilatory defect, no BD response                          No indication of restrictive physiology via spirometry                          However, No formal lung volumes for evaluation                          Mild Diffusion Impairement                                               ~--Admin, IQ Admin Admin on 11- 03:04 PM--~                          CXR: Improving consolidation                                  ~--Admin, IQ Admin Admin on 11- 03:03 PM--~                          Labs: ABG: ph 7.48, po2 60, bicarb 29, 50% FIO2                                  ~--Admin, IQ Admin Admin on 11- 03:02 PM--~                          VS: , RR 28, 97%, 112/63                                          [X] T > 99.4°F(37.4°C) PO and new onset, >= One:                                  [X] Culture pending <= 2d                                  ~--Admin, IQ Admin Admin on 11- 03:07 PM--~                                  cult pending                 Version: InterQual® 2016. 3  InterQual® and [employer]  © [employer] and/or one of its [place]. All Rights Reserved. CPT only © 2015 American Medical Association. All Rights Reserved.

## 2017-12-04 NOTE — PROGRESS NOTES
[facility]  [facility] Date: 11[facility]/2017             Daily Progress Note: 12/4/2017    The patient's chart is reviewed and the patient is discussed with the staff.    52 y.o. female presents with complaints of a frequent productive cough and increasing SOB. Reported issues started with increased LE about a month ago. CXR with B infiltrates initially suspected infection vs edema. Placed on bactrim to cover PJP. HIV negative and bactrim off with no immunosuppression. CXR cleared with lasix and overall more c/w pulmonary edema. H/o cocaine + UDS. This time just opioids. BNP mildly elevated, troponin negative. H/o Takosubo. Subjective:     Sitting up in bed, complaints of shortness of breath with activity. Occasional cough, nonproductive. Very talkative. Very active in the bed and moving without complaints. Taking Morphine IV for pain.     Current Facility-Administered Medications   Medication Dose Route Frequency    furosemide (LASIX) injection 40 mg  40 mg IntraVENous BID    estradiol (ESTRACE) tablet 2 mg  2 mg Oral DAILY    albuterol (PROVENTIL VENTOLIN) nebulizer solution 2.5 mg  2.5 mg Nebulization Q4H PRN    lidocaine (XYLOCAINE) 4 % (40 mg/mL) topical solution   Aerosolization PRN    clonazePAM (KlonoPIN) tablet 0.5 mg  0.5 mg Oral BID PRN    HYDROcodone-homatropine (HYCODAN) 5-1.5 mg/5 mL (5 mL) syrup 5 mL  5 mL Oral Q8H PRN    morphine injection 1 mg  1 mg IntraVENous Q4H PRN    guaiFENesin (ROBITUSSIN) 100 mg/5 mL oral liquid 100 mg  100 mg Oral Q4H PRN    FLUoxetine (PROzac) capsule 10 mg  10 mg Oral DAILY    pantoprazole (PROTONIX) tablet 40 mg  40 mg Oral ACB    ondansetron (ZOFRAN) injection 4 mg  4 mg IntraVENous Q6H PRN    guaiFENesin ER (MUCINEX) tablet 1,200 mg  1,200 mg Oral Q12H    sodium chloride (NS) flush 5-10 mL  5-10 mL IntraVENous Q8H    sodium chloride (NS) flush 5-10 mL  5-10 mL IntraVENous PRN    acetaminophen (TYLENOL) tablet 650 mg 650 mg Oral Q4H PRN    nicotine (NICODERM CQ) 21 mg/24 hr patch 1 Patch  1 Patch TransDERmal Q24H    enoxaparin (LOVENOX) injection 40 mg  40 mg SubCUTAneous Q24H    amitriptyline (ELAVIL) tablet 100 mg  100 mg Oral QHS    gabapentin (NEURONTIN) capsule 400 mg  400 mg Oral BID       Review of Systems  Constitutional: negative for fever, chills, sweats  Cardiovascular: negative for chest pain, palpitations, syncope, edema  Gastrointestinal:  negative for dysphagia, reflux, vomiting, diarrhea, abdominal pain, or melena  Neurologic:  negative for focal weakness, numbness, headache    Objective:     Vitals:    12/03/17 0833 12/03/17 1140 12/03/17 1459 12/03/17 2024   BP:  104/67 117/66 96/67   Pulse:  (!) 113 (!) 118 (!) 110   Resp:  17 18 18   Temp:  97.8 °F (36.6 °C) 98.8 °F (37.1 °C) 98.3 °F (36.8 °C)   SpO2: 93% 99% 97% 96%   Weight:       Height:         Intake and Output:   12/02 1901 - 12/04 0700  In: 0 [P.O.:358]  Out: 900 [Urine:900]       Physical Exam:   Constitution:  the patient is well developed and in no acute distress, NC 5L, sat 96%  EENMT:  Sclera clear, pupils equal, oral mucosa moist  Respiratory: rare crackles, no wheezing  Cardiovascular:  RRR without M,G,R  Gastrointestinal: soft and non-tender; with positive bowel sounds. Musculoskeletal: warm without cyanosis. There is no lower leg edema. Skin:  no jaundice or rashes, no wounds   Neurologic: no gross neuro deficits     Psychiatric:  alert and oriented x 3    CXR: None today    CXR 12/2/17:          LAB  No results for input(s): GLUCPOC in the last 72 hours.     No lab exists for component: [place]   Recent Labs      12/03/17   0423   WBC  11.8*   HGB  11.2*   HCT  35.2*   PLT  511*     Recent Labs      12/04/17   0517  12/03/17   0423  12/02/17   0820   NA  137  137  139   K  4.6  4.3  4.0   CL  99  97*  101   CO2  28  29  26   GLU  89  98  107*   BUN  23  22  21   CREA  0.75  0.79  0.61   CA  9.1  9.5  8.8     No results for input(s): PH, PCO2, PO2, HCO3 in the last 72 hours. No results for input(s): LCAD, LAC in the last 72 hours. Assessment:  (Medical Decision Making)     Hospital Problems  Date Reviewed: 12/4/2017          Codes Class Noted POA    [facility] ([facility] ICD-10-CM: J81.0  ICD-9-CM: 518.4  11/30/2017 Unknown    IV Lasix    Hx of cocaine abuse ICD-10-CM: Z87.898  ICD-9-CM: 305.63  11/28/2017 Yes    chronic    * (Principal)Acute respiratory failure with hypoxia ([facility] ICD-10-CM: J96.01  ICD-9-CM: 518.81  11/27/2017 Yes    On NC 5L    Esophageal reflux (Chronic) ICD-10-CM: K21.9  ICD-9-CM: 530.81  11/25/2017 Yes    chronic    Rheumatic disease of mitral valve (Chronic) ICD-10-CM: I05.9  ICD-9-CM: 394.9  11/25/2017 Yes    chronic    Rheumatic aortic insufficiency (Chronic) ICD-10-CM: I06.1  ICD-9-CM: 395.1  11/25/2017 Yes    chronic    Cough (Chronic) ICD-10-CM: R05  ICD-9-CM: 786.2  11/25/2017 Yes    nonproductive    Takotsubo cardiomyopathy (Chronic) ICD-10-CM: I51.81  ICD-9-CM: 429.83  11/25/2017 Yes    Recent ECHO with improved LV    Bilateral pulmonary infiltrates on CXR ICD-10-CM: R91.8  ICD-9-CM: 793.19  11/25/2017 Yes    Felt volume overload    Nicotine dependence (Chronic) ICD-10-CM: G92.074  ICD-9-CM: 305.1  11/25/2017 Yes    chronic          Plan:  (Medical Decision Making)     --Lasix 40mg IV BID--urine output 900ml recorded. Stressed limiting PO fluids. --Consider weaning to PO Laxix  --Wean off IV Morphine to [place] for left rib pain. --Wean O2 as tolerated--was not on prior to admission    More than 50% of the time documented was spent in face-to-face contact with the patient and in the care of the patient on the floor/unit where the patient is located. Lazaro Chaudhari NP  Lungs:  Basilar crackles  Heart:  RRR with no Murmur/Rubs/Gallops    Additional Comments:  Wean O2, hopefully home soon    I have spoken with and examined the patient. I agree with the above assessment and plan as documented.     Glorious Cast Gina Mcmillan MD

## 2017-12-04 NOTE — PROGRESS NOTES
Oxygen Qualifier       Room air: SpO2 with O2 and liter flow   Resting SpO2  90%     Ambulating SpO2  84%  85% on 1lnc   87% on 2lnc   91% on 3lnc       Completed by:    Pierce Montanez RT

## 2017-12-05 NOTE — ADT AUTH CERT NOTES
LOC:Acute Adult-General Medical (12/3/2017) by No Antonio        Review Entered Review Status       12/4/2017 In Primary       Details         REVIEW SUMMARY     Patient: Rochelle Poole  Review Number: [id number]  Review Status: In Primary     Condition Specific: Yes     Condition Level Of Care Code: ACUTE  Condition Level Of Care Description: Acute        OUTCOMES  Outcome Type: Primary           REVIEW DETAILS     Service Date: 12/03/2017  Admit Date: 11/25/2017  Product: [facility] Adult  Subset: General Medical      (Symptom or finding within 24h)         (Excludes PO medications unless noted)          [X] Select Day, One:              [X] Episode Day 3-X, One:                  [X] ACUTE, >= One:                      [X] Respiratory, One:                          [X] Partial responder, not clinically stable for discharge and requires continued stay, >= One:                              [X] O2 sat <= 91%(0.91) and < baseline requiring supplemental oxygen                              ~--Admin, IQ Admin Admin on 12- 02:05 PM--~                              Daily Progress Note: 12/3/2017                              Now on 5lpm of O2. Tearful and wants more medicine for pain. Says she hasn't done cocaine in a year. Wants a bronchoscopy because she has a cough. EXAM: Respiratory: CTA B, no w/r/r. Musculoskeletal: warm without cyanosis. There is trace B lower leg edema. T 97.8, /67, P 113-118, R 18, 02 SAT 93% 5L HI FLOW NC                                  WBC 11.8, RBC 3.72, HGB 11.2, HCT 35.2, , CL 97,                                       Plan:  (Medical Decision Making)                              -weaned back to NC.                               -lasix 40mg bid. F/u BMP tomorrow                              -follow strict I/o. Patient with multiple drink bottles at bedside. Try to limit to 1000-1500cc intake a day.                                -off abx.                                   RT--RESPIRATORY THERAPY MISCELLANEOUS Vibralung w/ neb tx Routine TID RT,  LOVENOX SC Q 2 4HRS, LASIX 40 MG IV BID, MORPHINE IV Q 4 HRS PRN X 5 (10X/24 HRS), ZOFRAN IV Q 4 HRS PRN X 2 (5X/24 HRS),                      Version: [employer]® 2016. 3  InterQual® and [employer]  © The [facility] and/or one of its [place]. All Rights Reserved. CPT only © 2015 American Medical Association. All Rights Reserved.                  LOC:Acute Adult-General Medical (12/2/2017) by Gregorio Varma        Review Entered Review Status       12/4/2017 In Primary       Details         REVIEW SUMMARY     Patient: Hiral Villar  Review Number: [id number]  Review Status: In Primary     Condition Specific: Yes     Condition Level Of Care Code: ACUTE  Condition Level Of Care Description: Acute        OUTCOMES  Outcome Type: Primary           REVIEW DETAILS     Service Date: 12/02/2017  Admit Date: 11/25/2017  Product: Davy Ivans Adult  Subset: General Medical      (Symptom or finding within 24h)         (Excludes PO medications unless noted)          [X] Select Day, One:              [X] Episode Day 3-X, One:                  [X] ACUTE, >= One:                      [X] Respiratory, One:                          [X] Partial responder, not clinically stable for discharge and requires continued stay, >= One:                              [X] O2 sat <= 91%(0.91) and < baseline requiring supplemental oxygen                              ~--Admin, IQ Admin Admin on 12- 01:25 PM--~                              Daily Progress Note: 12/2/2017                                  Patient was to transfer out yesterday but had to be stopped due to worsening hypoxia, up to 10L. Repeat CXR showing some worsening pulmonary edema. Patient back down to 4L O2 today.   T 97, /68, P 121, R 24-48, 02 SAT down to 90%                                  GLUC 107 CXR: Pulmonary edema worse in the interval.                                      Plan:  (Medical Decision Making)                              -weaned back to NC.                               -increase lasix to 40mg IV daily, additional 20mg now. -follow strict I/o. Patient with multiple drink bottles at bedside. Try to limit to 1000-1500cc intake a day. -off abx.                               -off steroids. No signs of COPD on [facility] PFT's.                               -transfer to floor.                                       LOVENBOX SC Q 24 HRS, MORPHINE  IV Q 4 HRS PRN X5, ZOFRAN IV Q 6 HRS PRN X3, LASIX 20 MG IV X 2                     Version: InterQual® 2016. 3  InterQual® and [employer]  © The [employer] and/or one of its [place]. All Rights Reserved. CPT only © 2015 American Medical Association. All Rights Reserved.                  LOC:Acute Adult-General Medical (12/1/2017) by Vivian Rodriguez        Review Entered Review Status       12/4/2017 In Primary       Details         REVIEW SUMMARY     Patient: Roxy Cruz  Review Number: [id number]  Review Status:  In Primary     Condition Specific: Yes     Condition Level Of Care Code: ACUTE  Condition Level Of Care Description: Acute        OUTCOMES  Outcome Type: Primary           REVIEW DETAILS     Service Date: 12/01/2017  Admit Date: 11/25/2017  Product: [employer] Adult  Subset: General Medical      (Symptom or finding within 24h)         (Excludes PO medications unless noted)          [X] Select Day, One:              [X] Episode Day 3-X, One:                  [X] ACUTE, >= One:                      [X] Respiratory, One:                          [X] Partial responder, not clinically stable for discharge and requires continued stay, >= One:                              [X] O2 sat <= 91%(0.91) and < baseline requiring supplemental oxygen ~--Admin,  [place] Than[place] on 12- 01:19 PM--~                               Daily Progress Note: 12/1/2017                              Patient down to 4L O2 today. Continues to have some cough and chest discomfort. No new issues today. EXAM:  Musculoskeletal: warm without cyanosis. There is trace B lower leg edema. T 98.3, /75, P 106, R 43, 02 SAT 97% 4L NC                              GLUC 107, BUN 25,                                   Assessment:                              ACUTE RESP FAILURE W/HYPOXIA, ACUTE  PULM EDEMA, BILAT PULM INFILTRATES ON CXR, HX COCAINE ABUSE, RHEUMAIC AORTIC INSUFFICIENCY, ESOPHAGEAL REFLUX, RHEUMATIC DISEASE MITRAL VALVE                              Plan:  (Medical Decision Making)                              -weaned to NC today                              -will continue scheduled lasix, 20mg IV daily and monitor I/O and daily BMP's. -off abx.                               -off steroids. No signs of COPD on [facility] PFT's.                               -transfer to floor.                          Version: [employer] 2016. 3  [employer] Revenue® and [employer]  © The [employer] and/or one of its [place]. All Rights Reserved. CPT only © 2015 American Medical Association. All Rights Reserved.

## 2018-01-12 PROBLEM — F33.9 RECURRENT DEPRESSION (HCC): Status: ACTIVE | Noted: 2018-01-12

## 2018-01-15 ENCOUNTER — HOSPITAL ENCOUNTER (OUTPATIENT)
Dept: SURGERY | Age: 53
Discharge: HOME | End: 2018-01-15
Payer: COMMERCIAL

## 2018-01-15 VITALS
TEMPERATURE: 97.4 F | HEART RATE: 100 BPM | SYSTOLIC BLOOD PRESSURE: 113 MMHG | RESPIRATION RATE: 16 BRPM | BODY MASS INDEX: 26.73 KG/M2 | OXYGEN SATURATION: 99 % | WEIGHT: 156.56 LBS | HEIGHT: 64 IN | DIASTOLIC BLOOD PRESSURE: 69 MMHG

## 2018-01-15 LAB
ANION GAP SERPL CALC-SCNC: 7 MMOL/L (ref 7–16)
BUN SERPL-MCNC: 15 MG/DL (ref 6–23)
CALCIUM SERPL-MCNC: 9 MG/DL (ref 8.3–10.4)
CHLORIDE SERPL-SCNC: 106 MMOL/L (ref 98–107)
CO2 SERPL-SCNC: 30 MMOL/L (ref 21–32)
CREAT SERPL-MCNC: 1.04 MG/DL (ref 0.6–1)
ERYTHROCYTE [DISTWIDTH] IN BLOOD BY AUTOMATED COUNT: 15.1 % (ref 11.9–14.6)
GLUCOSE SERPL-MCNC: 97 MG/DL (ref 65–100)
HCT VFR BLD AUTO: 35.4 % (ref 35.8–46.3)
HGB BLD-MCNC: 11 G/DL (ref 11.7–15.4)
MCH RBC QN AUTO: 29.7 PG (ref 26.1–32.9)
MCHC RBC AUTO-ENTMCNC: 31.1 G/DL (ref 31.4–35)
MCV RBC AUTO: 95.7 FL (ref 79.6–97.8)
PLATELET # BLD AUTO: 386 K/UL (ref 150–450)
PMV BLD AUTO: 9.3 FL (ref 10.8–14.1)
POTASSIUM SERPL-SCNC: 3.6 MMOL/L (ref 3.5–5.1)
RBC # BLD AUTO: 3.7 M/UL (ref 4.05–5.25)
SODIUM SERPL-SCNC: 143 MMOL/L (ref 136–145)
WBC # BLD AUTO: 4.8 K/UL (ref 4.3–11.1)

## 2018-01-15 PROCEDURE — 85027 COMPLETE CBC AUTOMATED: CPT | Performed by: UROLOGY

## 2018-01-15 PROCEDURE — 80048 BASIC METABOLIC PNL TOTAL CA: CPT | Performed by: UROLOGY

## 2018-01-15 RX ORDER — FLUOXETINE HYDROCHLORIDE 20 MG/1
20 CAPSULE ORAL DAILY
COMMUNITY
End: 2018-03-23 | Stop reason: SDUPTHER

## 2018-01-15 NOTE — PERIOP NOTES
Patient verified name, , and surgery as listed in [facility]. Patient provided medical/health information and PTA medications to the best of their ability. Pt remained on her phone during the entire assessment. TYPE  CASE:1b  Orders per surgeon: Received per telephone order for labs. Need written orders   Labs per surgeon:cbc, bmp. Results: pending  Labs per anesthesia protocol: none  EKG  :  None per grid. Most recent ekg and echo to desk for review per anesthesia and approved per Dr. Malathi Cool. Patient provided with and instructed on education handouts including Guide to Surgery, blood transfusions, pain management, and hand hygiene for the family and community, and [facility] brochure. Soraida mist soap and instructions given per hospital policy. Instructed patient to continue previous medications as prescribed prior to surgery unless otherwise directed and to take the following medications the day of surgery according to anesthesia guidelines : albuterol nebulizer, symbicort, klonopin, estradiol, prozac, gabapentin, norco if needed, procardia, protonix. Use spiriva  . Instructed patient to hold  the following medications: eliquis as of 18 with clearance to chart. Original medication prescription bottles not visualized during patient appointment. Patient teach back successful and patient demonstrates knowledge of instruction.

## 2018-01-15 NOTE — PERIOP NOTES
Recent Results (from the past 12 hour(s))   CBC W/O DIFF    Collection Time: 01/15/18 11:52 AM   Result Value Ref Range    WBC 4.8 4.3 - 11.1 K/uL    RBC 3.70 (L) 4.05 - 5.25 M/uL    HGB 11.0 (L) 11.7 - 15.4 g/dL    HCT 35.4 (L) 35.8 - 46.3 %    MCV 95.7 79.6 - 97.8 FL    MCH 29.7 26.1 - 32.9 PG    MCHC 31.1 (L) 31.4 - 35.0 g/dL    RDW 15.1 (H) 11.9 - 14.6 %    PLATELET 391 521 - 557 K/uL    MPV 9.3 (L) 10.8 - 58.4 FL   METABOLIC PANEL, BASIC    Collection Time: 01/15/18 11:52 AM   Result Value Ref Range    Sodium 143 136 - 145 mmol/L    Potassium 3.6 3.5 - 5.1 mmol/L    Chloride 106 98 - 107 mmol/L    CO2 30 21 - 32 mmol/L    Anion gap 7 7 - 16 mmol/L    Glucose 97 65 - 100 mg/dL    BUN 15 6 - 23 MG/DL    Creatinine 1.04 (H) 0.6 - 1.0 MG/DL    GFR est AA >60 >60 ml/min/1.73m2    GFR est non-AA 60 (L) >60 ml/min/1.73m2    Calcium 9.0 8.3 - 10.4 MG/DL

## 2018-01-17 ENCOUNTER — ANESTHESIA EVENT (OUTPATIENT)
Dept: SURGERY | Age: 53
End: 2018-01-17
Payer: COMMERCIAL

## 2018-01-18 ENCOUNTER — HOSPITAL ENCOUNTER (OUTPATIENT)
Age: 53
Setting detail: OUTPATIENT SURGERY
Discharge: HOME | End: 2018-01-18
Attending: UROLOGY | Admitting: UROLOGY
Payer: COMMERCIAL

## 2018-01-18 ENCOUNTER — ANESTHESIA (OUTPATIENT)
Dept: SURGERY | Age: 53
End: 2018-01-18
Payer: COMMERCIAL

## 2018-01-18 VITALS
SYSTOLIC BLOOD PRESSURE: 127 MMHG | DIASTOLIC BLOOD PRESSURE: 75 MMHG | RESPIRATION RATE: 15 BRPM | WEIGHT: 156.56 LBS | HEIGHT: 64 IN | BODY MASS INDEX: 26.73 KG/M2 | TEMPERATURE: 98.5 F | HEART RATE: 91 BPM | OXYGEN SATURATION: 92 %

## 2018-01-18 DIAGNOSIS — N30.10 INTERSTITIAL CYSTITIS: Primary | ICD-10-CM

## 2018-01-18 LAB — POTASSIUM BLD-SCNC: 4 MMOL/L (ref 3.5–5.1)

## 2018-01-18 PROCEDURE — 76060000032 HC ANESTHESIA 0.5 TO 1 HR: Performed by: UROLOGY

## 2018-01-18 PROCEDURE — 77030019927 HC TBNG IRR CYSTO BAXT -A: Performed by: UROLOGY

## 2018-01-18 PROCEDURE — 74011250636 HC RX REV CODE- 250/636: Performed by: ANESTHESIOLOGY

## 2018-01-18 PROCEDURE — 74011250636 HC RX REV CODE- 250/636

## 2018-01-18 PROCEDURE — 77030032490 HC SLV COMPR SCD KNE COVD -B: Performed by: UROLOGY

## 2018-01-18 PROCEDURE — 76010000138 HC OR TIME 0.5 TO 1 HR: Performed by: UROLOGY

## 2018-01-18 PROCEDURE — 84132 ASSAY OF SERUM POTASSIUM: CPT

## 2018-01-18 PROCEDURE — 77030008477 HC STYL SATN SLP COVD -A: Performed by: ANESTHESIOLOGY

## 2018-01-18 PROCEDURE — 77030018832 HC SOL IRR H20 ICUM -A: Performed by: UROLOGY

## 2018-01-18 PROCEDURE — 74011000250 HC RX REV CODE- 250

## 2018-01-18 PROCEDURE — 74011000250 HC RX REV CODE- 250: Performed by: UROLOGY

## 2018-01-18 PROCEDURE — 77030008703 HC TU ET UNCUF COVD -A: Performed by: ANESTHESIOLOGY

## 2018-01-18 PROCEDURE — 74011250637 HC RX REV CODE- 250/637: Performed by: UROLOGY

## 2018-01-18 PROCEDURE — 76210000006 HC OR PH I REC 0.5 TO 1 HR: Performed by: UROLOGY

## 2018-01-18 PROCEDURE — 74011250637 HC RX REV CODE- 250/637: Performed by: ANESTHESIOLOGY

## 2018-01-18 PROCEDURE — 76210000020 HC REC RM PH II FIRST 0.5 HR: Performed by: UROLOGY

## 2018-01-18 PROCEDURE — 74011250636 HC RX REV CODE- 250/636: Performed by: UROLOGY

## 2018-01-18 RX ORDER — FENTANYL CITRATE 50 UG/ML
100 INJECTION, SOLUTION INTRAMUSCULAR; INTRAVENOUS ONCE
Status: DISCONTINUED | OUTPATIENT
Start: 2018-01-18 | End: 2018-01-18 | Stop reason: HOSPADM

## 2018-01-18 RX ORDER — ROCURONIUM BROMIDE 10 MG/ML
INJECTION, SOLUTION INTRAVENOUS AS NEEDED
Status: DISCONTINUED | OUTPATIENT
Start: 2018-01-18 | End: 2018-01-18 | Stop reason: HOSPADM

## 2018-01-18 RX ORDER — PROPOFOL 10 MG/ML
INJECTION, EMULSION INTRAVENOUS AS NEEDED
Status: DISCONTINUED | OUTPATIENT
Start: 2018-01-18 | End: 2018-01-18 | Stop reason: HOSPADM

## 2018-01-18 RX ORDER — OXYCODONE AND ACETAMINOPHEN 5; 325 MG/1; MG/1
1-2 TABLET ORAL
Qty: 20 TAB | Refills: 0 | Status: SHIPPED | OUTPATIENT
Start: 2018-01-18 | End: 2018-03-03

## 2018-01-18 RX ORDER — BUPIVACAINE HYDROCHLORIDE 5 MG/ML
INJECTION, SOLUTION EPIDURAL; INTRACAUDAL AS NEEDED
Status: DISCONTINUED | OUTPATIENT
Start: 2018-01-18 | End: 2018-01-18 | Stop reason: HOSPADM

## 2018-01-18 RX ORDER — SODIUM CHLORIDE 0.9 % (FLUSH) 0.9 %
5-10 SYRINGE (ML) INJECTION AS NEEDED
Status: DISCONTINUED | OUTPATIENT
Start: 2018-01-18 | End: 2018-01-18 | Stop reason: HOSPADM

## 2018-01-18 RX ORDER — FENTANYL CITRATE 50 UG/ML
INJECTION, SOLUTION INTRAMUSCULAR; INTRAVENOUS AS NEEDED
Status: DISCONTINUED | OUTPATIENT
Start: 2018-01-18 | End: 2018-01-18 | Stop reason: HOSPADM

## 2018-01-18 RX ORDER — CEFAZOLIN SODIUM/WATER 2 G/20 ML
2 SYRINGE (ML) INTRAVENOUS
Status: COMPLETED | OUTPATIENT
Start: 2018-01-18 | End: 2018-01-18

## 2018-01-18 RX ORDER — SODIUM CHLORIDE, SODIUM LACTATE, POTASSIUM CHLORIDE, CALCIUM CHLORIDE 600; 310; 30; 20 MG/100ML; MG/100ML; MG/100ML; MG/100ML
100 INJECTION, SOLUTION INTRAVENOUS CONTINUOUS
Status: DISCONTINUED | OUTPATIENT
Start: 2018-01-18 | End: 2018-01-18 | Stop reason: HOSPADM

## 2018-01-18 RX ORDER — OXYCODONE HYDROCHLORIDE 5 MG/1
10 TABLET ORAL
Status: DISCONTINUED | OUTPATIENT
Start: 2018-01-18 | End: 2018-01-18 | Stop reason: HOSPADM

## 2018-01-18 RX ORDER — SODIUM CHLORIDE 0.9 % (FLUSH) 0.9 %
5-10 SYRINGE (ML) INJECTION EVERY 8 HOURS
Status: DISCONTINUED | OUTPATIENT
Start: 2018-01-18 | End: 2018-01-18 | Stop reason: HOSPADM

## 2018-01-18 RX ORDER — HYDROMORPHONE HYDROCHLORIDE 2 MG/ML
1 INJECTION, SOLUTION INTRAMUSCULAR; INTRAVENOUS; SUBCUTANEOUS ONCE
Status: COMPLETED | OUTPATIENT
Start: 2018-01-18 | End: 2018-01-18

## 2018-01-18 RX ORDER — ACETAMINOPHEN 500 MG
1000 TABLET ORAL ONCE
Status: DISCONTINUED | OUTPATIENT
Start: 2018-01-18 | End: 2018-01-18 | Stop reason: HOSPADM

## 2018-01-18 RX ORDER — MIDAZOLAM HYDROCHLORIDE 1 MG/ML
2 INJECTION, SOLUTION INTRAMUSCULAR; INTRAVENOUS ONCE
Status: DISCONTINUED | OUTPATIENT
Start: 2018-01-18 | End: 2018-01-18 | Stop reason: HOSPADM

## 2018-01-18 RX ORDER — ATROPA BELLADONNA AND OPIUM 16.2; 6 MG/1; MG/1
SUPPOSITORY RECTAL AS NEEDED
Status: DISCONTINUED | OUTPATIENT
Start: 2018-01-18 | End: 2018-01-18 | Stop reason: HOSPADM

## 2018-01-18 RX ORDER — SUCCINYLCHOLINE CHLORIDE 20 MG/ML
INJECTION INTRAMUSCULAR; INTRAVENOUS AS NEEDED
Status: DISCONTINUED | OUTPATIENT
Start: 2018-01-18 | End: 2018-01-18 | Stop reason: HOSPADM

## 2018-01-18 RX ORDER — ONDANSETRON 2 MG/ML
INJECTION INTRAMUSCULAR; INTRAVENOUS AS NEEDED
Status: DISCONTINUED | OUTPATIENT
Start: 2018-01-18 | End: 2018-01-18 | Stop reason: HOSPADM

## 2018-01-18 RX ORDER — LIDOCAINE HYDROCHLORIDE 10 MG/ML
0.1 INJECTION INFILTRATION; PERINEURAL AS NEEDED
Status: DISCONTINUED | OUTPATIENT
Start: 2018-01-18 | End: 2018-01-18 | Stop reason: HOSPADM

## 2018-01-18 RX ORDER — FLUMAZENIL 0.1 MG/ML
0.2 INJECTION INTRAVENOUS
Status: DISCONTINUED | OUTPATIENT
Start: 2018-01-18 | End: 2018-01-18 | Stop reason: HOSPADM

## 2018-01-18 RX ORDER — HYDROMORPHONE HYDROCHLORIDE 2 MG/ML
0.5 INJECTION, SOLUTION INTRAMUSCULAR; INTRAVENOUS; SUBCUTANEOUS
Status: DISCONTINUED | OUTPATIENT
Start: 2018-01-18 | End: 2018-01-18 | Stop reason: HOSPADM

## 2018-01-18 RX ORDER — DIPHENHYDRAMINE HYDROCHLORIDE 50 MG/ML
12.5 INJECTION, SOLUTION INTRAMUSCULAR; INTRAVENOUS
Status: DISCONTINUED | OUTPATIENT
Start: 2018-01-18 | End: 2018-01-18 | Stop reason: HOSPADM

## 2018-01-18 RX ORDER — OXYCODONE HYDROCHLORIDE 5 MG/1
5 TABLET ORAL
Status: DISCONTINUED | OUTPATIENT
Start: 2018-01-18 | End: 2018-01-18 | Stop reason: HOSPADM

## 2018-01-18 RX ORDER — NALOXONE HYDROCHLORIDE 0.4 MG/ML
0.2 INJECTION, SOLUTION INTRAMUSCULAR; INTRAVENOUS; SUBCUTANEOUS AS NEEDED
Status: DISCONTINUED | OUTPATIENT
Start: 2018-01-18 | End: 2018-01-18 | Stop reason: HOSPADM

## 2018-01-18 RX ORDER — LIDOCAINE HYDROCHLORIDE 20 MG/ML
INJECTION, SOLUTION EPIDURAL; INFILTRATION; INTRACAUDAL; PERINEURAL AS NEEDED
Status: DISCONTINUED | OUTPATIENT
Start: 2018-01-18 | End: 2018-01-18 | Stop reason: HOSPADM

## 2018-01-18 RX ORDER — MIDAZOLAM HYDROCHLORIDE 1 MG/ML
2 INJECTION, SOLUTION INTRAMUSCULAR; INTRAVENOUS
Status: DISCONTINUED | OUTPATIENT
Start: 2018-01-18 | End: 2018-01-18 | Stop reason: HOSPADM

## 2018-01-18 RX ADMIN — SODIUM CHLORIDE, SODIUM LACTATE, POTASSIUM CHLORIDE, AND CALCIUM CHLORIDE 100 ML/HR: 600; 310; 30; 20 INJECTION, SOLUTION INTRAVENOUS at 09:57

## 2018-01-18 RX ADMIN — HYDROMORPHONE HYDROCHLORIDE 1 MG: 2 INJECTION, SOLUTION INTRAMUSCULAR; INTRAVENOUS; SUBCUTANEOUS at 10:22

## 2018-01-18 RX ADMIN — SUCCINYLCHOLINE CHLORIDE 100 MG: 20 INJECTION INTRAMUSCULAR; INTRAVENOUS at 11:34

## 2018-01-18 RX ADMIN — ONDANSETRON 4 MG: 2 INJECTION INTRAMUSCULAR; INTRAVENOUS at 11:59

## 2018-01-18 RX ADMIN — FENTANYL CITRATE 50 MCG: 50 INJECTION, SOLUTION INTRAMUSCULAR; INTRAVENOUS at 11:45

## 2018-01-18 RX ADMIN — SODIUM CHLORIDE, SODIUM LACTATE, POTASSIUM CHLORIDE, AND CALCIUM CHLORIDE: 600; 310; 30; 20 INJECTION, SOLUTION INTRAVENOUS at 11:29

## 2018-01-18 RX ADMIN — LIDOCAINE HYDROCHLORIDE 50 MG: 20 INJECTION, SOLUTION EPIDURAL; INFILTRATION; INTRACAUDAL; PERINEURAL at 11:34

## 2018-01-18 RX ADMIN — PROPOFOL 200 MG: 10 INJECTION, EMULSION INTRAVENOUS at 11:34

## 2018-01-18 RX ADMIN — Medication 2 G: at 11:43

## 2018-01-18 RX ADMIN — ROCURONIUM BROMIDE 5 MG: 10 INJECTION, SOLUTION INTRAVENOUS at 11:34

## 2018-01-18 RX ADMIN — OXYCODONE HYDROCHLORIDE 10 MG: 5 TABLET ORAL at 12:42

## 2018-01-18 RX ADMIN — HYDROMORPHONE HYDROCHLORIDE 0.5 MG: 2 INJECTION, SOLUTION INTRAMUSCULAR; INTRAVENOUS; SUBCUTANEOUS at 12:35

## 2018-01-18 RX ADMIN — HYDROMORPHONE HYDROCHLORIDE 0.5 MG: 2 INJECTION, SOLUTION INTRAMUSCULAR; INTRAVENOUS; SUBCUTANEOUS at 12:42

## 2018-01-18 RX ADMIN — GABAPENTIN 400 MG: 300 CAPSULE ORAL at 09:56

## 2018-01-18 NOTE — DISCHARGE INSTRUCTIONS
Bladder Augmentation: What to Expect at Home  Your Recovery  Bladder augmentation is surgery to make the bladder larger and improve its ability to stretch. After surgery, your bladder should be able to hold more urine. After surgery, you may feel weak and tired at first. You will probably feel some pain or cramping in your lower belly and need pain medicine for a week or two. Try to avoid heavy lifting and strenuous activities that might put extra pressure on your bladder while you recover. This care sheet gives you a general idea about how long it will take for you to recover. But each person recovers at a different pace. Follow the steps below to get better as quickly as possible. How can you care for yourself at home? Activity  1. Rest when you feel tired. Getting enough sleep will help you recover. 2. Try to walk each day. Start by walking a little more than you did the day before. Bit by bit, increase the amount you walk. Walking boosts blood flow and helps prevent pneumonia and constipation. 3. Avoid strenuous activities, such as bicycle riding, jogging, weight lifting, or aerobic exercise, for 6 to 8 weeks or until your doctor says it is okay. 4. For 6 to 8 weeks or until your doctor says it is okay, avoid lifting anything that would make you strain. This may include a child, heavy grocery bags and milk containers, a heavy briefcase or backpack, cat litter or dog food bags, or a vacuum . 5. Ask your doctor when you can drive again. 6. You will probably need to take 72 hours off from work. It depends on the type of work you do and how you feel. 7. You may shower as usual. Pat the cut (incision) dry. Do not take a bath until your doctor tells you it is okay. 8. Ask your doctor when it is okay for you to have sex. Diet  1. You can eat your normal diet. If your stomach is upset, try bland, low-fat foods like plain rice, broiled chicken, toast, and yogurt.   2. Drink plenty of fluids (unless your doctor tells you not to). 3. You may notice that your bowel movements are not regular right after your surgery. This is common. Try to avoid constipation and straining with bowel movements. You may want to take a fiber supplement every day. If you have not had a bowel movement after a couple of days, ask your doctor about taking a mild laxative. Medicines  1. Your doctor will tell you if and when you can restart your medicines. He or she will also give you instructions about taking any new medicines. 2. If you take blood thinners, such as warfarin (Coumadin), clopidogrel (Plavix), or aspirin, be sure to talk to your doctor. He or she will tell you if and when to start taking those medicines again. Make sure that you understand exactly what your doctor wants you to do. 3. Be safe with medicines. Take pain medicines exactly as directed. 1. If the doctor gave you a prescription medicine for pain, take it as prescribed. 2. If you are not taking a prescription pain medicine, ask your doctor if you can take an over-the-counter medicine. 4. If you think your pain medicine is making you sick to your stomach:  1. Take your medicine after meals (unless your doctor has told you not to). 2. Ask your doctor for a different pain medicine. 5. If your doctor prescribed antibiotics, take them as directed. Do not stop taking them just because you feel better. You need to take the full course of antibiotics. Other instructions  · If your doctor has told you to flush your bladder, follow his or her instructions on how to do this. Follow-up care is a key part of your treatment and safety. Be sure to make and go to all appointments, and call your doctor if you are having problems. It's also a good idea to know your test results and keep a list of the medicines you take. When should you call for help? Call 911 anytime you think you may need emergency care. For example, call if:  · You passed out (lost consciousness).   · You have severe trouble breathing. · You have sudden chest pain and shortness of breath, or you cough up blood. · You have severe pain in your belly. Call your doctor now or seek immediate medical care if:  · You have bright red vaginal bleeding that soaks one or more pads in an hour, or you have large clots. · You are sick to your stomach or cannot keep fluids down. · You have pain that does not get better after you take pain medicine. · You have loose stitches, or your incision comes open. · Your incision is bleeding. · You have vaginal discharge that has increased in amount or smells bad. · Your catheters come out. · Your catheters are not draining urine, even after you flush your bladder. · You have signs of infection, such as:  ¨ Increased pain, swelling, warmth, or redness. ¨ Red streaks leading from the incision. ¨ Pus draining from the incision. ¨ A fever. · You have clots of blood in your urine. · You have trouble passing urine or stool, especially if you have pain or swelling in your lower belly. · You have new or worse pain when you urinate. · You have pain in your back just below your rib cage. This is called flank pain. Watch closely for changes in your health, and be sure to contact your doctor if:  You do not have a bowel movement after taking a laxative. After general anesthesia or intravenous sedation, for 24 hours or while taking prescription Narcotics:  · Limit your activities  · Do not drive and operate hazardous machinery  · Do not make important personal or business decisions  · Do  not drink alcoholic beverages  · If you have not urinated within 8 hours after discharge, please contact your surgeon on call. *  Please give a list of your current medications to your Primary Care Provider. *  Please update this list whenever your medications are discontinued, doses are      changed, or new medications (including over-the-counter products) are added.   *  Please carry medication information at all times in case of emergency situations. These are general instructions for a healthy lifestyle:  No smoking/ No tobacco products/ Avoid exposure to second hand smoke  Surgeon General's Warning:  Quitting smoking now greatly reduces serious risk to your health. Obesity, smoking, and sedentary lifestyle greatly increases your risk for illness  A healthy diet, regular physical exercise & weight monitoring are important for maintaining a healthy lifestyle    You may be retaining fluid if you have a history of heart failure or if you experience any of the following symptoms:  Weight gain of 3 pounds or more overnight or 5 pounds in a week, increased swelling in our hands or feet or shortness of breath while lying flat in bed. Please call your doctor as soon as you notice any of these symptoms; do not wait until your next office visit. Recognize signs and symptoms of STROKE:    F-face looks uneven  A-arms unable to move or move unevenly  S-speech slurred or non-existent  T-time-call 911 as soon as signs and symptoms begin-DO NOT go       Back to bed or wait to see if you get better-TIME IS BRAIN.

## 2018-01-18 NOTE — OP NOTES
[address] REPORT    Cassidy Done  MR#: [id number]  : 1965  ACCOUNT #: [[id number]]   DATE OF SERVICE: 2018    PREOPERATIVE DIAGNOSIS:  Interstitial cystitis. POSTOPERATIVE DIAGNOSIS:  Interstitial cystitis. PROCEDURE:  Cystoscopy and hydrodistention of the bladder. SURGEON:   Mayur Rousseau., MD    FINDINGS:  Bladder capacity of 1000 mL, no glomerulations noted after hydrodistention. INDICATION FOR PROCEDURE:  The patient has history of pelvic pain and urinary symptoms with previous hydrodistention showing glomerulations, consistent with interstitial cystitis. She has worsening symptoms and brought in at this time for repeat hydrodistention. DESCRIPTION OF PROCEDURE:  The patient was given a general anesthetic, placed in the dorsal lithotomy position. A B and O suppository was placed in the rectum. She was prepped and draped in sterile fashion. I inserted a 25-Icelandic scope into the urethra using the video camera and 30-degree lens. The bladder mucosa appeared normal.  Both ureteral orifices were visualized and appeared to be in the normal position. The bladder was distended with the irrigation bag held 80 cm anterior to the level of the bladder. The bladder was distended for 8 minutes and then allowed to drain. I inspected the bladder after hydrodistention. The capacity was 1000 mL. There were no glomerulations noted. We then instilled Marcaine into the bladder. The patient tolerated this well. She will be discharged home. Return to the office in one month. ESTIMATED BLOOD LOSS:  Zero. ANESTHESIA:    General.    IMPLANTS:  None. COMPLICATIONS:  None. SPECIMENS REMOVED:  none.       Jose Jenkins MD       [facility] [place]  D: 2018 12:27     T: 2018 13:16  JOB #: [id number]

## 2018-01-18 NOTE — IP AVS SNAPSHOT
[address] [address] 
[phone] Patient: Dina Avalos MRN: [id number] :1965 About your hospitalization You were admitted on:  2018 You last received care in the:  [facility] PACU You were discharged on:  2018 Why you were hospitalized Your primary diagnosis was:  Not on File Follow-up Information Follow up With Details Comments Contact Info Rajat Gilliland MD Follow up on 2018 10:50 am [address] [address] 
[phone] Aniya Hodge MD   [address] [place] [address] 
[phone] Your Scheduled Appointments 2018 10:50 AM EST Office Visit with Rajat Gilliland MD  
[facility] 52 ([address]) [address] [address]  
[phone] Discharge Orders None A check sabas indicates which time of day the medication should be taken. My Medications START taking these medications Instructions Each Dose to Equal  
 Morning Noon Evening Bedtime * oxyCODONE-acetaminophen 5-325 mg per tablet Commonly known as:  PERCOCET Your last dose was: Your next dose is: Take 1-2 Tabs by mouth every four (4) hours as needed for Pain. Max Daily Amount: 12 Tabs. 1-2 Tab * oxyCODONE-acetaminophen 5-325 mg per tablet Commonly known as:  PERCOCET Your last dose was: Your next dose is: Take 1-2 Tabs by mouth every four (4) hours as needed for Pain. Max Daily Amount: 12 Tabs. 1-2 Tab * Notice: This list has 2 medication(s) that are the same as other medications prescribed for you. Read the directions carefully, and ask your doctor or other care provider to review them with you. CONTINUE taking these medications Instructions Each Dose to Equal  
 Morning Noon Evening Bedtime  
 albuterol 1.25 mg/3 mL Nebu Commonly known as:  Lestine Mindi Your last dose was: Your next dose is: 1.25 mg by Nebulization route three (3) times daily. Indications: use on the [place] 1.25 mg  
    
   
   
   
  
 amitriptyline 100 mg tablet Commonly known as:  ELAVIL Your last dose was: Your next dose is: Take 1 Tab by mouth nightly. 100 mg  
    
   
   
   
  
 ammonium lactate 12 % lotion Commonly known as:  LAC-HYDRIN Your last dose was: Your next dose is:    
   
   
 Apply  to affected area as needed. rub in to affected area well  
     
   
   
   
  
 apixaban 5 mg tablet Commonly known as:  Armand Grimes Your last dose was: Your next dose is: Take 1 Tab by mouth two (2) times a day. 5 mg  
    
   
   
   
  
 clonazePAM 0.5 mg tablet Commonly known as:  Marlon Radish Your last dose was: Your next dose is: Take 0.5 mg by mouth three (3) times daily. Indications: take on the dos  
 0.5 mg  
    
   
   
   
  
 cyclobenzaprine 10 mg tablet Commonly known as:  FLEXERIL Your last dose was: Your next dose is: Take  by mouth two (2) times daily as needed for Muscle Spasm(s). estradiol 2 mg tablet Commonly known as:  ESTRACE Your last dose was: Your next dose is: Take 2 mg by mouth daily. Indications: take on the [place] 2 mg  
    
   
   
   
  
 furosemide 40 mg tablet Commonly known as:  LASIX Your last dose was: Your next dose is: Take 1 Tab by mouth daily. 40 mg  
    
   
   
   
  
 gabapentin 400 mg capsule Commonly known as:  NEURONTIN Your last dose was: Your next dose is: Take 400 mg by mouth three (3) times daily. Indications: take on the [facility] 400 mg HYDROcodone-acetaminophen 7.5-325 mg per tablet Commonly known as:  Jade Draper Your last dose was: Your next dose is: Take 1 Tab by mouth every six (6) hours as needed. Max Daily Amount: 4 Tabs. 1 Tab Wtbyil-Jkdpe-F. Blue-Sal-NaPhos 120-0.12-10.8 mg Tab Commonly known as:  [employer] Your last dose was: Your next dose is: Take 1 Tab by mouth four (4) times daily as needed. Prn dysuria 1 Tab NIFEdipine ER 60 mg ER tablet Commonly known as:  PROCARDIA XL Your last dose was: Your next dose is: Take 1 Tab by mouth daily. 60 mg  
    
   
   
   
  
 nitroglycerin 0.4 mg SL tablet Commonly known as:  NITROSTAT Your last dose was: Your next dose is:    
   
   
 1 Tab by SubLINGual route every five (5) minutes as needed for Chest Pain. 0.4 mg  
    
   
   
   
  
 oxybutynin 5 mg tablet Commonly known as:  VUXGOFRT Your last dose was: Your next dose is: Take 5 mg by mouth two (2) times a day. Indications: take on the [facility] 5 mg Oxygen Your last dose was: Your next dose is:    
   
   
 nightly. Indications: 3l/min at hs and prn during the day  
     
   
   
   
  
 pravastatin 10 mg tablet Commonly known as:  PRAVACHOL Your last dose was: Your next dose is: Take 10 mg by mouth nightly. 10 mg  
    
   
   
   
  
 promethazine 25 mg tablet Commonly known as:  PHENERGAN Your last dose was: Your next dose is: Take 1 Tab by mouth every six (6) hours as needed. 25 mg PROTONIX PO Your last dose was: Your next dose is: Take 1 Cap by mouth daily. Indications: am, take on the dos.  does not know the dosage 1 Cap PROzac 20 mg capsule Generic drug:  FLUoxetine Your last dose was: Your next dose is: Take 20 mg by mouth daily. Indications: am- take on the [facility] 20 mg  
    
   
   
   
  
 SPIRIVA WITH HANDIHALER 18 mcg inhalation capsule Generic drug:  tiotropium Your last dose was: Your next dose is: Take 1 Cap by inhalation daily. Indications: BRONCHOSPASM PREVENTION WITH COPD, am- use on the [facility] 1 Cap SYMBICORT 160-4.5 mcg/actuation Hfaa Generic drug:  budesonide-formoterol Your last dose was: Your next dose is: Take 1 Puff by inhalation two (2) times a day. Indications: BRONCHOSPASM PREVENTION WITH COPD, use on the [facility] 1 Puff Where to Get Your Medications Information on where to get these meds will be given to you by the nurse or doctor. ! Ask your nurse or doctor about these medications  
  oxyCODONE-acetaminophen 5-325 mg per tablet  
 oxyCODONE-acetaminophen 5-325 mg per tablet Discharge Instructions Bladder Augmentation: What to Expect at [facility] Your Recovery Bladder augmentation is surgery to make the bladder larger and improve its ability to stretch. After surgery, your bladder should be able to hold more urine. After surgery, you may feel weak and tired at first. You will probably feel some pain or cramping in your lower belly and need pain medicine for a week or two. Try to avoid heavy lifting and strenuous activities that might put extra pressure on your bladder while you recover. This care sheet gives you a general idea about how long it will take for you to recover. But each person recovers at a different pace. Follow the steps below to get better as quickly as possible. How can you care for yourself at home? Activity 1. Rest when you feel tired. Getting enough sleep will help you recover. 2. Try to walk each day. Start by walking a little more than you did the day before. Bit by bit, increase the amount you walk. Walking boosts blood flow and helps prevent pneumonia and constipation. 3. Avoid strenuous activities, such as bicycle riding, jogging, weight lifting, or aerobic exercise, for 6 to 8 weeks or until your doctor says it is okay. 4. For 6 to 8 weeks or until your doctor says it is okay, avoid lifting anything that would make you strain. This may include a child, heavy grocery bags and milk containers, a heavy briefcase or backpack, cat litter or dog food bags, or a vacuum . 5. Ask your doctor when you can drive again. 6. You will probably need to take 72 hours off from work. It depends on the type of work you do and how you feel. 7. You may shower as usual. Pat the cut (incision) dry. Do not take a bath until your doctor tells you it is okay. 8. Ask your doctor when it is okay for you to have sex. Diet 1. You can eat your normal diet. If your stomach is upset, try bland, low-fat foods like plain rice, broiled chicken, toast, and yogurt. 2. Drink plenty of fluids (unless your doctor tells you not to). 3. You may notice that your bowel movements are not regular right after your surgery. This is common. Try to avoid constipation and straining with bowel movements. You may want to take a fiber supplement every day. If you have not had a bowel movement after a couple of days, ask your doctor about taking a mild laxative. Medicines 1. Your doctor will tell you if and when you can restart your medicines. He or she will also give you instructions about taking any new medicines. 2. If you take blood thinners, such as warfarin (Coumadin), clopidogrel (Plavix), or aspirin, be sure to talk to your doctor. He or she will tell you if and when to start taking those medicines again.  Make sure that you understand exactly what your doctor wants you to do. 3. Be safe with medicines. Take pain medicines exactly as directed. 1. If the doctor gave you a prescription medicine for pain, take it as prescribed. 2. If you are not taking a prescription pain medicine, ask your doctor if you can take an over-the-counter medicine. 4. If you think your pain medicine is making you sick to your stomach: 
1. Take your medicine after meals (unless your doctor has told you not to). 2. Ask your doctor for a different pain medicine. 5. If your doctor prescribed antibiotics, take them as directed. Do not stop taking them just because you feel better. You need to take the full course of antibiotics. Other instructions · If your doctor has told you to flush your bladder, follow his or her instructions on how to do this. Follow-up care is a key part of your treatment and safety. Be sure to make and go to all appointments, and call your doctor if you are having problems. It's also a good idea to know your test results and keep a list of the medicines you take. When should you call for help? Call 911 anytime you think you may need emergency care. For example, call if: 
· You passed out (lost consciousness). · You have severe trouble breathing. · You have sudden chest pain and shortness of breath, or you cough up blood. · You have severe pain in your belly. Call your doctor now or seek immediate medical care if: 
· You have bright red vaginal bleeding that soaks one or more pads in an hour, or you have large clots. · You are sick to your stomach or cannot keep fluids down. · You have pain that does not get better after you take pain medicine. · You have loose stitches, or your incision comes open. · Your incision is bleeding. · You have vaginal discharge that has increased in amount or smells bad. · Your catheters come out. · Your catheters are not draining urine, even after you flush your bladder. · You have signs of infection, such as: 
¨ Increased pain, swelling, warmth, or redness. ¨ Red streaks leading from the incision. ¨ Pus draining from the incision. ¨ A fever. · You have clots of blood in your urine. · You have trouble passing urine or stool, especially if you have pain or swelling in your lower belly. · You have new or worse pain when you urinate. · You have pain in your back just below your rib cage. This is called flank pain. Watch closely for changes in your health, and be sure to contact your doctor if: You do not have a bowel movement after taking a laxative. After general anesthesia or intravenous sedation, for 24 hours or while taking prescription Narcotics: · Limit your activities · Do not drive and operate hazardous machinery · Do not make important personal or business decisions · Do  not drink alcoholic beverages · If you have not urinated within 8 hours after discharge, please contact your surgeon on call. *  Please give a list of your current medications to your Primary Care Provider. *  Please update this list whenever your medications are discontinued, doses are 
    changed, or new medications (including over-the-counter products) are added. *  Please carry medication information at all times in case of emergency situations. These are general instructions for a healthy lifestyle: No smoking/ No tobacco products/ Avoid exposure to second hand smoke Surgeon General's Warning:  Quitting smoking now greatly reduces serious risk to your health. Obesity, smoking, and sedentary lifestyle greatly increases your risk for illness A healthy diet, regular physical exercise & weight monitoring are important for maintaining a healthy lifestyle You may be retaining fluid if you have a history of heart failure or if you experience any of the following symptoms:  Weight gain of 3 pounds or more overnight or 5 pounds in a week, increased swelling in our hands or feet or shortness of breath while lying flat in bed. Please call your doctor as soon as you notice any of these symptoms; do not wait until your next office visit. Recognize signs and symptoms of STROKE: 
 
F-face looks uneven A-arms unable to move or move unevenly S-speech slurred or non-existent T-time-call 911 as soon as signs and symptoms begin-DO NOT go Back to bed or wait to see if you get better-TIME IS BRAIN. Introducing [address]! Sherri Alexis introduces [employer] patient portal. Now you can access parts of your medical record, email your doctor's office, and request medication refills online. 1. In your internet browser, go to [email or web address][email or web address]/"[email or web address] 2. Click on the First Time User? Click Here link in the Sign In box. You will see the New Member Sign Up page. 3. Enter your [employer] Access Code exactly as it appears below. You will not need to use this code after youve completed the sign-up process. If you do not sign up before the expiration date, you must request a new code. · [employer] Access Code: [email or web address] Expires: 1/31/2018  7:39 AM 
 
4. Enter the last four digits of your Social Security Number (xxxx) and Date of Birth (mm/dd/yyyy) as indicated and click Submit. You will be taken to the next sign-up page. 5. Create a [employer] ID. This will be your [employer] login ID and cannot be changed, so think of one that is secure and easy to remember. 6. Create a [employer] password. You can change your password at any time. 7. Enter your Password Reset Question and Answer. This can be used at a later time if you forget your password. 8. Enter your e-mail address. You will receive e-mail notification when new information is available in [address] 9. Click Sign Up. You can now view and download portions of your medical record. 10. Click the Download Summary menu link to download a portable copy of your medical information. If you have questions, please visit the Frequently Asked Questions section of the [employer] website. Remember, [employer] is NOT to be used for urgent needs. For medical emergencies, dial 911. Now available from your iPhone and Android! Providers Seen During Your Hospitalization Provider Specialty Primary office phone Melodie Mcgill MD Urology [phone] Your Primary Care Physician (PCP) Primary Care Physician Office Phone Office Fax Janene Civil  You are allergic to the following Allergen Reactions Aspirin Other (comments) Inflames IBS per pt Bentyl (Dicyclomine) Itching Toradol (Ketorolac) Hives Ultram (Tramadol) Nausea and Vomiting Zofran (Ondansetron Hcl (Pf)) Nausea and Vomiting Recent Documentation Height Weight BMI OB Status Smoking Status 1.626 m 71 kg 26.87 kg/m2 Hysterectomy Current Every Day Smoker Emergency Contacts Name Discharge Info Relation Home Work Mobile Anastasia Chapa DISCHARGE CAREGIVER [3] Parent [1] [phone] Patient Belongings The following personal items are in your possession at time of discharge: 
  Dental Appliances: None         Home Medications: None   Jewelry: Necklace, Bracelet  Clothing: Footwear, Jacket/Coat, Socks, Shorts, Pants, Undergarments Please provide this summary of care documentation to your next provider. Signatures-by signing, you are acknowledging that this After Visit Summary has been reviewed with you and you have received a copy. Patient Signature:  ____________________________________________________________ Date:  ____________________________________________________________  
  
Power Todd Provider Signature:  ____________________________________________________________ Date:  ____________________________________________________________

## 2018-01-18 NOTE — BRIEF OP NOTE
BRIEF OPERATIVE NOTE    Date of Procedure: 1/18/2018   Preoperative Diagnosis: Mixed incontinence [N39.46]  Interstitial cystitis [N30.10]  Postoperative Diagnosis: Pelvic Pain    Procedure(s):  CYSTOSCOPY WITH HYDRODISTENTION  Surgeon(s) and Role:     * Sasha Rousseau MD - Primary         Assistant Staff:       Surgical Staff:  Circ-1: Nay Rivera RN  Circ-Relief: Radhika Gupta RN  Event Time In   Incision Start 1153   Incision Close 1206     Anesthesia: General   Estimated Blood Loss: none  Specimens: * No specimens in log *   Findings: see op note   Complications: none  Implants: * No implants in log *

## 2018-01-18 NOTE — ANESTHESIA PREPROCEDURE EVALUATION
Anesthetic History   No history of anesthetic complications            Review of Systems / Medical History  Patient summary reviewed, nursing notes reviewed and pertinent labs reviewed    Pulmonary    COPD (Chronic bronchitis): mild      Shortness of breath (Requiring 3L O2 with exertion) and smoker  Asthma : well controlled       Neuro/Psych         Psychiatric history     Cardiovascular    Hypertension  Valvular problems/murmurs: aortic insufficiency    CHF (Early last year developed Takotsubo CM - most recent ECHO (12/17) EF 45%)        Exercise tolerance: <4 METS     GI/Hepatic/Renal     GERD: poorly controlled           Endo/Other        Arthritis     Other Findings   Comments: Admission 12/17 with pulm edema secondary to voln overload         Physical Exam    Airway  Mallampati: II  TM Distance: 4 - 6 cm  Neck ROM: normal range of motion   Mouth opening: Normal     Cardiovascular    Rhythm: regular  Rate: normal    Murmur: Grade 2, Aortic area     Dental    Dentition: Edentulous     Pulmonary  Breath sounds clear to auscultation               Abdominal  GI exam deferred       Other Findings            Anesthetic Plan    ASA: 3  Anesthesia type: general          Induction: Intravenous and RSI  Anesthetic plan and risks discussed with: Patient      Poorly controlled GERD.   Plan for GETA

## 2018-01-20 NOTE — ANESTHESIA POSTPROCEDURE EVALUATION
Post-Anesthesia Evaluation and Assessment    Patient: German Laurent MRN: [id number]  SSN: [id number]    YOB: 1965  Age: 50 y.o. Sex: female       Cardiovascular Function/Vital Signs  Visit Vitals    /75    Pulse 91    Temp 36.9 °C (98.5 °F)    Resp 15    Ht 5' 4\" (1.626 m)    Wt 71 kg (156 lb 9 oz)    SpO2 92%    BMI 26.87 kg/m2       Patient is status post general anesthesia for Procedure(s):  CYSTOSCOPY WITH HYDRODISTENTION. Nausea/Vomiting: None    Postoperative hydration reviewed and adequate. Pain:  Pain Scale 1: Numeric (0 - 10) (01/18/18 1303)  Pain Intensity 1: 3 (01/18/18 1303)   Managed    Neurological Status:   Neuro (WDL): Within Defined Limits (01/18/18 1303)  Neuro  Neurologic State: Alert (01/18/18 1303)  LUE Motor Response: Purposeful (01/18/18 1303)  LLE Motor Response: Purposeful (01/18/18 1303)  RUE Motor Response: Purposeful (01/18/18 1303)  RLE Motor Response: Purposeful (01/18/18 1303)   At baseline    Mental Status and Level of Consciousness: Arousable    Pulmonary Status:   O2 Device: Room air (01/18/18 1303)   Adequate oxygenation and airway patent    Complications related to anesthesia: None    Post-anesthesia assessment completed. No concerns. Doing well. No cardiac concerns.     Signed By: Mar Gomez MD     January 19, 2018

## 2018-01-24 ENCOUNTER — HOSPITAL ENCOUNTER (EMERGENCY)
Age: 53
Discharge: HOME | End: 2018-01-24
Attending: EMERGENCY MEDICINE
Payer: COMMERCIAL

## 2018-01-24 ENCOUNTER — APPOINTMENT (OUTPATIENT)
Dept: CT IMAGING | Age: 53
End: 2018-01-24
Attending: EMERGENCY MEDICINE
Payer: COMMERCIAL

## 2018-01-24 ENCOUNTER — APPOINTMENT (OUTPATIENT)
Dept: MRI IMAGING | Age: 53
End: 2018-01-24
Attending: EMERGENCY MEDICINE
Payer: COMMERCIAL

## 2018-01-24 ENCOUNTER — APPOINTMENT (OUTPATIENT)
Dept: GENERAL RADIOLOGY | Age: 53
End: 2018-01-24
Attending: EMERGENCY MEDICINE
Payer: COMMERCIAL

## 2018-01-24 VITALS
HEART RATE: 80 BPM | SYSTOLIC BLOOD PRESSURE: 117 MMHG | OXYGEN SATURATION: 98 % | HEIGHT: 64 IN | WEIGHT: 160 LBS | BODY MASS INDEX: 27.31 KG/M2 | DIASTOLIC BLOOD PRESSURE: 73 MMHG | RESPIRATION RATE: 18 BRPM | TEMPERATURE: 97.7 F

## 2018-01-24 DIAGNOSIS — G43.109 COMPLICATED MIGRAINE: Primary | ICD-10-CM

## 2018-01-24 DIAGNOSIS — H53.9 VISION CHANGES: ICD-10-CM

## 2018-01-24 LAB
ALBUMIN SERPL-MCNC: 3.7 G/DL (ref 3.5–5)
ALBUMIN/GLOB SERPL: 1 {RATIO} (ref 1.2–3.5)
ALP SERPL-CCNC: 84 U/L (ref 50–136)
ALT SERPL-CCNC: 24 U/L (ref 12–65)
ANION GAP SERPL CALC-SCNC: 12 MMOL/L (ref 7–16)
AST SERPL-CCNC: 27 U/L (ref 15–37)
ATRIAL RATE: 111 BPM
BASOPHILS # BLD: 0 K/UL (ref 0–0.2)
BASOPHILS NFR BLD: 0 % (ref 0–2)
BILIRUB SERPL-MCNC: 0.3 MG/DL (ref 0.2–1.1)
BUN SERPL-MCNC: 10 MG/DL (ref 6–23)
CALCIUM SERPL-MCNC: 9.3 MG/DL (ref 8.3–10.4)
CALCULATED P AXIS, ECG09: 77 DEGREES
CALCULATED R AXIS, ECG10: 9 DEGREES
CALCULATED T AXIS, ECG11: 35 DEGREES
CHLORIDE SERPL-SCNC: 108 MMOL/L (ref 98–107)
CO2 SERPL-SCNC: 20 MMOL/L (ref 21–32)
CREAT SERPL-MCNC: 0.83 MG/DL (ref 0.6–1)
DIAGNOSIS, 93000: NORMAL
DIFFERENTIAL METHOD BLD: ABNORMAL
EOSINOPHIL # BLD: 0 K/UL (ref 0–0.8)
EOSINOPHIL NFR BLD: 0 % (ref 0.5–7.8)
ERYTHROCYTE [DISTWIDTH] IN BLOOD BY AUTOMATED COUNT: 15 % (ref 11.9–14.6)
GLOBULIN SER CALC-MCNC: 3.8 G/DL (ref 2.3–3.5)
GLUCOSE SERPL-MCNC: 140 MG/DL (ref 65–100)
HCT VFR BLD AUTO: 35.2 % (ref 35.8–46.3)
HGB BLD-MCNC: 11.5 G/DL (ref 11.7–15.4)
IMM GRANULOCYTES # BLD: 0 K/UL (ref 0–0.5)
IMM GRANULOCYTES NFR BLD AUTO: 0 % (ref 0–5)
LYMPHOCYTES # BLD: 1 K/UL (ref 0.5–4.6)
LYMPHOCYTES NFR BLD: 15 % (ref 13–44)
MCH RBC QN AUTO: 30.7 PG (ref 26.1–32.9)
MCHC RBC AUTO-ENTMCNC: 32.7 G/DL (ref 31.4–35)
MCV RBC AUTO: 94.1 FL (ref 79.6–97.8)
MONOCYTES # BLD: 0.1 K/UL (ref 0.1–1.3)
MONOCYTES NFR BLD: 2 % (ref 4–12)
NEUTS SEG # BLD: 5.7 K/UL (ref 1.7–8.2)
NEUTS SEG NFR BLD: 83 % (ref 43–78)
P-R INTERVAL, ECG05: 120 MS
PLATELET # BLD AUTO: 328 K/UL (ref 150–450)
PMV BLD AUTO: 9.5 FL (ref 10.8–14.1)
POTASSIUM SERPL-SCNC: 4 MMOL/L (ref 3.5–5.1)
PROT SERPL-MCNC: 7.5 G/DL (ref 6.3–8.2)
Q-T INTERVAL, ECG07: 376 MS
QRS DURATION, ECG06: 80 MS
QTC CALCULATION (BEZET), ECG08: 511 MS
RBC # BLD AUTO: 3.74 M/UL (ref 4.05–5.25)
SODIUM SERPL-SCNC: 140 MMOL/L (ref 136–145)
TROPONIN I BLD-MCNC: 0 NG/ML (ref 0.02–0.05)
VENTRICULAR RATE, ECG03: 111 BPM
WBC # BLD AUTO: 6.8 K/UL (ref 4.3–11.1)

## 2018-01-24 PROCEDURE — 93005 ELECTROCARDIOGRAM TRACING: CPT | Performed by: EMERGENCY MEDICINE

## 2018-01-24 PROCEDURE — 72040 X-RAY EXAM NECK SPINE 2-3 VW: CPT

## 2018-01-24 PROCEDURE — 74011250636 HC RX REV CODE- 250/636: Performed by: EMERGENCY MEDICINE

## 2018-01-24 PROCEDURE — 84484 ASSAY OF TROPONIN QUANT: CPT

## 2018-01-24 PROCEDURE — 70450 CT HEAD/BRAIN W/O DYE: CPT

## 2018-01-24 PROCEDURE — 80053 COMPREHEN METABOLIC PANEL: CPT | Performed by: EMERGENCY MEDICINE

## 2018-01-24 PROCEDURE — 85025 COMPLETE CBC W/AUTO DIFF WBC: CPT | Performed by: EMERGENCY MEDICINE

## 2018-01-24 PROCEDURE — 96375 TX/PRO/DX INJ NEW DRUG ADDON: CPT | Performed by: EMERGENCY MEDICINE

## 2018-01-24 PROCEDURE — 71046 X-RAY EXAM CHEST 2 VIEWS: CPT

## 2018-01-24 PROCEDURE — 96374 THER/PROPH/DIAG INJ IV PUSH: CPT | Performed by: EMERGENCY MEDICINE

## 2018-01-24 PROCEDURE — 70551 MRI BRAIN STEM W/O DYE: CPT

## 2018-01-24 PROCEDURE — 99285 EMERGENCY DEPT VISIT HI MDM: CPT | Performed by: EMERGENCY MEDICINE

## 2018-01-24 RX ORDER — LORAZEPAM 2 MG/ML
1 INJECTION INTRAMUSCULAR AS NEEDED
Status: DISCONTINUED | OUTPATIENT
Start: 2018-01-24 | End: 2018-01-24 | Stop reason: HOSPADM

## 2018-01-24 RX ORDER — TETRACAINE HYDROCHLORIDE 5 MG/ML
1 SOLUTION OPHTHALMIC
Status: DISCONTINUED | OUTPATIENT
Start: 2018-01-24 | End: 2018-01-24 | Stop reason: HOSPADM

## 2018-01-24 RX ORDER — PROCHLORPERAZINE EDISYLATE 5 MG/ML
10 INJECTION INTRAMUSCULAR; INTRAVENOUS
Status: COMPLETED | OUTPATIENT
Start: 2018-01-24 | End: 2018-01-24

## 2018-01-24 RX ORDER — PREDNISONE 20 MG/1
40 TABLET ORAL DAILY
Qty: 10 TAB | Refills: 0 | Status: SHIPPED | OUTPATIENT
Start: 2018-01-24 | End: 2018-01-29

## 2018-01-24 RX ORDER — DIPHENHYDRAMINE HYDROCHLORIDE 50 MG/ML
25 INJECTION, SOLUTION INTRAMUSCULAR; INTRAVENOUS
Status: COMPLETED | OUTPATIENT
Start: 2018-01-24 | End: 2018-01-24

## 2018-01-24 RX ORDER — NALBUPHINE HYDROCHLORIDE 10 MG/ML
10 INJECTION, SOLUTION INTRAMUSCULAR; INTRAVENOUS; SUBCUTANEOUS
Status: COMPLETED | OUTPATIENT
Start: 2018-01-24 | End: 2018-01-24

## 2018-01-24 RX ADMIN — LORAZEPAM 1 MG: 2 INJECTION INTRAMUSCULAR; INTRAVENOUS at 11:23

## 2018-01-24 RX ADMIN — DIPHENHYDRAMINE HYDROCHLORIDE 25 MG: 50 INJECTION, SOLUTION INTRAMUSCULAR; INTRAVENOUS at 07:33

## 2018-01-24 RX ADMIN — NALBUPHINE HYDROCHLORIDE 10 MG: 10 INJECTION, SOLUTION INTRAMUSCULAR; INTRAVENOUS; SUBCUTANEOUS at 07:36

## 2018-01-24 RX ADMIN — PROCHLORPERAZINE EDISYLATE 10 MG: 5 INJECTION INTRAMUSCULAR; INTRAVENOUS at 07:35

## 2018-01-24 NOTE — ED NOTES
I have reviewed discharge instructions with the patient. The patient verbalized understanding. Patient left ED via Discharge Method: ambulatory to Home with family. Opportunity for questions and clarification provided. Patient given 1 scripts. To continue your aftercare when you leave the hospital, you may receive an automated call from our care team to check in on how you are doing. This is a free service and part of our promise to provide the best care and service to meet your aftercare needs.  If you have questions, or wish to unsubscribe from this service please call [phone]. Thank you for Choosing our [facility] Emergency Department.

## 2018-01-24 NOTE — DISCHARGE INSTRUCTIONS
Migraine Headache: Care Instructions  Your Care Instructions  Migraines are painful, throbbing headaches that often start on one side of the head. They may cause nausea and vomiting and make you sensitive to light, sound, or smell. Without treatment, migraines can last from 4 hours to a few days. Medicines can help prevent migraines or stop them after they have started. Your doctor can help you find which ones work best for you. Follow-up care is a key part of your treatment and safety. Be sure to make and go to all appointments, and call your doctor if you are having problems. It's also a good idea to know your test results and keep a list of the medicines you take. How can you care for yourself at home? · Do not drive if you have taken a prescription pain medicine. · Rest in a quiet, dark room until your headache is gone. Close your eyes, and try to relax or go to sleep. Don't watch TV or read. · Put a cold, moist cloth or cold pack on the painful area for 10 to 20 minutes at a time. Put a thin cloth between the cold pack and your skin. · Use a warm, moist towel or a heating pad set on low to relax tight shoulder and neck muscles. · Have someone gently massage your neck and shoulders. · Take your medicines exactly as prescribed. Call your doctor if you think you are having a problem with your medicine. You will get more details on the specific medicines your doctor prescribes. · Be careful not to take pain medicine more often than the instructions allow. You could get worse or more frequent headaches when the medicine wears off. To prevent migraines  · Keep a headache diary so you can figure out what triggers your headaches. Avoiding triggers may help you prevent headaches. Record when each headache began, how long it lasted, and what the pain was like.  (Was it throbbing, aching, stabbing, or dull?) Write down any other symptoms you had with the headache, such as nausea, flashing lights or dark spots, or sensitivity to bright light or loud noise. Note if the headache occurred near your period. List anything that might have triggered the headache. Triggers may include certain foods (chocolate, cheese, wine) or odors, smoke, bright light, stress, or lack of sleep. · If your doctor has prescribed medicine for your migraines, take it as directed. You may have medicine that you take only when you get a migraine and medicine that you take all the time to help prevent migraines. ¨ If your doctor has prescribed medicine for when you get a headache, take it at the first sign of a migraine, unless your doctor has given you other instructions. ¨ If your doctor has prescribed medicine to prevent migraines, take it exactly as prescribed. Call your doctor if you think you are having a problem with your medicine. · Find healthy ways to deal with stress. Migraines are most common during or right after stressful times. Take time to relax before and after you do something that has caused a migraine in the past.  · Try to keep your muscles relaxed by keeping good posture. Check your jaw, face, neck, and shoulder muscles for tension. Try to relax them. When you sit at a desk, change positions often. And make sure to stretch for 30 seconds each hour. · Get plenty of sleep and exercise. · Eat meals on a regular schedule. Avoid foods and drinks that often trigger migraines. These include chocolate, alcohol (especially red wine and port), aspartame, monosodium glutamate (MSG), and some additives found in foods (such as hot dogs, grady, cold cuts, aged cheeses, and pickled foods). · Limit caffeine. Don't drink too much coffee, tea, or soda. But don't quit caffeine suddenly. That can also give you migraines. · Do not smoke or allow others to smoke around you. If you need help quitting, talk to your doctor about stop-smoking programs and medicines. These can increase your chances of quitting for good.   · If you are taking birth control pills or hormone therapy, talk to your doctor about whether they are triggering your migraines. When should you call for help? Call 911 anytime you think you may need emergency care. For example, call if:  ? · You have signs of a stroke. These may include:  ¨ Sudden numbness, paralysis, or weakness in your face, arm, or leg, especially on only one side of your body. ¨ Sudden vision changes. ¨ Sudden trouble speaking. ¨ Sudden confusion or trouble understanding simple statements. ¨ Sudden problems with walking or balance. ¨ A sudden, severe headache that is different from past headaches. ?Call your doctor now or seek immediate medical care if:  ? · You have new or worse nausea and vomiting. ? · You have a new or higher fever. ? · Your headache gets much worse. ? Watch closely for changes in your health, and be sure to contact your doctor if:  ? · You are not getting better after 2 days (48 hours). Where can you learn more? Go to http://maria alejandra-nereyda.info/. Enter L630 in the search box to learn more about \"Migraine Headache: Care Instructions. \"  Current as of: October 14, 2016  Content Version: 11.4  © 0568-3981 [employer]. Care instructions adapted under license by [employer] (which disclaims liability or warranty for this information). If you have questions about a medical condition or this instruction, always ask your healthcare professional. [place] [address] any warranty or liability for your use of this information.

## 2018-01-24 NOTE — ED PROVIDER NOTES
HPI Comments: 74-year-old female with multiple medical problems including prior non-ST elevation MI, cardiomyopathy, carotidynia, migraine, bilateral DVT, chronic pain, interstitial cystitis, hypertension, COPD presents with 1 week of intermittent left-sided neck pain radiating into left head and face  Symptoms have been more constant over the past 2 days. She states her left hand feels heavy and is tingling. She also feels off balance only with her left leg. She has had increased bilateral leg swelling and has difficulty putting on her boots. She reports double and sometimes \"triple vision\" over the past 2 days  She was seen at urgent care yesterday for the same and given a shot of steroids. She recently had a repeat carotid Doppler and echo last week by cardiologist, but has not yet received results. Receives daily Norco by primary care physician for chronic pain \"all over\". Ran out 2 days ago when symptoms worsened. Patient is a 50 y.o. female presenting with headaches. The history is provided by the patient. Headache    Associated symptoms include weakness. Pertinent negatives include no fever, no palpitations, no shortness of breath, no dizziness, no nausea and no vomiting.         Past Medical History:   Diagnosis Date    Anemia     Arrhythmia     palpitations, moderate mitral valve regurge    Arthritis     lower back osteo    Asthma     uses inhalers    Cardiomyopathy     due to murmur (patient states leaky valve)    CHF (congestive heart failure) (HCC)     Chronic pain     stomach 8 yrs    Coagulation defect ([address]     eliquis    COPD     bronchitis chronic controlled by inhalers    Decreased cardiac ejection fraction 11/27/2017    EF 45-50% per echo 11/27/17    Depression     controlled      Diverticulitis     GERD (gastroesophageal reflux disease)     fair control with med    Heart murmur     Hypertension     IBS (irritable bowel syndrome)     IC (interstitial cystitis)     Insomnia     Mitral valve regurgitation, rheumatic     followed Dr. Arminda Marte Palpitations 5/16/2016    Psychiatric disorder     anxiety    Requires oxygen therapy     3l/min at hs. prn during the days as needed    Rheumatic aortic insufficiency 5/16/2016    Smoker          Thromboembolus [facility])     bilateral legs    Tobacco abuse disorder 5/16/2016    Vitamin D deficiency        Past Surgical History:   Procedure Laterality Date    BIOPSY OF BREAST, INCISIONAL Left     lymph node , left, patient states her bx neg, but high risk    COLONOSCOPY      8 polyps removed, diverticulitis    CYSTOSCOPY  8-23-11    bladder dilitation    EGD      HX APPENDECTOMY  2006    HX HEART CATHETERIZATION  5/27/2011    no stents    HX LAP CHOLECYSTECTOMY  6/6/2011    HX NATASHA AND BSO  2004    fibroid tumors, hyst    HX UROLOGICAL      cystoscopy         Family History:   Problem Relation Age of Onset    Heart Failure Father 76     chf    Heart Disease Father     Diabetes Sister        Social History     Social History    Marital status: LEGALLY      Spouse name: N/A    Number of children: N/A    Years of education: N/A     Occupational History    Not on file. Social History Main Topics    Smoking status: Current Every Day Smoker     Packs/day: 1.00     Years: 25.00    Smokeless tobacco: Never Used      Comment: no cigarettes for 2 weeks     Alcohol use No    Drug use: No    Sexual activity: Not on file     Other Topics Concern    Not on file     Social History Narrative         ALLERGIES: Aspirin; Bentyl [dicyclomine]; Toradol [ketorolac]; Ultram [tramadol]; and Zofran [ondansetron hcl (pf)]    Review of Systems   Constitutional: Negative for chills and fever. HENT: Negative for congestion and hearing loss. Eyes: Positive for visual disturbance. Respiratory: Negative for cough and shortness of breath. Cardiovascular: Positive for leg swelling. Negative for chest pain and palpitations. Gastrointestinal: Negative for abdominal pain, diarrhea, nausea and vomiting. Genitourinary: Negative for dysuria. Musculoskeletal: Positive for arthralgias, myalgias and neck pain. Negative for neck stiffness. Skin: Negative for rash. Neurological: Positive for weakness, numbness and headaches. Negative for dizziness, seizures, syncope and speech difficulty. Psychiatric/Behavioral: Negative for confusion. The patient is nervous/anxious. Vitals:    01/24/18 0617   BP: 136/87   Pulse: (!) 120   Resp: 18   Temp: 97.7 °F (36.5 °C)   SpO2: 97%   Weight: 72.6 kg (160 lb)   Height: 5' 4\" (1.626 m)            Physical Exam   Constitutional: She is oriented to person, place, and time. She appears well-developed and well-nourished. HENT:   Head: Normocephalic and atraumatic. Right Ear: External ear normal.   Left Ear: External ear normal.   Nose: Left sinus exhibits maxillary sinus tenderness and frontal sinus tenderness. Mouth/Throat: Oropharynx is clear and moist.   Eyes: Conjunctivae, EOM and lids are normal. Pupils are equal, round, and reactive to light. Right conjunctiva is not injected. Left conjunctiva is not injected. Neck: Trachea normal and normal range of motion. Neck supple. Muscular tenderness present. No spinous process tenderness present. Carotid bruit is not present. No tracheal deviation, no erythema and normal range of motion present. Cardiovascular: Regular rhythm, normal heart sounds and intact distal pulses. Tachycardia present. Pulmonary/Chest: Effort normal and breath sounds normal. No stridor. No respiratory distress. She has no wheezes. Abdominal: Soft. Bowel sounds are normal. She exhibits no distension. There is no tenderness. Musculoskeletal: Normal range of motion. She exhibits no edema. Neurological: She is alert and oriented to person, place, and time. No cranial nerve deficit. She exhibits normal muscle tone.  Coordination normal.   Patient reports diminished sensation left arm and left leg. No noticeable deficits   Skin: Skin is warm and dry. Psychiatric: Judgment normal. Her mood appears anxious. Cognition and memory are normal.   Nursing note and vitals reviewed. MDM  Number of Diagnoses or Management Options  Diagnosis management comments: Parts of this document were created using dragon voice recognition software. The chart has been reviewed but errors may still be present. 7:58 AM  Reviewed chart. Patient has had left-sided carotodynia since at least October which is the reason for recent carotid Dopplers. Doppler showed no new narrowing. Prior c spine X-ray had showed some calcification near the carotid bulb.    9:11 AM  Patient reports improvement in headache. Still having intermittent left-sided numbness and weakness, but none currently. Has persistent left eye double and blurry vision. 20/40 vision on the left, 20/25 on the right. Had recent echo and carotid Dopplers. Has multiple risk factors for stroke. We'll obtain MRI.    2:21 PM  MRI unremarkable except for changes consistent with migraine. Likely complicated migraine. Bilateral eye pressures checked with Jaime-Pen and were 16. No tenderness to palpation of the globe. Advise close follow-up with ophthalmology. I discussed the results of all labs, procedures, radiographs, and treatments with the patient and available family. Treatment plan is agreed upon and the patient is ready for discharge. Questions about treatment in the ED and differential diagnosis of presenting condition were answered. Patient was given verbal discharge instructions including, but not limited to, importance of returning to the emergency department for any concern of worsening or continued symptoms. Instructions were given to follow up with a primary care provider or specialist within 1-2 days.   Adverse effects of medications, if prescribed, were discussed and patient was advised to refrain from significant physical activity until followed up by primary care physician and to not drive or operate heavy machinery after taking any sedating substances.            Amount and/or Complexity of Data Reviewed  Clinical lab tests: ordered and reviewed (Results for orders placed or performed during the hospital encounter of 01/24/18  -CBC WITH AUTOMATED DIFF       Result                                            Value                         Ref Range                       WBC                                               6.8                           4.3 - 11.1 K/uL                 RBC                                               3.74 (L)                      4.05 - 5.25 M/uL                HGB                                               11.5 (L)                      11.7 - 15.4 g/dL                HCT                                               35.2 (L)                      35.8 - 46.3 %                   MCV                                               94.1                          79.6 - 97.8 FL                  MCH                                               30.7                          26.1 - 32.9 PG                  MCHC                                              32.7                          31.4 - 35.0 g/dL                RDW                                               15.0 (H)                      11.9 - 14.6 %                   PLATELET                                          328                           150 - 450 K/uL                  MPV                                               9.5 (L)                       10.8 - 14.1 FL                  DF                                                AUTOMATED                                                     NEUTROPHILS                                       83 (H)                        43 - 78 %                       LYMPHOCYTES                                       15                            13 - 44 % MONOCYTES                                         2 (L)                         4.0 - 12.0 %                    EOSINOPHILS                                       0 (L)                         0.5 - 7.8 %                     BASOPHILS                                         0                             0.0 - 2.0 %                     IMMATURE GRANULOCYTES                             0                             0.0 - 5.0 %                     ABS. NEUTROPHILS                                  5.7                           1.7 - 8.2 K/UL                  ABS. LYMPHOCYTES                                  1.0                           0.5 - 4.6 K/UL                  ABS. MONOCYTES                                    0.1                           0.1 - 1.3 K/UL                  ABS. EOSINOPHILS                                  0.0                           0.0 - 0.8 K/UL                  ABS. BASOPHILS                                    0.0                           0.0 - 0.2 K/UL                  ABS. IMM.  GRANS.                                  0.0                           0.0 - 0.5 K/UL             -METABOLIC PANEL, COMPREHENSIVE       Result                                            Value                         Ref Range                       Sodium                                            140                           136 - 145 mmol/L                Potassium                                         4.0                           3.5 - 5.1 mmol/L                Chloride                                          108 (H)                       98 - 107 mmol/L                 CO2                                               20 (L)                        21 - 32 mmol/L                  Anion gap                                         12                            7 - 16 mmol/L                   Glucose                                           140 (H)                       65 - 100 mg/dL                  BUN 10                            6 - 23 MG/DL                    Creatinine                                        0.83                          0.6 - 1.0 MG/DL                 GFR est AA                                        >60                           >60 ml/min/1.73m2               GFR est non-AA                                    >60                           >60 ml/min/1.73m2               Calcium                                           9.3                           8.3 - 10.4 MG/DL                Bilirubin, total                                  0.3                           0.2 - 1.1 MG/DL                 ALT (SGPT)                                        24                            12 - 65 U/L                     AST (SGOT)                                        27                            15 - 37 U/L                     Alk. phosphatase                                  84                            50 - 136 U/L                    Protein, total                                    7.5                           6.3 - 8.2 g/dL                  Albumin                                           3.7                           3.5 - 5.0 g/dL                  Globulin                                          3.8 (H)                       2.3 - 3.5 g/dL                  A-G Ratio                                         1.0 (L)                       1.2 - 3.5                  -POC TROPONIN-I       Result                                            Value                         Ref Range                       Troponin-I (POC)                                  0 (L)                         0.02 - 0.05 ng/ml          -EKG, 12 LEAD, INITIAL       Result                                            Value                         Ref Range                       Ventricular Rate                                  111                           BPM                             Atrial Rate 111                           BPM                             P-R Interval                                      120                           ms                              QRS Duration                                      80                            ms                              Q-T Interval                                      376                           ms                              QTC Calculation (Bezet)                           511                           ms                              Calculated P Axis                                 77                            degrees                         Calculated R Axis                                 9                             degrees                         Calculated T Axis                                 35                            degrees                         Diagnosis                                                                                                   !! AGE AND GENDER SPECIFIC ECG ANALYSIS !! Sinus tachycardia   with short NE   Biatrial enlargement   Septal infarct (cited on or before 30-NOV-2017)   Abnormal ECG   When compared with ECG of 30-NOV-2017 13:03,   No significant change was found   Confirmed by Rah Tripathi MD (), LAZ ALBRIGHT ([id number]) on 1/24/2018 7:00:56 AM     )  Tests in the radiology section of CPT®: ordered and reviewed (Xr Chest Pa Lat    Result Date: 1/24/2018  Chest 2 views dated 1/24/2018 Comparison chest x-ray 12/3/2017 CLINICAL INFORMATION: Chest pain Heart is normal in size and mediastinum unremarkable. Lungs clear and pulmonary vascularity normal. No pleural effusion. IMPRESSION: No acute abnormality    Xr Spine Cerv Pa Lat [place] 3 V Max    Result Date: 1/24/2018  Cervical spine dated 1/24/2018 CLINICAL INFORMATION: Neck pain. No other clinical information provided Limited exam consists of AP, AP odontoid and lateral view alignment is unremarkable.  No fracture or vertebral compression. No disc space narrowing. No prevertebral soft tissue swelling. IMPRESSION: No acute bony abnormality    Mri Brain Wo Cont    Result Date: 1/24/2018  MRI BRAIN WITHOUT CONTRAST, 1/24/2018 HISTORY: 50year-old with severe headache and visual changes for 2 days. Loss of balance. Breast cancer. TECHNIQUE: Sagittal and axial T1-weighted, axial T2-weighted, axial and coronal FLAIR, axial T2-weighted gradient-echo, axial diffusion weighted images with ADC maps of the brain. COMPARISON: Head CT from the same day. FINDINGS: There is no acute infarction, acute intracranial hemorrhage, hydrocephalus, intra-axial mass, or abnormal extra-axial fluid collection. On the T2-weighted and FLAIR sequences, there are scattered hyperintense supratentorial white matter lesions. This is not an uncommon finding which may be present with asymptomatic patients, with migraine headaches or with mild chronic small vessel ischemic disease. Appropriate flow voids are present in the large dural venous sinuses, suggesting patency of these vessels. The cerebellar tonsils are in a normal position. IMPRESSION: 1. No acute intracranial findings. 2. Nonspecific white matter findings present as described. This pattern may be present with migraine headaches or mild chronic small vessel ischemic disease. Ct Head Wo Cont    Result Date: 1/24/2018  History: headache, left sided weakness, numbness, double vision Exam: CT head without contrast Technique: Thin section axial CT images were obtained from the skullbase through the vertex. Radiation dose reduction techniques were used for this study. Our CT scanners use one or all of the following: Automated exposure control, adjustment of the mA and/or kV according to patient size, use of iterative reconstruction. COMPARISON: 10/17/2012 Findings: The ventricles are normal in size, shape, and position. No new abnormal parenchymal density is present.  No extra-axial fluid collection is present. There is no mass or mass-effect. The basilar cisterns are patent. The paranasal sinuses and mastoid air cells are clear. Impression: Chronic appearing white matter change without acute intracranial abnormality.     )  Tests in the medicine section of CPT®: ordered and reviewed      ED Course       Procedures                       SUMMARY:    -  Left ventricle: LV wall motion consistent with Takotsubo Stress Induced  Cardiomyopathy pattern (17). Systolic function was mildly reduced. Ejection fraction was estimated in the range of 45 % to 50 %. There was  akinesis of the apical wall(s). Doppler parameters were consistent with mild  diastolic dysfunction (grade 1). -  Tricuspid valve: There was mild regurgitation.     SYSTEM MEASUREMENT TABLES    2D  Ao Diam: 2.9 cm  LA Diam: 3.1 cm  %FS: 39.7 %  IVSd: 1 cm  LVIDd: 4.9 cm  LVIDs: 2.9 cm  LVOT Diam: 2.1 cm  LVPWd: 1 cm  RVIDd: 2.9 cm    CW  TR Vmax: 3 m/s  TR maxP.8 mmHg    Prepared and signed by    Tarun Encinas MD [facility]  Signed [id number] 13:31:12

## 2018-01-24 NOTE — ED NOTES
Pt sleeping in bed, vital signs being monitored. Pt respirations even and unlabored. Pt denies any additional needs at this time. Bed locked and low.

## 2018-03-02 ENCOUNTER — HOSPITAL ENCOUNTER (EMERGENCY)
Age: 53
Discharge: HOME | End: 2018-03-02
Attending: EMERGENCY MEDICINE
Payer: COMMERCIAL

## 2018-03-02 VITALS
HEART RATE: 90 BPM | BODY MASS INDEX: 29.19 KG/M2 | TEMPERATURE: 98.4 F | RESPIRATION RATE: 20 BRPM | DIASTOLIC BLOOD PRESSURE: 68 MMHG | OXYGEN SATURATION: 97 % | WEIGHT: 171 LBS | HEIGHT: 64 IN | SYSTOLIC BLOOD PRESSURE: 120 MMHG

## 2018-03-02 DIAGNOSIS — M79.18 MUSCULOSKELETAL PAIN: Primary | ICD-10-CM

## 2018-03-02 LAB
ANION GAP SERPL CALC-SCNC: 11 MMOL/L (ref 7–16)
BASOPHILS # BLD: 0 K/UL (ref 0–0.2)
BASOPHILS NFR BLD: 0 % (ref 0–2)
BUN SERPL-MCNC: 9 MG/DL (ref 6–23)
CALCIUM SERPL-MCNC: 8.5 MG/DL (ref 8.3–10.4)
CHLORIDE SERPL-SCNC: 111 MMOL/L (ref 98–107)
CO2 SERPL-SCNC: 23 MMOL/L (ref 21–32)
CREAT SERPL-MCNC: 0.7 MG/DL (ref 0.6–1)
DIFFERENTIAL METHOD BLD: ABNORMAL
EOSINOPHIL # BLD: 0 K/UL (ref 0–0.8)
EOSINOPHIL NFR BLD: 1 % (ref 0.5–7.8)
ERYTHROCYTE [DISTWIDTH] IN BLOOD BY AUTOMATED COUNT: 14.9 % (ref 11.9–14.6)
GLUCOSE SERPL-MCNC: 82 MG/DL (ref 65–100)
HCT VFR BLD AUTO: 30.8 % (ref 35.8–46.3)
HGB BLD-MCNC: 9.7 G/DL (ref 11.7–15.4)
IMM GRANULOCYTES # BLD: 0 K/UL (ref 0–0.5)
IMM GRANULOCYTES NFR BLD AUTO: 0 % (ref 0–5)
LYMPHOCYTES # BLD: 1.7 K/UL (ref 0.5–4.6)
LYMPHOCYTES NFR BLD: 42 % (ref 13–44)
MCH RBC QN AUTO: 30.1 PG (ref 26.1–32.9)
MCHC RBC AUTO-ENTMCNC: 31.5 G/DL (ref 31.4–35)
MCV RBC AUTO: 95.7 FL (ref 79.6–97.8)
MONOCYTES # BLD: 0.4 K/UL (ref 0.1–1.3)
MONOCYTES NFR BLD: 10 % (ref 4–12)
NEUTS SEG # BLD: 1.9 K/UL (ref 1.7–8.2)
NEUTS SEG NFR BLD: 47 % (ref 43–78)
PLATELET # BLD AUTO: 286 K/UL (ref 150–450)
PMV BLD AUTO: 9.6 FL (ref 10.8–14.1)
POTASSIUM SERPL-SCNC: 3.8 MMOL/L (ref 3.5–5.1)
RBC # BLD AUTO: 3.22 M/UL (ref 4.05–5.25)
SODIUM SERPL-SCNC: 145 MMOL/L (ref 136–145)
WBC # BLD AUTO: 3.9 K/UL (ref 4.3–11.1)

## 2018-03-02 PROCEDURE — 96372 THER/PROPH/DIAG INJ SC/IM: CPT | Performed by: EMERGENCY MEDICINE

## 2018-03-02 PROCEDURE — 85025 COMPLETE CBC W/AUTO DIFF WBC: CPT | Performed by: EMERGENCY MEDICINE

## 2018-03-02 PROCEDURE — 99284 EMERGENCY DEPT VISIT MOD MDM: CPT | Performed by: EMERGENCY MEDICINE

## 2018-03-02 PROCEDURE — 74011250637 HC RX REV CODE- 250/637: Performed by: EMERGENCY MEDICINE

## 2018-03-02 PROCEDURE — 74011250636 HC RX REV CODE- 250/636: Performed by: EMERGENCY MEDICINE

## 2018-03-02 PROCEDURE — 80048 BASIC METABOLIC PNL TOTAL CA: CPT | Performed by: EMERGENCY MEDICINE

## 2018-03-02 RX ORDER — CYCLOBENZAPRINE HCL 10 MG
10 TABLET ORAL
Status: COMPLETED | OUTPATIENT
Start: 2018-03-02 | End: 2018-03-02

## 2018-03-02 RX ORDER — HYDROCODONE BITARTRATE AND ACETAMINOPHEN 5; 325 MG/1; MG/1
1 TABLET ORAL ONCE
Status: COMPLETED | OUTPATIENT
Start: 2018-03-02 | End: 2018-03-02

## 2018-03-02 RX ORDER — CYCLOBENZAPRINE HCL 10 MG
10 TABLET ORAL 2 TIMES DAILY
Qty: 10 TAB | Refills: 0 | Status: SHIPPED | OUTPATIENT
Start: 2018-03-02 | End: 2018-03-07

## 2018-03-02 RX ORDER — PROMETHAZINE HYDROCHLORIDE 25 MG/ML
25 INJECTION, SOLUTION INTRAMUSCULAR; INTRAVENOUS
Status: COMPLETED | OUTPATIENT
Start: 2018-03-02 | End: 2018-03-02

## 2018-03-02 RX ADMIN — HYDROCODONE BITARTRATE AND ACETAMINOPHEN 1 TABLET: 5; 325 TABLET ORAL at 15:17

## 2018-03-02 RX ADMIN — PROMETHAZINE HYDROCHLORIDE 25 MG: 25 INJECTION INTRAMUSCULAR; INTRAVENOUS at 15:58

## 2018-03-02 RX ADMIN — CYCLOBENZAPRINE HYDROCHLORIDE 10 MG: 10 TABLET, FILM COATED ORAL at 16:41

## 2018-03-02 NOTE — ED NOTES
Patient requesting pain medication. Patient states that the norco \"isn't working. I take more then that at home. \"

## 2018-03-02 NOTE — ED PROVIDER NOTES
HPI Comments: 73-year-old lady presents with concerns about left side pain and occasional tingling she says is off and on for the last few months. Patient says that she has had no weakness or speech problems. She says the majority of her pain is the left side of her head, left side of her neck, left posterior shoulder and down her left ribs. She says maybe a little bit of tingling in that left rib area and left neck area. She has had no fevers or chills and no  Vomiting or diarrhea. Patient is a 50 y.o. female presenting with flank pain. The history is provided by the patient. Flank Pain    Pertinent negatives include no chest pain, no fever, no headaches, no abdominal pain, no dysuria and no weakness.         Past Medical History:   Diagnosis Date    Anemia     Arrhythmia     palpitations, moderate mitral valve regurge    Arthritis     lower back osteo    Asthma     uses inhalers    Cardiomyopathy     due to murmur (patient states leaky valve)    CHF (congestive heart failure) (HCC)     Chronic pain     stomach 8 yrs    Coagulation defect ([address]     eliquis    COPD     bronchitis chronic controlled by inhalers    Decreased cardiac ejection fraction 11/27/2017    EF 45-50% per echo 11/27/17    Depression     controlled      Diverticulitis     GERD (gastroesophageal reflux disease)     fair control with med    Heart murmur     Hypertension     IBS (irritable bowel syndrome)     IC (interstitial cystitis)     Insomnia     Mitral valve regurgitation, rheumatic     followed Dr. Deja De La Vega Palpitations 5/16/2016    Psychiatric disorder     anxiety    Requires oxygen therapy     3l/min at hs. prn during the days as needed    Rheumatic aortic insufficiency 5/16/2016    Smoker          Thromboembolus ([address]     bilateral legs    Tobacco abuse disorder 5/16/2016    Vitamin D deficiency        Past Surgical History:   Procedure Laterality Date    BIOPSY OF BREAST, INCISIONAL Left     lymph node , left, patient states her bx neg, but high risk    COLONOSCOPY      8 polyps removed, diverticulitis    CYSTOSCOPY  8-23-11    bladder dilitation    EGD      HX APPENDECTOMY  2006    HX HEART CATHETERIZATION  5/27/2011    no stents    HX LAP CHOLECYSTECTOMY  6/6/2011    HX NATASHA AND BSO  2004    fibroid tumors, hyst    HX UROLOGICAL      cystoscopy         Family History:   Problem Relation Age of Onset    Heart Failure Father 76     chf    Heart Disease Father     Diabetes Sister        Social History     Social History    Marital status: LEGALLY      Spouse name: N/A    Number of children: N/A    Years of education: N/A     Occupational History    Not on file. Social History Main Topics    Smoking status: Current Every Day Smoker     Packs/day: 1.00     Years: 25.00    Smokeless tobacco: Never Used      Comment: no cigarettes for 2 weeks     Alcohol use No    Drug use: No    Sexual activity: Not on file     Other Topics Concern    Not on file     Social History Narrative         ALLERGIES: Aspirin; Bentyl [dicyclomine]; Toradol [ketorolac]; Ultram [tramadol]; and Zofran [ondansetron hcl (pf)]    Review of Systems   Constitutional: Negative for chills, diaphoresis and fever. HENT: Negative for congestion, rhinorrhea and sore throat. Eyes: Negative for redness and visual disturbance. Respiratory: Negative for cough, chest tightness, shortness of breath and wheezing. Cardiovascular: Negative for chest pain and palpitations. Gastrointestinal: Negative for abdominal pain, blood in stool, diarrhea, nausea and vomiting. Endocrine: Negative for polydipsia and polyuria. Genitourinary: Negative for dysuria and hematuria. Musculoskeletal: Positive for arthralgias and myalgias. Negative for neck stiffness. Pain on the left side   Skin: Negative for rash. Allergic/Immunologic: Negative for environmental allergies and food allergies.    Neurological: Negative for dizziness, weakness and headaches. Hematological: Negative for adenopathy. Does not bruise/bleed easily. Psychiatric/Behavioral: Negative for confusion and sleep disturbance. The patient is not nervous/anxious. Vitals:    03/02/18 1334   BP: 120/73   Pulse: 97   Resp: 20   Temp: 98.4 °F (36.9 °C)   SpO2: 99%   Weight: 77.6 kg (171 lb)   Height: 5' 4\" (1.626 m)            Physical Exam   Constitutional: She is oriented to person, place, and time. She appears well-developed and well-nourished. HENT:   Head: Normocephalic and atraumatic. Eyes: Conjunctivae and EOM are normal. Pupils are equal, round, and reactive to light. Neck: Normal range of motion. Cardiovascular: Normal rate and regular rhythm. Pulmonary/Chest: Effort normal and breath sounds normal. No respiratory distress. She has no wheezes. She has no rales. She exhibits no tenderness. Abdominal: Soft. Bowel sounds are normal. There is no rebound and no guarding. Musculoskeletal: Normal range of motion. She exhibits no edema or tenderness. Lymphadenopathy:     She has no cervical adenopathy. Neurological: She is alert and oriented to person, place, and time. Skin: Skin is warm and dry. Psychiatric: She has a normal mood and affect. Nursing note and vitals reviewed. MDM  Number of Diagnoses or Management Options  Diagnosis management comments: Patient's exam is unremarkable. She had a head CT back in January for these symptoms and that was unremarkable and do not think she needs a repeat head CT today. I will check her basic blood work.         ED Course       Procedures

## 2018-03-02 NOTE — ED NOTES
.I have reviewed discharge instructions with the patient. The patient verbalized understanding. Patient left ED via Discharge Method: ambulatory to Home with (self). Opportunity for questions and clarification provided. Patient given 1 scripts. No e-sign        To continue your aftercare when you leave the hospital, you may receive an automated call from our care team to check in on how you are doing. This is a free service and part of our promise to provide the best care and service to meet your aftercare needs.  If you have questions, or wish to unsubscribe from this service please call [phone]. Thank you for Choosing our [facility].

## 2018-03-03 ENCOUNTER — APPOINTMENT (OUTPATIENT)
Dept: GENERAL RADIOLOGY | Age: 53
End: 2018-03-03
Attending: EMERGENCY MEDICINE
Payer: COMMERCIAL

## 2018-03-03 ENCOUNTER — HOSPITAL ENCOUNTER (EMERGENCY)
Age: 53
Discharge: LEFT BEFORE DISCHARGE | End: 2018-03-03
Attending: EMERGENCY MEDICINE
Payer: COMMERCIAL

## 2018-03-03 VITALS
OXYGEN SATURATION: 98 % | TEMPERATURE: 98.6 F | DIASTOLIC BLOOD PRESSURE: 78 MMHG | WEIGHT: 172 LBS | BODY MASS INDEX: 29.37 KG/M2 | RESPIRATION RATE: 18 BRPM | SYSTOLIC BLOOD PRESSURE: 130 MMHG | HEART RATE: 89 BPM | HEIGHT: 64 IN

## 2018-03-03 DIAGNOSIS — G89.29 OTHER CHRONIC PAIN: Primary | ICD-10-CM

## 2018-03-03 LAB
ALBUMIN SERPL-MCNC: 3 G/DL (ref 3.5–5)
ALBUMIN/GLOB SERPL: 0.8 {RATIO} (ref 1.2–3.5)
ALP SERPL-CCNC: 64 U/L (ref 50–136)
ALT SERPL-CCNC: 19 U/L (ref 12–65)
ANION GAP SERPL CALC-SCNC: 10 MMOL/L (ref 7–16)
AST SERPL-CCNC: 39 U/L (ref 15–37)
BASOPHILS # BLD: 0 K/UL (ref 0–0.2)
BASOPHILS NFR BLD: 1 % (ref 0–2)
BILIRUB SERPL-MCNC: 0.1 MG/DL (ref 0.2–1.1)
BNP SERPL-MCNC: 80 PG/ML
BUN SERPL-MCNC: 11 MG/DL (ref 6–23)
CALCIUM SERPL-MCNC: 8 MG/DL (ref 8.3–10.4)
CHLORIDE SERPL-SCNC: 107 MMOL/L (ref 98–107)
CO2 SERPL-SCNC: 27 MMOL/L (ref 21–32)
CREAT SERPL-MCNC: 0.8 MG/DL (ref 0.6–1)
DIFFERENTIAL METHOD BLD: ABNORMAL
EOSINOPHIL # BLD: 0 K/UL (ref 0–0.8)
EOSINOPHIL NFR BLD: 1 % (ref 0.5–7.8)
ERYTHROCYTE [DISTWIDTH] IN BLOOD BY AUTOMATED COUNT: 14.5 % (ref 11.9–14.6)
ETHANOL SERPL-MCNC: <3 MG/DL
GLOBULIN SER CALC-MCNC: 3.7 G/DL (ref 2.3–3.5)
GLUCOSE SERPL-MCNC: 81 MG/DL (ref 65–100)
HCT VFR BLD AUTO: 32.2 % (ref 35.8–46.3)
HGB BLD-MCNC: 10.4 G/DL (ref 11.7–15.4)
IMM GRANULOCYTES # BLD: 0 K/UL (ref 0–0.5)
IMM GRANULOCYTES NFR BLD AUTO: 0 % (ref 0–5)
LYMPHOCYTES # BLD: 1.9 K/UL (ref 0.5–4.6)
LYMPHOCYTES NFR BLD: 46 % (ref 13–44)
MAGNESIUM SERPL-MCNC: 1.8 MG/DL (ref 1.8–2.4)
MCH RBC QN AUTO: 30.7 PG (ref 26.1–32.9)
MCHC RBC AUTO-ENTMCNC: 32.3 G/DL (ref 31.4–35)
MCV RBC AUTO: 95 FL (ref 79.6–97.8)
MONOCYTES # BLD: 0.4 K/UL (ref 0.1–1.3)
MONOCYTES NFR BLD: 9 % (ref 4–12)
NEUTS SEG # BLD: 1.8 K/UL (ref 1.7–8.2)
NEUTS SEG NFR BLD: 43 % (ref 43–78)
PLATELET # BLD AUTO: 334 K/UL (ref 150–450)
PMV BLD AUTO: 9.5 FL (ref 10.8–14.1)
POTASSIUM SERPL-SCNC: 3.8 MMOL/L (ref 3.5–5.1)
PROT SERPL-MCNC: 6.7 G/DL (ref 6.3–8.2)
RBC # BLD AUTO: 3.39 M/UL (ref 4.05–5.25)
SODIUM SERPL-SCNC: 144 MMOL/L (ref 136–145)
TROPONIN I BLD-MCNC: 0 NG/ML (ref 0.02–0.05)
WBC # BLD AUTO: 4.1 K/UL (ref 4.3–11.1)

## 2018-03-03 PROCEDURE — 93005 ELECTROCARDIOGRAM TRACING: CPT | Performed by: EMERGENCY MEDICINE

## 2018-03-03 PROCEDURE — 83735 ASSAY OF MAGNESIUM: CPT | Performed by: EMERGENCY MEDICINE

## 2018-03-03 PROCEDURE — 80053 COMPREHEN METABOLIC PANEL: CPT | Performed by: EMERGENCY MEDICINE

## 2018-03-03 PROCEDURE — 84484 ASSAY OF TROPONIN QUANT: CPT

## 2018-03-03 PROCEDURE — 71046 X-RAY EXAM CHEST 2 VIEWS: CPT

## 2018-03-03 PROCEDURE — 99283 EMERGENCY DEPT VISIT LOW MDM: CPT | Performed by: EMERGENCY MEDICINE

## 2018-03-03 PROCEDURE — 85025 COMPLETE CBC W/AUTO DIFF WBC: CPT | Performed by: EMERGENCY MEDICINE

## 2018-03-03 PROCEDURE — 80307 DRUG TEST PRSMV CHEM ANLYZR: CPT | Performed by: EMERGENCY MEDICINE

## 2018-03-03 PROCEDURE — 83880 ASSAY OF NATRIURETIC PEPTIDE: CPT | Performed by: EMERGENCY MEDICINE

## 2018-03-03 NOTE — ED PROVIDER NOTES
HPI Comments: Normal heart catheterization 11 months ago      Patient is a 50 y.o. female presenting with weakness and chest pain. The history is provided by the patient and the EMS personnel. Extremity Weakness    Associated symptoms include back pain. Pertinent negatives include no neck pain. Chest Pain (Angina)    This is a recurrent problem. The current episode started more than 2 days ago. The problem has been gradually worsening. The problem occurs constantly. The pain is associated with normal activity. The pain is present in the left side. The pain is moderate. The quality of the pain is described as dull and tightness. The pain radiates to the upper back and mid back. Associated symptoms include back pain, cough, dizziness, malaise/fatigue and weakness. Pertinent negatives include no abdominal pain, no claudication, no diaphoresis, no fever, no headaches, no irregular heartbeat, no leg pain, no orthopnea, no palpitations, no shortness of breath and no vomiting. Treatments tried: multiple home medications. The treatment provided no relief. Risk factors include smoking/tobacco exposure, cardiac disease, family history and stress.         Past Medical History:   Diagnosis Date    Anemia     Arrhythmia     palpitations, moderate mitral valve regurge    Arthritis     lower back osteo    Asthma     uses inhalers    Cardiomyopathy     due to murmur (patient states leaky valve)    CHF (congestive heart failure) (HCC)     Chronic pain     stomach 8 yrs    Coagulation defect ([address]     eliquis    COPD     bronchitis chronic controlled by inhalers    Decreased cardiac ejection fraction 11/27/2017    EF 45-50% per echo 11/27/17    Depression     controlled      Diverticulitis     GERD (gastroesophageal reflux disease)     fair control with med    Heart murmur     Hypertension     IBS (irritable bowel syndrome)     IC (interstitial cystitis)     Insomnia     Mitral valve regurgitation, rheumatic followed Dr. Nelson Gagnon Palpitations 5/16/2016    Psychiatric disorder     anxiety    Requires oxygen therapy     3l/min at hs. prn during the days as needed    Rheumatic aortic insufficiency 5/16/2016    Smoker          Thromboembolus [facility])     bilateral legs    Tobacco abuse disorder 5/16/2016    Vitamin D deficiency        Past Surgical History:   Procedure Laterality Date    BIOPSY OF BREAST, INCISIONAL Left     lymph node , left, patient states her bx neg, but high risk    COLONOSCOPY      8 polyps removed, diverticulitis    CYSTOSCOPY  8-23-11    bladder dilitation    EGD      HX APPENDECTOMY  2006    HX HEART CATHETERIZATION  5/27/2011    no stents    HX LAP CHOLECYSTECTOMY  6/6/2011    HX NATASHA AND BSO  2004    fibroid tumors, hyst    HX UROLOGICAL      cystoscopy         Family History:   Problem Relation Age of Onset    Heart Failure Father 76     chf    Heart Disease Father     Diabetes Sister        Social History     Social History    Marital status: LEGALLY      Spouse name: N/A    Number of children: N/A    Years of education: N/A     Occupational History    Not on file. Social History Main Topics    Smoking status: Current Every Day Smoker     Packs/day: 1.00     Years: 25.00    Smokeless tobacco: Never Used      Comment: no cigarettes for 2 weeks     Alcohol use No    Drug use: No    Sexual activity: Not on file     Other Topics Concern    Not on file     Social History Narrative         ALLERGIES: Aspirin; Bentyl [dicyclomine]; Toradol [ketorolac]; Ultram [tramadol]; and Zofran [ondansetron hcl (pf)]    Review of Systems   Constitutional: Positive for malaise/fatigue. Negative for chills, diaphoresis and fever. HENT: Negative for congestion, ear pain and rhinorrhea. Eyes: Negative for photophobia and discharge. Respiratory: Positive for cough and chest tightness. Negative for shortness of breath. Cardiovascular: Positive for chest pain.  Negative for palpitations, orthopnea and claudication. Gastrointestinal: Negative for abdominal pain, constipation, diarrhea and vomiting. Endocrine: Negative for cold intolerance and heat intolerance. Genitourinary: Negative for dysuria and flank pain. Musculoskeletal: Positive for back pain. Negative for arthralgias, myalgias and neck pain. Skin: Negative for rash and wound. Allergic/Immunologic: Negative for environmental allergies and food allergies. Neurological: Positive for dizziness and weakness. Negative for syncope and headaches. Hematological: Negative for adenopathy. Does not bruise/bleed easily. Psychiatric/Behavioral: Positive for dysphoric mood. The patient is nervous/anxious. All other systems reviewed and are negative. Vitals:    03/03/18 1743   BP: 130/78   Pulse: 89   Resp: 18   Temp: 98.6 °F (37 °C)   SpO2: 98%   Weight: 78 kg (172 lb)   Height: 5' 4\" (1.626 m)            Physical Exam   Constitutional: She is oriented to person, place, and time. She appears well-developed and well-nourished. She appears distressed. HENT:   Head: Normocephalic and atraumatic. Mouth/Throat: Oropharynx is clear and moist.   Eyes: Conjunctivae and EOM are normal. Pupils are equal, round, and reactive to light. Right eye exhibits no discharge. Left eye exhibits no discharge. No scleral icterus. Neck: Normal range of motion. Neck supple. No thyromegaly present. Cardiovascular: Normal rate, regular rhythm, normal heart sounds, intact distal pulses and normal pulses. No extrasystoles are present. PMI is not displaced. Exam reveals no gallop and no friction rub. No murmur heard. Pulmonary/Chest: Effort normal and breath sounds normal. No respiratory distress. She has no wheezes. She exhibits no tenderness. Abdominal: Soft. Normal appearance and bowel sounds are normal. She exhibits no distension. There is no hepatosplenomegaly. There is no tenderness. There is no rebound and no guarding.  No hernia. Musculoskeletal: Normal range of motion. She exhibits no edema or tenderness. Neurological: She is alert and oriented to person, place, and time. No cranial nerve deficit. She exhibits normal muscle tone. Skin: Skin is warm. No rash noted. She is not diaphoretic. No erythema. Psychiatric: Her speech is normal and behavior is normal. Judgment and thought content normal. Her mood appears anxious. Cognition and memory are normal.   Nursing note and vitals reviewed. MDM  Number of Diagnoses or Management Options  Other chronic pain: established and worsening  Diagnosis management comments: EKG revealed  Sinus rhythm, normal intervals, normal axis  No ectopy  No acute ischemic changes    Differential diagnosis  ACS, CHF, arthralgias, pneumonia, COPD    7:21 PM  Patient requesting pain medication. Patient was informed that as soon as she gave us a urine specimen we will go ahead and treat her pain. Patient became irate when she was informed this and amended to leave. At this juncture we have not identified any life-threatening conditions the patient is awake alert and able to make decisions for her own care. Patient will be signed out AGAINST MEDICAL ADVICE       Amount and/or Complexity of Data Reviewed  Clinical lab tests: ordered and reviewed  Review and summarize past medical records: yes    Risk of Complications, Morbidity, and/or Mortality  Presenting problems: moderate  Diagnostic procedures: moderate  General comments: Elements of this note have been dictated via voice recognition software. Text and phrases may be limited by the accuracy of the software. The chart has been reviewed, but errors may still be present. Patient Progress  Patient progress: other (comment) (Patient left AGAINST MEDICAL ADVICE prior to my reevaluation. )        ED Course       Procedures

## 2018-03-03 NOTE — ED TRIAGE NOTES
Pt was brought by ems from urgent care. bgl [id number]. Urgent care called ems for chest pain, but the pt states it is not chest pain. Pt states it is shoulder blade and down the left arm pain. Generalized weakness and fainting. Pt states she felt dizzy before fainting and lost consciousness.  Pt states she has felt the pain for 2-3 days

## 2018-03-04 LAB
ATRIAL RATE: 90 BPM
CALCULATED P AXIS, ECG09: 65 DEGREES
CALCULATED R AXIS, ECG10: -2 DEGREES
CALCULATED T AXIS, ECG11: 48 DEGREES
DIAGNOSIS, 93000: NORMAL
P-R INTERVAL, ECG05: 120 MS
Q-T INTERVAL, ECG07: 328 MS
QRS DURATION, ECG06: 88 MS
QTC CALCULATION (BEZET), ECG08: 401 MS
VENTRICULAR RATE, ECG03: 90 BPM

## 2018-03-04 NOTE — ED NOTES
Pt was requesting pain meds so this RN spoke with Dr. Saida Munguia who stated \"if she provides a urine sample then I will order something for pain. \"  This RN told the pt what Dr Saida Munguia said and pt replied \"I dont need to give yall no damn urine sample. Fuck this shit I am ready to go. I can sit at home in pain. Take this IV out. \"  This RN went back to Dr Saida Munguia and tolf him what that pts response was and he stated well she can sign out AMA.

## 2018-03-29 PROBLEM — J96.01 ACUTE RESPIRATORY FAILURE WITH HYPOXIA (HCC): Status: RESOLVED | Noted: 2017-11-27 | Resolved: 2018-03-29

## 2018-03-29 PROBLEM — R91.8 BILATERAL PULMONARY INFILTRATES ON CXR: Status: RESOLVED | Noted: 2017-11-25 | Resolved: 2018-03-29

## 2018-03-29 PROBLEM — R05.9 COUGH: Chronic | Status: RESOLVED | Noted: 2017-11-25 | Resolved: 2018-03-29

## 2018-05-01 ENCOUNTER — APPOINTMENT (OUTPATIENT)
Dept: GENERAL RADIOLOGY | Age: 53
End: 2018-05-01
Attending: EMERGENCY MEDICINE
Payer: COMMERCIAL

## 2018-05-01 ENCOUNTER — HOSPITAL ENCOUNTER (EMERGENCY)
Age: 53
Discharge: HOME | End: 2018-05-01
Attending: EMERGENCY MEDICINE
Payer: COMMERCIAL

## 2018-05-01 VITALS
WEIGHT: 170 LBS | HEART RATE: 100 BPM | OXYGEN SATURATION: 98 % | BODY MASS INDEX: 29.02 KG/M2 | SYSTOLIC BLOOD PRESSURE: 106 MMHG | DIASTOLIC BLOOD PRESSURE: 70 MMHG | HEIGHT: 64 IN | TEMPERATURE: 97.6 F | RESPIRATION RATE: 16 BRPM

## 2018-05-01 DIAGNOSIS — M25.511 ACUTE PAIN OF RIGHT SHOULDER: Primary | ICD-10-CM

## 2018-05-01 PROCEDURE — 73030 X-RAY EXAM OF SHOULDER: CPT

## 2018-05-01 PROCEDURE — 99283 EMERGENCY DEPT VISIT LOW MDM: CPT | Performed by: EMERGENCY MEDICINE

## 2018-05-01 RX ORDER — CYCLOBENZAPRINE HCL 10 MG
10 TABLET ORAL
Qty: 15 TAB | Refills: 0 | Status: SHIPPED | OUTPATIENT
Start: 2018-05-01 | End: 2018-10-18

## 2018-05-01 NOTE — DISCHARGE INSTRUCTIONS
Wear the sling as needed for pain relief. Follow-up with  Your primary care doctor and with an orthopedic doctor for further evaluation. .    Please return to the emergency department with any fevers, redness, swelling, worsening symptoms, or additional concerns.

## 2018-05-01 NOTE — ED NOTES
I have reviewed discharge instructions with the patient. The patient verbalized understanding. Patient left ED via Discharge Method: ambulatory to Home with patient. Opportunity for questions and clarification provided. Patient given 1 scripts. No e-sign. To continue your aftercare when you leave the hospital, you may receive an automated call from our care team to check in on how you are doing. This is a free service and part of our promise to provide the best care and service to meet your aftercare needs.  If you have questions, or wish to unsubscribe from this service please call [phone]. Thank you for Choosing our [facility].

## 2018-05-01 NOTE — ED PROVIDER NOTES
HPI Comments: 50year old lady presents with concerns about pain in her right shoulder she says it got worse over the past couple of days. She says that her pain is a sharp pain that prevents her from being able to fully move her shoulder. Pain is reported as an 8 out of 10 and says it does not radiate anywhere. Nothing seems to make it any better but motion makes it much worse. She denies any injury and says it does not feel  Swollen, hot, or red. Elements of this note were created using speech recognition software. As such, errors of speech recognition may be present. Patient is a 50 y.o. female presenting with arm pain. The history is provided by the patient.    Arm Pain           Past Medical History:   Diagnosis Date    Anemia     Arrhythmia     palpitations, moderate mitral valve regurge    Arthritis     lower back osteo    Asthma     uses inhalers    Cardiomyopathy     due to murmur (patient states leaky valve)    [facility]     Chronic pain     stomach 8 yrs    Coagulation defect ([address]     eliquis    COPD     bronchitis chronic controlled by inhalers    Decreased cardiac ejection fraction 11/27/2017    EF 45-50% per echo 11/27/17    Depression     controlled      Diverticulitis     GERD (gastroesophageal reflux disease)     fair control with med    Heart murmur     Hypertension     IBS (irritable bowel syndrome)     IC (interstitial cystitis)     Insomnia     Mitral valve regurgitation, rheumatic     followed Dr. Prasanna Heller Palpitations 5/16/2016    Psychiatric disorder     anxiety    Requires oxygen therapy     3l/min at hs. prn during the days as needed    Rheumatic aortic insufficiency 5/16/2016    Smoker          Thromboembolus ([address]     bilateral legs    Tobacco abuse disorder 5/16/2016    Vitamin D deficiency        Past Surgical History:   Procedure Laterality Date    BIOPSY OF BREAST, INCISIONAL Left     lymph node , left, patient states her bx neg, but high risk    COLONOSCOPY      8 polyps removed, diverticulitis    CYSTOSCOPY  8-23-11    bladder dilitation    EGD      HX APPENDECTOMY  2006    HX HEART CATHETERIZATION  5/27/2011    no stents    HX LAP CHOLECYSTECTOMY  6/6/2011    HX NATASHA AND BSO  2004    fibroid tumors, hyst    HX UROLOGICAL      cystoscopy         Family History:   Problem Relation Age of Onset    Heart Failure Father 76     chf    Heart Disease Father     Diabetes Sister     Thyroid Disease Sister        Social History     Social History    Marital status: LEGALLY      Spouse name: N/A    Number of children: N/A    Years of education: N/A     Occupational History    Not on file. Social History Main Topics    Smoking status: Former Smoker     Packs/day: 1.00     Years: 25.00     Quit date: 3/23/2018    Smokeless tobacco: Never Used      Comment: no cigarettes for 2 weeks     Alcohol use No    Drug use: No    Sexual activity: Not on file     Other Topics Concern    Not on file     Social History Narrative         ALLERGIES: Aspirin; Bentyl [dicyclomine]; Toradol [ketorolac]; Ultram [tramadol]; and Zofran [ondansetron hcl (pf)]    Review of Systems   Constitutional: Negative for chills and fever. Respiratory: Negative. Cardiovascular: Negative. Gastrointestinal: Negative. Musculoskeletal: Positive for arthralgias. Negative for joint swelling. Skin: Negative for color change and wound. Neurological: Negative. Vitals:    05/01/18 0815   BP: 105/67   Pulse: (!) 104   Resp: 16   Temp: 97.9 °F (36.6 °C)   SpO2: 98%   Weight: 77.1 kg (170 lb)   Height: 5' 4\" (1.626 m)            Physical Exam   Constitutional: She is oriented to person, place, and time. She appears well-developed and well-nourished. Pulmonary/Chest: Effort normal and breath sounds normal.   Musculoskeletal:   Patient has reduced passive range of motion in her shoulder. With  Abduction I can get her to about 30°. Extension gets to about 40°. And she has pain with any attempt at rotation. Neurological: She is alert and oriented to person, place, and time. Skin: Skin is warm and dry. Nursing note and vitals reviewed. MDM  Number of Diagnoses or Management Options  Diagnosis management comments: Patient's exam is not consistent with a septic arthritis. She presents more like a capsular injury or frozen shoulder. I will get an x-ray to evaluate for occult bony abnormality.         ED Course       Procedures

## 2018-05-01 NOTE — ED TRIAGE NOTES
Pt to ED with right shoulder joint pain. Unable to raise shoulder in triage. CMS is normal, no deformity or external injury noted.

## 2018-05-19 ENCOUNTER — TELEPHONE (OUTPATIENT)
Dept: CARDIOLOGY | Age: 53
End: 2018-05-19

## 2018-05-19 ENCOUNTER — APPOINTMENT (OUTPATIENT)
Dept: GENERAL RADIOLOGY | Age: 53
DRG: 663 | End: 2018-05-19
Attending: EMERGENCY MEDICINE
Payer: COMMERCIAL

## 2018-05-19 ENCOUNTER — APPOINTMENT (OUTPATIENT)
Dept: CT IMAGING | Age: 53
DRG: 663 | End: 2018-05-19
Attending: EMERGENCY MEDICINE
Payer: COMMERCIAL

## 2018-05-19 ENCOUNTER — HOSPITAL ENCOUNTER (INPATIENT)
Age: 53
LOS: 2 days | Discharge: LEFT BEFORE DISCHARGE | DRG: 663 | End: 2018-05-21
Attending: EMERGENCY MEDICINE | Admitting: INTERNAL MEDICINE
Payer: COMMERCIAL

## 2018-05-19 DIAGNOSIS — R60.9 PERIPHERAL EDEMA: ICD-10-CM

## 2018-05-19 DIAGNOSIS — K92.2 GASTROINTESTINAL HEMORRHAGE, UNSPECIFIED GASTROINTESTINAL HEMORRHAGE TYPE: ICD-10-CM

## 2018-05-19 DIAGNOSIS — D64.9 ANEMIA, UNSPECIFIED TYPE: Primary | ICD-10-CM

## 2018-05-19 PROBLEM — E78.5 HYPERLIPIDEMIA: Chronic | Status: ACTIVE | Noted: 2018-05-19
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
## 2018-05-19 PROBLEM — I51.89 DIASTOLIC DYSFUNCTION: Chronic | Status: ACTIVE | Noted: 2018-05-19

## 2018-05-19 PROBLEM — M25.512 LEFT SHOULDER PAIN: Chronic | Status: ACTIVE | Noted: 2018-05-19

## 2018-05-19 PROBLEM — J44.9 COPD (CHRONIC OBSTRUCTIVE PULMONARY DISEASE) (HCC): Chronic | Status: ACTIVE | Noted: 2018-05-19

## 2018-05-19 PROBLEM — J96.11 CHRONIC RESPIRATORY FAILURE WITH HYPOXIA (HCC): Chronic | Status: ACTIVE | Noted: 2018-05-19

## 2018-05-19 LAB
ALBUMIN SERPL-MCNC: 3.4 G/DL (ref 3.5–5)
ALBUMIN/GLOB SERPL: 0.9 {RATIO} (ref 1.2–3.5)
ALP SERPL-CCNC: 91 U/L (ref 50–136)
ALT SERPL-CCNC: 27 U/L (ref 12–65)
ANION GAP SERPL CALC-SCNC: 10 MMOL/L (ref 7–16)
AST SERPL-CCNC: 35 U/L (ref 15–37)
ATRIAL RATE: 104 BPM
BASOPHILS # BLD: 0 K/UL (ref 0–0.2)
BASOPHILS NFR BLD: 0 % (ref 0–2)
BILIRUB SERPL-MCNC: 0.2 MG/DL (ref 0.2–1.1)
BNP SERPL-MCNC: 66 PG/ML
BUN SERPL-MCNC: 14 MG/DL (ref 6–23)
CALCIUM SERPL-MCNC: 9.1 MG/DL (ref 8.3–10.4)
CALCULATED P AXIS, ECG09: 74 DEGREES
CALCULATED R AXIS, ECG10: 43 DEGREES
CALCULATED T AXIS, ECG11: 69 DEGREES
CHLORIDE SERPL-SCNC: 106 MMOL/L (ref 98–107)
CO2 SERPL-SCNC: 24 MMOL/L (ref 21–32)
CREAT SERPL-MCNC: 1.05 MG/DL (ref 0.6–1)
DIAGNOSIS, 93000: NORMAL
DIFFERENTIAL METHOD BLD: ABNORMAL
EOSINOPHIL # BLD: 0.1 K/UL (ref 0–0.8)
EOSINOPHIL NFR BLD: 1 % (ref 0.5–7.8)
ERYTHROCYTE [DISTWIDTH] IN BLOOD BY AUTOMATED COUNT: 13.9 % (ref 11.9–14.6)
FERRITIN SERPL-MCNC: 13 NG/ML (ref 8–388)
FOLATE SERPL-MCNC: 8.5 NG/ML (ref 3.1–17.5)
GLOBULIN SER CALC-MCNC: 3.8 G/DL (ref 2.3–3.5)
GLUCOSE SERPL-MCNC: 106 MG/DL (ref 65–100)
HCT VFR BLD AUTO: 24.4 % (ref 35.8–46.3)
HCT VFR BLD AUTO: 30.4 % (ref 35.8–46.3)
HGB BLD-MCNC: 7.6 G/DL (ref 11.7–15.4)
HGB BLD-MCNC: 9.5 G/DL (ref 11.7–15.4)
IMM GRANULOCYTES # BLD: 0 K/UL (ref 0–0.5)
IMM GRANULOCYTES NFR BLD AUTO: 0 % (ref 0–5)
IRON SATN MFR SERPL: 7 %
IRON SERPL-MCNC: 30 UG/DL (ref 35–150)
LYMPHOCYTES # BLD: 1.9 K/UL (ref 0.5–4.6)
LYMPHOCYTES NFR BLD: 31 % (ref 13–44)
MAGNESIUM SERPL-MCNC: 1.9 MG/DL (ref 1.8–2.4)
MCH RBC QN AUTO: 27.4 PG (ref 26.1–32.9)
MCHC RBC AUTO-ENTMCNC: 31.1 G/DL (ref 31.4–35)
MCV RBC AUTO: 88.1 FL (ref 79.6–97.8)
MONOCYTES # BLD: 0.5 K/UL (ref 0.1–1.3)
MONOCYTES NFR BLD: 8 % (ref 4–12)
NEUTS SEG # BLD: 3.7 K/UL (ref 1.7–8.2)
NEUTS SEG NFR BLD: 60 % (ref 43–78)
P-R INTERVAL, ECG05: 132 MS
PLATELET # BLD AUTO: 370 K/UL (ref 150–450)
PMV BLD AUTO: 8.8 FL (ref 10.8–14.1)
POTASSIUM SERPL-SCNC: 4.1 MMOL/L (ref 3.5–5.1)
PROT SERPL-MCNC: 7.2 G/DL (ref 6.3–8.2)
Q-T INTERVAL, ECG07: 358 MS
QRS DURATION, ECG06: 90 MS
QTC CALCULATION (BEZET), ECG08: 470 MS
RBC # BLD AUTO: 2.77 M/UL (ref 4.05–5.25)
SODIUM SERPL-SCNC: 140 MMOL/L (ref 136–145)
TIBC SERPL-MCNC: 439 UG/DL (ref 250–450)
TROPONIN I SERPL-MCNC: <0.02 NG/ML (ref 0.02–0.05)
TROPONIN I SERPL-MCNC: <0.02 NG/ML (ref 0.02–0.05)
VENTRICULAR RATE, ECG03: 104 BPM
VIT B12 SERPL-MCNC: 394 PG/ML (ref 193–986)
WBC # BLD AUTO: 6.1 K/UL (ref 4.3–11.1)

## 2018-05-19 PROCEDURE — 71045 X-RAY EXAM CHEST 1 VIEW: CPT

## 2018-05-19 PROCEDURE — 74011250636 HC RX REV CODE- 250/636: Performed by: INTERNAL MEDICINE

## 2018-05-19 PROCEDURE — 82607 VITAMIN B-12: CPT | Performed by: INTERNAL MEDICINE

## 2018-05-19 PROCEDURE — 74011000250 HC RX REV CODE- 250: Performed by: INTERNAL MEDICINE

## 2018-05-19 PROCEDURE — P9040 RBC LEUKOREDUCED IRRADIATED: HCPCS | Performed by: EMERGENCY MEDICINE

## 2018-05-19 PROCEDURE — 74011250637 HC RX REV CODE- 250/637: Performed by: INTERNAL MEDICINE

## 2018-05-19 PROCEDURE — 30233N1 TRANSFUSION OF NONAUTOLOGOUS RED BLOOD CELLS INTO PERIPHERAL VEIN, PERCUTANEOUS APPROACH: ICD-10-PCS | Performed by: EMERGENCY MEDICINE

## 2018-05-19 PROCEDURE — 74011250637 HC RX REV CODE- 250/637: Performed by: EMERGENCY MEDICINE

## 2018-05-19 PROCEDURE — 77030039270 HC TU BLD FLTR CARD -A

## 2018-05-19 PROCEDURE — 36430 TRANSFUSION BLD/BLD COMPNT: CPT

## 2018-05-19 PROCEDURE — 94640 AIRWAY INHALATION TREATMENT: CPT

## 2018-05-19 PROCEDURE — 70450 CT HEAD/BRAIN W/O DYE: CPT

## 2018-05-19 PROCEDURE — 83880 ASSAY OF NATRIURETIC PEPTIDE: CPT | Performed by: EMERGENCY MEDICINE

## 2018-05-19 PROCEDURE — 94664 DEMO&/EVAL PT USE INHALER: CPT

## 2018-05-19 PROCEDURE — 84484 ASSAY OF TROPONIN QUANT: CPT | Performed by: EMERGENCY MEDICINE

## 2018-05-19 PROCEDURE — 86923 COMPATIBILITY TEST ELECTRIC: CPT | Performed by: EMERGENCY MEDICINE

## 2018-05-19 PROCEDURE — 93005 ELECTROCARDIOGRAM TRACING: CPT | Performed by: EMERGENCY MEDICINE

## 2018-05-19 PROCEDURE — 85018 HEMOGLOBIN: CPT | Performed by: INTERNAL MEDICINE

## 2018-05-19 PROCEDURE — 99284 EMERGENCY DEPT VISIT MOD MDM: CPT | Performed by: EMERGENCY MEDICINE

## 2018-05-19 PROCEDURE — 36415 COLL VENOUS BLD VENIPUNCTURE: CPT | Performed by: INTERNAL MEDICINE

## 2018-05-19 PROCEDURE — 85025 COMPLETE CBC W/AUTO DIFF WBC: CPT | Performed by: EMERGENCY MEDICINE

## 2018-05-19 PROCEDURE — 82746 ASSAY OF FOLIC ACID SERUM: CPT | Performed by: INTERNAL MEDICINE

## 2018-05-19 PROCEDURE — 65270000029 HC RM PRIVATE

## 2018-05-19 PROCEDURE — P9016 RBC LEUKOCYTES REDUCED: HCPCS | Performed by: EMERGENCY MEDICINE

## 2018-05-19 PROCEDURE — 83540 ASSAY OF IRON: CPT | Performed by: INTERNAL MEDICINE

## 2018-05-19 PROCEDURE — 83735 ASSAY OF MAGNESIUM: CPT | Performed by: EMERGENCY MEDICINE

## 2018-05-19 PROCEDURE — 94760 N-INVAS EAR/PLS OXIMETRY 1: CPT

## 2018-05-19 PROCEDURE — 82728 ASSAY OF FERRITIN: CPT | Performed by: INTERNAL MEDICINE

## 2018-05-19 PROCEDURE — 93005 ELECTROCARDIOGRAM TRACING: CPT | Performed by: INTERNAL MEDICINE

## 2018-05-19 PROCEDURE — 86900 BLOOD TYPING SEROLOGIC ABO: CPT | Performed by: EMERGENCY MEDICINE

## 2018-05-19 PROCEDURE — C9113 INJ PANTOPRAZOLE SODIUM, VIA: HCPCS | Performed by: INTERNAL MEDICINE

## 2018-05-19 PROCEDURE — 80053 COMPREHEN METABOLIC PANEL: CPT | Performed by: EMERGENCY MEDICINE

## 2018-05-19 RX ORDER — NIFEDIPINE 30 MG/1
60 TABLET, EXTENDED RELEASE ORAL DAILY
Status: DISCONTINUED | OUTPATIENT
Start: 2018-05-19 | End: 2018-05-21 | Stop reason: HOSPADM

## 2018-05-19 RX ORDER — BUDESONIDE 0.5 MG/2ML
500 INHALANT ORAL
Status: DISCONTINUED | OUTPATIENT
Start: 2018-05-19 | End: 2018-05-21 | Stop reason: HOSPADM

## 2018-05-19 RX ORDER — PAROXETINE HYDROCHLORIDE 20 MG/1
20 TABLET, FILM COATED ORAL DAILY
Status: DISCONTINUED | OUTPATIENT
Start: 2018-05-19 | End: 2018-05-21 | Stop reason: HOSPADM

## 2018-05-19 RX ORDER — BUDESONIDE 0.5 MG/2ML
500 INHALANT ORAL
Status: DISCONTINUED | OUTPATIENT
Start: 2018-05-19 | End: 2018-05-19

## 2018-05-19 RX ORDER — ALBUTEROL SULFATE 0.83 MG/ML
2.5 SOLUTION RESPIRATORY (INHALATION)
Status: DISCONTINUED | OUTPATIENT
Start: 2018-05-19 | End: 2018-05-19

## 2018-05-19 RX ORDER — ACETAMINOPHEN 325 MG/1
650 TABLET ORAL
Status: DISCONTINUED | OUTPATIENT
Start: 2018-05-19 | End: 2018-05-21 | Stop reason: HOSPADM

## 2018-05-19 RX ORDER — AMITRIPTYLINE HYDROCHLORIDE 50 MG/1
100 TABLET, FILM COATED ORAL
Status: DISCONTINUED | OUTPATIENT
Start: 2018-05-19 | End: 2018-05-21 | Stop reason: HOSPADM

## 2018-05-19 RX ORDER — PROMETHAZINE HYDROCHLORIDE 25 MG/1
25 TABLET ORAL
Status: COMPLETED | OUTPATIENT
Start: 2018-05-19 | End: 2018-05-19

## 2018-05-19 RX ORDER — ALBUTEROL SULFATE 0.83 MG/ML
2.5 SOLUTION RESPIRATORY (INHALATION)
Status: DISCONTINUED | OUTPATIENT
Start: 2018-05-19 | End: 2018-05-21 | Stop reason: HOSPADM

## 2018-05-19 RX ORDER — BISACODYL 5 MG
10 TABLET, DELAYED RELEASE (ENTERIC COATED) ORAL
Status: COMPLETED | OUTPATIENT
Start: 2018-05-19 | End: 2018-05-19

## 2018-05-19 RX ORDER — SODIUM CHLORIDE 9 MG/ML
250 INJECTION, SOLUTION INTRAVENOUS AS NEEDED
Status: DISCONTINUED | OUTPATIENT
Start: 2018-05-19 | End: 2018-05-21 | Stop reason: HOSPADM

## 2018-05-19 RX ORDER — ALBUTEROL SULFATE 2.5 MG/.5ML
2.5 SOLUTION RESPIRATORY (INHALATION)
Status: DISCONTINUED | OUTPATIENT
Start: 2018-05-19 | End: 2018-05-21 | Stop reason: HOSPADM

## 2018-05-19 RX ORDER — HYDROCODONE BITARTRATE AND ACETAMINOPHEN 10; 325 MG/1; MG/1
1 TABLET ORAL
Status: COMPLETED | OUTPATIENT
Start: 2018-05-19 | End: 2018-05-19

## 2018-05-19 RX ORDER — HYDROCODONE BITARTRATE AND ACETAMINOPHEN 5; 325 MG/1; MG/1
1 TABLET ORAL
Status: DISCONTINUED | OUTPATIENT
Start: 2018-05-19 | End: 2018-05-19 | Stop reason: SDUPTHER

## 2018-05-19 RX ORDER — PANTOPRAZOLE SODIUM 40 MG/1
40 TABLET, DELAYED RELEASE ORAL
Status: COMPLETED | OUTPATIENT
Start: 2018-05-19 | End: 2018-05-19

## 2018-05-19 RX ORDER — METOLAZONE 10 MG/1
10 TABLET ORAL DAILY
Qty: 3 TAB | Refills: 0 | Status: SHIPPED | OUTPATIENT
Start: 2018-05-19 | End: 2018-05-19

## 2018-05-19 RX ORDER — ONDANSETRON 2 MG/ML
4 INJECTION INTRAMUSCULAR; INTRAVENOUS
Status: DISCONTINUED | OUTPATIENT
Start: 2018-05-19 | End: 2018-05-21 | Stop reason: HOSPADM

## 2018-05-19 RX ORDER — HYDROCODONE BITARTRATE AND ACETAMINOPHEN 5; 325 MG/1; MG/1
1 TABLET ORAL
Status: DISCONTINUED | OUTPATIENT
Start: 2018-05-19 | End: 2018-05-20

## 2018-05-19 RX ORDER — NALOXONE HYDROCHLORIDE 0.4 MG/ML
0.4 INJECTION, SOLUTION INTRAMUSCULAR; INTRAVENOUS; SUBCUTANEOUS AS NEEDED
Status: DISCONTINUED | OUTPATIENT
Start: 2018-05-19 | End: 2018-05-21 | Stop reason: HOSPADM

## 2018-05-19 RX ORDER — PRAVASTATIN SODIUM 20 MG/1
40 TABLET ORAL
Status: DISCONTINUED | OUTPATIENT
Start: 2018-05-19 | End: 2018-05-21 | Stop reason: HOSPADM

## 2018-05-19 RX ORDER — SODIUM CHLORIDE 0.9 % (FLUSH) 0.9 %
5-10 SYRINGE (ML) INJECTION EVERY 8 HOURS
Status: DISCONTINUED | OUTPATIENT
Start: 2018-05-20 | End: 2018-05-21 | Stop reason: HOSPADM

## 2018-05-19 RX ORDER — MORPHINE SULFATE 2 MG/ML
2 INJECTION, SOLUTION INTRAMUSCULAR; INTRAVENOUS
Status: DISCONTINUED | OUTPATIENT
Start: 2018-05-19 | End: 2018-05-20

## 2018-05-19 RX ORDER — OXYBUTYNIN CHLORIDE 5 MG/1
5 TABLET ORAL 2 TIMES DAILY
Status: DISCONTINUED | OUTPATIENT
Start: 2018-05-19 | End: 2018-05-21 | Stop reason: HOSPADM

## 2018-05-19 RX ORDER — SODIUM CHLORIDE 0.9 % (FLUSH) 0.9 %
5-10 SYRINGE (ML) INJECTION AS NEEDED
Status: DISCONTINUED | OUTPATIENT
Start: 2018-05-19 | End: 2018-05-21 | Stop reason: HOSPADM

## 2018-05-19 RX ORDER — POLYETHYLENE GLYCOL 3350 17 G/17G
255 POWDER, FOR SOLUTION ORAL ONCE
Status: COMPLETED | OUTPATIENT
Start: 2018-05-19 | End: 2018-05-19

## 2018-05-19 RX ORDER — FUROSEMIDE 40 MG/1
40 TABLET ORAL DAILY
Status: DISCONTINUED | OUTPATIENT
Start: 2018-05-19 | End: 2018-05-20

## 2018-05-19 RX ORDER — GABAPENTIN 400 MG/1
400 CAPSULE ORAL 3 TIMES DAILY
Status: DISCONTINUED | OUTPATIENT
Start: 2018-05-19 | End: 2018-05-21 | Stop reason: HOSPADM

## 2018-05-19 RX ORDER — FUROSEMIDE 10 MG/ML
40 INJECTION INTRAMUSCULAR; INTRAVENOUS ONCE
Status: COMPLETED | OUTPATIENT
Start: 2018-05-19 | End: 2018-05-19

## 2018-05-19 RX ADMIN — ALBUTEROL SULFATE 2.5 MG: 2.5 SOLUTION RESPIRATORY (INHALATION) at 19:30

## 2018-05-19 RX ADMIN — PANTOPRAZOLE SODIUM 40 MG: 40 TABLET, DELAYED RELEASE ORAL at 06:36

## 2018-05-19 RX ADMIN — MORPHINE SULFATE 2 MG: 2 INJECTION, SOLUTION INTRAMUSCULAR; INTRAVENOUS at 13:46

## 2018-05-19 RX ADMIN — FUROSEMIDE 40 MG: 10 INJECTION, SOLUTION INTRAMUSCULAR; INTRAVENOUS at 13:21

## 2018-05-19 RX ADMIN — POLYETHYLENE GLYCOL 3350 255 G: 17 POWDER, FOR SOLUTION ORAL at 13:29

## 2018-05-19 RX ADMIN — PAROXETINE HYDROCHLORIDE 20 MG: 20 TABLET, FILM COATED ORAL at 12:41

## 2018-05-19 RX ADMIN — BISACODYL 10 MG: 5 TABLET, COATED ORAL at 12:40

## 2018-05-19 RX ADMIN — BUDESONIDE 500 MCG: 0.5 INHALANT RESPIRATORY (INHALATION) at 19:30

## 2018-05-19 RX ADMIN — OXYBUTYNIN CHLORIDE 5 MG: 5 TABLET ORAL at 18:27

## 2018-05-19 RX ADMIN — SODIUM CHLORIDE 40 MG: 9 INJECTION INTRAMUSCULAR; INTRAVENOUS; SUBCUTANEOUS at 07:06

## 2018-05-19 RX ADMIN — NIFEDIPINE 60 MG: 30 TABLET, FILM COATED, EXTENDED RELEASE ORAL at 12:41

## 2018-05-19 RX ADMIN — GABAPENTIN 400 MG: 400 CAPSULE ORAL at 21:47

## 2018-05-19 RX ADMIN — HYDROCODONE BITARTRATE AND ACETAMINOPHEN 1 TABLET: 10; 325 TABLET ORAL at 06:36

## 2018-05-19 RX ADMIN — SODIUM CHLORIDE 40 MG: 9 INJECTION INTRAMUSCULAR; INTRAVENOUS; SUBCUTANEOUS at 21:47

## 2018-05-19 RX ADMIN — HYDROCODONE BITARTRATE AND ACETAMINOPHEN 1 TABLET: 5; 325 TABLET ORAL at 18:36

## 2018-05-19 RX ADMIN — PRAVASTATIN SODIUM 40 MG: 20 TABLET ORAL at 21:47

## 2018-05-19 RX ADMIN — AMITRIPTYLINE HYDROCHLORIDE 100 MG: 50 TABLET, FILM COATED ORAL at 21:47

## 2018-05-19 RX ADMIN — OXYBUTYNIN CHLORIDE 5 MG: 5 TABLET ORAL at 12:41

## 2018-05-19 RX ADMIN — MORPHINE SULFATE 2 MG: 2 INJECTION, SOLUTION INTRAMUSCULAR; INTRAVENOUS at 19:52

## 2018-05-19 RX ADMIN — PROMETHAZINE HYDROCHLORIDE 25 MG: 25 TABLET ORAL at 05:00

## 2018-05-19 RX ADMIN — GABAPENTIN 400 MG: 400 CAPSULE ORAL at 13:45

## 2018-05-19 NOTE — H&P
Hospitalist H&P Note     Admit Date:  2018  4:25 AM   Name:  Lorraine Chance   Age:  50 y.o.  :  1965   MRN:  [id number]   PCP:  Yvette Tesfaye MD  Treatment Team: Attending Provider: Kentrell Bosch MD; Primary Nurse: Jason City.; Primary Nurse: Abelardo Raza RN    HPI:     CC: shortness of breath       Ms. Yvrose Cline is a 49 yo female with PMH of takotsubo cardiomyopathy (EF 45-50%) , LVDD1,  DVT / PE on eliquis, COPD on 3 L O2 QHS/prn, evaluated with dyspnea, edema and falls who is found with symptomatic anemia HGB of 7.6. Prior HGB was 10.2018 and she does admit to melena over prior weeks. She is pending S GI evaluation with EGD for \"ulcers\"  She has current left UE sling due to \":frozen\" shoulder and is pending ortho consult and she did hit her head with a fall today, CT head pending. 2 units PRBC ordered in the ED    10 systems reviewed and negative except as noted in HPI. - has hearing and vision loss, chest pain, palpitations, joint pain, headaches, sweats and mood changes                Past Medical History:   Diagnosis Date    Anemia     Arrhythmia     palpitations, moderate mitral valve regurge    Arthritis     lower back osteo    Asthma     uses inhalers    Cardiomyopathy     due to murmur (patient states leaky valve)    CHF (congestive heart failure) (HCC)     Chronic pain     stomach 8 yrs    Coagulation defect ([address]     eliquis    COPD     bronchitis chronic controlled by inhalers    Decreased cardiac ejection fraction 2017    EF 45-50% per echo 17    Depression     controlled      Diverticulitis     GERD (gastroesophageal reflux disease)     fair control with med    Heart murmur     Hypertension     IBS (irritable bowel syndrome)     IC (interstitial cystitis)     Insomnia     Mitral valve regurgitation, rheumatic     followed Dr. Susie Portillo Palpitations 2016    Psychiatric disorder     anxiety    Requires oxygen therapy     3l/min at hs. prn during the days as needed    Rheumatic aortic insufficiency 5/16/2016    Smoker          Thromboembolus [facility])     bilateral legs    Tobacco abuse disorder 5/16/2016    Vitamin D deficiency       Past Surgical History:   Procedure Laterality Date    BIOPSY OF BREAST, INCISIONAL Left     lymph node , left, patient states her bx neg, but high risk    COLONOSCOPY      8 polyps removed, diverticulitis    CYSTOSCOPY  8-23-11    bladder dilitation    EGD      HX APPENDECTOMY  2006    HX HEART CATHETERIZATION  5/27/2011    no stents    HX LAP CHOLECYSTECTOMY  6/6/2011    HX NATASHA AND BSO  2004    fibroid tumors, hyst    HX UROLOGICAL      cystoscopy      Allergies   Allergen Reactions    Aspirin Other (comments)     Inflames IBS per pt    Bentyl [Dicyclomine] Itching    Toradol [Ketorolac] Hives    Ultram [Tramadol] Nausea and Vomiting    Zofran [Ondansetron Hcl (Pf)] Nausea and Vomiting      Social History   Substance Use Topics    Smoking status: Former Smoker     Packs/day: 1.00     Years: 25.00     Quit date: 3/23/2018    Smokeless tobacco: Never Used      Comment: no cigarettes for 2 weeks     Alcohol use No      Family History   Problem Relation Age of Onset    Heart Failure Father 76     chf    Heart Disease Father     Diabetes Sister     Thyroid Disease Sister       Immunization History   Administered Date(s) Administered    Influenza Vaccine 01/01/2012, 10/03/2013    ZZZ-RETIRED (DO NOT USE) Pneumococcal Vaccine (Unspecified Type) 02/06/2012     PTA Medications:  Prior to Admission Medications   Prescriptions Last Dose Informant Patient Reported? Taking? HYDROcodone-acetaminophen (NORCO) 7.5-325 mg per tablet   No No   Sig: Take 1 Tab by mouth every six (6) hours as needed. Max Daily Amount: 4 Tabs. Patient taking differently: Take 1 Tab by mouth every six (6) hours as needed. Indications: may take on the dos if needed   Svjuzw-Firnm-K. Blue-Sal-NaPhos (URIMAR-T) 120-0.12-10.8 mg tab   No No   Sig: Take 1 Tab by mouth four (4) times daily as needed. Prn dysuria   Patient taking differently: Take 1 Tab by mouth four (4) times daily as needed. Prn dysuria  Ordered by Dr Bharathi Mcintosh   NIFEdipine ER (PROCARDIA XL) 60 mg ER tablet   No No   Sig: Take 1 Tab by mouth daily. Patient taking differently: Take 60 mg by mouth daily. Indications: am- take on the dos   Oxygen   Yes No   Sig: nightly. Indications: 3l/min at hs and prn during the day   PANTOPRAZOLE SODIUM (PROTONIX PO)   Yes No   Sig: Take 40 mg by mouth daily. Indications: am, take on the dos. does not know the dosage   PARoxetine (PAXIL) 20 mg tablet   Yes No   Sig: Take 20 mg by mouth daily. VENTOLIN HFA 90 mcg/actuation inhaler   Yes No   Sig: Take 2 Puffs by inhalation every four (4) hours as needed. albuterol (ACCUNEB) 1.25 mg/3 mL nebu   Yes No   Si.25 mg by Nebulization route three (3) times daily. Indications: use on the dos   amitriptyline (ELAVIL) 100 mg tablet   No No   Sig: Take 1 Tab by mouth nightly. ammonium lactate (LAC-HYDRIN) 12 % lotion   Yes No   Sig: Apply  to affected area as needed. rub in to affected area well   apixaban (ELIQUIS) 5 mg tablet   No No   Sig: Take 1 Tab by mouth two (2) times a day. budesonide-formoterol (SYMBICORT) 160-4.5 mcg/actuation HFA inhaler   Yes No   Sig: Take 1 Puff by inhalation two (2) times a day. Indications: BRONCHOSPASM PREVENTION WITH COPD, use on the dos   cyclobenzaprine (FLEXERIL) 10 mg tablet   No No   Sig: Take 1 Tab by mouth three (3) times daily as needed for Muscle Spasm(s). diphenhydrAMINE (BANOPHEN) 50 mg capsule   Yes No   Sig: Take 50 mg by mouth every six (6) hours as needed. furosemide (LASIX) 40 mg tablet   No No   Sig: Take 1 Tab by mouth daily. Patient taking differently: Take 40 mg by mouth daily. Indications: am   gabapentin (NEURONTIN) 400 mg capsule   No No   Sig: Take 1 Cap by mouth three (3) times daily.  Indications: take on the dos   nitroglycerin (NITROSTAT) 0.4 mg SL tablet   No No   Si Tab by SubLINGual route every five (5) minutes as needed for Chest Pain. oxybutynin (DITROPAN) 5 mg tablet   Yes No   Sig: Take 5 mg by mouth two (2) times a day. Indications: take on the dos   pravastatin (PRAVACHOL) 10 mg tablet   Yes No   Sig: Take 40 mg by mouth nightly. promethazine (PHENERGAN) 25 mg tablet   No No   Sig: Take 1 Tab by mouth every six (6) hours as needed. tiotropium (SPIRIVA WITH HANDIHALER) 18 mcg inhalation capsule  Self Yes No   Sig: Take 1 Cap by inhalation daily. Indications: BRONCHOSPASM PREVENTION WITH COPD, am- use on the dos      Facility-Administered Medications: None       Objective:   Patient Vitals for the past 24 hrs:   Temp Pulse Resp BP SpO2   18 - - - - 98 %   18 98.1 °F (36.7 °C) (!) 109 20 117/76 98 %     Oxygen Therapy  O2 Sat (%): 98 % (18)  Pulse via Oximetry: 107 beats per minute (18)  O2 Device: Room air (18)  No intake or output data in the 24 hours ending 18    Physical Exam:  General:    Alert. No distress  Eyes:   Normal sclera. Extraocular movements intact. PERRLA  ENT:  Normocephalic, atraumatic. Moist mucous membranes  CV:   RRR. No m/r/g.. trace edema  Lungs:  CTAB. No wheezing, rhonchi, or rales. Abdomen: Soft, nontender, nondistended. Present BS  Extremities: Warm and dry. .  Neurologic:  grossly intact. Skin:     No rashes or jaundice. Normal coloration  Psych:  Normal mood and affect. I reviewed the labs, imaging, EKGs, telemetry, and other studies done this admission.   Data Review:   Recent Results (from the past 24 hour(s))   MAGNESIUM    Collection Time: 18  5:09 AM   Result Value Ref Range    Magnesium 1.9 1.8 - 2.4 mg/dL   CBC WITH AUTOMATED DIFF    Collection Time: 18  5:09 AM   Result Value Ref Range    WBC 6.1 4.3 - 11.1 K/uL    RBC 2.77 (L) 4.05 - 5.25 M/uL    HGB 7.6 (L) 11.7 - 15.4 g/dL    HCT 24.4 (L) 35.8 - 46.3 %    MCV 88.1 79.6 - 97.8 FL    MCH 27.4 26.1 - 32.9 PG    MCHC 31.1 (L) 31.4 - 35.0 g/dL    RDW 13.9 11.9 - 14.6 %    PLATELET 137 320 - 581 K/uL    MPV 8.8 (L) 10.8 - 14.1 FL    DF AUTOMATED      NEUTROPHILS 60 43 - 78 %    LYMPHOCYTES 31 13 - 44 %    MONOCYTES 8 4.0 - 12.0 %    EOSINOPHILS 1 0.5 - 7.8 %    BASOPHILS 0 0.0 - 2.0 %    IMMATURE GRANULOCYTES 0 0.0 - 5.0 %    ABS. NEUTROPHILS 3.7 1.7 - 8.2 K/UL    ABS. LYMPHOCYTES 1.9 0.5 - 4.6 K/UL    ABS. MONOCYTES 0.5 0.1 - 1.3 K/UL    ABS. EOSINOPHILS 0.1 0.0 - 0.8 K/UL    ABS. BASOPHILS 0.0 0.0 - 0.2 K/UL    ABS. IMM. GRANS. 0.0 0.0 - 0.5 K/UL   BNP    Collection Time: 05/19/18  5:09 AM   Result Value Ref Range    BNP 66 pg/mL   TROPONIN I    Collection Time: 05/19/18  5:09 AM   Result Value Ref Range    Troponin-I, Qt. <0.02 (L) 0.02 - 3.11 NG/ML   METABOLIC PANEL, COMPREHENSIVE    Collection Time: 05/19/18  5:09 AM   Result Value Ref Range    Sodium 140 136 - 145 mmol/L    Potassium 4.1 3.5 - 5.1 mmol/L    Chloride 106 98 - 107 mmol/L    CO2 24 21 - 32 mmol/L    Anion gap 10 7 - 16 mmol/L    Glucose 106 (H) 65 - 100 mg/dL    BUN 14 6 - 23 MG/DL    Creatinine 1.05 (H) 0.6 - 1.0 MG/DL    GFR est AA >60 >60 ml/min/1.73m2    GFR est non-AA 59 (L) >60 ml/min/1.73m2    Calcium 9.1 8.3 - 10.4 MG/DL    Bilirubin, total 0.2 0.2 - 1.1 MG/DL    ALT (SGPT) 27 12 - 65 U/L    AST (SGOT) 35 15 - 37 U/L    Alk. phosphatase 91 50 - 136 U/L    Protein, total 7.2 6.3 - 8.2 g/dL    Albumin 3.4 (L) 3.5 - 5.0 g/dL    Globulin 3.8 (H) 2.3 - 3.5 g/dL    A-G Ratio 0.9 (L) 1.2 - 3.5         All Micro Results     None          Other Studies:  Xr Chest Port    Result Date: 5/19/2018  EXAM:  XR CHEST PORT INDICATION:  cough COMPARISON:  3/3/2018 FINDINGS: A portable AP radiograph of the chest was obtained at 0446 hours. The patient is on a cardiac monitor. The lungs are clear.   The cardiac and mediastinal contours and pulmonary vascularity are normal. The bones and soft tissues are grossly within normal limits. IMPRESSION: Normal chest.       Assessment and Plan:     Hospital Problems as of 5/19/2018  Date Reviewed: 1/12/2018          Codes Class Noted - Resolved POA    * (Principal)Anemia ICD-10-CM: D64.9  ICD-9-CM: 285.9  5/19/2018 - Present Yes        COPD (chronic obstructive pulmonary disease) ([address] (Chronic) ICD-10-CM: J44.9  ICD-9-CM: 313  5/19/2018 - Present Yes        Chronic respiratory failure with hypoxia (HCC) (Chronic) ICD-10-CM: J96.11  ICD-9-CM: 518.83, 799.02  5/19/2018 - Present Yes        Hyperlipidemia (Chronic) ICD-10-CM: E78.5  ICD-9-CM: 272.4  5/19/2018 - Present Yes        Diastolic dysfunction (Chronic) ICD-10-CM: I51.9  ICD-9-CM: 429.9  5/19/2018 - Present Yes        Fall ICD-10-CM: [address]  ICD-9-CM: E888.9  5/19/2018 - Present Yes        Left shoulder pain (Chronic) ICD-10-CM: M25.512  ICD-9-CM: 719.41  5/19/2018 - Present Yes        DVT, recurrent, lower extremity, chronic, bilateral (HCC) ICD-10-CM: I82.503  ICD-9-CM: 453.50  3/23/2018 - Present Yes        Takotsubo cardiomyopathy (Chronic) ICD-10-CM: I51.81  ICD-9-CM: 429.83  11/25/2017 - Present Yes        Depression ICD-10-CM: F32.9  ICD-9-CM: 746  9/3/2013 - Present Yes              · Symptomatic anemia: transfuse 2 units PRBC with lasix 40 mg IV in between units, hold eliquis, trend HGB every 8 hours, GI consult for EGD thus NPO status  · Cardiomyopathy: continue oral lasix, trend troponin and EKG pending   · Chronic hypoxic respiratory failure: continue O2 QHS and prn   · COPD: continue nebulizers  · DVT: holding eliquis in light of anemia as above   · Fall: pending CT head in light of anticoagulation   · Left shoulder pain: has sling with pending outpatient ortho consult     Discharge planning:  PT, OT.  Case management   DVT ppx: SCD  Code status:  Full  Estimated LOS:  Greater than 2 midnights  Risk:  high  Care plan: mother Linda Delgadillo [phone]   Signed:  Nicky Woodward Bill Evangelista MD

## 2018-05-19 NOTE — TELEPHONE ENCOUNTER
Pt called with multiple complaints. Reported medication reaction to answering service. However, states concern is over the swelling in her legs. Reports she feels SOB and has BLE edema. States that she pressed in on her legs and it \"left a dent that took 15 min to go away. \" Reports this started on Sunday but she didn't call office this week until now. Also reports more upset and emotional -- on Paxil per OB/GYN and see mental health. States she thinks her HGB is low because she has been dizzy. Reports still taking Eliquis. Instructed to go to ED for evaluation. Pt v/u.

## 2018-05-19 NOTE — PROGRESS NOTES
Ms. Ioana Jimenez is a 51 yo female with PMH of takotsubo cardiomyopathy (EF 45-50%) , LVDD1,  DVT / PE on eliquis, COPD on 3 L O2 QHS/prn, evaluated with dyspnea, edema and falls who is found with symptomatic anemia HGB of 7.6. She reports pain in her right lower quadrant. She is pending colonoscopy and EGD on Monday. She denies any nausea or vomiting. AM labs ordered.

## 2018-05-19 NOTE — ED TRIAGE NOTES
Pt arrived via [facility], called out for \"leg pain\". \"My legs are real swole, I take my fluid medicine but it ain't helping. My stomach is swell up too. I have diverticulosis, but I don't have an infection since I'm on my medicine. My stomach swells every other day. \"

## 2018-05-19 NOTE — DISCHARGE INSTRUCTIONS
Leg and Ankle Edema: Care Instructions  Your Care Instructions  Swelling in the legs, ankles, and feet is called edema. It is common after you sit or stand for a while. Long plane flights or car rides often cause swelling in the legs and feet. You may also have swelling if you have to stand for long periods of time at your job. Problems with the veins in the legs (varicose veins) and changes in hormones can also cause swelling. Sometimes the swelling in the ankles and feet is caused by a more serious problem, such as heart failure, infection, blood clots, or liver or kidney disease. Follow-up care is a key part of your treatment and safety. Be sure to make and go to all appointments, and call your doctor if you are having problems. It's also a good idea to know your test results and keep a list of the medicines you take. How can you care for yourself at home? · If your doctor gave you medicine, take it as prescribed. Call your doctor if you think you are having a problem with your medicine. · Whenever you are resting, raise your legs up. Try to keep the swollen area higher than the level of your heart. · Take breaks from standing or sitting in one position. ¨ Walk around to increase the blood flow in your lower legs. ¨ Move your feet and ankles often while you stand, or tighten and relax your leg muscles. · Wear support stockings. Put them on in the morning, before swelling gets worse. · Eat a balanced diet. Lose weight if you need to. · Limit the amount of salt (sodium) in your diet. Salt holds fluid in the body and may increase swelling. When should you call for help? Call 911 anytime you think you may need emergency care. For example, call if:  ? · You have symptoms of a blood clot in your lung (called a pulmonary embolism). These may include:  ¨ Sudden chest pain. ¨ Trouble breathing. ¨ Coughing up blood.    ?Call your doctor now or seek immediate medical care if:  ? · You have signs of a blood clot, such as:  ¨ Pain in your calf, back of the knee, thigh, or groin. ¨ Redness and swelling in your leg or groin. ? · You have symptoms of infection, such as:  ¨ Increased pain, swelling, warmth, or redness. ¨ Red streaks or pus. ¨ A fever. ? Watch closely for changes in your health, and be sure to contact your doctor if:  ? · Your swelling is getting worse. ? · You have new or worsening pain in your legs. ? · You do not get better as expected. Where can you learn more? Go to http://maria alejandra-nereyad.info/. Enter Q528 in the search box to learn more about \"Leg and Ankle Edema: Care Instructions. \"  Current as of: March 20, 2017  Content Version: 11.4  © 7099-3511 [employer]. Care instructions adapted under license by [employer] (which disclaims liability or warranty for this information). If you have questions about a medical condition or this instruction, always ask your healthcare professional. [place] [address] any warranty or liability for your use of this information.

## 2018-05-19 NOTE — ED PROVIDER NOTES
HPI Comments: Patient presents with a multitude of complaints, first and foremost she mentions her leg edema which is admittedly very mild. When the hemoglobin is 7.6 eventually returns she initially denies blood in stool or black tarry stools. A few minutes later she then confides she has had some black stools on and off for the past few days. She takes Elequis, due to history of DVTs. States she cannot take any form of NSAID, not even baby aspirin. She was recently seen with shoulder pain and thought to have a frozen shoulder on the left. She is in a sling, reports she is doing range of motion exercises as she was instructed. Patient has not yet seen Dr. Noah Cota, from orthopedics for follow-up. She reported having history of anemia at the initial interview, and initially reported her productive cough as being Green, and thick. When asking her about blood in the stool, she then describes minor hemoptysis, and then changes again to say that prior to coming she had a coughing spell with. Hemoptysis. Patient does relate getting dizzy when she assumes a standing position after having been sitting or laying. States that she fell twice tonight. No loss of consciousness though she does have a mild headache        Patient is a 50 y.o. female presenting with leg pain. The history is provided by the patient. Leg Pain    This is a new problem. The current episode started more than 2 days ago. The problem occurs constantly. The problem has not changed since onset. The symptoms are aggravated by standing. She has tried nothing for the symptoms.         Past Medical History:   Diagnosis Date    Anemia     Arrhythmia     palpitations, moderate mitral valve regurge    Arthritis     lower back osteo    Asthma     uses inhalers    Cardiomyopathy     due to murmur (patient states leaky valve)    CHF (congestive heart failure) (HCC)     Chronic pain     stomach 8 yrs    Coagulation defect ([address]     eliquis    COPD     bronchitis chronic controlled by inhalers    Decreased cardiac ejection fraction 11/27/2017    EF 45-50% per echo 11/27/17    Depression     controlled      Diverticulitis     GERD (gastroesophageal reflux disease)     fair control with med    Heart murmur     Hypertension     IBS (irritable bowel syndrome)     IC (interstitial cystitis)     Insomnia     Mitral valve regurgitation, rheumatic     followed Dr. Eulogio Starr Palpitations 5/16/2016    Psychiatric disorder     anxiety    Requires oxygen therapy     3l/min at hs. prn during the days as needed    Rheumatic aortic insufficiency 5/16/2016    Smoker          Thromboembolus ([address]     bilateral legs    Tobacco abuse disorder 5/16/2016    Vitamin D deficiency        Past Surgical History:   Procedure Laterality Date    BIOPSY OF BREAST, INCISIONAL Left     lymph node , left, patient states her bx neg, but high risk    COLONOSCOPY      8 polyps removed, diverticulitis    CYSTOSCOPY  8-23-11    bladder dilitation    EGD      HX APPENDECTOMY  2006    HX HEART CATHETERIZATION  5/27/2011    no stents    HX LAP CHOLECYSTECTOMY  6/6/2011    HX NATASHA AND BSO  2004    fibroid tumors, hyst    HX UROLOGICAL      cystoscopy         Family History:   Problem Relation Age of Onset    Heart Failure Father 76     chf    Heart Disease Father     Diabetes Sister     Thyroid Disease Sister        Social History     Social History    Marital status: LEGALLY      Spouse name: N/A    Number of children: N/A    Years of education: N/A     Occupational History    Not on file.      Social History Main Topics    Smoking status: Former Smoker     Packs/day: 1.00     Years: 25.00     Quit date: 3/23/2018    Smokeless tobacco: Never Used      Comment: no cigarettes for 2 weeks     Alcohol use No    Drug use: No    Sexual activity: Not on file     Other Topics Concern    Not on file     Social History Narrative         ALLERGIES: Aspirin; Bentyl [dicyclomine]; Toradol [ketorolac]; Ultram [tramadol]; and Zofran [ondansetron hcl (pf)]    Review of Systems   Constitutional: Negative for chills and fever. HENT: Negative for rhinorrhea and sore throat. Eyes: Negative for discharge and redness. Respiratory: Positive for cough. Negative for shortness of breath and wheezing. Cardiovascular: Negative for chest pain. Gastrointestinal: Positive for abdominal pain, nausea and vomiting (post-tussive). Negative for anal bleeding and blood in stool. Skin: Negative for rash. Neurological: Positive for dizziness, light-headedness and headaches. All other systems reviewed and are negative. Vitals:    05/19/18 0426 05/19/18 0448   BP: 117/76    Pulse: (!) 109    Resp: 20    Temp: 98.1 °F (36.7 °C)    SpO2: 98% 98%   Weight: 77.1 kg (170 lb)    Height: 5' 4\" (1.626 m)             Physical Exam   Constitutional: She is oriented to person, place, and time. She appears well-developed and well-nourished. No distress. HENT:   Head: Normocephalic and atraumatic. Eyes: Pupils are equal, round, and reactive to light. Right eye exhibits no discharge. Left eye exhibits no discharge. No scleral icterus. Pale conjunctiva   Neck: Normal range of motion. Neck supple. Cardiovascular: Normal rate, regular rhythm and normal heart sounds. Exam reveals no gallop. No murmur heard. Pulmonary/Chest: Effort normal and breath sounds normal. No respiratory distress. She has no wheezes. She has no rales. Abdominal: Soft. There is no tenderness. There is no guarding. Genitourinary:   Genitourinary Comments: deferred   Musculoskeletal: Normal range of motion. She exhibits edema. She exhibits no tenderness or deformity. Trace edema to legs   Neurological: She is alert and oriented to person, place, and time. She exhibits normal muscle tone. cni 2-12 grossly   Skin: Skin is warm and dry. She is not diaphoretic.    Psychiatric: She has a normal mood and affect. Her behavior is normal.   Nursing note and vitals reviewed. MDM  Number of Diagnoses or Management Options  Acute bronchitis, unspecified organism:   Peripheral edema:   Diagnosis management comments: Medical decision making note:  Mild peripheral edema BNP is normal  Hemoglobin is 7.6 this is 3.9 g lower than it was in January  Patient on blood thinners, and relates a history of melena  Transfuse order placed, Protonix ordered,  Admit to hospitalist  We'll get a CAT scan of the head on the way upstairs just to have all face is covered that I think the likelihood of intracranial injury from her fall is very low. This concludes the \"medical decision making note\" part of this emergency department visit note.           ED Course       Procedures

## 2018-05-19 NOTE — PROGRESS NOTES
Patient being admitted to 216 from ER  Chaplains have followed since 2009.   Jose Francisco robertson  Parent - olivia  Prior code status with no directives on file  Recurrent depression 2018  Multiple medical issues    Augusto Graves, staff [address], [facility]  /   [email or web address]

## 2018-05-19 NOTE — CONSULTS
[facility] Consult Note       Primary GI Physician: Dr. Mariza Zuluaga  Referring Physician:  Dr. Seb Bennett    Consult Date:  5/19/2018    Admit Date:  5/19/2018    Chief Complaint:  GIB    Subjective:     History of Present Illness:  Patient is a 50 y.o. female with PMH of takotsubo cardiomyopathy (EF 45-50%) , LVDD1,  DVT / PE on eliquis, COPD on 3 L O2 QHS/prn, who is seen in consultation at the request of Dr. Seb Bennett for GIB with anemia and report of one week hx of melena. Pt is followed by Dr. Nic Portillo at [address] and has hx of GERD and Erwin's with recurrent reports of dysphagia. She was to have an EGD 4/30 but this was cancelled as pt complained of severe shoulder pain due to frozen shoulder which also complains of today. Pt had a colonoscopy showing tics and IH but with very poor prep in 2011 by [facility]. Colonoscopy was repeated June 2017 by Dr. Nic Portillo and again had stool in the rectum. Pt reports having increased lower extremity edema and falls recently. She also reports Cardiology changed her from Xarelto to Eliquis about 2 weeks ago. She has been taking Eliquis BID as directed and took it last night. She has been having black stools with increased frequency for the last week but did not think that meant she was bleeding. She has also had increased SOB and some substernal chest pain recently. Despite her significant frozen shoulder pain, pt denies NSAID use. She has not had vomiting but reports chronic nausea. She is on protonix at home. HGB 7.6 on arrival with orders for blood transfusion today.      PMH:  Past Medical History:   Diagnosis Date    Anemia     Arrhythmia     palpitations, moderate mitral valve regurge    Arthritis     lower back osteo    Asthma     uses inhalers    Cardiomyopathy     due to murmur (patient states leaky valve)    CHF (congestive heart failure) (HCC)     Chronic pain     stomach 8 yrs    Coagulation defect ([address]     eliquis    COPD     bronchitis chronic controlled by inhalers    Decreased cardiac ejection fraction 11/27/2017    EF 45-50% per echo 11/27/17    Depression     controlled      Diverticulitis     GERD (gastroesophageal reflux disease)     fair control with med    Heart murmur     Hypertension     IBS (irritable bowel syndrome)     IC (interstitial cystitis)     Insomnia     Mitral valve regurgitation, rheumatic     followed Dr. Bernadette Armendariz Palpitations 5/16/2016    Psychiatric disorder     anxiety    Requires oxygen therapy     3l/min at hs. prn during the days as needed    Rheumatic aortic insufficiency 5/16/2016    Smoker          Thromboembolus ([address]     bilateral legs    Tobacco abuse disorder 5/16/2016    Vitamin D deficiency        PSH:  Past Surgical History:   Procedure Laterality Date    BIOPSY OF BREAST, INCISIONAL Left     lymph node , left, patient states her bx neg, but high risk    COLONOSCOPY      8 polyps removed, diverticulitis    CYSTOSCOPY  8-23-11    bladder dilitation    EGD      HX APPENDECTOMY  2006    HX HEART CATHETERIZATION  5/27/2011    no stents    HX LAP CHOLECYSTECTOMY  6/6/2011    HX NATASHA AND BSO  2004    fibroid tumors, hyst    HX UROLOGICAL      cystoscopy       Allergies: Allergies   Allergen Reactions    Aspirin Other (comments)     Inflames IBS per pt    Bentyl [Dicyclomine] Itching    Toradol [Ketorolac] Hives    Ultram [Tramadol] Nausea and Vomiting    Zofran [Ondansetron Hcl (Pf)] Nausea and Vomiting       Home Medications:  Prior to Admission medications    Medication Sig Start Date End Date Taking? Authorizing Provider   apixaban (ELIQUIS) 5 mg tablet Take 1 Tab by mouth two (2) times a day. 5/17/18   Joe John MD   gabapentin (NEURONTIN) 400 mg capsule Take 1 Cap by mouth three (3) times daily. Indications: take on the [facility] 5/10/18   Yunior Garner MD   cyclobenzaprine (FLEXERIL) 10 mg tablet Take 1 Tab by mouth three (3) times daily as needed for Muscle Spasm(s).  5/1/18 Judy Munoz MD   diphenhydrAMINE [facility]) 50 mg capsule Take 50 mg by mouth every six (6) hours as needed. Historical Provider   PARoxetine (PAXIL) 20 mg tablet Take 20 mg by mouth daily. Historical Provider   VENTOLIN HFA 90 mcg/actuation inhaler Take 2 Puffs by inhalation every four (4) hours as needed. 3/6/18   Historical Provider   nitroglycerin (NITROSTAT) 0.4 mg SL tablet 1 Tab by SubLINGual route every five (5) minutes as needed for Chest Pain. 3/23/18   Martinez Chu MD   amitriptyline (ELAVIL) 100 mg tablet Take 1 Tab by mouth nightly. 3/23/18   Martinez Chu MD   Orukqp-Evltb-N. Blue-Sal-NaPhos (URIMAR-T) 120-0.12-10.8 mg tab Take 1 Tab by mouth four (4) times daily as needed. Prn dysuria  Patient taking differently: Take 1 Tab by mouth four (4) times daily as needed. Prn dysuria  Ordered by Dr Simón Washington 1/15/18   Wilmer Dimas MD   PANTOPRAZOLE SODIUM (PROTONIX PO) Take 40 mg by mouth daily. Indications: am, take on the dos. does not know the dosage    Historical Provider   Oxygen nightly. Indications: 3l/min at hs and prn during the day    Historical Provider   furosemide (LASIX) 40 mg tablet Take 1 Tab by mouth daily. Patient taking differently: Take 40 mg by mouth daily. Indications: am 12/4/17   Donato Sibley NP   HYDROcodone-acetaminophen [facility]) 7.5-325 mg per tablet Take 1 Tab by mouth every six (6) hours as needed. Max Daily Amount: 4 Tabs. Patient taking differently: Take 1 Tab by mouth every six (6) hours as needed. Indications: may take on the dos if needed 12/4/17   Donato Sibley NP   albuterol (ACCUNEB) 1.25 mg/3 mL nebu 1.25 mg by Nebulization route three (3) times daily. Indications: use on the [address] Provider   oxybutynin (DITROPAN) 5 mg tablet Take 5 mg by mouth two (2) times a day. Indications: take on the [address] Provider   ammonium lactate (LAC-HYDRIN) 12 % lotion Apply  to affected area as needed.  rub in to affected area well    Historical Provider   pravastatin (PRAVACHOL) 10 mg tablet Take 40 mg by mouth nightly. Phys MD Xena   budesonide-formoterol (SYMBICORT) 160-4.5 mcg/actuation HFA inhaler Take 1 Puff by inhalation two (2) times a day. Indications: BRONCHOSPASM PREVENTION WITH COPD, use on the dos    Historical Provider   NIFEdipine ER (PROCARDIA XL) 60 mg ER tablet Take 1 Tab by mouth daily. Patient taking differently: Take 60 mg by mouth daily. Indications: am- take on the [place] 1/30/17   Rhona Balbuena MD   promethazine (PHENERGAN) 25 mg tablet Take 1 Tab by mouth every six (6) hours as needed. 1/8/16   Giovanna Wall MD   tiotropium (SPIRIVA WITH HANDIHALER) 18 mcg inhalation capsule Take 1 Cap by inhalation daily. Indications: BRONCHOSPASM PREVENTION WITH COPD, am- use on the [employer] MD Xena       Hospital Medications:  Current Facility-Administered Medications   Medication Dose Route Frequency    0.9% sodium chloride infusion 250 mL  250 mL IntraVENous PRN    pantoprazole (PROTONIX) 40 mg in sodium chloride 0.9% 10 mL injection  40 mg IntraVENous Q12H    furosemide (LASIX) injection 40 mg  40 mg IntraVENous ONCE       Social History:  Social History   Substance Use Topics    Smoking status: Former Smoker     Packs/day: 1.00     Years: 25.00     Quit date: 3/23/2018    Smokeless tobacco: Never Used      Comment: no cigarettes for 2 weeks     Alcohol use No       POSITIVE HISTORY OF COCAINE use according to [facility] records. Family History:  Family History   Problem Relation Age of Onset    Heart Failure Father 76     chf    Heart Disease Father     Diabetes Sister     Thyroid Disease Sister        Review of Systems:  A detailed 10 system ROS is obtained, with pertinent positives as listed above. All others are negative.     Diet:  NPO    Objective:     Physical Exam:  Vitals:  Visit Vitals    /75    Pulse 94    Temp 98 °F (36.7 °C)    Resp 19    Ht 5' 4\" (1.626 m)    Wt 77.1 kg (170 lb)    SpO2 96%    BMI 29.18 kg/m2     Gen:  Pt is alert, cooperative, no acute distress  Skin:  Extremities and face reveal no rashes. HEENT: Sclerae anicteric. Extra-occular muscles are intact. No oral ulcers. No abnormal pigmentation of the lips. The neck is supple. Cardiovascular: Regular rate and rhythm. No murmurs, gallops, or rubs. Respiratory:  Comfortable breathing with no accessory muscle use. Clear breath sounds anteriorly with no wheezes, rales, or rhonchi. GI:  Abdomen nondistended, soft, and nontender. Normal active bowel sounds. No enlargement of the liver or spleen. No masses palpable. Rectal:  Laxity of sphincter tone, no evidence of red or black blood on my exam. Hard stool in the vault. Musculoskeletal:  No pitting edema of the lower legs. Neurological:  Gross memory appears intact. Patient is alert and oriented. Psychiatric:  Mood appears appropriate with judgement intact. Laboratory:    Recent Labs      05/19/18   0509   WBC  6.1   HGB  7.6*   HCT  24.4*   PLT  370   MCV  88.1   NA  140   K  4.1   CL  106   CO2  24   BUN  14   CREA  1.05*   CA  9.1   MG  1.9   GLU  106*   AP  91   SGOT  35   ALT  27   TBILI  0.2   ALB  3.4*   TP  7.2          Assessment:     Principal Problem:    Anemia (5/19/2018)    Active Problems:    Depression (9/3/2013)      Takotsubo cardiomyopathy (11/25/2017)      DVT, recurrent, lower extremity, chronic, bilateral (HCC) (3/23/2018)      COPD (chronic obstructive pulmonary disease) ([facility] (5/19/2018)      Chronic respiratory failure with hypoxia ([facility] (5/19/2018)      Hyperlipidemia (1/64/4426)      Diastolic dysfunction (9/06/0129)      Fall (5/19/2018)      Left shoulder pain (5/19/2018)    51 yo female who is seen in consultation with report of melena and drop in hgb to 7.6 with concern for GIB.  PMH of takotsubo cardiomyopathy (EF 45-50%) , LVDD1,  DVT / PE on eliquis, COPD on 3 L O2 QHS/prn, frozen shoulder with significant pain, IBS, Interstitial cystitis, history of cocaine use. She is followed by dr. Lenora Jordan with [facility] and had incomplete colonoscopy in 2011 and 2017 due to poor prep. She has hx of Erwin's and GERD and EGD scheduled for 4/30 cancelled due to reports of severe shoulder pain. Denies NSAID use. C/o nausea without vomiting. No evidence of bleeding on rectal exam. Hard stool suggests hx of constipation. Plan:     - agree with transfusion.  - PPI BID  - monitor H/H  - hold Eliquis  - due to lack of findings on rectal exam, endscopy is not felt to be urgent. If she is still inpatient on Monday, we will schedule EGD. Otherwise she can follow up with her regular GI Dr. Lenora Jordan with [facility]. Terrell Sun      Patient is seen and examined in collaboration with Dr. Cynthia Izaguirre. Assessment and plan as per Dr. Cynthia Izaguirre.

## 2018-05-19 NOTE — PROGRESS NOTES
Attempted to see pt for PT evaluation. Upon chart review, noted abnormally low values for Hg and Hct. Will hold PT evaluation until values normalize and as scheduling permits.     Elizbeth Opitz, PT

## 2018-05-19 NOTE — PROGRESS NOTES
Arrived to unit from ER this AM, 2 units of PRBCs given, no adverse reaction noted. Morphine 2mg IV given for left shoulder pain. Norco 5/325 x1 tab given once this shift. No BM this shift.

## 2018-05-19 NOTE — ED NOTES
TRANSFER - OUT REPORT:    Verbal report given to Liz Arnold RN on Mariza Novoa  being transferred to [facility] for routine progression of care       Report consisted of patients Situation, Background, Assessment and   Recommendations(SBAR). Information from the following report(s) SBAR, Kardex, ED Summary, [facility] and Recent Results was reviewed with the receiving nurse. Lines:   Peripheral IV 05/19/18 Right;Posterior Hand (Active)   Site Assessment Clean, dry, & intact 5/19/2018  6:50 AM   Phlebitis Assessment 0 5/19/2018  6:50 AM   Infiltration Assessment 0 5/19/2018  6:50 AM   Dressing Status Clean, dry, & intact 5/19/2018  6:50 AM        Opportunity for questions and clarification was provided.       Patient transported with:   [employer]

## 2018-05-20 LAB
ABO + RH BLD: NORMAL
ANION GAP SERPL CALC-SCNC: 11 MMOL/L (ref 7–16)
BASOPHILS # BLD: 0 K/UL (ref 0–0.2)
BASOPHILS NFR BLD: 0 % (ref 0–2)
BLD PROD TYP BPU: NORMAL
BLD PROD TYP BPU: NORMAL
BLOOD GROUP ANTIBODIES SERPL: NORMAL
BPU ID: NORMAL
BPU ID: NORMAL
BUN SERPL-MCNC: 15 MG/DL (ref 6–23)
CALCIUM SERPL-MCNC: 9.1 MG/DL (ref 8.3–10.4)
CHLORIDE SERPL-SCNC: 105 MMOL/L (ref 98–107)
CO2 SERPL-SCNC: 26 MMOL/L (ref 21–32)
CREAT SERPL-MCNC: 1.02 MG/DL (ref 0.6–1)
CROSSMATCH RESULT,%XM: NORMAL
CROSSMATCH RESULT,%XM: NORMAL
DIFFERENTIAL METHOD BLD: ABNORMAL
EOSINOPHIL # BLD: 0.1 K/UL (ref 0–0.8)
EOSINOPHIL NFR BLD: 1 % (ref 0.5–7.8)
ERYTHROCYTE [DISTWIDTH] IN BLOOD BY AUTOMATED COUNT: 16.7 % (ref 11.9–14.6)
GLUCOSE SERPL-MCNC: 108 MG/DL (ref 65–100)
HCT VFR BLD AUTO: 33.6 % (ref 35.8–46.3)
HCT VFR BLD AUTO: 34.9 % (ref 35.8–46.3)
HEMOCCULT STL QL: NEGATIVE
HGB BLD-MCNC: 10.8 G/DL (ref 11.7–15.4)
HGB BLD-MCNC: 11.3 G/DL (ref 11.7–15.4)
IMM GRANULOCYTES # BLD: 0 K/UL (ref 0–0.5)
IMM GRANULOCYTES NFR BLD AUTO: 0 % (ref 0–5)
LYMPHOCYTES # BLD: 1.8 K/UL (ref 0.5–4.6)
LYMPHOCYTES NFR BLD: 29 % (ref 13–44)
MCH RBC QN AUTO: 28.1 PG (ref 26.1–32.9)
MCHC RBC AUTO-ENTMCNC: 32.1 G/DL (ref 31.4–35)
MCV RBC AUTO: 87.3 FL (ref 79.6–97.8)
MONOCYTES # BLD: 0.5 K/UL (ref 0.1–1.3)
MONOCYTES NFR BLD: 9 % (ref 4–12)
NEUTS SEG # BLD: 3.9 K/UL (ref 1.7–8.2)
NEUTS SEG NFR BLD: 61 % (ref 43–78)
PLATELET # BLD AUTO: 364 K/UL (ref 150–450)
PMV BLD AUTO: 8.9 FL (ref 10.8–14.1)
POTASSIUM SERPL-SCNC: 3.9 MMOL/L (ref 3.5–5.1)
RBC # BLD AUTO: 3.85 M/UL (ref 4.05–5.25)
SODIUM SERPL-SCNC: 142 MMOL/L (ref 136–145)
SPECIMEN EXP DATE BLD: NORMAL
STATUS OF UNIT,%ST: NORMAL
STATUS OF UNIT,%ST: NORMAL
TROPONIN I SERPL-MCNC: <0.02 NG/ML (ref 0.02–0.05)
UNIT DIVISION, %UDIV: 0
UNIT DIVISION, %UDIV: 0
WBC # BLD AUTO: 6.3 K/UL (ref 4.3–11.1)

## 2018-05-20 PROCEDURE — 74011250637 HC RX REV CODE- 250/637: Performed by: INTERNAL MEDICINE

## 2018-05-20 PROCEDURE — 84484 ASSAY OF TROPONIN QUANT: CPT | Performed by: INTERNAL MEDICINE

## 2018-05-20 PROCEDURE — 85018 HEMOGLOBIN: CPT | Performed by: INTERNAL MEDICINE

## 2018-05-20 PROCEDURE — 36415 COLL VENOUS BLD VENIPUNCTURE: CPT | Performed by: INTERNAL MEDICINE

## 2018-05-20 PROCEDURE — 94640 AIRWAY INHALATION TREATMENT: CPT

## 2018-05-20 PROCEDURE — 94760 N-INVAS EAR/PLS OXIMETRY 1: CPT

## 2018-05-20 PROCEDURE — 80048 BASIC METABOLIC PNL TOTAL CA: CPT | Performed by: INTERNAL MEDICINE

## 2018-05-20 PROCEDURE — 82272 OCCULT BLD FECES 1-3 TESTS: CPT | Performed by: PHYSICIAN ASSISTANT

## 2018-05-20 PROCEDURE — 74011000250 HC RX REV CODE- 250: Performed by: INTERNAL MEDICINE

## 2018-05-20 PROCEDURE — 77030032490 HC SLV COMPR SCD KNE COVD -B

## 2018-05-20 PROCEDURE — 85025 COMPLETE CBC W/AUTO DIFF WBC: CPT | Performed by: INTERNAL MEDICINE

## 2018-05-20 PROCEDURE — 74011250636 HC RX REV CODE- 250/636: Performed by: INTERNAL MEDICINE

## 2018-05-20 PROCEDURE — 77010033678 HC OXYGEN DAILY

## 2018-05-20 PROCEDURE — 65270000029 HC RM PRIVATE

## 2018-05-20 RX ORDER — PANTOPRAZOLE SODIUM 40 MG/1
40 TABLET, DELAYED RELEASE ORAL
Status: DISCONTINUED | OUTPATIENT
Start: 2018-05-20 | End: 2018-05-21 | Stop reason: HOSPADM

## 2018-05-20 RX ORDER — BISACODYL 5 MG
10 TABLET, DELAYED RELEASE (ENTERIC COATED) ORAL
Status: COMPLETED | OUTPATIENT
Start: 2018-05-20 | End: 2018-05-20

## 2018-05-20 RX ORDER — SODIUM CHLORIDE 9 MG/ML
75 INJECTION, SOLUTION INTRAVENOUS CONTINUOUS
Status: DISCONTINUED | OUTPATIENT
Start: 2018-05-20 | End: 2018-05-20

## 2018-05-20 RX ORDER — POLYETHYLENE GLYCOL 3350 17 G/17G
255 POWDER, FOR SOLUTION ORAL ONCE
Status: COMPLETED | OUTPATIENT
Start: 2018-05-20 | End: 2018-05-20

## 2018-05-20 RX ORDER — HYDROCODONE BITARTRATE AND ACETAMINOPHEN 7.5; 325 MG/1; MG/1
1 TABLET ORAL
Status: DISCONTINUED | OUTPATIENT
Start: 2018-05-20 | End: 2018-05-21 | Stop reason: HOSPADM

## 2018-05-20 RX ADMIN — Medication 10 ML: at 14:36

## 2018-05-20 RX ADMIN — Medication 10 ML: at 23:04

## 2018-05-20 RX ADMIN — FUROSEMIDE 40 MG: 40 TABLET ORAL at 09:06

## 2018-05-20 RX ADMIN — SODIUM CHLORIDE 75 ML/HR: 900 INJECTION, SOLUTION INTRAVENOUS at 04:33

## 2018-05-20 RX ADMIN — BISACODYL 10 MG: 5 TABLET, COATED ORAL at 09:09

## 2018-05-20 RX ADMIN — MORPHINE SULFATE 2 MG: 2 INJECTION, SOLUTION INTRAMUSCULAR; INTRAVENOUS at 12:08

## 2018-05-20 RX ADMIN — NIFEDIPINE 60 MG: 30 TABLET, FILM COATED, EXTENDED RELEASE ORAL at 09:06

## 2018-05-20 RX ADMIN — BUDESONIDE 500 MCG: 0.5 INHALANT RESPIRATORY (INHALATION) at 07:51

## 2018-05-20 RX ADMIN — ALBUTEROL SULFATE 2.5 MG: 2.5 SOLUTION RESPIRATORY (INHALATION) at 19:30

## 2018-05-20 RX ADMIN — PANTOPRAZOLE SODIUM 40 MG: 40 TABLET, DELAYED RELEASE ORAL at 09:09

## 2018-05-20 RX ADMIN — ALBUTEROL SULFATE 2.5 MG: 2.5 SOLUTION RESPIRATORY (INHALATION) at 07:51

## 2018-05-20 RX ADMIN — GABAPENTIN 400 MG: 400 CAPSULE ORAL at 23:04

## 2018-05-20 RX ADMIN — HYDROCODONE BITARTRATE AND ACETAMINOPHEN 1 TABLET: 7.5; 325 TABLET ORAL at 15:35

## 2018-05-20 RX ADMIN — MORPHINE SULFATE 2 MG: 2 INJECTION, SOLUTION INTRAMUSCULAR; INTRAVENOUS at 03:31

## 2018-05-20 RX ADMIN — AMITRIPTYLINE HYDROCHLORIDE 100 MG: 50 TABLET, FILM COATED ORAL at 23:04

## 2018-05-20 RX ADMIN — GABAPENTIN 400 MG: 400 CAPSULE ORAL at 14:54

## 2018-05-20 RX ADMIN — OXYBUTYNIN CHLORIDE 5 MG: 5 TABLET ORAL at 09:06

## 2018-05-20 RX ADMIN — OXYBUTYNIN CHLORIDE 5 MG: 5 TABLET ORAL at 17:02

## 2018-05-20 RX ADMIN — ALBUTEROL SULFATE 2.5 MG: 2.5 SOLUTION RESPIRATORY (INHALATION) at 15:09

## 2018-05-20 RX ADMIN — GABAPENTIN 400 MG: 400 CAPSULE ORAL at 06:42

## 2018-05-20 RX ADMIN — BUDESONIDE 500 MCG: 0.5 INHALANT RESPIRATORY (INHALATION) at 19:30

## 2018-05-20 RX ADMIN — PRAVASTATIN SODIUM 40 MG: 20 TABLET ORAL at 23:04

## 2018-05-20 RX ADMIN — PAROXETINE HYDROCHLORIDE 20 MG: 20 TABLET, FILM COATED ORAL at 09:06

## 2018-05-20 RX ADMIN — HYDROCODONE BITARTRATE AND ACETAMINOPHEN 1 TABLET: 7.5; 325 TABLET ORAL at 23:56

## 2018-05-20 RX ADMIN — HYDROCODONE BITARTRATE AND ACETAMINOPHEN 1 TABLET: 5; 325 TABLET ORAL at 01:42

## 2018-05-20 RX ADMIN — Medication 10 ML: at 03:31

## 2018-05-20 RX ADMIN — POLYETHYLENE GLYCOL 3350 255 G: 17 POWDER, FOR SOLUTION ORAL at 14:55

## 2018-05-20 RX ADMIN — HYDROCODONE BITARTRATE AND ACETAMINOPHEN 1 TABLET: 5; 325 TABLET ORAL at 09:05

## 2018-05-20 NOTE — PROGRESS NOTES
Oxygen Qualifier       Room air: SpO2 with O2 and liter flow   Resting SpO2  85%  94% on 4 L   Ambulating SpO2  85% 89% on 4L  92% on 5L         Completed by:    Barbara Cummings, RT

## 2018-05-20 NOTE — PROGRESS NOTES
Problem: Falls - Risk of  Goal: *Absence of Falls  Document Joselito Fall Risk and appropriate interventions in the flowsheet.    Outcome: Progressing Towards Goal  Fall Risk Interventions:            Medication Interventions: Bed/chair exit alarm, Assess postural VS orthostatic hypotension, Evaluate medications/consider consulting pharmacy, Teach patient to arise slowly    Elimination Interventions: Call light in reach, Toileting schedule/hourly rounds

## 2018-05-20 NOTE — PROGRESS NOTES
Call to hospitalists because pt states she  \"hasn't voided since I've been up here. \" Pt received lasix between units of blood yesterday. Day nurse stated that she was unsure if pt vided because of pt's \"bizarre conversation. \"  Pt has drunk 1600cc of gatorade/bowel prep and still has not voided that I have seen. Attempted bladder scan which showed 18cc in bladder. She states that \"I have interstitial cystitis and dr. Naga Killian has to stretch my bladder. \" She has had 2 loose brown diarrheal stools. NS at 75cc/hr ordered for 12 hours; this was started. 0700-slept intermittently after pain meds. Awakened this am for am meds and pt was drowsy, acted  Confused, didn't remember I was just in there with medicines. Asks for pain meds every time someone is in room even though she had just been sleeping. END OF SHIFT NOTE:    INTAKE/OUTPUT  05/19 0701 - 05/20 0700  In: 374 [I.V.:374]  Out: 0   Voiding: NO  Catheter: NO  Drain:              Flatus: Patient does have flatus present. Stool:  2 occurrences. Loose brown large amts. Characteristics:       Emesis: 0 occurrences. Characteristics:        VITAL SIGNS  Patient Vitals for the past 12 hrs:   Temp Pulse Resp BP SpO2   05/20/18 0000 98.1 °F (36.7 °C) 72 18 124/72 97 %   05/19/18 1930 97.8 °F (36.6 °C) 93 18 109/71 96 %       Pain Assessment  Pain Intensity 1: 8 (05/20/18 0332)  Pain Location 1: Shoulder  Pain Intervention(s) 1: Medication (see MAR)       Ambulating  Yes    Shift report given to oncoming nurse at the bedside.     Aron Gongora RN

## 2018-05-20 NOTE — PROGRESS NOTES
Hospitalist Progress Note    Subjective:   Daily Progress Note: 5/20/2018 12:59 PM    Ms. Derrick Valdez is a 49 yo AAF, with recent dx of PE for which she is on eliquis, who presented 5/19 for weakness and shortness of breath found to be due to symptomatic anemia as hg was 7.6 (was 10.4 in March). Endorses melena.  eliquis held, given two units PRBCs and is undergoing prep for EGD/colonoscopy with GI tomorrow. CT head neg for acute abnormality, checked as she had a fall prior to presentation. Very flighty in speech/ideas with rapid/pressured speech. Asks for frequent pain medications. Hg now 10.8 after transfusion.     Current Facility-Administered Medications   Medication Dose Route Frequency    pantoprazole (PROTONIX) tablet 40 mg  40 mg Oral ACB    polyethylene glycol (MIRALAX) powder 255 g  255 g Oral ONCE    HYDROcodone-acetaminophen (NORCO) 7.5-325 mg per tablet 1 Tab  1 Tab Oral Q6H PRN    0.9% sodium chloride infusion 250 mL  250 mL IntraVENous PRN    albuterol CONCENTRATE 2.5mg/0.5 mL neb soln  2.5 mg Nebulization Q4H PRN    amitriptyline (ELAVIL) tablet 100 mg  100 mg Oral QHS    gabapentin (NEURONTIN) capsule 400 mg  400 mg Oral TID    NIFEdipine ER (PROCARDIA XL) tablet 60 mg  60 mg Oral DAILY    oxybutynin (DITROPAN) tablet 5 mg  5 mg Oral BID    PARoxetine (PAXIL) tablet 20 mg  20 mg Oral DAILY    pravastatin (PRAVACHOL) tablet 40 mg  40 mg Oral QHS    tiotropium (SPIRIVA) inhalation capsule 18 mcg  1 Cap Inhalation DAILY    sodium chloride (NS) flush 5-10 mL  5-10 mL IntraVENous Q8H    sodium chloride (NS) flush 5-10 mL  5-10 mL IntraVENous PRN    acetaminophen (TYLENOL) tablet 650 mg  650 mg Oral Q6H PRN    naloxone (NARCAN) injection 0.4 mg  0.4 mg IntraVENous PRN    ondansetron (ZOFRAN) injection 4 mg  4 mg IntraVENous Q4H PRN    budesonide (PULMICORT) 500 mcg/2 ml nebulizer suspension  500 mcg Nebulization BID RT    And    albuterol (PROVENTIL VENTOLIN) nebulizer solution 2.5 mg  2.5 mg Nebulization Q6HWA RT        Review of Systems  A comprehensive review of systems was negative except for that written in the HPI. Objective:     Visit Vitals    /84    Pulse 98    Temp 97.7 °F (36.5 °C)    Resp 16    Ht 5' 4\" (1.626 m)    Wt 79 kg (174 lb 3.2 oz)    SpO2 98%    BMI 29.9 kg/m2    O2 Flow Rate (L/min): 5 l/min (decreased to  3 lpm) O2 Device: Nasal cannula    Temp (24hrs), Av.8 °F (36.6 °C), Min:97.4 °F (36.3 °C), Max:98.5 °F (36.9 °C)          1901 -  0700  In: 1974 [P.O.:1600;  I.V.:374]  Out: 0     General: awake, alert, flight of ideas, pressured speech  Eyes; non icteric  Neck; supple  CV: RRR  Pulm; CTAB  Abd; soft, non tender    Additional comments:I reviewed the patient's new clinical lab test results. j    Data Review    Recent Results (from the past 24 hour(s))   HGB & HCT    Collection Time: 18  3:44 PM   Result Value Ref Range    HGB 9.5 (L) 11.7 - 15.4 g/dL    HCT 30.4 (L) 35.8 - 46.3 %   TROPONIN I    Collection Time: 18  3:44 PM   Result Value Ref Range    Troponin-I, Qt. <0.02 (L) 0.02 - 6.58 NG/ML   METABOLIC PANEL, BASIC    Collection Time: 18  5:26 AM   Result Value Ref Range    Sodium 142 136 - 145 mmol/L    Potassium 3.9 3.5 - 5.1 mmol/L    Chloride 105 98 - 107 mmol/L    CO2 26 21 - 32 mmol/L    Anion gap 11 7 - 16 mmol/L    Glucose 108 (H) 65 - 100 mg/dL    BUN 15 6 - 23 MG/DL    Creatinine 1.02 (H) 0.6 - 1.0 MG/DL    GFR est AA >60 >60 ml/min/1.73m2    GFR est non-AA >60 >60 ml/min/1.73m2    Calcium 9.1 8.3 - 10.4 MG/DL   CBC WITH AUTOMATED DIFF    Collection Time: 18  5:26 AM   Result Value Ref Range    WBC 6.3 4.3 - 11.1 K/uL    RBC 3.85 (L) 4.05 - 5.25 M/uL    HGB 10.8 (L) 11.7 - 15.4 g/dL    HCT 33.6 (L) 35.8 - 46.3 %    MCV 87.3 79.6 - 97.8 FL    MCH 28.1 26.1 - 32.9 PG    MCHC 32.1 31.4 - 35.0 g/dL    RDW 16.7 (H) 11.9 - 14.6 %    PLATELET 358 228 - 127 K/uL    MPV 8.9 (L) 10.8 - 14.1 FL    DF AUTOMATED NEUTROPHILS 61 43 - 78 %    LYMPHOCYTES 29 13 - 44 %    MONOCYTES 9 4.0 - 12.0 %    EOSINOPHILS 1 0.5 - 7.8 %    BASOPHILS 0 0.0 - 2.0 %    IMMATURE GRANULOCYTES 0 0.0 - 5.0 %    ABS. NEUTROPHILS 3.9 1.7 - 8.2 K/UL    ABS. LYMPHOCYTES 1.8 0.5 - 4.6 K/UL    ABS. MONOCYTES 0.5 0.1 - 1.3 K/UL    ABS. EOSINOPHILS 0.1 0.0 - 0.8 K/UL    ABS. BASOPHILS 0.0 0.0 - 0.2 K/UL    ABS. IMM. GRANS. 0.0 0.0 - 0.5 K/UL   TROPONIN I    Collection Time: 05/20/18  5:26 AM   Result Value Ref Range    Troponin-I, Qt. <0.02 (L) 0.02 - 0.05 NG/ML         Assessment/Plan:     Principal Problem:    Anemia (5/19/2018)    Active Problems:    Depression (9/3/2013)      Takotsubo cardiomyopathy (11/25/2017)      DVT, recurrent, lower extremity, chronic, bilateral ([address] (3/23/2018)      COPD (chronic obstructive pulmonary disease) ([facility] (5/19/2018)      Chronic respiratory failure with hypoxia ([address] (5/19/2018)      Hyperlipidemia (0/06/0352)      Diastolic dysfunction (2/11/6208)      Fall (5/19/2018)      Left shoulder pain (5/19/2018)      Q12 hg checks. CLD until midnight, then NPO. EGD and colonoscopy planned for tomorrow. Oxygen requirements increasing and patient with increased sleepiness/lethargy. Stop iv narcotics, use prn norco home dose. Stop lasix and IVFs for now.      Care Plan discussed with: Patient/Family and Nurse    Signed By: Joseph Haas MD     May 20, 2018

## 2018-05-20 NOTE — PROGRESS NOTES
Attempted to see pt but was told by RN to come back after she was given her pain medication. Will check back later if time and scheduling allows.   Santo Clark, PT, DPT

## 2018-05-20 NOTE — PROGRESS NOTES
5/20/2018    Admit Date: 5/19/2018    Subjective:   CHIEF COMPLAINT- anemia  HPI      Overall-stable           Diet-clear    Appetite-good     Nausea-no   Vomiting-no          Pain-no            BM-yes   Bleeding-no    Medications-reviewed and adjusted as appropriate   IV FLUIDS-reviewed      FAM HX-per H&P   SH-per H&P   tob-former            etoh-no      Past Medical History:   Diagnosis Date    Anemia     Arrhythmia     palpitations, moderate mitral valve regurge    Arthritis     lower back osteo    Asthma     uses inhalers    Cardiomyopathy     due to murmur (patient states leaky valve)    CHF (congestive heart failure) (HCC)     Chronic pain     stomach 8 yrs    Coagulation defect ([facility]     eliquis    COPD     bronchitis chronic controlled by inhalers    Decreased cardiac ejection fraction 11/27/2017    EF 45-50% per echo 11/27/17    Depression     controlled      Diverticulitis     GERD (gastroesophageal reflux disease)     fair control with med    Heart murmur     Hypertension     IBS (irritable bowel syndrome)     IC (interstitial cystitis)     Insomnia     Mitral valve regurgitation, rheumatic     followed Dr. Libertad Diaz Palpitations 5/16/2016    Psychiatric disorder     anxiety    Requires oxygen therapy     3l/min at hs. prn during the days as needed    Rheumatic aortic insufficiency 5/16/2016    Smoker          Thromboembolus ([facility]     bilateral legs    Tobacco abuse disorder 5/16/2016    Vitamin D deficiency                Past Surgical History:   Procedure Laterality Date    BIOPSY OF BREAST, INCISIONAL Left     lymph node , left, patient states her bx neg, but high risk    COLONOSCOPY      8 polyps removed, diverticulitis    CYSTOSCOPY  8-23-11    bladder dilitation    EGD      HX APPENDECTOMY  2006    HX HEART CATHETERIZATION  5/27/2011    no stents    HX LAP CHOLECYSTECTOMY  6/6/2011    HX NATASHA AND BSO  2004    fibroid tumors, hyst    HX UROLOGICAL      cystoscopy ROS--                 RESP-neg            CARDIAC-neg                       -neg             Further ROS as per PMH and PSH- see problem list                            Objective:     Visit Vitals    /79    Pulse 96    Temp 97.8 °F (36.6 °C)    Resp 18    Ht 5' 4\" (1.626 m)    Wt 79 kg (174 lb 3.2 oz)    SpO2 99%    BMI 29.9 kg/m2         Intake/Output Summary (Last 24 hours) at 05/20/18 0821  Last data filed at 05/20/18 0433   Gross per 24 hour   Intake             1974 ml   Output                0 ml   Net             1974 ml       EXAM:     NEURO-a&o    HEENT-wnl    LUNGS-clear       COR-rrr        ABD-soft , min tenderness, no rebound, good bs        EXT-no edema                         LABS-  Lab Results   Component Value Date/Time    WBC 6.3 05/20/2018 05:26 AM    RBC 3.85 (L) 05/20/2018 05:26 AM    HGB 10.8 (L) 05/20/2018 05:26 AM    HCT 33.6 (L) 05/20/2018 05:26 AM    PLATELET 501 19/25/9616 05:26 AM     Lab Results   Component Value Date/Time    Sodium 142 05/20/2018 05:26 AM    Potassium 3.9 05/20/2018 05:26 AM    Chloride 105 05/20/2018 05:26 AM    CO2 26 05/20/2018 05:26 AM    Anion gap 11 05/20/2018 05:26 AM    Glucose 108 (H) 05/20/2018 05:26 AM    BUN 15 05/20/2018 05:26 AM    Creatinine 1.02 (H) 05/20/2018 05:26 AM    GFR est AA >60 05/20/2018 05:26 AM    GFR est non-AA >60 05/20/2018 05:26 AM    Calcium 9.1 05/20/2018 05:26 AM    Magnesium 1.9 05/19/2018 05:09 AM    Phosphorus 3.2 11/30/2017 05:10 AM    Albumin 3.4 (L) 05/19/2018 05:09 AM    Bilirubin, total 0.2 05/19/2018 05:09 AM    Protein, total 7.2 05/19/2018 05:09 AM    Globulin 3.8 (H) 05/19/2018 05:09 AM    A-G Ratio 0.9 (L) 05/19/2018 05:09 AM    AST (SGOT) 35 05/19/2018 05:09 AM    ALT (SGPT) 27 05/19/2018 05:09 AM         TRANSFUSION- 2 units    Assessment:     Principal Problem:    Anemia (5/19/2018)    Active Problems:    Depression (9/3/2013)      Takotsubo cardiomyopathy (11/25/2017)      DVT, recurrent, lower extremity, chronic, bilateral ([facility] (3/23/2018)      COPD (chronic obstructive pulmonary disease) ([facility] [facility] (5/19/2018)      Chronic respiratory failure with hypoxia ([facility] [facility] (5/19/2018)      Hyperlipidemia (3/14/9579)      Diastolic dysfunction (8/34/6342)      Fall (5/19/2018)      Left shoulder pain (5/19/2018)    pt has started 2 day prep and had results  She will cont prep today  Feels better post transfusion    Plan:     egd and colo tomorrow      PT SEEN AND EXAMINED AND PLAN DISCUSSED AND IMPLEMENTED.   oBbby Merida MD

## 2018-05-21 ENCOUNTER — ANESTHESIA (OUTPATIENT)
Dept: ENDOSCOPY | Age: 53
DRG: 663 | End: 2018-05-21
Payer: COMMERCIAL

## 2018-05-21 ENCOUNTER — ANESTHESIA EVENT (OUTPATIENT)
Dept: ENDOSCOPY | Age: 53
DRG: 663 | End: 2018-05-21
Payer: COMMERCIAL

## 2018-05-21 VITALS
HEART RATE: 83 BPM | BODY MASS INDEX: 30.05 KG/M2 | OXYGEN SATURATION: 98 % | DIASTOLIC BLOOD PRESSURE: 50 MMHG | SYSTOLIC BLOOD PRESSURE: 94 MMHG | HEIGHT: 64 IN | RESPIRATION RATE: 16 BRPM | TEMPERATURE: 97 F | WEIGHT: 176 LBS

## 2018-05-21 LAB
ANION GAP SERPL CALC-SCNC: 9 MMOL/L (ref 7–16)
BUN SERPL-MCNC: 15 MG/DL (ref 6–23)
CALCIUM SERPL-MCNC: 8.8 MG/DL (ref 8.3–10.4)
CHLORIDE SERPL-SCNC: 108 MMOL/L (ref 98–107)
CO2 SERPL-SCNC: 26 MMOL/L (ref 21–32)
CREAT SERPL-MCNC: 0.87 MG/DL (ref 0.6–1)
GLUCOSE SERPL-MCNC: 99 MG/DL (ref 65–100)
HCT VFR BLD AUTO: 32.7 % (ref 35.8–46.3)
HGB BLD-MCNC: 10.1 G/DL (ref 11.7–15.4)
MAGNESIUM SERPL-MCNC: 2 MG/DL (ref 1.8–2.4)
PHOSPHATE SERPL-MCNC: 4.4 MG/DL (ref 2.5–4.5)
POTASSIUM SERPL-SCNC: 3.8 MMOL/L (ref 3.5–5.1)
SODIUM SERPL-SCNC: 143 MMOL/L (ref 136–145)

## 2018-05-21 PROCEDURE — 74011250637 HC RX REV CODE- 250/637: Performed by: INTERNAL MEDICINE

## 2018-05-21 PROCEDURE — 36415 COLL VENOUS BLD VENIPUNCTURE: CPT | Performed by: INTERNAL MEDICINE

## 2018-05-21 PROCEDURE — 83735 ASSAY OF MAGNESIUM: CPT | Performed by: INTERNAL MEDICINE

## 2018-05-21 PROCEDURE — 0DJ08ZZ INSPECTION OF UPPER INTESTINAL TRACT, VIA NATURAL OR ARTIFICIAL OPENING ENDOSCOPIC: ICD-10-PCS | Performed by: INTERNAL MEDICINE

## 2018-05-21 PROCEDURE — 76060000032 HC ANESTHESIA 0.5 TO 1 HR: Performed by: INTERNAL MEDICINE

## 2018-05-21 PROCEDURE — 74011000250 HC RX REV CODE- 250

## 2018-05-21 PROCEDURE — 80048 BASIC METABOLIC PNL TOTAL CA: CPT | Performed by: INTERNAL MEDICINE

## 2018-05-21 PROCEDURE — 84100 ASSAY OF PHOSPHORUS: CPT | Performed by: INTERNAL MEDICINE

## 2018-05-21 PROCEDURE — 94760 N-INVAS EAR/PLS OXIMETRY 1: CPT

## 2018-05-21 PROCEDURE — 94640 AIRWAY INHALATION TREATMENT: CPT

## 2018-05-21 PROCEDURE — 74011250636 HC RX REV CODE- 250/636

## 2018-05-21 PROCEDURE — 74011000250 HC RX REV CODE- 250: Performed by: INTERNAL MEDICINE

## 2018-05-21 PROCEDURE — 85014 HEMATOCRIT: CPT | Performed by: INTERNAL MEDICINE

## 2018-05-21 PROCEDURE — 77010033678 HC OXYGEN DAILY

## 2018-05-21 PROCEDURE — 76040000026: Performed by: INTERNAL MEDICINE

## 2018-05-21 PROCEDURE — 0DJD8ZZ INSPECTION OF LOWER INTESTINAL TRACT, VIA NATURAL OR ARTIFICIAL OPENING ENDOSCOPIC: ICD-10-PCS | Performed by: INTERNAL MEDICINE

## 2018-05-21 RX ORDER — PROPOFOL 10 MG/ML
INJECTION, EMULSION INTRAVENOUS AS NEEDED
Status: DISCONTINUED | OUTPATIENT
Start: 2018-05-21 | End: 2018-05-21 | Stop reason: HOSPADM

## 2018-05-21 RX ORDER — PROPOFOL 10 MG/ML
INJECTION, EMULSION INTRAVENOUS
Status: DISCONTINUED | OUTPATIENT
Start: 2018-05-21 | End: 2018-05-21 | Stop reason: HOSPADM

## 2018-05-21 RX ORDER — POLYETHYLENE GLYCOL 3350 17 G/17G
255 POWDER, FOR SOLUTION ORAL ONCE
Status: DISCONTINUED | OUTPATIENT
Start: 2018-05-21 | End: 2018-05-21 | Stop reason: HOSPADM

## 2018-05-21 RX ORDER — SODIUM CHLORIDE 0.9 % (FLUSH) 0.9 %
5-10 SYRINGE (ML) INJECTION AS NEEDED
Status: CANCELLED | OUTPATIENT
Start: 2018-05-21

## 2018-05-21 RX ORDER — LIDOCAINE HYDROCHLORIDE 20 MG/ML
INJECTION, SOLUTION EPIDURAL; INFILTRATION; INTRACAUDAL; PERINEURAL AS NEEDED
Status: DISCONTINUED | OUTPATIENT
Start: 2018-05-21 | End: 2018-05-21 | Stop reason: HOSPADM

## 2018-05-21 RX ORDER — SODIUM CHLORIDE, SODIUM LACTATE, POTASSIUM CHLORIDE, CALCIUM CHLORIDE 600; 310; 30; 20 MG/100ML; MG/100ML; MG/100ML; MG/100ML
100 INJECTION, SOLUTION INTRAVENOUS CONTINUOUS
Status: CANCELLED | OUTPATIENT
Start: 2018-05-21

## 2018-05-21 RX ORDER — BISACODYL 5 MG
20 TABLET, DELAYED RELEASE (ENTERIC COATED) ORAL DAILY PRN
Status: DISCONTINUED | OUTPATIENT
Start: 2018-05-21 | End: 2018-05-21 | Stop reason: HOSPADM

## 2018-05-21 RX ORDER — SODIUM CHLORIDE, SODIUM LACTATE, POTASSIUM CHLORIDE, CALCIUM CHLORIDE 600; 310; 30; 20 MG/100ML; MG/100ML; MG/100ML; MG/100ML
INJECTION, SOLUTION INTRAVENOUS
Status: DISCONTINUED | OUTPATIENT
Start: 2018-05-21 | End: 2018-05-21 | Stop reason: HOSPADM

## 2018-05-21 RX ADMIN — TIOTROPIUM BROMIDE 18 MCG: 18 CAPSULE ORAL; RESPIRATORY (INHALATION) at 07:27

## 2018-05-21 RX ADMIN — PROPOFOL 200 MCG/KG/MIN: 10 INJECTION, EMULSION INTRAVENOUS at 10:51

## 2018-05-21 RX ADMIN — PROPOFOL 40 MG: 10 INJECTION, EMULSION INTRAVENOUS at 10:51

## 2018-05-21 RX ADMIN — GABAPENTIN 400 MG: 400 CAPSULE ORAL at 06:27

## 2018-05-21 RX ADMIN — PANTOPRAZOLE SODIUM 40 MG: 40 TABLET, DELAYED RELEASE ORAL at 07:49

## 2018-05-21 RX ADMIN — LIDOCAINE HYDROCHLORIDE 40 MG: 20 INJECTION, SOLUTION EPIDURAL; INFILTRATION; INTRACAUDAL; PERINEURAL at 10:47

## 2018-05-21 RX ADMIN — HYDROCODONE BITARTRATE AND ACETAMINOPHEN 1 TABLET: 7.5; 325 TABLET ORAL at 08:02

## 2018-05-21 RX ADMIN — BUDESONIDE 500 MCG: 0.5 INHALANT RESPIRATORY (INHALATION) at 07:23

## 2018-05-21 RX ADMIN — SODIUM CHLORIDE, SODIUM LACTATE, POTASSIUM CHLORIDE, CALCIUM CHLORIDE: 600; 310; 30; 20 INJECTION, SOLUTION INTRAVENOUS at 10:47

## 2018-05-21 RX ADMIN — PROPOFOL 30 MG: 10 INJECTION, EMULSION INTRAVENOUS at 10:53

## 2018-05-21 RX ADMIN — PROPOFOL 30 MG: 10 INJECTION, EMULSION INTRAVENOUS at 10:55

## 2018-05-21 RX ADMIN — ALBUTEROL SULFATE 2.5 MG: 2.5 SOLUTION RESPIRATORY (INHALATION) at 07:24

## 2018-05-21 NOTE — PROGRESS NOTES
TRANSFER - IN REPORT:    Verbal report received from Jaelyn(name) on Wilmer Conn  being received from [facility](unit) for routine progression of care      Report consisted of patients Situation, Background, Assessment and   Recommendations(SBAR). Information from the following report(s) Kardex was reviewed with the receiving nurse. Opportunity for questions and clarification was provided. Assessment completed upon patients arrival to unit and care assumed.

## 2018-05-21 NOTE — PROGRESS NOTES
TRANSFER - IN REPORT:    Verbal report received from Boyle on [place] Speak  being received from GI Lab for ordered procedure      Report consisted of patients Situation, Background, Assessment and   Recommendations(SBAR). Information from the following report(s) Kardex was reviewed with the receiving nurse. Opportunity for questions and clarification was provided. Assessment completed upon patients arrival to unit and care assumed.

## 2018-05-21 NOTE — PROGRESS NOTES
TRANSFER - OUT REPORT:    Verbal report given to Tara Herman RN on Orvel Sabrina  being transferred to [facility] for routine post - op       Report consisted of patients Situation, Background, Assessment and   Recommendations(SBAR). Information from the following report(s) SBAR was reviewed with the receiving nurse. Lines:   Peripheral IV 05/21/18 Left Forearm (Active)   Site Assessment Clean, dry, & intact 5/21/2018  7:50 AM   Phlebitis Assessment 0 5/21/2018  7:50 AM   Infiltration Assessment 0 5/21/2018  7:50 AM   Dressing Status Clean, dry, & intact 5/21/2018  7:50 AM   Dressing Type Transparent 5/21/2018  7:50 AM   Hub Color/Line Status Capped 5/21/2018  7:50 AM        Opportunity for questions and clarification was provided.

## 2018-05-21 NOTE — INTERVAL H&P NOTE
H&P Update:  Ba Alvarado was seen and examined. History and physical has been reviewed. The patient has been examined. There have been no significant clinical changes since the completion of the originally dated History and Physical. Reports liquid stools and blood after bowel prep though her answers have varied and there were some chocolates noted at bedside. Also reports persistent  Right abdomen and flank pain not adequately controlled.      Signed By: Yamilet Jeronimo MD     May 21, 2018 10:40 AM

## 2018-05-21 NOTE — ANESTHESIA POSTPROCEDURE EVALUATION
Post-Anesthesia Evaluation and Assessment    Patient: Mariza Novoa MRN: [id number]  SSN: [id number]    YOB: 1965  Age: 50 y.o. Sex: female       Cardiovascular Function/Vital Signs  Visit Vitals    /57    Pulse 88    Temp 36.1 °C (97 °F)    Resp 16    Ht 5' 4\" (1.626 m)    Wt 79.8 kg (176 lb)    SpO2 98%    Breastfeeding No    BMI 30.21 kg/m2       Patient is status post total IV anesthesia anesthesia for Procedure(s):  ESOPHAGOGASTRODUODENOSCOPY (EGD)  COLONOSCOPY. Nausea/Vomiting: None    Postoperative hydration reviewed and adequate. Pain:  Pain Scale 1: Visual (05/21/18 1147)  Pain Intensity 1: 0 (05/21/18 1147)   Managed    Neurological Status: At baseline    Mental Status and Level of Consciousness: Alert and oriented     Pulmonary Status:   O2 Device: Room air (05/21/18 1147)   Adequate oxygenation and airway patent    Complications related to anesthesia: None    Post-anesthesia assessment completed.  No concerns    Signed By: Grace Lui MD     May 21, 2018

## 2018-05-21 NOTE — PROGRESS NOTES
END OF SHIFT NOTE:    INTAKE/OUTPUT     Voiding: YES  Catheter: NO  Drain:              Flatus: Patient does have flatus present. Stool:  2 occurrences. Characteristics: Large loose brown diarrheal stools. Not entirely clear drainage. Emesis: 0 occurrences. Characteristics:        VITAL SIGNS  Patient Vitals for the past 12 hrs:   Temp Pulse Resp BP SpO2   05/20/18 2301 97.8 °F (36.6 °C) 95 16 116/79 95 %   05/20/18 1934 98.2 °F (36.8 °C) 98 16 114/79 96 %   05/20/18 1930 - - - - 97 %       Pain Assessment  Pain Intensity 1: 9 (05/20/18 1535)  Pain Location 1: Generalized  Pain Intervention(s) 1: Medication (see MAR)       Ambulating  Yes    Shift report given to oncoming nurse at the bedside.     Ana Sacks, RN

## 2018-05-21 NOTE — PROGRESS NOTES
TRANSFER - OUT REPORT:    Verbal report given to Maureen Thee on [address]  being transferred to GI Lab for ordered procedure       Report consisted of patients Situation, Background, Assessment and   Recommendations(SBAR). Information from the following report(s) Kardex was reviewed with the receiving nurse. Lines:   Peripheral IV 05/21/18 Left Forearm (Active)   Site Assessment Clean, dry, & intact 5/21/2018  1:11 AM   Phlebitis Assessment 0 5/21/2018  1:11 AM   Infiltration Assessment 0 5/21/2018  1:11 AM   Dressing Status Clean, dry, & intact 5/21/2018  1:11 AM   Dressing Type Transparent 5/21/2018  1:11 AM   Hub Color/Line Status Capped 5/21/2018  1:11 AM        Opportunity for questions and clarification was provided.       Patient transported with:   [employer]

## 2018-05-21 NOTE — PROGRESS NOTES
Hospitalist Progress Note    Subjective:   Daily Progress Note: 5/21/2018 9:00 AM    Ms. Haylee Gamboa is a 51 yo AAF, with recent dx of PE for which she is on eliquis, who presented 5/19 for weakness and shortness of breath found to be due to symptomatic anemia as hg was 7.6 (was 10.4 in March). Endorses melena.  eliquis held, given two units PRBCs and is undergoing prep for EGD/colonoscopy with GI today. CT head neg for acute abnormality, checked as she had a fall prior to presentation. Very flighty in speech/ideas with rapid/pressured speech. Asks for frequent pain medications. had increased O2 requirements up to 5L NC yesterday so discontinued iv narcotics as patient states her pain is chronic. Hg stable since transfusion.    ROS not obtainable as all patient will do is yell about wanting iv pain meds and that she deserves them because she \"is in the hospital.\"     Current Facility-Administered Medications   Medication Dose Route Frequency    pantoprazole (PROTONIX) tablet 40 mg  40 mg Oral ACB    HYDROcodone-acetaminophen (NORCO) 7.5-325 mg per tablet 1 Tab  1 Tab Oral Q6H PRN    0.9% sodium chloride infusion 250 mL  250 mL IntraVENous PRN    albuterol CONCENTRATE 2.5mg/0.5 mL neb soln  2.5 mg Nebulization Q4H PRN    amitriptyline (ELAVIL) tablet 100 mg  100 mg Oral QHS    gabapentin (NEURONTIN) capsule 400 mg  400 mg Oral TID    NIFEdipine ER (PROCARDIA XL) tablet 60 mg  60 mg Oral DAILY    oxybutynin (DITROPAN) tablet 5 mg  5 mg Oral BID    PARoxetine (PAXIL) tablet 20 mg  20 mg Oral DAILY    pravastatin (PRAVACHOL) tablet 40 mg  40 mg Oral QHS    tiotropium (SPIRIVA) inhalation capsule 18 mcg  1 Cap Inhalation DAILY    sodium chloride (NS) flush 5-10 mL  5-10 mL IntraVENous Q8H    sodium chloride (NS) flush 5-10 mL  5-10 mL IntraVENous PRN    acetaminophen (TYLENOL) tablet 650 mg  650 mg Oral Q6H PRN    naloxone (NARCAN) injection 0.4 mg  0.4 mg IntraVENous PRN    ondansetron (ZOFRAN) injection 4 mg  4 mg IntraVENous Q4H PRN    budesonide (PULMICORT) 500 mcg/2 ml nebulizer suspension  500 mcg Nebulization BID RT    And    albuterol (PROVENTIL VENTOLIN) nebulizer solution 2.5 mg  2.5 mg Nebulization Q6HWA RT        Review of Systems  Review of systems not obtained due to patient factors.     Objective:     Visit Vitals    /69    Pulse 79    Temp 97.5 °F (36.4 °C)    Resp 18    Ht 5' 4\" (1.626 m)    Wt 79.8 kg (176 lb)    SpO2 100%    BMI 30.21 kg/m2    O2 Flow Rate (L/min): 2 l/min O2 Device: Nasal cannula    Temp (24hrs), Av.8 °F (36.6 °C), Min:97.5 °F (36.4 °C), Max:98.2 °F (36.8 °C)          1901 -  0700  In: 1600 [P.O.:1600]  Out: 100 [Urine:100]    General: awake, alert, oriented, disgruntled and angry  Eyes; non icteric, EOMI  Neck; supple  PULM: non labored, normal effort, no accessory muscle use  Abd; soft, non distended, active BS    Additional comments:I reviewed the patient's new clinical lab test results. j    Data Review    Recent Results (from the past 24 hour(s))   OCCULT BLOOD, STOOL    Collection Time: 18 12:05 PM   Result Value Ref Range    Occult blood, stool NEGATIVE  NEG     HGB & HCT    Collection Time: 18  7:23 PM   Result Value Ref Range    HGB 11.3 (L) 11.7 - 15.4 g/dL    HCT 34.9 (L) 35.8 - 37.6 %   METABOLIC PANEL, BASIC    Collection Time: 18  4:55 AM   Result Value Ref Range    Sodium 143 136 - 145 mmol/L    Potassium 3.8 3.5 - 5.1 mmol/L    Chloride 108 (H) 98 - 107 mmol/L    CO2 26 21 - 32 mmol/L    Anion gap 9 7 - 16 mmol/L    Glucose 99 65 - 100 mg/dL    BUN 15 6 - 23 MG/DL    Creatinine 0.87 0.6 - 1.0 MG/DL    GFR est AA >60 >60 ml/min/1.73m2    GFR est non-AA >60 >60 ml/min/1.73m2    Calcium 8.8 8.3 - 10.4 MG/DL   MAGNESIUM    Collection Time: 18  4:55 AM   Result Value Ref Range    Magnesium 2.0 1.8 - 2.4 mg/dL   PHOSPHORUS    Collection Time: 18  4:55 AM   Result Value Ref Range    Phosphorus 4.4 2.5 - 4.5 MG/DL   HGB & HCT    Collection Time: 05/21/18  4:55 AM   Result Value Ref Range    HGB 10.1 (L) 11.7 - 15.4 g/dL    HCT 32.7 (L) 35.8 - 46.3 %         Assessment/Plan:     Principal Problem:    Anemia (5/19/2018)    Active Problems:    Depression (9/3/2013)      Takotsubo cardiomyopathy (11/25/2017)      DVT, recurrent, lower extremity, chronic, bilateral ([facility] [address] (3/23/2018)      COPD (chronic obstructive pulmonary disease) ([facility] (5/19/2018)      Chronic respiratory failure with hypoxia ([facility] (5/19/2018)      Hyperlipidemia (9/91/0448)      Diastolic dysfunction (4/72/7393)      Fall (5/19/2018)      Left shoulder pain (5/19/2018)    Q12 hg checks. Stable since transfusion. EGD/colonoscopy today, if no active bleeding need to re introduce eliquis for PE. If tolerates eliquis with stable hg, may DC home. Discussed AGAIN that patient will not be getting IV narcotics. Has prn norco ordered. RN may allow patient to have heating pad.      Care Plan discussed with: Patient/Family and Nurse    Signed By: Patrick Chávez MD     May 21, 2018

## 2018-05-21 NOTE — PROCEDURES
OPERATIVE NOTE    Date of Procedure: 5/21/2018   Preoperative Diagnosis: melena, anemia (hgb stable after PRBC -off Eliquis), need to resume blood thinners  Postoperative Diagnosis: hiatal hernia, irregular Z-line. retained food debris in stomach - possible bezoar, no blood or coffee grounds seen  Colon with poor prep after 2 days with ? Compliance, no bleeding or melena seen. Left Colon Diverticulosis, Minimal internal hemorrhoids without stigmata of bleeding. Procedure(s):  ESOPHAGOGASTRODUODENOSCOPY (EGD)  COLONOSCOPY  Surgeon(s) and Role:     * Percy James MD - Primary  Surgical Staff:  Endoscopy RN-1: Ibrahima Xiong RN  Endoscopy RN-2: Dottie Kennedy RN  No case tracking events are documented in the log. Anesthesia: MAC   Estimated Blood Loss: Nil  Specimens: * No specimens in log *     Procedure #1:  After obtaining informed consent, the patient was placed in the left lateral position and received IV anesthesia. See anesthesia records for details. The endoscope was advanced under direct vision without difficulty. The esophagus, stomach (including retroflexed views) and duodenum were evaluated. The patient was then repositioned for the Colonoscopy. Findings:  OROPHARYNX:  Cords and Pyriform recesses appeared normal.  ESOPHAGUS:  The proximal, mid and distal esophagus appeared normal. The Z line wasirregular at 37 cms without evidence of esophagitis, ulcerations, stricture or definite Erwin's. STOMACH:  The fundus on antegrade and retroflexed views appeared normal apart from small hiatal hernia and laxity of LES. The body of the stomach showed food debris with possible food bezoar indicating delayed gastric emptying or non-compliance with liquid diet for Colonoscopy. The antrum and pylorus appeared normal.  DUODENUM:  The bulb and second portions appeared normal.    Recommendations:   Routine post endoscopy instructions. Aslo see Colonoscopy report.     Procedure #2:  After informed consent for both procedures, the patient was placed in the left lateral position and received iv anesthesia. See anesthesiology records for details. EGD was initially performed as described. Patient was re-positioned for Colonoscopy. A digital rectal exam was performed. The Colonoscope was inserted into the rectum and passed under direct vison to the cecum where the ileocecal valve and appendiceal orifice were identified. Colonoscope was then slowly withdrawn with inadequate evaluation of the mucosa secondary to retained stool. Retroflexion was performed in the rectum. Prep was poor and inadequate for screening or visualization of small or flat lesions. The patient was taken to the recovery area in stable condition. Findings:  RECTUM: Minimal internal hemorrhoids were noted without stigmata of bleeding. COLON: Mild diverticulosis was noted in the sigmoid and descending Colon. Large amount of retained fecal slurry and liquid stool with particulate debris was seen scattered throughout the colon. No fresh or old blood or melena was seen. Remainder of the Colon exam was grossly within normal limits with no evidence of inflammation or obstructive lesions. Small or flat lesions cannot be excluded due to limitations of this exam.   TERMINAL ILEUM: The terminal ileum was not entered. Recommendations:   Routine post colonoscopy instructions. Conservative management of diverticulosis and hemorrhoids. Repeat after additional day of liquid diet and bowel prep if she agrees. Minimize narcotics which delay gastric and colonic emptying. Complications: None.     Implants: * No implants in log *    Dale Combs MD

## 2018-05-21 NOTE — ANESTHESIA PREPROCEDURE EVALUATION
Anesthetic History   No history of anesthetic complications            Review of Systems / Medical History  Patient summary reviewed, nursing notes reviewed and pertinent labs reviewed    Pulmonary    COPD (Chronic bronchitis): mild      Shortness of breath (Requiring 3L O2 with exertion) and smoker  Asthma : well controlled       Neuro/Psych         Psychiatric history     Cardiovascular    Hypertension  Valvular problems/murmurs: aortic insufficiency    CHF (Early last year developed Takotsubo CM - most recent ECHO (12/17) EF 45%)  Dysrhythmias       Exercise tolerance: <4 METS     GI/Hepatic/Renal     GERD          Comments: GIB Endo/Other        Arthritis     Other Findings   Comments: Admission 12/17 with pulm edema secondary to voln overload           Physical Exam    Airway  Mallampati: II  TM Distance: 4 - 6 cm  Neck ROM: normal range of motion   Mouth opening: Normal     Cardiovascular    Rhythm: regular  Rate: normal    Murmur: Grade 2, Aortic area     Dental    Dentition: Edentulous     Pulmonary  Breath sounds clear to auscultation               Abdominal  GI exam deferred       Other Findings            Anesthetic Plan    ASA: 3  Anesthesia type: total IV anesthesia          Induction: Intravenous  Anesthetic plan and risks discussed with: Patient

## 2018-05-21 NOTE — PROGRESS NOTES
Patient resting on left side. Tap water enema administered per order. Instructed patient told hold for 15 minutes. Verbalized understanding and tolerated procedure.

## 2018-05-21 NOTE — PROGRESS NOTES
Upset over changes in her pain meds. Yelling out \"I need my Morphine! \". In the amount of time it took me to walk down the hills to get her a [place], she was sound asleep and stayed that way for 2 hours. Awakened at 2300 for hs meds and vital signs. Had been incontinent in  her sleep of large amt clear tan/brown watery stool. . She said she voided as well. Now yelling that she is \"pouring blood from her rectum. \" This is not the case. No blood on any tissue, brief,  or cloths in BR. No blood in commode. No blood on her skin. Assisted with bath. Back to bed with new IV started because she had loosed the old IV with her hand-wringing. Given Norco at [phone]. Bag of candy taken away from bed and put in closet due to NPO status for colonoscopy. 0100-talking on phone with family. HS O2 on. 0200-Sleeping.

## 2018-05-21 NOTE — H&P (VIEW-ONLY)
[facility] Consult Note       Primary GI Physician: Dr. Saritha Cunningham  Referring Physician:  Dr. Armand Allen    Consult Date:  5/19/2018    Admit Date:  5/19/2018    Chief Complaint:  GIB    Subjective:     History of Present Illness:  Patient is a 50 y.o. female with PMH of takotsubo cardiomyopathy (EF 45-50%) , LVDD1,  DVT / PE on eliquis, COPD on 3 L O2 QHS/prn, who is seen in consultation at the request of Dr. Armand Allen for GIB with anemia and report of one week hx of melena. Pt is followed by Dr. Estefania Benitez at [facility] and has hx of GERD and Erwin's with recurrent reports of dysphagia. She was to have an EGD 4/30 but this was cancelled as pt complained of severe shoulder pain due to frozen shoulder which also complains of today. Pt had a colonoscopy showing tics and IH but with very poor prep in 2011 by [facility]. Colonoscopy was repeated June 2017 by Dr. Estefania Benitez and again had stool in the rectum. Pt reports having increased lower extremity edema and falls recently. She also reports Cardiology changed her from Xarelto to Eliquis about 2 weeks ago. She has been taking Eliquis BID as directed and took it last night. She has been having black stools with increased frequency for the last week but did not think that meant she was bleeding. She has also had increased SOB and some substernal chest pain recently. Despite her significant frozen shoulder pain, pt denies NSAID use. She has not had vomiting but reports chronic nausea. She is on protonix at home. HGB 7.6 on arrival with orders for blood transfusion today.      PMH:  Past Medical History:   Diagnosis Date    Anemia     Arrhythmia     palpitations, moderate mitral valve regurge    Arthritis     lower back osteo    Asthma     uses inhalers    Cardiomyopathy     due to murmur (patient states leaky valve)    CHF (congestive heart failure) (HCC)     Chronic pain     stomach 8 yrs    Coagulation defect ([address]     eliquis    COPD     bronchitis chronic controlled by inhalers    Decreased cardiac ejection fraction 11/27/2017    EF 45-50% per echo 11/27/17    Depression     controlled      Diverticulitis     GERD (gastroesophageal reflux disease)     fair control with med    Heart murmur     Hypertension     IBS (irritable bowel syndrome)     IC (interstitial cystitis)     Insomnia     Mitral valve regurgitation, rheumatic     followed Dr. Bernadette Armendariz Palpitations 5/16/2016    Psychiatric disorder     anxiety    Requires oxygen therapy     3l/min at hs. prn during the days as needed    Rheumatic aortic insufficiency 5/16/2016    Smoker          Thromboembolus ([address]     bilateral legs    Tobacco abuse disorder 5/16/2016    Vitamin D deficiency        PSH:  Past Surgical History:   Procedure Laterality Date    BIOPSY OF BREAST, INCISIONAL Left     lymph node , left, patient states her bx neg, but high risk    COLONOSCOPY      8 polyps removed, diverticulitis    CYSTOSCOPY  8-23-11    bladder dilitation    EGD      HX APPENDECTOMY  2006    HX HEART CATHETERIZATION  5/27/2011    no stents    HX LAP CHOLECYSTECTOMY  6/6/2011    HX NATASHA AND BSO  2004    fibroid tumors, hyst    HX UROLOGICAL      cystoscopy       Allergies: Allergies   Allergen Reactions    Aspirin Other (comments)     Inflames IBS per pt    Bentyl [Dicyclomine] Itching    Toradol [Ketorolac] Hives    Ultram [Tramadol] Nausea and Vomiting    Zofran [Ondansetron Hcl (Pf)] Nausea and Vomiting       Home Medications:  Prior to Admission medications    Medication Sig Start Date End Date Taking? Authorizing Provider   apixaban (ELIQUIS) 5 mg tablet Take 1 Tab by mouth two (2) times a day. 5/17/18   Joe John MD   gabapentin (NEURONTIN) 400 mg capsule Take 1 Cap by mouth three (3) times daily. Indications: take on the [facility] 5/10/18   Yunior Garner MD   cyclobenzaprine (FLEXERIL) 10 mg tablet Take 1 Tab by mouth three (3) times daily as needed for Muscle Spasm(s).  5/1/18 Josh Solis MD   diphenhydrAMINE [facility]) 50 mg capsule Take 50 mg by mouth every six (6) hours as needed. Historical Provider   PARoxetine (PAXIL) 20 mg tablet Take 20 mg by mouth daily. Historical Provider   VENTOLIN HFA 90 mcg/actuation inhaler Take 2 Puffs by inhalation every four (4) hours as needed. 3/6/18   Historical Provider   nitroglycerin (NITROSTAT) 0.4 mg SL tablet 1 Tab by SubLINGual route every five (5) minutes as needed for Chest Pain. 3/23/18   Raphael Hodge MD   amitriptyline (ELAVIL) 100 mg tablet Take 1 Tab by mouth nightly. 3/23/18   Raphael Hodge MD   Lrwpta-Sstwt-I. Blue-Sal-NaPhos (URIMAR-T) 120-0.12-10.8 mg tab Take 1 Tab by mouth four (4) times daily as needed. Prn dysuria  Patient taking differently: Take 1 Tab by mouth four (4) times daily as needed. Prn dysuria  Ordered by Dr Celeste Valdes 1/15/18   Dona Almazan MD   PANTOPRAZOLE SODIUM (PROTONIX PO) Take 40 mg by mouth daily. Indications: am, take on the dos. does not know the dosage    Historical Provider   Oxygen nightly. Indications: 3l/min at hs and prn during the day    Historical Provider   furosemide (LASIX) 40 mg tablet Take 1 Tab by mouth daily. Patient taking differently: Take 40 mg by mouth daily. Indications: am 12/4/17   Raj Dwyer NP   HYDROcodone-acetaminophen [facility]) 7.5-325 mg per tablet Take 1 Tab by mouth every six (6) hours as needed. Max Daily Amount: 4 Tabs. Patient taking differently: Take 1 Tab by mouth every six (6) hours as needed. Indications: may take on the dos if needed 12/4/17   Raj Dwyer NP   albuterol (ACCUNEB) 1.25 mg/3 mL nebu 1.25 mg by Nebulization route three (3) times daily. Indications: use on the [employer] Provider   oxybutynin (DITROPAN) 5 mg tablet Take 5 mg by mouth two (2) times a day. Indications: take on the [address] Provider   ammonium lactate (LAC-HYDRIN) 12 % lotion Apply  to affected area as needed.  rub in to affected area well    Historical Provider   pravastatin (PRAVACHOL) 10 mg tablet Take 40 mg by mouth nightly. Phys MD Xena   budesonide-formoterol (SYMBICORT) 160-4.5 mcg/actuation HFA inhaler Take 1 Puff by inhalation two (2) times a day. Indications: BRONCHOSPASM PREVENTION WITH COPD, use on the dos    Historical Provider   NIFEdipine ER (PROCARDIA XL) 60 mg ER tablet Take 1 Tab by mouth daily. Patient taking differently: Take 60 mg by mouth daily. Indications: am- take on the [facility] 1/30/17   Betty Grier MD   promethazine (PHENERGAN) 25 mg tablet Take 1 Tab by mouth every six (6) hours as needed. 1/8/16   Isaura Ness MD   tiotropium (SPIRIVA WITH HANDIHALER) 18 mcg inhalation capsule Take 1 Cap by inhalation daily. Indications: BRONCHOSPASM PREVENTION WITH COPD, am- use on the [employer] MD Xena       Hospital Medications:  Current Facility-Administered Medications   Medication Dose Route Frequency    0.9% sodium chloride infusion 250 mL  250 mL IntraVENous PRN    pantoprazole (PROTONIX) 40 mg in sodium chloride 0.9% 10 mL injection  40 mg IntraVENous Q12H    furosemide (LASIX) injection 40 mg  40 mg IntraVENous ONCE       Social History:  Social History   Substance Use Topics    Smoking status: Former Smoker     Packs/day: 1.00     Years: 25.00     Quit date: 3/23/2018    Smokeless tobacco: Never Used      Comment: no cigarettes for 2 weeks     Alcohol use No       POSITIVE HISTORY OF COCAINE use according to [address] records. Family History:  Family History   Problem Relation Age of Onset    Heart Failure Father 76     chf    Heart Disease Father     Diabetes Sister     Thyroid Disease Sister        Review of Systems:  A detailed 10 system ROS is obtained, with pertinent positives as listed above. All others are negative.     Diet:  NPO    Objective:     Physical Exam:  Vitals:  Visit Vitals    /75    Pulse 94    Temp 98 °F (36.7 °C)    Resp 19    Ht 5' 4\" (1.626 m)    Wt 77.1 kg (170 lb)    SpO2 96%    BMI 29.18 kg/m2     Gen:  Pt is alert, cooperative, no acute distress  Skin:  Extremities and face reveal no rashes. HEENT: Sclerae anicteric. Extra-occular muscles are intact. No oral ulcers. No abnormal pigmentation of the lips. The neck is supple. Cardiovascular: Regular rate and rhythm. No murmurs, gallops, or rubs. Respiratory:  Comfortable breathing with no accessory muscle use. Clear breath sounds anteriorly with no wheezes, rales, or rhonchi. GI:  Abdomen nondistended, soft, and nontender. Normal active bowel sounds. No enlargement of the liver or spleen. No masses palpable. Rectal:  Laxity of sphincter tone, no evidence of red or black blood on my exam. Hard stool in the vault. Musculoskeletal:  No pitting edema of the lower legs. Neurological:  Gross memory appears intact. Patient is alert and oriented. Psychiatric:  Mood appears appropriate with judgement intact. Laboratory:    Recent Labs      05/19/18   0509   WBC  6.1   HGB  7.6*   HCT  24.4*   PLT  370   MCV  88.1   NA  140   K  4.1   CL  106   CO2  24   BUN  14   CREA  1.05*   CA  9.1   MG  1.9   GLU  106*   AP  91   SGOT  35   ALT  27   TBILI  0.2   ALB  3.4*   TP  7.2          Assessment:     Principal Problem:    Anemia (5/19/2018)    Active Problems:    Depression (9/3/2013)      Takotsubo cardiomyopathy (11/25/2017)      DVT, recurrent, lower extremity, chronic, bilateral (HCC) (3/23/2018)      COPD (chronic obstructive pulmonary disease) ([facility] (5/19/2018)      Chronic respiratory failure with hypoxia ([facility] (5/19/2018)      Hyperlipidemia (5/00/0466)      Diastolic dysfunction (8/51/7593)      Fall (5/19/2018)      Left shoulder pain (5/19/2018)    49 yo female who is seen in consultation with report of melena and drop in hgb to 7.6 with concern for GIB.  PMH of takotsubo cardiomyopathy (EF 45-50%) , LVDD1,  DVT / PE on eliquis, COPD on 3 L O2 QHS/prn, frozen shoulder with significant pain, IBS, Interstitial cystitis, history of cocaine use. She is followed by dr. Tanika Joyner with [facility] and had incomplete colonoscopy in 2011 and 2017 due to poor prep. She has hx of Erwin's and GERD and EGD scheduled for 4/30 cancelled due to reports of severe shoulder pain. Denies NSAID use. C/o nausea without vomiting. No evidence of bleeding on rectal exam. Hard stool suggests hx of constipation. Plan:     - agree with transfusion.  - PPI BID  - monitor H/H  - hold Eliquis  - due to lack of findings on rectal exam, endscopy is not felt to be urgent. If she is still inpatient on Monday, we will schedule EGD. Otherwise she can follow up with her regular GI Dr. Tanika Joyner with [facility]. Carla Hall, [address]      Patient is seen and examined in collaboration with Dr. Maria Elena Martinez. Assessment and plan as per Dr. Maria Elena Martinez.

## 2018-05-21 NOTE — PROGRESS NOTES
Patient returned from the GI Lab from her procedures eating chocolate candy. Stating that she completed her 2 day bowel prep and she wasn't going to repeat her procedures tomorrow. Instructed patient that she is ordered a clear liquids for today with another bowel prep ordered for today. Notified SHANNAN Harrison via phone that patient is eating chocolate candy and refusing to have her repeat GI procedures tomorrow. PA stated would be up to speak with patient shortly.

## 2018-05-21 NOTE — DISCHARGE SUMMARY
Physician Discharge Summary       Patient: Aramis Alfonso MRN: [id number]  SSN: [id number]    YOB: 1965  Age: 50 y.o. Sex: female    PCP: Pam Alcaraz MD    Allergies: Aspirin; Bentyl [dicyclomine]; Toradol [ketorolac]; Ultram [tramadol]; and Zofran [ondansetron hcl (pf)]    Admit date: 5/19/2018  Admitting Provider: Alessandro Interiano MD    Discharge date: 5/21/2018  Discharging Provider: Lenora Tabares MD    * Admission Diagnoses: Anemia  anemia    * Discharge Diagnoses:    Hospital Problems as of 5/21/2018  Date Reviewed: 1/12/2018          Codes Class Noted - Resolved POA    * (Principal)Anemia ICD-10-CM: D64.9  ICD-9-CM: 285.9  5/19/2018 - Present Yes        COPD (chronic obstructive pulmonary disease) ([facility] (Chronic) ICD-10-CM: J44.9  ICD-9-CM: 635  5/19/2018 - Present Yes        Chronic respiratory failure with hypoxia (HCC) (Chronic) ICD-10-CM: J96.11  ICD-9-CM: 518.83, 799.02  5/19/2018 - Present Yes        Hyperlipidemia (Chronic) ICD-10-CM: E78.5  ICD-9-CM: 272.4  5/19/2018 - Present Yes        Diastolic dysfunction (Chronic) ICD-10-CM: I51.9  ICD-9-CM: 429.9  5/19/2018 - Present Yes        Fall ICD-10-CM: Derinda Specking. Paul Keens  ICD-9-CM: E888.9  5/19/2018 - Present Yes        Left shoulder pain (Chronic) ICD-10-CM: M25.512  ICD-9-CM: 719.41  5/19/2018 - Present Yes        DVT, recurrent, lower extremity, chronic, bilateral (HCC) ICD-10-CM: I82.503  ICD-9-CM: 453.50  3/23/2018 - Present Yes        Takotsubo cardiomyopathy (Chronic) ICD-10-CM: I51.81  ICD-9-CM: 429.83  11/25/2017 - Present Yes        Depression ICD-10-CM: F32.9  ICD-9-CM: 025  9/3/2013 - Present Yes              * Hospital Course:     Ms. Jamie Jensen is a 49 yo AAF, with recent dx of PE for which she is on eliquis, who presented 5/19 for weakness and shortness of breath found to be due to symptomatic anemia as hg was 7.6 (was 10.4 in March).  Endorses melena.  eliquis held, given two units PRBCs and is undergoing prep for EGD/colonoscopy with GI today. CT head neg for acute abnormality, checked as she had a fall prior to presentation. López Wilson flighty in speech/ideas with rapid/pressured speech.  Asks for frequent pain medications.  had increased O2 requirements up to 5L NC yesterday so discontinued iv narcotics as patient states her pain is chronic. Hg stable since transfusion. ROS not obtainable as all patient will do is yell about wanting iv pain meds and that she deserves them because she \"is in the hospital.\"     Patient went down for EGD and colonoscopy. Poor bowel prep and still had food in her stomach (chocolates found in room with her earlier.)  GI recommended reprepping tonight and repeating procedures tomorrow. Patient told RN she would not have these done and left the hospital AMA before the RN could even page me to let me know. High risk patient because unsure if she has bleeding issue in her gi tract or if she knows she needs to be on her eliquis for PE.     * Procedures:   Procedure(s) with comments:  ESOPHAGOGASTRODUODENOSCOPY (EGD) - 08673  COLONOSCOPY - 47073    Consults: GI    Significant Diagnostic Studies:     Head CT     INDICATION:  Headache after falling, on blood thinners     Multiple axial images obtained through the brain without intravenous contrast.   Radiation dose reduction techniques were used for this study: All CT scans  performed at this facility use one or all of the following: Automated exposure  control, adjustment of the mA and/or kVp according to patient's size, iterative  reconstruction.     FINDINGS: No areas of abnormal attenuation are seen in the brain. There is no CT  evidence of acute hemorrhage or infarction. The ventricles are normal in size. There are no extra-axial fluid collections. No masses are seen. The sinuses are  clear. There are no bony lesions.     IMPRESSION  IMPRESSION: No CT evidence of acute intracranial abnormality.     * Discharge Condition: fair  * Disposition: Against Medical Advice    Discharge Medications:  Discharge Medication List as of 5/21/2018  1:16 PM          Follow-up Information     Follow up With Details Comments Contact Info    Ron Peres MD   [facility] [address] [place] [address]  [phone]            Signed:  Raza Galindo MD  5/21/2018  4:14 PM

## 2018-05-21 NOTE — PROGRESS NOTES
PT note: PT evaluation received and chart reviewed. Pt about to leave floor per RN for GI lab. Will attempt evaluation later as time and schedule allow. Thank you.    Douglas Cason, PT  5/21/2018

## 2018-05-21 NOTE — PROGRESS NOTES
Patient walked up to nurses station stating her mother is on her way to pick her up and take her home. Instructed patient that she would be leaving against medical advice and patient stated she understood and signed the AMA form.

## 2018-05-27 ENCOUNTER — APPOINTMENT (OUTPATIENT)
Dept: GENERAL RADIOLOGY | Age: 53
End: 2018-05-27
Attending: EMERGENCY MEDICINE
Payer: COMMERCIAL

## 2018-05-27 ENCOUNTER — HOSPITAL ENCOUNTER (EMERGENCY)
Age: 53
Discharge: HOME | End: 2018-05-27
Attending: EMERGENCY MEDICINE
Payer: COMMERCIAL

## 2018-05-27 VITALS
DIASTOLIC BLOOD PRESSURE: 68 MMHG | TEMPERATURE: 97.9 F | BODY MASS INDEX: 31.58 KG/M2 | HEIGHT: 64 IN | HEART RATE: 86 BPM | RESPIRATION RATE: 16 BRPM | WEIGHT: 185 LBS | SYSTOLIC BLOOD PRESSURE: 123 MMHG | OXYGEN SATURATION: 95 %

## 2018-05-27 DIAGNOSIS — R53.83 FATIGUE, UNSPECIFIED TYPE: Primary | ICD-10-CM

## 2018-05-27 LAB
ALBUMIN SERPL-MCNC: 3.6 G/DL (ref 3.5–5)
ALBUMIN/GLOB SERPL: 1 {RATIO} (ref 1.2–3.5)
ALP SERPL-CCNC: 87 U/L (ref 50–136)
ALT SERPL-CCNC: 38 U/L (ref 12–65)
ANION GAP SERPL CALC-SCNC: 9 MMOL/L (ref 7–16)
AST SERPL-CCNC: 47 U/L (ref 15–37)
BASOPHILS # BLD: 0 K/UL (ref 0–0.2)
BASOPHILS NFR BLD: 0 % (ref 0–2)
BILIRUB SERPL-MCNC: 0.3 MG/DL (ref 0.2–1.1)
BUN SERPL-MCNC: 12 MG/DL (ref 6–23)
CALCIUM SERPL-MCNC: 9.1 MG/DL (ref 8.3–10.4)
CHLORIDE SERPL-SCNC: 105 MMOL/L (ref 98–107)
CO2 SERPL-SCNC: 26 MMOL/L (ref 21–32)
CREAT SERPL-MCNC: 1.04 MG/DL (ref 0.6–1)
DIFFERENTIAL METHOD BLD: ABNORMAL
EOSINOPHIL # BLD: 0.1 K/UL (ref 0–0.8)
EOSINOPHIL NFR BLD: 2 % (ref 0.5–7.8)
ERYTHROCYTE [DISTWIDTH] IN BLOOD BY AUTOMATED COUNT: 15.7 % (ref 11.9–14.6)
GLOBULIN SER CALC-MCNC: 3.5 G/DL (ref 2.3–3.5)
GLUCOSE SERPL-MCNC: 91 MG/DL (ref 65–100)
HCT VFR BLD AUTO: 33.8 % (ref 35.8–46.3)
HGB BLD-MCNC: 10.8 G/DL (ref 11.7–15.4)
IMM GRANULOCYTES # BLD: 0 K/UL (ref 0–0.5)
IMM GRANULOCYTES NFR BLD AUTO: 0 % (ref 0–5)
LYMPHOCYTES # BLD: 2.2 K/UL (ref 0.5–4.6)
LYMPHOCYTES NFR BLD: 36 % (ref 13–44)
MCH RBC QN AUTO: 28 PG (ref 26.1–32.9)
MCHC RBC AUTO-ENTMCNC: 32 G/DL (ref 31.4–35)
MCV RBC AUTO: 87.6 FL (ref 79.6–97.8)
MONOCYTES # BLD: 0.4 K/UL (ref 0.1–1.3)
MONOCYTES NFR BLD: 7 % (ref 4–12)
NEUTS SEG # BLD: 3.4 K/UL (ref 1.7–8.2)
NEUTS SEG NFR BLD: 55 % (ref 43–78)
PLATELET # BLD AUTO: 293 K/UL (ref 150–450)
PMV BLD AUTO: 8.8 FL (ref 10.8–14.1)
POTASSIUM SERPL-SCNC: 3.7 MMOL/L (ref 3.5–5.1)
PROT SERPL-MCNC: 7.1 G/DL (ref 6.3–8.2)
RBC # BLD AUTO: 3.86 M/UL (ref 4.05–5.25)
SODIUM SERPL-SCNC: 140 MMOL/L (ref 136–145)
WBC # BLD AUTO: 6.1 K/UL (ref 4.3–11.1)

## 2018-05-27 PROCEDURE — 80053 COMPREHEN METABOLIC PANEL: CPT | Performed by: EMERGENCY MEDICINE

## 2018-05-27 PROCEDURE — 99282 EMERGENCY DEPT VISIT SF MDM: CPT | Performed by: EMERGENCY MEDICINE

## 2018-05-27 PROCEDURE — 74011250636 HC RX REV CODE- 250/636: Performed by: EMERGENCY MEDICINE

## 2018-05-27 PROCEDURE — 96372 THER/PROPH/DIAG INJ SC/IM: CPT | Performed by: EMERGENCY MEDICINE

## 2018-05-27 PROCEDURE — 71101 X-RAY EXAM UNILAT RIBS/CHEST: CPT

## 2018-05-27 PROCEDURE — 85025 COMPLETE CBC W/AUTO DIFF WBC: CPT | Performed by: EMERGENCY MEDICINE

## 2018-05-27 RX ORDER — ONDANSETRON 4 MG/1
4 TABLET, ORALLY DISINTEGRATING ORAL
Status: DISCONTINUED | OUTPATIENT
Start: 2018-05-27 | End: 2018-05-27

## 2018-05-27 RX ORDER — PROMETHAZINE HYDROCHLORIDE 25 MG/ML
25 INJECTION, SOLUTION INTRAMUSCULAR; INTRAVENOUS
Status: COMPLETED | OUTPATIENT
Start: 2018-05-27 | End: 2018-05-27

## 2018-05-27 RX ADMIN — PROMETHAZINE HYDROCHLORIDE 25 MG: 25 INJECTION INTRAMUSCULAR; INTRAVENOUS at 07:11

## 2018-05-27 NOTE — ED NOTES
Pt states she is allergic to zofran and that it causes hives and itches. I asked the pt about the nausea and vomiting we have listed as her reaction and she states it causes everything.

## 2018-05-27 NOTE — ED NOTES
Pt is aware of the need for urine and states she isn't able to urinate right now. Educate pt we will revisit the subject after she returns from radiology. Pt to radiology with tech via stretcher.

## 2018-05-27 NOTE — ED NOTES
Pt is resting on stretcher with eyes closed. Woke pt and re-educated about need for urine specimen. Educated pt on straight catheter as well and pt stated \"I ain't doing that. Y'all can get some pee from me before I leave. \"

## 2018-05-27 NOTE — ED NOTES
I have reviewed discharge instructions with the patient. The patient verbalized understanding. Patient left ED via Discharge Method: ambulatory to Home with  Self. Pt has friend coming to pick her up from the lobby. Opportunity for questions and clarification provided. Patient given 0 scripts. To continue your aftercare when you leave the hospital, you may receive an automated call from our care team to check in on how you are doing. This is a free service and part of our promise to provide the best care and service to meet your aftercare needs.  If you have questions, or wish to unsubscribe from this service please call [phone]. Thank you for Choosing our [facility] Emergency Department.

## 2018-05-27 NOTE — ED PROVIDER NOTES
HPI Comments: 56-year-old lady presents with concerns about feeling fatigued and dizzy. Patient says she has concerns that her hemoglobin might be low. She denies any specific fevers or chills and says she has had no vomiting or diarrhea. She said once she noted a little bit of blood on the toilet for  She has not had any dark or black stools. With respect to her dizziness she says she's had vertigo many times in the past  The admitted here for similar dizziness symptoms. She denies any weakness, numbness, tingling, speech problems, or balance issues. Review of her hospital discharge notes that the patient left AMA before she can be counseled by the doctor. Elements of this note were created using speech recognition software. As such, errors of speech recognition may be present. Patient is a 50 y.o. female presenting with fatigue. The history is provided by the patient. Fatigue   Pertinent negatives include no shortness of breath, no chest pain, no vomiting, no confusion, no headaches and no nausea.         Past Medical History:   Diagnosis Date    Anemia     Arrhythmia     palpitations, moderate mitral valve regurge    Arthritis     lower back osteo    Asthma     uses inhalers    Cardiomyopathy     due to murmur (patient states leaky valve)    CHF (congestive heart failure) (HCC)     Chronic pain     stomach 8 yrs    Coagulation defect ([address]     eliquis    COPD     bronchitis chronic controlled by inhalers    Decreased cardiac ejection fraction 11/27/2017    EF 45-50% per echo 11/27/17    Depression     controlled      Diverticulitis     GERD (gastroesophageal reflux disease)     fair control with med    Heart murmur     Hypertension     IBS (irritable bowel syndrome)     IC (interstitial cystitis)     Insomnia     Mitral valve regurgitation, rheumatic     followed Dr. Ruiz Cords Palpitations 5/16/2016    Psychiatric disorder     anxiety    Requires oxygen therapy     3l/min at hs. prn during the days as needed    Rheumatic aortic insufficiency 5/16/2016    Smoker          Thromboembolus [facility])     bilateral legs    Tobacco abuse disorder 5/16/2016    Vitamin D deficiency        Past Surgical History:   Procedure Laterality Date    BIOPSY OF BREAST, INCISIONAL Left     lymph node , left, patient states her bx neg, but high risk    COLONOSCOPY      8 polyps removed, diverticulitis    COLONOSCOPY N/A 5/21/2018    COLONOSCOPY performed by Eamon Brizuela MD at [address]  8-23-11    bladder dilitation    EGD      HX APPENDECTOMY  2006    HX HEART CATHETERIZATION  5/27/2011    no stents    HX LAP CHOLECYSTECTOMY  6/6/2011    HX NATASHA AND BSO  2004    fibroid tumors, hyst    HX UROLOGICAL      cystoscopy         Family History:   Problem Relation Age of Onset    Heart Failure Father 76     chf    Heart Disease Father     Diabetes Sister     Thyroid Disease Sister        Social History     Social History    Marital status: LEGALLY      Spouse name: N/A    Number of children: N/A    Years of education: N/A     Occupational History    Not on file. Social History Main Topics    Smoking status: Former Smoker     Packs/day: 1.00     Years: 25.00     Quit date: 3/23/2018    Smokeless tobacco: Never Used      Comment: no cigarettes for 2 weeks     Alcohol use No    Drug use: No    Sexual activity: Not on file     Other Topics Concern    Not on file     Social History Narrative         ALLERGIES: Aspirin; Bentyl [dicyclomine]; Toradol [ketorolac]; Ultram [tramadol]; and Zofran [ondansetron hcl (pf)]    Review of Systems   Constitutional: Positive for fatigue. Negative for chills, diaphoresis and fever. HENT: Negative for congestion, rhinorrhea and sore throat. Eyes: Negative for redness and visual disturbance. Respiratory: Negative for cough, chest tightness, shortness of breath and wheezing.     Cardiovascular: Negative for chest pain and palpitations. Gastrointestinal: Negative for abdominal pain, blood in stool, diarrhea, nausea and vomiting. Endocrine: Negative for polydipsia and polyuria. Genitourinary: Negative for dysuria and hematuria. Musculoskeletal: Negative for arthralgias, myalgias and neck stiffness. Skin: Negative for rash. Allergic/Immunologic: Negative for environmental allergies and food allergies. Neurological: Positive for dizziness. Negative for weakness and headaches. Hematological: Negative for adenopathy. Does not bruise/bleed easily. Psychiatric/Behavioral: Negative for confusion and sleep disturbance. The patient is not nervous/anxious. Vitals:    05/27/18 0617   BP: 123/69   Pulse: 95   Resp: 16   Temp: 97.9 °F (36.6 °C)   SpO2: 97%   Weight: 83.9 kg (185 lb)   Height: 5' 4\" (1.626 m)            Physical Exam   Constitutional: She is oriented to person, place, and time. She appears well-developed and well-nourished. HENT:   Head: Normocephalic and atraumatic. Eyes: Conjunctivae and EOM are normal. Pupils are equal, round, and reactive to light. Neck: Normal range of motion. Cardiovascular: Normal rate and regular rhythm. Pulmonary/Chest: Effort normal and breath sounds normal. No respiratory distress. She has no wheezes. She has no rales. She exhibits no tenderness. Abdominal: Soft. Bowel sounds are normal. There is no rebound and no guarding. Musculoskeletal: Normal range of motion. She exhibits no edema or tenderness. Lymphadenopathy:     She has no cervical adenopathy. Neurological: She is alert and oriented to person, place, and time. Skin: Skin is warm and dry. Psychiatric: She has a normal mood and affect. Nursing note and vitals reviewed. MDM  Number of Diagnoses or Management Options  Diagnosis management comments: I will check her hemoglobin level as well as her basic blood work. 7:57 AM  Hemoglobin and x-ray are unremarkable.   Patient says that she has been taking her Eliquis since she left the hospital.  I will discharge her home.         ED Course       Procedures

## 2018-05-27 NOTE — DISCHARGE INSTRUCTIONS
Return with any fevers, vomiting, difficult breathing, worsening symptoms, or additional concerns. Follow-up with your primary care doctor on Tuesday or Wednesday.

## 2018-05-27 NOTE — ED TRIAGE NOTES
Pt returns for complaint of weakness and blood in stool. States she was recently discharged with blood blank bracelet from this facility on her wrist. Also complains of abdominal pain.

## 2018-06-06 PROBLEM — Z66 DNR (DO NOT RESUSCITATE): Status: ACTIVE | Noted: 2017-05-28

## 2018-06-06 PROBLEM — N89.8 VAGINAL DISCHARGE: Status: ACTIVE | Noted: 2018-03-16

## 2018-06-06 PROBLEM — I24.9 ACS (ACUTE CORONARY SYNDROME) (HCC): Status: RESOLVED | Noted: 2017-05-28 | Resolved: 2018-06-06

## 2018-06-06 PROBLEM — N64.4 BREAST PAIN, LEFT: Status: ACTIVE | Noted: 2017-10-06

## 2018-06-06 PROBLEM — I99.9 COCAINE-INDUCED VASCULAR DISORDER (HCC): Status: ACTIVE | Noted: 2017-05-28

## 2018-06-06 PROBLEM — I10 HTN (HYPERTENSION): Status: ACTIVE | Noted: 2018-06-06

## 2018-06-06 PROBLEM — I24.9 ACUTE CORONARY SYNDROME (HCC): Status: RESOLVED | Noted: 2017-05-28 | Resolved: 2018-06-06

## 2018-06-06 PROBLEM — K22.70 BARRETT'S ESOPHAGUS WITHOUT DYSPLASIA: Status: ACTIVE | Noted: 2017-09-20

## 2018-06-06 PROBLEM — Z86.718 HISTORY OF DVT (DEEP VEIN THROMBOSIS): Status: ACTIVE | Noted: 2018-04-14

## 2018-06-06 PROBLEM — F14.988 COCAINE-INDUCED VASCULAR DISORDER (HCC): Status: ACTIVE | Noted: 2017-05-28

## 2018-06-06 PROBLEM — I21.4 NSTEMI (NON-ST ELEVATED MYOCARDIAL INFARCTION) (HCC): Status: ACTIVE | Noted: 2017-05-28

## 2018-06-06 PROBLEM — D62 ACUTE BLOOD LOSS ANEMIA: Status: RESOLVED | Noted: 2018-04-14 | Resolved: 2018-06-06

## 2018-06-06 PROBLEM — Z00.00 HEALTHCARE MAINTENANCE: Status: ACTIVE | Noted: 2018-03-16

## 2018-06-06 PROBLEM — I35.1 AORTIC VALVE REGURGITATION: Status: ACTIVE | Noted: 2018-06-06

## 2018-06-06 PROBLEM — I24.9 ACS (ACUTE CORONARY SYNDROME) (HCC): Status: ACTIVE | Noted: 2017-05-28

## 2018-06-06 PROBLEM — Z90.710 STATUS POST HYSTERECTOMY: Status: ACTIVE | Noted: 2018-03-16

## 2018-06-06 PROBLEM — N95.1 VASOMOTOR SYMPTOMS DUE TO MENOPAUSE: Status: ACTIVE | Noted: 2018-05-13

## 2018-06-06 PROBLEM — E89.40 SURGICAL MENOPAUSE: Status: ACTIVE | Noted: 2018-03-19

## 2018-06-06 PROBLEM — J81.0 ACUTE PULMONARY EDEMA (HCC): Status: RESOLVED | Noted: 2017-11-30 | Resolved: 2018-06-06

## 2018-06-06 PROBLEM — I24.9 ACUTE CORONARY SYNDROME (HCC): Status: ACTIVE | Noted: 2017-05-28

## 2018-06-06 PROBLEM — D62 ACUTE BLOOD LOSS ANEMIA: Status: ACTIVE | Noted: 2018-04-14

## 2018-06-06 PROBLEM — M79.604 RIGHT LEG PAIN: Status: ACTIVE | Noted: 2018-03-01

## 2018-06-06 PROBLEM — I34.0 MITRAL VALVE REGURGITATION: Status: ACTIVE | Noted: 2018-06-06

## 2018-06-16 ENCOUNTER — HOSPITAL ENCOUNTER (EMERGENCY)
Age: 53
Discharge: HOME | End: 2018-06-16
Attending: EMERGENCY MEDICINE
Payer: COMMERCIAL

## 2018-06-16 VITALS
SYSTOLIC BLOOD PRESSURE: 130 MMHG | BODY MASS INDEX: 30.73 KG/M2 | HEART RATE: 86 BPM | OXYGEN SATURATION: 99 % | RESPIRATION RATE: 16 BRPM | DIASTOLIC BLOOD PRESSURE: 76 MMHG | TEMPERATURE: 97.9 F | HEIGHT: 64 IN | WEIGHT: 180 LBS

## 2018-06-16 DIAGNOSIS — S91.012A LACERATION OF LEFT ANKLE, INITIAL ENCOUNTER: Primary | ICD-10-CM

## 2018-06-16 PROCEDURE — 77030002916 HC SUT ETHLN J&J -A

## 2018-06-16 PROCEDURE — 99284 EMERGENCY DEPT VISIT MOD MDM: CPT | Performed by: EMERGENCY MEDICINE

## 2018-06-16 PROCEDURE — 75810000293 HC SIMP/SUPERF WND  RPR: Performed by: EMERGENCY MEDICINE

## 2018-06-16 NOTE — DISCHARGE INSTRUCTIONS
Keep wound clean with mild soap and water  Keep pressure bandage on the leg  Elevate your leg to reduce bleeding  Stitches out in 1 week

## 2018-06-16 NOTE — ED NOTES
I have reviewed discharge instructions with the patient. The patient verbalized understanding. Patient left ED via Discharge Method: ambulatory to Home with mother  Opportunity for questions and clarification provided. Patient given 1 scripts. To continue your aftercare when you leave the hospital, you may receive an automated call from our care team to check in on how you are doing. This is a free service and part of our promise to provide the best care and service to meet your aftercare needs.  If you have questions, or wish to unsubscribe from this service please call [phone]. Thank you for Choosing our [facility] Emergency Department.

## 2018-06-16 NOTE — ED TRIAGE NOTES
Pt broke a mirror in her house and stepped on the glass. Now has lacs on left foot. 20left hand. 5mg morphine given in route.

## 2018-06-17 NOTE — ED PROVIDER NOTES
HPI Comments: Patient states she broke a mirror and it fell on her leg causing laceration. No other injury. She is anticoagulated secondary to a blood clot. Received Morphine 5 mg IV enroute by EMS. Patient is a 50 y.o. female presenting with skin laceration. The history is provided by the patient and medical records. Laceration    The incident occurred less than 1 hour ago. The laceration is located on the left leg and left foot. The laceration is 3 cm in size. The injury mechanism is broken glass. Foreign body present: no. The pain is mild. The pain has been constant since onset. Pertinent negatives include no numbness, no weakness, no loss of motion, no coolness and no discoloration.         Past Medical History:   Diagnosis Date    Anemia     Arrhythmia     palpitations, moderate mitral valve regurge    Arthritis     lower back osteo    Asthma     uses inhalers    Cardiomyopathy     due to murmur (patient states leaky valve)    CHF [facility]congestive [facility] failure[facility] ([facility])     Chronic pain     stomach 8 yrs    Coagulation defect ([address]     eliquis    COPD     bronchitis chronic controlled by inhalers    Decreased cardiac ejection fraction 11/27/2017    EF 45-50% per echo 11/27/17    Depression     controlled      Diverticulitis     GERD (gastroesophageal reflux disease)     fair control with med    Heart murmur     Hypertension     IBS (irritable bowel syndrome)     IC (interstitial cystitis)     Insomnia     Migraine with aura and without status migrainosus, not intractable 6/17/2016    Mitral valve regurgitation, rheumatic     followed Dr. Manzanares Score Palpitations 5/16/2016    Psychiatric disorder     anxiety    Requires oxygen therapy     3l/min at hs. prn during the days as needed    Rheumatic aortic insufficiency 5/16/2016    Smoker          Thromboembolus ([address]     bilateral legs    Tobacco abuse disorder 5/16/2016    Vitamin D deficiency        Past Surgical History:   Procedure Laterality Date    BIOPSY OF BREAST, INCISIONAL Left     lymph node , left, patient states her bx neg, but high risk    COLONOSCOPY      8 polyps removed, diverticulitis    COLONOSCOPY N/A 5/21/2018    COLONOSCOPY performed by Gilberto Jacome MD at [address]  8-23-11    bladder dilitation    EGD      HX APPENDECTOMY  2006    HX HEART CATHETERIZATION  5/27/2011    no stents    HX LAP CHOLECYSTECTOMY  6/6/2011    HX NATASHA AND BSO  2004    fibroid tumors, hyst    HX UROLOGICAL      cystoscopy         Family History:   Problem Relation Age of Onset    Heart Failure Father 76     chf    Heart Disease Father     Diabetes Sister     Thyroid Disease Sister        Social History     Social History    Marital status: LEGALLY      Spouse name: N/A    Number of children: N/A    Years of education: N/A     Occupational History    Not on file. Social History Main Topics    Smoking status: Former Smoker     Packs/day: 1.00     Years: 25.00     Quit date: 3/23/2018    Smokeless tobacco: Never Used      Comment: no cigarettes for 2 weeks     Alcohol use No    Drug use: No    Sexual activity: Not on file     Other Topics Concern    Not on file     Social History Narrative         ALLERGIES: Aspirin; Bentyl [dicyclomine]; Toradol [ketorolac]; Ultram [tramadol]; and Zofran [ondansetron hcl (pf)]    Review of Systems   Constitutional: Negative. HENT: Negative. Respiratory: Negative. Cardiovascular: Negative. Skin: Positive for wound. Neurological: Negative. Negative for weakness and numbness. Hematological: Bruises/bleeds easily. Vitals:    06/16/18 0700 06/16/18 0731 06/16/18 0900 06/16/18 1003   BP: 140/79 135/81 133/80 130/76   Pulse: 89 91 86 86   Resp:    16   Temp:       SpO2: 94% 92% 94% 99%   Weight:       Height:                Physical Exam   Constitutional: She appears well-developed and well-nourished. Sleeping.     HENT:   Head: Normocephalic and atraumatic. Cardiovascular: Normal rate, regular rhythm, normal heart sounds and intact distal pulses. Pulmonary/Chest: Effort normal and breath sounds normal.   Neurological: She exhibits normal muscle tone. Sleeping through majority of visit. When awake she asks for pain medication. Skin:   Left lower leg with several superficial linear scratches. No FB. 1 cm laceration lower third anterior tibial area  3 cm laceration anterior ankle area  Both are superficial but bleeding secondary to anticoagulation   Nursing note and vitals reviewed. [facility]      ED Course       Procedures      Lacerations left leg  Procedure note: wound repair. Cleaned with Betadine. Local anesthesia with 1 % xylocaine. Irrigated with NS. 1 suture in the 1 cm lac, 3 sutures in the 3 cm lac  With 4 - 0 ethilon. Tolerated well. No complications. Instructions discussed with patient  Suture removal in 7 days  Follow up with PCP  Return with new or worse symptoms.

## 2018-07-17 ENCOUNTER — APPOINTMENT (OUTPATIENT)
Dept: GENERAL RADIOLOGY | Age: 53
End: 2018-07-17
Attending: EMERGENCY MEDICINE
Payer: COMMERCIAL

## 2018-07-17 ENCOUNTER — HOSPITAL ENCOUNTER (EMERGENCY)
Age: 53
Discharge: HOME | End: 2018-07-17
Attending: EMERGENCY MEDICINE
Payer: COMMERCIAL

## 2018-07-17 VITALS
SYSTOLIC BLOOD PRESSURE: 135 MMHG | WEIGHT: 175 LBS | OXYGEN SATURATION: 99 % | RESPIRATION RATE: 18 BRPM | BODY MASS INDEX: 29.16 KG/M2 | HEIGHT: 65 IN | TEMPERATURE: 98.2 F | DIASTOLIC BLOOD PRESSURE: 81 MMHG | HEART RATE: 91 BPM

## 2018-07-17 DIAGNOSIS — R53.83 MALAISE AND FATIGUE: ICD-10-CM

## 2018-07-17 DIAGNOSIS — G89.29 OTHER CHRONIC PAIN: ICD-10-CM

## 2018-07-17 DIAGNOSIS — R53.81 MALAISE AND FATIGUE: ICD-10-CM

## 2018-07-17 DIAGNOSIS — J20.9 ACUTE BRONCHITIS, UNSPECIFIED ORGANISM: Primary | ICD-10-CM

## 2018-07-17 LAB
ALBUMIN SERPL-MCNC: 3.4 G/DL (ref 3.5–5)
ALBUMIN/GLOB SERPL: 1 {RATIO} (ref 1.2–3.5)
ALP SERPL-CCNC: 90 U/L (ref 50–136)
ALT SERPL-CCNC: 42 U/L (ref 12–65)
ANION GAP SERPL CALC-SCNC: 9 MMOL/L (ref 7–16)
AST SERPL-CCNC: 55 U/L (ref 15–37)
BASOPHILS # BLD: 0 K/UL (ref 0–0.2)
BASOPHILS NFR BLD: 0 % (ref 0–2)
BILIRUB SERPL-MCNC: 0.2 MG/DL (ref 0.2–1.1)
BNP SERPL-MCNC: 95 PG/ML
BUN SERPL-MCNC: 11 MG/DL (ref 6–23)
CALCIUM SERPL-MCNC: 8.7 MG/DL (ref 8.3–10.4)
CHLORIDE SERPL-SCNC: 106 MMOL/L (ref 98–107)
CO2 SERPL-SCNC: 24 MMOL/L (ref 21–32)
CREAT SERPL-MCNC: 1.04 MG/DL (ref 0.6–1)
DIFFERENTIAL METHOD BLD: ABNORMAL
EOSINOPHIL # BLD: 0.1 K/UL (ref 0–0.8)
EOSINOPHIL NFR BLD: 1 % (ref 0.5–7.8)
ERYTHROCYTE [DISTWIDTH] IN BLOOD BY AUTOMATED COUNT: 18.1 % (ref 11.9–14.6)
GLOBULIN SER CALC-MCNC: 3.4 G/DL (ref 2.3–3.5)
GLUCOSE SERPL-MCNC: 78 MG/DL (ref 65–100)
HCT VFR BLD AUTO: 30.6 % (ref 35.8–46.3)
HGB BLD-MCNC: 9.9 G/DL (ref 11.7–15.4)
IMM GRANULOCYTES # BLD: 0 K/UL (ref 0–0.5)
IMM GRANULOCYTES NFR BLD AUTO: 0 % (ref 0–5)
LYMPHOCYTES # BLD: 2.4 K/UL (ref 0.5–4.6)
LYMPHOCYTES NFR BLD: 39 % (ref 13–44)
MCH RBC QN AUTO: 28.5 PG (ref 26.1–32.9)
MCHC RBC AUTO-ENTMCNC: 32.4 G/DL (ref 31.4–35)
MCV RBC AUTO: 88.2 FL (ref 79.6–97.8)
MONOCYTES # BLD: 0.5 K/UL (ref 0.1–1.3)
MONOCYTES NFR BLD: 8 % (ref 4–12)
NEUTS SEG # BLD: 3.2 K/UL (ref 1.7–8.2)
NEUTS SEG NFR BLD: 52 % (ref 43–78)
PLATELET # BLD AUTO: 308 K/UL (ref 150–450)
PMV BLD AUTO: 9.6 FL (ref 10.8–14.1)
POTASSIUM SERPL-SCNC: 3.7 MMOL/L (ref 3.5–5.1)
PROT SERPL-MCNC: 6.8 G/DL (ref 6.3–8.2)
RBC # BLD AUTO: 3.47 M/UL (ref 4.05–5.25)
SODIUM SERPL-SCNC: 139 MMOL/L (ref 136–145)
WBC # BLD AUTO: 6.1 K/UL (ref 4.3–11.1)

## 2018-07-17 PROCEDURE — 71046 X-RAY EXAM CHEST 2 VIEWS: CPT

## 2018-07-17 PROCEDURE — 74011000250 HC RX REV CODE- 250: Performed by: EMERGENCY MEDICINE

## 2018-07-17 PROCEDURE — 80053 COMPREHEN METABOLIC PANEL: CPT | Performed by: EMERGENCY MEDICINE

## 2018-07-17 PROCEDURE — 74011250636 HC RX REV CODE- 250/636: Performed by: EMERGENCY MEDICINE

## 2018-07-17 PROCEDURE — 96374 THER/PROPH/DIAG INJ IV PUSH: CPT | Performed by: EMERGENCY MEDICINE

## 2018-07-17 PROCEDURE — 85025 COMPLETE CBC W/AUTO DIFF WBC: CPT | Performed by: EMERGENCY MEDICINE

## 2018-07-17 PROCEDURE — 99283 EMERGENCY DEPT VISIT LOW MDM: CPT | Performed by: EMERGENCY MEDICINE

## 2018-07-17 PROCEDURE — 83880 ASSAY OF NATRIURETIC PEPTIDE: CPT | Performed by: EMERGENCY MEDICINE

## 2018-07-17 RX ORDER — AMOXICILLIN AND CLAVULANATE POTASSIUM 875; 125 MG/1; MG/1
1 TABLET, FILM COATED ORAL 2 TIMES DAILY
Qty: 20 TAB | Refills: 0 | Status: SHIPPED | OUTPATIENT
Start: 2018-07-17 | End: 2018-07-27

## 2018-07-17 RX ORDER — BENZONATATE 100 MG/1
200 CAPSULE ORAL
Qty: 30 CAP | Refills: 0 | Status: SHIPPED | OUTPATIENT
Start: 2018-07-17 | End: 2018-07-24

## 2018-07-17 RX ADMIN — SODIUM CHLORIDE 12.5 MG: 9 INJECTION INTRAMUSCULAR; INTRAVENOUS; SUBCUTANEOUS at 06:57

## 2018-07-17 NOTE — ED NOTES
I have reviewed discharge instructions with the patient. The patient verbalized understanding. Patient left ED via Discharge Method: ambulatory to Home with friend. Opportunity for questions and clarification provided. Patient given 2 scripts. To continue your aftercare when you leave the hospital, you may receive an automated call from our care team to check in on how you are doing. This is a free service and part of our promise to provide the best care and service to meet your aftercare needs.  If you have questions, or wish to unsubscribe from this service please call [phone]. Thank you for Choosing our [facility] Emergency Department.

## 2018-07-17 NOTE — DISCHARGE INSTRUCTIONS
Bronchitis: Care Instructions  Your Care Instructions    Bronchitis is inflammation of the bronchial tubes, which carry air to the lungs. The tubes swell and produce mucus, or phlegm. The mucus and inflamed bronchial tubes make you cough. You may have trouble breathing. Most cases of bronchitis are caused by viruses like those that cause colds. Antibiotics usually do not help and they may be harmful. Bronchitis usually develops rapidly and lasts about 2 to 3 weeks in otherwise healthy people. Follow-up care is a key part of your treatment and safety. Be sure to make and go to all appointments, and call your doctor if you are having problems. It's also a good idea to know your test results and keep a list of the medicines you take. How can you care for yourself at home? · Take all medicines exactly as prescribed. Call your doctor if you think you are having a problem with your medicine. · Get some extra rest.  · Take an over-the-counter pain medicine, such as acetaminophen (Tylenol), ibuprofen (Advil, Motrin), or naproxen (Aleve) to reduce fever and relieve body aches. Read and follow all instructions on the label. · Do not take two or more pain medicines at the same time unless the doctor told you to. Many pain medicines have acetaminophen, which is Tylenol. Too much acetaminophen (Tylenol) can be harmful. · Take an over-the-counter cough medicine that contains dextromethorphan to help quiet a dry, hacking cough so that you can sleep. Avoid cough medicines that have more than one active ingredient. Read and follow all instructions on the label. · Breathe moist air from a humidifier, hot shower, or sink filled with hot water. The heat and moisture will thin mucus so you can cough it out. · Do not smoke. Smoking can make bronchitis worse. If you need help quitting, talk to your doctor about stop-smoking programs and medicines. These can increase your chances of quitting for good.   When should you call for help? Call 911 anytime you think you may need emergency care. For example, call if:    · You have severe trouble breathing.    Call your doctor now or seek immediate medical care if:    · You have new or worse trouble breathing.     · You cough up dark brown or bloody mucus (sputum).     · You have a new or higher fever.     · You have a new rash.    Watch closely for changes in your health, and be sure to contact your doctor if:    · You cough more deeply or more often, especially if you notice more mucus or a change in the color of your mucus.     · You are not getting better as expected. Where can you learn more? Go to http://maria alejandra-nreeyda.info/. Enter H333 in the search box to learn more about \"Bronchitis: Care Instructions. \"  Current as of: December 6, 2017  Content Version: 11.7  © 2062-7197 [employer]. Care instructions adapted under license by [employer] (which disclaims liability or warranty for this information). If you have questions about a medical condition or this instruction, always ask your healthcare professional. [address] any warranty or liability for your use of this information. Chronic Pain: Care Instructions  Your Care Instructions    Chronic pain is pain that lasts a long time (months or even years) and may or may not have a clear cause. It is different from acute pain, which usually does have a clear cause-like an injury or illness-and gets better over time. Chronic pain:  · Lasts over time but may vary from day to day. · Does not go away despite efforts to end it. · May disrupt your sleep and lead to fatigue. · May cause depression or anxiety. · May make your muscles tense, causing more pain. · Can disrupt your work, hobbies, home life, and relationships with friends and family. Chronic pain is a very real condition. It is not just in your head.  Treatment can help and usually includes several methods used together, such as medicines, physical therapy, exercise, and other treatments. Learning how to relax and changing negative thought patterns can also help you cope. Chronic pain is complex. Taking an active role in your treatment will help you better manage your pain. Tell your doctor if you have trouble dealing with your pain. You may have to try several things before you find what works best for you. Follow-up care is a key part of your treatment and safety. Be sure to make and go to all appointments, and call your doctor if you are having problems. It's also a good idea to know your test results and keep a list of the medicines you take. How can you care for yourself at home? · Pace yourself. Break up large jobs into smaller tasks. Save harder tasks for days when you have less pain, or go back and forth between hard tasks and easier ones. Take rest breaks. · Relax, and reduce stress. Relaxation techniques such as deep breathing or meditation can help. · Keep moving. Gentle, daily exercise can help reduce pain over the long run. Try low- or no-impact exercises such as walking, swimming, and stationary biking. Do stretches to stay flexible. · Try heat, cold packs, and massage. · Get enough sleep. Chronic pain can make you tired and drain your energy. Talk with your doctor if you have trouble sleeping because of pain. · Think positive. Your thoughts can affect your pain level. Do things that you enjoy to distract yourself when you have pain instead of focusing on the pain. See a movie, read a book, listen to music, or spend time with a friend. · If you think you are depressed, talk to your doctor about treatment. · Keep a daily pain diary. Record how your moods, thoughts, sleep patterns, activities, and medicine affect your pain. You may find that your pain is worse during or after certain activities or when you are feeling a certain emotion.  Having a record of your pain can help you and your doctor find the best ways to treat your pain. · Take pain medicines exactly as directed. ¨ If the doctor gave you a prescription medicine for pain, take it as prescribed. ¨ If you are not taking a prescription pain medicine, ask your doctor if you can take an over-the-counter medicine. Reducing constipation caused by pain medicine  · Include fruits, vegetables, beans, and whole grains in your diet each day. These foods are high in fiber. · Drink plenty of fluids, enough so that your urine is light yellow or clear like water. If you have kidney, heart, or liver disease and have to limit fluids, talk with your doctor before you increase the amount of fluids you drink. · If your doctor recommends it, get more exercise. Walking is a good choice. Bit by bit, increase the amount you walk every day. Try for at least 30 minutes on most days of the week. · Schedule time each day for a bowel movement. A daily routine may help. Take your time and do not strain when having a bowel movement. When should you call for help? Call your doctor now or seek immediate medical care if:    · Your pain gets worse or is out of control.     · You feel down or blue, or you do not enjoy things like you once did. You may be depressed, which is common in people with chronic pain. Depression can be treated.     · You have vomiting or cramps for more than 2 hours.    Watch closely for changes in your health, and be sure to contact your doctor if:    · You cannot sleep because of pain.     · You are very worried or anxious about your pain.     · You have trouble taking your pain medicine.     · You have any concerns about your pain medicine.     · You have trouble with bowel movements, such as:  ¨ No bowel movement in 3 days. ¨ Blood in the anal area, in your stool, or on the toilet paper. ¨ Diarrhea for more than 24 hours. Where can you learn more? Go to http://maria alejandra-nereyda.info/.   Enter N004 in the search box to learn more about \"Chronic Pain: Care Instructions. \"  Current as of: October 9, 2017  Content Version: 11.7  © 5379-3068 [employer]. Care instructions adapted under license by [employer] (which disclaims liability or warranty for this information). If you have questions about a medical condition or this instruction, always ask your healthcare professional. [address] any warranty or liability for your use of this information. Fatigue: Care Instructions  Your Care Instructions    Fatigue is a feeling of tiredness, exhaustion, or lack of energy. You may feel fatigue because of too much or not enough activity. It can also come from stress, lack of sleep, boredom, and poor diet. Many medical problems, such as viral infections, can cause fatigue. Emotional problems, especially depression, are often the cause of fatigue. Fatigue is most often a symptom of another problem. Treatment for fatigue depends on the cause. For example, if you have fatigue because you have a certain health problem, treating this problem also treats your fatigue. If depression or anxiety is the cause, treatment may help. Follow-up care is a key part of your treatment and safety. Be sure to make and go to all appointments, and call your doctor if you are having problems. It's also a good idea to know your test results and keep a list of the medicines you take. How can you care for yourself at home? · Get regular exercise. But don't overdo it. Go back and forth between rest and exercise. · Get plenty of rest.  · Eat a healthy diet. Do not skip meals, especially breakfast.  · Reduce your use of caffeine, tobacco, and alcohol. Caffeine is most often found in coffee, tea, cola drinks, and chocolate. · Limit medicines that can cause fatigue. This includes tranquilizers and cold and allergy medicines. When should you call for help?   Watch closely for changes in your health, and be sure to contact your doctor if:    · You have new symptoms such as fever or a rash.     · Your fatigue gets worse.     · You have been feeling down, depressed, or hopeless. Or you may have lost interest in things that you usually enjoy.     · You are not getting better as expected. Where can you learn more? Go to http://maria alejandra-nereyda.info/. Enter G262 in the search box to learn more about \"Fatigue: Care Instructions. \"  Current as of: November 20, 2017  Content Version: 11.7  © 1980-3205 [employer]. Care instructions adapted under license by [employer] (which disclaims liability or warranty for this information). If you have questions about a medical condition or this instruction, always ask your healthcare professional. [address] any warranty or liability for your use of this information.

## 2018-07-17 NOTE — ED NOTES
Report received from [facility], [facility]. Care assumed at this time.

## 2018-07-17 NOTE — ED TRIAGE NOTES
Pt arrives via [facility] complaining of cough that is producing blood for two days. Pt also states \"I felt my ear wet so I touched it, and then there was blood on my finger. \" Pt reports a headache accompanied with nausea. Pt reports chest pain that started earlier yesterday. Pt denies vomiting or diarrhea.

## 2018-07-17 NOTE — ED PROVIDER NOTES
HPI Comments: Patient presents to ER with multiple complaints. Reports she has been having cough productive of yellow  To bloody sputum for the past couple days. Reports generalized malaise and fatigue. For shortness of breath. Also reports headache and nausea. Reports generalized body aches. Denies any fevers. Patient is a 50 y.o. female presenting with cough. The history is provided by the patient. Cough   This is a new problem. The current episode started more than 2 days ago. The problem has not changed since onset. The cough is productive of blood-tinged sputum. There has been no fever. Associated symptoms include myalgias and shortness of breath. Pertinent negatives include no chest pain, no chills, no eye redness, no ear congestion and no headaches.         Past Medical History:   Diagnosis Date    Anemia     Arrhythmia     palpitations, moderate mitral valve regurge    Arthritis     lower back osteo    Asthma     uses inhalers    Cardiomyopathy     due to murmur (patient states leaky valve)    CHF (congestive heart failure) ([facility])     Chronic pain     stomach 8 yrs    Coagulation defect ([address]     eliquis    COPD     bronchitis chronic controlled by inhalers    Decreased cardiac ejection fraction 11/27/2017    EF 45-50% per echo 11/27/17    Depression     controlled      Diverticulitis     GERD (gastroesophageal reflux disease)     fair control with med    Heart murmur     Hypertension     IBS (irritable bowel syndrome)     IC (interstitial cystitis)     Insomnia     Migraine with aura and without status migrainosus, not intractable 6/17/2016    Mitral valve regurgitation, rheumatic     followed Dr. Spence Kayser Palpitations 5/16/2016    Psychiatric disorder     anxiety    Requires oxygen therapy     3l/min at hs. prn during the days as needed    Rheumatic aortic insufficiency 5/16/2016    Smoker          Thromboembolus ([address]     bilateral legs    Tobacco abuse disorder 5/16/2016  Vitamin D deficiency        Past Surgical History:   Procedure Laterality Date    BIOPSY OF BREAST, INCISIONAL Left     lymph node , left, patient states her bx neg, but high risk    COLONOSCOPY      8 polyps removed, diverticulitis    COLONOSCOPY N/A 5/21/2018    COLONOSCOPY performed by Shanti Prater MD at [address]  8-23-11    bladder dilitation    EGD      HX APPENDECTOMY  2006    HX HEART CATHETERIZATION  5/27/2011    no stents    HX LAP CHOLECYSTECTOMY  6/6/2011    HX NATASHA AND BSO  2004    fibroid tumors, hyst    HX UROLOGICAL      cystoscopy         Family History:   Problem Relation Age of Onset    Heart Failure Father 76     chf    Heart Disease Father     Diabetes Sister     Thyroid Disease Sister        Social History     Social History    Marital status: LEGALLY      Spouse name: N/A    Number of children: N/A    Years of education: N/A     Occupational History    Not on file. Social History Main Topics    Smoking status: Former Smoker     Packs/day: 1.00     Years: 25.00     Quit date: 3/23/2018    Smokeless tobacco: Never Used      Comment: no cigarettes for 2 weeks     Alcohol use No    Drug use: No    Sexual activity: Not on file     Other Topics Concern    Not on file     Social History Narrative         ALLERGIES: Aspirin; Bentyl [dicyclomine]; Toradol [ketorolac]; Ultram [tramadol]; and Zofran [ondansetron hcl (pf)]    Review of Systems   Constitutional: Positive for fatigue. Negative for chills and fever. HENT: Negative for congestion. Eyes: Negative for photophobia, redness and visual disturbance. Respiratory: Positive for cough and shortness of breath. Negative for chest tightness. Cardiovascular: Negative for chest pain and palpitations. Gastrointestinal: Negative for abdominal pain. Endocrine: Negative for polydipsia, polyphagia and polyuria. Genitourinary: Negative for decreased urine volume and dysuria. Musculoskeletal: Positive for myalgias. Negative for back pain and gait problem. Skin: Negative for pallor and rash. Allergic/Immunologic: Negative for food allergies and immunocompromised state. Neurological: Negative for seizures, numbness and headaches. Psychiatric/Behavioral: Negative for behavioral problems. All other systems reviewed and are negative. Vitals:    07/17/18 0616   BP: 129/70   Pulse: 97   Resp: 16   Temp: 98.3 °F (36.8 °C)   SpO2: 100%   Weight: 79.4 kg (175 lb)   Height: 5' 5\" (1.651 m)            Physical Exam   Constitutional: She is oriented to person, place, and time. She appears well-developed and well-nourished. HENT:   Head: Normocephalic and atraumatic. Mouth/Throat: Oropharynx is clear and moist.   Eyes: Conjunctivae and EOM are normal. Pupils are equal, round, and reactive to light. No scleral icterus. Neck: Normal range of motion. Neck supple. No tracheal deviation present. No thyromegaly present. Cardiovascular: Normal rate, regular rhythm and intact distal pulses. Exam reveals no friction rub. No murmur heard. Pulmonary/Chest: Effort normal and breath sounds normal. No respiratory distress. She has no wheezes. Abdominal: Soft. Bowel sounds are normal. She exhibits no distension. There is no tenderness. Musculoskeletal: Normal range of motion. She exhibits no edema or deformity. Neurological: She is alert and oriented to person, place, and time. She has normal reflexes. No cranial nerve deficit. Skin: Skin is warm and dry. No erythema. Nursing note and vitals reviewed. MDM  Number of Diagnoses or Management Options  Diagnosis management comments: Low suspicion for any emergent pathology. We'll obtain chest x-ray here as well as basic labs    8:23 AM  Labs fairly stable as well as chest x-ray shows no obvious fluid or pneumonia  Treatment for bronchitis.   Encouraged follow-up with primary care physician       Amount and/or Complexity of Data Reviewed  Clinical lab tests: ordered and reviewed  Tests in the radiology section of CPT®: ordered and reviewed    Risk of Complications, Morbidity, and/or Mortality  Presenting problems: moderate  Diagnostic procedures: low  Management options: low          ED Course       Procedures

## 2018-07-18 ENCOUNTER — HOSPITAL ENCOUNTER (EMERGENCY)
Age: 53
Discharge: HOME | End: 2018-07-18
Attending: EMERGENCY MEDICINE
Payer: COMMERCIAL

## 2018-07-18 ENCOUNTER — APPOINTMENT (OUTPATIENT)
Dept: GENERAL RADIOLOGY | Age: 53
End: 2018-07-18
Attending: EMERGENCY MEDICINE
Payer: COMMERCIAL

## 2018-07-18 VITALS
RESPIRATION RATE: 18 BRPM | DIASTOLIC BLOOD PRESSURE: 75 MMHG | SYSTOLIC BLOOD PRESSURE: 121 MMHG | HEIGHT: 65 IN | TEMPERATURE: 98.2 F | BODY MASS INDEX: 29.16 KG/M2 | OXYGEN SATURATION: 97 % | WEIGHT: 175 LBS | HEART RATE: 107 BPM

## 2018-07-18 DIAGNOSIS — S90.31XA CONTUSION OF RIGHT FOOT, INITIAL ENCOUNTER: Primary | ICD-10-CM

## 2018-07-18 PROCEDURE — 74011250637 HC RX REV CODE- 250/637: Performed by: EMERGENCY MEDICINE

## 2018-07-18 PROCEDURE — 73630 X-RAY EXAM OF FOOT: CPT

## 2018-07-18 RX ORDER — TRAMADOL HYDROCHLORIDE 50 MG/1
50 TABLET ORAL
Status: COMPLETED | OUTPATIENT
Start: 2018-07-18 | End: 2018-07-18

## 2018-07-18 RX ORDER — TRAMADOL HYDROCHLORIDE 50 MG/1
TABLET ORAL
Status: DISCONTINUED
Start: 2018-07-18 | End: 2018-07-18 | Stop reason: HOSPADM

## 2018-07-18 RX ADMIN — TRAMADOL HYDROCHLORIDE 50 MG: 50 TABLET, FILM COATED ORAL at 01:40

## 2018-07-18 NOTE — ED NOTES
Patient arrives via EMS from home. Patient fell going up the stairs at her home tonight. Patient reports last four toes on her right foot. EMS reports pt was ambulatory for approx 30 minutes at her home gathering her things before they could leave. Patient presents unable to bear weight on foot in ER.  VSS. No obvious bruising or deformity noted. Patient reports headache \"that won't ever go away. \"

## 2018-07-19 NOTE — DISCHARGE INSTRUCTIONS
Patient was given handwritten instructions for rest ice elevation and wearing postop shoe for 3-4 days.

## 2018-07-19 NOTE — ED PROVIDER NOTES
HPI Comments: Patient is poor historian. Head her foot on some steps going up the steps. Happened some time in the hours prior to arrival.  Patient cannot pin down a specific time. No fall and did not suffer any other injuries. Patient is a 50 y.o. female presenting with foot pain. The history is provided by the patient. Foot Pain This is a new problem. The current episode started 6 to 12 hours ago. The problem occurs constantly. The pain is present in the right foot. The pain is moderate. Associated symptoms include limited range of motion. Pertinent negatives include no numbness. She has tried nothing for the symptoms. There has been a history of trauma. Past Medical History:  
Diagnosis Date  Anemia  Arrhythmia   
 palpitations, moderate mitral valve regurge  Arthritis   
 lower back osteo  Asthma   
 uses inhalers  Cardiomyopathy   
 due to murmur (patient states leaky valve)  CHF (congestive heart failure) ([address]  Chronic pain   
 stomach 8 yrs  Coagulation defect ([address]   
 eliquis  COPD   
 bronchitis chronic controlled by inhalers  Decreased cardiac ejection fraction 11/27/2017 EF 45-50% per echo 11/27/17  Depression   
 controlled  Diverticulitis  GERD (gastroesophageal reflux disease)   
 fair control with med  Heart murmur  Hypertension  IBS (irritable bowel syndrome)  IC (interstitial cystitis)  Insomnia  Migraine with aura and without status migrainosus, not intractable 6/17/2016  Mitral valve regurgitation, rheumatic   
 followed Dr. Lawrence Rising  Palpitations 5/16/2016  Psychiatric disorder   
 anxiety  Requires oxygen therapy 3l/min at hs. prn during the days as needed  Rheumatic aortic insufficiency 5/16/2016  Smoker  Thromboembolus ([address] bilateral legs  Tobacco abuse disorder 5/16/2016  Vitamin D deficiency Past Surgical History:  
Procedure Laterality Date  BIOPSY OF BREAST, INCISIONAL Left   
 lymph node , left, patient states her bx neg, but high risk  COLONOSCOPY    
 8 polyps removed, diverticulitis  COLONOSCOPY N/A 5/21/2018 COLONOSCOPY performed by Poncho M[facility]ugh [facility] at [facility] ENDOSCOPY  CYSTOSCOPY  8-23-11  
 bladder dilitation  EGD  HX APPENDECTOMY  2006  HX HEART CATHETERIZATION  5/27/2011  
 no stents  HX LAP CHOLECYSTECTOMY  6/6/2011  HX NATASHA AND BSO  2004  
 fibroid tumors, hyst  
 HX UROLOGICAL    
 cystoscopy Family History:  
Problem Relation Age of Onset  Heart Failure Father 76 chf  
 Heart Disease Father  Diabetes Sister  Thyroid Disease Sister Social History Social History  Marital status: LEGALLY  Spouse name: N/A  
 Number of children: N/A  
 Years of education: N/A Occupational History  Not on file. Social History Main Topics  Smoking status: Former Smoker Packs/day: 1.00 Years: 25.00 Quit date: 3/23/2018  Smokeless tobacco: Never Used Comment: no cigarettes for 2 weeks  Alcohol use No  
 Drug use: No  
 Sexual activity: Not on file Other Topics Concern  Not on file Social History Narrative ALLERGIES: Aspirin; Bentyl [dicyclomine]; Toradol [ketorolac]; Ultram [tramadol]; and Zofran [ondansetron hcl (pf)] Review of Systems Constitutional: Negative for chills and fever. Neurological: Negative for numbness. Vitals:  
 07/18/18 0002 07/18/18 0145 BP: 121/75 121/75 Pulse: (!) 107 (!) 107 Resp: 18 18 Temp: 98.2 °F (36.8 °C) 98.2 °F (36.8 °C) SpO2: 97% Weight: 79.4 kg (175 lb) Height: 5' 5\" (1.651 m) Physical Exam  
Constitutional: She appears well-developed and well-nourished. No distress. Musculoskeletal:  
     Right ankle: She exhibits normal range of motion, no swelling, no ecchymosis and no deformity. Tenderness. Right foot: There is tenderness and bony tenderness.  There is no swelling, no crepitus and no deformity. Feet: 
 
Skin: Skin is warm and dry. Nursing note and vitals reviewed. MDM Number of Diagnoses or Management Options Diagnosis management comments: Imaging to assess for fracture. Amount and/or Complexity of Data Reviewed Tests in the radiology section of CPT®: ordered and reviewed Independent visualization of images, tracings, or specimens: yes Risk of Complications, Morbidity, and/or Mortality Presenting problems: low Diagnostic procedures: minimal 
Management options: low Patient Progress Patient progress: stable ED Course Procedures My initial interpretation of the foot x-ray was negative. Radiologist interprets as negative as well. This chart was completed for memory. There is a down time chart that was completed by hand during the patient's visit.

## 2018-08-07 ENCOUNTER — ANESTHESIA EVENT (OUTPATIENT)
Dept: SURGERY | Age: 53
End: 2018-08-07
Payer: COMMERCIAL

## 2018-08-08 ENCOUNTER — HOSPITAL ENCOUNTER (OUTPATIENT)
Age: 53
Setting detail: OUTPATIENT SURGERY
Discharge: HOME | End: 2018-08-08
Attending: ORTHOPAEDIC SURGERY | Admitting: ORTHOPAEDIC SURGERY
Payer: COMMERCIAL

## 2018-08-08 ENCOUNTER — HOSPITAL ENCOUNTER (OUTPATIENT)
Dept: MRI IMAGING | Age: 53
Discharge: HOME | End: 2018-08-08
Attending: ORTHOPAEDIC SURGERY
Payer: COMMERCIAL

## 2018-08-08 ENCOUNTER — ANESTHESIA (OUTPATIENT)
Dept: SURGERY | Age: 53
End: 2018-08-08
Payer: COMMERCIAL

## 2018-08-08 VITALS
DIASTOLIC BLOOD PRESSURE: 81 MMHG | WEIGHT: 177.13 LBS | RESPIRATION RATE: 16 BRPM | BODY MASS INDEX: 29.51 KG/M2 | HEIGHT: 65 IN | OXYGEN SATURATION: 95 % | TEMPERATURE: 97.8 F | HEART RATE: 89 BPM | SYSTOLIC BLOOD PRESSURE: 140 MMHG

## 2018-08-08 DIAGNOSIS — M75.52 BURSITIS OF LEFT SHOULDER: ICD-10-CM

## 2018-08-08 PROCEDURE — 74011250636 HC RX REV CODE- 250/636: Performed by: ANESTHESIOLOGY

## 2018-08-08 PROCEDURE — 74011250636 HC RX REV CODE- 250/636

## 2018-08-08 PROCEDURE — 76210000006 HC OR PH I REC 0.5 TO 1 HR

## 2018-08-08 PROCEDURE — 73221 MRI JOINT UPR EXTREM W/O DYE: CPT

## 2018-08-08 PROCEDURE — 74011250637 HC RX REV CODE- 250/637: Performed by: ANESTHESIOLOGY

## 2018-08-08 PROCEDURE — 76060000032 HC ANESTHESIA 0.5 TO 1 HR

## 2018-08-08 PROCEDURE — 76210000020 HC REC RM PH II FIRST 0.5 HR

## 2018-08-08 RX ORDER — OXYCODONE HYDROCHLORIDE 5 MG/1
5 TABLET ORAL
Status: DISCONTINUED | OUTPATIENT
Start: 2018-08-08 | End: 2018-08-08 | Stop reason: HOSPADM

## 2018-08-08 RX ORDER — HYDROMORPHONE HYDROCHLORIDE 2 MG/ML
0.5 INJECTION, SOLUTION INTRAMUSCULAR; INTRAVENOUS; SUBCUTANEOUS
Status: DISCONTINUED | OUTPATIENT
Start: 2018-08-08 | End: 2018-08-08 | Stop reason: HOSPADM

## 2018-08-08 RX ORDER — MIDAZOLAM HYDROCHLORIDE 1 MG/ML
2 INJECTION, SOLUTION INTRAMUSCULAR; INTRAVENOUS ONCE
Status: DISCONTINUED | OUTPATIENT
Start: 2018-08-08 | End: 2018-08-08 | Stop reason: HOSPADM

## 2018-08-08 RX ORDER — LIDOCAINE HYDROCHLORIDE 20 MG/ML
INJECTION, SOLUTION EPIDURAL; INFILTRATION; INTRACAUDAL; PERINEURAL AS NEEDED
Status: DISCONTINUED | OUTPATIENT
Start: 2018-08-08 | End: 2018-08-08 | Stop reason: HOSPADM

## 2018-08-08 RX ORDER — ALBUTEROL SULFATE 0.83 MG/ML
2.5 SOLUTION RESPIRATORY (INHALATION) AS NEEDED
Status: DISCONTINUED | OUTPATIENT
Start: 2018-08-08 | End: 2018-08-08 | Stop reason: HOSPADM

## 2018-08-08 RX ORDER — ONDANSETRON 2 MG/ML
4 INJECTION INTRAMUSCULAR; INTRAVENOUS ONCE
Status: DISCONTINUED | OUTPATIENT
Start: 2018-08-08 | End: 2018-08-08 | Stop reason: HOSPADM

## 2018-08-08 RX ORDER — PROPOFOL 10 MG/ML
INJECTION, EMULSION INTRAVENOUS AS NEEDED
Status: DISCONTINUED | OUTPATIENT
Start: 2018-08-08 | End: 2018-08-08 | Stop reason: HOSPADM

## 2018-08-08 RX ORDER — SODIUM CHLORIDE, SODIUM LACTATE, POTASSIUM CHLORIDE, CALCIUM CHLORIDE 600; 310; 30; 20 MG/100ML; MG/100ML; MG/100ML; MG/100ML
100 INJECTION, SOLUTION INTRAVENOUS CONTINUOUS
Status: DISCONTINUED | OUTPATIENT
Start: 2018-08-08 | End: 2018-08-08 | Stop reason: HOSPADM

## 2018-08-08 RX ORDER — DIPHENHYDRAMINE HYDROCHLORIDE 50 MG/ML
12.5 INJECTION, SOLUTION INTRAMUSCULAR; INTRAVENOUS
Status: DISCONTINUED | OUTPATIENT
Start: 2018-08-08 | End: 2018-08-08 | Stop reason: HOSPADM

## 2018-08-08 RX ORDER — ONDANSETRON 2 MG/ML
4 INJECTION INTRAMUSCULAR; INTRAVENOUS ONCE
Status: COMPLETED | OUTPATIENT
Start: 2018-08-08 | End: 2018-08-08

## 2018-08-08 RX ORDER — MIDAZOLAM HYDROCHLORIDE 1 MG/ML
2 INJECTION, SOLUTION INTRAMUSCULAR; INTRAVENOUS
Status: DISCONTINUED | OUTPATIENT
Start: 2018-08-08 | End: 2018-08-08 | Stop reason: HOSPADM

## 2018-08-08 RX ORDER — PROPOFOL 10 MG/ML
INJECTION, EMULSION INTRAVENOUS
Status: DISCONTINUED | OUTPATIENT
Start: 2018-08-08 | End: 2018-08-08 | Stop reason: HOSPADM

## 2018-08-08 RX ORDER — LIDOCAINE HYDROCHLORIDE 10 MG/ML
0.1 INJECTION INFILTRATION; PERINEURAL AS NEEDED
Status: DISCONTINUED | OUTPATIENT
Start: 2018-08-08 | End: 2018-08-08 | Stop reason: HOSPADM

## 2018-08-08 RX ORDER — NALOXONE HYDROCHLORIDE 0.4 MG/ML
0.1 INJECTION, SOLUTION INTRAMUSCULAR; INTRAVENOUS; SUBCUTANEOUS AS NEEDED
Status: DISCONTINUED | OUTPATIENT
Start: 2018-08-08 | End: 2018-08-08 | Stop reason: HOSPADM

## 2018-08-08 RX ORDER — OXYCODONE HYDROCHLORIDE 5 MG/1
10 TABLET ORAL
Status: COMPLETED | OUTPATIENT
Start: 2018-08-08 | End: 2018-08-08

## 2018-08-08 RX ORDER — FENTANYL CITRATE 50 UG/ML
100 INJECTION, SOLUTION INTRAMUSCULAR; INTRAVENOUS ONCE
Status: DISCONTINUED | OUTPATIENT
Start: 2018-08-08 | End: 2018-08-08 | Stop reason: HOSPADM

## 2018-08-08 RX ADMIN — PROPOFOL 100 MCG/KG/MIN: 10 INJECTION, EMULSION INTRAVENOUS at 12:36

## 2018-08-08 RX ADMIN — SODIUM CHLORIDE, SODIUM LACTATE, POTASSIUM CHLORIDE, AND CALCIUM CHLORIDE 100 ML/HR: 600; 310; 30; 20 INJECTION, SOLUTION INTRAVENOUS at 10:48

## 2018-08-08 RX ADMIN — PROPOFOL 50 MG: 10 INJECTION, EMULSION INTRAVENOUS at 12:36

## 2018-08-08 RX ADMIN — LIDOCAINE HYDROCHLORIDE 100 MG: 20 INJECTION, SOLUTION EPIDURAL; INFILTRATION; INTRACAUDAL; PERINEURAL at 12:36

## 2018-08-08 RX ADMIN — ONDANSETRON 4 MG: 2 INJECTION INTRAMUSCULAR; INTRAVENOUS at 11:02

## 2018-08-08 RX ADMIN — OXYCODONE HYDROCHLORIDE 10 MG: 5 TABLET ORAL at 13:27

## 2018-08-08 NOTE — DISCHARGE INSTRUCTIONS
Magnetic Resonance Imaging (MRI): About This Test  What is it? Magnetic resonance imaging (MRI) is a test that uses a magnetic field and pulses of radio wave energy to make pictures of organs and structures inside the body. When you have an MRI, you lie on a table and your body is moved into the MRI machine, where an image is taken of the area of the body being studied. Why is this test done? You may have an MRI for many reasons. This test can find problems such as tumors, bleeding, injury, blood vessel disease, and infection. An MRI also may provide more information about a problem seen on an X-ray, ultrasound scan, CT scan, or nuclear medicine exam.  How can you prepare for the test?  Talk to your doctor about all your health conditions before the test. For example, tell your doctor if:  · You are allergic to any medicines. · You are or might be pregnant. · You have a pacemaker, an artificial limb, any metal pins or metal parts in your body, metal heart valves, metal clips in your brain, metal implants in your ears, or any other implanted or prosthetic medical device. · You have an intrauterine device (IUD) in place. · You get nervous in confined spaces. You may need medicine to help you relax. · You wear any patches that contain medicine. · You have kidney disease. What happens before the test?  · You will remove all metal objects from your body. These include hearing aids, dentures, jewelry, watches, and hairpins. · You will take off all or most of your clothes and then change into a gown. · If you do leave some clothes on, make sure you take everything out of your pockets. · You may have contrast materials (dye) put into your arm through a tube called an IV. Contrast material helps doctors see specific organs, blood vessels, and most tumors. What happens during the test?  · You will lie on your back on a table that is part of the MRI scanner.   · The table will slide into the space that contains the magnet. · Inside the scanner you will hear a fan and feel air moving. You may hear tapping, thumping, or snapping noises. You may be given earplugs or headphones to reduce the noise. · You will be asked to hold still during the scan. You may be asked to hold your breath for short periods. · You may be alone in the scanning room, but a technologist will be watching you through a window and talking with you during the test.  What else should you know about the test?  · An MRI does not hurt. · If a dye is used, you may feel a quick sting or pinch and some coolness when the IV is started. The dye may give you a metallic taste in your mouth. Some people feel sick to their stomach or get a headache. · If you breastfeed and are concerned about whether the dye used in this test is safe, talk to your doctor. Most experts believe that very little dye passes into breast milk and even less is passed on to the baby. But if you prefer, you can store some of your breast milk ahead of time and use it for a day or two after the test.  · You may feel warmth in the area being examined. This is normal.  How long does the test take? · The test usually takes 30 to 60 minutes but can take as long as 2 hours. What happens after the test?  · You will probably be able to go home right away, depending on the reason for the test.  Follow-up care is a key part of your treatment and safety. Be sure to make and go to all appointments, and call your doctor if you are having problems. It's also a good idea to keep a list of the medicines you take. Ask your doctor when you can expect to have your test results. Where can you learn more? Go to http://maria alejandra-nereyda.info/. Enter V016 in the search box to learn more about \"Magnetic Resonance Imaging (MRI): About This Test.\"  Current as of: October 9, 2017  Content Version: 11.7  © 1155-2021 [employer].  Care instructions adapted under license by [facility] (which disclaims liability or warranty for this information). If you have questions about a medical condition or this instruction, always ask your healthcare professional. [address] any warranty or liability for your use of this information. After general anesthesia or intravenous sedation, for 24 hours or while taking prescription Narcotics:  · Limit your activities  · Do not drive and operate hazardous machinery  · Do not make important personal or business decisions  · Do  not drink alcoholic beverages  · If you have not urinated within 8 hours after discharge, please contact your surgeon on call. *  Please give a list of your current medications to your Primary Care Provider. *  Please update this list whenever your medications are discontinued, doses are      changed, or new medications (including over-the-counter products) are added. *  Please carry medication information at all times in case of emergency situations. These are general instructions for a healthy lifestyle:  No smoking/ No tobacco products/ Avoid exposure to second hand smoke  Surgeon General's Warning:  Quitting smoking now greatly reduces serious risk to your health. Obesity, smoking, and sedentary lifestyle greatly increases your risk for illness  A healthy diet, regular physical exercise & weight monitoring are important for maintaining a healthy lifestyle    You may be retaining fluid if you have a history of heart failure or if you experience any of the following symptoms:  Weight gain of 3 pounds or more overnight or 5 pounds in a week, increased swelling in our hands or feet or shortness of breath while lying flat in bed. Please call your doctor as soon as you notice any of these symptoms; do not wait until your next office visit.     Recognize signs and symptoms of STROKE:  F-face looks uneven  A-arms unable to move or move unevenly  S-speech slurred or non-existent  T-time-call 911 as soon as signs and symptoms begin-DO NOT go       Back to bed or wait to see if you get better-TIME IS BRAIN.

## 2018-08-08 NOTE — IP AVS SNAPSHOT
[address] [id number] 
[phone] Patient: Jeff Mcrae MRN: [id number] :1965 About your hospitalization You were admitted on:  2018 You last received care in the:  [facility] PACU You were discharged on:  2018 Why you were hospitalized Your primary diagnosis was:  Not on File Follow-up Information Follow up With Details Comments Contact Info Florence Saab MD   [address] [address] 
[phone] Your Scheduled Appointments 2018  1:30 PM EDT  
ECHOCARDIOGRAM with ECHO [facility] CARDIOLOGY [place] OFFICE ([address] [place] [address]) [address] 
[address]  
[phone] 2018  2:00 PM EDT Office Visit with Wale Sheriff MD  
[facility] ([address] [place] [address]) [address] 
[address]  
[phone] Discharge Orders None A check sabas indicates which time of day the medication should be taken. My Medications ASK your doctor about these medications Instructions Each Dose to Equal  
 Morning Noon Evening Bedtime * albuterol 1.25 mg/3 mL Nebu Commonly known as:  Elgie Caller Your last dose was: Your next dose is: 1.25 mg by Nebulization route three (3) times daily. Indications: use on the [address] 1.25 mg  
    
   
   
   
  
 * VENTOLIN HFA 90 mcg/actuation inhaler Generic drug:  albuterol Your last dose was: Your next dose is: Take 2 Puffs by inhalation every four (4) hours as needed. 2 Puff  
    
   
   
   
  
 ammonium lactate 12 % lotion Commonly known as:  LAC-HYDRIN Your last dose was: Your next dose is:    
   
   
 Apply  to affected area as needed. rub in to affected area well apixaban 5 mg tablet Commonly known as:  Traci Can Your last dose was: Your next dose is: Take 1 Tab by mouth two (2) times a day. 5 mg BANOPHEN 50 mg capsule Generic drug:  diphenhydrAMINE Your last dose was: Your next dose is: Take 50 mg by mouth every six (6) hours as needed. 50 mg  
    
   
   
   
  
 clonazePAM 0.5 mg tablet Commonly known as:  Raquel Patch Your last dose was: Your next dose is: Take 1 Tab by mouth three (3) times daily. Max Daily Amount: 1.5 mg.  
 0.5 mg  
    
   
   
   
  
 cyclobenzaprine 10 mg tablet Commonly known as:  FLEXERIL Your last dose was: Your next dose is: Take 1 Tab by mouth three (3) times daily as needed for Muscle Spasm(s). 10 mg  
    
   
   
   
  
 estradiol 0.5 mg tablet Commonly known as:  ESTRACE Your last dose was: Your next dose is: Take 1 Tab by mouth daily. Can d/c the estradiol patches 0.5 mg FLUoxetine 20 mg capsule Commonly known as:  PROzac Your last dose was: Your next dose is: Take 1 Cap by mouth daily. 1 Cap  
    
   
   
   
  
 furosemide 40 mg tablet Commonly known as:  LASIX Your last dose was: Your next dose is: Take 1 Tab by mouth daily. 40 mg  
    
   
   
   
  
 gabapentin 400 mg capsule Commonly known as:  NEURONTIN Your last dose was: Your next dose is: Take 1 Cap by mouth three (3) times daily. Indications: take on the [facility] 400 mg  
    
   
   
   
  
 guaiFENesin-codeine 100-10 mg/5 mL solution Commonly known as:  ROBITUSSIN AC Your last dose was: Your next dose is: Take 10 mL by mouth four (4) times daily as needed. 10 mL  
    
   
   
   
  
 hydrOXYzine pamoate 25 mg capsule Commonly known as:  VISTARIL Your last dose was: Your next dose is: Take 1 Cap by mouth two (2) times daily as needed. 1 Cap  
    
   
   
   
  
 mirtazapine 15 mg tablet Commonly known as:  Mariza Blackman Your last dose was: Your next dose is: Take 1 Tab by mouth nightly. 1 Tab Nebulizer Accessories Kit Your last dose was: Your next dose is:    
   
   
 Use tid NIFEdipine ER 60 mg ER tablet Commonly known as:  PROCARDIA XL Your last dose was: Your next dose is: Take 1 Tab by mouth daily. 60 mg  
    
   
   
   
  
 nitroglycerin 0.4 mg SL tablet Commonly known as:  NITROSTAT Your last dose was: Your next dose is:    
   
   
 1 Tab by SubLINGual route every five (5) minutes as needed for Chest Pain. 0.4 mg  
    
   
   
   
  
 oxybutynin 5 mg tablet Commonly known as:  ZSMQCWOB Your last dose was: Your next dose is: Take 5 mg by mouth two (2) times a day. 5 mg Oxygen Your last dose was: Your next dose is:    
   
   
 nightly. Indications: 3l/min at hs and prn during the day  
     
   
   
   
  
 pravastatin 40 mg tablet Commonly known as:  PRAVACHOL Your last dose was: Your next dose is: Take 40 mg by mouth nightly. 40 mg PROTONIX 40 mg tablet Generic drug:  pantoprazole Your last dose was: Your next dose is: Take 40 mg by mouth daily. Indications: morning 40 mg  
    
   
   
   
  
 SPIRIVA WITH HANDIHALER 18 mcg inhalation capsule Generic drug:  tiotropium Your last dose was: Your next dose is: Take 1 Cap by inhalation daily. Indications: BRONCHOSPASM PREVENTION WITH COPD  
 1 Cap  SYMBICORT 160-4.5 mcg/actuation aa  
 Generic drug:  budesonide-formoterol Your last dose was: Your next dose is: Take 1 Puff by inhalation two (2) times a day. Indications: BRONCHOSPASM PREVENTION WITH COPD, use on the [facility] 1 Puff * Notice: This list has 2 medication(s) that are the same as other medications prescribed for you. Read the directions carefully, and ask your doctor or other care provider to review them with you. Opioid Education Prescription Opioids: What You Need to Know: 
 
Prescription opioids can be used to help relieve moderate-to-severe pain and are often prescribed following a surgery or injury, or for certain health conditions. These medications can be an important part of treatment but also come with serious risks. Opioids are strong pain medicines. Examples include hydrocodone, oxycodone, fentanyl, and morphine. Heroin is an example of an illegal opioid. It is important to work with your health care provider to make sure you are getting the safest, most effective care. WHAT ARE THE RISKS AND SIDE EFFECTS OF OPIOID USE? Prescription opioids carry serious risks of addiction and overdose, especially with prolonged use. An opioid overdose, often marked by slow breathing, can cause sudden death. The use of prescription opioids can have a number of side effects as well, even when taken as directed. · Tolerance-meaning you might need to take more of a medication for the same pain relief · Physical dependence-meaning you have symptoms of withdrawal when the medication is stopped. Withdrawal symptoms can include nausea, sweating, chills, diarrhea, stomach cramps, and muscle aches. Withdrawal can last up to several weeks, depending on which drug you took and how long you took it. · Increased sensitivity to pain · Constipation · Nausea, vomiting, and dry mouth · Sleepiness and dizziness · Confusion · Depression · Low levels of testosterone that can result in lower sex drive, energy, and strength · Itching and sweating RISKS ARE GREATER WITH:      
· History of drug misuse, substance use disorder, or overdose · Mental health conditions (such as depression or anxiety) · Sleep apnea · Older age (72 years or older) · Pregnancy Avoid alcohol while taking prescription opioids. Also, unless specifically advised by your health care provider, medications to avoid include: · Benzodiazepines (such as Xanax or Valium) · Muscle relaxants (such as Soma or Flexeril) · Hypnotics (such as Ambien or Lunesta) · Other prescription opioids KNOW YOUR OPTIONS Talk to your health care provider about ways to manage your pain that don't involve prescription opioids. Some of these options may actually work better and have fewer risks and side effects. Options may include: 
· Pain relievers such as acetaminophen, ibuprofen, and naproxen · Some medications that are also used for depression or seizures · Physical therapy and exercise · Counseling to help patients learn how to cope better with triggers of pain and stress. · Application of heat or cold compress · Massage therapy · Relaxation techniques Be Informed Make sure you know the name of your medication, how much and how often to take it, and its potential risks & side effects. IF YOU ARE PRESCRIBED OPIOIDS FOR PAIN: 
· Never take opioids in greater amounts or more often than prescribed. Remember the goal is not to be pain-free but to manage your pain at a tolerable level. · Follow up with your primary care provider to: · Work together to create a plan on how to manage your pain. · Talk about ways to help manage your pain that don't involve prescription opioids. · Talk about any and all concerns and side effects. · Help prevent misuse and abuse. · Never sell or share prescription opioids · Help prevent misuse and abuse. · Store prescription opioids in a secure place and out of reach of others (this may include visitors, children, friends, and family). · Safely dispose of unused/unwanted prescription opioids: Find your community drug take-back program or your pharmacy mail-back program, or flush them down the toilet, following guidance from the Food and Drug Administration (www.fda.gov/Drugs/ResourcesForYou). · Visit www.cdc.gov/drugoverdose to learn about the risks of opioid abuse and overdose. · If you believe you may be struggling with addiction, tell your health care provider and ask for guidance or call [employer] at 7-517-355-XZFD. Discharge Instructions Magnetic Resonance Imaging (MRI): About This Test 
What is it? Magnetic resonance imaging (MRI) is a test that uses a magnetic field and pulses of radio wave energy to make pictures of organs and structures inside the body. When you have an MRI, you lie on a table and your body is moved into the MRI machine, where an image is taken of the area of the body being studied. Why is this test done? You may have an MRI for many reasons. This test can find problems such as tumors, bleeding, injury, blood vessel disease, and infection. An MRI also may provide more information about a problem seen on an X-ray, ultrasound scan, CT scan, or nuclear medicine exam. 
How can you prepare for the test? 
Talk to your doctor about all your health conditions before the test. For example, tell your doctor if: 
· You are allergic to any medicines. · You are or might be pregnant. · You have a pacemaker, an artificial limb, any metal pins or metal parts in your body, metal heart valves, metal clips in your brain, metal implants in your ears, or any other implanted or prosthetic medical device. · You have an intrauterine device (IUD) in place. · You get nervous in confined spaces. You may need medicine to help you relax. · You wear any patches that contain medicine. · You have kidney disease. What happens before the test? 
· You will remove all metal objects from your body. These include hearing aids, dentures, jewelry, watches, and hairpins. · You will take off all or most of your clothes and then change into a gown. · If you do leave some clothes on, make sure you take everything out of your pockets. · You may have contrast materials (dye) put into your arm through a tube called an IV. Contrast material helps doctors see specific organs, blood vessels, and most tumors. What happens during the test? 
· You will lie on your back on a table that is part of the MRI scanner. · The table will slide into the space that contains the magnet. · Inside the scanner you will hear a fan and feel air moving. You may hear tapping, thumping, or snapping noises. You may be given earplugs or headphones to reduce the noise. · You will be asked to hold still during the scan. You may be asked to hold your breath for short periods. · You may be alone in the scanning room, but a technologist will be watching you through a window and talking with you during the test. 
What else should you know about the test? 
· An MRI does not hurt. · If a dye is used, you may feel a quick sting or pinch and some coolness when the IV is started. The dye may give you a metallic taste in your mouth. Some people feel sick to their stomach or get a headache. · If you breastfeed and are concerned about whether the dye used in this test is safe, talk to your doctor. Most experts believe that very little dye passes into breast milk and even less is passed on to the baby. But if you prefer, you can store some of your breast milk ahead of time and use it for a day or two after the test. 
· You may feel warmth in the area being examined. This is normal. 
How long does the test take? · The test usually takes 30 to 60 minutes but can take as long as 2 hours. What happens after the test? 
· You will probably be able to go home right away, depending on the reason for the test. 
Follow-up care is a key part of your treatment and safety. Be sure to make and go to all appointments, and call your doctor if you are having problems. It's also a good idea to keep a list of the medicines you take. Ask your doctor when you can expect to have your test results. Where can you learn more? Go to http://maria alejandra-nereyda.info/. Enter V016 in the search box to learn more about \"Magnetic Resonance Imaging (MRI): About This Test.\" Current as of: October 9, 2017 Content Version: 11.7 © 5093-5491 [employer]. Care instructions adapted under license by [employer] (which disclaims liability or warranty for this information). If you have questions about a medical condition or this instruction, always ask your healthcare professional. [address] any warranty or liability for your use of this information. After general anesthesia or intravenous sedation, for 24 hours or while taking prescription Narcotics: · Limit your activities · Do not drive and operate hazardous machinery · Do not make important personal or business decisions · Do  not drink alcoholic beverages · If you have not urinated within 8 hours after discharge, please contact your surgeon on call. *  Please give a list of your current medications to your Primary Care Provider. *  Please update this list whenever your medications are discontinued, doses are 
    changed, or new medications (including over-the-counter products) are added. *  Please carry medication information at all times in case of emergency situations. These are general instructions for a healthy lifestyle: No smoking/ No tobacco products/ Avoid exposure to second hand smoke Surgeon General's Warning:  Quitting smoking now greatly reduces serious risk to your health. Obesity, smoking, and sedentary lifestyle greatly increases your risk for illness A healthy diet, regular physical exercise & weight monitoring are important for maintaining a healthy lifestyle You may be retaining fluid if you have a history of heart failure or if you experience any of the following symptoms:  Weight gain of 3 pounds or more overnight or 5 pounds in a week, increased swelling in our hands or feet or shortness of breath while lying flat in bed. Please call your doctor as soon as you notice any of these symptoms; do not wait until your next office visit. Recognize signs and symptoms of STROKE: 
F-face looks uneven A-arms unable to move or move unevenly S-speech slurred or non-existent T-time-call 911 as soon as signs and symptoms begin-DO NOT go Back to bed or wait to see if you get better-TIME IS BRAIN. Introducing \[facility]\"" & HEALTH SERVICES! Dear Brody Wagner: 
Thank you for requesting a [employer] account. Our records indicate that you already have an active [employer] account. You can access your account anytime at [email or web address] Did you know that you can access your hospital and ER discharge instructions at any time in [employer]? You can also review all of your test results from your hospital stay or ER visit. Additional Information If you have questions, please visit the Frequently Asked Questions section of the [employer] website at [email or web address] Remember, [employer] is NOT to be used for urgent needs. For medical emergencies, dial 911. Now available from your iPhone and Android! Introducing Neel Olivas As a [employer] patient, I wanted to make you aware of our electronic visit tool called Neel Olivas. [employer] 24/7 allows you to connect within minutes with a medical provider 24 hours a day, seven days a week via a mobile device or tablet or logging into a secure website from your computer. You can access [employer] from anywhere in the [place]. A virtual visit might be right for you when you have a simple condition and feel like you just dont want to get out of bed, or cant get away from work for an appointment, when your regular [facility] provider is not available (evenings, weekends or holidays), or when youre out of town and need minor care. Electronic visits cost only $49 and if the [employer] 24/7 provider determines a prescription is needed to treat your condition, one can be electronically transmitted to a nearby pharmacy*. Please take a moment to enroll today if you have not already done so. The enrollment process is free and takes just a few minutes. To enroll, please download the [facility] 24/EnerG2 constantine to your tablet or phone, or visit [email or web address] to enroll on your computer. And, as an [address] [place] [address] patient with a [employer] account, the results of your visits will be scanned into your electronic medical record and your primary care provider will be able to view the scanned results. We urge you to continue to see your regular [facility] provider for your ongoing medical care. And while your primary care provider may not be the one available when you seek a Neel [employer] virtual visit, the peace of mind you get from getting a real diagnosis real time can be priceless. For more information on [employer], view our Frequently Asked Questions (FAQs) at [email or web address]. Sincerely, 
 
Milvia Mullen MD 
Chief Medical Officer [facility] *:  certain medications cannot be prescribed via [employer] Providers Seen During Your Hospitalization Provider Specialty Primary office phone Noah Duncan MD Orthopedic Surgery [phone] Your Primary Care Physician (PCP) Primary Care Physician Office Phone Office Fax Pricilla Ann [phone] You are allergic to the following Allergen Reactions Aspirin Other (comments) Inflames IBS per pt Bentyl (Dicyclomine) Itching Toradol (Ketorolac) Hives Ultram (Tramadol) Nausea and Vomiting Zofran (Ondansetron Hcl (Pf)) Nausea and Vomiting Recent Documentation Height Weight BMI OB Status Smoking Status 1.651 m 80.3 kg 29.48 kg/m2 Hysterectomy Former Smoker Emergency Contacts Name Discharge Info Relation Home Work Mobile Anastasia Chapa DISCHARGE CAREGIVER [3] Parent [1] [phone] Patient Belongings The following personal items are in your possession at time of discharge: 
  Dental Appliances: None         Home Medications: None   Jewelry: None  Clothing: Dress, Undergarments, Footwear    Other Valuables: Cell Phone, Purse, Other (comment) (ortho boot ) Please provide this summary of care documentation to your next provider. Signatures-by signing, you are acknowledging that this After Visit Summary has been reviewed with you and you have received a copy. Patient Signature:  ____________________________________________________________ Date:  ____________________________________________________________  
  
[facility] Signature:  ____________________________________________________________ Date:  ____________________________________________________________

## 2018-08-08 NOTE — ANESTHESIA POSTPROCEDURE EVALUATION
Post-Anesthesia Evaluation and Assessment    Patient: Chris Rodriguez MRN: [id number]  SSN: [id number]    YOB: 1965  Age: 50 y.o. Sex: female       Cardiovascular Function/Vital Signs  Visit Vitals    /68    Pulse 100    Temp 36.5 °C (97.7 °F)    Resp 16    Ht 5' 5\" (1.651 m)    Wt 80.3 kg (177 lb 2 oz)    SpO2 97%    BMI 29.48 kg/m2       Patient is status post total IV anesthesia anesthesia for Procedure(s):  MRI ANESTHESIA LEFT SHOULDER WITHOUT CONTRAST. Nausea/Vomiting: None    Postoperative hydration reviewed and adequate. Pain:  Pain Scale 1: Numeric (0 - 10) (08/08/18 1327)  Pain Intensity 1: 5 (08/08/18 1327)   Managed    Neurological Status:   Neuro (WDL): Within Defined Limits (08/08/18 1314)   At baseline    Mental Status and Level of Consciousness: Arousable    Pulmonary Status:   O2 Device: Room air (08/08/18 1314)   Adequate oxygenation and airway patent    Complications related to anesthesia: None    Post-anesthesia assessment completed.  No concerns    Signed By: Bryon Cotter MD     August 8, 2018

## 2018-08-08 NOTE — H&P
Patient: Lisa Gamez MRN: [id number]  SSN: [id number]    YOB: 1965  Age: 50 y.o. Sex: female      History and Physical    Lisa Gamez is a 50 y.o. female having Procedure(s):  MRI ANESTHESIA LEFT SHOULDER WITHOUT CONTRAST. Allergies:    Allergies   Allergen Reactions    Aspirin Other (comments)     Inflames IBS per pt    Bentyl [Dicyclomine] Itching    Toradol [Ketorolac] Hives    Ultram [Tramadol] Nausea and Vomiting    Zofran [Ondansetron Hcl (Pf)] Nausea and Vomiting        Chief Complaint: shoulder pain     History of Present Illness: shoulder pain    Past Medical History:   Diagnosis Date    Anemia     blood transfusion 5/2018 per pt    Arrhythmia     palpitations, moderate mitral valve regurge    Arthritis     lower back osteo    Asthma     uses inhalers    Cardiomyopathy     ECHO 11/2017    CHF (congestive heart failure) [address] [address]     11/2017 Echo  LVEF 45-50%    Chronic pain 2010    Coagulation defect ([facility] [address]     eliquis    Cocaine use     Reports last use 5/2018 per patient    COPD     nebulizer daily and maint inhalers, can not climb 1 flight of stairs (also CHF)  oxygen 3 LPM qhs    Decreased cardiac ejection fraction 11/27/2017    EF 45-50% per echo 11/27/17    Depression     controlled      Diverticulitis     GERD (gastroesophageal reflux disease)     uncontrolled with daily meds-- comes and goes- 3 pillows    Heart murmur     Hypertension     managed with meds    IBS (irritable bowel syndrome)     IC (interstitial cystitis)     Insomnia     Migraine with aura and without status migrainosus, not intractable 6/17/2016    Mitral valve regurgitation, rheumatic     followed Dr. Jessica Babcock Palpitations 5/16/2016    Psychiatric disorder     anxiety    Requires oxygen therapy     3l/min at hs. prn during the days as needed    Rheumatic aortic insufficiency 5/16/2016    Rheumatic fever as child    pt reports rheumatic fever in childhood    Smoker     started age 23-- smoked 0.5 ppd until 2018-- cut back to 10-2 cig daily per pt    Stroke [facility])     \"mild stroke\" pt reports 2014- \"left sided weakness and balance is off\"     Thromboembolus ([address]     BLE 2012    Tobacco abuse disorder 5/16/2016    Vitamin D deficiency       Past Surgical History:   Procedure Laterality Date    BIOPSY OF BREAST, INCISIONAL Left     lymph node , left, patient states her bx neg, but high risk    COLONOSCOPY      8 polyps removed, diverticulitis    COLONOSCOPY N/A 5/21/2018    COLONOSCOPY performed by Sesar Valdes MD at [address]  8-23-11    bladder dilitation    EGD      HX APPENDECTOMY  2006    HX HEART CATHETERIZATION  5/27/2011    no blockages, no intervention    HX HEART CATHETERIZATION  04/2017    no intervention    HX LAP CHOLECYSTECTOMY  6/6/2011    HX NATASHA AND BSO  2004    fibroid tumors, hyst    HX UROLOGICAL  01/2018    cystoscopy with hydrodistention of bladder      Family History   Problem Relation Age of Onset    Heart Failure Father 76     chf    Heart Disease Father     Diabetes Sister     Thyroid Disease Sister       Social History   Substance Use Topics    Smoking status: Former Smoker     Packs/day: 1.00     Years: 27.00     Quit date: 3/23/2018    Smokeless tobacco: Never Used      Comment: \"cutting back on smoking\"    Alcohol use No        Prior to Admission medications    Medication Sig Start Date End Date Taking? Authorizing Provider   pantoprazole (PROTONIX) 40 mg tablet Take 40 mg by mouth daily. Indications: morning   Yes Historical Provider   pravastatin (PRAVACHOL) 40 mg tablet Take 40 mg by mouth nightly. Yes Historical Provider   FLUoxetine (PROZAC) 20 mg capsule Take 1 Cap by mouth daily. 7/23/18  Yes Historical Provider   hydrOXYzine pamoate (VISTARIL) 25 mg capsule Take 1 Cap by mouth two (2) times daily as needed.  7/23/18  Yes Historical Provider   mirtazapine (REMERON) 15 mg tablet Take 1 Tab by mouth nightly. 7/23/18  Yes Historical Provider   estradiol (ESTRACE) 0.5 mg tablet Take 1 Tab by mouth daily. Can d/c the estradiol patches 7/25/18  Yes Sahara Reagan MD   Nebulizer Accessories kit Use tid 7/16/18  Yes Sahara Reagan MD   clonazePAM [facility] 0.5 mg tablet Take 1 Tab by mouth three (3) times daily. Max Daily Amount: 1.5 mg. Patient taking differently: Take 0.5 mg by mouth two (2) times a day. 6/6/18  Yes Sahara Reagan MD   apixaban (ELIQUIS) 5 mg tablet Take 1 Tab by mouth two (2) times a day. 5/17/18  Yes Kasi Garnett MD   gabapentin (NEURONTIN) 400 mg capsule Take 1 Cap by mouth three (3) times daily. Indications: take on the dos  Patient taking differently: Take 400 mg by mouth three (3) times daily. 5/10/18  Yes Sahara Reagan MD   cyclobenzaprine (FLEXERIL) 10 mg tablet Take 1 Tab by mouth three (3) times daily as needed for Muscle Spasm(s). 5/1/18  Yes Maryjane Rooney MD   diphenhydrAMINE [facility]) 50 mg capsule Take 50 mg by mouth every six (6) hours as needed. Yes Historical Provider   VENTOLIN HFA 90 mcg/actuation inhaler Take 2 Puffs by inhalation every four (4) hours as needed. 3/6/18  Yes Historical Provider   nitroglycerin (NITROSTAT) 0.4 mg SL tablet 1 Tab by SubLINGual route every five (5) minutes as needed for Chest Pain. 3/23/18  Yes Kasi Garnett MD   Oxygen nightly. Indications: 3l/min at hs and prn during the day   Yes Historical Provider   furosemide (LASIX) 40 mg tablet Take 1 Tab by mouth daily. Patient taking differently: Take 40 mg by mouth daily. Indications: am 12/4/17  Yes Diamantina Osgood, NP   albuterol (ACCUNEB) 1.25 mg/3 mL nebu 1.25 mg by Nebulization route three (3) times daily. Indications: use on the dos   Yes Historical Provider   oxybutynin (DITROPAN) 5 mg tablet Take 5 mg by mouth two (2) times a day. Yes Historical Provider   ammonium lactate (LAC-HYDRIN) 12 % lotion Apply  to affected area as needed.  rub in to affected area well   Yes Historical Provider   budesonide-formoterol (SYMBICORT) 160-4.5 mcg/actuation HFA inhaler Take 1 Puff by inhalation two (2) times a day. Indications: BRONCHOSPASM PREVENTION WITH COPD, use on the dos   Yes Historical Provider   NIFEdipine ER (PROCARDIA XL) 60 mg ER tablet Take 1 Tab by mouth daily. Patient taking differently: Take 60 mg by mouth daily. Indications: morning 1/30/17  Yes Char Gordon MD   tiotropium (SPIRIVA WITH HANDIHALER) 18 mcg inhalation capsule Take 1 Cap by inhalation daily. Indications: BRONCHOSPASM PREVENTION WITH COPD   Yes Bin Mccallum, MD   guaiFENesin-codeine (ROBITUSSIN AC) 100-10 mg/5 mL solution Take 10 mL by mouth four (4) times daily as needed. 7/20/18   Historical Provider        Visit Vitals    /75 (BP 1 Location: Right arm, BP Patient Position: At rest)    Pulse 94    Temp 36.8 °C (98.2 °F)    Resp 18    Ht 5' 5\" (1.651 m)    Wt 80.3 kg (177 lb 2 oz)    SpO2 99%    BMI 29.48 kg/m2        Assessment and Plan:   Rodriguez Patel is a 50 y.o. female having Procedure(s):  MRI ANESTHESIA LEFT SHOULDER WITHOUT CONTRAST for shoulder pain.     Preanesthesia Evaluation     Last edited 08/08/18 1006 by Johnny Kessler MD             Anesthetic History   No history of anesthetic complications            Review of Systems / Medical History  Patient summary reviewed, nursing notes reviewed and pertinent labs reviewed    Pulmonary    COPD (Chronic bronchitis): mild      Shortness of breath (Requiring 3L O2 with exertion) and smoker  Asthma : well controlled       Neuro/Psych         Psychiatric history     Cardiovascular    Hypertension  Valvular problems/murmurs: aortic insufficiency    CHF (Early last year developed Takotsubo CM - most recent ECHO (12/17) EF 45%)  Dysrhythmias       Exercise tolerance: <4 METS     GI/Hepatic/Renal     GERD          Comments: GIB Endo/Other        Arthritis     Other Findings   Comments: Admission 12/17 with pulm edema secondary to voln overload Physical Exam    Airway  Mallampati: II  TM Distance: 4 - 6 cm  Neck ROM: normal range of motion   Mouth opening: Normal     Cardiovascular    Rhythm: regular  Rate: normal    Murmur: Grade 2, Aortic area     Dental    Dentition: Edentulous     Pulmonary  Breath sounds clear to auscultation               Abdominal  GI exam deferred       Other Findings            Anesthetic Plan    ASA: 3  Anesthesia type: total IV anesthesia          Induction: Intravenous  Anesthetic plan and risks discussed with: Patient               Preanesthesia evaluation performed by Gertrude Severin, MD            Signed By: Gertrude Severin, MD     August 8, 2018

## 2018-08-29 ENCOUNTER — APPOINTMENT (OUTPATIENT)
Dept: CT IMAGING | Age: 53
End: 2018-08-29
Attending: EMERGENCY MEDICINE
Payer: COMMERCIAL

## 2018-08-29 ENCOUNTER — HOSPITAL ENCOUNTER (EMERGENCY)
Age: 53
Discharge: HOME | End: 2018-08-29
Attending: EMERGENCY MEDICINE
Payer: COMMERCIAL

## 2018-08-29 ENCOUNTER — APPOINTMENT (OUTPATIENT)
Dept: GENERAL RADIOLOGY | Age: 53
End: 2018-08-29
Attending: EMERGENCY MEDICINE
Payer: COMMERCIAL

## 2018-08-29 VITALS
HEIGHT: 65 IN | BODY MASS INDEX: 30.82 KG/M2 | TEMPERATURE: 97.8 F | DIASTOLIC BLOOD PRESSURE: 69 MMHG | HEART RATE: 94 BPM | WEIGHT: 185 LBS | SYSTOLIC BLOOD PRESSURE: 128 MMHG | RESPIRATION RATE: 16 BRPM | OXYGEN SATURATION: 96 %

## 2018-08-29 DIAGNOSIS — J44.9 CHRONIC OBSTRUCTIVE PULMONARY DISEASE, UNSPECIFIED COPD TYPE (HCC): Primary | ICD-10-CM

## 2018-08-29 DIAGNOSIS — R06.02 SOB (SHORTNESS OF BREATH): ICD-10-CM

## 2018-08-29 LAB
ALBUMIN SERPL-MCNC: 3.4 G/DL (ref 3.5–5)
ALBUMIN/GLOB SERPL: 0.9 {RATIO} (ref 1.2–3.5)
ALP SERPL-CCNC: 91 U/L (ref 50–136)
ALT SERPL-CCNC: 35 U/L (ref 12–65)
ANION GAP SERPL CALC-SCNC: 13 MMOL/L (ref 7–16)
AST SERPL-CCNC: 34 U/L (ref 15–37)
ATRIAL RATE: 90 BPM
BASOPHILS # BLD: 0 K/UL (ref 0–0.2)
BASOPHILS NFR BLD: 0 % (ref 0–2)
BILIRUB SERPL-MCNC: 0.2 MG/DL (ref 0.2–1.1)
BNP SERPL-MCNC: 86 PG/ML
BUN SERPL-MCNC: 17 MG/DL (ref 6–23)
CALCIUM SERPL-MCNC: 8.5 MG/DL (ref 8.3–10.4)
CALCULATED P AXIS, ECG09: 67 DEGREES
CALCULATED R AXIS, ECG10: 45 DEGREES
CALCULATED T AXIS, ECG11: 13 DEGREES
CHLORIDE SERPL-SCNC: 105 MMOL/L (ref 98–107)
CO2 SERPL-SCNC: 22 MMOL/L (ref 21–32)
CREAT SERPL-MCNC: 1.05 MG/DL (ref 0.6–1)
DIAGNOSIS, 93000: NORMAL
DIFFERENTIAL METHOD BLD: ABNORMAL
EOSINOPHIL # BLD: 0.1 K/UL (ref 0–0.8)
EOSINOPHIL NFR BLD: 1 % (ref 0.5–7.8)
ERYTHROCYTE [DISTWIDTH] IN BLOOD BY AUTOMATED COUNT: 17.7 %
GLOBULIN SER CALC-MCNC: 3.9 G/DL (ref 2.3–3.5)
GLUCOSE SERPL-MCNC: 97 MG/DL (ref 65–100)
HCT VFR BLD AUTO: 32.5 % (ref 35.8–46.3)
HGB BLD-MCNC: 10.2 G/DL (ref 11.7–15.4)
IMM GRANULOCYTES # BLD: 0 K/UL (ref 0–0.5)
IMM GRANULOCYTES NFR BLD AUTO: 0 % (ref 0–5)
LYMPHOCYTES # BLD: 3 K/UL (ref 0.5–4.6)
LYMPHOCYTES NFR BLD: 38 % (ref 13–44)
MCH RBC QN AUTO: 29 PG (ref 26.1–32.9)
MCHC RBC AUTO-ENTMCNC: 31.4 G/DL (ref 31.4–35)
MCV RBC AUTO: 92.3 FL (ref 79.6–97.8)
MONOCYTES # BLD: 0.6 K/UL (ref 0.1–1.3)
MONOCYTES NFR BLD: 8 % (ref 4–12)
NEUTS SEG # BLD: 4.2 K/UL (ref 1.7–8.2)
NEUTS SEG NFR BLD: 53 % (ref 43–78)
NRBC # BLD: 0 K/UL (ref 0–0.2)
P-R INTERVAL, ECG05: 120 MS
PLATELET # BLD AUTO: 332 K/UL (ref 150–450)
PMV BLD AUTO: 10.1 FL (ref 9.4–12.3)
POTASSIUM SERPL-SCNC: 3.5 MMOL/L (ref 3.5–5.1)
PROT SERPL-MCNC: 7.3 G/DL (ref 6.3–8.2)
Q-T INTERVAL, ECG07: 366 MS
QRS DURATION, ECG06: 88 MS
QTC CALCULATION (BEZET), ECG08: 447 MS
RBC # BLD AUTO: 3.52 M/UL (ref 4.05–5.2)
SODIUM SERPL-SCNC: 140 MMOL/L (ref 136–145)
TROPONIN I BLD-MCNC: 0 NG/ML (ref 0.02–0.05)
TROPONIN I BLD-MCNC: 0 NG/ML (ref 0.02–0.05)
VENTRICULAR RATE, ECG03: 90 BPM
WBC # BLD AUTO: 7.9 K/UL (ref 4.3–11.1)

## 2018-08-29 PROCEDURE — 99284 EMERGENCY DEPT VISIT MOD MDM: CPT | Performed by: EMERGENCY MEDICINE

## 2018-08-29 PROCEDURE — 96375 TX/PRO/DX INJ NEW DRUG ADDON: CPT | Performed by: EMERGENCY MEDICINE

## 2018-08-29 PROCEDURE — 74011000258 HC RX REV CODE- 258: Performed by: EMERGENCY MEDICINE

## 2018-08-29 PROCEDURE — 74011250636 HC RX REV CODE- 250/636: Performed by: EMERGENCY MEDICINE

## 2018-08-29 PROCEDURE — 85025 COMPLETE CBC W/AUTO DIFF WBC: CPT

## 2018-08-29 PROCEDURE — 74011636320 HC RX REV CODE- 636/320: Performed by: EMERGENCY MEDICINE

## 2018-08-29 PROCEDURE — 80053 COMPREHEN METABOLIC PANEL: CPT

## 2018-08-29 PROCEDURE — 74011000250 HC RX REV CODE- 250: Performed by: EMERGENCY MEDICINE

## 2018-08-29 PROCEDURE — 71046 X-RAY EXAM CHEST 2 VIEWS: CPT

## 2018-08-29 PROCEDURE — 71260 CT THORAX DX C+: CPT

## 2018-08-29 PROCEDURE — 93005 ELECTROCARDIOGRAM TRACING: CPT | Performed by: EMERGENCY MEDICINE

## 2018-08-29 PROCEDURE — 77030013140 HC MSK NEB VYRM -A

## 2018-08-29 PROCEDURE — 96374 THER/PROPH/DIAG INJ IV PUSH: CPT | Performed by: EMERGENCY MEDICINE

## 2018-08-29 PROCEDURE — 83880 ASSAY OF NATRIURETIC PEPTIDE: CPT

## 2018-08-29 PROCEDURE — 84484 ASSAY OF TROPONIN QUANT: CPT

## 2018-08-29 PROCEDURE — 94640 AIRWAY INHALATION TREATMENT: CPT

## 2018-08-29 RX ORDER — PREDNISONE 20 MG/1
60 TABLET ORAL DAILY
Qty: 12 TAB | Refills: 0 | Status: SHIPPED | OUTPATIENT
Start: 2018-08-29 | End: 2018-09-02

## 2018-08-29 RX ORDER — MORPHINE SULFATE 2 MG/ML
4 INJECTION, SOLUTION INTRAMUSCULAR; INTRAVENOUS ONCE
Status: COMPLETED | OUTPATIENT
Start: 2018-08-29 | End: 2018-08-29

## 2018-08-29 RX ORDER — IPRATROPIUM BROMIDE AND ALBUTEROL SULFATE 2.5; .5 MG/3ML; MG/3ML
3 SOLUTION RESPIRATORY (INHALATION)
Status: COMPLETED | OUTPATIENT
Start: 2018-08-29 | End: 2018-08-29

## 2018-08-29 RX ORDER — SODIUM CHLORIDE 0.9 % (FLUSH) 0.9 %
10 SYRINGE (ML) INJECTION
Status: COMPLETED | OUTPATIENT
Start: 2018-08-29 | End: 2018-08-29

## 2018-08-29 RX ORDER — ONDANSETRON 2 MG/ML
4 INJECTION INTRAMUSCULAR; INTRAVENOUS
Status: COMPLETED | OUTPATIENT
Start: 2018-08-29 | End: 2018-08-29

## 2018-08-29 RX ORDER — ALBUTEROL SULFATE 90 UG/1
2 AEROSOL, METERED RESPIRATORY (INHALATION)
Qty: 1 INHALER | Refills: 0 | Status: SHIPPED | OUTPATIENT
Start: 2018-08-29 | End: 2020-09-21 | Stop reason: SDUPTHER

## 2018-08-29 RX ADMIN — SODIUM CHLORIDE 100 ML: 900 INJECTION, SOLUTION INTRAVENOUS at 10:56

## 2018-08-29 RX ADMIN — IOPAMIDOL 100 ML: 755 INJECTION, SOLUTION INTRAVENOUS at 10:56

## 2018-08-29 RX ADMIN — MORPHINE SULFATE 4 MG: 2 INJECTION, SOLUTION INTRAMUSCULAR; INTRAVENOUS at 11:43

## 2018-08-29 RX ADMIN — Medication 10 ML: at 10:56

## 2018-08-29 RX ADMIN — IPRATROPIUM BROMIDE AND ALBUTEROL SULFATE 3 ML: .5; 3 SOLUTION RESPIRATORY (INHALATION) at 11:26

## 2018-08-29 RX ADMIN — ONDANSETRON 4 MG: 2 INJECTION, SOLUTION INTRAMUSCULAR; INTRAVENOUS at 11:44

## 2018-08-29 RX ADMIN — METHYLPREDNISOLONE SODIUM SUCCINATE 125 MG: 125 INJECTION, POWDER, FOR SOLUTION INTRAMUSCULAR; INTRAVENOUS at 12:07

## 2018-08-29 NOTE — ED PROVIDER NOTES
HPI Comments: 68-year-old female with history of COPD, DVT on Eliquis, CHF on Lasix 40 mg daily present with complaint of worsening lower extremity swelling and shortness of breath that is progressive the past 24 hours. States she feels that she has gained 11 pounds overnight. States she does weigh herself daily. States she's been compliant with her Lasix. States she's been having intermittent palpitations. Patient denies chest pain, fever, chills, productive cough, nausea, vomiting, hemoptysis, abdominal pain. Patient is a 50 y.o. female presenting with swelling. The history is provided by the patient. No  was used. Swelling This is a new problem. The current episode started 12 to 24 hours ago. The problem occurs constantly. The problem has not changed since onset. The pain is at a severity of 0/10. The patient is experiencing no pain. Pertinent negatives include no anorexia, no fever, no belching, no diarrhea, no flatus, no hematochezia, no melena, no nausea, no vomiting, no dysuria, no headaches, no myalgias and no chest pain. Nothing worsens the pain. The pain is relieved by nothing. Past Medical History:  
Diagnosis Date  Anemia   
 blood transfusion 5/2018 per pt  Arrhythmia   
 palpitations, moderate mitral valve regurge  Arthritis   
 lower back osteo  Asthma   
 uses inhalers  Cardiomyopathy ECHO 11/2017  CHF (congestive heart failure) ([address]   
 11/2017 Echo  LVEF 45-50%  Chronic pain 2010  Coagulation defect ([facility] [address]   
 eliquis  Cocaine use Reports last use 5/2018 per patient  COPD   
 nebulizer daily and maint inhalers, can not climb 1 flight of stairs (also CHF)  oxygen 3 LPM qhs  Decreased cardiac ejection fraction 11/27/2017 EF 45-50% per echo 11/27/17  Depression   
 controlled  Diverticulitis  GERD (gastroesophageal reflux disease)   
 uncontrolled with daily meds-- comes and goes- 3 pillows  Heart murmur  Hypertension   
 managed with meds  IBS (irritable bowel syndrome)  IC (interstitial cystitis)  Insomnia  Migraine with aura and without status migrainosus, not intractable 6/17/2016  Mitral valve regurgitation, rheumatic   
 followed Dr. Sabillon Free  Palpitations 5/16/2016  Psychiatric disorder   
 anxiety  Requires oxygen therapy 3l/min at hs. prn during the days as needed  Rheumatic aortic insufficiency 5/16/2016  Rheumatic fever as child  
 pt reports rheumatic fever in childhood  Smoker   
 started age 23-- smoked 0.5 ppd until 2018-- cut back to 10-2 cig daily per pt  Stroke ([address] \"mild stroke\" pt reports 2014- \"left sided weakness and balance is off\"  Thromboembolus ([address] BLE 2012  Tobacco abuse disorder 5/16/2016  Vitamin D deficiency Past Surgical History:  
Procedure Laterality Date  BIOPSY OF BREAST, INCISIONAL Left   
 lymph node , left, patient states her bx neg, but high risk  COLONOSCOPY    
 8 polyps removed, diverticulitis  COLONOSCOPY N/A 5/21/2018 COL[facility]COPY performed [facility] Wolfgang Vale [facility] ENDOSCOPY  CYSTOSCOPY  8-23-11  
 bladder dilitation  EGD  HX APPENDECTOMY  2006  HX HEART CATHETERIZATION  5/27/2011  
 no blockages, no intervention  HX HEART CATHETERIZATION  04/2017  
 no intervention  HX LAP CHOLECYSTECTOMY  6/6/2011  HX NATASHA AND BSO  2004  
 fibroid tumors, hyst  
 HX UROLOGICAL  01/2018  
 cystoscopy with hydrodistention of bladder Family History:  
Problem Relation Age of Onset  Heart Failure Father 76 chf  
 Heart Disease Father  Diabetes Sister  Thyroid Disease Sister Social History Social History  Marital status: LEGALLY  Spouse name: N/A  
 Number of children: N/A  
 Years of education: N/A Occupational History  Not on file. Social History Main Topics  Smoking status: Former Smoker Packs/day: 1.00 Years: 27.00 Quit date: 3/23/2018  Smokeless tobacco: Never Used Comment: \"cutting back on smoking\"  Alcohol use No  
 Drug use: Yes Special: Cocaine Comment: Last use 5/2018  Sexual activity: Not on file Other Topics Concern  Not on file Social History Narrative ALLERGIES: Aspirin; Bentyl [dicyclomine]; Toradol [ketorolac]; Ultram [tramadol]; and Zofran [ondansetron hcl (pf)] Review of Systems Constitutional: Negative for chills, fatigue and fever. HENT: Negative for congestion and rhinorrhea. Respiratory: Positive for shortness of breath. Negative for cough. Cardiovascular: Positive for leg swelling. Negative for chest pain and palpitations. Gastrointestinal: Negative for abdominal pain, anorexia, diarrhea, flatus, hematochezia, melena, nausea and vomiting. Genitourinary: Negative for dysuria and flank pain. Musculoskeletal: Negative for gait problem, joint swelling and myalgias. Skin: Negative for pallor and rash. Neurological: Negative for dizziness, syncope, weakness and headaches. Vitals:  
 08/29/18 0811 08/29/18 1126 08/29/18 1139 BP: 131/80  123/68 Pulse: (!) 105  93 Resp: 18 Temp: 97.8 °F (36.6 °C) SpO2: 93% 96% 97% Weight: 83.9 kg (185 lb) Height: 5' 5\" (1.651 m) Physical Exam  
Constitutional: She is oriented to person, place, and time. Patient well appearing in no acute distress. Nontoxic in appearance. HENT:  
Head: Normocephalic. Mouth/Throat: Oropharynx is clear and moist.  
MMM. Neck: Normal range of motion. No JVD present. No tracheal deviation present. Cardiovascular: Regular rhythm, normal heart sounds and intact distal pulses. Tachycardic. Pulses 2+ and equal throughout. Pulmonary/Chest: Effort normal. She has wheezes. She has no rales. She exhibits no tenderness. Mild wheezes noted bilaterally. Abdominal: Soft. Soft, NTND. No rebound or guarding. No CVAT. Musculoskeletal: Normal range of motion. She exhibits no edema. No LE edema present on exam.  
Neurological: She is alert and oriented to person, place, and time. No cranial nerve deficit. Coordination normal.  
Skin: Skin is warm and dry. No rash. Psychiatric: She has a normal mood and affect. Her behavior is normal.  
Nursing note and vitals reviewed. MDM Number of Diagnoses or Management Options Diagnosis management comments: Troponin negative. EKG with no acute findings. BNP within normal limits. Chest x-ray with no evidence of volume overload. CT chest negative for PE or infiltrate. Vital signs stable. Patient ready for discharge home. Will have patient follow up with cardiology & PCP in 24-48 hrs. Patient given return precautions. Patient given DuoNeb and Solu-Medrol with improvement of symptoms. Will discharge home with prednisone taper and albuterol inhaler. Amount and/or Complexity of Data Reviewed Clinical lab tests: reviewed and ordered Tests in the radiology section of CPT®: ordered and reviewed Tests in the medicine section of CPT®: ordered and reviewed Review and summarize past medical records: yes Independent visualization of images, tracings, or specimens: yes Risk of Complications, Morbidity, and/or Mortality Presenting problems: moderate Diagnostic procedures: moderate Management options: moderate Patient Progress Patient progress: stable ED Course Comment By Time CXR IMPRESSION: No acute abnormality Dinesh Rogers MD 08/29 0628 CT chest Impression: No acute abnormality. Bradford Collins MD 08/29 4108 EKG Date/Time: 8/29/2018 9:16 AM 
Performed by: Cecilia Reed Authorized by: DINESH Bedoya  
 
ECG reviewed by ED Physician in the absence of a cardiologist: yes Rate:  
  ECG rate:  90 ECG rate assessment: normal   
Rhythm: Rhythm: sinus rhythm Ectopy:  
  Ectopy: none QRS:  
  QRS axis:  Normal 
  QRS intervals:  Normal 
Conduction:  
  Conduction: normal   
ST segments: ST segments:  Normal 
T waves:  
  T waves: normal   
 
 
 
 
Results Include: 
 
Recent Results (from the past 24 hour(s)) CBC WITH AUTOMATED DIFF Collection Time: 08/29/18  8:18 AM  
Result Value Ref Range WBC 7.9 4.3 - 11.1 K/uL  
 RBC 3.52 (L) 4.05 - 5.2 M/uL  
 HGB 10.2 (L) 11.7 - 15.4 g/dL HCT 32.5 (L) 35.8 - 46.3 % MCV 92.3 79.6 - 97.8 FL  
 MCH 29.0 26.1 - 32.9 PG  
 MCHC 31.4 31.4 - 35.0 g/dL  
 RDW 17.7 % PLATELET 715 126 - 683 K/uL MPV 10.1 9.4 - 12.3 FL ABSOLUTE NRBC 0.00 0.0 - 0.2 K/uL  
 DF AUTOMATED NEUTROPHILS 53 43 - 78 % LYMPHOCYTES 38 13 - 44 % MONOCYTES 8 4.0 - 12.0 % EOSINOPHILS 1 0.5 - 7.8 % BASOPHILS 0 0.0 - 2.0 % IMMATURE GRANULOCYTES 0 0.0 - 5.0 %  
 ABS. NEUTROPHILS 4.2 1.7 - 8.2 K/UL  
 ABS. LYMPHOCYTES 3.0 0.5 - 4.6 K/UL  
 ABS. MONOCYTES 0.6 0.1 - 1.3 K/UL  
 ABS. EOSINOPHILS 0.1 0.0 - 0.8 K/UL  
 ABS. BASOPHILS 0.0 0.0 - 0.2 K/UL  
 ABS. IMM. GRANS. 0.0 0.0 - 0.5 K/UL METABOLIC PANEL, COMPREHENSIVE Collection Time: 08/29/18  8:18 AM  
Result Value Ref Range Sodium 140 136 - 145 mmol/L Potassium 3.5 3.5 - 5.1 mmol/L Chloride 105 98 - 107 mmol/L  
 CO2 22 21 - 32 mmol/L Anion gap 13 7 - 16 mmol/L Glucose 97 65 - 100 mg/dL BUN 17 6 - 23 MG/DL Creatinine 1.05 (H) 0.6 - 1.0 MG/DL  
 GFR est AA >60 >60 ml/min/1.73m2 GFR est non-AA 59 (L) >60 ml/min/1.73m2 Calcium 8.5 8.3 - 10.4 MG/DL Bilirubin, total 0.2 0.2 - 1.1 MG/DL  
 ALT (SGPT) 35 12 - 65 U/L  
 AST (SGOT) 34 15 - 37 U/L Alk. phosphatase 91 50 - 136 U/L Protein, total 7.3 6.3 - 8.2 g/dL Albumin 3.4 (L) 3.5 - 5.0 g/dL Globulin 3.9 (H) 2.3 - 3.5 g/dL A-G Ratio 0.9 (L) 1.2 - 3.5 BNP Collection Time: 08/29/18  8:18 AM  
Result Value Ref Range BNP 86 pg/mL EKG, 12 LEAD, INITIAL Collection Time: 08/29/18  9:06 AM  
Result Value Ref Range Ventricular Rate 90 BPM  
 Atrial Rate 90 BPM  
 P-R Interval 120 ms QRS Duration 88 ms Q-T Interval 366 ms  
 QTC Calculation (Bezet) 447 ms Calculated P Axis 67 degrees Calculated R Axis 45 degrees Calculated T Axis 13 degrees Diagnosis    
  !! AGE AND GENDER SPECIFIC ECG ANALYSIS !! Normal sinus rhythm Biatrial enlargement Septal infarct (cited on or before 30-NOV-2017) Abnormal ECG When compared with ECG of 19-MAY-2018 05:27, 
Nonspecific T wave abnormality now evident in Inferior leads Inverted T waves have replaced nonspecific T wave abnormality in Anterior  
leads POC TROPONIN-I Collection Time: 08/29/18  9:23 AM  
Result Value Ref Range Troponin-I (POC) 0 (L) 0.02 - 0.05 ng/ml Unique Neff MD; 8/29/2018 @8:33 AM Voice dictation software was used during the making of this note. This software is not perfect and grammatical and other typographical errors may be present.   This note has not been proofread for errors. 
===================================================================

## 2018-08-29 NOTE — ED TRIAGE NOTES
I have reviewed discharge instructions with the patient. The patient verbalized understanding. Patient left ED via Discharge Method: wheelchair to Home with (self and mother). Opportunity for questions and clarification provided. Patient given 2 scripts. No nicgn. Frannie Matthew To continue your aftercare when you leave the hospital, you may receive an automated call from our care team to check in on how you are doing. This is a free service and part of our promise to provide the best care and service to meet your aftercare needs.  If you have questions, or wish to unsubscribe from this service please call [phone]. Thank you for Choosing our [facility] Emergency Department.

## 2018-08-29 NOTE — DISCHARGE INSTRUCTIONS
Chronic Obstructive Pulmonary Disease (COPD): Care Instructions  Your Care Instructions    Chronic obstructive pulmonary disease (COPD) is a general term for a group of lung diseases, including emphysema and chronic bronchitis. People with COPD have decreased airflow in and out of the lungs, which makes it hard to breathe. The airways also can get clogged with thick mucus. Cigarette smoking is a major cause of COPD. Although there is no cure for COPD, you can slow its progress. Following your treatment plan and taking care of yourself can help you feel better and live longer. Follow-up care is a key part of your treatment and safety. Be sure to make and go to all appointments, and call your doctor if you are having problems. It's also a good idea to know your test results and keep a list of the medicines you take. How can you care for yourself at home?   Staying healthy    · Do not smoke. This is the most important step you can take to prevent more damage to your lungs. If you need help quitting, talk to your doctor about stop-smoking programs and medicines. These can increase your chances of quitting for good.     · Avoid colds and flu. Get a pneumococcal vaccine shot. If you have had one before, ask your doctor whether you need a second dose. Get the flu vaccine every fall. If you must be around people with colds or the flu, wash your hands often.     · Avoid secondhand smoke, air pollution, and high altitudes. Also avoid cold, dry air and hot, humid air. Stay at home with your windows closed when air pollution is bad.    Medicines and oxygen therapy    · Take your medicines exactly as prescribed. Call your doctor if you think you are having a problem with your medicine.     · You may be taking medicines such as:  ¨ Bronchodilators. These help open your airways and make breathing easier. Bronchodilators are either short-acting (work for 6 to 9 hours) or long-acting (work for 24 hours).  You inhale most bronchodilators, so they start to act quickly. Always carry your quick-relief inhaler with you in case you need it while you are away from home. ¨ Corticosteroids (prednisone, budesonide). These reduce airway inflammation. They come in pill or inhaled form. You must take these medicines every day for them to work well.     · A spacer may help you get more inhaled medicine to your lungs. Ask your doctor or pharmacist if a spacer is right for you. If it is, ask how to use it properly.     · Do not take any vitamins, over-the-counter medicine, or herbal products without talking to your doctor first.     · If your doctor prescribed antibiotics, take them as directed. Do not stop taking them just because you feel better. You need to take the full course of antibiotics.     · Oxygen therapy boosts the amount of oxygen in your blood and helps you breathe easier. Use the flow rate your doctor has recommended, and do not change it without talking to your doctor first.   Activity    · Get regular exercise. Walking is an easy way to get exercise. Start out slowly, and walk a little more each day.     · Pay attention to your breathing. You are exercising too hard if you cannot talk while you are exercising.     · Take short rest breaks when doing household chores and other activities.     · Learn breathing methods-such as breathing through pursed lips-to help you become less short of breath.     · If your doctor has not set you up with a pulmonary rehabilitation program, talk to him or her about whether rehab is right for you. Rehab includes exercise programs, education about your disease and how to manage it, help with diet and other changes, and emotional support. Diet    · Eat regular, healthy meals. Use bronchodilators about 1 hour before you eat to make it easier to eat. Eat several small meals instead of three large ones.  Drink beverages at the end of the meal. Avoid foods that are hard to chew.     · Eat foods that contain protein so that you do not lose muscle mass.     · Talk with your doctor if you gain too much weight or if you lose weight without trying.    Mental health    · Talk to your family, friends, or a therapist about your feelings. It is normal to feel frightened, angry, hopeless, helpless, and even guilty. Talking openly about bad feelings can help you cope. If these feelings last, talk to your doctor. When should you call for help? Call 911 anytime you think you may need emergency care. For example, call if:    · You have severe trouble breathing.    Call your doctor now or seek immediate medical care if:    · You have new or worse trouble breathing.     · You cough up blood.     · You have a fever.    Watch closely for changes in your health, and be sure to contact your doctor if:    · You cough more deeply or more often, especially if you notice more mucus or a change in the color of your mucus.     · You have new or worse swelling in your legs or belly.     · You are not getting better as expected. Where can you learn more? Go to http://maria alejandra-nereyda.info/. Sharyle Cheng in the search box to learn more about \"Chronic Obstructive Pulmonary Disease (COPD): Care Instructions. \"  Current as of: December 6, 2017  Content Version: 11.7  © 7967-0362 [employer]. Care instructions adapted under license by [employer] (which disclaims liability or warranty for this information). If you have questions about a medical condition or this instruction, always ask your healthcare professional. [place] [address] any warranty or liability for your use of this information. COPD Exacerbation Plan: Care Instructions  Your Care Instructions    If you have chronic obstructive pulmonary disease (COPD), your usual shortness of breath could suddenly get worse. You may start coughing more and have more mucus.  This flare-up is called a COPD exacerbation (say \"nz-LXN-xw-BAY-shun\"). A lung infection or air pollution could set off an exacerbation. Sometimes it can happen after a quick change in temperature or being around chemicals. Work with your doctor to make a plan for dealing with an exacerbation. You can better manage it if you plan ahead. Follow-up care is a key part of your treatment and safety. Be sure to make and go to all appointments, and call your doctor if you are having problems. It's also a good idea to know your test results and keep a list of the medicines you take. How can you care for yourself at home? During an exacerbation  · Do not panic if you start to have one. Quick treatment at home may help you prevent serious breathing problems. If you have a COPD exacerbation plan that you developed with your doctor, follow it. · Take your medicines exactly as your doctor tells you. ¨ Use your inhaler as directed by your doctor. If your symptoms do not get better after you use your medicine, have someone take you to the emergency room. Call an ambulance if necessary. ¨ With inhaled medicines, a spacer or a nebulizer may help you get more medicine to your lungs. Ask your doctor or pharmacist how to use them properly. Practice using the spacer in front of a mirror before you have an exacerbation. This may help you get the medicine into your lungs quickly. ¨ If your doctor has given you steroid pills, take them as directed. ¨ Your doctor may have given you a prescription for antibiotics, which you can fill if you need it. ¨ Talk to your doctor if you have any problems with your medicine. And call your doctor if you have to use your antibiotic or steroid pills. Preventing an exacerbation  · Do not smoke. This is the most important step you can take to prevent more damage to your lungs and prevent problems. If you already smoke, it is never too late to stop. If you need help quitting, talk to your doctor about stop-smoking programs and medicines.  These can increase your chances of quitting for good. · Take your daily medicines as prescribed. · Avoid colds and flu. ¨ Get a pneumococcal vaccine. ¨ Get a flu vaccine each year, as soon as it is available. Ask those you live or work with to do the same, so they will not get the flu and infect you. ¨ Try to stay away from people with colds or the flu. ¨ Wash your hands often. · Avoid secondhand smoke; air pollution; cold, dry air; hot, humid air; and high altitudes. Stay at home with your windows closed when air pollution is bad. · Learn breathing techniques for COPD, such as breathing through pursed lips. These techniques can help you breathe easier during an exacerbation. When should you call for help? Call 911 anytime you think you may need emergency care. For example, call if:    · You have severe trouble breathing.     · You have severe chest pain.    Call your doctor now or seek immediate medical care if:    · You have new or worse shortness of breath.     · You develop new chest pain.     · You are coughing more deeply or more often, especially if you notice more mucus or a change in the color of your mucus.     · You cough up blood.     · You have new or increased swelling in your legs or belly.     · You have a fever.    Watch closely for changes in your health, and be sure to contact your doctor if:    · You need to use your antibiotic or steroid pills.     · Your symptoms are getting worse. Where can you learn more? Go to http://maria alejandra-nereyda.info/. Enter A752 in the search box to learn more about \"COPD Exacerbation Plan: Care Instructions. \"  Current as of: December 6, 2017  Content Version: 11.7  © 7830-9644 [employer]. Care instructions adapted under license by [employer] (which disclaims liability or warranty for this information).  If you have questions about a medical condition or this instruction, always ask your healthcare professional. [employer], [facility] disclaims any warranty or liability for your use of this information. Chronic Obstructive Pulmonary Disease (COPD) Flare-Ups: Care Instructions  Your Care Instructions    Chronic obstructive pulmonary disease (COPD) is a lung disease that makes it hard to breathe. It is caused by damage to the lungs over many years, usually from smoking. COPD is often a mix of two diseases:  · Chronic bronchitis: The airways that carry air to the lungs (bronchial tubes) get inflamed and make a lot of mucus. This can narrow or block the airways. · Emphysema: In a healthy person, the tiny air sacs in the lungs are like balloons. As you breathe in and out, they get bigger and smaller to move air through your lungs. But with emphysema, these air sacs are damaged and lose their stretch. Less air gets in and out of the lungs. Many people with COPD have attacks called flare-ups or exacerbations. This is when your usual symptoms quickly get worse and stay worse. The doctor has checked you carefully. But problems can develop later. If you notice any problems or new symptoms, get medical treatment right away. Follow-up care is a key part of your treatment and safety. Be sure to make and go to all appointments, and call your doctor if you are having problems. It's also a good idea to know your test results and keep a list of the medicines you take. How can you care for yourself at home? · Be safe with medicines. Take your medicines exactly as prescribed. Call your doctor if you think you are having a problem with your medicine. You may be taking medicines such as:  ¨ Bronchodilators. These help open your airways and make breathing easier. ¨ Corticosteroids. These reduce airway inflammation. They may be given as pills, in a vein, or in an inhaled form. You may go home with pills in addition to an inhaler that you already use. · A spacer may help you get more inhaled medicine to your lungs.  Ask your doctor or pharmacist if a spacer is right for you. If it is, ask how to use it properly. · If your doctor prescribed antibiotics, take them as directed. Do not stop taking them just because you feel better. You need to take the full course of antibiotics. · If your doctor prescribed oxygen, use the flow rate your doctor has recommended. Do not change it without talking to your doctor first.  · Do not smoke. Smoking makes COPD worse. If you need help quitting, talk to your doctor about stop-smoking programs and medicines. These can increase your chances of quitting for good. When should you call for help? Call 911 anytime you think you may need emergency care. For example, call if:    · You have severe trouble breathing.    Call your doctor now or seek immediate medical care if:    · You have new or worse trouble breathing.     · Your coughing or wheezing gets worse.     · You cough up dark brown or bloody mucus (sputum).     · You have a new or higher fever.    Watch closely for changes in your health, and be sure to contact your doctor if:    · You notice more mucus or a change in the color of your mucus.     · You need to use your antibiotic or steroid pills.     · You do not get better as expected. Where can you learn more? Go to http://maria alejandra-nereyda.info/. Enter H485 in the search box to learn more about \"Chronic Obstructive Pulmonary Disease (COPD) Flare-Ups: Care Instructions. \"  Current as of: December 6, 2017  Content Version: 11.7  © 5558-9273 [employer]. Care instructions adapted under license by [employer] (which disclaims liability or warranty for this information). If you have questions about a medical condition or this instruction, always ask your healthcare professional. [place] [address] any warranty or liability for your use of this information.          Shortness of Breath: Care Instructions  Your Care Instructions  Shortness of breath has many causes. Sometimes conditions such as anxiety can lead to shortness of breath. Some people get mild shortness of breath when they exercise. Trouble breathing also can be a symptom of a serious problem, such as asthma, lung disease, emphysema, heart problems, and pneumonia. If your shortness of breath continues, you may need tests and treatment. Watch for any changes in your breathing and other symptoms. Follow-up care is a key part of your treatment and safety. Be sure to make and go to all appointments, and call your doctor if you are having problems. It's also a good idea to know your test results and keep a list of the medicines you take. How can you care for yourself at home? · Do not smoke or allow others to smoke around you. If you need help quitting, talk to your doctor about stop-smoking programs and medicines. These can increase your chances of quitting for good. · Get plenty of rest and sleep. · Take your medicines exactly as prescribed. Call your doctor if you think you are having a problem with your medicine. · Find healthy ways to deal with stress. ¨ Exercise daily. ¨ Get plenty of sleep. ¨ Eat regularly and well. When should you call for help? Call 911 anytime you think you may need emergency care. For example, call if:    · You have severe shortness of breath.     · You have symptoms of a heart attack. These may include:  ¨ Chest pain or pressure, or a strange feeling in the chest.  ¨ Sweating. ¨ Shortness of breath. ¨ Nausea or vomiting. ¨ Pain, pressure, or a strange feeling in the back, neck, jaw, or upper belly or in one or both shoulders or arms. ¨ Lightheadedness or sudden weakness. ¨ A fast or irregular heartbeat. After you call 911, the  may tell you to chew 1 adult-strength or 2 to 4 low-dose aspirin. Wait for an ambulance.  Do not try to drive yourself.    Call your doctor now or seek immediate medical care if:    · Your shortness of breath gets worse or you start to wheeze. Wheezing is a high-pitched sound when you breathe.     · You wake up at night out of breath or have to prop your head up on several pillows to breathe.     · You are short of breath after only light activity or while at rest.    Watch closely for changes in your health, and be sure to contact your doctor if:    · You do not get better over the next 1 to 2 days. Where can you learn more? Go to http://maria alejandra-nereyda.info/. Enter S780 in the search box to learn more about \"Shortness of Breath: Care Instructions. \"  Current as of: December 6, 2017  Content Version: 11.7  © 7383-3519 [employer]. Care instructions adapted under license by [employer] (which disclaims liability or warranty for this information). If you have questions about a medical condition or this instruction, always ask your healthcare professional. [place] [address] any warranty or liability for your use of this information. Heart Failure: Care Instructions  Your Care Instructions    Heart failure occurs when your heart does not pump as much blood as the body needs. Failure does not mean that the heart has stopped pumping but rather that it is not pumping as well as it should. Over time, this causes fluid buildup in your lungs and other parts of your body. Fluid buildup can cause shortness of breath, fatigue, swollen ankles, and other problems. By taking medicines regularly, reducing sodium (salt) in your diet, checking your weight every day, and making lifestyle changes, you can feel better and live longer. Follow-up care is a key part of your treatment and safety. Be sure to make and go to all appointments, and call your doctor if you are having problems. It's also a good idea to know your test results and keep a list of the medicines you take. How can you care for yourself at home? Medicines     · Be safe with medicines.  Take your medicines exactly as prescribed. Call your doctor if you think you are having a problem with your medicine.      · Do not take any vitamins, over-the-counter medicine, or herbal products without talking to your doctor first. Oswald Gray not take ibuprofen (Advil or Motrin) and naproxen (Aleve) without talking to your doctor first. They could make your heart failure worse.      · You may be taking some of the following medicine. ¨ Beta-blockers can slow heart rate, decrease blood pressure, and improve your condition. Taking a beta-blocker may lower your chance of needing to be hospitalized. ¨ Angiotensin-converting enzyme inhibitors (ACEIs) reduce the heart's workload, lower blood pressure, and reduce swelling. Taking an ACEI may lower your chance of needing to be hospitalized again. ¨ Angiotensin II receptor blockers (ARBs) work like ACEIs. Your doctor may prescribe them instead of ACEIs. ¨ Diuretics, also called water pills, reduce swelling. ¨ Potassium supplements replace this important mineral, which is sometimes lost with diuretics. ¨ Aspirin and other blood thinners prevent blood clots, which can cause a stroke or heart attack.    You will get more details on the specific medicines your doctor prescribes. Diet     · Your doctor may suggest that you limit sodium to 2,000 milligrams (mg) a day or less. That is less than 1 teaspoon of salt a day, including all the salt you eat in cooking or in packaged foods. People get most of their sodium from processed foods. Fast food and restaurant meals also tend to be very high in sodium.    · Ask your doctor how much liquid you can drink each day. You may have to limit liquids.   Ozzy Schmitt     · Weigh yourself without clothing at the same time each day. Record your weight. Call your doctor if you have a sudden weight gain, such as more than 2 to 3 pounds in a day or 5 pounds in a week.  (Your doctor may suggest a different range of weight gain.) A sudden weight gain may mean that your heart failure is getting worse.    Activity level     · Start light exercise (if your doctor says it is okay). Even if you can only do a small amount, exercise will help you get stronger, have more energy, and manage your weight and your stress. Walking is an easy way to get exercise. Start out by walking a little more than you did before. Bit by bit, increase the amount you walk.      · When you exercise, watch for signs that your heart is working too hard. You are pushing yourself too hard if you cannot talk while you are exercising. If you become short of breath or dizzy or have chest pain, stop, sit down, and rest.      · If you feel \"wiped out\" the day after you exercise, walk slower or for a shorter distance until you can work up to a better pace.      · Get enough rest at night. Sleeping with 1 or 2 pillows under your upper body and head may help you breathe easier.    Lifestyle changes     · Do not smoke. Smoking can make a heart condition worse. If you need help quitting, talk to your doctor about stop-smoking programs and medicines. These can increase your chances of quitting for good. Quitting smoking may be the most important step you can take to protect your heart.      · Limit alcohol to 2 drinks a day for men and 1 drink a day for women. Too much alcohol can cause health problems.      · Avoid getting sick from colds and the flu. Get a pneumococcal vaccine shot. If you have had one before, ask your doctor whether you need another dose. Get a flu shot each year. If you must be around people with colds or the flu, wash your hands often. When should you call for help?   Call 911 if you have symptoms of sudden heart failure such as:     · You have severe trouble breathing.      · You cough up pink, foamy mucus.      · You have a new irregular or rapid heartbeat.    Call your doctor now or seek immediate medical care if:     · You have new or increased shortness of breath.      · You are dizzy or lightheaded, or you feel like you may faint.      · You have sudden weight gain, such as more than 2 to 3 pounds in a day or 5 pounds in a week. (Your doctor may suggest a different range of weight gain.)      · You have increased swelling in your legs, ankles, or feet.      · You are suddenly so tired or weak that you cannot do your usual activities.    Watch closely for changes in your health, and be sure to contact your doctor if you develop new symptoms. Where can you learn more? Go to http://maria alejandra-nereyda.info/. Enter Y222 in the search box to learn more about \"Heart Failure: Care Instructions. \"  Current as of: May 10, 2017  Content Version: 11.7  © 7188-3192 [employer]. Care instructions adapted under license by [facility] (which disclaims liability or warranty for this information).  If you have questions about a medical condition or this instruction, always ask your healthcare professional. [place] [address] any warranty or liability for your use of this information.

## 2018-08-29 NOTE — ED TRIAGE NOTES
Pt states she gained 11 lbs in a few hours. Pt states her blood clot medicine is causing her to have knots in her left leg. Pt states she is exhausted. Pt states she did not take her breathing treatments this morning.

## 2018-09-17 ENCOUNTER — TELEPHONE (OUTPATIENT)
Dept: CARDIOLOGY | Age: 53
End: 2018-09-17

## 2018-09-17 ENCOUNTER — HOSPITAL ENCOUNTER (OUTPATIENT)
Dept: GENERAL RADIOLOGY | Age: 53
Discharge: HOME | End: 2018-09-17
Payer: COMMERCIAL

## 2018-09-17 ENCOUNTER — HOSPITAL ENCOUNTER (EMERGENCY)
Age: 53
Discharge: HOME | End: 2018-09-17
Attending: EMERGENCY MEDICINE
Payer: COMMERCIAL

## 2018-09-17 ENCOUNTER — APPOINTMENT (OUTPATIENT)
Dept: GENERAL RADIOLOGY | Age: 53
End: 2018-09-17
Attending: EMERGENCY MEDICINE
Payer: COMMERCIAL

## 2018-09-17 VITALS
WEIGHT: 190 LBS | SYSTOLIC BLOOD PRESSURE: 129 MMHG | HEIGHT: 65 IN | RESPIRATION RATE: 18 BRPM | TEMPERATURE: 98 F | DIASTOLIC BLOOD PRESSURE: 65 MMHG | HEART RATE: 93 BPM | OXYGEN SATURATION: 94 % | BODY MASS INDEX: 31.65 KG/M2

## 2018-09-17 DIAGNOSIS — R60.9 DEPENDENT EDEMA: ICD-10-CM

## 2018-09-17 DIAGNOSIS — I10 ESSENTIAL HYPERTENSION: ICD-10-CM

## 2018-09-17 DIAGNOSIS — R06.02 SOB (SHORTNESS OF BREATH): ICD-10-CM

## 2018-09-17 DIAGNOSIS — I50.9 ACUTE ON CHRONIC CONGESTIVE HEART FAILURE, UNSPECIFIED HEART FAILURE TYPE (HCC): Primary | ICD-10-CM

## 2018-09-17 DIAGNOSIS — J96.11 CHRONIC RESPIRATORY FAILURE WITH HYPOXIA (HCC): ICD-10-CM

## 2018-09-17 LAB
ALBUMIN SERPL-MCNC: 3.3 G/DL (ref 3.5–5)
ALBUMIN/GLOB SERPL: 0.8 {RATIO} (ref 1.2–3.5)
ALP SERPL-CCNC: 82 U/L (ref 50–136)
ALT SERPL-CCNC: 28 U/L (ref 12–65)
ANION GAP SERPL CALC-SCNC: 13 MMOL/L (ref 7–16)
AST SERPL-CCNC: 30 U/L (ref 15–37)
ATRIAL RATE: 101 BPM
BASOPHILS # BLD: 0 K/UL (ref 0–0.2)
BASOPHILS NFR BLD: 0 % (ref 0–2)
BILIRUB SERPL-MCNC: 0.1 MG/DL (ref 0.2–1.1)
BNP SERPL-MCNC: 84 PG/ML
BUN SERPL-MCNC: 17 MG/DL (ref 6–23)
CALCIUM SERPL-MCNC: 8.7 MG/DL (ref 8.3–10.4)
CALCULATED P AXIS, ECG09: 64 DEGREES
CALCULATED R AXIS, ECG10: 27 DEGREES
CALCULATED T AXIS, ECG11: 2 DEGREES
CHLORIDE SERPL-SCNC: 107 MMOL/L (ref 98–107)
CO2 SERPL-SCNC: 21 MMOL/L (ref 21–32)
CREAT SERPL-MCNC: 0.96 MG/DL (ref 0.6–1)
DIAGNOSIS, 93000: NORMAL
DIFFERENTIAL METHOD BLD: ABNORMAL
EOSINOPHIL # BLD: 0.1 K/UL (ref 0–0.8)
EOSINOPHIL NFR BLD: 1 % (ref 0.5–7.8)
ERYTHROCYTE [DISTWIDTH] IN BLOOD BY AUTOMATED COUNT: 15.3 %
GLOBULIN SER CALC-MCNC: 3.9 G/DL (ref 2.3–3.5)
GLUCOSE SERPL-MCNC: 105 MG/DL (ref 65–100)
HCT VFR BLD AUTO: 30.6 % (ref 35.8–46.3)
HGB BLD-MCNC: 9.7 G/DL (ref 11.7–15.4)
IMM GRANULOCYTES # BLD: 0 K/UL (ref 0–0.5)
IMM GRANULOCYTES NFR BLD AUTO: 0 % (ref 0–5)
LYMPHOCYTES # BLD: 1.9 K/UL (ref 0.5–4.6)
LYMPHOCYTES NFR BLD: 35 % (ref 13–44)
MCH RBC QN AUTO: 29.2 PG (ref 26.1–32.9)
MCHC RBC AUTO-ENTMCNC: 31.7 G/DL (ref 31.4–35)
MCV RBC AUTO: 92.2 FL (ref 79.6–97.8)
MONOCYTES # BLD: 0.5 K/UL (ref 0.1–1.3)
MONOCYTES NFR BLD: 9 % (ref 4–12)
NEUTS SEG # BLD: 3.1 K/UL (ref 1.7–8.2)
NEUTS SEG NFR BLD: 55 % (ref 43–78)
NRBC # BLD: 0 K/UL (ref 0–0.2)
P-R INTERVAL, ECG05: 120 MS
PLATELET # BLD AUTO: 272 K/UL (ref 150–450)
PMV BLD AUTO: 9.4 FL (ref 9.4–12.3)
POTASSIUM SERPL-SCNC: 3.7 MMOL/L (ref 3.5–5.1)
PROT SERPL-MCNC: 7.2 G/DL (ref 6.3–8.2)
Q-T INTERVAL, ECG07: 380 MS
QRS DURATION, ECG06: 80 MS
QTC CALCULATION (BEZET), ECG08: 492 MS
RBC # BLD AUTO: 3.32 M/UL (ref 4.05–5.2)
SODIUM SERPL-SCNC: 141 MMOL/L (ref 136–145)
TROPONIN I SERPL-MCNC: <0.02 NG/ML (ref 0.02–0.05)
VENTRICULAR RATE, ECG03: 101 BPM
WBC # BLD AUTO: 5.5 K/UL (ref 4.3–11.1)

## 2018-09-17 PROCEDURE — 96374 THER/PROPH/DIAG INJ IV PUSH: CPT | Performed by: EMERGENCY MEDICINE

## 2018-09-17 PROCEDURE — 99285 EMERGENCY DEPT VISIT HI MDM: CPT | Performed by: EMERGENCY MEDICINE

## 2018-09-17 PROCEDURE — 71046 X-RAY EXAM CHEST 2 VIEWS: CPT

## 2018-09-17 PROCEDURE — 71045 X-RAY EXAM CHEST 1 VIEW: CPT

## 2018-09-17 PROCEDURE — 93005 ELECTROCARDIOGRAM TRACING: CPT | Performed by: EMERGENCY MEDICINE

## 2018-09-17 PROCEDURE — 74011250636 HC RX REV CODE- 250/636: Performed by: EMERGENCY MEDICINE

## 2018-09-17 PROCEDURE — 85025 COMPLETE CBC W/AUTO DIFF WBC: CPT

## 2018-09-17 PROCEDURE — 84484 ASSAY OF TROPONIN QUANT: CPT

## 2018-09-17 PROCEDURE — 83880 ASSAY OF NATRIURETIC PEPTIDE: CPT

## 2018-09-17 PROCEDURE — 80053 COMPREHEN METABOLIC PANEL: CPT

## 2018-09-17 RX ORDER — FUROSEMIDE 10 MG/ML
40 INJECTION INTRAMUSCULAR; INTRAVENOUS
Status: COMPLETED | OUTPATIENT
Start: 2018-09-17 | End: 2018-09-17

## 2018-09-17 RX ADMIN — FUROSEMIDE 40 MG: 10 INJECTION, SOLUTION INTRAMUSCULAR; INTRAVENOUS at 03:53

## 2018-09-17 NOTE — TELEPHONE ENCOUNTER
Pt called to report R foot tingling x 2+ weeks. Was off/on but now constant. States now having pain in her foot with ambulation. States is [employer]" but states taking all meds as directed. States taking Eliquis for \"blood clots. \"    Instructed that she would need to go to ED for evaluation and work-up. Pt states that she is going to call EMS to take her to ED.      Jesenia Nicole, MOUNIKA-C

## 2018-09-17 NOTE — ED NOTES
Patient to ED with c/c tingling to bottoms of feet x 2 weeks. Patient reports R>L. Patient also reports some associated leg swelling. Per EMS, patient with report of .Alyssia upon arrival in ED. Patient reports lasix use, denies missing any dosages. Patient reports orthopnea. At time of triage, patient with no apparent dyspnea, speaking clearly with O2 saturation 95%.

## 2018-09-17 NOTE — ED PROVIDER NOTES
HPI Comments: Patient states she has a history of congestive heart failure for which she takes Lasix 40 mg once a day. She has been taking her Lasix. However the past several days she has had some discomfort to her lower extremities along with some swelling. She has had some shortness of breath but states that she normally sleeps on 3 pillows at night with oxygen and this has not changed. She denies any cough or fever, denies any chest pain. [facility] who manages her congestive heart failure. She has had similar symptoms in the past. 
 
Elements of this note were created using speech recognition software. As such, errors of speech recognition may be present. Patient is a 50 y.o. female presenting with leg pain and shortness of breath. The history is provided by the patient. Leg Pain Shortness of Breath Associated symptoms include leg pain and leg swelling. Pertinent negatives include no fever, no chest pain and no vomiting. Past Medical History:  
Diagnosis Date  Anemia   
 blood transfusion 5/2018 per pt  Arrhythmia   
 palpitations, moderate mitral valve regurge  Arthritis   
 lower back osteo  Asthma   
 uses inhalers  Cardiomyopathy ECHO 11/2017  CHF (congestive heart failure) ([address]   
 11/2017 Echo  LVEF 45-50%  Chronic pain 2010  [facility]agulation defect [address] [address]   
 eliquis  Cocaine use Reports last use 5/2018 per patient  COPD   
 nebulizer daily and maint inhalers, can not climb 1 flight of stairs (also CHF)  oxygen 3 LPM qhs  Decreased cardiac ejection fraction 11/27/2017 EF 45-50% per echo 11/27/17  Depression   
 controlled  Diverticulitis  GERD (gastroesophageal reflux disease)   
 uncontrolled with daily meds-- comes and goes- 3 pillows  Heart murmur  Hypertension   
 managed with meds  IBS (irritable bowel syndrome)  IC (interstitial cystitis)  Insomnia  Migraine with aura and without status migrainosus, not intractable 6/17/2016  Mitral valve regurgitation, rheumatic   
 followed Dr. Ramon Knott  Palpitations 5/16/2016  Psychiatric disorder   
 anxiety  Requires oxygen therapy 3l/min at hs. prn during the days as needed  Rheumatic aortic insufficiency 5/16/2016  Rheumatic fever as child  
 pt reports rheumatic fever in childhood  Smoker   
 started age 23-- smoked 0.5 ppd until 2018-- cut back to 10-2 cig daily per pt  Stroke ([address] \"mild stroke\" pt reports 2014- \"left sided weakness and balance is off\"  Thromboembolus ([address] BLE 2012  Tobacco abuse disorder 5/16/2016  Vitamin D deficiency Past Surgical History:  
Procedure Laterality Date  BIOPSY OF BREAST, INCISIONAL Left   
 lymph node , left, patient states her bx neg, but high risk  COLONOSCOPY    
 8 polyps removed, diverticulitis  COLONOSCOPY N/A 5/21/2018 COLONOSCOPY performed by Renetta Hong [facility] at [facility] ENDOSCOPY  CYSTOSCOPY  8-23-11  
 bladder dilitation  EGD  HX APPENDECTOMY  2006  HX HEART CATHETERIZATION  5/27/2011  
 no blockages, no intervention  HX HEART CATHETERIZATION  04/2017  
 no intervention  HX LAP CHOLECYSTECTOMY  6/6/2011  HX NATASHA AND BSO  2004  
 fibroid tumors, hyst  
 HX UROLOGICAL  01/2018  
 cystoscopy with hydrodistention of bladder Family History:  
Problem Relation Age of Onset  Heart Failure Father 76 chf  
 Heart Disease Father  Diabetes Sister  Thyroid Disease Sister Social History Social History  Marital status: LEGALLY  Spouse name: N/A  
 Number of children: N/A  
 Years of education: N/A Occupational History  Not on file. Social History Main Topics  Smoking status: Former Smoker Packs/day: 1.00 Years: 27.00 Quit date: 3/23/2018  Smokeless tobacco: Never Used Comment: \"cutting back on smoking\"  Alcohol use No  
 Drug use: Yes Special: Cocaine Comment: Last use 5/2018  Sexual activity: Not on file Other Topics Concern  Not on file Social History Narrative ALLERGIES: Aspirin; Bentyl [dicyclomine]; Toradol [ketorolac]; Ultram [tramadol]; and Zofran [ondansetron hcl (pf)] Review of Systems Constitutional: Negative for chills and fever. Respiratory: Positive for shortness of breath. Cardiovascular: Positive for leg swelling. Negative for chest pain. Gastrointestinal: Negative for nausea and vomiting. All other systems reviewed and are negative. Vitals:  
 09/17/18 0152 BP: 130/80 Pulse: (!) 103 Resp: 20 Temp: 98.5 °F (36.9 °C) SpO2: 95% Weight: 86.2 kg (190 lb) Height: 5' 5\" (1.651 m) Physical Exam  
Constitutional: She is oriented to person, place, and time. She appears well-developed and well-nourished. HENT:  
Head: Normocephalic and atraumatic. Eyes: Conjunctivae are normal. Pupils are equal, round, and reactive to light. Neck: Normal range of motion. Neck supple. Cardiovascular: Normal rate and regular rhythm. Pulmonary/Chest: Effort normal and breath sounds normal.  
Abdominal: Soft. Bowel sounds are normal.  
Musculoskeletal: She exhibits edema. She exhibits no tenderness. Mild pitting edema bilateral lower extremities Neurological: She is alert and oriented to person, place, and time. Skin: Skin is warm and dry. Psychiatric: She has a normal mood and affect. Her behavior is normal.  
Nursing note and vitals reviewed. MDM Number of Diagnoses or Management Options Acute on chronic congestive heart failure, unspecified heart failure type [facility]): established and worsening Chronic respiratory failure with hypoxia [facility]):  
Dependent edema:  
Essential hypertension:  
Diagnosis management comments: 3:27 AM ppatient clinically is having a mild congestive heart failure exacerbation.   She will be treated with Lasix and her daily dose will be increased for the next 3 days. I will leave her contact information with up to cardiology for close outpatient follow-up 3:42 AM cannot leave a voicemail with upset cardiology as  I get a busy signal.  I spoke with Dr. Debbi Wallace, he says patient can call at 8 AM to make an appointment. This was conveyed to the patient Amount and/or Complexity of Data Reviewed Clinical lab tests: reviewed and ordered Tests in the radiology section of CPT®: ordered and reviewed Tests in the medicine section of CPT®: ordered and reviewed Discuss the patient with other providers: yes Independent visualization of images, tracings, or specimens: yes Risk of Complications, Morbidity, and/or Mortality Presenting problems: moderate Diagnostic procedures: moderate Management options: moderate Patient Progress Patient progress: stable ED Course Procedures

## 2018-09-17 NOTE — ED NOTES
I have reviewed discharge instructions with the patient. The patient verbalized understanding. IV removed tip intact Patient left ED via Discharge Method: ambulatory to Home with self. Opportunity for questions and clarification provided. Patient given 0 scripts. To continue your aftercare when you leave the hospital, you may receive an automated call from our care team to check in on how you are doing. This is a free service and part of our promise to provide the best care and service to meet your aftercare needs.  If you have questions, or wish to unsubscribe from this service please call [phone]. Thank you for Choosing our [facility] Emergency Department.

## 2018-09-17 NOTE — DISCHARGE INSTRUCTIONS
Heart Failure: Care Instructions  Your Care Instructions    Heart failure occurs when your heart does not pump as much blood as the body needs. Failure does not mean that the heart has stopped pumping but rather that it is not pumping as well as it should. Over time, this causes fluid buildup in your lungs and other parts of your body. Fluid buildup can cause shortness of breath, fatigue, swollen ankles, and other problems. By taking medicines regularly, reducing sodium (salt) in your diet, checking your weight every day, and making lifestyle changes, you can feel better and live longer. Follow-up care is a key part of your treatment and safety. Be sure to make and go to all appointments, and call your doctor if you are having problems. It's also a good idea to know your test results and keep a list of the medicines you take. How can you care for yourself at home? Medicines    · Be safe with medicines. Take your medicines exactly as prescribed. Call your doctor if you think you are having a problem with your medicine.     · Do not take any vitamins, over-the-counter medicine, or herbal products without talking to your doctor first. Bushahidh Lota not take ibuprofen (Advil or Motrin) and naproxen (Aleve) without talking to your doctor first. They could make your heart failure worse.     · You may be taking some of the following medicine. ¨ Beta-blockers can slow heart rate, decrease blood pressure, and improve your condition. Taking a beta-blocker may lower your chance of needing to be hospitalized. ¨ Angiotensin-converting enzyme inhibitors (ACEIs) reduce the heart's workload, lower blood pressure, and reduce swelling. Taking an ACEI may lower your chance of needing to be hospitalized again. ¨ Angiotensin II receptor blockers (ARBs) work like ACEIs. Your doctor may prescribe them instead of ACEIs. ¨ Diuretics, also called water pills, reduce swelling.   ¨ Potassium supplements replace this important mineral, which is sometimes lost with diuretics. ¨ Aspirin and other blood thinners prevent blood clots, which can cause a stroke or heart attack.    You will get more details on the specific medicines your doctor prescribes. Diet    · Your doctor may suggest that you limit sodium to 2,000 milligrams (mg) a day or less. That is less than 1 teaspoon of salt a day, including all the salt you eat in cooking or in packaged foods. People get most of their sodium from processed foods. Fast food and restaurant meals also tend to be very high in sodium.     · Ask your doctor how much liquid you can drink each day. You may have to limit liquids.    Weight    · Weigh yourself without clothing at the same time each day. Record your weight. Call your doctor if you have a sudden weight gain, such as more than 2 to 3 pounds in a day or 5 pounds in a week. (Your doctor may suggest a different range of weight gain.) A sudden weight gain may mean that your heart failure is getting worse.    Activity level    · Start light exercise (if your doctor says it is okay). Even if you can only do a small amount, exercise will help you get stronger, have more energy, and manage your weight and your stress. Walking is an easy way to get exercise. Start out by walking a little more than you did before. Bit by bit, increase the amount you walk.     · When you exercise, watch for signs that your heart is working too hard. You are pushing yourself too hard if you cannot talk while you are exercising. If you become short of breath or dizzy or have chest pain, stop, sit down, and rest.     · If you feel \"wiped out\" the day after you exercise, walk slower or for a shorter distance until you can work up to a better pace.     · Get enough rest at night. Sleeping with 1 or 2 pillows under your upper body and head may help you breathe easier.    Lifestyle changes    · Do not smoke. Smoking can make a heart condition worse.  If you need help quitting, talk to your doctor about stop-smoking programs and medicines. These can increase your chances of quitting for good. Quitting smoking may be the most important step you can take to protect your heart.     · Limit alcohol to 2 drinks a day for men and 1 drink a day for women. Too much alcohol can cause health problems.     · Avoid getting sick from colds and the flu. Get a pneumococcal vaccine shot. If you have had one before, ask your doctor whether you need another dose. Get a flu shot each year. If you must be around people with colds or the flu, wash your hands often. When should you call for help? Call 911 if you have symptoms of sudden heart failure such as:    · You have severe trouble breathing.     · You cough up pink, foamy mucus.     · You have a new irregular or rapid heartbeat.    Call your doctor now or seek immediate medical care if:    · You have new or increased shortness of breath.     · You are dizzy or lightheaded, or you feel like you may faint.     · You have sudden weight gain, such as more than 2 to 3 pounds in a day or 5 pounds in a week. (Your doctor may suggest a different range of weight gain.)     · You have increased swelling in your legs, ankles, or feet.     · You are suddenly so tired or weak that you cannot do your usual activities.    Watch closely for changes in your health, and be sure to contact your doctor if you develop new symptoms. Where can you learn more? Go to http://maria alejandra-nereyda.info/. Enter H515 in the search box to learn more about \"Heart Failure: Care Instructions. \"  Current as of: May 10, 2017  Content Version: 11.7  © 2419-8290 [employer]. Care instructions adapted under license by [employer] (which disclaims liability or warranty for this information).  If you have questions about a medical condition or this instruction, always ask your healthcare professional. [address] any warranty or liability for your use of this information.

## 2018-10-01 ENCOUNTER — APPOINTMENT (OUTPATIENT)
Dept: GENERAL RADIOLOGY | Age: 53
End: 2018-10-01
Attending: EMERGENCY MEDICINE
Payer: COMMERCIAL

## 2018-10-01 ENCOUNTER — HOSPITAL ENCOUNTER (EMERGENCY)
Age: 53
Discharge: HOME | End: 2018-10-01
Attending: EMERGENCY MEDICINE
Payer: COMMERCIAL

## 2018-10-01 VITALS
TEMPERATURE: 98 F | RESPIRATION RATE: 16 BRPM | OXYGEN SATURATION: 98 % | DIASTOLIC BLOOD PRESSURE: 68 MMHG | HEIGHT: 65 IN | HEART RATE: 103 BPM | SYSTOLIC BLOOD PRESSURE: 109 MMHG | WEIGHT: 190 LBS | BODY MASS INDEX: 31.65 KG/M2

## 2018-10-01 DIAGNOSIS — R22.43 LOCALIZED SWELLING OF BOTH LOWER LEGS: Primary | ICD-10-CM

## 2018-10-01 LAB
ALBUMIN SERPL-MCNC: 3.3 G/DL (ref 3.5–5)
ALBUMIN/GLOB SERPL: 1 {RATIO} (ref 1.2–3.5)
ALP SERPL-CCNC: 85 U/L (ref 50–136)
ALT SERPL-CCNC: 22 U/L (ref 12–65)
ANION GAP SERPL CALC-SCNC: 9 MMOL/L (ref 7–16)
AST SERPL-CCNC: 39 U/L (ref 15–37)
BASOPHILS # BLD: 0 K/UL (ref 0–0.2)
BASOPHILS NFR BLD: 1 % (ref 0–2)
BILIRUB SERPL-MCNC: 0.2 MG/DL (ref 0.2–1.1)
BNP SERPL-MCNC: 70 PG/ML
BUN SERPL-MCNC: 5 MG/DL (ref 6–23)
CALCIUM SERPL-MCNC: 8.4 MG/DL (ref 8.3–10.4)
CHLORIDE SERPL-SCNC: 111 MMOL/L (ref 98–107)
CK SERPL-CCNC: 165 U/L (ref 21–215)
CO2 SERPL-SCNC: 25 MMOL/L (ref 21–32)
CREAT SERPL-MCNC: 1.13 MG/DL (ref 0.6–1)
DIFFERENTIAL METHOD BLD: ABNORMAL
EOSINOPHIL # BLD: 0 K/UL (ref 0–0.8)
EOSINOPHIL NFR BLD: 1 % (ref 0.5–7.8)
ERYTHROCYTE [DISTWIDTH] IN BLOOD BY AUTOMATED COUNT: 14.1 %
GLOBULIN SER CALC-MCNC: 3.4 G/DL (ref 2.3–3.5)
GLUCOSE SERPL-MCNC: 112 MG/DL (ref 65–100)
HCT VFR BLD AUTO: 34.3 % (ref 35.8–46.3)
HGB BLD-MCNC: 10.8 G/DL (ref 11.7–15.4)
IMM GRANULOCYTES # BLD: 0 K/UL (ref 0–0.5)
IMM GRANULOCYTES NFR BLD AUTO: 0 % (ref 0–5)
LYMPHOCYTES # BLD: 1.7 K/UL (ref 0.5–4.6)
LYMPHOCYTES NFR BLD: 42 % (ref 13–44)
MCH RBC QN AUTO: 30.2 PG (ref 26.1–32.9)
MCHC RBC AUTO-ENTMCNC: 31.5 G/DL (ref 31.4–35)
MCV RBC AUTO: 95.8 FL (ref 79.6–97.8)
MONOCYTES # BLD: 0.3 K/UL (ref 0.1–1.3)
MONOCYTES NFR BLD: 8 % (ref 4–12)
NEUTS SEG # BLD: 2 K/UL (ref 1.7–8.2)
NEUTS SEG NFR BLD: 49 % (ref 43–78)
NRBC # BLD: 0 K/UL (ref 0–0.2)
PLATELET # BLD AUTO: 379 K/UL (ref 150–450)
PMV BLD AUTO: 9.2 FL (ref 9.4–12.3)
POTASSIUM SERPL-SCNC: 3.5 MMOL/L (ref 3.5–5.1)
PROT SERPL-MCNC: 6.7 G/DL (ref 6.3–8.2)
RBC # BLD AUTO: 3.58 M/UL (ref 4.05–5.2)
SODIUM SERPL-SCNC: 145 MMOL/L (ref 136–145)
WBC # BLD AUTO: 4.1 K/UL (ref 4.3–11.1)

## 2018-10-01 PROCEDURE — 99281 EMR DPT VST MAYX REQ PHY/QHP: CPT | Performed by: EMERGENCY MEDICINE

## 2018-10-01 PROCEDURE — 71045 X-RAY EXAM CHEST 1 VIEW: CPT

## 2018-10-01 PROCEDURE — 82550 ASSAY OF CK (CPK): CPT

## 2018-10-01 PROCEDURE — 80053 COMPREHEN METABOLIC PANEL: CPT

## 2018-10-01 PROCEDURE — 85025 COMPLETE CBC W/AUTO DIFF WBC: CPT

## 2018-10-01 PROCEDURE — 83880 ASSAY OF NATRIURETIC PEPTIDE: CPT

## 2018-10-01 RX ORDER — HYDROCODONE BITARTRATE AND ACETAMINOPHEN 5; 325 MG/1; MG/1
1 TABLET ORAL ONCE
Status: DISCONTINUED | OUTPATIENT
Start: 2018-10-01 | End: 2018-10-01 | Stop reason: HOSPADM

## 2018-10-01 NOTE — ED NOTES
Patient left without signing discharge papers and getting medication - patient cursing staff loudly and walked out of ed

## 2018-10-01 NOTE — ED PROVIDER NOTES
HPI: 
50 F, here with legs swelling and pain x 1 week. Hx of CHF. Taking all medications. Called cardiology office. Sent here for eval.  
No fever. + cough. No back pain or abdominal pain. Drinking water. Unsure about fluid restrictions. ROS Constitutional: No fever, no chills Skin: no rash Eye:  
ENMT: No sore throat Respiratory: No shortness of breath, no cough Cardiovascular: No chest pain, no palpitations Gastrointestinal: No vomiting, no nausea, no diarrhea, no abdominal pain : No dysuria MSK: No back pain, + muscle pain, no joint pain Neuro: No headache, no change in mental status, no numbness, no tingling, no weakness All other review of systems positive per history of present illness and the above otherwise negative or noncontributory. Visit Vitals  /68 (BP 1 Location: Left arm, BP Patient Position: At rest)  Pulse (!) 103  Temp 98 °F (36.7 °C)  Resp 16  
 Ht 5' 5\" (1.651 m)  Wt 86.2 kg (190 lb)  SpO2 98%  BMI 31.62 kg/m2 Past Medical History:  
Diagnosis Date  Anemia   
 blood transfusion 5/2018 per pt  Arrhythmia   
 palpitations, moderate mitral valve regurge  Arthritis   
 lower back osteo  Asthma   
 uses inhalers  Cardiomyopathy ECHO 11/2017  [address]F (congestive heart failure) ([facility] [address]   
 11/2017 Echo  LVEF 45-50%  Chronic pain 2010  Coagulation defect ([facility] [address]   
 eliquis  Cocaine use Reports last use 5/2018 per patient  COPD   
 nebulizer daily and maint inhalers, can not climb 1 flight of stairs (also CHF)  oxygen 3 LPM qhs  Decreased cardiac ejection fraction 11/27/2017 EF 45-50% per echo 11/27/17  Depression   
 controlled  Diverticulitis  GERD (gastroesophageal reflux disease)   
 uncontrolled with daily meds-- comes and goes- 3 pillows  Heart murmur  Hypertension   
 managed with meds  IBS (irritable bowel syndrome)  IC (interstitial cystitis)  Insomnia  Migraine with aura and without status migrainosus, not intractable 2016  Mitral valve regurgitation, rheumatic   
 followed Dr. Rena Zhao  Palpitations 2016  Psychiatric disorder   
 anxiety  Requires oxygen therapy 3l/min at hs. prn during the days as needed  Rheumatic aortic insufficiency 2016  Rheumatic fever as child  
 pt reports rheumatic fever in childhood  Smoker   
 started age 23-- smoked 0.5 ppd until 2018-- cut back to 10-2 cig daily per pt  Stroke ([address] \"mild stroke\" pt reports - \"left sided weakness and balance is off\"  Thromboembolus ([address] BLE   Tobacco abuse disorder 2016  Vitamin D deficiency Past Surgical History:  
Procedure Laterality Date  BIOPSY OF BREAST, INCISIONAL Left   
 lymph node , left, patient states her bx neg, but high risk  COLONOSCOPY    
 8 polyps removed, diverticulitis  COLONOSCOPY N/A 2018 COLONOSCOPY performed by Titus Soares [facility] at [facility] ENDOSCOPY  CYSTOSCOPY  11  
 bladder dilitation  EGD  HX APPENDECTOMY    HX HEART CATHETERIZATION  2011  
 no blockages, no intervention  HX HEART CATHETERIZATION  2017  
 no intervention  HX LAP CHOLECYSTECTOMY  2011  HX NATASHA AND BSO  2004  
 fibroid tumors, hyst  
 HX UROLOGICAL  2018  
 cystoscopy with hydrodistention of bladder Prior to Admission Medications Prescriptions Last Dose Informant Patient Reported? Taking? EPINEPHrine (EPIPEN JR) 0.15 mg/0.3 mL injection   Yes No  
Si.15 mg by IntraMUSCular route once as needed. FLUoxetine (PROZAC) 20 mg capsule   Yes No  
Sig: Take 1 Cap by mouth daily. NIFEdipine ER (PROCARDIA XL) 60 mg ER tablet   No No  
Sig: Take 1 Tab by mouth daily. Patient taking differently: Take 60 mg by mouth daily. Indications: morning Nebulizer Accessories kit   No No  
Sig: Use tid Oxygen   Yes No  
 Sig: nightly. Indications: 3l/min at hs and prn during the day  
albuterol (PROVENTIL HFA, VENTOLIN HFA, PROAIR HFA) 90 mcg/actuation inhaler   No No  
Sig: Take 2 Puffs by inhalation every six (6) hours as needed for Wheezing. ammonium lactate (LAC-HYDRIN) 12 % lotion   Yes No  
Sig: Apply  to affected area as needed. rub in to affected area well  
apixaban (ELIQUIS) 5 mg tablet   No No  
Sig: Take 1 Tab by mouth two (2) times a day. budesonide-formoterol (SYMBICORT) 160-4.5 mcg/actuation HFA inhaler   Yes No  
Sig: Take 1 Puff by inhalation two (2) times a day. Indications: BRONCHOSPASM PREVENTION WITH COPD, use on the dos  
clonazePAM (KLONOPIN) 0.5 mg tablet   No No  
Sig: Take 1 Tab by mouth three (3) times daily. Max Daily Amount: 1.5 mg.  
cyclobenzaprine (FLEXERIL) 10 mg tablet   No No  
Sig: Take 1 Tab by mouth three (3) times daily as needed for Muscle Spasm(s). diphenhydrAMINE (BANOPHEN) 50 mg capsule   Yes No  
Sig: Take 50 mg by mouth every six (6) hours as needed. estradiol (ESTRACE) 0.5 mg tablet   No No  
Sig: Take 1 Tab by mouth daily. Can d/c the estradiol patches  
furosemide (LASIX) 40 mg tablet   No No  
Sig: Take 1 Tab by mouth daily. Patient taking differently: Take 40 mg by mouth daily. Indications: am  
gabapentin (NEURONTIN) 400 mg capsule   No No  
Sig: Take 1 Cap by mouth three (3) times daily. Indications: take on the [facility] Patient taking differently: Take 400 mg by mouth three (3) times daily. hydrOXYzine pamoate (VISTARIL) 25 mg capsule   Yes No  
Sig: Take 1 Cap by mouth two (2) times daily as needed. mirtazapine (REMERON) 15 mg tablet   No No  
Sig: Take 1 Tab by mouth nightly. nitroglycerin (NITROSTAT) 0.4 mg SL tablet   No No  
Si Tab by SubLINGual route every five (5) minutes as needed for Chest Pain. oxybutynin (DITROPAN) 5 mg tablet   Yes No  
Sig: Take 5 mg by mouth two (2) times a day.   
pantoprazole (PROTONIX) 40 mg tablet   No No  
 Sig: Take 1 Tab by mouth daily. Indications: morning  
pravastatin (PRAVACHOL) 40 mg tablet   No No  
Sig: Take 1 Tab by mouth nightly. tiotropium (SPIRIVA WITH HANDIHALER) 18 mcg inhalation capsule  Self Yes No  
Sig: Take 1 Cap by inhalation daily. Indications: BRONCHOSPASM PREVENTION WITH COPD Facility-Administered Medications: None Adult Exam  
General: alert, no acute distress Head: normocephalic, atraumatic ENT: moist mucous membranes Neck: supple, non-tender; full range of motion Cardiovascular: regular rate and rhythm, normal peripheral perfusion, 1+ edema in bilat LE. Equal radial pulses and DP pulses. Respiratory:  normal respirations; no wheezing, rales or rhonchi Gastrointestinal: soft, non-tender; no rebound or guarding, no peritoneal signs, no distension Back: non-tender, full range of motion Musculoskeletal: normal range of motion, normal strength, no gross deformities No overlying warmth Neurological: alert and oriented x 4, no gross focal deficits; normal speech Psychiatric: cooperative; appropriate mood and affect MDM: Hemoglobin of 10.8. Relatively unchanged. Hydrocodone given for pain. BNP less than 100. Lungs are clear. Kidney function not severely worsened compared to baseline. Recommend fluid restriction. She has good distal pulses. I do not suspect any acute neurovascular compromise. She does not have back pain, saddle paresthesia, changes to her neurologic exam.  
 
 
ED Course Added CK to the labs. Unremarkable. When back to speak the patient however not found room. Nursing staff apparently saw her walk out. Did not want to wait. Low suspicion for cellulitis. Patient left before I can talk to her about the final diagnosis and prescription. Recent Results (from the past 12 hour(s)) CBC WITH AUTOMATED DIFF Collection Time: 10/01/18  2:33 PM  
Result Value Ref Range WBC 4.1 (L) 4.3 - 11.1 K/uL  
 RBC 3.58 (L) 4.05 - 5.2 M/uL HGB 10.8 (L) 11.7 - 15.4 g/dL HCT 34.3 (L) 35.8 - 46.3 % MCV 95.8 79.6 - 97.8 FL  
 MCH 30.2 26.1 - 32.9 PG  
 MCHC 31.5 31.4 - 35.0 g/dL  
 RDW 14.1 % PLATELET 515 140 - 981 K/uL MPV 9.2 (L) 9.4 - 12.3 FL ABSOLUTE NRBC 0.00 0.0 - 0.2 K/uL  
 DF AUTOMATED NEUTROPHILS 49 43 - 78 % LYMPHOCYTES 42 13 - 44 % MONOCYTES 8 4.0 - 12.0 % EOSINOPHILS 1 0.5 - 7.8 % BASOPHILS 1 0.0 - 2.0 % IMMATURE GRANULOCYTES 0 0.0 - 5.0 %  
 ABS. NEUTROPHILS 2.0 1.7 - 8.2 K/UL  
 ABS. LYMPHOCYTES 1.7 0.5 - 4.6 K/UL  
 ABS. MONOCYTES 0.3 0.1 - 1.3 K/UL  
 ABS. EOSINOPHILS 0.0 0.0 - 0.8 K/UL  
 ABS. BASOPHILS 0.0 0.0 - 0.2 K/UL  
 ABS. IMM. GRANS. 0.0 0.0 - 0.5 K/UL METABOLIC PANEL, COMPREHENSIVE Collection Time: 10/01/18  2:33 PM  
Result Value Ref Range Sodium 145 136 - 145 mmol/L Potassium 3.5 3.5 - 5.1 mmol/L Chloride 111 (H) 98 - 107 mmol/L  
 CO2 25 21 - 32 mmol/L Anion gap 9 7 - 16 mmol/L Glucose 112 (H) 65 - 100 mg/dL BUN 5 (L) 6 - 23 MG/DL Creatinine 1.13 (H) 0.6 - 1.0 MG/DL  
 GFR est AA >60 >60 ml/min/1.73m2 GFR est non-AA 54 (L) >60 ml/min/1.73m2 Calcium 8.4 8.3 - 10.4 MG/DL Bilirubin, total 0.2 0.2 - 1.1 MG/DL  
 ALT (SGPT) 22 12 - 65 U/L  
 AST (SGOT) 39 (H) 15 - 37 U/L Alk. phosphatase 85 50 - 136 U/L Protein, total 6.7 6.3 - 8.2 g/dL Albumin 3.3 (L) 3.5 - 5.0 g/dL Globulin 3.4 2.3 - 3.5 g/dL A-G Ratio 1.0 (L) 1.2 - 3.5 BNP Collection Time: 10/01/18  2:33 PM  
Result Value Ref Range BNP 70 pg/mL CK Collection Time: 10/01/18  2:33 PM  
Result Value Ref Range  21 - 215 U/L Dragon voice recognition software was used to create this note. Although the note has been reviewed and corrected where necessary, additional errors may have been overlooked and remain in the text.

## 2018-10-04 ENCOUNTER — HOSPITAL ENCOUNTER (EMERGENCY)
Age: 53
Discharge: HOME | End: 2018-10-04
Attending: EMERGENCY MEDICINE
Payer: COMMERCIAL

## 2018-10-04 VITALS
WEIGHT: 190 LBS | RESPIRATION RATE: 18 BRPM | HEART RATE: 83 BPM | OXYGEN SATURATION: 99 % | TEMPERATURE: 98 F | HEIGHT: 65 IN | DIASTOLIC BLOOD PRESSURE: 62 MMHG | BODY MASS INDEX: 31.65 KG/M2 | SYSTOLIC BLOOD PRESSURE: 107 MMHG

## 2018-10-04 DIAGNOSIS — E78.5 HYPERLIPIDEMIA, UNSPECIFIED HYPERLIPIDEMIA TYPE: Chronic | ICD-10-CM

## 2018-10-04 DIAGNOSIS — J44.9 CHRONIC OBSTRUCTIVE PULMONARY DISEASE, UNSPECIFIED COPD TYPE (HCC): Chronic | ICD-10-CM

## 2018-10-04 DIAGNOSIS — R60.0 BILATERAL LEG EDEMA: ICD-10-CM

## 2018-10-04 DIAGNOSIS — I50.9 ACUTE ON CHRONIC CONGESTIVE HEART FAILURE, UNSPECIFIED HEART FAILURE TYPE (HCC): Primary | ICD-10-CM

## 2018-10-04 DIAGNOSIS — I10 ESSENTIAL HYPERTENSION: ICD-10-CM

## 2018-10-04 PROCEDURE — 74011250637 HC RX REV CODE- 250/637: Performed by: EMERGENCY MEDICINE

## 2018-10-04 PROCEDURE — 99283 EMERGENCY DEPT VISIT LOW MDM: CPT | Performed by: EMERGENCY MEDICINE

## 2018-10-04 RX ORDER — FUROSEMIDE 40 MG/1
80 TABLET ORAL ONCE
Status: COMPLETED | OUTPATIENT
Start: 2018-10-04 | End: 2018-10-04

## 2018-10-04 RX ORDER — FUROSEMIDE 40 MG/1
80 TABLET ORAL DAILY
Status: DISCONTINUED | OUTPATIENT
Start: 2018-10-04 | End: 2018-10-04

## 2018-10-04 RX ORDER — FUROSEMIDE 40 MG/1
80 TABLET ORAL 2 TIMES DAILY
Qty: 20 TAB | Refills: 0 | Status: SHIPPED | OUTPATIENT
Start: 2018-10-04 | End: 2018-10-09

## 2018-10-04 RX ORDER — FUROSEMIDE 20 MG/1
80 TABLET ORAL 2 TIMES DAILY
Qty: 40 TAB | Refills: 0 | Status: SHIPPED | OUTPATIENT
Start: 2018-10-04 | End: 2018-10-09

## 2018-10-04 RX ADMIN — FUROSEMIDE 80 MG: 40 TABLET ORAL at 04:34

## 2018-10-04 NOTE — ED TRIAGE NOTES
Patient arrives via [facility] for complaints of leg swelling. Patient has history of CHF. Takes 80mg Lasix. +3 pitting edema. MARIALUISA gresham noted.

## 2018-10-04 NOTE — ED PROVIDER NOTES
HPI Comments: Patient has a history of CHF and COPD, is on oxygen 3 L at home per her pulmonologist.  She takes Lasix 80 mg once a day for her congestive heart failure. She states that recently her legs have been swelling. She normally sleeps on 4 pillows, states she slipped on 4 pillows last night. She is unable to lay flat due to her dyspnea which is her baseline. She denies any chest pain, denies any aggravating or alleviating factors. She is here tonight because of her increased swelling of her legs. She states she has been taking her Lasix 80 mg daily. She has an appointment with her cardiologist on October 24. The last time she saw her cardiologist was a couple months ago. Elements of this note were created using speech recognition software. As such, errors of speech recognition may be present. Patient is a 50 y.o. female presenting with lower extremity edema. The history is provided by the patient. Leg Swelling Past Medical History:  
Diagnosis Date  Anemia   
 blood transfusion 5/2018 per pt  Arrhythmia   
 palpitations, moderate mitral valve regurge  Arthritis   
 lower back osteo  Asthma   
 uses inhalers  Cardiomyopathy ECHO 11/2017  CHF (congestive heart failure) ([facility] [address]   
 11/2017 Echo  LVEF 45-50%  Chronic pain 2010  Coagulation defect ([facility] [address]   
 eliquis  Cocaine use Reports last use 5/2018 per patient  COPD   
 nebulizer daily and maint inhalers, can not climb 1 flight of stairs (also CHF)  oxygen 3 LPM qhs  Decreased cardiac ejection fraction 11/27/2017 EF 45-50% per echo 11/27/17  Depression   
 controlled  Diverticulitis  GERD (gastroesophageal reflux disease)   
 uncontrolled with daily meds-- comes and goes- 3 pillows  Heart murmur  Hypertension   
 managed with meds  IBS (irritable bowel syndrome)  IC (interstitial cystitis)  Insomnia  Migraine with aura and without status migrainosus, not intractable 6/17/2016  Mitral valve regurgitation, rheumatic   
 followed Dr. Preciado Led  Palpitations 5/16/2016  Psychiatric disorder   
 anxiety  Requires oxygen therapy 3l/min at hs. prn during the days as needed  Rheumatic aortic insufficiency 5/16/2016  Rheumatic fever as child  
 pt reports rheumatic fever in childhood  Smoker   
 started age 23-- smoked 0.5 ppd until 2018-- cut back to 10-2 cig daily per pt  Stroke ([address] \"mild stroke\" pt reports 2014- \"left sided weakness and balance is off\"  Thromboembolus ([facility] [address] BLE 2012  Tobacco abuse disorder 5/16/2016  Vitamin D deficiency Past Surgical History:  
Procedure Laterality Date  BIOPSY OF BREAST, INCISIONAL Left   
 lymph node , left, patient states her bx neg, but high risk  COLONOSCOPY    
 8 polyps removed, diverticulitis  COLONOSCOPY N/A 5/21/2018 COLONOSCOPY performed by Lyric Bunn MD at [facility] ENDOSCOPY  CYSTOSCOPY  8-23-11  
 bladder dilitation  EGD  HX APPENDECTOMY  2006  HX HEART CATHETERIZATION  5/27/2011  
 no blockages, no intervention  HX HEART CATHETERIZATION  04/2017  
 no intervention  HX LAP CHOLECYSTECTOMY  6/6/2011  HX NATASHA AND BSO  2004  
 fibroid tumors, hyst  
 HX UROLOGICAL  01/2018  
 cystoscopy with hydrodistention of bladder Family History:  
Problem Relation Age of Onset  Heart Failure Father 76 chf  
 Heart Disease Father  Diabetes Sister  Thyroid Disease Sister Social History Social History  Marital status: LEGALLY  Spouse name: N/A  
 Number of children: N/A  
 Years of education: N/A Occupational History  Not on file. Social History Main Topics  Smoking status: Former Smoker Packs/day: 1.00 Years: 27.00 Quit date: 3/23/2018  Smokeless tobacco: Never Used Comment: \"cutting back on smoking\"  Alcohol use No  
 Drug use: Yes Special: Cocaine Comment: Last use 5/2018  Sexual activity: Not on file Other Topics Concern  Not on file Social History Narrative ALLERGIES: Aspirin; Bentyl [dicyclomine]; Toradol [ketorolac]; Ultram [tramadol]; and Zofran [ondansetron hcl (pf)] Review of Systems Constitutional: Negative for chills and fever. Gastrointestinal: Negative for nausea and vomiting. All other systems reviewed and are negative. Vitals:  
 10/04/18 8801 BP: 105/66 Pulse: 93 Resp: 18 Temp: 98 °F (36.7 °C) SpO2: 97% Weight: 86.2 kg (190 lb) Height: 5' 5\" (1.651 m) Physical Exam  
Constitutional: She is oriented to person, place, and time. She appears well-developed and well-nourished. HENT:  
Head: Normocephalic and atraumatic. Eyes: Conjunctivae are normal. Pupils are equal, round, and reactive to light. Neck: Normal range of motion. Neck supple. Cardiovascular: Normal rate and regular rhythm. Pulmonary/Chest: Effort normal and breath sounds normal. No respiratory distress. She has no rales. Abdominal: Soft. Bowel sounds are normal.  
Musculoskeletal: She exhibits edema. She exhibits no tenderness. 1+ pitting edema bilateral lower extremities Neurological: She is alert and oriented to person, place, and time. Skin: Skin is warm and dry. Psychiatric: She has a normal mood and affect. Her behavior is normal.  
Nursing note and vitals reviewed. MDM Number of Diagnoses or Management Options Diagnosis management comments: 4:07 AM patient was here several days ago with the identical symptoms. She has no rest or a compromise, she does have 4 pillow orthopnea but this is her baseline. She is on supplemental oxygen at home, is currently 97% on room air and she appears comfortable. Her main concern at this time is the edema of her legs.   The plan is to double her Lasix dose for the next 5 days. I have left her contact information with [facility] cardiology for close outpatient follow-up. Per old records, patient has a history of Takatsubo cardiomyopathy. I spoke with Dr. Yusuf Cobos, he recommends doubling her Lasix dose for 5 days. Amount and/or Complexity of Data Reviewed Decide to obtain previous medical records or to obtain history from someone other than the patient: yes Review and summarize past medical records: yes Discuss the patient with other providers: yes Risk of Complications, Morbidity, and/or Mortality Presenting problems: moderate Diagnostic procedures: moderate Management options: moderate Patient Progress Patient progress: improved ED Course Procedures

## 2018-10-04 NOTE — ED NOTES
I have reviewed discharge instructions with the patient. The patient verbalized understanding. Patient left ED via Discharge Method: ambulatory to Home with self. Opportunity for questions and clarification provided. Patient given 1 scripts. To continue your aftercare when you leave the hospital, you may receive an automated call from our care team to check in on how you are doing. This is a free service and part of our promise to provide the best care and service to meet your aftercare needs.  If you have questions, or wish to unsubscribe from this service please call [phone]. Thank you for Choosing our [facility] Emergency Department.

## 2018-10-04 NOTE — DISCHARGE INSTRUCTIONS
Heart Failure: Care Instructions  Your Care Instructions    Heart failure occurs when your heart does not pump as much blood as the body needs. Failure does not mean that the heart has stopped pumping but rather that it is not pumping as well as it should. Over time, this causes fluid buildup in your lungs and other parts of your body. Fluid buildup can cause shortness of breath, fatigue, swollen ankles, and other problems. By taking medicines regularly, reducing sodium (salt) in your diet, checking your weight every day, and making lifestyle changes, you can feel better and live longer. Follow-up care is a key part of your treatment and safety. Be sure to make and go to all appointments, and call your doctor if you are having problems. It's also a good idea to know your test results and keep a list of the medicines you take. How can you care for yourself at home? Medicines    · Be safe with medicines. Take your medicines exactly as prescribed. Call your doctor if you think you are having a problem with your medicine.     · Do not take any vitamins, over-the-counter medicine, or herbal products without talking to your doctor first. Ranulfo Clark not take ibuprofen (Advil or Motrin) and naproxen (Aleve) without talking to your doctor first. They could make your heart failure worse.     · You may be taking some of the following medicine. ¨ Beta-blockers can slow heart rate, decrease blood pressure, and improve your condition. Taking a beta-blocker may lower your chance of needing to be hospitalized. ¨ Angiotensin-converting enzyme inhibitors (ACEIs) reduce the heart's workload, lower blood pressure, and reduce swelling. Taking an ACEI may lower your chance of needing to be hospitalized again. ¨ Angiotensin II receptor blockers (ARBs) work like ACEIs. Your doctor may prescribe them instead of ACEIs. ¨ Diuretics, also called water pills, reduce swelling.   ¨ Potassium supplements replace this important mineral, which is sometimes lost with diuretics. ¨ Aspirin and other blood thinners prevent blood clots, which can cause a stroke or heart attack.    You will get more details on the specific medicines your doctor prescribes. Diet    · Your doctor may suggest that you limit sodium to 2,000 milligrams (mg) a day or less. That is less than 1 teaspoon of salt a day, including all the salt you eat in cooking or in packaged foods. People get most of their sodium from processed foods. Fast food and restaurant meals also tend to be very high in sodium.     · Ask your doctor how much liquid you can drink each day. You may have to limit liquids.    Weight    · Weigh yourself without clothing at the same time each day. Record your weight. Call your doctor if you have a sudden weight gain, such as more than 2 to 3 pounds in a day or 5 pounds in a week. (Your doctor may suggest a different range of weight gain.) A sudden weight gain may mean that your heart failure is getting worse.    Activity level    · Start light exercise (if your doctor says it is okay). Even if you can only do a small amount, exercise will help you get stronger, have more energy, and manage your weight and your stress. Walking is an easy way to get exercise. Start out by walking a little more than you did before. Bit by bit, increase the amount you walk.     · When you exercise, watch for signs that your heart is working too hard. You are pushing yourself too hard if you cannot talk while you are exercising. If you become short of breath or dizzy or have chest pain, stop, sit down, and rest.     · If you feel \"wiped out\" the day after you exercise, walk slower or for a shorter distance until you can work up to a better pace.     · Get enough rest at night. Sleeping with 1 or 2 pillows under your upper body and head may help you breathe easier.    Lifestyle changes    · Do not smoke. Smoking can make a heart condition worse.  If you need help quitting, talk to your doctor about stop-smoking programs and medicines. These can increase your chances of quitting for good. Quitting smoking may be the most important step you can take to protect your heart.     · Limit alcohol to 2 drinks a day for men and 1 drink a day for women. Too much alcohol can cause health problems.     · Avoid getting sick from colds and the flu. Get a pneumococcal vaccine shot. If you have had one before, ask your doctor whether you need another dose. Get a flu shot each year. If you must be around people with colds or the flu, wash your hands often. When should you call for help? Call 911 if you have symptoms of sudden heart failure such as:    · You have severe trouble breathing.     · You cough up pink, foamy mucus.     · You have a new irregular or rapid heartbeat.    Call your doctor now or seek immediate medical care if:    · You have new or increased shortness of breath.     · You are dizzy or lightheaded, or you feel like you may faint.     · You have sudden weight gain, such as more than 2 to 3 pounds in a day or 5 pounds in a week. (Your doctor may suggest a different range of weight gain.)     · You have increased swelling in your legs, ankles, or feet.     · You are suddenly so tired or weak that you cannot do your usual activities.    Watch closely for changes in your health, and be sure to contact your doctor if you develop new symptoms. Where can you learn more? Go to http://maria alejandra-nereyda.info/. Enter I058 in the search box to learn more about \"Heart Failure: Care Instructions. \"  Current as of: December 6, 2017  Content Version: 11.8  © 3320-6415 [employer]. Care instructions adapted under license by [employer] (which disclaims liability or warranty for this information).  If you have questions about a medical condition or this instruction, always ask your healthcare professional. [address] any warranty or liability for your use of this information.

## 2018-10-18 PROBLEM — I50.22 CHRONIC SYSTOLIC CONGESTIVE HEART FAILURE (HCC): Status: ACTIVE | Noted: 2018-10-18

## 2018-10-22 ENCOUNTER — HOSPITAL ENCOUNTER (OUTPATIENT)
Dept: CARDIAC CATH/INVASIVE PROCEDURES | Age: 53
Discharge: HOME | End: 2018-10-22
Attending: INTERNAL MEDICINE | Admitting: INTERNAL MEDICINE
Payer: COMMERCIAL

## 2018-10-22 VITALS
BODY MASS INDEX: 32.44 KG/M2 | WEIGHT: 190 LBS | RESPIRATION RATE: 23 BRPM | OXYGEN SATURATION: 98 % | DIASTOLIC BLOOD PRESSURE: 69 MMHG | TEMPERATURE: 98 F | HEIGHT: 64 IN | HEART RATE: 90 BPM | SYSTOLIC BLOOD PRESSURE: 108 MMHG

## 2018-10-22 LAB
ANION GAP SERPL CALC-SCNC: 9 MMOL/L (ref 7–16)
ATRIAL RATE: 80 BPM
BUN SERPL-MCNC: 14 MG/DL (ref 6–23)
CALCIUM SERPL-MCNC: 8.9 MG/DL (ref 8.3–10.4)
CALCULATED P AXIS, ECG09: 67 DEGREES
CALCULATED R AXIS, ECG10: 5 DEGREES
CALCULATED T AXIS, ECG11: 57 DEGREES
CHLORIDE SERPL-SCNC: 106 MMOL/L (ref 98–107)
CO2 SERPL-SCNC: 24 MMOL/L (ref 21–32)
CREAT SERPL-MCNC: 1.03 MG/DL (ref 0.6–1)
DIAGNOSIS, 93000: NORMAL
ERYTHROCYTE [DISTWIDTH] IN BLOOD BY AUTOMATED COUNT: 13.4 %
GLUCOSE SERPL-MCNC: 91 MG/DL (ref 65–100)
HCT VFR BLD AUTO: 33.3 % (ref 35.8–46.3)
HGB BLD-MCNC: 10.6 G/DL (ref 11.7–15.4)
INR PPP: 1.3
MCH RBC QN AUTO: 29.4 PG (ref 26.1–32.9)
MCHC RBC AUTO-ENTMCNC: 31.8 G/DL (ref 31.4–35)
MCV RBC AUTO: 92.5 FL (ref 79.6–97.8)
NRBC # BLD: 0.02 K/UL (ref 0–0.2)
P-R INTERVAL, ECG05: 136 MS
PLATELET # BLD AUTO: 349 K/UL (ref 150–450)
PMV BLD AUTO: 10.2 FL (ref 9.4–12.3)
POTASSIUM SERPL-SCNC: 3.9 MMOL/L (ref 3.5–5.1)
PROTHROMBIN TIME: 15.1 SEC (ref 11.5–14.5)
Q-T INTERVAL, ECG07: 380 MS
QRS DURATION, ECG06: 82 MS
QTC CALCULATION (BEZET), ECG08: 438 MS
RBC # BLD AUTO: 3.6 M/UL (ref 4.05–5.2)
SODIUM SERPL-SCNC: 139 MMOL/L (ref 136–145)
VENTRICULAR RATE, ECG03: 80 BPM
WBC # BLD AUTO: 5.9 K/UL (ref 4.3–11.1)

## 2018-10-22 PROCEDURE — 80048 BASIC METABOLIC PNL TOTAL CA: CPT

## 2018-10-22 PROCEDURE — 93005 ELECTROCARDIOGRAM TRACING: CPT | Performed by: INTERNAL MEDICINE

## 2018-10-22 PROCEDURE — 74011250636 HC RX REV CODE- 250/636: Performed by: INTERNAL MEDICINE

## 2018-10-22 PROCEDURE — 74011000250 HC RX REV CODE- 250: Performed by: INTERNAL MEDICINE

## 2018-10-22 PROCEDURE — 99152 MOD SED SAME PHYS/QHP 5/>YRS: CPT

## 2018-10-22 PROCEDURE — 85027 COMPLETE CBC AUTOMATED: CPT

## 2018-10-22 PROCEDURE — 93312 ECHO TRANSESOPHAGEAL: CPT

## 2018-10-22 PROCEDURE — 74011250636 HC RX REV CODE- 250/636

## 2018-10-22 PROCEDURE — 85610 PROTHROMBIN TIME: CPT

## 2018-10-22 RX ORDER — LIDOCAINE HYDROCHLORIDE 20 MG/ML
15 SOLUTION OROPHARYNGEAL AS NEEDED
Status: DISCONTINUED | OUTPATIENT
Start: 2018-10-22 | End: 2018-10-22 | Stop reason: HOSPADM

## 2018-10-22 RX ORDER — SODIUM CHLORIDE 9 MG/ML
75 INJECTION, SOLUTION INTRAVENOUS CONTINUOUS
Status: DISCONTINUED | OUTPATIENT
Start: 2018-10-22 | End: 2018-10-22 | Stop reason: HOSPADM

## 2018-10-22 RX ORDER — MIDAZOLAM HYDROCHLORIDE 1 MG/ML
.5-2 INJECTION, SOLUTION INTRAMUSCULAR; INTRAVENOUS
Status: DISCONTINUED | OUTPATIENT
Start: 2018-10-22 | End: 2018-10-22 | Stop reason: HOSPADM

## 2018-10-22 RX ORDER — FENTANYL CITRATE 50 UG/ML
25-50 INJECTION, SOLUTION INTRAMUSCULAR; INTRAVENOUS
Status: DISCONTINUED | OUTPATIENT
Start: 2018-10-22 | End: 2018-10-22 | Stop reason: HOSPADM

## 2018-10-22 RX ADMIN — MIDAZOLAM HYDROCHLORIDE 2 MG: 1 INJECTION, SOLUTION INTRAMUSCULAR; INTRAVENOUS at 08:38

## 2018-10-22 RX ADMIN — MIDAZOLAM HYDROCHLORIDE 2 MG: 1 INJECTION, SOLUTION INTRAMUSCULAR; INTRAVENOUS at 08:35

## 2018-10-22 RX ADMIN — FENTANYL CITRATE 50 MCG: 50 INJECTION, SOLUTION INTRAMUSCULAR; INTRAVENOUS at 08:35

## 2018-10-22 RX ADMIN — MIDAZOLAM HYDROCHLORIDE 2 MG: 1 INJECTION, SOLUTION INTRAMUSCULAR; INTRAVENOUS at 08:42

## 2018-10-22 RX ADMIN — SODIUM CHLORIDE 75 ML/HR: 900 INJECTION, SOLUTION INTRAVENOUS at 08:04

## 2018-10-22 RX ADMIN — MIDAZOLAM HYDROCHLORIDE 2 MG: 1 INJECTION, SOLUTION INTRAMUSCULAR; INTRAVENOUS at 08:40

## 2018-10-22 RX ADMIN — LIDOCAINE HYDROCHLORIDE 15 ML: 20 SOLUTION ORAL; TOPICAL at 08:32

## 2018-10-22 RX ADMIN — MIDAZOLAM HYDROCHLORIDE 2 MG: 1 INJECTION, SOLUTION INTRAMUSCULAR; INTRAVENOUS at 08:36

## 2018-10-22 RX ADMIN — FENTANYL CITRATE 25 MCG: 50 INJECTION, SOLUTION INTRAMUSCULAR; INTRAVENOUS at 08:43

## 2018-10-22 NOTE — PROGRESS NOTES
Report received from [facility] cath lab rn for continue of care post CHRISTI. Patient with no complaints at this time.

## 2018-10-22 NOTE — PROGRESS NOTES
Discharge instructions and home medications reviewed with patient. Time allowed for questions and answers. Patient discharged to home via wheel chair

## 2018-10-22 NOTE — PROCEDURES
Brief Cardiac Procedure Note    Patient: Calista Cross MRN: [id number]  SSN: [id number]    YOB: 1965  Age: 50 y.o. Sex: female      Date of Procedure: 10/22/2018     Pre-procedure Diagnosis: Valvular Heart Disease    Post-procedure Diagnosis: Mild to moderate AI. Procedure: Transesophageal Echocardiogram    Brief Description of Procedure: As above    Performed By: Gaston Marlow MD     Assistants: None    Anesthesia: Moderate Sedation    Estimated Blood Loss: Less than 10 mL      Specimens: None    Implants: None    Findings: Mild to moderate AI    Complications: None    Recommendations: Continue medical therapy.     Signed By: Gaston Marlow MD     October 22, 2018

## 2018-10-22 NOTE — PROGRESS NOTES
Patient received to [address] [place] [address] room # 8  Ambulatory from [facility]. Patient scheduled for CHRISTI today with Dr Weston Lyon. Procedure reviewed & questions answered, voiced good understanding consent obtained & placed on chart. All medications and medical history reviewed. Will prep patient per orders. Patient & family updated on plan of care. The patient has a fraility score of 3-MANAGING WELL, based on independent of ADLs/ambulation.

## 2018-10-22 NOTE — PROGRESS NOTES
TRANSFER - OUT REPORT: 
 
Verbal report given to Rosemary(name) on Hernan Confer  being transferred to cpru(unit) for routine progression of care Report consisted of patients Situation, Background, Assessment and  
Recommendations(SBAR). Information from the following report(s) SBAR was reviewed with the receiving nurse. Opportunity for questions and clarification was provided. Procedure: CHRISTI Intra Procedure Meds: 
 
Versed: 10mg Fentanyl: 75mcg Peripheral IV 10/22/18 Left; Lower Forearm (Active)  
 
  
  
  
  
  
  
  
  
  
is allergic to aspirin; bentyl [dicyclomine]; toradol [ketorolac]; ultram [tramadol]; and zofran [ondansetron hcl (pf)]. Past Medical History:  
Diagnosis Date  Anemia   
 blood transfusion 5/2018 per pt  Arrhythmia   
 palpitations, moderate mitral valve regurge  Arthritis   
 lower back osteo  Asthma   
 uses inhalers  Cardiomyopathy ECHO 11/2017  CHF (congestive heart failure) ([facility] [address]   
 11/2017 Echo  LVEF 45-50%  Chronic pain 2010  Coagulation defect ([facility] [address]   
 eliquis  Cocaine use Reports last use 5/2018 per patient  COPD   
 nebulizer daily and maint inhalers, can not climb 1 flight of stairs (also CHF)  oxygen 3 LPM qhs  Decreased cardiac ejection fraction 11/27/2017 EF 45-50% per echo 11/27/17  Depression   
 controlled  Diverticulitis  GERD (gastroesophageal reflux disease)   
 uncontrolled with daily meds-- comes and goes- 3 pillows  Heart murmur  Hypertension   
 managed with meds  IBS (irritable bowel syndrome)  IC (interstitial cystitis)  Insomnia  Migraine with aura and without status migrainosus, not intractable 6/17/2016  Mitral valve regurgitation, rheumatic   
 followed Dr. Rocco Garrison  Palpitations 5/16/2016  Psychiatric disorder   
 anxiety  Requires oxygen therapy 3l/min at hs. prn during the days as needed  Rheumatic aortic insufficiency 5/16/2016  Rheumatic fever as child  
 pt reports rheumatic fever in childhood  Smoker   
 started age 23-- smoked 0.5 ppd until 2018-- cut back to 10-2 cig daily per pt  Stroke ([facility] [address] \"mild stroke\" pt reports 2014- \"left sided weakness and balance is off\"  Thromboembolus ([facility] BLE 2012  Tobacco abuse disorder 5/16/2016  Vitamin D deficiency Visit Vitals /82 Pulse 96 Temp 98 °F (36.7 °C) Resp 18 Ht 5' 4\" (1.626 m) Wt 86.2 kg (190 lb) SpO2 94% Breastfeeding? No  
BMI 32.61 kg/m²

## 2018-10-22 NOTE — DISCHARGE INSTRUCTIONS
After your Transesophageal echocardiogram (CHRISTI)    Do not eat or drink for at least two hours after your procedure. (10:30) Your throat will be numb and there is a risk you might have difficulty swallowing for a while. Be careful when you do eat or drink for the first time especially with hot fluids since you could easily burn your throat. Call your doctor if:    You are bleeding from your throat or mouth  You have trouble breathing all of a sudden  You have chest pain or any pain that spreads to your neck, jaw or arms. You have questions or concerns  You have a fever greater than 101 F    Do not drive for 24 hours. Do not drink hot fluids for the next 3 hours.  (11:30)

## 2018-11-22 ENCOUNTER — HOSPITAL ENCOUNTER (EMERGENCY)
Age: 53
Discharge: HOME | DRG: 194 | End: 2018-11-22
Payer: COMMERCIAL

## 2018-11-22 ENCOUNTER — APPOINTMENT (OUTPATIENT)
Dept: GENERAL RADIOLOGY | Age: 53
DRG: 194 | End: 2018-11-22
Payer: COMMERCIAL

## 2018-11-22 VITALS
OXYGEN SATURATION: 96 % | RESPIRATION RATE: 16 BRPM | HEART RATE: 91 BPM | BODY MASS INDEX: 32.44 KG/M2 | HEIGHT: 64 IN | TEMPERATURE: 98.4 F | DIASTOLIC BLOOD PRESSURE: 55 MMHG | SYSTOLIC BLOOD PRESSURE: 117 MMHG | WEIGHT: 190 LBS

## 2018-11-22 DIAGNOSIS — J06.9 ACUTE URI: Primary | ICD-10-CM

## 2018-11-22 DIAGNOSIS — J02.9 ACUTE PHARYNGITIS, UNSPECIFIED ETIOLOGY: ICD-10-CM

## 2018-11-22 LAB
ATRIAL RATE: 93 BPM
CALCULATED P AXIS, ECG09: 77 DEGREES
CALCULATED R AXIS, ECG10: 59 DEGREES
CALCULATED T AXIS, ECG11: -40 DEGREES
DIAGNOSIS, 93000: NORMAL
FLUAV AG NPH QL IA: NEGATIVE
FLUBV AG NPH QL IA: NEGATIVE
P-R INTERVAL, ECG05: 126 MS
Q-T INTERVAL, ECG07: 324 MS
QRS DURATION, ECG06: 78 MS
QTC CALCULATION (BEZET), ECG08: 402 MS
SPECIMEN SOURCE: NORMAL
VENTRICULAR RATE, ECG03: 93 BPM

## 2018-11-22 PROCEDURE — 74011250636 HC RX REV CODE- 250/636

## 2018-11-22 PROCEDURE — 87804 INFLUENZA ASSAY W/OPTIC: CPT

## 2018-11-22 PROCEDURE — 93005 ELECTROCARDIOGRAM TRACING: CPT

## 2018-11-22 PROCEDURE — 99284 EMERGENCY DEPT VISIT MOD MDM: CPT

## 2018-11-22 PROCEDURE — 71046 X-RAY EXAM CHEST 2 VIEWS: CPT

## 2018-11-22 PROCEDURE — 96372 THER/PROPH/DIAG INJ SC/IM: CPT

## 2018-11-22 RX ORDER — HYDROCODONE BITARTRATE AND HOMATROPINE METHYLBROMIDE 1.5; 5 MG/5ML; MG/5ML
5 SYRUP ORAL 4 TIMES DAILY
Qty: 25 ML | Refills: 0 | Status: ON HOLD | OUTPATIENT
Start: 2018-11-22 | End: 2018-12-10

## 2018-11-22 RX ORDER — BENZONATATE 100 MG/1
100 CAPSULE ORAL
Qty: 30 CAP | Refills: 0 | Status: SHIPPED | OUTPATIENT
Start: 2018-11-22 | End: 2018-11-29

## 2018-11-22 RX ORDER — CLINDAMYCIN HYDROCHLORIDE 300 MG/1
300 CAPSULE ORAL 4 TIMES DAILY
Qty: 28 CAP | Refills: 0 | Status: SHIPPED | OUTPATIENT
Start: 2018-11-22 | End: 2018-11-29

## 2018-11-22 RX ORDER — PREDNISONE 10 MG/1
TABLET ORAL
Qty: 21 TAB | Refills: 0 | Status: ON HOLD | OUTPATIENT
Start: 2018-11-22 | End: 2018-12-10

## 2018-11-22 RX ADMIN — METHYLPREDNISOLONE SODIUM SUCCINATE 125 MG: 125 INJECTION, POWDER, FOR SOLUTION INTRAMUSCULAR; INTRAVENOUS at 04:52

## 2018-11-22 NOTE — ED TRIAGE NOTES
Patient called EMS from home per coughing uncontrollably for several days. Patient states there was a trace of blood one time while coughing. VSS. 12 lead normal.

## 2018-11-22 NOTE — ED PROVIDER NOTES
22-year-old female complaining of several days of cough congestion and wheezing. She didn't taking her nebulizer treatments 3 times a day. The history is provided by the patient. Cough This is a new problem. The current episode started 2 days ago. The problem occurs constantly. The problem has not changed since onset. The cough is non-productive. There has been no fever. Associated symptoms include wheezing. Pertinent negatives include no chest pain, no chills, no sweats and no weight loss. She has tried inhalers and nebulizers for the symptoms. The treatment provided no relief. Her past medical history is significant for COPD, heart failure and CHF. Past Medical History:  
Diagnosis Date  Anemia   
 blood transfusion 5/2018 per pt  Arrhythmia   
 palpitations, moderate mitral valve regurge  Arthritis   
 lower back osteo  Asthma   
 uses inhalers  Cardiomyopathy ECHO 11/2017  CHF (congestive heart failure) ([address]   
 11/2017 Echo  LVEF 45-50%  Chronic pain 2010  Coagulation defect ([facility]   
 eliquis  Cocaine use Reports last use 5/2018 per patient  COPD   
 nebulizer daily and maint inhalers, can not climb 1 flight of stairs (also CHF)  oxygen 3 LPM qhs  Decreased cardiac ejection fraction 11/27/2017 EF 45-50% per echo 11/27/17  Depression   
 controlled  Diverticulitis  GERD (gastroesophageal reflux disease)   
 uncontrolled with daily meds-- comes and goes- 3 pillows  Heart murmur  Hypertension   
 managed with meds  IBS (irritable bowel syndrome)  IC (interstitial cystitis)  Insomnia  Migraine with aura and without status migrainosus, not intractable 6/17/2016  Mitral valve regurgitation, rheumatic   
 followed Dr. Loi Gongora  Palpitations 5/16/2016  Psychiatric disorder   
 anxiety  Requires oxygen therapy 3l/min at hs. prn during the days as needed  Rheumatic aortic insufficiency 5/16/2016  Rheumatic fever as child  
 pt reports rheumatic fever in childhood  Smoker   
 started age 23-- smoked 0.5 ppd until 2018-- cut back to 10-2 cig daily per pt  Stroke ([facility] \"mild stroke\" pt reports 2014- \"left sided weakness and balance is off\"  Thromboembolus ([facility] BLE 2012  Tobacco abuse disorder 5/16/2016  Vitamin D deficiency Past Surgical History:  
Procedure Laterality Date  BIOPSY OF BREAST, INCISIONAL Left   
 lymph node , left, patient states her bx neg, but high risk  COLONOSCOPY    
 8 polyps removed, diverticulitis  CYSTOSCOPY  8-23-11  
 bladder dilitation  EGD  HX APPENDECTOMY  2006  HX HEART CATHETERIZATION  5/27/2011  
 no blockages, no intervention  HX HEART CATHETERIZATION  04/2017  
 no intervention  HX LAP CHOLECYSTECTOMY  6/6/2011  HX NATASHA AND BSO  2004  
 fibroid tumors, hyst  
 HX UROLOGICAL  01/2018  
 cystoscopy with hydrodistention of bladder Family History:  
Problem Relation Age of Onset  Heart Failure Father 76 chf  
 Heart Disease Father  Diabetes Sister  Thyroid Disease Sister Social History Socioeconomic History  Marital status: LEGALLY  Spouse name: Not on file  Number of children: Not on file  Years of education: Not on file  Highest education level: Not on file Social Needs  Financial resource strain: Not on file  Food insecurity - worry: Not on file  Food insecurity - inability: Not on file  Transportation needs - medical: Not on file  Transportation needs - non-medical: Not on file Occupational History  Not on file Tobacco Use  Smoking status: Current Every Day Smoker Packs/day: 0.25 Years: 27.00 Pack years: 6.75  Smokeless tobacco: Never Used  Tobacco comment: She hasn't had one in a few days. 10/18/18KH Substance and Sexual Activity  Alcohol use: No  
 Drug use: Yes Types: Cocaine Comment: Last use 5/2018  Sexual activity: Not on file Other Topics Concern  Not on file Social History Narrative  Not on file ALLERGIES: Aspirin; Bentyl [dicyclomine]; Toradol [ketorolac]; Ultram [tramadol]; and Zofran [ondansetron hcl (pf)] Review of Systems Constitutional: Negative. Negative for activity change, chills and weight loss. HENT: Negative. Eyes: Negative. Respiratory: Positive for cough and wheezing. Cardiovascular: Negative. Negative for chest pain. Gastrointestinal: Negative. Genitourinary: Negative. Musculoskeletal: Negative. Skin: Negative. Neurological: Negative. Psychiatric/Behavioral: Negative. All other systems reviewed and are negative. Vitals:  
 11/22/18 0354 BP: 139/65 Pulse: 91  
Resp: 16 Temp: 98.4 °F (36.9 °C) SpO2: 100% Weight: 86.2 kg (190 lb) Height: 5' 4\" (1.626 m) Physical Exam  
Constitutional: She is oriented to person, place, and time. She appears well-developed and well-nourished. No distress. HENT:  
Head: Normocephalic and atraumatic. Right Ear: External ear normal.  
Left Ear: External ear normal.  
Nose: Nose normal.  
Eyes: Conjunctivae and EOM are normal. Pupils are equal, round, and reactive to light. Right eye exhibits no discharge. Left eye exhibits no discharge. No scleral icterus. Neck: Normal range of motion. Cardiovascular: Regular rhythm. Pulmonary/Chest: Effort normal and breath sounds normal. No stridor. No respiratory distress. She has no wheezes. She has no rales. Abdominal: Soft. Bowel sounds are normal. She exhibits no distension. There is no tenderness. Musculoskeletal: Normal range of motion. Neurological: She is alert and oriented to person, place, and time. She exhibits normal muscle tone. Coordination normal.  
Skin: Skin is warm and dry. No rash noted. Psychiatric: She has a normal mood and affect.  Her behavior is normal.  
 Nursing note and vitals reviewed. MDM Number of Diagnoses or Management Options Diagnosis management comments: Coughing subsided in the ED prior to treatment. Patient has history of illicit drug use. Patient was observed for an hour or more in the ED did not have any further coughing. Chest x-ray was normal vascular congestion no pneumonia. SaO2 remained above 90 her entire stay. Most likely viral upper respiratory illness. Amount and/or Complexity of Data Reviewed Clinical lab tests: ordered and reviewed Tests in the radiology section of CPT®: ordered and reviewed Tests in the medicine section of CPT®: ordered and reviewed Risk of Complications, Morbidity, and/or Mortality Presenting problems: moderate Diagnostic procedures: moderate Management options: moderate Procedures

## 2018-11-22 NOTE — DISCHARGE INSTRUCTIONS

## 2018-11-22 NOTE — ED NOTES

## 2018-11-24 ENCOUNTER — HOSPITAL ENCOUNTER (INPATIENT)
Age: 53
LOS: 2 days | Discharge: LEFT BEFORE DISCHARGE | DRG: 194 | End: 2018-11-26
Admitting: INTERNAL MEDICINE
Payer: COMMERCIAL

## 2018-11-24 ENCOUNTER — APPOINTMENT (OUTPATIENT)
Dept: CT IMAGING | Age: 53
DRG: 194 | End: 2018-11-24
Payer: COMMERCIAL

## 2018-11-24 ENCOUNTER — APPOINTMENT (OUTPATIENT)
Dept: GENERAL RADIOLOGY | Age: 53
DRG: 194 | End: 2018-11-24
Payer: COMMERCIAL

## 2018-11-24 DIAGNOSIS — J18.9 PNEUMONIA OF BOTH UPPER LOBES DUE TO INFECTIOUS ORGANISM: Primary | ICD-10-CM

## 2018-11-24 LAB
ALBUMIN SERPL-MCNC: 3.5 G/DL (ref 3.5–5)
ALBUMIN/GLOB SERPL: 1 {RATIO} (ref 1.2–3.5)
ALP SERPL-CCNC: 136 U/L (ref 50–136)
ALT SERPL-CCNC: 53 U/L (ref 12–65)
AMPHET UR QL SCN: NEGATIVE
ANION GAP SERPL CALC-SCNC: 10 MMOL/L (ref 7–16)
AST SERPL-CCNC: 92 U/L (ref 15–37)
ATRIAL RATE: 90 BPM
BARBITURATES UR QL SCN: NEGATIVE
BASOPHILS # BLD: 0 K/UL (ref 0–0.2)
BASOPHILS NFR BLD: 0 % (ref 0–2)
BENZODIAZ UR QL: NEGATIVE
BILIRUB SERPL-MCNC: 0.2 MG/DL (ref 0.2–1.1)
BNP SERPL-MCNC: 321 PG/ML
BUN SERPL-MCNC: 7 MG/DL (ref 6–23)
CALCIUM SERPL-MCNC: 8.3 MG/DL (ref 8.3–10.4)
CALCULATED P AXIS, ECG09: 61 DEGREES
CALCULATED R AXIS, ECG10: 28 DEGREES
CALCULATED T AXIS, ECG11: -15 DEGREES
CANNABINOIDS UR QL SCN: NEGATIVE
CHLORIDE SERPL-SCNC: 109 MMOL/L (ref 98–107)
CO2 SERPL-SCNC: 23 MMOL/L (ref 21–32)
COCAINE UR QL SCN: POSITIVE
CREAT SERPL-MCNC: 1.09 MG/DL (ref 0.6–1)
DIAGNOSIS, 93000: NORMAL
DIFFERENTIAL METHOD BLD: ABNORMAL
EOSINOPHIL # BLD: 0 K/UL (ref 0–0.8)
EOSINOPHIL NFR BLD: 0 % (ref 0.5–7.8)
ERYTHROCYTE [DISTWIDTH] IN BLOOD BY AUTOMATED COUNT: 14.6 %
ETHANOL SERPL-MCNC: <3 MG/DL
GLOBULIN SER CALC-MCNC: 3.5 G/DL (ref 2.3–3.5)
GLUCOSE SERPL-MCNC: 129 MG/DL (ref 65–100)
HCT VFR BLD AUTO: 31.1 % (ref 35.8–46.3)
HGB BLD-MCNC: 10 G/DL (ref 11.7–15.4)
IMM GRANULOCYTES # BLD: 0 K/UL (ref 0–0.5)
IMM GRANULOCYTES NFR BLD AUTO: 0 % (ref 0–5)
LYMPHOCYTES # BLD: 1 K/UL (ref 0.5–4.6)
LYMPHOCYTES NFR BLD: 15 % (ref 13–44)
MCH RBC QN AUTO: 30.4 PG (ref 26.1–32.9)
MCHC RBC AUTO-ENTMCNC: 32.2 G/DL (ref 31.4–35)
MCV RBC AUTO: 94.5 FL (ref 79.6–97.8)
METHADONE UR QL: NEGATIVE
MONOCYTES # BLD: 0.2 K/UL (ref 0.1–1.3)
MONOCYTES NFR BLD: 3 % (ref 4–12)
NEUTS SEG # BLD: 5.5 K/UL (ref 1.7–8.2)
NEUTS SEG NFR BLD: 82 % (ref 43–78)
NRBC # BLD: 0 K/UL (ref 0–0.2)
OPIATES UR QL: NEGATIVE
P-R INTERVAL, ECG05: 122 MS
PCP UR QL: NEGATIVE
PLATELET # BLD AUTO: 307 K/UL (ref 150–450)
PMV BLD AUTO: 10.1 FL (ref 9.4–12.3)
POTASSIUM SERPL-SCNC: 4 MMOL/L (ref 3.5–5.1)
PROT SERPL-MCNC: 7 G/DL (ref 6.3–8.2)
Q-T INTERVAL, ECG07: 376 MS
QRS DURATION, ECG06: 96 MS
QTC CALCULATION (BEZET), ECG08: 459 MS
RBC # BLD AUTO: 3.29 M/UL (ref 4.05–5.2)
SODIUM SERPL-SCNC: 142 MMOL/L (ref 136–145)
VENTRICULAR RATE, ECG03: 90 BPM
WBC # BLD AUTO: 6.7 K/UL (ref 4.3–11.1)

## 2018-11-24 PROCEDURE — 65660000000 HC RM CCU STEPDOWN

## 2018-11-24 PROCEDURE — 74011636320 HC RX REV CODE- 636/320

## 2018-11-24 PROCEDURE — 80307 DRUG TEST PRSMV CHEM ANLYZR: CPT

## 2018-11-24 PROCEDURE — 77030013140 HC MSK NEB VYRM -A

## 2018-11-24 PROCEDURE — 74011000250 HC RX REV CODE- 250

## 2018-11-24 PROCEDURE — 74011000250 HC RX REV CODE- 250: Performed by: INTERNAL MEDICINE

## 2018-11-24 PROCEDURE — 96374 THER/PROPH/DIAG INJ IV PUSH: CPT

## 2018-11-24 PROCEDURE — 80053 COMPREHEN METABOLIC PANEL: CPT

## 2018-11-24 PROCEDURE — 96375 TX/PRO/DX INJ NEW DRUG ADDON: CPT

## 2018-11-24 PROCEDURE — 93005 ELECTROCARDIOGRAM TRACING: CPT | Performed by: INTERNAL MEDICINE

## 2018-11-24 PROCEDURE — 94760 N-INVAS EAR/PLS OXIMETRY 1: CPT

## 2018-11-24 PROCEDURE — 71260 CT THORAX DX C+: CPT

## 2018-11-24 PROCEDURE — 74011250636 HC RX REV CODE- 250/636

## 2018-11-24 PROCEDURE — 87040 BLOOD CULTURE FOR BACTERIA: CPT

## 2018-11-24 PROCEDURE — 99284 EMERGENCY DEPT VISIT MOD MDM: CPT

## 2018-11-24 PROCEDURE — 74011250636 HC RX REV CODE- 250/636: Performed by: INTERNAL MEDICINE

## 2018-11-24 PROCEDURE — 83880 ASSAY OF NATRIURETIC PEPTIDE: CPT

## 2018-11-24 PROCEDURE — 65270000029 HC RM PRIVATE

## 2018-11-24 PROCEDURE — 94640 AIRWAY INHALATION TREATMENT: CPT

## 2018-11-24 PROCEDURE — 85025 COMPLETE CBC W/AUTO DIFF WBC: CPT

## 2018-11-24 PROCEDURE — 74011000258 HC RX REV CODE- 258: Performed by: INTERNAL MEDICINE

## 2018-11-24 PROCEDURE — 74011250637 HC RX REV CODE- 250/637: Performed by: INTERNAL MEDICINE

## 2018-11-24 PROCEDURE — 71045 X-RAY EXAM CHEST 1 VIEW: CPT

## 2018-11-24 PROCEDURE — 74011000258 HC RX REV CODE- 258

## 2018-11-24 RX ORDER — CARVEDILOL 12.5 MG/1
12.5 TABLET ORAL 2 TIMES DAILY WITH MEALS
Status: DISCONTINUED | OUTPATIENT
Start: 2018-11-24 | End: 2018-11-27 | Stop reason: HOSPADM

## 2018-11-24 RX ORDER — HYDROCODONE BITARTRATE AND HOMATROPINE METHYLBROMIDE 1.5; 5 MG/5ML; MG/5ML
5 SYRUP ORAL
Status: DISCONTINUED | OUTPATIENT
Start: 2018-11-24 | End: 2018-11-26

## 2018-11-24 RX ORDER — PRAVASTATIN SODIUM 20 MG/1
40 TABLET ORAL
Status: DISCONTINUED | OUTPATIENT
Start: 2018-11-24 | End: 2018-11-27 | Stop reason: HOSPADM

## 2018-11-24 RX ORDER — HYDROCODONE BITARTRATE AND ACETAMINOPHEN 5; 325 MG/1; MG/1
1 TABLET ORAL
Status: DISCONTINUED | OUTPATIENT
Start: 2018-11-24 | End: 2018-11-26

## 2018-11-24 RX ORDER — FLUOXETINE HYDROCHLORIDE 20 MG/1
20 CAPSULE ORAL DAILY
Status: DISCONTINUED | OUTPATIENT
Start: 2018-11-24 | End: 2018-11-27 | Stop reason: HOSPADM

## 2018-11-24 RX ORDER — LOSARTAN POTASSIUM 50 MG/1
25 TABLET ORAL DAILY
Status: DISCONTINUED | OUTPATIENT
Start: 2018-11-24 | End: 2018-11-27 | Stop reason: HOSPADM

## 2018-11-24 RX ORDER — DOXYCYCLINE 100 MG/1
100 CAPSULE ORAL EVERY 12 HOURS
Status: DISCONTINUED | OUTPATIENT
Start: 2018-11-24 | End: 2018-11-27 | Stop reason: HOSPADM

## 2018-11-24 RX ORDER — LEVOFLOXACIN 5 MG/ML
750 INJECTION, SOLUTION INTRAVENOUS
Status: COMPLETED | OUTPATIENT
Start: 2018-11-24 | End: 2018-11-24

## 2018-11-24 RX ORDER — ALBUTEROL SULFATE 0.83 MG/ML
2.5 SOLUTION RESPIRATORY (INHALATION)
Status: DISCONTINUED | OUTPATIENT
Start: 2018-11-24 | End: 2018-11-27 | Stop reason: HOSPADM

## 2018-11-24 RX ORDER — CLONAZEPAM 0.5 MG/1
0.5 TABLET ORAL
Status: DISCONTINUED | OUTPATIENT
Start: 2018-11-24 | End: 2018-11-27 | Stop reason: HOSPADM

## 2018-11-24 RX ORDER — SODIUM CHLORIDE 0.9 % (FLUSH) 0.9 %
10 SYRINGE (ML) INJECTION
Status: COMPLETED | OUTPATIENT
Start: 2018-11-24 | End: 2018-11-24

## 2018-11-24 RX ORDER — SPIRONOLACTONE 25 MG/1
25 TABLET ORAL DAILY
Status: DISCONTINUED | OUTPATIENT
Start: 2018-11-24 | End: 2018-11-27 | Stop reason: HOSPADM

## 2018-11-24 RX ORDER — NITROGLYCERIN 0.4 MG/1
0.4 TABLET SUBLINGUAL
Status: DISCONTINUED | OUTPATIENT
Start: 2018-11-24 | End: 2018-11-27 | Stop reason: HOSPADM

## 2018-11-24 RX ORDER — FUROSEMIDE 10 MG/ML
40 INJECTION INTRAMUSCULAR; INTRAVENOUS
Status: COMPLETED | OUTPATIENT
Start: 2018-11-24 | End: 2018-11-24

## 2018-11-24 RX ORDER — PANTOPRAZOLE SODIUM 40 MG/1
40 TABLET, DELAYED RELEASE ORAL DAILY
Status: DISCONTINUED | OUTPATIENT
Start: 2018-11-24 | End: 2018-11-27 | Stop reason: HOSPADM

## 2018-11-24 RX ORDER — ALBUTEROL SULFATE 0.83 MG/ML
2.5 SOLUTION RESPIRATORY (INHALATION) ONCE
Status: COMPLETED | OUTPATIENT
Start: 2018-11-24 | End: 2018-11-24

## 2018-11-24 RX ORDER — BENZONATATE 100 MG/1
100 CAPSULE ORAL
Status: DISCONTINUED | OUTPATIENT
Start: 2018-11-24 | End: 2018-11-27 | Stop reason: HOSPADM

## 2018-11-24 RX ORDER — BUDESONIDE 0.5 MG/2ML
500 INHALANT ORAL
Status: DISCONTINUED | OUTPATIENT
Start: 2018-11-24 | End: 2018-11-27 | Stop reason: HOSPADM

## 2018-11-24 RX ORDER — MIRTAZAPINE 15 MG/1
15 TABLET, FILM COATED ORAL
Status: DISCONTINUED | OUTPATIENT
Start: 2018-11-24 | End: 2018-11-27 | Stop reason: HOSPADM

## 2018-11-24 RX ORDER — OXYBUTYNIN CHLORIDE 5 MG/1
5 TABLET ORAL 2 TIMES DAILY
Status: DISCONTINUED | OUTPATIENT
Start: 2018-11-24 | End: 2018-11-27 | Stop reason: HOSPADM

## 2018-11-24 RX ORDER — HYDRALAZINE HYDROCHLORIDE 20 MG/ML
10 INJECTION INTRAMUSCULAR; INTRAVENOUS
Status: DISCONTINUED | OUTPATIENT
Start: 2018-11-24 | End: 2018-11-27 | Stop reason: HOSPADM

## 2018-11-24 RX ORDER — HYDROXYZINE PAMOATE 25 MG/1
25 CAPSULE ORAL
Status: DISCONTINUED | OUTPATIENT
Start: 2018-11-24 | End: 2018-11-27 | Stop reason: HOSPADM

## 2018-11-24 RX ORDER — ACETAMINOPHEN 325 MG/1
650 TABLET ORAL
Status: DISCONTINUED | OUTPATIENT
Start: 2018-11-24 | End: 2018-11-27 | Stop reason: HOSPADM

## 2018-11-24 RX ORDER — GABAPENTIN 400 MG/1
400 CAPSULE ORAL 3 TIMES DAILY
Status: DISCONTINUED | OUTPATIENT
Start: 2018-11-24 | End: 2018-11-27 | Stop reason: HOSPADM

## 2018-11-24 RX ADMIN — GABAPENTIN 400 MG: 400 CAPSULE ORAL at 09:48

## 2018-11-24 RX ADMIN — SODIUM CHLORIDE 100 ML: 900 INJECTION, SOLUTION INTRAVENOUS at 06:35

## 2018-11-24 RX ADMIN — MIRTAZAPINE 15 MG: 15 TABLET, FILM COATED ORAL at 20:42

## 2018-11-24 RX ADMIN — PANTOPRAZOLE SODIUM 40 MG: 40 TABLET, DELAYED RELEASE ORAL at 09:48

## 2018-11-24 RX ADMIN — CARVEDILOL 12.5 MG: 12.5 TABLET, FILM COATED ORAL at 16:16

## 2018-11-24 RX ADMIN — PROMETHAZINE HYDROCHLORIDE 12.5 MG: 25 INJECTION INTRAMUSCULAR; INTRAVENOUS at 07:50

## 2018-11-24 RX ADMIN — GABAPENTIN 400 MG: 400 CAPSULE ORAL at 20:42

## 2018-11-24 RX ADMIN — GABAPENTIN 400 MG: 400 CAPSULE ORAL at 15:49

## 2018-11-24 RX ADMIN — TIOTROPIUM BROMIDE 18 MCG: 18 CAPSULE ORAL; RESPIRATORY (INHALATION) at 11:14

## 2018-11-24 RX ADMIN — IOPAMIDOL 100 ML: 755 INJECTION, SOLUTION INTRAVENOUS at 06:34

## 2018-11-24 RX ADMIN — ALBUTEROL SULFATE 2.5 MG: 2.5 SOLUTION RESPIRATORY (INHALATION) at 20:08

## 2018-11-24 RX ADMIN — ALBUTEROL SULFATE 2.5 MG: 2.5 SOLUTION RESPIRATORY (INHALATION) at 11:14

## 2018-11-24 RX ADMIN — OXYBUTYNIN CHLORIDE 5 MG: 5 TABLET ORAL at 09:48

## 2018-11-24 RX ADMIN — BUDESONIDE 500 MCG: 0.5 INHALANT RESPIRATORY (INHALATION) at 20:08

## 2018-11-24 RX ADMIN — ALBUTEROL SULFATE 2.5 MG: 2.5 SOLUTION RESPIRATORY (INHALATION) at 05:51

## 2018-11-24 RX ADMIN — DOXYCYCLINE HYCLATE 100 MG: 100 CAPSULE ORAL at 09:48

## 2018-11-24 RX ADMIN — APIXABAN 5 MG: 5 TABLET, FILM COATED ORAL at 09:47

## 2018-11-24 RX ADMIN — BENZONATATE 100 MG: 100 CAPSULE ORAL at 09:47

## 2018-11-24 RX ADMIN — DOXYCYCLINE HYCLATE 100 MG: 100 CAPSULE ORAL at 20:43

## 2018-11-24 RX ADMIN — BENZONATATE 100 MG: 100 CAPSULE ORAL at 15:48

## 2018-11-24 RX ADMIN — BENZONATATE 100 MG: 100 CAPSULE ORAL at 21:09

## 2018-11-24 RX ADMIN — PROMETHAZINE HYDROCHLORIDE 12.5 MG: 25 INJECTION, SOLUTION INTRAMUSCULAR; INTRAVENOUS at 16:14

## 2018-11-24 RX ADMIN — Medication 10 ML: at 06:35

## 2018-11-24 RX ADMIN — CARVEDILOL 12.5 MG: 12.5 TABLET, FILM COATED ORAL at 09:48

## 2018-11-24 RX ADMIN — HYDROCODONE BITARTRATE AND ACETAMINOPHEN 1 TABLET: 5; 325 TABLET ORAL at 09:47

## 2018-11-24 RX ADMIN — BUDESONIDE 500 MCG: 0.5 INHALANT RESPIRATORY (INHALATION) at 11:14

## 2018-11-24 RX ADMIN — OXYBUTYNIN CHLORIDE 5 MG: 5 TABLET ORAL at 17:09

## 2018-11-24 RX ADMIN — FUROSEMIDE 40 MG: 10 INJECTION, SOLUTION INTRAMUSCULAR; INTRAVENOUS at 06:07

## 2018-11-24 RX ADMIN — APIXABAN 5 MG: 5 TABLET, FILM COATED ORAL at 17:09

## 2018-11-24 RX ADMIN — LEVOFLOXACIN 750 MG: 5 INJECTION, SOLUTION INTRAVENOUS at 07:50

## 2018-11-24 RX ADMIN — PRAVASTATIN SODIUM 40 MG: 20 TABLET ORAL at 20:43

## 2018-11-24 RX ADMIN — HYDROCODONE BITARTRATE AND ACETAMINOPHEN 1 TABLET: 5; 325 TABLET ORAL at 15:48

## 2018-11-24 RX ADMIN — SPIRONOLACTONE 25 MG: 25 TABLET, FILM COATED ORAL at 09:47

## 2018-11-24 RX ADMIN — HYDROXYZINE PAMOATE 25 MG: 25 CAPSULE ORAL at 09:47

## 2018-11-24 RX ADMIN — LOSARTAN POTASSIUM 25 MG: 50 TABLET ORAL at 09:46

## 2018-11-24 RX ADMIN — CEFTRIAXONE SODIUM 2 G: 2 INJECTION, POWDER, FOR SOLUTION INTRAMUSCULAR; INTRAVENOUS at 09:49

## 2018-11-24 RX ADMIN — FLUOXETINE HYDROCHLORIDE 20 MG: 20 CAPSULE ORAL at 09:48

## 2018-11-24 NOTE — ED PROVIDER NOTES
37-year-old female complaining of cough. Patient was seen 2 days ago for the same complaint had workup which was negative for pneumonia or congestive heart failure. Patient was very dramatic about her cough and is coughing mainly when she has a care provider in the room. Patient was started on Zithromax she is on day 3 if she is taking it. She was also given prednisone and cough medicine. Cough medicine was 25 mL of Hycodanwhich most likely she took. Cough This is a chronic problem. The current episode started more than 1 week ago. The problem occurs constantly. The problem has not changed since onset. The cough is non-productive. There has been no fever. Associated symptoms include rhinorrhea and wheezing. Pertinent negatives include no chest pain, no chills and no sweats. She has tried cough syrup, antibiotics, opioids and steroids for the symptoms. She is a smoker (also has been known to smoke crack cocaine). Her past medical history is significant for COPD and heart failure. Past Medical History:  
Diagnosis Date  Anemia   
 blood transfusion 5/2018 per pt  Arrhythmia   
 palpitations, moderate mitral valve regurge  Arthritis   
 lower back osteo  Asthma   
 uses inhalers  Cardiomyopathy ECHO 11/2017  CHF (congestive heart failure) ([address]   
 11/2017 Echo  LVEF 45-50%  Chronic pain 2010  Coagulation defect ([facility] [address]   
 eliquis  Cocaine use Reports last use 5/2018 per patient  COPD   
 nebulizer daily and maint inhalers, can not climb 1 flight of stairs (also CHF)  oxygen 3 LPM qhs  Decreased cardiac ejection fraction 11/27/2017 EF 45-50% per echo 11/27/17  Depression   
 controlled  Diverticulitis  GERD (gastroesophageal reflux disease)   
 uncontrolled with daily meds-- comes and goes- 3 pillows  Heart murmur  Hypertension   
 managed with meds  IBS (irritable bowel syndrome)  IC (interstitial cystitis)  Insomnia  Migraine with aura and without status migrainosus, not intractable 6/17/2016  Mitral valve regurgitation, rheumatic   
 followed Dr. Jyoti Jeffries  Palpitations 5/16/2016  Psychiatric disorder   
 anxiety  Requires oxygen therapy 3l/min at hs. prn during the days as needed  Rheumatic aortic insufficiency 5/16/2016  Rheumatic fever as child  
 pt reports rheumatic fever in childhood  Smoker   
 started age 23-- smoked 0.5 ppd until 2018-- cut back to 10-2 cig daily per pt  Stroke ([address] \"mild stroke\" pt reports 2014- \"left sided weakness and balance is off\"  Thromboembolus ([facility] [address] BLE 2012  Tobacco abuse disorder 5/16/2016  Vitamin D deficiency Past Surgical History:  
Procedure Laterality Date  BIOPSY OF BREAST, INCISIONAL Left   
 lymph node , left, patient states her bx neg, but high risk  COLONOSCOPY    
 8 polyps removed, diverticulitis  CYSTOSCOPY  8-23-11  
 bladder dilitation  EGD  HX APPENDECTOMY  2006  HX HEART CATHETERIZATION  5/27/2011  
 no blockages, no intervention  HX HEART CATHETERIZATION  04/2017  
 no intervention  HX LAP CHOLECYSTECTOMY  6/6/2011  HX NATASHA AND BSO  2004  
 fibroid tumors, hyst  
 HX UROLOGICAL  01/2018  
 cystoscopy with hydrodistention of bladder Family History:  
Problem Relation Age of Onset  Heart Failure Father 76 chf  
 Heart Disease Father  Diabetes Sister  Thyroid Disease Sister Social History Socioeconomic History  Marital status: LEGALLY  Spouse name: Not on file  Number of children: Not on file  Years of education: Not on file  Highest education level: Not on file Social Needs  Financial resource strain: Not on file  Food insecurity - worry: Not on file  Food insecurity - inability: Not on file  Transportation needs - medical: Not on file  Transportation needs - non-medical: Not on file Occupational History  Not on file Tobacco Use  Smoking status: Current Every Day Smoker Packs/day: 0.25 Years: 27.00 Pack years: 6.75  Smokeless tobacco: Never Used  Tobacco comment: She hasn't had one in a few days. 10/18/18KH Substance and Sexual Activity  Alcohol use: No  
 Drug use: Yes Types: Cocaine Comment: Last use 5/2018  Sexual activity: Not on file Other Topics Concern  Not on file Social History Narrative  Not on file ALLERGIES: Aspirin; Bentyl [dicyclomine]; Toradol [ketorolac]; Ultram [tramadol]; and Zofran [ondansetron hcl (pf)] Review of Systems Constitutional: Negative. Negative for activity change and chills. HENT: Positive for rhinorrhea. Eyes: Negative. Respiratory: Positive for cough and wheezing. Cardiovascular: Negative. Negative for chest pain. Gastrointestinal: Negative. Genitourinary: Negative. Musculoskeletal: Negative. Skin: Negative. Neurological: Negative. Psychiatric/Behavioral: Negative. All other systems reviewed and are negative. Vitals:  
 11/24/18 9987 11/24/18 0531 11/24/18 5045 11/24/18 0946 BP: (!) 153/95   111/57 Pulse: 96   87 Resp: 24 Temp: 98.1 °F (36.7 °C) SpO2: 98% 99% 97% Weight: 86.2 kg (190 lb) Height: 5' 4\" (1.626 m) Physical Exam  
Constitutional: She is oriented to person, place, and time. She appears well-developed and well-nourished. No distress. HENT:  
Head: Normocephalic and atraumatic. Right Ear: External ear normal.  
Left Ear: External ear normal.  
Nose: Nose normal.  
Eyes: Conjunctivae and EOM are normal. Pupils are equal, round, and reactive to light. Right eye exhibits no discharge. Left eye exhibits no discharge. No scleral icterus. Neck: Normal range of motion. Cardiovascular: Regular rhythm. Pulmonary/Chest: Effort normal and breath sounds normal. No stridor. No respiratory distress. She has no wheezes. She has no rales. Abdominal: Soft. Bowel sounds are normal. She exhibits no distension. There is no tenderness. Musculoskeletal: Normal range of motion. Neurological: She is alert and oriented to person, place, and time. She exhibits normal muscle tone. Coordination normal.  
Skin: Skin is warm and dry. No rash noted. Psychiatric: Her affect is labile. She is hyperactive. She expresses impulsivity and inappropriate judgment. MDM Number of Diagnoses or Management Options Pneumonia of both upper lobes due to infectious organism [facility]):  
Diagnosis management comments: Patient intermittently coughing marked   improvement after nebulizer. No fever chest x-ray unchanged no signs of pulmonary edema she is not hypoxic. Patient acts as if she is intoxicated on something and refused to give urine. Patient urinates in the bed and on the floor. Patient patient takes her clothes off and exposed herself to healthcare providers. Patient's very inappropriate, but this is her baseline. Patient is not hypoxic however does have several comorbidities which could cause her to fail outpatient treatment. Patient is also not reliable. Discussed with hospitalist there going to evaluate and admit her for now. Chest x-ray is unremarkable CT chest was performed to find any underlying disease process that x-ray. Amount and/or Complexity of Data Reviewed Clinical lab tests: ordered and reviewed Tests in the radiology section of CPT®: ordered and reviewed Tests in the medicine section of CPT®: ordered and reviewed Risk of Complications, Morbidity, and/or Mortality Presenting problems: moderate Diagnostic procedures: moderate Management options: moderate Patient Progress Patient progress: stable Procedures

## 2018-11-24 NOTE — ED TRIAGE NOTES
States she is not any better from prior visit. States cough is real bad. Able to speak in full sentences while ambulating to room. Dry cough noted. Pt also reports incontinence with cough.

## 2018-11-24 NOTE — PROGRESS NOTES
Dual full body skin assessment completed by Keven Victoria and Mary Kay Calderon RN. Elbows intact without redness or breakdown. Sacrum intact without redness or breakdown. Heels intact without redness or breakdown.

## 2018-11-24 NOTE — PROGRESS NOTES
TRANSFER - IN REPORT: 
 
Verbal report received from [place], RN on [address]  being received from ER for routine progression of care Report consisted of patients Situation, Background, Assessment and  
Recommendations(SBAR). Information from the following report(s) ED Summary was reviewed with the receiving nurse. Opportunity for questions and clarification was provided.

## 2018-11-24 NOTE — PROGRESS NOTES
Tessalon 100 mg po given for cough. Norco 5 mg po given for pain. Vistaril 25 mg po given for anxiety.

## 2018-11-25 LAB
ANION GAP SERPL CALC-SCNC: 7 MMOL/L (ref 7–16)
BUN SERPL-MCNC: 12 MG/DL (ref 6–23)
CALCIUM SERPL-MCNC: 8.2 MG/DL (ref 8.3–10.4)
CHLORIDE SERPL-SCNC: 108 MMOL/L (ref 98–107)
CO2 SERPL-SCNC: 28 MMOL/L (ref 21–32)
CREAT SERPL-MCNC: 0.96 MG/DL (ref 0.6–1)
ERYTHROCYTE [DISTWIDTH] IN BLOOD BY AUTOMATED COUNT: 14.8 %
GLUCOSE SERPL-MCNC: 88 MG/DL (ref 65–100)
HCT VFR BLD AUTO: 31.4 % (ref 35.8–46.3)
HGB BLD-MCNC: 9.8 G/DL (ref 11.7–15.4)
MAGNESIUM SERPL-MCNC: 1.9 MG/DL (ref 1.8–2.4)
MCH RBC QN AUTO: 30 PG (ref 26.1–32.9)
MCHC RBC AUTO-ENTMCNC: 31.2 G/DL (ref 31.4–35)
MCV RBC AUTO: 96 FL (ref 79.6–97.8)
NRBC # BLD: 0 K/UL (ref 0–0.2)
PLATELET # BLD AUTO: 288 K/UL (ref 150–450)
PMV BLD AUTO: 10.3 FL (ref 9.4–12.3)
POTASSIUM SERPL-SCNC: 3.5 MMOL/L (ref 3.5–5.1)
RBC # BLD AUTO: 3.27 M/UL (ref 4.05–5.2)
SODIUM SERPL-SCNC: 143 MMOL/L (ref 136–145)
WBC # BLD AUTO: 6.4 K/UL (ref 4.3–11.1)

## 2018-11-25 PROCEDURE — 74011250636 HC RX REV CODE- 250/636: Performed by: INTERNAL MEDICINE

## 2018-11-25 PROCEDURE — 74011250637 HC RX REV CODE- 250/637: Performed by: INTERNAL MEDICINE

## 2018-11-25 PROCEDURE — 65660000000 HC RM CCU STEPDOWN

## 2018-11-25 PROCEDURE — 94640 AIRWAY INHALATION TREATMENT: CPT

## 2018-11-25 PROCEDURE — 83735 ASSAY OF MAGNESIUM: CPT

## 2018-11-25 PROCEDURE — 65270000029 HC RM PRIVATE

## 2018-11-25 PROCEDURE — 85027 COMPLETE CBC AUTOMATED: CPT

## 2018-11-25 PROCEDURE — 74011000258 HC RX REV CODE- 258: Performed by: INTERNAL MEDICINE

## 2018-11-25 PROCEDURE — 74011636637 HC RX REV CODE- 636/637: Performed by: INTERNAL MEDICINE

## 2018-11-25 PROCEDURE — 94760 N-INVAS EAR/PLS OXIMETRY 1: CPT

## 2018-11-25 PROCEDURE — 36415 COLL VENOUS BLD VENIPUNCTURE: CPT

## 2018-11-25 PROCEDURE — 80048 BASIC METABOLIC PNL TOTAL CA: CPT

## 2018-11-25 PROCEDURE — 74011000250 HC RX REV CODE- 250: Performed by: INTERNAL MEDICINE

## 2018-11-25 RX ORDER — BENZONATATE 100 MG/1
100 CAPSULE ORAL
Status: COMPLETED | OUTPATIENT
Start: 2018-11-25 | End: 2018-11-25

## 2018-11-25 RX ORDER — MORPHINE SULFATE 10 MG/ML
2 INJECTION, SOLUTION INTRAMUSCULAR; INTRAVENOUS
Status: DISPENSED | OUTPATIENT
Start: 2018-11-25 | End: 2018-11-26

## 2018-11-25 RX ORDER — PREDNISONE 20 MG/1
40 TABLET ORAL
Status: DISCONTINUED | OUTPATIENT
Start: 2018-11-25 | End: 2018-11-27 | Stop reason: HOSPADM

## 2018-11-25 RX ADMIN — FLUOXETINE HYDROCHLORIDE 20 MG: 20 CAPSULE ORAL at 09:34

## 2018-11-25 RX ADMIN — GABAPENTIN 400 MG: 400 CAPSULE ORAL at 21:27

## 2018-11-25 RX ADMIN — APIXABAN 5 MG: 5 TABLET, FILM COATED ORAL at 09:36

## 2018-11-25 RX ADMIN — CLONAZEPAM 0.5 MG: 0.5 TABLET ORAL at 19:22

## 2018-11-25 RX ADMIN — ALBUTEROL SULFATE 2.5 MG: 2.5 SOLUTION RESPIRATORY (INHALATION) at 01:18

## 2018-11-25 RX ADMIN — BENZONATATE 100 MG: 100 CAPSULE ORAL at 13:26

## 2018-11-25 RX ADMIN — HYDROCODONE BITARTRATE AND HOMATROPINE METHYLBROMIDE 5 ML: 5; 1.5 SOLUTION ORAL at 07:51

## 2018-11-25 RX ADMIN — MIRTAZAPINE 15 MG: 15 TABLET, FILM COATED ORAL at 21:28

## 2018-11-25 RX ADMIN — MORPHINE SULFATE 2 MG: 10 INJECTION INTRAVENOUS at 21:26

## 2018-11-25 RX ADMIN — ALBUTEROL SULFATE 2.5 MG: 2.5 SOLUTION RESPIRATORY (INHALATION) at 14:16

## 2018-11-25 RX ADMIN — PROMETHAZINE HYDROCHLORIDE 12.5 MG: 25 INJECTION, SOLUTION INTRAMUSCULAR; INTRAVENOUS at 03:11

## 2018-11-25 RX ADMIN — DOXYCYCLINE HYCLATE 100 MG: 100 CAPSULE ORAL at 09:34

## 2018-11-25 RX ADMIN — OXYBUTYNIN CHLORIDE 5 MG: 5 TABLET ORAL at 17:19

## 2018-11-25 RX ADMIN — CARVEDILOL 12.5 MG: 12.5 TABLET, FILM COATED ORAL at 16:57

## 2018-11-25 RX ADMIN — ALBUTEROL SULFATE 2.5 MG: 2.5 SOLUTION RESPIRATORY (INHALATION) at 07:13

## 2018-11-25 RX ADMIN — APIXABAN 5 MG: 5 TABLET, FILM COATED ORAL at 17:18

## 2018-11-25 RX ADMIN — CARVEDILOL 12.5 MG: 12.5 TABLET, FILM COATED ORAL at 07:52

## 2018-11-25 RX ADMIN — CEFTRIAXONE SODIUM 2 G: 2 INJECTION, POWDER, FOR SOLUTION INTRAMUSCULAR; INTRAVENOUS at 09:42

## 2018-11-25 RX ADMIN — ALBUTEROL SULFATE 2.5 MG: 2.5 SOLUTION RESPIRATORY (INHALATION) at 19:10

## 2018-11-25 RX ADMIN — LOSARTAN POTASSIUM 25 MG: 50 TABLET ORAL at 09:35

## 2018-11-25 RX ADMIN — HYDROCODONE BITARTRATE AND HOMATROPINE METHYLBROMIDE 5 ML: 5; 1.5 SOLUTION ORAL at 16:56

## 2018-11-25 RX ADMIN — BUDESONIDE 500 MCG: 0.5 INHALANT RESPIRATORY (INHALATION) at 19:10

## 2018-11-25 RX ADMIN — PREDNISONE 40 MG: 20 TABLET ORAL at 13:26

## 2018-11-25 RX ADMIN — HYDROCODONE BITARTRATE AND ACETAMINOPHEN 1 TABLET: 5; 325 TABLET ORAL at 03:07

## 2018-11-25 RX ADMIN — MORPHINE SULFATE 2 MG: 10 INJECTION INTRAVENOUS at 13:29

## 2018-11-25 RX ADMIN — PANTOPRAZOLE SODIUM 40 MG: 40 TABLET, DELAYED RELEASE ORAL at 09:34

## 2018-11-25 RX ADMIN — GABAPENTIN 400 MG: 400 CAPSULE ORAL at 16:57

## 2018-11-25 RX ADMIN — DOXYCYCLINE HYCLATE 100 MG: 100 CAPSULE ORAL at 21:27

## 2018-11-25 RX ADMIN — PROMETHAZINE HYDROCHLORIDE 12.5 MG: 25 INJECTION, SOLUTION INTRAMUSCULAR; INTRAVENOUS at 09:52

## 2018-11-25 RX ADMIN — BUDESONIDE 500 MCG: 0.5 INHALANT RESPIRATORY (INHALATION) at 07:13

## 2018-11-25 RX ADMIN — PRAVASTATIN SODIUM 40 MG: 20 TABLET ORAL at 21:28

## 2018-11-25 RX ADMIN — GABAPENTIN 400 MG: 400 CAPSULE ORAL at 09:35

## 2018-11-25 RX ADMIN — OXYBUTYNIN CHLORIDE 5 MG: 5 TABLET ORAL at 09:34

## 2018-11-25 RX ADMIN — SPIRONOLACTONE 25 MG: 25 TABLET, FILM COATED ORAL at 09:34

## 2018-11-25 RX ADMIN — HYDROCODONE BITARTRATE AND ACETAMINOPHEN 1 TABLET: 5; 325 TABLET ORAL at 09:52

## 2018-11-25 RX ADMIN — HYDROCODONE BITARTRATE AND ACETAMINOPHEN 1 TABLET: 5; 325 TABLET ORAL at 19:22

## 2018-11-25 RX ADMIN — TIOTROPIUM BROMIDE 18 MCG: 18 CAPSULE ORAL; RESPIRATORY (INHALATION) at 07:13

## 2018-11-25 NOTE — PROGRESS NOTES
Administered benzonatate as ordered PRN for cough. Patient experiencing painful, non productive cough, inducing gag reflex. Will reassess per protocol.

## 2018-11-25 NOTE — PROGRESS NOTES
Reviewed notes for spiritual concerns Sukhjinder Hilliard, staff [address], [id number] [facility]  /   [email or web address]

## 2018-11-25 NOTE — PROGRESS NOTES
Norco and phenergan effective. No further complaints of pain or nausea. Patient resting quietly in bed at this time, eyes closed, respirations present. No needs stated. Call light within reach.

## 2018-11-25 NOTE — PROGRESS NOTES
Progress Note Patient: Stacey Covert MRN: [id number]  SSN: [id number] YOB: 1965  Age: 48 y.o. Sex: female Admit Date: 11/24/2018 LOS: 1 day Subjective:  
 
Patient was seen at bedside today. Complaining of persistent cough and chest pain. Mild bilateral wheezing present. No sputum production. Drug screen in urine was positive for cocaine. Suspect chemical pneumonitis. Will add a low dose of steroids. Case discussed with Dr Tu Jaimes ( cardiology) regarding the use of BB and concomitant cocaine abuse. Decision was made to continue coreg at a reduced dose of 12.5 bid, add losartan and monitor. Patient has been stable using Coreg until this date and no strong indication to stop it was found. Objective:  
 
Vitals:  
 11/25/18 0719 11/25/18 1130 11/25/18 1419 11/25/18 1442 BP: 124/73 115/63  126/70 Pulse: 79 84  86 Resp: 19 19 18 Temp: 98.1 °F (36.7 °C) 98 °F (36.7 °C)  98.1 °F (36.7 °C) SpO2: 95% 96% 97% 96% Weight:      
Height:      
  
 
Intake and Output: 
Current Shift: 11/25 0701 - 11/25 1900 In: 480 [P.O.:480] Out: - Last three shifts: 11/23 1901 - 11/25 0700 In: 704.8 [P.O.:640; I.V.:64.8] Out: - Physical Exam:  
GENERAL: alert, appears stated age EYE: conjunctivae/corneas clear. LYMPHATIC: Cervical, supraclavicular, and axillary nodes normal.  
THROAT & NECK: normal and no erythema or exudates noted. LUNG: clear to auscultation bilaterally HEART: regular rate and rhythm, S1, S2 normal, no murmur, click, rub or gallop ABDOMEN: soft, non-tender. Bowel sounds normal. No masses,  no organomegaly EXTREMITIES:  extremities normal, atraumatic, no cyanosis or edema SKIN: Normal. 
NEUROLOGIC: AOx3. No focal deficits PSYCHIATRIC: non focal 
 
Lab/Data Review: 
Lab results reviewed. For significant abnormal values and values requiring intervention, see assessment and plan. Assessment:  
 
Active Problems: Cocaine abuse ([facility] (11/11/2013) Overview: Last Assessment & Plan:  
    - Patient denies. UDS positive. - No beta blocker now or upon discharge. Rheumatic aortic insufficiency (11/25/2017) Bronchitis, chronic, mucopurulent ([facility] (6/7/2016) Overview: she is seeing DR Gilberto Domingo at [facility] Chronic respiratory failure ([facility] (12/4/2017) Overview: Secondary to CHF and Takotsubo COPD (chronic obstructive pulmonary disease) ([facility] (5/19/2018) Overview: Last Assessment & Plan:  
    Lungs clear, normal chest x-ray yesterday. Continue inhalers, Singulair,  
    & Flonase. Take Medrol dose pack as prescribed, discussed adverse  
    effects. Stressed importance of keeping ENT & Pulmonology appointments. May also take Mucinex as needed, drink plenty of water. Follow up if  
    worsening symptoms or concerns. Pt verbalized understanding, agreed with  
    plan. Last Assessment & Plan:  
    Lungs clear, normal chest x-ray yesterday. Continue inhalers, Singulair,  
    & Flonase. Take Medrol dose pack as prescribed, discussed adverse  
    effects. Stressed importance of keeping ENT & Pulmonology appointments. May also take Mucinex as needed, drink plenty of water. Follow up if  
    worsening symptoms or concerns. Pt verbalized understanding, agreed with  
    plan. Chronic respiratory failure with hypoxia ([facility] (5/19/2018) Anxiety (7/19/2016) Overview: Last Assessment & Plan:  
    Klonopin dosage verified with pharmacy, SCRIPTS checked, will phone in 1  
    month prescription with 2 refills. HTN (hypertension) (6/6/2018) Overview: Last Assessment & Plan:  
    BP controlled. Continue current medications. Last Assessment & Plan:  
    BP controlled. Continue current medications. Bilateral pneumonia (11/24/2018) Plan:  
 
- continue antibiotics for now  
-added oral prednisone  
-monitor VS 
 - decision was made to continue BB after case was discussed with cardiology  
-patient was advised to quit using cocaine Signed By: MD Carl   
 November 25, 2018

## 2018-11-25 NOTE — PROGRESS NOTES
Assumed care of pt. Pt resting in bed with eyes closed. NAD. Breathing unlabored on RA. Call bell within pt reach. Will monitor.

## 2018-11-25 NOTE — PROGRESS NOTES
Patient resting quietly in bed at this time, eyes closed, respirations even and unlabored on room air. No acute events overnight. Bed low and locked. Bedside table, personal belongings and call light within reach.

## 2018-11-25 NOTE — PROGRESS NOTES
Received bedside shift report from offgoing nurse, Daysi Givens. Patient resting quietly in bed at this time, eyes closed, respirations even and unlabored on room air. No needs stated. Call light within reach.

## 2018-11-25 NOTE — PROGRESS NOTES
Benzonatate effective. Patient resting quietly in bed at this time, eyes closed, respirations present. No needs stated. Call light within reach.

## 2018-11-25 NOTE — PROGRESS NOTES
Patient resting quietly in bed at this time, eyes closed, respirations present. No needs stated. Call light within reach.

## 2018-11-25 NOTE — PROGRESS NOTES
Bedside shift report completed with oncoming nurseRadha. Patient resting quietly in bed at this time, eyes closed, respirations present. No needs stated. Call light within reach.

## 2018-11-25 NOTE — PROGRESS NOTES
Administered norco as ordered PRN for severe pain. Patient reports pain 10/10 generalized pain. Also administered phenergan as ordered PRN for patient's complaint of nausea. Will reassess per protocol.

## 2018-11-26 VITALS
SYSTOLIC BLOOD PRESSURE: 118 MMHG | BODY MASS INDEX: 33.9 KG/M2 | HEIGHT: 64 IN | WEIGHT: 198.6 LBS | HEART RATE: 87 BPM | OXYGEN SATURATION: 95 % | DIASTOLIC BLOOD PRESSURE: 79 MMHG | RESPIRATION RATE: 18 BRPM | TEMPERATURE: 98.1 F

## 2018-11-26 LAB
ANION GAP SERPL CALC-SCNC: 6 MMOL/L (ref 7–16)
BUN SERPL-MCNC: 13 MG/DL (ref 6–23)
CALCIUM SERPL-MCNC: 8.5 MG/DL (ref 8.3–10.4)
CHLORIDE SERPL-SCNC: 106 MMOL/L (ref 98–107)
CO2 SERPL-SCNC: 28 MMOL/L (ref 21–32)
CREAT SERPL-MCNC: 0.82 MG/DL (ref 0.6–1)
ERYTHROCYTE [DISTWIDTH] IN BLOOD BY AUTOMATED COUNT: 14.6 %
GLUCOSE SERPL-MCNC: 114 MG/DL (ref 65–100)
HCT VFR BLD AUTO: 28.9 % (ref 35.8–46.3)
HGB BLD-MCNC: 9.1 G/DL (ref 11.7–15.4)
MCH RBC QN AUTO: 30 PG (ref 26.1–32.9)
MCHC RBC AUTO-ENTMCNC: 31.5 G/DL (ref 31.4–35)
MCV RBC AUTO: 95.4 FL (ref 79.6–97.8)
NRBC # BLD: 0 K/UL (ref 0–0.2)
PLATELET # BLD AUTO: 282 K/UL (ref 150–450)
PMV BLD AUTO: 10.2 FL (ref 9.4–12.3)
POTASSIUM SERPL-SCNC: 3.8 MMOL/L (ref 3.5–5.1)
RBC # BLD AUTO: 3.03 M/UL (ref 4.05–5.2)
SODIUM SERPL-SCNC: 140 MMOL/L (ref 136–145)
WBC # BLD AUTO: 7.3 K/UL (ref 4.3–11.1)

## 2018-11-26 PROCEDURE — 85027 COMPLETE CBC AUTOMATED: CPT

## 2018-11-26 PROCEDURE — 65270000029 HC RM PRIVATE

## 2018-11-26 PROCEDURE — 74011000250 HC RX REV CODE- 250: Performed by: INTERNAL MEDICINE

## 2018-11-26 PROCEDURE — 74011250636 HC RX REV CODE- 250/636: Performed by: INTERNAL MEDICINE

## 2018-11-26 PROCEDURE — 80048 BASIC METABOLIC PNL TOTAL CA: CPT

## 2018-11-26 PROCEDURE — 94760 N-INVAS EAR/PLS OXIMETRY 1: CPT

## 2018-11-26 PROCEDURE — 77030012341 HC CHMB SPCR OPTC MDI VYRM -A

## 2018-11-26 PROCEDURE — 36415 COLL VENOUS BLD VENIPUNCTURE: CPT

## 2018-11-26 PROCEDURE — 77030020120 HC VLV RESP PEP HI -B

## 2018-11-26 PROCEDURE — 74011000258 HC RX REV CODE- 258: Performed by: INTERNAL MEDICINE

## 2018-11-26 PROCEDURE — 74011636637 HC RX REV CODE- 636/637: Performed by: INTERNAL MEDICINE

## 2018-11-26 PROCEDURE — 94640 AIRWAY INHALATION TREATMENT: CPT

## 2018-11-26 PROCEDURE — 74011250637 HC RX REV CODE- 250/637: Performed by: INTERNAL MEDICINE

## 2018-11-26 RX ORDER — HYDROCODONE BITARTRATE AND HOMATROPINE METHYLBROMIDE 1.5; 5 MG/5ML; MG/5ML
5 SYRUP ORAL
Status: DISCONTINUED | OUTPATIENT
Start: 2018-11-26 | End: 2018-11-27 | Stop reason: HOSPADM

## 2018-11-26 RX ORDER — HYDROCODONE BITARTRATE AND ACETAMINOPHEN 10; 325 MG/1; MG/1
1 TABLET ORAL
Status: DISCONTINUED | OUTPATIENT
Start: 2018-11-26 | End: 2018-11-27 | Stop reason: HOSPADM

## 2018-11-26 RX ORDER — ESTRADIOL 0.5 MG/1
0.5 TABLET ORAL DAILY
Status: DISCONTINUED | OUTPATIENT
Start: 2018-11-27 | End: 2018-11-27 | Stop reason: HOSPADM

## 2018-11-26 RX ADMIN — HYDROCODONE BITARTRATE AND HOMATROPINE METHYLBROMIDE 5 ML: 5; 1.5 SOLUTION ORAL at 01:51

## 2018-11-26 RX ADMIN — ALBUTEROL SULFATE 2.5 MG: 2.5 SOLUTION RESPIRATORY (INHALATION) at 08:54

## 2018-11-26 RX ADMIN — CARVEDILOL 12.5 MG: 12.5 TABLET, FILM COATED ORAL at 08:45

## 2018-11-26 RX ADMIN — MORPHINE SULFATE 2 MG: 10 INJECTION INTRAVENOUS at 13:00

## 2018-11-26 RX ADMIN — ALBUTEROL SULFATE 2.5 MG: 2.5 SOLUTION RESPIRATORY (INHALATION) at 19:21

## 2018-11-26 RX ADMIN — GABAPENTIN 400 MG: 400 CAPSULE ORAL at 08:45

## 2018-11-26 RX ADMIN — BUDESONIDE 500 MCG: 0.5 INHALANT RESPIRATORY (INHALATION) at 19:21

## 2018-11-26 RX ADMIN — TIOTROPIUM BROMIDE 18 MCG: 18 CAPSULE ORAL; RESPIRATORY (INHALATION) at 08:54

## 2018-11-26 RX ADMIN — GABAPENTIN 400 MG: 400 CAPSULE ORAL at 16:39

## 2018-11-26 RX ADMIN — CARVEDILOL 12.5 MG: 12.5 TABLET, FILM COATED ORAL at 16:39

## 2018-11-26 RX ADMIN — BUDESONIDE 500 MCG: 0.5 INHALANT RESPIRATORY (INHALATION) at 08:54

## 2018-11-26 RX ADMIN — HYDROCODONE BITARTRATE AND ACETAMINOPHEN 1 TABLET: 10; 325 TABLET ORAL at 16:39

## 2018-11-26 RX ADMIN — ALBUTEROL SULFATE 2.5 MG: 2.5 SOLUTION RESPIRATORY (INHALATION) at 01:59

## 2018-11-26 RX ADMIN — OXYBUTYNIN CHLORIDE 5 MG: 5 TABLET ORAL at 08:45

## 2018-11-26 RX ADMIN — CEFTRIAXONE SODIUM 2 G: 2 INJECTION, POWDER, FOR SOLUTION INTRAMUSCULAR; INTRAVENOUS at 09:54

## 2018-11-26 RX ADMIN — HYDROCODONE BITARTRATE AND ACETAMINOPHEN 1 TABLET: 5; 325 TABLET ORAL at 01:51

## 2018-11-26 RX ADMIN — LOSARTAN POTASSIUM 25 MG: 50 TABLET ORAL at 09:54

## 2018-11-26 RX ADMIN — PANTOPRAZOLE SODIUM 40 MG: 40 TABLET, DELAYED RELEASE ORAL at 08:45

## 2018-11-26 RX ADMIN — HYDROCODONE BITARTRATE AND HOMATROPINE METHYLBROMIDE 5 ML: 5; 1.5 SOLUTION ORAL at 08:45

## 2018-11-26 RX ADMIN — APIXABAN 5 MG: 5 TABLET, FILM COATED ORAL at 08:45

## 2018-11-26 RX ADMIN — APIXABAN 5 MG: 5 TABLET, FILM COATED ORAL at 17:29

## 2018-11-26 RX ADMIN — SPIRONOLACTONE 25 MG: 25 TABLET, FILM COATED ORAL at 08:45

## 2018-11-26 RX ADMIN — DOXYCYCLINE HYCLATE 100 MG: 100 CAPSULE ORAL at 08:45

## 2018-11-26 RX ADMIN — PREDNISONE 40 MG: 20 TABLET ORAL at 09:54

## 2018-11-26 RX ADMIN — OXYBUTYNIN CHLORIDE 5 MG: 5 TABLET ORAL at 17:30

## 2018-11-26 RX ADMIN — FLUOXETINE HYDROCHLORIDE 20 MG: 20 CAPSULE ORAL at 08:45

## 2018-11-26 RX ADMIN — HYDROCODONE BITARTRATE AND ACETAMINOPHEN 1 TABLET: 5; 325 TABLET ORAL at 08:45

## 2018-11-26 RX ADMIN — ALBUTEROL SULFATE 2.5 MG: 2.5 SOLUTION RESPIRATORY (INHALATION) at 13:34

## 2018-11-26 RX ADMIN — MORPHINE SULFATE 2 MG: 10 INJECTION INTRAVENOUS at 04:59

## 2018-11-26 NOTE — PROGRESS NOTES
Problem: Interdisciplinary Rounds Goal: Interdisciplinary Rounds Interdisciplinary team rounds were held 11/26/2018 with the following team members:Care Management, Physical Therapy, Physician and Clinical Coordinator and the patient. Plan of care discussed. See clinical pathway and/or care plan for interventions and desired outcomes.

## 2018-11-26 NOTE — PROGRESS NOTES
Patient requested pain medication for rib cage pain, 9/10. Morphine 2 mg ivp given per prn order. States she continues to have cough, refuses tessalon as it does not work for her. Resting quietly at this time.

## 2018-11-26 NOTE — PROGRESS NOTES
Patient requested medication for right rib pain and for anxiety. Medicated with Norco, 1 tab per prn order and clonazepam 0.5 mg po per prn order. Will continue to monitor.

## 2018-11-26 NOTE — PROGRESS NOTES
Patient requested pain medication and cough medication. States she had gotten up to bathroom and cleaned up, began hurting and coughing. Medicated with Norco 1 tab per prn order and hycodan per prn order. .  Noted that patient understands time limits on her medications, she calls when due.

## 2018-11-26 NOTE — PROGRESS NOTES
Patient admitted over the weekend with PNA and substance abuse (UDS positive for cocaine). She is independent at baseline. She is insured through [facility]. No discharge planning needs anticipated. Case Management will continue to follow. Care Management Interventions PCP Verified by CM: Yes Transition of Care Consult (CM Consult): Discharge Planning Discharge Durable Medical Equipment: No 
Physical Therapy Consult: No 
Occupational Therapy Consult: No 
Speech Therapy Consult: No 
Current Support Network: Own Home Confirm Follow Up Transport: Family Plan discussed with Pt/Family/Caregiver: Yes Freedom of Choice Offered: Yes Discharge Location Discharge Placement: Home

## 2018-11-26 NOTE — PROGRESS NOTES
Patient alert and oriented. Patient ambulates in room and in hallway unassisted. Talkative. Left floor numerous times, despite staff instructions not to leave floor. Respirations even and unlabored. Oxygen sat at 96% on RA. No complaints at this time.

## 2018-11-27 NOTE — PROGRESS NOTES
Patient advised this RN that she is leaving AMA due to [place] friend that  just now\". Spoke with Dr. Rosa Reis to advise him of the situation. He advised that he would not be discharging this patient. Informed patient that it is against medical advice for her to leave the hospital at this time as she has pneumonia and needs the antibiotics that we are giving to her. She replied with, \"if something changes I will come back\". Patient signed AMA paperwork and ambulated off unit.

## 2018-11-27 NOTE — PROGRESS NOTES
Problem: Falls - Risk of 
Goal: *Absence of Falls Document Tess Estes Fall Risk and appropriate interventions in the flowsheet. Outcome: Resolved/Met Date Met: 11/26/18 Fall Risk Interventions: 
  
 
  
 
Medication Interventions: Assess postural VS orthostatic hypotension, Teach patient to arise slowly Elimination Interventions: Call light in reach

## 2018-11-27 NOTE — PROGRESS NOTES
Received bedside shift report from offgoing nurse, Judy Villalta. Patient resting quietly in bed at this time, awake, respirations even and unlabored on room air. Denies needs at this time. Call light within reach.

## 2018-11-27 NOTE — PROGRESS NOTES
Progress Note Patient: Mali Vincent MRN: [id number]  SSN: [id number] YOB: 1965  Age: 48 y.o. Sex: female Admit Date: 11/24/2018 LOS: 2 days Subjective: This is a 47 YO female patient with an extensive PMH of NSTEMI secondary to cocaine use in 3680, chronic systolic CHF with EF of 16%, Aortic regurgitation, COPD using O2 at home,  HTN, chronic DVT with PE in the past on chronic anticoagulation, history of previous GI bleed who came to the ER with a CC of persistent cough and SOB. Her WBC and electrolytes were WNL. A drug screen in urine was positive for cocaine. A CT scan of the chest reported bilateral upper pneumonia. The patient received one dose of levaquin in the ER and was presented to the hospitalist team for admission. She was admitted to the medical floor and started on oral steroids, nebs and IV antibiotics. Her clinical presentation is more suggestive of cocaine induced pneumonitis. Not very suggestive of bacterial infection. Patient keeps asking for pain medication which raises suspicion for drug seeking behavior. Objective:  
 
Vitals:  
 11/26/18 1334 11/26/18 1450 11/26/18 1900 11/26/18 1921 BP:  118/66 118/79 Pulse:  80 87 Resp:  16 18 Temp:  97.8 °F (36.6 °C) 98.1 °F (36.7 °C) SpO2: 97% 96% 97% 95% Weight:      
Height:      
  
 
Intake and Output: 
Current Shift: No intake/output data recorded. Last three shifts: 11/25 0701 - 11/26 1900 In: 2140 [P.O.:2040; I.V.:100] Out: - Physical Exam:  
GENERAL: alert, appears stated age EYE: conjunctivae/corneas clear. LYMPHATIC: Cervical, supraclavicular, and axillary nodes normal.  
THROAT & NECK: normal and no erythema or exudates noted. LUNG: mild bilateral rhonchi and wheezing HEART: regular rate and rhythm, S1, S2 normal, no murmur, click, rub or gallop ABDOMEN: soft, non-tender. Bowel sounds normal. No masses,  no organomegaly EXTREMITIES:  extremities normal, atraumatic, no cyanosis or edema SKIN: Normal. 
NEUROLOGIC: AOx3. No focal deficits PSYCHIATRIC: non focal 
 
Lab/Data Review: 
Lab results reviewed. For significant abnormal values and values requiring intervention, see assessment and plan. Assessment:  
 
Active Problems: 
  Cocaine abuse ([facility] (11/11/2013) Overview: Last Assessment & Plan:  
    - Patient denies. UDS positive. - No beta blocker now or upon discharge. Rheumatic aortic insufficiency (11/25/2017) Bronchitis, chronic, mucopurulent ([facility] (6/7/2016) Overview: she is seeing DR Robbie Faith at [facility] Chronic respiratory failure ([facility] (12/4/2017) Overview: Secondary to CHF and Takotsubo COPD (chronic obstructive pulmonary disease) ([facility] (5/19/2018) Overview: Last Assessment & Plan:  
    Lungs clear, normal chest x-ray yesterday. Continue inhalers, Singulair,  
    & Flonase. Take Medrol dose pack as prescribed, discussed adverse  
    effects. Stressed importance of keeping ENT & Pulmonology appointments. May also take Mucinex as needed, drink plenty of water. Follow up if  
    worsening symptoms or concerns. Pt verbalized understanding, agreed with  
    plan. Last Assessment & Plan:  
    Lungs clear, normal chest x-ray yesterday. Continue inhalers, Singulair,  
    & Flonase. Take Medrol dose pack as prescribed, discussed adverse  
    effects. Stressed importance of keeping ENT & Pulmonology appointments. May also take Mucinex as needed, drink plenty of water. Follow up if  
    worsening symptoms or concerns. Pt verbalized understanding, agreed with  
    plan. Chronic respiratory failure with hypoxia ([facility] (5/19/2018) Anxiety (7/19/2016) Overview: Last Assessment & Plan:  
    Klonopin dosage verified with pharmacy, SCRIPTS checked, will phone in 1  
    month prescription with 2 refills. HTN (hypertension) (6/6/2018) Overview: Last Assessment & Plan:  
    BP controlled. Continue current medications. Last Assessment & Plan:  
    BP controlled. Continue current medications. Bilateral pneumonia (11/24/2018) Plan:  
 
- continue antibiotics for now  
-continue  oral prednisone -nebs  
-monitor VS 
- decision was made to continue BB despite the use of cocaine after case was discussed with cardiology ( Dr. Loida Díaz). Coreg should be safe to use as she has been on this drug for a while and never has had a major issue ( like hypertensive crises). Losartan added. 
-afib controlled. Continue eliquis   
-patient was advised to quit using cocaine Signed By: Jose Lozano MD   
 November 26, 2018

## 2018-11-27 NOTE — PROGRESS NOTES
Patient repeatedly asking for morphine. Advised patient that she does not have any morphine available. Informed patient of the next available time that she could have her norco. Patient replied with, \"well can I get the cough syrup? \" Advised patient that she could not have that yet either and informed her of the time and that the cough syrup and norco must be administered one hour apart. Patient stated \"I got to have the morphine back. We gonna get that morphine back\". Patient verbalizes understanding of medications available and the times they will be available. Denies further needs. Call light within reach.

## 2018-11-29 LAB
BACTERIA SPEC CULT: NORMAL
BACTERIA SPEC CULT: NORMAL
SERVICE CMNT-IMP: NORMAL
SERVICE CMNT-IMP: NORMAL

## 2018-12-10 ENCOUNTER — APPOINTMENT (OUTPATIENT)
Dept: GENERAL RADIOLOGY | Age: 53
DRG: 133 | End: 2018-12-10
Attending: EMERGENCY MEDICINE
Payer: COMMERCIAL

## 2018-12-10 ENCOUNTER — HOSPITAL ENCOUNTER (INPATIENT)
Age: 53
LOS: 3 days | Discharge: LEFT BEFORE DISCHARGE | DRG: 133 | End: 2018-12-13
Attending: EMERGENCY MEDICINE | Admitting: HOSPITALIST
Payer: COMMERCIAL

## 2018-12-10 ENCOUNTER — APPOINTMENT (OUTPATIENT)
Dept: GENERAL RADIOLOGY | Age: 53
DRG: 133 | End: 2018-12-10
Attending: HOSPITALIST
Payer: COMMERCIAL

## 2018-12-10 DIAGNOSIS — J44.9 CHRONIC OBSTRUCTIVE PULMONARY DISEASE, UNSPECIFIED COPD TYPE (HCC): Chronic | ICD-10-CM

## 2018-12-10 DIAGNOSIS — J96.01 ACUTE RESPIRATORY FAILURE WITH HYPOXIA (HCC): ICD-10-CM

## 2018-12-10 DIAGNOSIS — F19.10 SUBSTANCE ABUSE (HCC): ICD-10-CM

## 2018-12-10 DIAGNOSIS — R06.2 WHEEZING: ICD-10-CM

## 2018-12-10 DIAGNOSIS — F14.11 HX OF COCAINE ABUSE (HCC): ICD-10-CM

## 2018-12-10 DIAGNOSIS — J41.1 BRONCHITIS, CHRONIC, MUCOPURULENT (HCC): ICD-10-CM

## 2018-12-10 DIAGNOSIS — J18.9 PNEUMONIA OF BOTH LUNGS DUE TO INFECTIOUS ORGANISM, UNSPECIFIED PART OF LUNG: ICD-10-CM

## 2018-12-10 DIAGNOSIS — I50.23 ACUTE ON CHRONIC SYSTOLIC HEART FAILURE (HCC): ICD-10-CM

## 2018-12-10 DIAGNOSIS — R06.03 RESPIRATORY DISTRESS: Primary | ICD-10-CM

## 2018-12-10 DIAGNOSIS — I50.22 CHRONIC SYSTOLIC HEART FAILURE (HCC): ICD-10-CM

## 2018-12-10 DIAGNOSIS — R06.03 ACUTE RESPIRATORY DISTRESS: ICD-10-CM

## 2018-12-10 DIAGNOSIS — R09.02 HYPOXIA: ICD-10-CM

## 2018-12-10 DIAGNOSIS — J96.01 ACUTE HYPOXEMIC RESPIRATORY FAILURE (HCC): ICD-10-CM

## 2018-12-10 PROBLEM — R06.00 DYSPNEA: Status: ACTIVE | Noted: 2018-12-10

## 2018-12-10 LAB
ALBUMIN SERPL-MCNC: 3.4 G/DL (ref 3.5–5)
ALBUMIN/GLOB SERPL: 0.7 {RATIO} (ref 1.2–3.5)
ALP SERPL-CCNC: 136 U/L (ref 50–136)
ALT SERPL-CCNC: 42 U/L (ref 12–65)
ANION GAP SERPL CALC-SCNC: 8 MMOL/L (ref 7–16)
ARTERIAL PATENCY WRIST A: YES
AST SERPL-CCNC: 64 U/L (ref 15–37)
ATRIAL RATE: 102 BPM
BASE EXCESS BLD CALC-SCNC: 0 MMOL/L
BASOPHILS # BLD: 0 K/UL (ref 0–0.2)
BASOPHILS NFR BLD: 0 % (ref 0–2)
BDY SITE: NORMAL
BILIRUB SERPL-MCNC: 0.5 MG/DL (ref 0.2–1.1)
BNP SERPL-MCNC: 386 PG/ML
BODY TEMPERATURE: 98.6
BUN SERPL-MCNC: 14 MG/DL (ref 6–23)
CALCIUM SERPL-MCNC: 9.6 MG/DL (ref 8.3–10.4)
CALCULATED P AXIS, ECG09: 55 DEGREES
CALCULATED R AXIS, ECG10: 30 DEGREES
CALCULATED T AXIS, ECG11: 19 DEGREES
CHLORIDE SERPL-SCNC: 106 MMOL/L (ref 98–107)
CO2 BLD-SCNC: 26 MMOL/L
CO2 SERPL-SCNC: 24 MMOL/L (ref 21–32)
COLLECT TIME,HTIME: 1828
CREAT SERPL-MCNC: 1.12 MG/DL (ref 0.6–1)
CRP SERPL HS-MCNC: 80.2 MG/L
DIAGNOSIS, 93000: NORMAL
DIFFERENTIAL METHOD BLD: ABNORMAL
EOSINOPHIL # BLD: 0 K/UL (ref 0–0.8)
EOSINOPHIL NFR BLD: 0 % (ref 0.5–7.8)
ERYTHROCYTE [DISTWIDTH] IN BLOOD BY AUTOMATED COUNT: 14.6 % (ref 11.9–14.6)
FLOW RATE ISTAT,IFRATE: 60 L/MIN
GAS FLOW.O2 O2 DELIVERY SYS: NORMAL L/MIN
GLOBULIN SER CALC-MCNC: 5 G/DL (ref 2.3–3.5)
GLUCOSE SERPL-MCNC: 105 MG/DL (ref 65–100)
HCO3 BLD-SCNC: 24.6 MMOL/L (ref 22–26)
HCT VFR BLD AUTO: 32.7 % (ref 35.8–46.3)
HGB BLD-MCNC: 9.9 G/DL (ref 11.7–15.4)
IMM GRANULOCYTES # BLD: 0.1 K/UL (ref 0–0.5)
IMM GRANULOCYTES NFR BLD AUTO: 0 % (ref 0–5)
LACTATE BLD-SCNC: 1.55 MMOL/L (ref 0.5–1.9)
LYMPHOCYTES # BLD: 1.1 K/UL (ref 0.5–4.6)
LYMPHOCYTES NFR BLD: 7 % (ref 13–44)
MAGNESIUM SERPL-MCNC: 2.6 MG/DL (ref 1.8–2.4)
MCH RBC QN AUTO: 28.9 PG (ref 26.1–32.9)
MCHC RBC AUTO-ENTMCNC: 30.3 G/DL (ref 31.4–35)
MCV RBC AUTO: 95.6 FL (ref 79.6–97.8)
MONOCYTES # BLD: 0.8 K/UL (ref 0.1–1.3)
MONOCYTES NFR BLD: 5 % (ref 4–12)
NEUTS SEG # BLD: 13.8 K/UL (ref 1.7–8.2)
NEUTS SEG NFR BLD: 87 % (ref 43–78)
NRBC # BLD: 0.03 K/UL (ref 0–0.2)
O2/TOTAL GAS SETTING VFR VENT: 100 %
P-R INTERVAL, ECG05: 120 MS
PCO2 BLD: 41.2 MMHG (ref 35–45)
PH BLD: 7.38 [PH] (ref 7.35–7.45)
PLATELET # BLD AUTO: 462 K/UL (ref 150–450)
PMV BLD AUTO: 9.8 FL (ref 9.4–12.3)
PO2 BLD: 97 MMHG (ref 75–100)
POTASSIUM SERPL-SCNC: 4 MMOL/L (ref 3.5–5.1)
PROCALCITONIN SERPL-MCNC: 0.2 NG/ML
PROT SERPL-MCNC: 8.4 G/DL (ref 6.3–8.2)
Q-T INTERVAL, ECG07: 350 MS
QRS DURATION, ECG06: 78 MS
QTC CALCULATION (BEZET), ECG08: 456 MS
RBC # BLD AUTO: 3.42 M/UL (ref 4.05–5.2)
SAO2 % BLD: 97 % (ref 95–98)
SERVICE CMNT-IMP: NORMAL
SERVICE CMNT-IMP: NORMAL
SODIUM SERPL-SCNC: 138 MMOL/L (ref 136–145)
SPECIMEN TYPE: NORMAL
TROPONIN I BLD-MCNC: 0 NG/ML (ref 0.02–0.05)
TROPONIN I SERPL-MCNC: <0.02 NG/ML (ref 0.02–0.05)
VENTRICULAR RATE, ECG03: 102 BPM
WBC # BLD AUTO: 15.8 K/UL (ref 4.3–11.1)

## 2018-12-10 PROCEDURE — 74011250636 HC RX REV CODE- 250/636: Performed by: EMERGENCY MEDICINE

## 2018-12-10 PROCEDURE — 99253 IP/OBS CNSLTJ NEW/EST LOW 45: CPT | Performed by: INTERNAL MEDICINE

## 2018-12-10 PROCEDURE — 83880 ASSAY OF NATRIURETIC PEPTIDE: CPT

## 2018-12-10 PROCEDURE — 82803 BLOOD GASES ANY COMBINATION: CPT

## 2018-12-10 PROCEDURE — 74011250637 HC RX REV CODE- 250/637: Performed by: EMERGENCY MEDICINE

## 2018-12-10 PROCEDURE — 96365 THER/PROPH/DIAG IV INF INIT: CPT | Performed by: EMERGENCY MEDICINE

## 2018-12-10 PROCEDURE — 94640 AIRWAY INHALATION TREATMENT: CPT

## 2018-12-10 PROCEDURE — 74011000250 HC RX REV CODE- 250: Performed by: HOSPITALIST

## 2018-12-10 PROCEDURE — 65660000000 HC RM CCU STEPDOWN

## 2018-12-10 PROCEDURE — 87205 SMEAR GRAM STAIN: CPT

## 2018-12-10 PROCEDURE — 84484 ASSAY OF TROPONIN QUANT: CPT

## 2018-12-10 PROCEDURE — 77010033711 HC HIGH FLOW OXYGEN

## 2018-12-10 PROCEDURE — 36415 COLL VENOUS BLD VENIPUNCTURE: CPT

## 2018-12-10 PROCEDURE — 74011000258 HC RX REV CODE- 258: Performed by: HOSPITALIST

## 2018-12-10 PROCEDURE — 80053 COMPREHEN METABOLIC PANEL: CPT

## 2018-12-10 PROCEDURE — 94760 N-INVAS EAR/PLS OXIMETRY 1: CPT

## 2018-12-10 PROCEDURE — 74011250637 HC RX REV CODE- 250/637: Performed by: HOSPITALIST

## 2018-12-10 PROCEDURE — 87641 MR-STAPH DNA AMP PROBE: CPT

## 2018-12-10 PROCEDURE — 93005 ELECTROCARDIOGRAM TRACING: CPT | Performed by: EMERGENCY MEDICINE

## 2018-12-10 PROCEDURE — 77030021668 HC NEB PREFIL KT VYRM -A

## 2018-12-10 PROCEDURE — 83735 ASSAY OF MAGNESIUM: CPT

## 2018-12-10 PROCEDURE — 71045 X-RAY EXAM CHEST 1 VIEW: CPT

## 2018-12-10 PROCEDURE — 74011000250 HC RX REV CODE- 250: Performed by: EMERGENCY MEDICINE

## 2018-12-10 PROCEDURE — 87040 BLOOD CULTURE FOR BACTERIA: CPT

## 2018-12-10 PROCEDURE — 80353 DRUG SCREENING COCAINE: CPT

## 2018-12-10 PROCEDURE — 96375 TX/PRO/DX INJ NEW DRUG ADDON: CPT | Performed by: EMERGENCY MEDICINE

## 2018-12-10 PROCEDURE — 74011250636 HC RX REV CODE- 250/636: Performed by: HOSPITALIST

## 2018-12-10 PROCEDURE — 86141 C-REACTIVE PROTEIN HS: CPT

## 2018-12-10 PROCEDURE — 36600 WITHDRAWAL OF ARTERIAL BLOOD: CPT

## 2018-12-10 PROCEDURE — 99285 EMERGENCY DEPT VISIT HI MDM: CPT | Performed by: EMERGENCY MEDICINE

## 2018-12-10 PROCEDURE — 85025 COMPLETE CBC W/AUTO DIFF WBC: CPT

## 2018-12-10 PROCEDURE — 83605 ASSAY OF LACTIC ACID: CPT

## 2018-12-10 PROCEDURE — 84145 PROCALCITONIN (PCT): CPT

## 2018-12-10 RX ORDER — GABAPENTIN 400 MG/1
400 CAPSULE ORAL 3 TIMES DAILY
Status: DISCONTINUED | OUTPATIENT
Start: 2018-12-10 | End: 2018-12-13 | Stop reason: HOSPADM

## 2018-12-10 RX ORDER — PANTOPRAZOLE SODIUM 40 MG/1
40 TABLET, DELAYED RELEASE ORAL
Status: DISCONTINUED | OUTPATIENT
Start: 2018-12-11 | End: 2018-12-13 | Stop reason: HOSPADM

## 2018-12-10 RX ORDER — SODIUM CHLORIDE 0.9 % (FLUSH) 0.9 %
5-10 SYRINGE (ML) INJECTION EVERY 8 HOURS
Status: DISCONTINUED | OUTPATIENT
Start: 2018-12-10 | End: 2018-12-13 | Stop reason: HOSPADM

## 2018-12-10 RX ORDER — OXYBUTYNIN CHLORIDE 5 MG/1
5 TABLET ORAL 2 TIMES DAILY
Status: DISCONTINUED | OUTPATIENT
Start: 2018-12-10 | End: 2018-12-13 | Stop reason: HOSPADM

## 2018-12-10 RX ORDER — CLONAZEPAM 0.5 MG/1
0.5 TABLET ORAL 3 TIMES DAILY
Status: DISCONTINUED | OUTPATIENT
Start: 2018-12-10 | End: 2018-12-13

## 2018-12-10 RX ORDER — PROMETHAZINE HYDROCHLORIDE 25 MG/1
25 TABLET ORAL
Status: DISCONTINUED | OUTPATIENT
Start: 2018-12-10 | End: 2018-12-13 | Stop reason: HOSPADM

## 2018-12-10 RX ORDER — HYDROCODONE BITARTRATE AND ACETAMINOPHEN 5; 325 MG/1; MG/1
1 TABLET ORAL
Status: DISCONTINUED | OUTPATIENT
Start: 2018-12-10 | End: 2018-12-11

## 2018-12-10 RX ORDER — LIDOCAINE HYDROCHLORIDE 40 MG/ML
3 SOLUTION TOPICAL ONCE
Status: COMPLETED | OUTPATIENT
Start: 2018-12-10 | End: 2018-12-10

## 2018-12-10 RX ORDER — LORAZEPAM 2 MG/ML
1 INJECTION INTRAMUSCULAR
Status: DISCONTINUED | OUTPATIENT
Start: 2018-12-10 | End: 2018-12-11

## 2018-12-10 RX ORDER — GUAIFENESIN 600 MG/1
600 TABLET, EXTENDED RELEASE ORAL EVERY 12 HOURS
Status: DISCONTINUED | OUTPATIENT
Start: 2018-12-10 | End: 2018-12-13

## 2018-12-10 RX ORDER — SPIRONOLACTONE 25 MG/1
25 TABLET ORAL DAILY
Status: DISCONTINUED | OUTPATIENT
Start: 2018-12-11 | End: 2018-12-13 | Stop reason: HOSPADM

## 2018-12-10 RX ORDER — VANCOMYCIN 2 GRAM/500 ML IN 0.9 % SODIUM CHLORIDE INTRAVENOUS
2000 ONCE
Status: COMPLETED | OUTPATIENT
Start: 2018-12-10 | End: 2018-12-11

## 2018-12-10 RX ORDER — FLUOXETINE HYDROCHLORIDE 20 MG/1
20 CAPSULE ORAL DAILY
Status: DISCONTINUED | OUTPATIENT
Start: 2018-12-11 | End: 2018-12-13 | Stop reason: HOSPADM

## 2018-12-10 RX ORDER — FUROSEMIDE 10 MG/ML
40 INJECTION INTRAMUSCULAR; INTRAVENOUS EVERY 12 HOURS
Status: DISCONTINUED | OUTPATIENT
Start: 2018-12-10 | End: 2018-12-12

## 2018-12-10 RX ORDER — NITROGLYCERIN 0.4 MG/1
0.4 TABLET SUBLINGUAL
Status: DISCONTINUED | OUTPATIENT
Start: 2018-12-10 | End: 2018-12-13 | Stop reason: HOSPADM

## 2018-12-10 RX ORDER — CARVEDILOL 25 MG/1
25 TABLET ORAL 2 TIMES DAILY WITH MEALS
Status: DISCONTINUED | OUTPATIENT
Start: 2018-12-10 | End: 2018-12-11

## 2018-12-10 RX ORDER — LEVOFLOXACIN 500 MG/1
500 TABLET, FILM COATED ORAL DAILY
COMMUNITY
Start: 2018-12-08 | End: 2018-12-14

## 2018-12-10 RX ORDER — IPRATROPIUM BROMIDE AND ALBUTEROL SULFATE 2.5; .5 MG/3ML; MG/3ML
3 SOLUTION RESPIRATORY (INHALATION)
Status: DISCONTINUED | OUTPATIENT
Start: 2018-12-10 | End: 2018-12-13 | Stop reason: HOSPADM

## 2018-12-10 RX ORDER — NALOXONE HYDROCHLORIDE 0.4 MG/ML
0.4 INJECTION, SOLUTION INTRAMUSCULAR; INTRAVENOUS; SUBCUTANEOUS AS NEEDED
Status: DISCONTINUED | OUTPATIENT
Start: 2018-12-10 | End: 2018-12-13 | Stop reason: HOSPADM

## 2018-12-10 RX ORDER — DIPHENHYDRAMINE HCL 25 MG
50 CAPSULE ORAL
Status: DISCONTINUED | OUTPATIENT
Start: 2018-12-10 | End: 2018-12-13

## 2018-12-10 RX ORDER — PREDNISONE 20 MG/1
20 TABLET ORAL DAILY
COMMUNITY
Start: 2018-12-08 | End: 2018-12-12

## 2018-12-10 RX ORDER — MORPHINE SULFATE 10 MG/ML
5 INJECTION, SOLUTION INTRAMUSCULAR; INTRAVENOUS
Status: DISCONTINUED | OUTPATIENT
Start: 2018-12-10 | End: 2018-12-11

## 2018-12-10 RX ORDER — ACETAMINOPHEN 500 MG
1000 TABLET ORAL
Status: COMPLETED | OUTPATIENT
Start: 2018-12-10 | End: 2018-12-10

## 2018-12-10 RX ORDER — ACETAMINOPHEN 325 MG/1
650 TABLET ORAL
Status: DISCONTINUED | OUTPATIENT
Start: 2018-12-10 | End: 2018-12-13 | Stop reason: HOSPADM

## 2018-12-10 RX ORDER — MIRTAZAPINE 15 MG/1
15 TABLET, FILM COATED ORAL
Status: DISCONTINUED | OUTPATIENT
Start: 2018-12-10 | End: 2018-12-13 | Stop reason: HOSPADM

## 2018-12-10 RX ORDER — BUDESONIDE 0.5 MG/2ML
500 INHALANT ORAL
Status: DISCONTINUED | OUTPATIENT
Start: 2018-12-10 | End: 2018-12-13 | Stop reason: HOSPADM

## 2018-12-10 RX ORDER — MAGNESIUM SULFATE HEPTAHYDRATE 40 MG/ML
2 INJECTION, SOLUTION INTRAVENOUS
Status: COMPLETED | OUTPATIENT
Start: 2018-12-10 | End: 2018-12-10

## 2018-12-10 RX ORDER — IBUPROFEN 200 MG
1 TABLET ORAL EVERY 24 HOURS
Status: DISCONTINUED | OUTPATIENT
Start: 2018-12-10 | End: 2018-12-13 | Stop reason: HOSPADM

## 2018-12-10 RX ORDER — FUROSEMIDE 10 MG/ML
40 INJECTION INTRAMUSCULAR; INTRAVENOUS 2 TIMES DAILY
Status: DISCONTINUED | OUTPATIENT
Start: 2018-12-10 | End: 2018-12-10 | Stop reason: SDUPTHER

## 2018-12-10 RX ORDER — PRAVASTATIN SODIUM 20 MG/1
40 TABLET ORAL
Status: DISCONTINUED | OUTPATIENT
Start: 2018-12-10 | End: 2018-12-13 | Stop reason: HOSPADM

## 2018-12-10 RX ORDER — SODIUM CHLORIDE 0.9 % (FLUSH) 0.9 %
5-10 SYRINGE (ML) INJECTION AS NEEDED
Status: DISCONTINUED | OUTPATIENT
Start: 2018-12-10 | End: 2018-12-13 | Stop reason: HOSPADM

## 2018-12-10 RX ORDER — FUROSEMIDE 10 MG/ML
40 INJECTION INTRAMUSCULAR; INTRAVENOUS
Status: COMPLETED | OUTPATIENT
Start: 2018-12-10 | End: 2018-12-10

## 2018-12-10 RX ORDER — ESTRADIOL 1 MG/1
0.5 TABLET ORAL DAILY
Status: DISCONTINUED | OUTPATIENT
Start: 2018-12-11 | End: 2018-12-13 | Stop reason: HOSPADM

## 2018-12-10 RX ADMIN — MIRTAZAPINE 15 MG: 15 TABLET, FILM COATED ORAL at 21:38

## 2018-12-10 RX ADMIN — BUDESONIDE 500 MCG: 0.5 INHALANT RESPIRATORY (INHALATION) at 19:38

## 2018-12-10 RX ADMIN — METHYLPREDNISOLONE SODIUM SUCCINATE 125 MG: 125 INJECTION, POWDER, FOR SOLUTION INTRAMUSCULAR; INTRAVENOUS at 11:35

## 2018-12-10 RX ADMIN — FUROSEMIDE 40 MG: 10 INJECTION, SOLUTION INTRAVENOUS at 21:39

## 2018-12-10 RX ADMIN — ACETAMINOPHEN 1000 MG: 500 TABLET, FILM COATED ORAL at 14:04

## 2018-12-10 RX ADMIN — APIXABAN 5 MG: 5 TABLET, FILM COATED ORAL at 21:37

## 2018-12-10 RX ADMIN — AZITHROMYCIN MONOHYDRATE 500 MG: 500 INJECTION, POWDER, LYOPHILIZED, FOR SOLUTION INTRAVENOUS at 18:56

## 2018-12-10 RX ADMIN — METHYLPREDNISOLONE SODIUM SUCCINATE 40 MG: 40 INJECTION, POWDER, FOR SOLUTION INTRAMUSCULAR; INTRAVENOUS at 21:38

## 2018-12-10 RX ADMIN — FUROSEMIDE 40 MG: 10 INJECTION, SOLUTION INTRAMUSCULAR; INTRAVENOUS at 12:24

## 2018-12-10 RX ADMIN — CLONAZEPAM 0.5 MG: 0.5 TABLET ORAL at 21:37

## 2018-12-10 RX ADMIN — VANCOMYCIN HYDROCHLORIDE 2000 MG: 10 INJECTION, POWDER, LYOPHILIZED, FOR SOLUTION INTRAVENOUS at 21:39

## 2018-12-10 RX ADMIN — IPRATROPIUM BROMIDE AND ALBUTEROL SULFATE 3 ML: .5; 3 SOLUTION RESPIRATORY (INHALATION) at 19:38

## 2018-12-10 RX ADMIN — Medication 10 ML: at 17:16

## 2018-12-10 RX ADMIN — ACETAMINOPHEN 650 MG: 325 TABLET, FILM COATED ORAL at 19:22

## 2018-12-10 RX ADMIN — CARVEDILOL 25 MG: 25 TABLET, FILM COATED ORAL at 17:11

## 2018-12-10 RX ADMIN — GABAPENTIN 400 MG: 400 CAPSULE ORAL at 21:37

## 2018-12-10 RX ADMIN — GUAIFENESIN 600 MG: 600 TABLET, EXTENDED RELEASE ORAL at 21:38

## 2018-12-10 RX ADMIN — OXYBUTYNIN CHLORIDE 5 MG: 5 TABLET ORAL at 17:11

## 2018-12-10 RX ADMIN — MORPHINE SULFATE 5 MG: 10 INJECTION INTRAVENOUS at 21:50

## 2018-12-10 RX ADMIN — CEFTRIAXONE SODIUM 1 G: 1 INJECTION, POWDER, FOR SOLUTION INTRAMUSCULAR; INTRAVENOUS at 17:51

## 2018-12-10 RX ADMIN — MORPHINE SULFATE 5 MG: 10 INJECTION INTRAVENOUS at 17:11

## 2018-12-10 RX ADMIN — MAGNESIUM SULFATE HEPTAHYDRATE 2 G: 40 INJECTION, SOLUTION INTRAVENOUS at 11:35

## 2018-12-10 RX ADMIN — LIDOCAINE HYDROCHLORIDE 3 ML: 40 SOLUTION TOPICAL at 12:23

## 2018-12-10 RX ADMIN — PRAVASTATIN SODIUM 40 MG: 20 TABLET ORAL at 22:57

## 2018-12-10 RX ADMIN — Medication 10 ML: at 21:39

## 2018-12-10 NOTE — CONSULTS
CONSULT NOTE    Elder Prasad Rai Pro    12/10/2018    Date of Admission:  12/10/2018    The patient's chart is reviewed and the patient is discussed with the staff. Subjective:     Patient is a 48 y.o.  female seen and evaluated at the request of Dr. Gilford Rosella. She presents with dyspnea, hypoxemia and cough x several days, but acutely worsened this morning at 0600. She has history of systolic heart failure with EF 35-40% on CHRISTI 10/22/18, COPD, NSTEMI induced by Cocaine abuse in 2017, DVT/PE on Eliquis, and COPD on 3 liters chronic O2 who reports no cocaine use in several weeks (priro to hospitalization 11/24/2018). She denies fevers, she has noted some scant hemoptysis in sputum. She was felt to have cocaine induced pneumonitis during presentation 11/2018 treated with steroids and abx. She reportedly was asking for pain medications for left shoulder/back pain repeatedly in the ED, but did not ask me for pain meds today. States she hasn't had a cigarette in 10 days. She states she had a similar illness about 1 year ago and felt about the same. Review of Systems  A comprehensive review of systems was negative except for that written in the HPI.     Patient Active Problem List   Diagnosis Code    Esophageal reflux K21.9    Rheumatic disease of mitral valve I05.9    Degenerative disc disease, lumbar M51.36    Interstitial cystitis N30.10    Cocaine abuse ([address] F14.10    Rheumatic aortic insufficiency I06.1    Palpitations R00.2    Bronchitis, chronic, mucopurulent ([address] J41.1    Takotsubo cardiomyopathy I51.81    Carotidynia G90.01    Nicotine dependence F17.200    Hx of cocaine abuse Z87.898    Chronic respiratory failure ([address] J96.10    Depression, recurrent ([address] F33.9    DVT, recurrent, lower extremity, chronic, bilateral ([address] I82.503    Anemia D64.9    COPD (chronic obstructive pulmonary disease) ([address] J44.9    Chronic respiratory failure with hypoxia ([facility] J96.11    Hyperlipidemia G46.6    Diastolic dysfunction X59.8    Fall W19. XXXA    Left shoulder pain M25.512    Abnormal nipple N64.9    Anxiety F41.9    Aortic valve regurgitation I35.1    Erwin's esophagus without dysplasia K22.70    Breast lesion N64.9    Breast pain, left N64.4    Chronic low back pain M54.5, G89.29    Cocaine-induced vascular disorder (HCC) F14.988, I99.9    Colon polyps K63.5    DNR (do not resuscitate) Z66    Esophageal dysphagia R13.10    Healthcare maintenance Z00.00    History of DVT (deep vein thrombosis) Z86.718    History of tobacco use, presenting hazards to health Z87.891    HTN (hypertension) I10    Increased risk of breast cancer Z91.89    Intraductal papilloma of left breast D24.2    Irritable bowel syndrome without diarrhea K58.9    Lung nodule R91.1    Migraine with aura and without status migrainosus, not intractable G43. 109    Mild intermittent asthma with (acute) exacerbation J45.21    Mitral valve regurgitation I34.0    NSTEMI (non-ST elevated myocardial infarction) ([facility]) I21.4    Nasal polyps J33.9    Oral phase dysphagia R13.11    Post-operative state Z98.890    Reflux esophagitis K21.0    Right leg pain M79.604    Status post hysterectomy Z90.710    Surgical menopause E89.40    Swelling of both lower extremities M79.89    Vaginal discharge N89.8    Vasomotor symptoms due to menopause N95.1    Visit for screening mammogram Z12.31    Acute on chronic systolic heart failure (HCC) I50.23    Bilateral pneumonia J18.9    Dyspnea R06.00    Acute respiratory distress R06.03    Pneumonia J18.9    Substance abuse ([facility] F19.10     Prior to Admission Medications   Prescriptions Last Dose Informant Patient Reported? Taking? EPINEPHrine (EPIPEN JR) 0.15 mg/0.3 mL injection   Yes No   Si.15 mg by IntraMUSCular route once as needed. FLUoxetine (PROZAC) 20 mg capsule   Yes No   Sig: Take 1 Cap by mouth daily. HYDROcodone-homatropine (HYCODAN) 5-1.5 mg/5 mL (5 mL) syrup   No No   Sig: Take 5 mL by mouth four (4) times daily. Max Daily Amount: 20 mL. Nebulizer Accessories kit   No No   Sig: Use tid   Oxygen   Yes No   Sig: nightly. Indications: 3l/min at hs and prn during the day   albuterol (PROVENTIL HFA, VENTOLIN HFA, PROAIR HFA) 90 mcg/actuation inhaler   No No   Sig: Take 2 Puffs by inhalation every six (6) hours as needed for Wheezing. ammonium lactate (LAC-HYDRIN) 12 % lotion   Yes No   Sig: Apply  to affected area as needed. rub in to affected area well   apixaban (ELIQUIS) 5 mg tablet   No No   Sig: Take 1 Tab by mouth two (2) times a day. budesonide-formoterol (SYMBICORT) 160-4.5 mcg/actuation HFA inhaler   Yes No   Sig: Take 1 Puff by inhalation two (2) times a day. Indications: BRONCHOSPASM PREVENTION WITH COPD, use on the dos   carvedilol (COREG) 25 mg tablet   No No   Sig: Take 1 Tab by mouth two (2) times daily (with meals). clonazePAM (KLONOPIN) 0.5 mg tablet   No No   Sig: TAKE 1 TABLET BY MOUTH 3 TIMES A DAY. MAX 3/DAY   diphenhydrAMINE (BANOPHEN) 50 mg capsule   Yes No   Sig: Take 50 mg by mouth every six (6) hours as needed. estradiol (ESTRACE) 0.5 mg tablet   No No   Sig: Take 1 Tab by mouth daily. Can d/c the estradiol patches   gabapentin (NEURONTIN) 400 mg capsule   No No   Sig: Take 1 Cap by mouth three (3) times daily. Indications: take on the dos   Patient taking differently: Take 400 mg by mouth three (3) times daily. hydrOXYzine pamoate (VISTARIL) 25 mg capsule   Yes No   Sig: Take 1 Cap by mouth two (2) times daily as needed. mirtazapine (REMERON) 15 mg tablet   No No   Sig: Take 1 Tab by mouth nightly. nitroglycerin (NITROSTAT) 0.4 mg SL tablet   No No   Si Tab by SubLINGual route every five (5) minutes as needed for Chest Pain. oxybutynin (DITROPAN) 5 mg tablet   Yes No   Sig: Take 5 mg by mouth two (2) times a day.    pantoprazole (PROTONIX) 40 mg tablet   No No Sig: Take 1 Tab by mouth daily. Indications: morning   pravastatin (PRAVACHOL) 40 mg tablet   No No   Sig: Take 1 Tab by mouth nightly. predniSONE (STERAPRED DS) 10 mg dose pack   No No   Sig: Please take as directed on package. Please clarify any questions with the Pharmacist.   promethazine (PHENERGAN) 25 mg tablet   No No   Sig: Take 1 Tab by mouth every six (6) hours as needed for Nausea. spironolactone (ALDACTONE) 25 mg tablet   No No   Sig: Take 1 Tab by mouth daily. tiotropium (SPIRIVA WITH HANDIHALER) 18 mcg inhalation capsule  Self Yes No   Sig: Take 1 Cap by inhalation daily.  Indications: BRONCHOSPASM PREVENTION WITH COPD      Facility-Administered Medications: None       Past Medical History:   Diagnosis Date    Anemia     blood transfusion 5/2018 per pt    Arrhythmia     palpitations, moderate mitral valve regurge    Arthritis     lower back osteo    Asthma     uses inhalers    Cardiomyopathy     ECHO 11/2017    CHF (congestive heart failure) ([address]     11/2017 Echo  LVEF 45-50%    Chronic pain 2010    Coagulation defect ([facility]     eliquis    Cocaine use     Reports last use 5/2018 per patient    COPD     nebulizer daily and maint inhalers, can not climb 1 flight of stairs (also CHF)  oxygen 3 LPM qhs    Decreased cardiac ejection fraction 11/27/2017    EF 45-50% per echo 11/27/17    Depression     controlled      Diverticulitis     GERD (gastroesophageal reflux disease)     uncontrolled with daily meds-- comes and goes- 3 pillows    Heart murmur     Hypertension     managed with meds    IBS (irritable bowel syndrome)     IC (interstitial cystitis)     Insomnia     Migraine with aura and without status migrainosus, not intractable 6/17/2016    Mitral valve regurgitation, rheumatic     followed Dr. Rhona Byrd Palpitations 5/16/2016    Psychiatric disorder     anxiety    Requires oxygen therapy     3l/min at hs. prn during the days as needed    Rheumatic aortic insufficiency 5/16/2016    Rheumatic fever as child    pt reports rheumatic fever in childhood    Smoker     started age 23-- smoked 0.5 ppd until 2018-- cut back to 10-2 cig daily per pt    Stroke [facility])     \"mild stroke\" pt reports 2014- \"left sided weakness and balance is off\"     Thromboembolus ([address]     BLE 2012    Tobacco abuse disorder 5/16/2016    Vitamin D deficiency      Past Surgical History:   Procedure Laterality Date    BIOPSY OF BREAST, INCISIONAL Left     lymph node , left, patient states her bx neg, but high risk    COLONOSCOPY      8 polyps removed, diverticulitis    COLONOSCOPY N/A 5/21/2018    COLONOSCOPY performed by Brandin Randle MD at [address]  8-23-11    bladder dilitation    EGD      HX APPENDECTOMY  2006    HX HEART CATHETERIZATION  5/27/2011    no blockages, no intervention    HX HEART CATHETERIZATION  04/2017    no intervention    HX LAP CHOLECYSTECTOMY  6/6/2011    HX NATASHA AND BSO  2004    fibroid tumors, hyst    HX UROLOGICAL  01/2018    cystoscopy with hydrodistention of bladder     Social History     Socioeconomic History    Marital status: LEGALLY      Spouse name: Not on file    Number of children: Not on file    Years of education: Not on file    Highest education level: Not on file   Social Needs    Financial resource strain: Not on file    Food insecurity - worry: Not on file    Food insecurity - inability: Not on file   [employer] needs - medical: Not on file   [employer] needs - non-medical: Not on file   Occupational History    Not on file   Tobacco Use    Smoking status: Current Every Day Smoker     Packs/day: 0.25     Years: 27.00     Pack years: 6.75    Smokeless tobacco: Never Used    Tobacco comment: She hasn't had one in a few days.  10/18/18KH   Substance and Sexual Activity    Alcohol use: No    Drug use: Yes     Types: Cocaine     Comment: Last use 5/2018    Sexual activity: Not on file   Other Topics Concern  Not on file   Social History Narrative    Not on file     Family History   Problem Relation Age of Onset    Heart Failure Father 76        chf    Heart Disease Father     Diabetes Sister     Thyroid Disease Sister      Allergies   Allergen Reactions    Aspirin Other (comments)     Inflames IBS per pt    Bentyl [Dicyclomine] Itching    Toradol [Ketorolac] Hives    Ultram [Tramadol] Nausea and Vomiting    Zofran [Ondansetron Hcl (Pf)] Nausea and Vomiting       Current Facility-Administered Medications   Medication Dose Route Frequency    apixaban (ELIQUIS) tablet 5 mg  5 mg Oral BID    carvedilol (COREG) tablet 25 mg  25 mg Oral BID WITH MEALS    clonazePAM (KlonoPIN) tablet 0.5 mg  0.5 mg Oral TID    diphenhydrAMINE (BENADRYL) capsule 50 mg  50 mg Oral Q6H PRN    [START ON 12/11/2018] estradiol (ESTRACE) tablet 0.5 mg  0.5 mg Oral DAILY    [START ON 12/11/2018] FLUoxetine (PROzac) capsule 20 mg  20 mg Oral DAILY    gabapentin (NEURONTIN) capsule 400 mg  400 mg Oral TID    mirtazapine (REMERON) tablet 15 mg  15 mg Oral QHS    nitroglycerin (NITROSTAT) tablet 0.4 mg  0.4 mg SubLINGual Q5MIN PRN    oxybutynin (DITROPAN) tablet 5 mg  5 mg Oral BID    [START ON 12/11/2018] pantoprazole (PROTONIX) tablet 40 mg  40 mg Oral ACB    pravastatin (PRAVACHOL) tablet 40 mg  40 mg Oral QHS    promethazine (PHENERGAN) tablet 25 mg  25 mg Oral Q6H PRN    [START ON 12/11/2018] spironolactone (ALDACTONE) tablet 25 mg  25 mg Oral DAILY    sodium chloride (NS) flush 5-10 mL  5-10 mL IntraVENous Q8H    sodium chloride (NS) flush 5-10 mL  5-10 mL IntraVENous PRN    azithromycin (ZITHROMAX) 500 mg in 0.9% sodium chloride (MBP/ADV) 250 mL  500 mg IntraVENous Q24H    cefTRIAXone (ROCEPHIN) 1 g in 0.9% sodium chloride (MBP/ADV) 50 mL  1 g IntraVENous Q24H    acetaminophen (TYLENOL) tablet 650 mg  650 mg Oral Q4H PRN    HYDROcodone-acetaminophen (NORCO) 5-325 mg per tablet 1 Tab  1 Tab Oral Q4H PRN    morphine 10 mg/ml injection 5 mg  5 mg IntraVENous Q4H PRN    naloxone (NARCAN) injection 0.4 mg  0.4 mg IntraVENous PRN    methylPREDNISolone (PF) (Solu-MEDROL) injection 40 mg  40 mg IntraVENous Q12H    LORazepam (ATIVAN) injection 1 mg  1 mg IntraVENous Q6H PRN    nicotine (NICODERM CQ) 14 mg/24 hr patch 1 Patch  1 Patch TransDERmal Q24H    albuterol-ipratropium (DUO-NEB) 2.5 MG-0.5 MG/3 ML  3 mL Nebulization Q4HWA RT    furosemide (LASIX) injection 40 mg  40 mg IntraVENous Q12H    guaiFENesin ER (MUCINEX) tablet 600 mg  600 mg Oral Q12H    budesonide (PULMICORT) 500 mcg/2 ml nebulizer suspension  500 mcg Nebulization BID RT    vancomycin (VANCOCIN) 2000 mg in  ml infusion  2,000 mg IntraVENous ONCE     Objective:     Vitals:    12/10/18 1431 12/10/18 1516 12/10/18 1644 12/10/18 1656   BP: 149/81 136/64 134/80    Pulse: 89 99 92    Resp:   28    Temp:   98.7 °F (37.1 °C)    SpO2: 100% 99%     Weight:    189 lb 12.8 oz (86.1 kg)       PHYSICAL EXAM     Constitutional:  the patient is well developed and in no acute distress  EENMT:  Sclera clear, pupils equal, oral mucosa moist  Respiratory: Optiflow 99%; minimal wheeze, rales  Cardiovascular:  Regular, borderline tachy  Gastrointestinal: soft and non-tender; with positive bowel sounds. Musculoskeletal: warm without cyanosis. There is no lower leg edema. Skin:  no jaundice or rashes, no wounds   Neurologic: no gross neuro deficits     Psychiatric:  alert and oriented x 4, pressured speech    CXR:  Bilateral infiltrates, no significant effusions      Recent Labs     12/10/18  1142   WBC 15.8*   HGB 9.9*   HCT 32.7*   *     Recent Labs     12/10/18  1142      K 4.0      *   CO2 24   BUN 14   CREA 1.12*   CA 9.6   ALB 3.4*   TBILI 0.5   ALT 42   SGOT 64*     No results for input(s): PH, PCO2, PO2, HCO3 in the last 72 hours. No results for input(s): LCAD, LAC in the last 72 hours.     Assessment:  (Medical Decision Making)     Hospital Problems  Date Reviewed: 9/18/2018          Codes Class Noted POA    * (Principal) Dyspnea ICD-10-CM: R06.00  ICD-9-CM: 786.09  12/10/2018 Unknown        Acute respiratory distress ICD-10-CM: R06.03  ICD-9-CM: 518.82  12/10/2018 Unknown        Pneumonia ICD-10-CM: J18.9  ICD-9-CM: 008  12/10/2018 Unknown        Substance abuse ([facility] ICD-10-CM: F19.10  ICD-9-CM: 305.90  12/10/2018 Unknown        Acute on chronic systolic heart failure ([facility] ICD-10-CM: I50.23  ICD-9-CM: 428.23  10/18/2018 Unknown        HTN (hypertension) ICD-10-CM: I10  ICD-9-CM: 401.9  6/6/2018 Yes    Overview Addendum 7/16/2018  9:53 AM by Brittni Moreno LPN     Last Assessment & Plan:   BP controlled. Continue current medications. Last Assessment & Plan:   BP controlled. Continue current medications. Anemia ICD-10-CM: D64.9  ICD-9-CM: 285.9  5/19/2018 Yes        COPD (chronic obstructive pulmonary disease) ([facility] (Chronic) ICD-10-CM: J44.9  ICD-9-CM: 925  5/19/2018 Yes    Overview Addendum 7/16/2018  9:53 AM by Brittni Moreno LPN     Last Assessment & Plan:   Lungs clear, normal chest x-ray yesterday. Continue inhalers, Singulair, & Flonase. Take Medrol dose pack as prescribed, discussed adverse effects. Stressed importance of keeping ENT & Pulmonology appointments. May also take Mucinex as needed, drink plenty of water. Follow up if worsening symptoms or concerns. Pt verbalized understanding, agreed with plan. Last Assessment & Plan:   Lungs clear, normal chest x-ray yesterday. Continue inhalers, Singulair, & Flonase. Take Medrol dose pack as prescribed, discussed adverse effects. Stressed importance of keeping ENT & Pulmonology appointments. May also take Mucinex as needed, drink plenty of water. Follow up if worsening symptoms or concerns. Pt verbalized understanding, agreed with plan.              Hyperlipidemia (Chronic) ICD-10-CM: E78.5  ICD-9-CM: 272.4  5/19/2018 Yes        Hx of cocaine abuse ICD-10-CM: P43.841  ICD-9-CM: 305.63  11/28/2017 Yes            49 y/o female with MMP, h/o drug abuse with acute hypoxemic respiratory failure on Optiflow. High risk for decompensation. Agree with hospitalist current differential and plan. Plan:  (Medical Decision Making)     --agree with broad spectrum antibiotics for HAP coverage  --agree with IV steroids for AECOPD vs cocaine pneumonitis  --agree with checking UDS and cocaine studies  --agree with diuresis (received 1st dose at noon in ED); 40mg IV Q12 (next dose 2100)  --On Eliquis and doubt VTE (also with abnormal CXR which makes less likely)  --Would check ABG to rule out hypercarbic respiratory failure  --high risk for decompensation and need for NIPPV or intubation    Will follow with you. More than 50% of the time documented was spent in face-to-face contact with the patient and in the care of the patient on the floor/unit where the patient is located. Thank you very much for this referral.  We appreciate the opportunity to participate in this patient's care. Will follow along with above stated plan.     Anh Segovia MD

## 2018-12-10 NOTE — ED NOTES
Pt arrives hyperventilating, hx of bronchitis, O2 dependent at night only.  Initial RA sat 55%, placed on NRB

## 2018-12-10 NOTE — H&P
History and Physical 
 
Subjective:  
 
Mali Vincent is a 48 y.o.  female who presents with dyspnea with hypoxia and left shoulder pain. Pt has a PMHx of systolic heart failure with EF 35-40% on CHRISTI 10/22/18, COPD, NSTEMI induced by Cocaine abuse in 2017, DVT/PE on Eliquis, and COPD on 3 liters chronic O2 who presents to the ER with c/o cough and increasing dyspnea over the last week. Pt states she developed cough with yellow sputum about 1 week ago. She denies associated fever. States her dyspnea progessed with drop in O2 saturations today so she decided to seek medical attention. 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 Pt describes achy left upper shoulder chest pain w/o further radiation. She is begging for pain meds the moment I entered the room though appeared more relaxed on her phone just moments before. States she stopped using drugs and stopped smoking about 3 weeks ago. Currently on AIRVO. Past Medical History:  
Diagnosis Date  Anemia   
 blood transfusion 5/2018 per pt  Arrhythmia   
 palpitations, moderate mitral valve regurge  Arthritis   
 lower back osteo  Asthma   
 uses inhalers  Cardiomyopathy ECHO 11/2017  CHF (congestive heart failure) ([address]   
 11/2017 Echo  LVEF 45-50%  Chronic pain 2010  Coagulation defect ([facility]   
 eliquis  Cocaine use Reports last use 5/2018 per patient  COPD   
 nebulizer daily and maint inhalers, can not climb 1 flight of stairs (also CHF)  oxygen 3 LPM qhs  Decreased cardiac ejection fraction 11/27/2017 EF 45-50% per echo 11/27/17  Depression   
 controlled  Diverticulitis  GERD (gastroesophageal reflux disease)   
 uncontrolled with daily meds-- comes and goes- 3 pillows  Heart murmur  Hypertension   
 managed with meds  IBS (irritable bowel syndrome)  IC (interstitial cystitis)  Insomnia  Migraine with aura and without status migrainosus, not intractable 6/17/2016  Mitral valve regurgitation, rheumatic   
 followed Dr. Martin Knott  Palpitations 5/16/2016  Psychiatric disorder   
 anxiety  Requires oxygen therapy 3l/min at hs. prn during the days as needed  Rheumatic aortic insufficiency 5/16/2016  Rheumatic fever as child  
 pt reports rheumatic fever in childhood  Smoker   
 started age 23-- smoked 0.5 ppd until 2018-- cut back to 10-2 cig daily per pt  Stroke ([address] \"mild stroke\" pt reports 2014- \"left sided weakness and balance is off\"  Thromboembolus ([address] BLE 2012  Tobacco abuse disorder 5/16/2016  Vitamin D deficiency Past Surgical History:  
Procedure Laterality Date  BIOPSY OF BREAST, INCISIONAL Left   
 lymph node , left, patient states her bx neg, but high risk  COLONOSCOPY    
 8 polyps removed, diverticulitis  COLONOSCOPY N/A 5/21/2018 COLONOSCOPY performed by Louise Forbes MD at [facility] ENDOSCOPY  CYSTOSCOPY  8-23-11  
 bladder dilitation  EGD  HX APPENDECTOMY  2006  HX HEART CATHETERIZATION  5/27/2011  
 no blockages, no intervention  HX HEART CATHETERIZATION  04/2017  
 no intervention  HX LAP CHOLECYSTECTOMY  6/6/2011  HX NATASHA AND BSO  2004  
 fibroid tumors, hyst  
 HX UROLOGICAL  01/2018  
 cystoscopy with hydrodistention of bladder Family History Problem Relation Age of Onset  Heart Failure Father 76 chf  
 Heart Disease Father  Diabetes Sister  Thyroid Disease Sister Social History Tobacco Use  Smoking status: Current Every Day Smoker Packs/day: 0.25 Years: 27.00 Pack years: 6.75  Smokeless tobacco: Never Used  Tobacco comment: She hasn't had one in a few days. 10/18/18KH Substance Use Topics  Alcohol use: No  
   
Prior to Admission medications Medication Sig Start Date End Date Taking? Authorizing Provider  
predniSONE (STERAPRED DS) 10 mg dose pack Please take as directed on package. Please clarify any questions with the Pharmacist. 11/22/18   Margareth Srivastava MD  
HYDROcodone-homatropine (HYCODAN) 5-1.5 mg/5 mL (5 mL) syrup Take 5 mL by mouth four (4) times daily. Max Daily Amount: 20 mL. 11/22/18   Margareth Srivastava MD  
clonazePAM (KLONOPIN) 0.5 mg tablet TAKE 1 TABLET BY MOUTH 3 TIMES A DAY. MAX 3/DAY 11/16/18   Oliva Enriquez MD  
pravastatin (PRAVACHOL) 40 mg tablet Take 1 Tab by mouth nightly. 11/9/18   Shawn Collins MD  
carvedilol (COREG) 25 mg tablet Take 1 Tab by mouth two (2) times daily (with meals). 11/9/18   Shawn Collins MD  
promethazine (PHENERGAN) 25 mg tablet Take 1 Tab by mouth every six (6) hours as needed for Nausea. 11/9/18   Shawn Collins MD  
spironolactone (ALDACTONE) 25 mg tablet Take 1 Tab by mouth daily. 10/5/18   Jeanne Henry MD  
EPINEPHrine (EPIPEN JR) 0.15 mg/0.3 mL injection 0.15 mg by IntraMUSCular route once as needed. Provider, Historical  
mirtazapine (REMERON) 15 mg tablet Take 1 Tab by mouth nightly. 9/4/18   Oliva Enriquez MD  
pantoprazole (PROTONIX) 40 mg tablet Take 1 Tab by mouth daily. Indications: morning 9/4/18   Oliva Enriquez MD  
albuterol (PROVENTIL HFA, VENTOLIN HFA, PROAIR HFA) 90 mcg/actuation inhaler Take 2 Puffs by inhalation every six (6) hours as needed for Wheezing. 8/29/18   Bhavana Gutierrez MD  
FLUoxetine (PROZAC) 20 mg capsule Take 1 Cap by mouth daily. 7/23/18   Provider, Historical  
hydrOXYzine pamoate (VISTARIL) 25 mg capsule Take 1 Cap by mouth two (2) times daily as needed. 7/23/18   Provider, Historical  
estradiol (ESTRACE) 0.5 mg tablet Take 1 Tab by mouth daily. Can d/c the estradiol patches 7/25/18   Oliva Enriquez MD  
Nebulizer Accessories kit Use tid 7/16/18   Oliva Enriquez MD  
[facility]) 5 mg tablet Take 1 Tab by mouth two (2) times a day. 5/17/18   Jewels Henry MD  
gabapentin (NEURONTIN) 400 mg capsule Take 1 Cap by mouth three (3) times daily. Indications: take on the [facility] Patient taking differently: Take 400 mg by mouth three (3) times daily. 5/10/18   Rene Ashley MD  
diphenhydrAMINE (BANOPHEN) 50 mg capsule Take 50 mg by mouth every six (6) hours as needed. Provider, Historical  
nitroglycerin (NITROSTAT) 0.4 mg SL tablet 1 Tab by SubLINGual route every five (5) minutes as needed for Chest Pain. 3/23/18   Dirk Carr MD  
Oxygen nightly. Indications: 3l/min at hs and prn during the day    Provider, Historical  
oxybutynin (DITROPAN) 5 mg tablet Take 5 mg by mouth two (2) times a day. Provider, Historical  
ammonium lactate (LAC-HYDRIN) 12 % lotion Apply  to affected area as needed. rub in to affected area well    Provider, Historical  
budesonide-formoterol (SYMBICORT) 160-4.5 mcg/actuation HFA inhaler Take 1 Puff by inhalation two (2) times a day. Indications: BRONCHOSPASM PREVENTION WITH COPD, use on the dos    Provider, Historical  
tiotropium (SPIRIVA WITH HANDIHALER) 18 mcg inhalation capsule Take 1 Cap by inhalation daily. Indications: BRONCHOSPASM PREVENTION WITH COPD    Other, MD Bin  
 
Allergies Allergen Reactions  Aspirin Other (comments) Inflames IBS per pt  Bentyl [Dicyclomine] Itching  Toradol [Ketorolac] Hives  Ultram [Tramadol] Nausea and Vomiting  Zofran [Ondansetron Hcl (Pf)] Nausea and Vomiting Review of Systems: A comprehensive review of systems was negative except for that written in the History of Present Illness. Objective: Intake and Output:   
No intake/output data recorded. No intake/output data recorded. Patient Vitals for the past 24 hrs: 
 Temp Pulse Resp BP SpO2  
12/10/18 1644 98.7 °F (37.1 °C) 92 28 134/80   
12/10/18 1516  99  136/64 99 % 12/10/18 1431  89  149/81 100 % 12/10/18 1402  97  139/78 97 % 12/10/18 1346  95  136/77 99 % 12/10/18 1332  (!) 105  130/71 91 % 12/10/18 1316  97  144/71 95 % 12/10/18 1303  (!) 106  (!) 146/94 (!) 78 % 12/10/18 1246  99 (!) 59 137/77 92 % 12/10/18 1231  97 29 144/80 95 % 12/10/18 1224  (!) 101  144/82   
12/10/18 1223     91 % 12/10/18 1204     93 % 12/10/18 1203  (!) 103 (!) 64  95 % 12/10/18 1129 97.7 °F (36.5 °C) (!) 104 (!) 36 141/77 (!) 55 % Physical Exam:  
General appearance: alert - even anxious, cooperative, moderate distress, appears stated age Head: Normocephalic, without obvious abnormality, atraumatic Eyes: conjunctivae/corneas clear. PERRL, EOM's intact. Throat: Lips, mucosa, and tongue normal. No teeth and gums normal 
Neck: supple, symmetrical, trachea midline, no adenopathy, thyroid: not enlarged, symmetric, no tenderness/mass/nodules, no carotid bruit and no JVD Lungs: bilateral crackles - moderate, with mild wheeze Heart: regular tachy and rhythm, S1, S2 normal 
Abdomen: soft, non-tender. Bowel sounds normal. No masses,  no organomegaly Extremities: extremities normal, atraumatic, no cyanosis or edema Lymph nodes: Cervical, supraclavicular, and axillary nodes normal. 
Neurologic: Alert and oriented X 3, normal strength and tone. Normal symmetric reflexes. Psych: A+Ox3, anxious Data Review:  
Recent Results (from the past 24 hour(s)) POC TROPONIN-I Collection Time: 12/10/18 11:39 AM  
Result Value Ref Range Troponin-I (POC) 0 (L) 0.02 - 0.05 ng/ml POC LACTIC ACID Collection Time: 12/10/18 11:41 AM  
Result Value Ref Range Lactic Acid (POC) 1.55 0.5 - 1.9 mmol/L  
CBC WITH AUTOMATED DIFF Collection Time: 12/10/18 11:42 AM  
Result Value Ref Range WBC 15.8 (H) 4.3 - 11.1 K/uL  
 RBC 3.42 (L) 4.05 - 5.2 M/uL HGB 9.9 (L) 11.7 - 15.4 g/dL HCT 32.7 (L) 35.8 - 46.3 % MCV 95.6 79.6 - 97.8 FL  
 MCH 28.9 26.1 - 32.9 PG  
 MCHC 30.3 (L) 31.4 - 35.0 g/dL  
 RDW 14.6 11.9 - 14.6 % PLATELET 562 (H) 875 - 450 K/uL MPV 9.8 9.4 - 12.3 FL ABSOLUTE NRBC 0.03 0.0 - 0.2 K/uL  
 DF AUTOMATED NEUTROPHILS 87 (H) 43 - 78 % LYMPHOCYTES 7 (L) 13 - 44 % MONOCYTES 5 4.0 - 12.0 % EOSINOPHILS 0 (L) 0.5 - 7.8 % BASOPHILS 0 0.0 - 2.0 % IMMATURE GRANULOCYTES 0 0.0 - 5.0 %  
 ABS. NEUTROPHILS 13.8 (H) 1.7 - 8.2 K/UL  
 ABS. LYMPHOCYTES 1.1 0.5 - 4.6 K/UL  
 ABS. MONOCYTES 0.8 0.1 - 1.3 K/UL  
 ABS. EOSINOPHILS 0.0 0.0 - 0.8 K/UL  
 ABS. BASOPHILS 0.0 0.0 - 0.2 K/UL  
 ABS. IMM. GRANS. 0.1 0.0 - 0.5 K/UL METABOLIC PANEL, COMPREHENSIVE Collection Time: 12/10/18 11:42 AM  
Result Value Ref Range Sodium 138 136 - 145 mmol/L Potassium 4.0 3.5 - 5.1 mmol/L Chloride 106 98 - 107 mmol/L  
 CO2 24 21 - 32 mmol/L Anion gap 8 7 - 16 mmol/L Glucose 105 (H) 65 - 100 mg/dL BUN 14 6 - 23 MG/DL Creatinine 1.12 (H) 0.6 - 1.0 MG/DL  
 GFR est AA >60 >60 ml/min/1.73m2 GFR est non-AA 55 (L) >60 ml/min/1.73m2 Calcium 9.6 8.3 - 10.4 MG/DL Bilirubin, total 0.5 0.2 - 1.1 MG/DL  
 ALT (SGPT) 42 12 - 65 U/L  
 AST (SGOT) 64 (H) 15 - 37 U/L Alk. phosphatase 136 50 - 136 U/L Protein, total 8.4 (H) 6.3 - 8.2 g/dL Albumin 3.4 (L) 3.5 - 5.0 g/dL Globulin 5.0 (H) 2.3 - 3.5 g/dL A-G Ratio 0.7 (L) 1.2 - 3.5 BNP Collection Time: 12/10/18 11:42 AM  
Result Value Ref Range  pg/mL EKG, 12 LEAD, INITIAL Collection Time: 12/10/18 11:44 AM  
Result Value Ref Range Ventricular Rate 102 BPM  
 Atrial Rate 102 BPM  
 P-R Interval 120 ms QRS Duration 78 ms Q-T Interval 350 ms QTC Calculation (Bezet) 456 ms Calculated P Axis 55 degrees Calculated R Axis 30 degrees Calculated T Axis 19 degrees Diagnosis    
  !! AGE AND GENDER SPECIFIC ECG ANALYSIS !! Sinus tachycardia Biatrial enlargement Anteroseptal infarct (cited on or before 30-NOV-2017) Abnormal ECG When compared with ECG of 24-NOV-2018 05:19, 
Serial changes of Anteroseptal infarct Present Confirmed by Tim Welch MD (), BINA JEFFERY ([id number]) on 12/10/2018 1:42:40 PM 
  
 
 
Chest x-ray . Assessment:  
 
Principal Problem: 
  Dyspnea (12/10/2018) Active Problems: Hx of cocaine abuse (11/28/2017) Acute on chronic systolic heart failure ([facility] (10/18/2018) Acute respiratory distress (12/10/2018) Pneumonia (12/10/2018) [facility]stance abuse ([email or web address] (12/10/2018) Anemia (5/19/2018) COPD (chronic obstructive pulmonary disease) ([email or web address] (5/19/2018) Hyperlipidemia (5/19/2018) HTN (hypertension) (6/6/2018) Plan:  
 
Admit Start abx for HCAP since just recently discharged. Check procalcitonin level. Will ask pulmonary to see and also cardiology to consult due to her significant cardiac hx. Start lasix as cxr and exam suggests volume overload though no significant LE edema. F/up labs in the AM. Resume other home medications as deemed appropriate. Signed By: Rico Hernandez MD   
 December 10, 2018

## 2018-12-10 NOTE — PROGRESS NOTES
Pt arrived to room 812 via transport from the ED at 1620. Pt is stable. Pt is axo. Pt is up ad santiago. Optiflow on, 60 L/min. Pt c/o back pain 8/10. Pt has nonproductive cough. Pt skin is dry, warm and intact. IV site is clean dry and intact. Dual skin assessment performed with Leah SULLIVAN. Safety measures are in place. Will continue to monitor.

## 2018-12-10 NOTE — PROGRESS NOTES
Admission database completed. PTA medication list completed - patient states meds are the same from previous discharge except patient is currently on antibiotic and prednisone from an urgent care on Saturday - meds added to list.  Patient oriented to room and call system, encouraged to call for assistance as needed.

## 2018-12-10 NOTE — CONSULTS
[facility] Cardiology Consult                Date of  Admission: 12/10/2018 11:28 AM     Primary Care Physician: Dr Mary Edmond  Primary Cardiologist: Dr Yelitza Maldonado  Referring Physician: Dr Milana St Physician: Dr Naren Robert    CC/Reason for consult: rachel Honeycutt is a 48 y.o. female seen in the ER for cough. She has a h/o NICM w [facility] to evaluate her AI 10-22-18 showing EF 35-40%, mild-mod AR/MR, NSTEMI 2017 w LHC 4-2017 w minimal coronary irregularities, chronic pain, h/o DVT on eliquis (2012), cocaine use, 1 PPD x 20 years- decreased lately to 1 ppw and quit a week ago, COPD on 3.5L O2 at night, GI bleed 5-2018 w anemia but left AMA before work up complete. She has left AMA in the past.  She was recently hospitalized with cough and SOB w CT showing B pneumonia thought to be cocaine induced pneumonitis but she left AMA 11-26-18 before completing work up. She states she is compliant with a low NA diet and her meds. She has had a cough for months which has been worse for a week associated with increased dyspnea, nasal congestion and a sore throat. This has gotten gradually worse. Saturday she was seen at urgent care where she was placed on prednisone and levaquin but her dyspnea got worse and last night she couldn't get her breath, particularly when laying flat and came to the ER. She reports chills but no fever, chronic L back pain under her L scapula and shoulder pain, which radiates through to her L breast.  She has had dizziness when she stands but no syncope or palpitations. She denies recent cocaine use for \"a long time\" but had UDS + cocaine 11-24-18. She has gained 20 lbs with increased LE edema. She has not been around any sick contacts. In ER O2 sat 55% on RA, was placed on non re-breather and given lasix and solu-medrol. WBC 15.8, hgb 9.9, , K 1.12, , CXR B infiltrates vs pulmonary edema. EKG ST w rate 102 w NSST/T wave changes. /77.        Patient Active Problem List   Diagnosis Code    Esophageal reflux K21.9    Rheumatic disease of mitral valve I05.9    Degenerative disc disease, lumbar M51.36    Interstitial cystitis N30.10    Cocaine abuse ([facility] F14.10    Rheumatic aortic insufficiency I06.1    Palpitations R00.2    Bronchitis, chronic, mucopurulent ([facility] J41.1    Takotsubo cardiomyopathy I51.81    Carotidynia G90.01    Nicotine dependence F17.200    Hx of cocaine abuse Z87.898    Chronic respiratory failure ([facility] J96.10    Depression, recurrent ([facility] F33.9    DVT, recurrent, lower extremity, chronic, bilateral ([facility] I82.503    Anemia D64.9    COPD (chronic obstructive pulmonary disease) ([facility] J44.9    Chronic respiratory failure with hypoxia (HCC) J96.11    Hyperlipidemia O83.4    Diastolic dysfunction U08.3    Fall W19. XXXA    Left shoulder pain M25.512    Abnormal nipple N64.9    Anxiety F41.9    Aortic valve regurgitation I35.1    Erwin's esophagus without dysplasia K22.70    Breast lesion N64.9    Breast pain, left N64.4    Chronic low back pain M54.5, G89.29    Cocaine-induced vascular disorder (HCC) F14.988, I99.9    Colon polyps K63.5    DNR (do not resuscitate) Z66    Esophageal dysphagia R13.10    Healthcare maintenance Z00.00    History of DVT (deep vein thrombosis) Z86.718    History of tobacco use, presenting hazards to health Z87.891    HTN (hypertension) I10    Increased risk of breast cancer Z91.89    Intraductal papilloma of left breast D24.2    Irritable bowel syndrome without diarrhea K58.9    Lung nodule R91.1    Migraine with aura and without status migrainosus, not intractable G43. 109    Mild intermittent asthma with (acute) exacerbation J45.21    Mitral valve regurgitation I34.0    NSTEMI (non-ST elevated myocardial infarction) (HCC) I21.4    Nasal polyps J33.9    Oral phase dysphagia R13.11    Post-operative state Z98.890    Reflux esophagitis K21.0    Right leg pain M79.604    Status post hysterectomy Z90.710    Surgical menopause E89.40    Swelling of both lower extremities M79.89    Vaginal discharge N89.8    Vasomotor symptoms due to menopause N95.1    Visit for screening mammogram Z12.31    Chronic systolic congestive heart failure (HCC) I50.22    Bilateral pneumonia J18.9       Past Medical History:   Diagnosis Date    Anemia     blood transfusion 5/2018 per pt    Arrhythmia     palpitations, moderate mitral valve regurge    Arthritis     lower back osteo    Asthma     uses inhalers    Cardiomyopathy     ECHO 11/2017    CHF (congestive heart failure) ([facility]     11/2017 Echo  LVEF 45-50%    Chronic pain 2010    Coagulation defect ([facility] [address]     eliquis    Cocaine use     Reports last use 5/2018 per patient    COPD     nebulizer daily and maint inhalers, can not climb 1 flight of stairs (also CHF)  oxygen 3 LPM qhs    Decreased cardiac ejection fraction 11/27/2017    EF 45-50% per echo 11/27/17    Depression     controlled      Diverticulitis     GERD (gastroesophageal reflux disease)     uncontrolled with daily meds-- comes and goes- 3 pillows    Heart murmur     Hypertension     managed with meds    IBS (irritable bowel syndrome)     IC (interstitial cystitis)     Insomnia     Migraine with aura and without status migrainosus, not intractable 6/17/2016    Mitral valve regurgitation, rheumatic     followed Dr. Abel Busby Palpitations 5/16/2016    Psychiatric disorder     anxiety    Requires oxygen therapy     3l/min at hs. prn during the days as needed    Rheumatic aortic insufficiency 5/16/2016    Rheumatic fever as child    pt reports rheumatic fever in childhood    Smoker     started age 23-- smoked 0.5 ppd until 2018-- cut back to 10-2 cig daily per pt    Stroke [facility])     \"mild stroke\" pt reports 2014- \"left sided weakness and balance is off\"     Thromboembolus ([facility]     BLE 2012    Tobacco abuse disorder 5/16/2016    Vitamin D deficiency       Past Surgical History:   Procedure Laterality Date    BIOPSY OF BREAST, INCISIONAL Left     lymph node , left, patient states her bx neg, but high risk    COLONOSCOPY      8 polyps removed, diverticulitis    COLONOSCOPY N/A 5/21/2018    COLONOSCOPY performed by Gerlean Hatchet, MD at [address]  8-23-11    bladder dilitation    EGD      HX APPENDECTOMY  2006    HX HEART CATHETERIZATION  5/27/2011    no blockages, no intervention    HX HEART CATHETERIZATION  04/2017    no intervention    HX LAP CHOLECYSTECTOMY  6/6/2011    HX NATASHA AND BSO  2004    fibroid tumors, hyst    HX UROLOGICAL  01/2018    cystoscopy with hydrodistention of bladder     Allergies   Allergen Reactions    Aspirin Other (comments)     Inflames IBS per pt    Bentyl [Dicyclomine] Itching    Toradol [Ketorolac] Hives    Ultram [Tramadol] Nausea and Vomiting    Zofran [Ondansetron Hcl (Pf)] Nausea and Vomiting      Family History   Problem Relation Age of Onset    Heart Failure Father 76        chf    Heart Disease Father     Diabetes Sister     Thyroid Disease Sister       Social History     Tobacco Use    Smoking status: Current Every Day Smoker     Packs/day: 0.25     Years: 27.00     Pack years: 6.75    Smokeless tobacco: Never Used    Tobacco comment: She hasn't had one in a few days. 10/18/18KH   Substance Use Topics    Alcohol use: No        No current facility-administered medications for this encounter. Current Outpatient Medications   Medication Sig    predniSONE (STERAPRED DS) 10 mg dose pack Please take as directed on package. Please clarify any questions with the Pharmacist.    HYDROcodone-homatropine (HYCODAN) 5-1.5 mg/5 mL (5 mL) syrup Take 5 mL by mouth four (4) times daily. Max Daily Amount: 20 mL.  clonazePAM (KLONOPIN) 0.5 mg tablet TAKE 1 TABLET BY MOUTH 3 TIMES A DAY. MAX 3/DAY    pravastatin (PRAVACHOL) 40 mg tablet Take 1 Tab by mouth nightly.     carvedilol (COREG) 25 mg tablet Take 1 Tab by mouth two (2) times daily (with meals).  promethazine (PHENERGAN) 25 mg tablet Take 1 Tab by mouth every six (6) hours as needed for Nausea.  spironolactone (ALDACTONE) 25 mg tablet Take 1 Tab by mouth daily.  EPINEPHrine (EPIPEN JR) 0.15 mg/0.3 mL injection 0.15 mg by IntraMUSCular route once as needed.  mirtazapine (REMERON) 15 mg tablet Take 1 Tab by mouth nightly.  pantoprazole (PROTONIX) 40 mg tablet Take 1 Tab by mouth daily. Indications: morning    albuterol (PROVENTIL HFA, VENTOLIN HFA, PROAIR HFA) 90 mcg/actuation inhaler Take 2 Puffs by inhalation every six (6) hours as needed for Wheezing.  FLUoxetine (PROZAC) 20 mg capsule Take 1 Cap by mouth daily.  hydrOXYzine pamoate (VISTARIL) 25 mg capsule Take 1 Cap by mouth two (2) times daily as needed.  estradiol (ESTRACE) 0.5 mg tablet Take 1 Tab by mouth daily. Can d/c the estradiol patches    Nebulizer Accessories kit Use tid    apixaban (ELIQUIS) 5 mg tablet Take 1 Tab by mouth two (2) times a day.  gabapentin (NEURONTIN) 400 mg capsule Take 1 Cap by mouth three (3) times daily. Indications: take on the dos (Patient taking differently: Take 400 mg by mouth three (3) times daily.)    diphenhydrAMINE (BANOPHEN) 50 mg capsule Take 50 mg by mouth every six (6) hours as needed.  nitroglycerin (NITROSTAT) 0.4 mg SL tablet 1 Tab by SubLINGual route every five (5) minutes as needed for Chest Pain.  Oxygen nightly. Indications: 3l/min at hs and prn during the day    oxybutynin (DITROPAN) 5 mg tablet Take 5 mg by mouth two (2) times a day.  ammonium lactate (LAC-HYDRIN) 12 % lotion Apply  to affected area as needed. rub in to affected area well    budesonide-formoterol (SYMBICORT) 160-4.5 mcg/actuation HFA inhaler Take 1 Puff by inhalation two (2) times a day. Indications: BRONCHOSPASM PREVENTION WITH COPD, use on the dos    tiotropium (SPIRIVA WITH HANDIHALER) 18 mcg inhalation capsule Take 1 Cap by inhalation daily. Indications: BRONCHOSPASM PREVENTION WITH COPD       Review of Symptoms:  General: + 20 lb weight gain,  + weakness, no fever + chills  Skin: no rashes, lumps, or other skin changes  HEENT: no headache, + dizziness  Neck: no swollen glands, goiter, pain or stiffness  Respiratory: + cough, sputum, hemoptysis, + dyspnea, wheezing  Cardiovascular: + as per HPI  Gastrointestinal: + GERD, no constipation, diarrhea, liver problems, + h/o GI bleed  Urinary: no frequency, urgency , hematuria, burning/pain with urination, recent flank pain, polyuria, nocturia, or difficulty urinating  Peripheral Vascular: + h/o recurrent DVT- 2012? Musculoskeletal: + chronic pain   Psychiatric: + anxiety, cocaine use  Neurological: no sensory or motor loss, seizures, syncope, tremors, numbness, no dementia  Hematologic: + anemia   Endocrine: no thyroid problems, heat or cold intolerance, excessive sweating, polyuria, polydipsia, no diabetes.        Physical Exam  Vitals:    12/10/18 1204 12/10/18 1223 12/10/18 1224 12/10/18 1231   BP:   144/82 144/80   Pulse:   (!) 101 97   Resp:    29   Temp:       SpO2: 93% 91%  95%       Physical Exam:  General: Well Developed, Well Nourished, 60L/min, appears very anxious, given tylenol for pain and stated Elina Thomas are they giving me this I could get this at home\"  HEENT: pupils equal and round, no abnormalities noted  Neck: supple, no JVD, no carotid bruits  Heart: S1S2 with RRR with 2/6 murmur   Lungs: B rhonchi at bases   Abd: soft, nontender, nondistended  Ext: warm, 1+ B edema  Skin: warm and dry  Psychiatric: Very anxious, tearful at times, asking for pain medications   Neurologic: Normal muscle tone        Labs:   Recent Labs     12/10/18  1142      K 4.0   BUN 14   CREA 1.12*   *   WBC 15.8*   HGB 9.9*   HCT 32.7*   *        Assessment/Plan:     Assessment:   Dyspnea (12/10/2018)- Multifactorial due to on going cocaine use, probably URI/infectious source, possibly cocaine pneumonitis and possible COPD exacerbation. Will assess response to diuresis, check CRP to determine whether inflammatory process. Hx of cocaine abuse (11/28/2017)- + UDS 11-24-18    Anemia (5/19/2018)- Chronic, no active bleeding at this time, left AMA 5-2018 and GI work up not completed     COPD - 3.5L O2 at night, cont current meds,     Hyperlipidemia (5/19/2018)- Statin     HTN (hypertension) - Coreg     Chronic systolic congestive heart failure - Ef 35-40% w mild-mod AR/MR- pt may be slightly volume overloaded w mildly elevated BNP, assess response to lasix, cont BB, no ACE/ARB at this time due to hypotension. Thank you very much for this referral. We appreciate the opportunity to participate in this patient's care. We will follow along with above stated plan.     Anastasio Dakin, PA-C  Consulting MD: Heena Torres

## 2018-12-10 NOTE — ED NOTES
TRANSFER - OUT REPORT: 
 
Verbal report given to [employer], RN on Dana Jones  being transferred to [facility] for routine progression of care Report consisted of patients Situation, Background, Assessment and  
Recommendations(SBAR). Information from the following report(s) SBAR, MAR and Recent Results was reviewed with the receiving nurse. Lines:  
Peripheral IV Antecubital (Active) Site Assessment Clean, dry, & intact 12/10/2018 11:47 AM  
Phlebitis Assessment 0 12/10/2018 11:47 AM  
Infiltration Assessment 0 12/10/2018 11:47 AM  
Dressing Status Clean, dry, & intact 12/10/2018 11:47 AM  
Dressing Type Transparent 12/10/2018 11:47 AM  
  
 
Opportunity for questions and clarification was provided. Patient transported with: 
 O2 @ 15 liters

## 2018-12-10 NOTE — PROGRESS NOTES
TRANSFER - IN REPORT: 
 
Verbal report received from [facility] (name) on Ceferino Vivas  being received from ED (unit) for routine progression of care Report consisted of patients Situation, Background, Assessment and  
Recommendations(SBAR). Information from the following report(s) SBAR and Kardex was reviewed with the receiving nurse. Opportunity for questions and clarification was provided. Assessment completed upon patients arrival to unit and care assumed. SBAR given to primary receiving RN, Vandana Sandoval.

## 2018-12-10 NOTE — ED PROVIDER NOTES
[facility] [place] [facility] Geraldo West is a 48 y.o. female seen on 12/10/2018 at 11:35 AM in the [facility] EMERGENCY DEPT in room ER07/07. Chief Complaint Patient presents with  Cough HPI: 57-year-old female presents to the emergency department with 3 days of worsening cough and shortness of breath On evaluation in triage. Oxygen saturations were noted to be 55%. She is tachypneic at 36. Tachycardic 104 History is limited by referred distress and shortness of breath. The history is provided by the patient. The history is limited by the condition of the patient. Review of Systems: Review of Systems Unable to perform ROS: Severe respiratory distress Constitutional: Negative for fever. Respiratory: Positive for cough, shortness of breath and wheezing. Past Medical History: Primary Care Doctor: Sandra Garcia MD  
 
Past Medical History:  
Diagnosis Date  Anemia   
 blood transfusion 5/2018 per pt  Arrhythmia   
 palpitations, moderate mitral valve regurge  Arthritis   
 lower back osteo  Asthma   
 uses inhalers  Cardiomyopathy ECHO 11/2017  CHF (congestive heart failure) ([address]   
 11/2017 Echo  LVEF 45-50%  Chronic pain 2010  Coagulation defect ([address]   
 eliquis  Cocaine use Reports last use 5/2018 per patient  COPD   
 nebulizer daily and maint inhalers, can not climb 1 flight of stairs (also CHF)  oxygen 3 LPM qhs  Decreased cardiac ejection fraction 11/27/2017 EF 45-50% per echo 11/27/17  Depression   
 controlled  Diverticulitis  GERD (gastroesophageal reflux disease)   
 uncontrolled with daily meds-- comes and goes- 3 pillows  Heart murmur  Hypertension   
 managed with meds  IBS (irritable bowel syndrome)  IC (interstitial cystitis)  Insomnia  Migraine with aura and without status migrainosus, not intractable 6/17/2016  Mitral valve regurgitation, rheumatic   
 followed Dr. Murillo Tibbie  Palpitations 5/16/2016  Psychiatric disorder   
 anxiety  Requires oxygen therapy 3l/min at hs. prn during the days as needed  Rheumatic aortic insufficiency 5/16/2016  Rheumatic fever as child  
 pt reports rheumatic fever in childhood  Smoker   
 started age 23-- smoked 0.5 ppd until 2018-- cut back to 10-2 cig daily per pt  Stroke ([address] \"mild stroke\" pt reports 2014- \"left sided weakness and balance is off\"  Thromboembolus ([facility] BLE 2012  Tobacco abuse disorder 5/16/2016  Vitamin D deficiency Past Surgical History:  
Procedure Laterality Date  BIOPSY OF BREAST, INCISIONAL Left   
 lymph node , left, patient states her bx neg, but high risk  COLONOSCOPY    
 8 polyps removed, diverticulitis  COLONOSCOPY N/A 5/21/2018 COLONOSCOPY performed by Jessica Piedra MD at [facility] ENDOSCOPY  CYSTOSCOPY  8-23-11  
 bladder dilitation  EGD  HX APPENDECTOMY  2006  HX HEART CATHETERIZATION  5/27/2011  
 no blockages, no intervention  HX HEART CATHETERIZATION  04/2017  
 no intervention  HX LAP CHOLECYSTECTOMY  6/6/2011  HX NATASHA AND BSO  2004  
 fibroid tumors, hyst  
 HX UROLOGICAL  01/2018  
 cystoscopy with hydrodistention of bladder Social History Socioeconomic History  Marital status: LEGALLY  Spouse name: Not on file  Number of children: Not on file  Years of education: Not on file  Highest education level: Not on file Tobacco Use  Smoking status: Current Every Day Smoker Packs/day: 0.25 Years: 27.00 Pack years: 6.75  Smokeless tobacco: Never Used  Tobacco comment: She hasn't had one in a few days. 10/18/18KH Substance and Sexual Activity  Alcohol use: No  
 Drug use: Yes Types: Cocaine Comment: Last use 5/2018 Prior to Admission Medications Prescriptions Last Dose Informant Patient Reported? Taking? EPINEPHrine (EPIPEN JR) 0.15 mg/0.3 mL injection   Yes No  
Si.15 mg by IntraMUSCular route once as needed. FLUoxetine (PROZAC) 20 mg capsule   Yes No  
Sig: Take 1 Cap by mouth daily. HYDROcodone-homatropine (HYCODAN) 5-1.5 mg/5 mL (5 mL) syrup   No No  
Sig: Take 5 mL by mouth four (4) times daily. Max Daily Amount: 20 mL. Nebulizer Accessories kit   No No  
Sig: Use tid Oxygen   Yes No  
Sig: nightly. Indications: 3l/min at hs and prn during the day  
albuterol (PROVENTIL HFA, VENTOLIN HFA, PROAIR HFA) 90 mcg/actuation inhaler   No No  
Sig: Take 2 Puffs by inhalation every six (6) hours as needed for Wheezing. ammonium lactate (LAC-HYDRIN) 12 % lotion   Yes No  
Sig: Apply  to affected area as needed. rub in to affected area well  
apixaban (ELIQUIS) 5 mg tablet   No No  
Sig: Take 1 Tab by mouth two (2) times a day. budesonide-formoterol (SYMBICORT) 160-4.5 mcg/actuation HFA inhaler   Yes No  
Sig: Take 1 Puff by inhalation two (2) times a day. Indications: BRONCHOSPASM PREVENTION WITH COPD, use on the dos  
carvedilol (COREG) 25 mg tablet   No No  
Sig: Take 1 Tab by mouth two (2) times daily (with meals). clonazePAM (KLONOPIN) 0.5 mg tablet   No No  
Sig: TAKE 1 TABLET BY MOUTH 3 TIMES A DAY. MAX 3/DAY diphenhydrAMINE (BANOPHEN) 50 mg capsule   Yes No  
Sig: Take 50 mg by mouth every six (6) hours as needed. estradiol (ESTRACE) 0.5 mg tablet   No No  
Sig: Take 1 Tab by mouth daily. Can d/c the estradiol patches  
gabapentin (NEURONTIN) 400 mg capsule   No No  
Sig: Take 1 Cap by mouth three (3) times daily. Indications: take on the [facility] Patient taking differently: Take 400 mg by mouth three (3) times daily. hydrOXYzine pamoate (VISTARIL) 25 mg capsule   Yes No  
Sig: Take 1 Cap by mouth two (2) times daily as needed. mirtazapine (REMERON) 15 mg tablet   No No  
Sig: Take 1 Tab by mouth nightly.   
nitroglycerin (NITROSTAT) 0.4 mg SL tablet   No No  
 Si Tab by SubLINGual route every five (5) minutes as needed for Chest Pain. oxybutynin (DITROPAN) 5 mg tablet   Yes No  
Sig: Take 5 mg by mouth two (2) times a day. pantoprazole (PROTONIX) 40 mg tablet   No No  
Sig: Take 1 Tab by mouth daily. Indications: morning  
pravastatin (PRAVACHOL) 40 mg tablet   No No  
Sig: Take 1 Tab by mouth nightly. predniSONE (STERAPRED DS) 10 mg dose pack   No No  
Sig: Please take as directed on package. Please clarify any questions with the Pharmacist.  
promethazine (PHENERGAN) 25 mg tablet   No No  
Sig: Take 1 Tab by mouth every six (6) hours as needed for Nausea. spironolactone (ALDACTONE) 25 mg tablet   No No  
Sig: Take 1 Tab by mouth daily. tiotropium (SPIRIVA WITH HANDIHALER) 18 mcg inhalation capsule  Self Yes No  
Sig: Take 1 Cap by inhalation daily. Indications: BRONCHOSPASM PREVENTION WITH COPD Facility-Administered Medications: None Allergies Allergen Reactions  Aspirin Other (comments) Inflames IBS per pt  Bentyl [Dicyclomine] Itching  Toradol [Ketorolac] Hives  Ultram [Tramadol] Nausea and Vomiting  Zofran [Ondansetron Hcl (Pf)] Nausea and Vomiting Physical Exam:  Nursing documentation reviewed. Vitals:  
 12/10/18 1204 12/10/18 1223 12/10/18 1224 12/10/18 1231 BP:   144/82 144/80 Pulse:   (!) 101 97 Resp:    29 Temp:      
SpO2: 93% 91%  95% Vital signs were reviewed. Physical Exam  
Constitutional:  
Nontoxic, tachypneic, Dyspneic uncomfortablefemale HENT:  
Head: Normocephalic and atraumatic. Eyes: EOM are normal. Pupils are equal, round, and reactive to light. Neck: Normal range of motion. Cardiovascular: Regular rhythm. Tachycardia present. Pulmonary/Chest: Accessory muscle usage present. No stridor. Tachypnea noted. She has decreased breath sounds. She has wheezes. Patient with significant diminished breath sounds bilaterally. Wheezing in all lung fields Abdominal: There is no tenderness. bdomen appears slightly distended. No ascites appreciated. Musculoskeletal: Normal range of motion. Neurological: She is alert. Patient's eyes were open. She is able to answer questions and follow commands. Skin: Skin is warm and dry. Capillary refill takes less than 2 seconds. Nursing note and vitals reviewed. Medical Decision Making: MDM Number of Diagnoses or Management Options Diagnosis management comments: 73-year-old female History of congestive heart failure ejection fraction 45-50%. Diminished breath sounds. Bilateral wheezing We'll obtain a chest x-ray. An ABG. albuterol. Magnesium. Solu-Medrol. Amount and/or Complexity of Data Reviewed Clinical lab tests: ordered Tests in the radiology section of CPT®: ordered Tests in the medicine section of CPT®: ordered Review and summarize past medical records: yes Risk of Complications, Morbidity, and/or Mortality Presenting problems: high Diagnostic procedures: minimal 
Management options: moderate 
 
  
______________________________________________________________________ 
ED Evaluation Labs:   
Recent Results (from the past 24 hour(s)) POC TROPONIN-I Collection Time: 12/10/18 11:39 AM  
Result Value Ref Range Troponin-I (POC) 0 (L) 0.02 - 0.05 ng/ml POC LACTIC ACID Collection Time: 12/10/18 11:41 AM  
Result Value Ref Range Lactic Acid (POC) 1.55 0.5 - 1.9 mmol/L  
CBC WITH AUTOMATED DIFF Collection Time: 12/10/18 11:42 AM  
Result Value Ref Range WBC 15.8 (H) 4.3 - 11.1 K/uL  
 RBC 3.42 (L) 4.05 - 5.2 M/uL HGB 9.9 (L) 11.7 - 15.4 g/dL HCT 32.7 (L) 35.8 - 46.3 % MCV 95.6 79.6 - 97.8 FL  
 MCH 28.9 26.1 - 32.9 PG  
 MCHC 30.3 (L) 31.4 - 35.0 g/dL  
 RDW 14.6 11.9 - 14.6 % PLATELET 029 (H) 719 - 450 K/uL MPV 9.8 9.4 - 12.3 FL ABSOLUTE NRBC 0.03 0.0 - 0.2 K/uL  
 DF AUTOMATED NEUTROPHILS 87 (H) 43 - 78 % LYMPHOCYTES 7 (L) 13 - 44 % MONOCYTES 5 4.0 - 12.0 % EOSINOPHILS 0 (L) 0.5 - 7.8 % BASOPHILS 0 0.0 - 2.0 % IMMATURE GRANULOCYTES 0 0.0 - 5.0 %  
 ABS. NEUTROPHILS 13.8 (H) 1.7 - 8.2 K/UL  
 ABS. LYMPHOCYTES 1.1 0.5 - 4.6 K/UL  
 ABS. MONOCYTES 0.8 0.1 - 1.3 K/UL  
 ABS. EOSINOPHILS 0.0 0.0 - 0.8 K/UL  
 ABS. BASOPHILS 0.0 0.0 - 0.2 K/UL  
 ABS. IMM. GRANS. 0.1 0.0 - 0.5 K/UL METABOLIC PANEL, COMPREHENSIVE Collection Time: 12/10/18 11:42 AM  
Result Value Ref Range Sodium 138 136 - 145 mmol/L Potassium 4.0 3.5 - 5.1 mmol/L Chloride 106 98 - 107 mmol/L  
 CO2 24 21 - 32 mmol/L Anion gap 8 7 - 16 mmol/L Glucose 105 (H) 65 - 100 mg/dL BUN 14 6 - 23 MG/DL Creatinine 1.12 (H) 0.6 - 1.0 MG/DL  
 GFR est AA >60 >60 ml/min/1.73m2 GFR est non-AA 55 (L) >60 ml/min/1.73m2 Calcium 9.6 8.3 - 10.4 MG/DL Bilirubin, total 0.5 0.2 - 1.1 MG/DL  
 ALT (SGPT) 42 12 - 65 U/L  
 AST (SGOT) 64 (H) 15 - 37 U/L Alk. phosphatase 136 50 - 136 U/L Protein, total 8.4 (H) 6.3 - 8.2 g/dL Albumin 3.4 (L) 3.5 - 5.0 g/dL Globulin 5.0 (H) 2.3 - 3.5 g/dL A-G Ratio 0.7 (L) 1.2 - 3.5 BNP Collection Time: 12/10/18 11:42 AM  
Result Value Ref Range  pg/mL EKG, 12 LEAD, INITIAL Collection Time: 12/10/18 11:44 AM  
Result Value Ref Range Ventricular Rate 102 BPM  
 Atrial Rate 102 BPM  
 P-R Interval 120 ms QRS Duration 78 ms Q-T Interval 350 ms QTC Calculation (Bezet) 456 ms Calculated P Axis 55 degrees Calculated R Axis 30 degrees Calculated T Axis 19 degrees Diagnosis    
  !! AGE AND GENDER SPECIFIC ECG ANALYSIS !! Sinus tachycardia Biatrial enlargement Anteroseptal infarct , age undetermined Abnormal ECG Labs reviewed and interpreted by me Radiology studies performed: XR CHEST PORT Final Result Impression:  Bilateral infiltrates versus pulmonary edema. chest x-ray visualized by me Orders Placed This Encounter  CULTURE, BLOOD  CULTURE, BLOOD  XR CHEST PORT  CBC WITH AUTOMATED DIFF  
 METABOLIC PANEL,COMP.  
 BNP  
 COCAINE & METABOLITE  POC TROPONIN-I  
 POC LACTIC ACID  
 POC TROPONIN-I  
 POC LACTIC ACID  EKG 12 LEAD INITIAL  
 INSERT PERIPHERAL IV ONE TIME STAT  methylPREDNISolone (PF) (Solu-MEDROL) injection 125 mg  
 magnesium sulfate 2 g/50 ml IVPB (premix or compounded)  lidocaine (XYLOCAINE) 4 % (40 mg/mL) topical solution 3 mL  furosemide (LASIX) injection 40 mg  
 
 
Medications  
methylPREDNISolone (PF) (Solu-MEDROL) injection 125 mg (125 mg IntraVENous Given 12/10/18 1135)  
magnesium sulfate 2 g/50 ml IVPB (premix or compounded) (2 g IntraVENous New Bag 12/10/18 1135) lidocaine (XYLOCAINE) 4 % (40 mg/mL) topical solution 3 mL (3 mL Aerosolization Given 12/10/18 1223) furosemide (LASIX) injection 40 mg (40 mg IntraVENous Given 12/10/18 1224) Recheck at 12:15. Patient's breathing is improved slightly. She still has diminished breath sounds bilaterally with wheezing in both bases. Chest x-ray shows bilateral pulmonary infiltrate versus  Edema. She has a history of congestive heart failure with an EF in the 40s to 46s. She is on high flow nasal cannula as well as a nonrebreather mask and maintained oxygen saturations in the mid 90s. She has a history of cocaine use. Was admitted as recently as November 24 with cocaine pneumonitis. She denies any recent use. Blood cultures were drawn. She is afebrile. Her lactate is normal.  No signs of end organ damage. Will follow to see if antibiotics are necessary Confusing picture. Differential includes pneumonia, viral infection, COPD, pneumonitis, congestive heart failure 
_________________________________________________________ Procedures 
 
================================================================== 
ASSESSMENT: Dyspnea with multifocal etiology Patient had an echocardiogram at [facility] cardiology in October. Showed an ejection fraction of 35-40% which is slightly worse than last one. She has a history of [place] cardiomyopathy. PLAN: Consult the hospitalist for admission. Consult cardiology question  Repeat echo while she is here 
_____________________________________________________________________ Condition:  Guarded Disposition:  Admit Diagnosis: 1. Respiratory distress 2. Wheezing 3. Hypoxia Santana Pyle MD; 12/10/2018 @11:35 AM=========================================== 
 
ED Course as of Dec 10 1338 Mon Dec 10, 2018  
1327 WBC: (!) 15.8 [GH] 1328 HGB: (!) 9.9 [GH] 1328 PLATELET: (!) 553 [GH] 1328 Creatinine: (!) 1.12 [GH] 1328 Lactic Acid (POC): 1.55 [GH] 1328 Troponin-I (POC): (!) 0 [GH] 1328 BNP: 386 [GH] 1328 No fever. She does have a leukocytosis. I don't believe she has a bacterial infection. XR CHEST PORT [GH] [address] at1:00. Respirations are much improved. She is still on high flow nasal cannula. Getting a lidocaine neb which is improved her cough. Her chest x-ray shows significant infiltrate or edema. Given that her BNP is less than 500 don't think this is congestive heart failure. [GH] [address] hospitalist for admission. [] ED Course User Index Wale Pickard MD

## 2018-12-11 LAB
AMPHET UR QL SCN: NEGATIVE
ANION GAP SERPL CALC-SCNC: 9 MMOL/L (ref 7–16)
BARBITURATES UR QL SCN: NEGATIVE
BASOPHILS # BLD: 0 K/UL (ref 0–0.2)
BASOPHILS NFR BLD: 0 % (ref 0–2)
BENZODIAZ UR QL: NEGATIVE
BUN SERPL-MCNC: 17 MG/DL (ref 6–23)
CALCIUM SERPL-MCNC: 9 MG/DL (ref 8.3–10.4)
CANNABINOIDS UR QL SCN: NEGATIVE
CHLORIDE SERPL-SCNC: 106 MMOL/L (ref 98–107)
CO2 SERPL-SCNC: 27 MMOL/L (ref 21–32)
COCAINE UR QL SCN: POSITIVE
CREAT SERPL-MCNC: 0.97 MG/DL (ref 0.6–1)
DIFFERENTIAL METHOD BLD: ABNORMAL
EOSINOPHIL # BLD: 0 K/UL (ref 0–0.8)
EOSINOPHIL NFR BLD: 0 % (ref 0.5–7.8)
ERYTHROCYTE [DISTWIDTH] IN BLOOD BY AUTOMATED COUNT: 14.3 % (ref 11.9–14.6)
GLUCOSE SERPL-MCNC: 137 MG/DL (ref 65–100)
HCT VFR BLD AUTO: 29.2 % (ref 35.8–46.3)
HGB BLD-MCNC: 9.1 G/DL (ref 11.7–15.4)
IMM GRANULOCYTES # BLD: 0.1 K/UL (ref 0–0.5)
IMM GRANULOCYTES NFR BLD AUTO: 1 % (ref 0–5)
LYMPHOCYTES # BLD: 0.5 K/UL (ref 0.5–4.6)
LYMPHOCYTES NFR BLD: 4 % (ref 13–44)
MAGNESIUM SERPL-MCNC: 2.5 MG/DL (ref 1.8–2.4)
MCH RBC QN AUTO: 29.4 PG (ref 26.1–32.9)
MCHC RBC AUTO-ENTMCNC: 31.2 G/DL (ref 31.4–35)
MCV RBC AUTO: 94.2 FL (ref 79.6–97.8)
METHADONE UR QL: NEGATIVE
MONOCYTES # BLD: 0.5 K/UL (ref 0.1–1.3)
MONOCYTES NFR BLD: 4 % (ref 4–12)
NEUTS SEG # BLD: 12.3 K/UL (ref 1.7–8.2)
NEUTS SEG NFR BLD: 92 % (ref 43–78)
NRBC # BLD: 0.04 K/UL (ref 0–0.2)
OPIATES UR QL: POSITIVE
PCP UR QL: NEGATIVE
PLATELET # BLD AUTO: 404 K/UL (ref 150–450)
PMV BLD AUTO: 10 FL (ref 9.4–12.3)
POTASSIUM SERPL-SCNC: 3.8 MMOL/L (ref 3.5–5.1)
RBC # BLD AUTO: 3.1 M/UL (ref 4.05–5.2)
SODIUM SERPL-SCNC: 142 MMOL/L (ref 136–145)
TROPONIN I SERPL-MCNC: <0.02 NG/ML (ref 0.02–0.05)
WBC # BLD AUTO: 13.3 K/UL (ref 4.3–11.1)

## 2018-12-11 PROCEDURE — 99232 SBSQ HOSP IP/OBS MODERATE 35: CPT | Performed by: INTERNAL MEDICINE

## 2018-12-11 PROCEDURE — 94640 AIRWAY INHALATION TREATMENT: CPT

## 2018-12-11 PROCEDURE — 83735 ASSAY OF MAGNESIUM: CPT

## 2018-12-11 PROCEDURE — 74011250637 HC RX REV CODE- 250/637: Performed by: NURSE PRACTITIONER

## 2018-12-11 PROCEDURE — 65660000000 HC RM CCU STEPDOWN

## 2018-12-11 PROCEDURE — 80048 BASIC METABOLIC PNL TOTAL CA: CPT

## 2018-12-11 PROCEDURE — 74011250637 HC RX REV CODE- 250/637: Performed by: INTERNAL MEDICINE

## 2018-12-11 PROCEDURE — 36415 COLL VENOUS BLD VENIPUNCTURE: CPT

## 2018-12-11 PROCEDURE — 77010033678 HC OXYGEN DAILY

## 2018-12-11 PROCEDURE — 94760 N-INVAS EAR/PLS OXIMETRY 1: CPT

## 2018-12-11 PROCEDURE — 74011000258 HC RX REV CODE- 258: Performed by: HOSPITALIST

## 2018-12-11 PROCEDURE — 74011250637 HC RX REV CODE- 250/637: Performed by: HOSPITALIST

## 2018-12-11 PROCEDURE — 85025 COMPLETE CBC W/AUTO DIFF WBC: CPT

## 2018-12-11 PROCEDURE — 74011250636 HC RX REV CODE- 250/636: Performed by: INTERNAL MEDICINE

## 2018-12-11 PROCEDURE — 77010033711 HC HIGH FLOW OXYGEN

## 2018-12-11 PROCEDURE — 80307 DRUG TEST PRSMV CHEM ANLYZR: CPT

## 2018-12-11 PROCEDURE — 74011000250 HC RX REV CODE- 250: Performed by: HOSPITALIST

## 2018-12-11 PROCEDURE — 84484 ASSAY OF TROPONIN QUANT: CPT

## 2018-12-11 PROCEDURE — 74011250636 HC RX REV CODE- 250/636: Performed by: HOSPITALIST

## 2018-12-11 RX ORDER — HYDROCODONE BITARTRATE AND ACETAMINOPHEN 5; 325 MG/1; MG/1
2 TABLET ORAL
Status: DISCONTINUED | OUTPATIENT
Start: 2018-12-11 | End: 2018-12-13

## 2018-12-11 RX ORDER — VANCOMYCIN HYDROCHLORIDE
1250 EVERY 12 HOURS
Status: DISCONTINUED | OUTPATIENT
Start: 2018-12-11 | End: 2018-12-12

## 2018-12-11 RX ORDER — LISINOPRIL 5 MG/1
10 TABLET ORAL DAILY
Status: DISCONTINUED | OUTPATIENT
Start: 2018-12-11 | End: 2018-12-13 | Stop reason: HOSPADM

## 2018-12-11 RX ORDER — LORAZEPAM 2 MG/ML
0.5 INJECTION INTRAMUSCULAR
Status: DISCONTINUED | OUTPATIENT
Start: 2018-12-11 | End: 2018-12-13

## 2018-12-11 RX ORDER — MORPHINE SULFATE 4 MG/ML
2 INJECTION INTRAVENOUS
Status: DISCONTINUED | OUTPATIENT
Start: 2018-12-11 | End: 2018-12-11

## 2018-12-11 RX ORDER — ENOXAPARIN SODIUM 100 MG/ML
40 INJECTION SUBCUTANEOUS DAILY
Status: DISCONTINUED | OUTPATIENT
Start: 2018-12-12 | End: 2018-12-13 | Stop reason: HOSPADM

## 2018-12-11 RX ORDER — AZITHROMYCIN 250 MG/1
500 TABLET, FILM COATED ORAL EVERY 24 HOURS
Status: DISCONTINUED | OUTPATIENT
Start: 2018-12-11 | End: 2018-12-13 | Stop reason: HOSPADM

## 2018-12-11 RX ORDER — HYDROCODONE BITARTRATE AND HOMATROPINE METHYLBROMIDE 1.5; 5 MG/5ML; MG/5ML
5 SYRUP ORAL
Status: DISCONTINUED | OUTPATIENT
Start: 2018-12-11 | End: 2018-12-13

## 2018-12-11 RX ADMIN — GABAPENTIN 400 MG: 400 CAPSULE ORAL at 08:53

## 2018-12-11 RX ADMIN — GABAPENTIN 400 MG: 400 CAPSULE ORAL at 16:37

## 2018-12-11 RX ADMIN — AZITHROMYCIN 500 MG: 250 TABLET, FILM COATED ORAL at 16:38

## 2018-12-11 RX ADMIN — CLONAZEPAM 0.5 MG: 0.5 TABLET ORAL at 20:49

## 2018-12-11 RX ADMIN — IPRATROPIUM BROMIDE AND ALBUTEROL SULFATE 3 ML: .5; 3 SOLUTION RESPIRATORY (INHALATION) at 19:55

## 2018-12-11 RX ADMIN — IPRATROPIUM BROMIDE AND ALBUTEROL SULFATE 3 ML: .5; 3 SOLUTION RESPIRATORY (INHALATION) at 11:41

## 2018-12-11 RX ADMIN — OXYBUTYNIN CHLORIDE 5 MG: 5 TABLET ORAL at 16:38

## 2018-12-11 RX ADMIN — HYDROCODONE BITARTRATE AND HOMATROPINE METHYLBROMIDE 5 ML: 5; 1.5 SOLUTION ORAL at 22:02

## 2018-12-11 RX ADMIN — APIXABAN 5 MG: 5 TABLET, FILM COATED ORAL at 08:53

## 2018-12-11 RX ADMIN — HYDROCODONE BITARTRATE AND HOMATROPINE METHYLBROMIDE 5 ML: 5; 1.5 SOLUTION ORAL at 13:47

## 2018-12-11 RX ADMIN — CARVEDILOL 25 MG: 25 TABLET, FILM COATED ORAL at 08:53

## 2018-12-11 RX ADMIN — GUAIFENESIN 600 MG: 600 TABLET, EXTENDED RELEASE ORAL at 08:53

## 2018-12-11 RX ADMIN — CLONAZEPAM 0.5 MG: 0.5 TABLET ORAL at 16:38

## 2018-12-11 RX ADMIN — PRAVASTATIN SODIUM 40 MG: 20 TABLET ORAL at 23:25

## 2018-12-11 RX ADMIN — VANCOMYCIN HYDROCHLORIDE 1250 MG: 10 INJECTION, POWDER, LYOPHILIZED, FOR SOLUTION INTRAVENOUS at 10:00

## 2018-12-11 RX ADMIN — GUAIFENESIN 600 MG: 600 TABLET, EXTENDED RELEASE ORAL at 20:50

## 2018-12-11 RX ADMIN — IPRATROPIUM BROMIDE AND ALBUTEROL SULFATE 3 ML: .5; 3 SOLUTION RESPIRATORY (INHALATION) at 15:53

## 2018-12-11 RX ADMIN — FLUOXETINE 20 MG: 20 CAPSULE ORAL at 08:53

## 2018-12-11 RX ADMIN — CEFTRIAXONE SODIUM 1 G: 1 INJECTION, POWDER, FOR SOLUTION INTRAMUSCULAR; INTRAVENOUS at 16:36

## 2018-12-11 RX ADMIN — MORPHINE SULFATE 5 MG: 10 INJECTION INTRAVENOUS at 08:53

## 2018-12-11 RX ADMIN — BUDESONIDE 500 MCG: 0.5 INHALANT RESPIRATORY (INHALATION) at 07:49

## 2018-12-11 RX ADMIN — OXYBUTYNIN CHLORIDE 5 MG: 5 TABLET ORAL at 08:53

## 2018-12-11 RX ADMIN — Medication 10 ML: at 22:03

## 2018-12-11 RX ADMIN — SPIRONOLACTONE 25 MG: 25 TABLET ORAL at 08:53

## 2018-12-11 RX ADMIN — Medication 5 ML: at 14:00

## 2018-12-11 RX ADMIN — ESTRADIOL 0.5 MG: 1 TABLET ORAL at 09:00

## 2018-12-11 RX ADMIN — VANCOMYCIN HYDROCHLORIDE 1250 MG: 10 INJECTION, POWDER, LYOPHILIZED, FOR SOLUTION INTRAVENOUS at 22:02

## 2018-12-11 RX ADMIN — METHYLPREDNISOLONE SODIUM SUCCINATE 40 MG: 40 INJECTION, POWDER, FOR SOLUTION INTRAMUSCULAR; INTRAVENOUS at 08:53

## 2018-12-11 RX ADMIN — LISINOPRIL 10 MG: 5 TABLET ORAL at 11:00

## 2018-12-11 RX ADMIN — CLONAZEPAM 0.5 MG: 0.5 TABLET ORAL at 08:52

## 2018-12-11 RX ADMIN — FUROSEMIDE 40 MG: 10 INJECTION, SOLUTION INTRAVENOUS at 08:54

## 2018-12-11 RX ADMIN — PANTOPRAZOLE SODIUM 40 MG: 40 TABLET, DELAYED RELEASE ORAL at 05:25

## 2018-12-11 RX ADMIN — Medication 10 ML: at 06:01

## 2018-12-11 RX ADMIN — MORPHINE SULFATE 5 MG: 10 INJECTION INTRAVENOUS at 03:13

## 2018-12-11 RX ADMIN — MIRTAZAPINE 15 MG: 15 TABLET, FILM COATED ORAL at 20:49

## 2018-12-11 RX ADMIN — HYDROCODONE BITARTRATE AND ACETAMINOPHEN 2 TABLET: 5; 325 TABLET ORAL at 23:28

## 2018-12-11 RX ADMIN — HYDROCODONE BITARTRATE AND ACETAMINOPHEN 2 TABLET: 5; 325 TABLET ORAL at 13:47

## 2018-12-11 RX ADMIN — IPRATROPIUM BROMIDE AND ALBUTEROL SULFATE 3 ML: .5; 3 SOLUTION RESPIRATORY (INHALATION) at 07:49

## 2018-12-11 RX ADMIN — BUDESONIDE 500 MCG: 0.5 INHALANT RESPIRATORY (INHALATION) at 19:55

## 2018-12-11 RX ADMIN — GABAPENTIN 400 MG: 400 CAPSULE ORAL at 20:49

## 2018-12-11 RX ADMIN — FUROSEMIDE 40 MG: 10 INJECTION, SOLUTION INTRAVENOUS at 20:50

## 2018-12-11 RX ADMIN — METHYLPREDNISOLONE SODIUM SUCCINATE 60 MG: 40 INJECTION, POWDER, FOR SOLUTION INTRAMUSCULAR; INTRAVENOUS at 20:50

## 2018-12-11 NOTE — PROGRESS NOTES
Hospitalist Progress Note    2018  Admit Date: 12/10/2018 11:28 AM   NAME: Kanu Archer   :  1965   MRN:  [id number]   Attending: Jearline Fleischer, MD  PCP:  Ravindra Lama MD    SUBJECTIVE:   Marge Contreras is a 49 yo F admitted  12/10 with acute hypoxic respiratory failure (HCAP vs cocaine pneumonitis). Reports she is breathing okay with optiflow but very short of breath if removed. + cough with bloody sputum (reports clots). She is very tangential in conversation. States she is not urinating frequently despite the lasix (uncertain if I/Os correct). Requesting for increased pain medication and hycodan. Review of Systems negative with exception of pertinent positives noted above  PHYSICAL EXAM     Visit Vitals  /78 (BP 1 Location: Left arm, BP Patient Position: Head of bed elevated (Comment degrees))   Pulse 92   Temp 97.5 °F (36.4 °C)   Resp 20   Wt 86.2 kg (190 lb 1.6 oz)   SpO2 95%   BMI 32.63 kg/m²      Temp (24hrs), Av °F (36.7 °C), Min:97.5 °F (36.4 °C), Max:98.7 °F (37.1 °C)    Patient Vitals for the past 24 hrs:   Temp Pulse Resp BP SpO2   18 1141     95 %   18 1108 97.5 °F (36.4 °C) 92 20 121/78 93 %   18 0749     92 %   18 0722 97.8 °F (36.6 °C) 90 21 145/77 91 %   18 0342 98.2 °F (36.8 °C) 88 20 137/89 90 %   12/10/18 2256 97.8 °F (36.6 °C) 78 20 133/86 100 %   12/10/18 1938     98 %   12/10/18 1932 97.9 °F (36.6 °C) (!) 101 24 142/88 96 %   12/10/18 1644 98.7 °F (37.1 °C) 92 28 134/80    12/10/18 1516  99  136/64 99 %       Oxygen Therapy  O2 Sat (%): 95 % (18 1141)  Pulse via Oximetry: 86 beats per minute (18 1141)  O2 Device: Heated; Hi flow nasal cannula (18 114)  O2 Flow Rate (L/min): 50 l/min (18 1141)  O2 Temperature: 87.8 °F (31 °C) (12/10/18 1223)  FIO2 (%): 85 %(decreased from 95%) (18 114)    Intake/Output Summary (Last 24 hours) at 2018 1454  Last data filed at 12/11/2018 1318  Gross per 24 hour   Intake 840 ml   Output 900 ml   Net -60 ml      General: Dyspneic in conversation, tangential in conversation   Lungs:  coarse  Heart:  Regular rate and rhythm,  No murmur, rub, or gallop  Abdomen: Soft, Non distended, Non tender, Positive bowel sounds  Extremities: No cyanosis, clubbing or edema  Neurologic:  No focal deficits    Recent Results (from the past 24 hour(s))   POC G3    Collection Time: 12/10/18  6:29 PM   Result Value Ref Range    Device: High Flow Nasal Cannula      FIO2 (POC) 100 %    pH (POC) 7.384 7.35 - 7.45      pCO2 (POC) 41.2 35 - 45 MMHG    pO2 (POC) 97 75 - 100 MMHG    HCO3 (POC) 24.6 22 - 26 MMOL/L    sO2 (POC) 97 95 - 98 %    Base excess (POC) 0 mmol/L    Allens test (POC) YES      Site RIGHT RADIAL      Patient temp.  98.6      Specimen type (POC) ARTERIAL      Performed by Ruben     CO2, POC 26 MMOL/L    Flow rate (POC) 60.000 L/min    Respiratory comment: NurseNotified     COLLECT TIME 1,828     CRP, HIGH SENSITIVITY    Collection Time: 12/10/18  6:52 PM   Result Value Ref Range    CRP, High sensitivity 80.2 mg/L   MAGNESIUM    Collection Time: 12/10/18  6:52 PM   Result Value Ref Range    Magnesium 2.6 (H) 1.8 - 2.4 mg/dL   PROCALCITONIN    Collection Time: 12/10/18  6:52 PM   Result Value Ref Range    Procalcitonin 0.2 ng/mL   TROPONIN I    Collection Time: 12/10/18  6:52 PM   Result Value Ref Range    Troponin-I, Qt. <0.02 (L) 0.02 - 6.94 NG/ML   METABOLIC PANEL, BASIC    Collection Time: 12/11/18  5:47 AM   Result Value Ref Range    Sodium 142 136 - 145 mmol/L    Potassium 3.8 3.5 - 5.1 mmol/L    Chloride 106 98 - 107 mmol/L    CO2 27 21 - 32 mmol/L    Anion gap 9 7 - 16 mmol/L    Glucose 137 (H) 65 - 100 mg/dL    BUN 17 6 - 23 MG/DL    Creatinine 0.97 0.6 - 1.0 MG/DL    GFR est AA >60 >60 ml/min/1.73m2    GFR est non-AA >60 >60 ml/min/1.73m2    Calcium 9.0 8.3 - 10.4 MG/DL   CBC WITH AUTOMATED DIFF    Collection Time: 12/11/18  5:47 AM   Result Value Ref Range    WBC 13.3 (H) 4.3 - 11.1 K/uL    RBC 3.10 (L) 4.05 - 5.2 M/uL    HGB 9.1 (L) 11.7 - 15.4 g/dL    HCT 29.2 (L) 35.8 - 46.3 %    MCV 94.2 79.6 - 97.8 FL    MCH 29.4 26.1 - 32.9 PG    MCHC 31.2 (L) 31.4 - 35.0 g/dL    RDW 14.3 11.9 - 14.6 %    PLATELET 513 910 - 006 K/uL    MPV 10.0 9.4 - 12.3 FL    ABSOLUTE NRBC 0.04 0.0 - 0.2 K/uL    DF AUTOMATED      NEUTROPHILS 92 (H) 43 - 78 %    LYMPHOCYTES 4 (L) 13 - 44 %    MONOCYTES 4 4.0 - 12.0 %    EOSINOPHILS 0 (L) 0.5 - 7.8 %    BASOPHILS 0 0.0 - 2.0 %    IMMATURE GRANULOCYTES 1 0.0 - 5.0 %    ABS. NEUTROPHILS 12.3 (H) 1.7 - 8.2 K/UL    ABS. LYMPHOCYTES 0.5 0.5 - 4.6 K/UL    ABS. MONOCYTES 0.5 0.1 - 1.3 K/UL    ABS. EOSINOPHILS 0.0 0.0 - 0.8 K/UL    ABS. BASOPHILS 0.0 0.0 - 0.2 K/UL    ABS. IMM.  GRANS. 0.1 0.0 - 0.5 K/UL   MAGNESIUM    Collection Time: 12/11/18  5:47 AM   Result Value Ref Range    Magnesium 2.5 (H) 1.8 - 2.4 mg/dL   TROPONIN I    Collection Time: 12/11/18  5:47 AM   Result Value Ref Range    Troponin-I, Qt. <0.02 (L) 0.02 - 0.05 NG/ML   DRUG SCREEN, URINE    Collection Time: 12/11/18  6:50 AM   Result Value Ref Range    PCP(PHENCYCLIDINE) NEGATIVE       BENZODIAZEPINES NEGATIVE       COCAINE POSITIVE      AMPHETAMINES NEGATIVE       METHADONE NEGATIVE       THC (TH-CANNABINOL) NEGATIVE       OPIATES POSITIVE      BARBITURATES NEGATIVE        All Micro Results     Procedure Component Value Units Date/Time    CULTURE, BLOOD [[id number]] Collected:  12/10/18 1142    Order Status:  Completed Specimen:  Blood Updated:  12/11/18 0810     Special Requests: --        RIGHT  Antecubital       Culture result: NO GROWTH AFTER 19 HOURS       CULTURE, BLOOD [[id number]] Collected:  12/10/18 1142    Order Status:  Completed Specimen:  Blood Updated:  12/11/18 0810     Special Requests: --        LEFT  Antecubital       Culture result: NO GROWTH AFTER 19 HOURS             Imaging:    XR CHEST PORT   Final Result   IMPRESSION: NO SIGNIFICANT INTERVAL CHANGE. XR CHEST PORT   Final Result   Impression:  Bilateral infiltrates versus pulmonary edema. ASSESSMENT      Hospital Problems as of 12/11/2018 Date Reviewed: 12/11/2018          Codes Class Noted - Resolved POA    Dyspnea ICD-10-CM: R06.00  ICD-9-CM: 786.09  12/10/2018 - Present Yes        Acute respiratory distress ICD-10-CM: R06.03  ICD-9-CM: 518.82  12/10/2018 - Present Yes        Pneumonia ICD-10-CM: J18.9  ICD-9-CM: 480  12/10/2018 - Present Yes        Substance abuse ([facility] ICD-10-CM: F19.10  ICD-9-CM: 305.90  12/10/2018 - Present Yes        Chronic systolic heart failure ([facility] ICD-10-CM: I50.22  ICD-9-CM: 428.22  10/18/2018 - Present Yes        HTN (hypertension) ICD-10-CM: I10  ICD-9-CM: 401.9  6/6/2018 - Present Yes    Overview Addendum 7/16/2018  9:53 AM by Giovanny Booth LPN     Last Assessment & Plan:   BP controlled. Continue current medications. Last Assessment & Plan:   BP controlled. Continue current medications. Anemia ICD-10-CM: D64.9  ICD-9-CM: 285.9  5/19/2018 - Present Yes        COPD (chronic obstructive pulmonary disease) ([facility] (Chronic) ICD-10-CM: J44.9  ICD-9-CM: 825  5/19/2018 - Present Yes    Overview Addendum 7/16/2018  9:53 AM by Giovanny Booth LPN     Last Assessment & Plan:   Lungs clear, normal chest x-ray yesterday. Continue inhalers, Singulair, & Flonase. Take Medrol dose pack as prescribed, discussed adverse effects. Stressed importance of keeping ENT & Pulmonology appointments. May also take Mucinex as needed, drink plenty of water. Follow up if worsening symptoms or concerns. Pt verbalized understanding, agreed with plan. Last Assessment & Plan:   Lungs clear, normal chest x-ray yesterday. Continue inhalers, Singulair, & Flonase. Take Medrol dose pack as prescribed, discussed adverse effects. Stressed importance of keeping ENT & Pulmonology appointments.   May also take Mucinex as needed, drink plenty of water. Follow up if worsening symptoms or concerns. Pt verbalized understanding, agreed with plan. Hyperlipidemia (Chronic) ICD-10-CM: E78.5  ICD-9-CM: 272.4  5/19/2018 - Present Yes        Hx of cocaine abuse ICD-10-CM: Z87.898  ICD-9-CM: 305.63  11/28/2017 - Present Yes        * (Principal) Acute respiratory failure with hypoxia [facility]) ICD-10-CM: J96.01  ICD-9-CM: 518.81  11/27/2017 - Present Yes            Plan:  · Acute respiratory failure- pneumonia vs cocaine induced pneumonitis. · Rocephin, azithromycin, vanc (day 2)  · Continue solumedrol per pulmonology. · Wean oxygen as tolerated. · Hycodan for cough. · Follow blood cultures. · Hemoptysis- pulmonary aware. Stopping eliquis and starting lovenox for DVT prevention. · History of a fib- NSR on EKG on admission. · Eliquis being stopped due to reported hemoptysis. · Chronic systolic heart failure- continue IV lasix for now. Cardiology believes infiltrates more inflammatory. Monitor I/O.    · Polysubstance abuse- counseled. Patient continually asking for more pain medications and hycodan. · Continue prn norco and hycodan as ordered. · Avoid IV narcotics due to tenuous respiratory status. Dispo- discharge date to be determined on clinical improvement    DVT Prophylaxis: Lovenox to start tomorrow; Eliquis discontinued today    Signed By: Mitchell Ram.  Juan Quiroga MD     December 11, 2018

## 2018-12-11 NOTE — PROGRESS NOTES
Patient stable with no complaints at this time. Patient is resting in bed with eyes closed. Patient appears comfortable at this time. Call light within reach and patient instructed to call if assistance is needed.   Report to be given to oncoming RN 7p-7a

## 2018-12-11 NOTE — PROGRESS NOTES
Stefany Seen Lynnette Hood Admission Date: 12/10/2018 Daily Progress Note: 12/11/2018 The patient's chart is reviewed and the patient is discussed with the staff. 
 
48 y.o. AAF evaluated at the request of Dr. Mariana Aguirre for dyspnea, hypoxemia and cough x several days. She has history of systolic heart failure with EF 35-40% on CHRISTI 10/22/18, COPD, NSTEMI induced by Cocaine abuse in 2017, DVT/PE on Eliquis, and COPD chronic O2 3L. She reports no cocaine use in several weeks (priro to hospitalization 11/24/2018), denies fevers but has noted some scant hemoptysis. Was felt to have cocaine induced pneumonitis during presentation 11/2018 treated with steroids and abx. She reportedly was asking for pain medications for left shoulder/back pain repeatedly in the ED, but did not ask me for pain meds today. States she hasn't had a cigarette in 10 days. She states she had a similar illness about 1 year ago and felt about the same. Subjective:  
 
Sitting up in bed, complains of cough with hemoptysis coughing up \"blood clots\". Requesting pain med and cough suppressant. Remains on Opti-flow. Current Facility-Administered Medications Medication Dose Route Frequency  azithromycin (ZITHROMAX) tablet 500 mg  500 mg Oral Q24H  
 vancomycin (VANCOCIN) 1250 mg in  ml infusion  1,250 mg IntraVENous Q12H  
 [START ON 12/12/2018] Vancomycin Trough Reminder   Other ONCE  
 apixaban (ELIQUIS) tablet 5 mg  5 mg Oral BID  carvedilol (COREG) tablet 25 mg  25 mg Oral BID WITH MEALS  clonazePAM (KlonoPIN) tablet 0.5 mg  0.5 mg Oral TID  diphenhydrAMINE (BENADRYL) capsule 50 mg  50 mg Oral Q6H PRN  
 estradiol (ESTRACE) tablet 0.5 mg  0.5 mg Oral DAILY  FLUoxetine (PROzac) capsule 20 mg  20 mg Oral DAILY  gabapentin (NEURONTIN) capsule 400 mg  400 mg Oral TID  mirtazapine (REMERON) tablet 15 mg  15 mg Oral QHS  nitroglycerin (NITROSTAT) tablet 0.4 mg  0.4 mg SubLINGual Q5MIN PRN  
 oxybutynin (DITROPAN) tablet 5 mg  5 mg Oral BID  pantoprazole (PROTONIX) tablet 40 mg  40 mg Oral ACB  pravastatin (PRAVACHOL) tablet 40 mg  40 mg Oral QHS  promethazine (PHENERGAN) tablet 25 mg  25 mg Oral Q6H PRN  
 spironolactone (ALDACTONE) tablet 25 mg  25 mg Oral DAILY  sodium chloride (NS) flush 5-10 mL  5-10 mL IntraVENous Q8H  
 sodium chloride (NS) flush 5-10 mL  5-10 mL IntraVENous PRN  
 cefTRIAXone (ROCEPHIN) 1 g in 0.9% sodium chloride (MBP/ADV) 50 mL  1 g IntraVENous Q24H  
 acetaminophen (TYLENOL) tablet 650 mg  650 mg Oral Q4H PRN  
 HYDROcodone-acetaminophen (NORCO) 5-325 mg per tablet 1 Tab  1 Tab Oral Q4H PRN  
 morphine 10 mg/ml injection 5 mg  5 mg IntraVENous Q4H PRN  
 naloxone (NARCAN) injection 0.4 mg  0.4 mg IntraVENous PRN  
 methylPREDNISolone (PF) (Solu-MEDROL) injection 40 mg  40 mg IntraVENous Q12H  
 LORazepam (ATIVAN) injection 1 mg  1 mg IntraVENous Q6H PRN  
 nicotine (NICODERM CQ) 14 mg/24 hr patch 1 Patch  1 Patch TransDERmal Q24H  
 albuterol-ipratropium (DUO-NEB) 2.5 MG-0.5 MG/3 ML  3 mL Nebulization Q4HWA RT  
 furosemide (LASIX) injection 40 mg  40 mg IntraVENous Q12H  
 guaiFENesin ER (MUCINEX) tablet 600 mg  600 mg Oral Q12H  
 budesonide (PULMICORT) 500 mcg/2 ml nebulizer suspension  500 mcg Nebulization BID RT Review of Systems Constitutional: negative for fever, chills, sweats Cardiovascular: negative for chest pain, palpitations, syncope, edema Gastrointestinal:  negative for dysphagia, reflux, vomiting, diarrhea, abdominal pain, or melena Neurologic:  negative for focal weakness, numbness, headache Objective:  
 
Vitals:  
 12/11/18 7895 12/11/18 0415 12/11/18 7542 12/11/18 5820 BP: 137/89  145/77 Pulse: 88  90 Resp: 20  21 Temp: 98.2 °F (36.8 °C)  97.8 °F (36.6 °C) SpO2: 90%  91% 92% Weight:  190 lb 1.6 oz (86.2 kg) Intake and Output:  
12/09 1901 - 12/11 0700 In: 360 [P.O.:360] Out: 400 [Urine:400] 12/11 0701 - 12/11 1900 In: 240 [P.O.:240] Out: 500 [Urine:500] Physical Exam:  
Constitution:  the patient is well developed and in no acute distress, Opti-flow 50L, 92% with sat 91% EENMT:  Sclera clear, pupils equal, oral mucosa moist 
Respiratory: scattered crackles anterior and posterior, hemoptysis Cardiovascular:  RRR without M,G,R 
Gastrointestinal: soft and non-tender; with positive bowel sounds. Musculoskeletal: warm without cyanosis. There is no lower leg edema. Skin:  no jaundice or rashes, no wounds Neurologic: no gross neuro deficits Psychiatric:  alert and oriented x 3 CXR 12/10/18:  Bilateral infiltrates versus pulmonary edema LAB No results for input(s): GLUCPOC in the last 72 hours. No lab exists for component: [place] Recent Labs 12/11/18 
030 70 25 13 12/10/18 
1142 WBC 13.3* 15.8* HGB 9.1* 9.9*  
HCT 29.2* 32.7*  
 462* Recent Labs 12/11/18 
0547 12/10/18 
1852 12/10/18 
1142   --  138  
K 3.8  --  4.0  
  --  106 CO2 27  --  24 *  --  105* BUN 17  --  14  
CREA 0.97  --  1.12* MG 2.5* 2.6*  --   
CA 9.0  --  9.6  
TROIQ <0.02* <0.02*  --   
ALB  --   --  3.4* TBILI  --   --  0.5 ALT  --   --  42 SGOT  --   --  64* Recent Labs 12/10/18 
1829 PHI 7.384 PCO2I 41.2 PO2I 97 HCO3I 24.6 No results for input(s): LCAD, LAC in the last 72 hours. Assessment:  (Medical Decision Making) Hospital Problems  Date Reviewed: 12/11/2018 Codes Class Noted POA Dyspnea ICD-10-CM: R06.00 
ICD-9-CM: 786.09  12/10/2018 Yes  
 continue opti-flow Acute respiratory distress ICD-10-CM: R06.03 
ICD-9-CM: 518.82  12/10/2018 Yes Opti-flow Pneumonia ICD-10-CM: J18.9 ICD-9-CM: 445  12/10/2018 Yes Continue antibiotics [facility] ICD-10-CM: F19.10 ICD-9-CM: 305.90  12/10/2018 Yes  
 chronic Acute on chronic systolic heart failure (HCC) ICD-10-CM: G23.05 ICD-9-CM: 428.23  10/18/2018 Yes On IV Lasix HTN (hypertension) ICD-10-CM: I10 
ICD-9-CM: 401.9  6/6/2018 Yes Last Assessment & Plan:  
BP controlled. Continue current medications. Last Assessment & Plan:  
BP controlled. Continue current medications. -140s Anemia ICD-10-CM: D64.9 ICD-9-CM: 285.9  5/19/2018 Yes  
 hgb 9.1 COPD (chronic obstructive pulmonary disease) (HCC) (Chronic) ICD-10-CM: J44.9 ICD-9-CM: 919  5/19/2018 Yes Last Assessment & Plan:  
Lungs clear, normal chest x-ray yesterday. Continue inhalers, Singulair, & Flonase. Take Medrol dose pack as prescribed, discussed adverse effects. Stressed importance of keeping ENT & Pulmonology appointments. May also take Mucinex as needed, drink plenty of water. Follow up if worsening symptoms or concerns. Pt verbalized understanding, agreed with plan. Last Assessment & Plan:  
Lungs clear, normal chest x-ray yesterday. Continue inhalers, Singulair, & Flonase. Take Medrol dose pack as prescribed, discussed adverse effects. Stressed importance of keeping ENT & Pulmonology appointments. May also take Mucinex as needed, drink plenty of water. Follow up if worsening symptoms or concerns. Pt verbalized understanding, agreed with plan. Continue current Hyperlipidemia (Chronic) ICD-10-CM: E34.3 ICD-9-CM: 272.4  5/19/2018 Yes  
 chronic Hx of cocaine abuse ICD-10-CM: Z87.898 ICD-9-CM: 305.63  11/28/2017 Yes UDS positive this admission * (Principal) Acute respiratory failure with hypoxia ([facility] ICD-10-CM: J96.01 
ICD-9-CM: 518.81  11/27/2017 Yes On Opti-flow--wean as tolerated Plan:  (Medical Decision Making) --Duoneb, Pulmicort, Mucinex 
--Solu Medrol 40mg q12h 
--Eliquis--having hemoptysis and discussed with cardiology.  Wll stop Eliquis for now, use Lovenox for DVT prophylaxis and consider only Eliquis 2.5mg BID if felt needed at discharge. DVT appears remote. --Zithromax, Rocephin, Vancomycin day 2 --Blood cultures:  pending 
--WBC down to 13.3 
--Lasix 40mg IV q12h 
--Nicotine patch--states no smoking in 10 days 
--UDS positive for cocaine and opiates --Requesting treatment for cough and pain. Try to limit IV narcotics. Norco increased. More than 50% of the time documented was spent in face-to-face contact with the patient and in the care of the patient on the floor/unit where the patient is located. Terence Aguilar, MOUNIKA Lungs:  coarse Heart:  RRR with no Murmur/Rubs/Gallops Additional Comments:  Continue support, ABX, steroids lasix, wean optiflow as tolerated, still high risk for decompensation and need for BIPAP I have spoken with and examined the patient. I agree with the above assessment and plan as documented.  
 
Jason Queen MD

## 2018-12-11 NOTE — PROGRESS NOTES
Patient c/o pain and requested cough medication. Norco 10mg and hycodan given per order. Will monitor.

## 2018-12-11 NOTE — PROGRESS NOTES
Spiritual Care Visit, initial visit. Visited with patient at bedside. Patient has emotional and physical issues. She stated that she has spiraled downward since her father had .  prayed for patient's emotional and physical healing and health. Encouraged her with pertinent scriptures. Visit by Brown Myles, Staff .  M.Arnoldo., Th.B., B.A.

## 2018-12-11 NOTE — PROGRESS NOTES
Patient voices concerns about pain. Medication to be given per order. Patient resting in bed watching TV. Patient is continues to c/o pain 10/10; MD aware. Patient does appear comfortable at this time. Call light within reach and patient instructed to call if assistance is needed. Will continue to monitor.

## 2018-12-11 NOTE — PROGRESS NOTES
Patient awakened when vitals taken and demanding morphine for pain. Medicated per prn order. She immediately calmed down and is resting quietly, eyes closed.

## 2018-12-11 NOTE — PROGRESS NOTES
Patient c/o discomfort and feeling feverish. Tylenol 650 mg po given. Refusing to give urine specimen at this time. Continues with airflo. Resting in bed, watching TV.

## 2018-12-11 NOTE — PROGRESS NOTES
Patient up to bedside commode, became short of breath with exertion and started yelling for more morphine. Explained it is not time, she finally went back to bed and calmed down. Has episode of dry, non productive coughing.

## 2018-12-11 NOTE — PROGRESS NOTES
Pharmacokinetic Consult to Pharmacist 
 
Sommer Jauregui is a 48 y.o. female being treated for CAP with azithromycin, ceftriaxone and vancomycin. Weight: 86.1 kg (189 lb 12.8 oz) Lab Results Component Value Date/Time BUN 14 12/10/2018 11:42 AM  
 Creatinine 1.12 (H) 12/10/2018 11:42 AM  
 WBC 15.8 (H) 12/10/2018 11:42 AM  
 Procalcitonin 0.2 11/28/2017 05:40 AM  
 Lactic Acid (POC) 1.55 12/10/2018 11:41 AM  
  
Estimated Creatinine Clearance: 63.1 mL/min (A) (based on SCr of 1.12 mg/dL (H)). Day 1 of vancomycin. Goal trough is 15-20. Dosing started with 2g x 1 and then 1g q12h. Will continue to follow patient. Thank you, Abelino Dmuont, Pharm. D. Clinical Pharmacist 
[phone]

## 2018-12-11 NOTE — PROGRESS NOTES
[facility] CARDIOLOGY PROGRESS NOTE 
      
 
12/11/2018 10:49 AM 
 
Admit Date: 12/10/2018 Subjective:  
Patient with continued dyspnea and cough. CRP 80 suggestive of inflammatory etiology of infiltrates. UDS positive for cocaine. ROS: 
Cardiovascular:  As noted above Objective:  
  
Vitals:  
 12/11/18 2712 12/11/18 0415 12/11/18 1371 12/11/18 8553 BP: 137/89  145/77 Pulse: 88  90 Resp: 20  21 Temp: 98.2 °F (36.8 °C)  97.8 °F (36.6 °C) SpO2: 90%  91% 92% Weight:  86.2 kg (190 lb 1.6 oz) Physical Exam: 
General-No Acute Distress Neck- supple, no JVD 
CV- regular rate and rhythm Grade I/VI HÉCTOR Lung- Bilateral rales. Abd- soft, nontender, nondistended Ext- no edema bilaterally. Skin- warm and dry Data Review:  
Recent Labs 12/11/18 
0547 12/10/18 
1852 12/10/18 
1142   --  138  
K 3.8  --  4.0  
MG 2.5* 2.6*  --   
BUN 17  --  14  
CREA 0.97  --  1.12* *  --  105* WBC 13.3*  --  15.8* HGB 9.1*  --  9.9*  
HCT 29.2*  --  32.7*  
  --  462* Lab Results Component Value Date/Time TROIQ <0.02 (L) 12/11/2018 05:47 AM  
 TROIQ <0.02 (L) 12/10/2018 06:52 PM  
 TNIPOC 0 (L) 12/10/2018 11:39 AM  
 
 
Assessment/Plan:  
 
Principal Problem: 
  Acute respiratory failure with hypoxia ([facility] [address] (11/27/2017) CRP severely elevated. Suspect non-cardiogenic edema. Continue IV solumedrol and antibiotics per pulmonary. Active Problems: Hx of cocaine abuse (11/28/2017) UDS positive. Anemia (5/19/2018) Stable. COPD (chronic obstructive pulmonary disease) ([address] (5/19/2018) Per Pulmonary HTN (hypertension) (6/6/2018) Stop coreg with Cocaine use. Chronic systolic heart failure ([address] (10/18/2018) Appears pulmonary edema likely non-cardiogenic. Cautious diuresis. Unfortunately will have to stop Coreg with Cocaine use. Start lisinopril. Acute respiratory distress (12/10/2018) See above. Pneumonia (12/10/2018) Per pulmonary.    
 
  
 
 
 
 
Verona Duncan MD 
12/11/2018 10:49 AM

## 2018-12-11 NOTE — PROGRESS NOTES
Problem: Interdisciplinary Rounds  Goal: Interdisciplinary Rounds  Interdisciplinary team rounds were held 12/11/2018 with the following team members:Care Management, Physical Therapy, Physician and Clinical Coordinator. Plan of care discussed. See clinical pathway and/or care plan for interventions and desired outcomes.

## 2018-12-11 NOTE — PROGRESS NOTES
Patient readmitted after recent discharge 11/26 with PNA and substance abuse (UDS positive for cocaine). She is independent at baseline. She is insured through [facility]. No discharge planning needs anticipated. Case Management will continue to follow. Care Management Interventions PCP Verified by CM: Yes Transition of Care Consult (CM Consult): Discharge Planning Discharge Durable Medical Equipment: No 
Physical Therapy Consult: No 
Occupational Therapy Consult: No 
Speech Therapy Consult: No 
Current Support Network: Own Home Confirm Follow Up Transport: Family Plan discussed with Pt/Family/Caregiver: Yes Freedom of Choice Offered: Yes Discharge Location Discharge Placement: Home

## 2018-12-11 NOTE — PROGRESS NOTES
Patient requested pain medication for left rib cage pain at 2140. Was medicated with Morphine 5 mg ivp per prn order. Has been very loud, anxious and impulsive tonight. Jumping out of bed to go to bathroom, tearing off airflo and disregarding iv tubing. Will continue to monitor.

## 2018-12-12 ENCOUNTER — APPOINTMENT (OUTPATIENT)
Dept: GENERAL RADIOLOGY | Age: 53
DRG: 133 | End: 2018-12-12
Attending: INTERNAL MEDICINE
Payer: COMMERCIAL

## 2018-12-12 LAB
ANION GAP SERPL CALC-SCNC: 12 MMOL/L (ref 7–16)
ARTERIAL PATENCY WRIST A: YES
BASE EXCESS BLD CALC-SCNC: 4 MMOL/L
BASE EXCESS BLD CALC-SCNC: 6 MMOL/L
BASE EXCESS BLD CALC-SCNC: 7 MMOL/L
BASOPHILS # BLD: 0 K/UL (ref 0–0.2)
BASOPHILS NFR BLD: 0 % (ref 0–2)
BDY SITE: ABNORMAL
BODY TEMPERATURE: 98.6
BUN SERPL-MCNC: 21 MG/DL (ref 6–23)
CALCIUM SERPL-MCNC: 8.9 MG/DL (ref 8.3–10.4)
CHLORIDE SERPL-SCNC: 101 MMOL/L (ref 98–107)
CO2 BLD-SCNC: 29 MMOL/L
CO2 BLD-SCNC: 32 MMOL/L
CO2 BLD-SCNC: 32 MMOL/L
CO2 SERPL-SCNC: 26 MMOL/L (ref 21–32)
COLLECT TIME,HTIME: 1212
COLLECT TIME,HTIME: 1300
COLLECT TIME,HTIME: 2043
CREAT SERPL-MCNC: 1.17 MG/DL (ref 0.6–1)
DIFFERENTIAL METHOD BLD: ABNORMAL
EOSINOPHIL # BLD: 0 K/UL (ref 0–0.8)
EOSINOPHIL NFR BLD: 0 % (ref 0.5–7.8)
ERYTHROCYTE [DISTWIDTH] IN BLOOD BY AUTOMATED COUNT: 14.4 % (ref 11.9–14.6)
FLOW RATE ISTAT,IFRATE: 50 L/MIN
FLOW RATE ISTAT,IFRATE: 55 L/MIN
FLOW RATE ISTAT,IFRATE: 55 L/MIN
GAS FLOW.O2 O2 DELIVERY SYS: ABNORMAL L/MIN
GLUCOSE SERPL-MCNC: 261 MG/DL (ref 65–100)
HCO3 BLD-SCNC: 27.7 MMOL/L (ref 22–26)
HCO3 BLD-SCNC: 30.8 MMOL/L (ref 22–26)
HCO3 BLD-SCNC: 31 MMOL/L (ref 22–26)
HCT VFR BLD AUTO: 31.7 % (ref 35.8–46.3)
HGB BLD-MCNC: 9.6 G/DL (ref 11.7–15.4)
IMM GRANULOCYTES # BLD: 0.1 K/UL (ref 0–0.5)
IMM GRANULOCYTES NFR BLD AUTO: 0 % (ref 0–5)
LYMPHOCYTES # BLD: 0.5 K/UL (ref 0.5–4.6)
LYMPHOCYTES NFR BLD: 3 % (ref 13–44)
MAGNESIUM SERPL-MCNC: 2.2 MG/DL (ref 1.8–2.4)
MCH RBC QN AUTO: 29.2 PG (ref 26.1–32.9)
MCHC RBC AUTO-ENTMCNC: 30.3 G/DL (ref 31.4–35)
MCV RBC AUTO: 96.4 FL (ref 79.6–97.8)
MONOCYTES # BLD: 0.5 K/UL (ref 0.1–1.3)
MONOCYTES NFR BLD: 4 % (ref 4–12)
NEUTS SEG # BLD: 12.8 K/UL (ref 1.7–8.2)
NEUTS SEG NFR BLD: 93 % (ref 43–78)
NRBC # BLD: 0.05 K/UL (ref 0–0.2)
O2/TOTAL GAS SETTING VFR VENT: 100 %
PCO2 BLD: 37.9 MMHG (ref 35–45)
PCO2 BLD: 39.7 MMHG (ref 35–45)
PCO2 BLD: 48 MMHG (ref 35–45)
PH BLD: 7.42 [PH] (ref 7.35–7.45)
PH BLD: 7.45 [PH] (ref 7.35–7.45)
PH BLD: 7.52 [PH] (ref 7.35–7.45)
PLATELET # BLD AUTO: 400 K/UL (ref 150–450)
PMV BLD AUTO: 9.7 FL (ref 9.4–12.3)
PO2 BLD: 32 MMHG (ref 75–100)
PO2 BLD: 45 MMHG (ref 75–100)
PO2 BLD: 72 MMHG (ref 75–100)
POTASSIUM SERPL-SCNC: 3.5 MMOL/L (ref 3.5–5.1)
RBC # BLD AUTO: 3.29 M/UL (ref 4.05–5.2)
SAO2 % BLD: 69 % (ref 95–98)
SAO2 % BLD: 83 % (ref 95–98)
SAO2 % BLD: 94 % (ref 95–98)
SERVICE CMNT-IMP: ABNORMAL
SODIUM SERPL-SCNC: 139 MMOL/L (ref 136–145)
SPECIMEN TYPE: ABNORMAL
VANCOMYCIN TROUGH SERPL-MCNC: 11.6 UG/ML (ref 5–20)
WBC # BLD AUTO: 13.8 K/UL (ref 4.3–11.1)

## 2018-12-12 PROCEDURE — 74011250636 HC RX REV CODE- 250/636: Performed by: HOSPITALIST

## 2018-12-12 PROCEDURE — 74011250637 HC RX REV CODE- 250/637: Performed by: NURSE PRACTITIONER

## 2018-12-12 PROCEDURE — 74011250636 HC RX REV CODE- 250/636: Performed by: INTERNAL MEDICINE

## 2018-12-12 PROCEDURE — 82803 BLOOD GASES ANY COMBINATION: CPT

## 2018-12-12 PROCEDURE — 94640 AIRWAY INHALATION TREATMENT: CPT

## 2018-12-12 PROCEDURE — 74011250636 HC RX REV CODE- 250/636: Performed by: NURSE PRACTITIONER

## 2018-12-12 PROCEDURE — 83735 ASSAY OF MAGNESIUM: CPT

## 2018-12-12 PROCEDURE — 71045 X-RAY EXAM CHEST 1 VIEW: CPT

## 2018-12-12 PROCEDURE — 36600 WITHDRAWAL OF ARTERIAL BLOOD: CPT

## 2018-12-12 PROCEDURE — 94761 N-INVAS EAR/PLS OXIMETRY MLT: CPT

## 2018-12-12 PROCEDURE — 74011250637 HC RX REV CODE- 250/637: Performed by: INTERNAL MEDICINE

## 2018-12-12 PROCEDURE — 94760 N-INVAS EAR/PLS OXIMETRY 1: CPT

## 2018-12-12 PROCEDURE — 77010033678 HC OXYGEN DAILY

## 2018-12-12 PROCEDURE — 99233 SBSQ HOSP IP/OBS HIGH 50: CPT | Performed by: INTERNAL MEDICINE

## 2018-12-12 PROCEDURE — 36415 COLL VENOUS BLD VENIPUNCTURE: CPT

## 2018-12-12 PROCEDURE — 74011000258 HC RX REV CODE- 258: Performed by: HOSPITALIST

## 2018-12-12 PROCEDURE — 85025 COMPLETE CBC W/AUTO DIFF WBC: CPT

## 2018-12-12 PROCEDURE — 77010033711 HC HIGH FLOW OXYGEN

## 2018-12-12 PROCEDURE — 65660000000 HC RM CCU STEPDOWN

## 2018-12-12 PROCEDURE — 74011250637 HC RX REV CODE- 250/637: Performed by: HOSPITALIST

## 2018-12-12 PROCEDURE — 80202 ASSAY OF VANCOMYCIN: CPT

## 2018-12-12 PROCEDURE — 80048 BASIC METABOLIC PNL TOTAL CA: CPT

## 2018-12-12 PROCEDURE — 74011000250 HC RX REV CODE- 250: Performed by: HOSPITALIST

## 2018-12-12 RX ORDER — FUROSEMIDE 40 MG/1
40 TABLET ORAL DAILY
Status: DISCONTINUED | OUTPATIENT
Start: 2018-12-13 | End: 2018-12-13

## 2018-12-12 RX ORDER — VANCOMYCIN 1.75 GRAM/500 ML IN 0.9 % SODIUM CHLORIDE INTRAVENOUS
1750 EVERY 12 HOURS
Status: DISCONTINUED | OUTPATIENT
Start: 2018-12-12 | End: 2018-12-13 | Stop reason: HOSPADM

## 2018-12-12 RX ORDER — BENZONATATE 100 MG/1
100 CAPSULE ORAL
Status: DISCONTINUED | OUTPATIENT
Start: 2018-12-12 | End: 2018-12-13 | Stop reason: HOSPADM

## 2018-12-12 RX ORDER — KETOROLAC TROMETHAMINE 15 MG/ML
15 INJECTION, SOLUTION INTRAMUSCULAR; INTRAVENOUS EVERY 8 HOURS
Status: DISCONTINUED | OUTPATIENT
Start: 2018-12-12 | End: 2018-12-12

## 2018-12-12 RX ADMIN — FUROSEMIDE 40 MG: 10 INJECTION, SOLUTION INTRAVENOUS at 09:36

## 2018-12-12 RX ADMIN — FLUOXETINE 20 MG: 20 CAPSULE ORAL at 09:35

## 2018-12-12 RX ADMIN — AZITHROMYCIN 500 MG: 250 TABLET, FILM COATED ORAL at 16:42

## 2018-12-12 RX ADMIN — HYDROCODONE BITARTRATE AND ACETAMINOPHEN 2 TABLET: 5; 325 TABLET ORAL at 16:40

## 2018-12-12 RX ADMIN — Medication 10 ML: at 06:04

## 2018-12-12 RX ADMIN — ESTRADIOL 0.5 MG: 1 TABLET ORAL at 09:00

## 2018-12-12 RX ADMIN — CEFTRIAXONE SODIUM 1 G: 1 INJECTION, POWDER, FOR SOLUTION INTRAMUSCULAR; INTRAVENOUS at 16:41

## 2018-12-12 RX ADMIN — HYDROCODONE BITARTRATE AND HOMATROPINE METHYLBROMIDE 5 ML: 5; 1.5 SOLUTION ORAL at 16:40

## 2018-12-12 RX ADMIN — OXYBUTYNIN CHLORIDE 5 MG: 5 TABLET ORAL at 09:35

## 2018-12-12 RX ADMIN — OXYBUTYNIN CHLORIDE 5 MG: 5 TABLET ORAL at 16:43

## 2018-12-12 RX ADMIN — GABAPENTIN 400 MG: 400 CAPSULE ORAL at 16:42

## 2018-12-12 RX ADMIN — METHYLPREDNISOLONE SODIUM SUCCINATE 60 MG: 40 INJECTION, POWDER, FOR SOLUTION INTRAMUSCULAR; INTRAVENOUS at 09:36

## 2018-12-12 RX ADMIN — GABAPENTIN 400 MG: 400 CAPSULE ORAL at 22:15

## 2018-12-12 RX ADMIN — BUDESONIDE 500 MCG: 0.5 INHALANT RESPIRATORY (INHALATION) at 07:49

## 2018-12-12 RX ADMIN — ENOXAPARIN SODIUM 40 MG: 40 INJECTION SUBCUTANEOUS at 09:36

## 2018-12-12 RX ADMIN — BUDESONIDE 500 MCG: 0.5 INHALANT RESPIRATORY (INHALATION) at 20:55

## 2018-12-12 RX ADMIN — IPRATROPIUM BROMIDE AND ALBUTEROL SULFATE 3 ML: .5; 3 SOLUTION RESPIRATORY (INHALATION) at 20:55

## 2018-12-12 RX ADMIN — CLONAZEPAM 0.5 MG: 0.5 TABLET ORAL at 22:15

## 2018-12-12 RX ADMIN — PANTOPRAZOLE SODIUM 40 MG: 40 TABLET, DELAYED RELEASE ORAL at 06:00

## 2018-12-12 RX ADMIN — CLONAZEPAM 0.5 MG: 0.5 TABLET ORAL at 09:35

## 2018-12-12 RX ADMIN — IPRATROPIUM BROMIDE AND ALBUTEROL SULFATE 3 ML: .5; 3 SOLUTION RESPIRATORY (INHALATION) at 11:36

## 2018-12-12 RX ADMIN — METHYLPREDNISOLONE SODIUM SUCCINATE 60 MG: 40 INJECTION, POWDER, FOR SOLUTION INTRAMUSCULAR; INTRAVENOUS at 22:13

## 2018-12-12 RX ADMIN — IPRATROPIUM BROMIDE AND ALBUTEROL SULFATE 3 ML: .5; 3 SOLUTION RESPIRATORY (INHALATION) at 15:56

## 2018-12-12 RX ADMIN — GUAIFENESIN 600 MG: 600 TABLET, EXTENDED RELEASE ORAL at 09:35

## 2018-12-12 RX ADMIN — SPIRONOLACTONE 25 MG: 25 TABLET ORAL at 09:35

## 2018-12-12 RX ADMIN — IPRATROPIUM BROMIDE AND ALBUTEROL SULFATE 3 ML: .5; 3 SOLUTION RESPIRATORY (INHALATION) at 07:49

## 2018-12-12 RX ADMIN — HYDROCODONE BITARTRATE AND HOMATROPINE METHYLBROMIDE 5 ML: 5; 1.5 SOLUTION ORAL at 06:00

## 2018-12-12 RX ADMIN — PRAVASTATIN SODIUM 40 MG: 20 TABLET ORAL at 22:15

## 2018-12-12 RX ADMIN — VANCOMYCIN HYDROCHLORIDE 1250 MG: 10 INJECTION, POWDER, LYOPHILIZED, FOR SOLUTION INTRAVENOUS at 10:33

## 2018-12-12 RX ADMIN — CLONAZEPAM 0.5 MG: 0.5 TABLET ORAL at 16:43

## 2018-12-12 RX ADMIN — HYDROCODONE BITARTRATE AND HOMATROPINE METHYLBROMIDE 5 ML: 5; 1.5 SOLUTION ORAL at 10:45

## 2018-12-12 RX ADMIN — LISINOPRIL 10 MG: 5 TABLET ORAL at 09:35

## 2018-12-12 RX ADMIN — VANCOMYCIN HYDROCHLORIDE 1750 MG: 10 INJECTION, POWDER, LYOPHILIZED, FOR SOLUTION INTRAVENOUS at 22:15

## 2018-12-12 RX ADMIN — GABAPENTIN 400 MG: 400 CAPSULE ORAL at 09:35

## 2018-12-12 RX ADMIN — HYDROCODONE BITARTRATE AND ACETAMINOPHEN 2 TABLET: 5; 325 TABLET ORAL at 04:10

## 2018-12-12 RX ADMIN — HYDROCODONE BITARTRATE AND ACETAMINOPHEN 2 TABLET: 5; 325 TABLET ORAL at 23:30

## 2018-12-12 RX ADMIN — MIRTAZAPINE 15 MG: 15 TABLET, FILM COATED ORAL at 22:15

## 2018-12-12 RX ADMIN — HYDROCODONE BITARTRATE AND ACETAMINOPHEN 2 TABLET: 5; 325 TABLET ORAL at 10:45

## 2018-12-12 RX ADMIN — Medication 5 ML: at 14:00

## 2018-12-12 RX ADMIN — GUAIFENESIN 600 MG: 600 TABLET, EXTENDED RELEASE ORAL at 22:15

## 2018-12-12 RX ADMIN — Medication 10 ML: at 22:16

## 2018-12-12 NOTE — PROGRESS NOTES
Problem: Interdisciplinary Rounds  Goal: Interdisciplinary Rounds  Outcome: Progressing Towards Goal  Interdisciplinary team rounds were held 12/12/2018 with the following team members:Care Management, Nursing, Physical Therapy, Physician and Clinical Coordinator and the patient. Plan of care discussed. See clinical pathway and/or care plan for interventions and desired outcomes.

## 2018-12-12 NOTE — PROGRESS NOTES
Hycodan effective. No cough noted at this time. Patient sitting up in bed at this time, talking with visitor at bedside, respirations even and unlabored.

## 2018-12-12 NOTE — PROGRESS NOTES
Hospitalist Progress Note    2018  Admit Date: 12/10/2018 11:28 AM   NAME: Kim Cintron   :  1965   MRN:  [id number]   Attending: Darian Cuenca MD  PCP:  Ivis Cabrera MD    SUBJECTIVE:   Naeem Narvaez is a 49 yo F admitted  12/10 with acute hypoxic respiratory failure (HCAP vs cocaine pneumonitis). Reports she is breathing okay with optiflow but very short of breath if removed. + cough with bloody sputum (reports clots). She is very tangential in conversation. States she is not urinating frequently despite the lasix (uncertain if I/Os correct). Requesting for increased pain medication and hycodan. - sleeping upon entering room. I awakened her and she started asking for more hycodan. Still on high flow oxygen. Reports no further hemoptysis.       Review of Systems negative with exception of pertinent positives noted above  PHYSICAL EXAM     Visit Vitals  /74   Pulse 75   Temp 98.6 °F (37 °C)   Resp 20   Wt 86.7 kg (191 lb 3.2 oz)   SpO2 (!) 88%   BMI 32.82 kg/m²      Temp (24hrs), Av.3 °F (36.8 °C), Min:98.1 °F (36.7 °C), Max:98.6 °F (37 °C)    Patient Vitals for the past 24 hrs:   Temp Pulse Resp BP SpO2   18 1558     (!) 88 %   18 1537 98.6 °F (37 °C) 75 20 139/74 95 %   18 1134 98.2 °F (36.8 °C) (!) 107 21 154/90    18 0752     95 %   18 0718 98.3 °F (36.8 °C) (!) 105 20 155/88 90 %   18 1955     91 %   18 1933 98.1 °F (36.7 °C) (!) 107 22 155/84 96 %       Oxygen Therapy  O2 Sat (%): (!) 88 % (18 1558)  Pulse via Oximetry: 102 beats per minute (18 1558)  O2 Device: Hi flow nasal cannula;Humidifier;Heated (18)  O2 Flow Rate (L/min): 55 l/min (18)  O2 Temperature: 87.8 °F (31 °C) (18)  FIO2 (%): 100 %(Pt on 100% Dr. Vasquez Raring aware) (18)    Intake/Output Summary (Last 24 hours) at 2018 6691  Last data filed at 2018 1325  Gross per 24 hour   Intake 1340 ml   Output 1080 ml   Net 260 ml      General: Dyspneic in conversation, tangential in conversation   Lungs:  coarse  Heart:  Regular rate and rhythm,  No murmur, rub, or gallop  Abdomen: Soft, Non distended, Non tender, Positive bowel sounds  Extremities: No cyanosis, clubbing or edema  Neurologic:  No focal deficits    Recent Results (from the past 24 hour(s))   METABOLIC PANEL, BASIC    Collection Time: 12/12/18  6:15 AM   Result Value Ref Range    Sodium 139 136 - 145 mmol/L    Potassium 3.5 3.5 - 5.1 mmol/L    Chloride 101 98 - 107 mmol/L    CO2 26 21 - 32 mmol/L    Anion gap 12 7 - 16 mmol/L    Glucose 261 (H) 65 - 100 mg/dL    BUN 21 6 - 23 MG/DL    Creatinine 1.17 (H) 0.6 - 1.0 MG/DL    GFR est AA >60 >60 ml/min/1.73m2    GFR est non-AA 52 (L) >60 ml/min/1.73m2    Calcium 8.9 8.3 - 10.4 MG/DL   MAGNESIUM    Collection Time: 12/12/18  6:15 AM   Result Value Ref Range    Magnesium 2.2 1.8 - 2.4 mg/dL   CBC WITH AUTOMATED DIFF    Collection Time: 12/12/18  6:15 AM   Result Value Ref Range    WBC 13.8 (H) 4.3 - 11.1 K/uL    RBC 3.29 (L) 4.05 - 5.2 M/uL    HGB 9.6 (L) 11.7 - 15.4 g/dL    HCT 31.7 (L) 35.8 - 46.3 %    MCV 96.4 79.6 - 97.8 FL    MCH 29.2 26.1 - 32.9 PG    MCHC 30.3 (L) 31.4 - 35.0 g/dL    RDW 14.4 11.9 - 14.6 %    PLATELET 074 534 - 975 K/uL    MPV 9.7 9.4 - 12.3 FL    ABSOLUTE NRBC 0.05 0.0 - 0.2 K/uL    DF AUTOMATED      NEUTROPHILS 93 (H) 43 - 78 %    LYMPHOCYTES 3 (L) 13 - 44 %    MONOCYTES 4 4.0 - 12.0 %    EOSINOPHILS 0 (L) 0.5 - 7.8 %    BASOPHILS 0 0.0 - 2.0 %    IMMATURE GRANULOCYTES 0 0.0 - 5.0 %    ABS. NEUTROPHILS 12.8 (H) 1.7 - 8.2 K/UL    ABS. LYMPHOCYTES 0.5 0.5 - 4.6 K/UL    ABS. MONOCYTES 0.5 0.1 - 1.3 K/UL    ABS. EOSINOPHILS 0.0 0.0 - 0.8 K/UL    ABS. BASOPHILS 0.0 0.0 - 0.2 K/UL    ABS. IMM.  GRANS. 0.1 0.0 - 0.5 K/UL   VANCOMYCIN, TROUGH    Collection Time: 12/12/18  9:52 AM   Result Value Ref Range    Vancomycin,trough 11.6 5 - 20 ug/mL   POC G3 Collection Time: 12/12/18 12:17 PM   Result Value Ref Range    Device: High Flow Nasal Cannula      FIO2 (POC) 100 %    pH (POC) 7.518 (H) 7.35 - 7.45      pCO2 (POC) 37.9 35 - 45 MMHG    pO2 (POC) 32 (LL) 75 - 100 MMHG    HCO3 (POC) 30.8 (H) 22 - 26 MMOL/L    sO2 (POC) 69 (L) 95 - 98 %    Base excess (POC) 7 mmol/L    Allens test (POC) YES      Site RIGHT RADIAL      Patient temp. 98.6      Specimen type (POC) ARTERIAL      Performed by [email or web address]     CO2, POC 32 MMOL/L    Flow rate (POC) 55.000 L/min    Critical value read back 00:03     COLLECT TIME 1,212     POC G3    Collection Time: 12/12/18  1:05 PM   Result Value Ref Range    Device: High Flow Nasal Cannula      FIO2 (POC) 100 %    pH (POC) 7.452 (H) 7.35 - 7.45      pCO2 (POC) 39.7 35 - 45 MMHG    pO2 (POC) 45 (LL) 75 - 100 MMHG    HCO3 (POC) 27.7 (H) 22 - 26 MMOL/L    sO2 (POC) 83 (L) 95 - 98 %    Base excess (POC) 4 mmol/L    Allens test (POC) YES      Site LEFT RADIAL      Patient temp.  98.6      Specimen type (POC) ARTERIAL      Performed by [email or web address]     CO2, POC 29 MMOL/L    Flow rate (POC) 55.000 L/min    Critical value read back 00:01     COLLECT TIME 1,300       All Micro Results     Procedure Component Value Units Date/Time    CULTURE, BLOOD [[id number]] Collected:  12/10/18 1142    Order Status:  Completed Specimen:  Blood Updated:  12/12/18 0786     Special Requests: --        LEFT  Antecubital       Culture result: NO GROWTH 2 DAYS       CULTURE, BLOOD [[id number]] Collected:  12/10/18 1142    Order Status:  Completed Specimen:  Blood Updated:  12/12/18 3549     Special Requests: --        RIGHT  Antecubital       GRAM STAIN       GRAM POS COCCI IN CLUSTERS            AEROBIC BOTTLE POSITIVE               RESULTS VERIFIED, PHONED TO AND READ BACK BY            EM Brewer 36 @ 0006 ON 12/12/18 BY NKE     Culture result:       CULTURE IN PROGRESS,FURTHER UPDATES TO FOLLOW                  SA target DNA sequence not detected within the sample. Test performed by PCR                Imaging:    XR CHEST PORT   Final Result   IMPRESSION: Progressive lung opacities, edema versus pneumonia. XR CHEST PORT   Final Result   IMPRESSION:  NO SIGNIFICANT INTERVAL CHANGE. XR CHEST PORT   Final Result   Impression:  Bilateral infiltrates versus pulmonary edema. ASSESSMENT      Hospital Problems as of 12/12/2018 Date Reviewed: 12/11/2018          Codes Class Noted - Resolved POA    Dyspnea ICD-10-CM: R06.00  ICD-9-CM: 786.09  12/10/2018 - Present Yes        Acute respiratory distress ICD-10-CM: R06.03  ICD-9-CM: 518.82  12/10/2018 - Present Yes        Pneumonia ICD-10-CM: J18.9  ICD-9-CM: 061  12/10/2018 - Present Yes        Substance abuse ([facility] ICD-10-CM: F19.10  ICD-9-CM: 305.90  12/10/2018 - Present Yes        Chronic systolic heart failure ([facility] ICD-10-CM: I50.22  ICD-9-CM: 428.22  10/18/2018 - Present Yes        HTN (hypertension) ICD-10-CM: I10  ICD-9-CM: 401.9  6/6/2018 - Present Yes    Overview Addendum 7/16/2018  9:53 AM by Dina Rice LPN     Last Assessment & Plan:   BP controlled. Continue current medications. Last Assessment & Plan:   BP controlled. Continue current medications. Anemia ICD-10-CM: D64.9  ICD-9-CM: 285.9  5/19/2018 - Present Yes        COPD (chronic obstructive pulmonary disease) ([facility] (Chronic) ICD-10-CM: J44.9  ICD-9-CM: 843  5/19/2018 - Present Yes    Overview Addendum 7/16/2018  9:53 AM by Dina Rice LPN     Last Assessment & Plan:   Lungs clear, normal chest x-ray yesterday. Continue inhalers, Singulair, & Flonase. Take Medrol dose pack as prescribed, discussed adverse effects. Stressed importance of keeping ENT & Pulmonology appointments. May also take Mucinex as needed, drink plenty of water. Follow up if worsening symptoms or concerns. Pt verbalized understanding, agreed with plan. Last Assessment & Plan:   Lungs clear, normal chest x-ray yesterday.   Continue inhalers, Singulair, & Flonase. Take Medrol dose pack as prescribed, discussed adverse effects. Stressed importance of keeping ENT & Pulmonology appointments. May also take Mucinex as needed, drink plenty of water. Follow up if worsening symptoms or concerns. Pt verbalized understanding, agreed with plan. Hyperlipidemia (Chronic) ICD-10-CM: E78.5  ICD-9-CM: 272.4  5/19/2018 - Present Yes        Hx of cocaine abuse ICD-10-CM: Z87.898  ICD-9-CM: 305.63  11/28/2017 - Present Yes        * (Principal) Acute respiratory failure with hypoxia [facility]) ICD-10-CM: J96.01  ICD-9-CM: 518.81  11/27/2017 - Present Yes            Plan:  · Acute respiratory failure- pneumonia vs cocaine induced pneumonitis. · Rocephin, azithromycin, vanc (day 3)  · Continue solumedrol per pulmonology. · Wean oxygen as tolerated. · Hycodan for cough. · Follow blood cultures. · Hemoptysis- pulmonary aware. Eliquis stopped. · History of a fib- NSR on EKG on admission. · Eliquis stopped due to reported hemoptysis. · Chronic systolic heart failure- change IV lasix to PO as Cr and BUN starting to climb. Cardiology believes infiltrates more inflammatory. Monitor I/O.    · Polysubstance abuse- counseled. Patient continually asking for more pain medications and hycodan. · Continue prn norco and hycodan as ordered. · Avoid IV narcotics due to tenuous respiratory status. · Exhibits narcotic seeking behavior. Dispo- discharge date to be determined on clinical improvement    DVT Prophylaxis: Lovenox    Signed By: Keon Ram.  Aleah Chapman MD     December 12, 2018

## 2018-12-12 NOTE — PROGRESS NOTES
Administered norco as ordered PRN for moderate pain. Patient reports pain 6/10 in left rib cage, chest and back. Will reassess per protocol.

## 2018-12-12 NOTE — PROGRESS NOTES
Dana Jones  Admission Date: 12/10/2018             Daily Progress Note: 12/12/2018    The patient's chart is reviewed and the patient is discussed with the staff.    48 y.o. AAF evaluated at the request of Dr. Jose Rojo for dyspnea, hypoxemia and cough x several days. She has history of systolic heart failure with EF 35-40% on CHRISTI 10/22/18, COPD, NSTEMI induced by Cocaine abuse in 2017, DVT/PE on Eliquis, and COPD chronic O2 3L. She reports no cocaine use in several weeks (priro to hospitalization 11/24/2018), denies fevers but has noted some scant hemoptysis. Was felt to have cocaine induced pneumonitis during presentation 11/2018 treated with steroids and abx. She reportedly was asking for pain medications for left shoulder/back pain repeatedly in the ED, but did not ask me for pain meds today. States she hasn't had a cigarette in 10 days. She states she had a similar illness about 1 year ago and felt about the same. Subjective:     Continues to beg for pain meds for pain and cough. States meds are helping. Tearful. Remains on 90% FiO2. No hemoptysis reported.  Off DOAC    Current Facility-Administered Medications   Medication Dose Route Frequency    ketorolac (TORADOL) injection 15 mg  15 mg IntraVENous Q8H    benzonatate (TESSALON) capsule 100 mg  100 mg Oral TID PRN    azithromycin (ZITHROMAX) tablet 500 mg  500 mg Oral Q24H    vancomycin (VANCOCIN) 1250 mg in  ml infusion  1,250 mg IntraVENous Q12H    Vancomycin Trough Reminder   Other ONCE    lisinopril (PRINIVIL, ZESTRIL) tablet 10 mg  10 mg Oral DAILY    enoxaparin (LOVENOX) injection 40 mg  40 mg SubCUTAneous DAILY    HYDROcodone-acetaminophen (NORCO) 5-325 mg per tablet 2 Tab  2 Tab Oral Q4H PRN    LORazepam (ATIVAN) injection 0.5 mg  0.5 mg IntraVENous Q6H PRN    HYDROcodone-homatropine (HYCODAN) 5-1.5 mg/5 mL (5 mL) syrup 5 mL  5 mL Oral Q4H PRN    methylPREDNISolone (PF) (SOLU-MEDROL) injection 60 mg  60 mg IntraVENous Q12H    clonazePAM (KlonoPIN) tablet 0.5 mg  0.5 mg Oral TID    diphenhydrAMINE (BENADRYL) capsule 50 mg  50 mg Oral Q6H PRN    estradiol (ESTRACE) tablet 0.5 mg  0.5 mg Oral DAILY    FLUoxetine (PROzac) capsule 20 mg  20 mg Oral DAILY    gabapentin (NEURONTIN) capsule 400 mg  400 mg Oral TID    mirtazapine (REMERON) tablet 15 mg  15 mg Oral QHS    nitroglycerin (NITROSTAT) tablet 0.4 mg  0.4 mg SubLINGual Q5MIN PRN    oxybutynin (DITROPAN) tablet 5 mg  5 mg Oral BID    pantoprazole (PROTONIX) tablet 40 mg  40 mg Oral ACB    pravastatin (PRAVACHOL) tablet 40 mg  40 mg Oral QHS    promethazine (PHENERGAN) tablet 25 mg  25 mg Oral Q6H PRN    spironolactone (ALDACTONE) tablet 25 mg  25 mg Oral DAILY    sodium chloride (NS) flush 5-10 mL  5-10 mL IntraVENous Q8H    sodium chloride (NS) flush 5-10 mL  5-10 mL IntraVENous PRN    cefTRIAXone (ROCEPHIN) 1 g in 0.9% sodium chloride (MBP/ADV) 50 mL  1 g IntraVENous Q24H    acetaminophen (TYLENOL) tablet 650 mg  650 mg Oral Q4H PRN    naloxone (NARCAN) injection 0.4 mg  0.4 mg IntraVENous PRN    nicotine (NICODERM CQ) 14 mg/24 hr patch 1 Patch  1 Patch TransDERmal Q24H    albuterol-ipratropium (DUO-NEB) 2.5 MG-0.5 MG/3 ML  3 mL Nebulization Q4HWA RT    furosemide (LASIX) injection 40 mg  40 mg IntraVENous Q12H    guaiFENesin ER (MUCINEX) tablet 600 mg  600 mg Oral Q12H    budesonide (PULMICORT) 500 mcg/2 ml nebulizer suspension  500 mcg Nebulization BID RT     Review of Systems  Constitutional: negative for fever, chills, sweats  Cardiovascular: negative for chest pain, palpitations, syncope, edema  Gastrointestinal:  negative for dysphagia, reflux, vomiting, diarrhea, abdominal pain, or melena  Neurologic:  negative for focal weakness, numbness, headache    Objective:     Vitals:    12/11/18 1955 12/12/18 0534 12/12/18 0718 12/12/18 0752   BP:   155/88    Pulse:   (!) 105    Resp:   20    Temp:   98.3 °F (36.8 °C)    SpO2: 91% 90% 95%   Weight:  191 lb 3.2 oz (86.7 kg)       Intake and Output:   12/10 1901 - 12/12 0700  In: 2300 [P.O.:1800; I.V.:500]  Out: 2380 [Urine:2380]  12/12 0701 - 12/12 1900  In: 120 [P.O.:120]  Out: 400 [Urine:400]    Physical Exam:   Constitution:  the patient is well developed and in no acute distress, Opti-flow 50L, 90% with sat 95%  EENMT:  Sclera clear, pupils equal, oral mucosa moist  Respiratory: scattered crackles anterior and posterior  Cardiovascular:  RRR without M,G,R  Gastrointestinal: soft and non-tender; with positive bowel sounds. Musculoskeletal: warm without cyanosis. There is no lower leg edema. Skin:  no jaundice or rashes, no wounds   Neurologic: no gross neuro deficits     Psychiatric:  alert and oriented x 3    CXR 12/10/18:  Bilateral infiltrates versus pulmonary edema       LAB  No results for input(s): GLUCPOC in the last 72 hours. No lab exists for component: [place]   Recent Labs     12/12/18  0615 12/11/18  0547 12/10/18  1142   WBC 13.8* 13.3* 15.8*   HGB 9.6* 9.1* 9.9*   HCT 31.7* 29.2* 32.7*    404 462*     Recent Labs     12/12/18  0615 12/11/18  0547 12/10/18  1852 12/10/18  1142    142  --  138   K 3.5 3.8  --  4.0    106  --  106   CO2 26 27  --  24   * 137*  --  105*   BUN 21 17  --  14   CREA 1.17* 0.97  --  1.12*   MG 2.2 2.5* 2.6*  --    CA 8.9 9.0  --  9.6   TROIQ  --  <0.02* <0.02*  --    ALB  --   --   --  3.4*   TBILI  --   --   --  0.5   ALT  --   --   --  42   SGOT  --   --   --  64*     Recent Labs     12/10/18  1829   PHI 7.384   PCO2I 41.2   PO2I 97   HCO3I 24.6     No results for input(s): LCAD, LAC in the last 72 hours.       Assessment:  (Medical Decision Making)     Hospital Problems  Date Reviewed: 12/11/2018          Codes Class Noted POA    Dyspnea ICD-10-CM: R06.00  ICD-9-CM: 786.09  12/10/2018 Yes    continue opti-flow    Acute respiratory distress ICD-10-CM: R06.03  ICD-9-CM: 518.82  12/10/2018 Yes    Opti-flow    Pneumonia ICD-10-CM: J18.9  ICD-9-CM: 950  12/10/2018 Yes    Continue antibiotics    Substance abuse ([facility] ICD-10-CM: F19.10  ICD-9-CM: 305.90  12/10/2018 Yes    chronic    Acute on chronic systolic heart failure ([facility] ICD-10-CM: G64.34  ICD-9-CM: 428.23  10/18/2018 Yes    On IV Lasix    HTN (hypertension) ICD-10-CM: I10  ICD-9-CM: 401.9  6/6/2018 Yes     Last Assessment & Plan:   BP controlled. Continue current medications. Last Assessment & Plan:   BP controlled. Continue current medications. -140s    Anemia ICD-10-CM: D64.9  ICD-9-CM: 285.9  5/19/2018 Yes    hgb 9.1    COPD (chronic obstructive pulmonary disease) (HCC) (Chronic) ICD-10-CM: J44.9  ICD-9-CM: 496  5/19/2018 Yes     Last Assessment & Plan:   Lungs clear, normal chest x-ray yesterday. Continue inhalers, Singulair, & Flonase. Take Medrol dose pack as prescribed, discussed adverse effects. Stressed importance of keeping ENT & Pulmonology appointments. May also take Mucinex as needed, drink plenty of water. Follow up if worsening symptoms or concerns. Pt verbalized understanding, agreed with plan. Last Assessment & Plan:   Lungs clear, normal chest x-ray yesterday. Continue inhalers, Singulair, & Flonase. Take Medrol dose pack as prescribed, discussed adverse effects. Stressed importance of keeping ENT & Pulmonology appointments. May also take Mucinex as needed, drink plenty of water. Follow up if worsening symptoms or concerns. Pt verbalized understanding, agreed with plan.          Continue current    Hyperlipidemia (Chronic) ICD-10-CM: E78.5  ICD-9-CM: 272.4  5/19/2018 Yes    chronic    Hx of cocaine abuse ICD-10-CM: Z87.898  ICD-9-CM: 305.63  11/28/2017 Yes    UDS positive this admission    * (Principal) Acute respiratory failure with hypoxia [facility]) ICD-10-CM: J96.01  ICD-9-CM: 518.81  11/27/2017 Yes    On Opti-flow--wean as tolerated          Plan:  (Medical Decision Making)     --Duoneb, Pulmicort, Mucinex  --Solu Medrol 60mg q12h  --Off Eliquis--having hemoptysis and discussed with cardiology. Wll stop Eliquis for now, use Lovenox for DVT prophylaxis and consider only Eliquis 2.5mg BID if felt needed at discharge. DVT appears remote. --Zithromax, Rocephin, Vancomycin day 3  --Blood cultures:  pending  --WBC down  --Lasix 40mg IV [id number]  --Nicotine patch--states no smoking in 10 days  --UDS positive for cocaine and opiates-despite telling me she was not using anymore  --Requesting treatment for cough and pain. Tessalon for cough. --Allergies to non-narcotic pain meds; defer to hospitalist for sole prescriber of pain meds given drug seeking behavior    More than 50% of the time documented was spent in face-to-face contact with the patient and in the care of the patient on the floor/unit where the patient is located.     Yesika Hughes MD

## 2018-12-12 NOTE — PROGRESS NOTES
Norco effective. Patient resting quietly in bed at this time, eyes closed, respirations even and unlabored.

## 2018-12-12 NOTE — PROGRESS NOTES
Patient resting quietly in bed at this time, eyes closed, respirations even and unlabored. Bed low and locked. Bedside table, personal belongings and call light within reach.

## 2018-12-12 NOTE — PROGRESS NOTES
Patient cursing staff even MD.  Patient crying and yelling. Patient given pain meds r/t c/o back pain and hycodan r/t c/o cough. Will monitor. Patient educated on proper way to speak to staff and patient asked to quit cursing. Patient states urine was taken from her without her knowledge. Patient was informed that she willing gave the urine upon arrival to this floor. Patient upset MD knows about cocaine abuse and will not give her more pain medications.

## 2018-12-12 NOTE — PROGRESS NOTES
Dave in lab notified this RN at approximately 0006 of gram + cocci in aerobic bottle. Notified Dr. Bessie Caldera at [phone]. No new orders received. Dr. Bessie Caldera aware patient already on vancomycin, rocephine, and zithromax.

## 2018-12-12 NOTE — PROGRESS NOTES
Norco effective. Patient resting quietly in bed at this time, awake, respirations even and unlabored. Watching tv at this time.

## 2018-12-12 NOTE — PROGRESS NOTES
Pharmacokinetic Consult to Pharmacist    Josh Mcknight is a 48 y.o. female being treated for CAP with azithromycin, ceftriaxone and vancomycin. Weight: 86.7 kg (191 lb 3.2 oz)  Lab Results   Component Value Date/Time    BUN 21 12/12/2018 06:15 AM    Creatinine 1.17 (H) 12/12/2018 06:15 AM    WBC 13.8 (H) 12/12/2018 06:15 AM    Procalcitonin 0.2 12/10/2018 06:52 PM    Lactic Acid (POC) 1.55 12/10/2018 11:41 AM      Estimated Creatinine Clearance: 60.6 mL/min (A) (based on SCr of 1.17 mg/dL (H)). Day 3 of vancomycin. Goal trough is 15-20. SCr slightly up today. Will need to watch trend. The vancomycin trough returned subtherapeutic 12 hours after the last dose (q12 regimen). Will increase dose to 1750 mg q12 for now. Will continue to follow patient.       Thank you,  Nathaly Dickson, PharmD, BCPS  Clinical Pharmacist  [phone]

## 2018-12-12 NOTE — PROGRESS NOTES
Received bedside shift report from [facility]. Patient sitting up in bed at this time, awake, respirations even and unlabored. Appears anxious, rapid speech, and agitated. Bed low and locked. Bedside table, personal belongings and call light within reach.

## 2018-12-12 NOTE — PROGRESS NOTES
Administered norco as ordered PRN for moderate pain. Patient reports pain 6/10 in back/chest/ribs. Will reassess per protocol.

## 2018-12-12 NOTE — PROGRESS NOTES
Discussed patient status in IDT rounds. We are currently weaning O2. Patient has no home O2. Patient will discharge home when medically ready. Care Management Interventions  PCP Verified by CM:  Yes  Transition of Care Consult (CM Consult): Discharge Planning  Discharge Durable Medical Equipment: No  Physical Therapy Consult: No  Occupational Therapy Consult: No  Speech Therapy Consult: No  Current Support Network: Own Home  Confirm Follow Up Transport: Family  Plan discussed with Pt/Family/Caregiver: Yes  Freedom of Choice Offered: Yes  Discharge Location  Discharge Placement: Home

## 2018-12-12 NOTE — PROGRESS NOTES
Bedside shift report completed with oncoming nurse, Kody Dacosta. Patient resting quietly in bed at this time, awake, respirations even and unlabored. Bed low and locked. Bedside table, personal belongings and call light within reach.

## 2018-12-12 NOTE — PROGRESS NOTES
[facility] CARDIOLOGY PROGRESS NOTE           12/12/2018 10:49 AM    Admit Date: 12/10/2018      Subjective:   Patient with cough and pain with coughing. Still requiring high flow oxygen. Very anxious. ROS:  Cardiovascular:  As noted above    Objective:      Vitals:    12/11/18 1955 12/12/18 0534 12/12/18 0718 12/12/18 0752   BP:   155/88    Pulse:   (!) 105    Resp:   20    Temp:   98.3 °F (36.8 °C)    SpO2: 91%  90% 95%   Weight:  86.7 kg (191 lb 3.2 oz)         Physical Exam:  General-No Acute Distress  Neck- supple, no JVD  CV- regular rate and rhythm Grade I/VI HÉCTOR  Lung- Bilateral rales. Abd- soft, nontender, nondistended  Ext- no edema bilaterally. Skin- warm and dry      Data Review:   Recent Labs     12/12/18  0615 12/11/18  0547    142   K 3.5 3.8   MG 2.2 2.5*   BUN 21 17   CREA 1.17* 0.97   * 137*   WBC 13.8* 13.3*   HGB 9.6* 9.1*   HCT 31.7* 29.2*    404        No results found for: SELIN Knott    Assessment/Plan:     Principal Problem:    Acute respiratory failure with hypoxia ([facility] (11/27/2017)    CRP severely elevated. Suspect non-cardiogenic edema. Continue IV solumedrol and antibiotics per pulmonary. Active Problems:    Hx of cocaine abuse (11/28/2017)    UDS positive. Anemia (5/19/2018)    Stable. COPD (chronic obstructive pulmonary disease) ([facility] (5/19/2018)    Per Pulmonary        HTN (hypertension) (6/6/2018)    Coreg stopped due to recent Cocaine use. Chronic systolic heart failure ([address] (10/18/2018)    Appears pulmonary edema likely non-cardiogenic. Cautious diuresis. Unfortunately will have to stop Coreg with Cocaine use. Started on  lisinopril. Acute respiratory distress (12/10/2018)    See above. Pneumonia (12/10/2018)    Per pulmonary.                  Cherelle Wall MD  12/12/2018 10:49 AM

## 2018-12-12 NOTE — PROGRESS NOTES
Patient voices no concerns at this time. Patient resting in bed with eyes closed. Call light within reach and patient instructed to call if assistance is needed. Will continue to monitor.

## 2018-12-12 NOTE — PROGRESS NOTES
Patient up to [facility] independently without assistance. Patient takes NC off from time to time. Patient was screaming, yelling, and crying earlier. Patient has calmed down a little. Mother at bedside for support but patient does not want mother to know her situation at this time.

## 2018-12-12 NOTE — PROGRESS NOTES
Problem: Falls - Risk of  Goal: *Absence of Falls  Document Joselito Fall Risk and appropriate interventions in the flowsheet.   Outcome: Progressing Towards Goal  Fall Risk Interventions:  Mobility Interventions: Bed/chair exit alarm, Communicate number of staff needed for ambulation/transfer, Patient to call before getting OOB         Medication Interventions: Evaluate medications/consider consulting pharmacy    Elimination Interventions: Call light in reach, Toilet paper/wipes in reach, Toileting schedule/hourly rounds

## 2018-12-13 ENCOUNTER — APPOINTMENT (OUTPATIENT)
Dept: GENERAL RADIOLOGY | Age: 53
DRG: 133 | End: 2018-12-13
Attending: INTERNAL MEDICINE
Payer: COMMERCIAL

## 2018-12-13 VITALS
RESPIRATION RATE: 40 BRPM | HEART RATE: 95 BPM | WEIGHT: 192.02 LBS | SYSTOLIC BLOOD PRESSURE: 110 MMHG | TEMPERATURE: 99.1 F | DIASTOLIC BLOOD PRESSURE: 67 MMHG | HEIGHT: 65 IN | OXYGEN SATURATION: 100 % | BODY MASS INDEX: 31.99 KG/M2

## 2018-12-13 PROBLEM — I99.9 COCAINE-INDUCED VASCULAR DISORDER (HCC): Chronic | Status: ACTIVE | Noted: 2017-05-28

## 2018-12-13 PROBLEM — D64.9 ANEMIA: Chronic | Status: ACTIVE | Noted: 2018-05-19

## 2018-12-13 PROBLEM — Z66 DNR (DO NOT RESUSCITATE): Status: RESOLVED | Noted: 2017-05-28 | Resolved: 2018-12-13

## 2018-12-13 PROBLEM — Z76.5 DRUG-SEEKING BEHAVIOR: Chronic | Status: ACTIVE | Noted: 2018-12-13

## 2018-12-13 PROBLEM — F14.11 HX OF COCAINE ABUSE (HCC): Chronic | Status: ACTIVE | Noted: 2017-11-28

## 2018-12-13 PROBLEM — K22.70 BARRETT'S ESOPHAGUS WITHOUT DYSPLASIA: Chronic | Status: ACTIVE | Noted: 2017-09-20

## 2018-12-13 PROBLEM — I50.22 CHRONIC SYSTOLIC HEART FAILURE (HCC): Chronic | Status: ACTIVE | Noted: 2018-10-18

## 2018-12-13 PROBLEM — I10 HTN (HYPERTENSION): Chronic | Status: ACTIVE | Noted: 2018-06-06

## 2018-12-13 PROBLEM — Z86.718 HISTORY OF DVT (DEEP VEIN THROMBOSIS): Chronic | Status: ACTIVE | Noted: 2018-04-14

## 2018-12-13 PROBLEM — F14.988 COCAINE-INDUCED VASCULAR DISORDER (HCC): Chronic | Status: ACTIVE | Noted: 2017-05-28

## 2018-12-13 PROBLEM — F19.20 POLYSUBSTANCE DEPENDENCE INCLUDING OPIOID TYPE DRUG WITH COMPLICATION, EPISODIC ABUSE (HCC): Status: ACTIVE | Noted: 2018-12-10

## 2018-12-13 LAB
ANION GAP SERPL CALC-SCNC: 9 MMOL/L (ref 7–16)
ARTERIAL PATENCY WRIST A: YES
BACTERIA SPEC CULT: ABNORMAL
BASE EXCESS BLD CALC-SCNC: 8 MMOL/L
BASOPHILS # BLD: 0 K/UL (ref 0–0.2)
BASOPHILS NFR BLD: 0 % (ref 0–2)
BDY SITE: ABNORMAL
BNP SERPL-MCNC: 118 PG/ML
BODY TEMPERATURE: 98.6
BUN SERPL-MCNC: 25 MG/DL (ref 6–23)
CALCIUM SERPL-MCNC: 9.3 MG/DL (ref 8.3–10.4)
CHLORIDE SERPL-SCNC: 100 MMOL/L (ref 98–107)
CO2 BLD-SCNC: 35 MMOL/L
CO2 SERPL-SCNC: 32 MMOL/L (ref 21–32)
COLLECT TIME,HTIME: 455
CREAT SERPL-MCNC: 0.87 MG/DL (ref 0.6–1)
DIFFERENTIAL METHOD BLD: ABNORMAL
EOSINOPHIL # BLD: 0 K/UL (ref 0–0.8)
EOSINOPHIL NFR BLD: 0 % (ref 0.5–7.8)
ERYTHROCYTE [DISTWIDTH] IN BLOOD BY AUTOMATED COUNT: 14.1 % (ref 11.9–14.6)
EXHALED MINUTE VOLUME, VE: 16.4 L/MIN
GAS FLOW.O2 O2 DELIVERY SYS: ABNORMAL L/MIN
GAS FLOW.O2 SETTING OXYMISER: 8 BPM
GLUCOSE SERPL-MCNC: 170 MG/DL (ref 65–100)
GRAM STN SPEC: ABNORMAL
HCO3 BLD-SCNC: 33.5 MMOL/L (ref 22–26)
HCT VFR BLD AUTO: 31.4 % (ref 35.8–46.3)
HGB BLD-MCNC: 9.8 G/DL (ref 11.7–15.4)
IMM GRANULOCYTES # BLD: 0.1 K/UL (ref 0–0.5)
IMM GRANULOCYTES NFR BLD AUTO: 1 % (ref 0–5)
INSPIRATION.DURATION SETTING TIME VENT: 0.9 SEC
LYMPHOCYTES # BLD: 0.5 K/UL (ref 0.5–4.6)
LYMPHOCYTES NFR BLD: 4 % (ref 13–44)
MAGNESIUM SERPL-MCNC: 2.3 MG/DL (ref 1.8–2.4)
MCH RBC QN AUTO: 29.3 PG (ref 26.1–32.9)
MCHC RBC AUTO-ENTMCNC: 31.2 G/DL (ref 31.4–35)
MCV RBC AUTO: 93.7 FL (ref 79.6–97.8)
MONOCYTES # BLD: 0.6 K/UL (ref 0.1–1.3)
MONOCYTES NFR BLD: 4 % (ref 4–12)
NEUTS SEG # BLD: 14 K/UL (ref 1.7–8.2)
NEUTS SEG NFR BLD: 92 % (ref 43–78)
NRBC # BLD: 0.03 K/UL (ref 0–0.2)
O2/TOTAL GAS SETTING VFR VENT: 100 %
PCO2 BLD: 51.5 MMHG (ref 35–45)
PEEP RESPIRATORY: 8 CMH2O
PH BLD: 7.42 [PH] (ref 7.35–7.45)
PIP ISTAT,IPIP: 14
PLATELET # BLD AUTO: 392 K/UL (ref 150–450)
PMV BLD AUTO: 9.5 FL (ref 9.4–12.3)
PO2 BLD: 97 MMHG (ref 75–100)
POTASSIUM SERPL-SCNC: 3.7 MMOL/L (ref 3.5–5.1)
RBC # BLD AUTO: 3.35 M/UL (ref 4.05–5.2)
SAO2 % BLD: 98 % (ref 95–98)
SERVICE CMNT-IMP: ABNORMAL
SERVICE CMNT-IMP: ABNORMAL
SODIUM SERPL-SCNC: 141 MMOL/L (ref 136–145)
SPECIMEN TYPE: ABNORMAL
SPONTANEOUS TIMED, IST: 12
VT SETTING VENT: 424 ML
WBC # BLD AUTO: 15.1 K/UL (ref 4.3–11.1)

## 2018-12-13 PROCEDURE — 74011250636 HC RX REV CODE- 250/636: Performed by: INTERNAL MEDICINE

## 2018-12-13 PROCEDURE — 74011000250 HC RX REV CODE- 250: Performed by: INTERNAL MEDICINE

## 2018-12-13 PROCEDURE — 94640 AIRWAY INHALATION TREATMENT: CPT

## 2018-12-13 PROCEDURE — 71045 X-RAY EXAM CHEST 1 VIEW: CPT

## 2018-12-13 PROCEDURE — 74011000258 HC RX REV CODE- 258: Performed by: HOSPITALIST

## 2018-12-13 PROCEDURE — 85025 COMPLETE CBC W/AUTO DIFF WBC: CPT

## 2018-12-13 PROCEDURE — 74011250636 HC RX REV CODE- 250/636: Performed by: NURSE PRACTITIONER

## 2018-12-13 PROCEDURE — 82803 BLOOD GASES ANY COMBINATION: CPT

## 2018-12-13 PROCEDURE — 74011000250 HC RX REV CODE- 250: Performed by: HOSPITALIST

## 2018-12-13 PROCEDURE — 36415 COLL VENOUS BLD VENIPUNCTURE: CPT

## 2018-12-13 PROCEDURE — 74011250637 HC RX REV CODE- 250/637: Performed by: INTERNAL MEDICINE

## 2018-12-13 PROCEDURE — 94660 CPAP INITIATION&MGMT: CPT

## 2018-12-13 PROCEDURE — 83735 ASSAY OF MAGNESIUM: CPT

## 2018-12-13 PROCEDURE — 80048 BASIC METABOLIC PNL TOTAL CA: CPT

## 2018-12-13 PROCEDURE — 87040 BLOOD CULTURE FOR BACTERIA: CPT

## 2018-12-13 PROCEDURE — 36600 WITHDRAWAL OF ARTERIAL BLOOD: CPT

## 2018-12-13 PROCEDURE — 99291 CRITICAL CARE FIRST HOUR: CPT | Performed by: INTERNAL MEDICINE

## 2018-12-13 PROCEDURE — 77030019605

## 2018-12-13 PROCEDURE — 74011250636 HC RX REV CODE- 250/636: Performed by: HOSPITALIST

## 2018-12-13 PROCEDURE — 74011250637 HC RX REV CODE- 250/637: Performed by: HOSPITALIST

## 2018-12-13 PROCEDURE — 74011250637 HC RX REV CODE- 250/637: Performed by: NURSE PRACTITIONER

## 2018-12-13 PROCEDURE — 83880 ASSAY OF NATRIURETIC PEPTIDE: CPT

## 2018-12-13 PROCEDURE — 77030034849

## 2018-12-13 PROCEDURE — 74011000258 HC RX REV CODE- 258: Performed by: INTERNAL MEDICINE

## 2018-12-13 RX ORDER — HYDROCODONE BITARTRATE AND HOMATROPINE METHYLBROMIDE 1.5; 5 MG/5ML; MG/5ML
5 SYRUP ORAL
Status: DISCONTINUED | OUTPATIENT
Start: 2018-12-13 | End: 2018-12-13 | Stop reason: HOSPADM

## 2018-12-13 RX ORDER — MORPHINE SULFATE 2 MG/ML
2 INJECTION, SOLUTION INTRAMUSCULAR; INTRAVENOUS
Status: DISCONTINUED | OUTPATIENT
Start: 2018-12-13 | End: 2018-12-13

## 2018-12-13 RX ORDER — DILTIAZEM HYDROCHLORIDE 30 MG/1
30 TABLET, FILM COATED ORAL
Status: DISCONTINUED | OUTPATIENT
Start: 2018-12-13 | End: 2018-12-13 | Stop reason: HOSPADM

## 2018-12-13 RX ORDER — HYDROCODONE BITARTRATE AND ACETAMINOPHEN 5; 325 MG/1; MG/1
1 TABLET ORAL
Status: DISCONTINUED | OUTPATIENT
Start: 2018-12-13 | End: 2018-12-13 | Stop reason: HOSPADM

## 2018-12-13 RX ORDER — FUROSEMIDE 10 MG/ML
40 INJECTION INTRAMUSCULAR; INTRAVENOUS 2 TIMES DAILY
Status: DISCONTINUED | OUTPATIENT
Start: 2018-12-13 | End: 2018-12-13 | Stop reason: HOSPADM

## 2018-12-13 RX ORDER — GUAIFENESIN/DEXTROMETHORPHAN 100-10MG/5
10 SYRUP ORAL
Status: DISCONTINUED | OUTPATIENT
Start: 2018-12-13 | End: 2018-12-13

## 2018-12-13 RX ORDER — GUAIFENESIN/DEXTROMETHORPHAN 100-10MG/5
10 SYRUP ORAL EVERY 4 HOURS
Status: DISCONTINUED | OUTPATIENT
Start: 2018-12-13 | End: 2018-12-13 | Stop reason: HOSPADM

## 2018-12-13 RX ADMIN — HYDROCODONE BITARTRATE AND ACETAMINOPHEN 1 TABLET: 5; 325 TABLET ORAL at 16:35

## 2018-12-13 RX ADMIN — METHYLPREDNISOLONE SODIUM SUCCINATE 60 MG: 40 INJECTION, POWDER, FOR SOLUTION INTRAMUSCULAR; INTRAVENOUS at 09:40

## 2018-12-13 RX ADMIN — HYDROCODONE BITARTRATE AND ACETAMINOPHEN 2 TABLET: 5; 325 TABLET ORAL at 10:14

## 2018-12-13 RX ADMIN — BUDESONIDE 500 MCG: 0.5 INHALANT RESPIRATORY (INHALATION) at 08:02

## 2018-12-13 RX ADMIN — GUAIFENESIN 600 MG: 600 TABLET, EXTENDED RELEASE ORAL at 09:40

## 2018-12-13 RX ADMIN — ENOXAPARIN SODIUM 40 MG: 40 INJECTION SUBCUTANEOUS at 09:40

## 2018-12-13 RX ADMIN — IPRATROPIUM BROMIDE AND ALBUTEROL SULFATE 3 ML: .5; 3 SOLUTION RESPIRATORY (INHALATION) at 11:25

## 2018-12-13 RX ADMIN — GABAPENTIN 400 MG: 400 CAPSULE ORAL at 09:40

## 2018-12-13 RX ADMIN — OXYBUTYNIN CHLORIDE 5 MG: 5 TABLET ORAL at 09:40

## 2018-12-13 RX ADMIN — HYDROCODONE BITARTRATE AND HOMATROPINE METHYLBROMIDE 5 ML: 5; 1.5 SOLUTION ORAL at 07:44

## 2018-12-13 RX ADMIN — IPRATROPIUM BROMIDE AND ALBUTEROL SULFATE 3 ML: .5; 3 SOLUTION RESPIRATORY (INHALATION) at 15:11

## 2018-12-13 RX ADMIN — CLONAZEPAM 0.5 MG: 0.5 TABLET ORAL at 09:39

## 2018-12-13 RX ADMIN — HYDROCODONE BITARTRATE AND HOMATROPINE METHYLBROMIDE 5 ML: 5; 1.5 SOLUTION ORAL at 00:19

## 2018-12-13 RX ADMIN — SODIUM CHLORIDE 0.3 MCG/KG/HR: 900 INJECTION, SOLUTION INTRAVENOUS at 17:45

## 2018-12-13 RX ADMIN — DILTIAZEM HYDROCHLORIDE 30 MG: 30 TABLET, FILM COATED ORAL at 16:35

## 2018-12-13 RX ADMIN — DILTIAZEM HYDROCHLORIDE 30 MG: 30 TABLET, FILM COATED ORAL at 10:57

## 2018-12-13 RX ADMIN — FUROSEMIDE 40 MG: 10 INJECTION, SOLUTION INTRAMUSCULAR; INTRAVENOUS at 09:40

## 2018-12-13 RX ADMIN — SPIRONOLACTONE 25 MG: 25 TABLET ORAL at 09:40

## 2018-12-13 RX ADMIN — HYDROCODONE BITARTRATE AND HOMATROPINE METHYLBROMIDE 5 ML: 5; 1.5 SOLUTION ORAL at 13:44

## 2018-12-13 RX ADMIN — FUROSEMIDE 40 MG: 10 INJECTION, SOLUTION INTRAMUSCULAR; INTRAVENOUS at 18:24

## 2018-12-13 RX ADMIN — AZITHROMYCIN 500 MG: 250 TABLET, FILM COATED ORAL at 18:23

## 2018-12-13 RX ADMIN — MORPHINE SULFATE 2 MG: 2 INJECTION, SOLUTION INTRAMUSCULAR; INTRAVENOUS at 11:38

## 2018-12-13 RX ADMIN — LISINOPRIL 10 MG: 5 TABLET ORAL at 09:39

## 2018-12-13 RX ADMIN — FLUOXETINE 20 MG: 20 CAPSULE ORAL at 09:40

## 2018-12-13 RX ADMIN — Medication 10 ML: at 18:02

## 2018-12-13 RX ADMIN — GABAPENTIN 400 MG: 400 CAPSULE ORAL at 16:35

## 2018-12-13 RX ADMIN — VANCOMYCIN HYDROCHLORIDE 1750 MG: 10 INJECTION, POWDER, LYOPHILIZED, FOR SOLUTION INTRAVENOUS at 10:22

## 2018-12-13 RX ADMIN — FUROSEMIDE 40 MG: 10 INJECTION, SOLUTION INTRAMUSCULAR; INTRAVENOUS at 04:52

## 2018-12-13 RX ADMIN — GUAIFENESIN AND DEXTROMETHORPHAN 10 ML: 100; 10 SYRUP ORAL at 16:35

## 2018-12-13 RX ADMIN — CEFTRIAXONE SODIUM 1 G: 1 INJECTION, POWDER, FOR SOLUTION INTRAMUSCULAR; INTRAVENOUS at 18:20

## 2018-12-13 RX ADMIN — HYDROCODONE BITARTRATE AND ACETAMINOPHEN 2 TABLET: 5; 325 TABLET ORAL at 06:10

## 2018-12-13 RX ADMIN — Medication 10 ML: at 05:42

## 2018-12-13 RX ADMIN — OXYBUTYNIN CHLORIDE 5 MG: 5 TABLET ORAL at 18:23

## 2018-12-13 RX ADMIN — ESTRADIOL 0.5 MG: 1 TABLET ORAL at 10:57

## 2018-12-13 RX ADMIN — PANTOPRAZOLE SODIUM 40 MG: 40 TABLET, DELAYED RELEASE ORAL at 07:44

## 2018-12-13 RX ADMIN — IPRATROPIUM BROMIDE AND ALBUTEROL SULFATE 3 ML: .5; 3 SOLUTION RESPIRATORY (INHALATION) at 08:02

## 2018-12-13 RX ADMIN — GUAIFENESIN AND DEXTROMETHORPHAN 10 ML: 100; 10 SYRUP ORAL at 13:34

## 2018-12-13 NOTE — PROGRESS NOTES
[facility] Patient resting quietly in bed at this time, eyes closed, respirations even and unlabored. Bed low and locked. Bedside table, personal belongings and call light within reach.

## 2018-12-13 NOTE — PROGRESS NOTES
Patient arrived to CCU, placed on BIPAP 100% and transferred to bed. Full CHG bath administered. NSR/ST on monitor, BP stable. O2 sat 95%. Frequent nonproductive cough. Peripheral vascular WDL, Don patent and draining. VSS. NAD. Will continue to closely monitor. Dual skin assessment: no abnormalities noted. Nicotine patch to L shoulder. allevyn placed for wound prevention.

## 2018-12-13 NOTE — PROGRESS NOTES
TRANSFER - IN REPORT:    Verbal report received from Ben Lama RN(name) on Sommer Jauregui  being received from 812(unit) for change in patient condition(BIPAP requirement)      Report consisted of patients Situation, Background, Assessment and   Recommendations(SBAR). Information from the following report(s) SBAR, Kardex, ED Summary, Intake/Output, MAR, Recent Results and Cardiac Rhythm NSR was reviewed with the receiving nurse. Opportunity for questions and clarification was provided. Assessment completed upon patients arrival to unit and care assumed.

## 2018-12-13 NOTE — PROGRESS NOTES
Spoke with Mother Vinicius Jack (pt's emergency contact) and informed her that pt. had been transferred to CCU room 0757.

## 2018-12-13 NOTE — PROGRESS NOTES
[facility] CARDIOLOGY PROGRESS NOTE    12/13/2018 9:11 AM    Admit Date: 12/10/2018    Admit Diagnosis: Acute respiratory distress;Pneumonia      Subjective:   Stable overnight without angina, CHF, or palpitations but still with SOB, resp distress last night on BiPAP now in ICU and feeling a little better. Vitals otherwise stable and controlled. Tolerating meds well. Objective:      Vitals:    12/13/18 0700 12/13/18 0701 12/13/18 0824 12/13/18 0828   BP: 150/72      Pulse: 91 95     Resp:  29     Temp:       SpO2: 100% 100% 91% 91%   Weight:       Height:           Physical Exam:  Neck- supple, difficult to assess JVD  CV- tachy but regular rate and rhythm no MRG  Lung- coarse bibasilar rhonchi  Abd- soft, nontender, nondistended  Ext- no edema  Skin- warm and dry    Data Review:   Recent Labs     12/13/18  0607 12/12/18  0615    139   K 3.7 3.5   MG 2.3 2.2   BUN 25* 21   CREA 0.87 1.17*   * 261*   WBC 15.1* 13.8*   HGB 9.8* 9.6*   HCT 31.4* 31.7*    400       Assessment and Plan:     Principal Problem:    Acute respiratory failure with hypoxia (HCC) (11/27/2017)    CRP severely elevated. Suspect non-cardiogenic edema. BNP normal at 118 this AM-  Continue IV solumedrol and antibiotics per pulmonary. IV lasix as needed.      Active Problems:    Hx of cocaine abuse (11/28/2017)    UDS positive. Cessation strongly encouraged       Anemia (5/19/2018)    Stable. Recheck in AM       COPD (chronic obstructive pulmonary disease) ([facility] (5/19/2018)    Per Pulmonary- no severe wheezing at present.          HTN (hypertension) (6/6/2018)    Coreg stopped due to recent Cocaine use.        Chronic systolic heart failure ([address] (10/18/2018)    Appears pulmonary edema likely non-cardiogenic. Cautious diuresis. Unfortunately will have to stop Coreg with Cocaine use.  Started on  lisinopril.  still with HR in 39'W and systolic HTN this AM- add cardizem short acting, titrate to max tolerated dose and then convert to long acting formulation.        Acute respiratory distress (12/10/2018)    See above.         Pneumonia (12/10/2018)    Per pulmonary.           Shaun German MD  [facility] Cardiology  Pager [phone]

## 2018-12-13 NOTE — PROGRESS NOTES
Hycodan effective. No cough noted at this time. Patient resting quietly in bed at this time, eyes closed, respirations even and unlabored.

## 2018-12-13 NOTE — PROGRESS NOTES
Spoke with Dr. Trina Keene in regard to patient current respiratory status. He advised to transfer the patient to ICU due to patient already being maxed out on optiflow and she will require bipap. Will follow orders.

## 2018-12-13 NOTE — PROGRESS NOTES
Norco effective. Patient resting quietly in bed at this time, eyes closed, respirations even and unlabored. Bed low and locked. Bedside table, personal belongings and call light within reach.

## 2018-12-13 NOTE — PROGRESS NOTES
Bedside shift report received from [place]. Pt in bed resting quietly. VSS on Bipap.

## 2018-12-13 NOTE — PROGRESS NOTES
[facility]  Admission Date: 12/10/2018             Daily Progress Note: 12/13/2018    The patient's chart is reviewed and the patient is discussed with the staff.    48 y.o. AAF evaluated at the request of Dr. Virginie Lindsey for dyspnea, hypoxemia and cough x several days. She has history of systolic heart failure with EF 35-40% on CHRISTI 10/22/18, COPD, NSTEMI induced by Cocaine abuse in 2017, DVT/PE on Eliquis, and COPD chronic O2 3L. She reports no cocaine use in several weeks (priro to hospitalization 11/24/2018), denies fevers but has noted some scant hemoptysis. Was felt to have cocaine induced pneumonitis during presentation 11/2018 treated with steroids and abx. She reportedly was asking for pain medications for left shoulder/back pain repeatedly in the ED, but did not ask me for pain meds today. States she hasn't had a cigarette in 10 days. She states she had a similar illness about 1 year ago and felt about the same. Subjective:     Called by nurse Gayatri Jaime to the floor since on 100% optiflow and saturation in the 80's. Patient has been progressively having higher oxygen requirements. Coughing up some blood intermittently but difficult to expectorate. Also wanting more pain meds per nurse. No wheezing. Patient reports her breathing is getting heavy and she is getting tired.      Current Facility-Administered Medications   Medication Dose Route Frequency    benzonatate (TESSALON) capsule 100 mg  100 mg Oral TID PRN    vancomycin (VANCOCIN) 1750 mg in  ml infusion  1,750 mg IntraVENous Q12H    furosemide (LASIX) tablet 40 mg  40 mg Oral DAILY    azithromycin (ZITHROMAX) tablet 500 mg  500 mg Oral Q24H    lisinopril (PRINIVIL, ZESTRIL) tablet 10 mg  10 mg Oral DAILY    enoxaparin (LOVENOX) injection 40 mg  40 mg SubCUTAneous DAILY    HYDROcodone-acetaminophen (NORCO) 5-325 mg per tablet 2 Tab  2 Tab Oral Q4H PRN    LORazepam (ATIVAN) injection 0.5 mg  0.5 mg IntraVENous Q6H PRN    HYDROcodone-homatropine (HYCODAN) 5-1.5 mg/5 mL (5 mL) syrup 5 mL  5 mL Oral Q4H PRN    methylPREDNISolone (PF) (SOLU-MEDROL) injection 60 mg  60 mg IntraVENous Q12H    clonazePAM (KlonoPIN) tablet 0.5 mg  0.5 mg Oral TID    diphenhydrAMINE (BENADRYL) capsule 50 mg  50 mg Oral Q6H PRN    estradiol (ESTRACE) tablet 0.5 mg  0.5 mg Oral DAILY    FLUoxetine (PROzac) capsule 20 mg  20 mg Oral DAILY    gabapentin (NEURONTIN) capsule 400 mg  400 mg Oral TID    mirtazapine (REMERON) tablet 15 mg  15 mg Oral QHS    nitroglycerin (NITROSTAT) tablet 0.4 mg  0.4 mg SubLINGual Q5MIN PRN    oxybutynin (DITROPAN) tablet 5 mg  5 mg Oral BID    pantoprazole (PROTONIX) tablet 40 mg  40 mg Oral ACB    pravastatin (PRAVACHOL) tablet 40 mg  40 mg Oral QHS    promethazine (PHENERGAN) tablet 25 mg  25 mg Oral Q6H PRN    spironolactone (ALDACTONE) tablet 25 mg  25 mg Oral DAILY    sodium chloride (NS) flush 5-10 mL  5-10 mL IntraVENous Q8H    sodium chloride (NS) flush 5-10 mL  5-10 mL IntraVENous PRN    cefTRIAXone (ROCEPHIN) 1 g in 0.9% sodium chloride (MBP/ADV) 50 mL  1 g IntraVENous Q24H    acetaminophen (TYLENOL) tablet 650 mg  650 mg Oral Q4H PRN    naloxone (NARCAN) injection 0.4 mg  0.4 mg IntraVENous PRN    nicotine (NICODERM CQ) 14 mg/24 hr patch 1 Patch  1 Patch TransDERmal Q24H    albuterol-ipratropium (DUO-NEB) 2.5 MG-0.5 MG/3 ML  3 mL Nebulization Q4HWA RT    guaiFENesin ER (MUCINEX) tablet 600 mg  600 mg Oral Q12H    budesonide (PULMICORT) 500 mcg/2 ml nebulizer suspension  500 mcg Nebulization BID RT     Review of Systems  Constitutional: negative for fever, chills, sweats  Cardiovascular: negative for chest pain, palpitations, syncope, edema  Respiratory: +hemoptysis (scant) +dyspnea   Gastrointestinal:  negative for dysphagia, reflux, vomiting, diarrhea, abdominal pain, or melena  Neurologic:  negative for focal weakness, numbness, headache    Objective:     Vitals: 12/12/18 2027 12/12/18 2057 12/12/18 2302 12/13/18 0331   BP:    142/83   Pulse:    97   Resp: (!) 38   (!) 46   Temp:    97.8 °F (36.6 °C)   SpO2: 90% 94% 90% (!) 82%   Weight:         Intake and Output:   12/11 0701 - 12/12 1900  In: 0078 [P.O.:2040; I.V.:500]  Out: 2380 [Urine:2380]  12/12 1901 - 12/13 0700  In: 120 [P.O.:120]  Out: -     Physical Exam:   Constitution:  the patient is well developed and in no acute distress, Opti-flow 50L, 100% with sat 95% and saturation from 86 to 90%  EENMT:  Sclera clear, pupils equal, oral mucosa moist  Respiratory: coarse with some crackles   Cardiovascular:  RRR without M,G,R  Gastrointestinal: soft and non-tender; with positive bowel sounds. Musculoskeletal: warm without cyanosis. There is no lower leg edema. Skin:  no jaundice or rashes, no wounds   Neurologic: no gross neuro deficits     Psychiatric:  alert and oriented x 3    CXR: pending now    On 12/12/18:      12/10/18:  Bilateral infiltrates versus pulmonary edema         LAB  No results for input(s): GLUCPOC in the last 72 hours. No lab exists for component: [place]   Recent Labs     12/12/18  0615 12/11/18  0547 12/10/18  1142   WBC 13.8* 13.3* 15.8*   HGB 9.6* 9.1* 9.9*   HCT 31.7* 29.2* 32.7*    404 462*     Recent Labs     12/12/18  0615 12/11/18  0547 12/10/18  1852 12/10/18  1142    142  --  138   K 3.5 3.8  --  4.0    106  --  106   CO2 26 27  --  24   * 137*  --  105*   BUN 21 17  --  14   CREA 1.17* 0.97  --  1.12*   MG 2.2 2.5* 2.6*  --    CA 8.9 9.0  --  9.6   TROIQ  --  <0.02* <0.02*  --    ALB  --   --   --  3.4*   TBILI  --   --   --  0.5   ALT  --   --   --  42   SGOT  --   --   --  64*     Recent Labs     12/12/18 2050 12/12/18  1305 12/12/18  1217   PHI 7.418 7.452* 7.518*   PCO2I 48.0* 39.7 37.9   PO2I 72* 45* 32*   HCO3I 31.0* 27.7* 30.8*     No results for input(s): LCAD, LAC in the last 72 hours.       Assessment:  (Medical Decision Making)     Hospital Problems Date Reviewed: 12/11/2018          Codes Class Noted POA    Dyspnea ICD-10-CM: R06.00  ICD-9-CM: 786.09  12/10/2018 Yes    See below    Acute respiratory distress ICD-10-CM: R06.03  ICD-9-CM: 518.82  12/10/2018 Yes    --see below    Pneumonia ICD-10-CM: J18.9  ICD-9-CM: 682  12/10/2018 Yes    Continue antibiotics    Substance abuse ([facility] ICD-10-CM: F19.10  ICD-9-CM: 305.90  12/10/2018 Yes    chronic    Acute on chronic systolic heart failure ([facility] ICD-10-CM: I50.23  ICD-9-CM: 428.23  10/18/2018 Yes    On IV Lasix    HTN (hypertension) ICD-10-CM: I10  ICD-9-CM: 401.9  6/6/2018 Yes     Last Assessment & Plan:   BP controlled. Continue current medications. Last Assessment & Plan:   BP controlled. Continue current medications. -140s    Anemia ICD-10-CM: D64.9  ICD-9-CM: 285.9  5/19/2018 Yes    hgb in 9's    COPD (chronic obstructive pulmonary disease) (HCC) (Chronic) ICD-10-CM: J44.9  ICD-9-CM: 496  5/19/2018 Yes     Last Assessment & Plan:   Lungs clear, normal chest x-ray yesterday. Continue inhalers, Singulair, & Flonase. Take Medrol dose pack as prescribed, discussed adverse effects. Stressed importance of keeping ENT & Pulmonology appointments. May also take Mucinex as needed, drink plenty of water. Follow up if worsening symptoms or concerns. Pt verbalized understanding, agreed with plan. Last Assessment & Plan:   Lungs clear, normal chest x-ray yesterday. Continue inhalers, Singulair, & Flonase. Take Medrol dose pack as prescribed, discussed adverse effects. Stressed importance of keeping ENT & Pulmonology appointments. May also take Mucinex as needed, drink plenty of water. Follow up if worsening symptoms or concerns. Pt verbalized understanding, agreed with plan.          Continue current    Hyperlipidemia (Chronic) ICD-10-CM: E78.5  ICD-9-CM: 272.4  5/19/2018 Yes    chronic    Hx of cocaine abuse ICD-10-CM: V04.737  ICD-9-CM: 305.63  11/28/2017 Yes    UDS positive this admission    * (Principal) Acute respiratory failure with hypoxia [facility]) ICD-10-CM: J96.01  ICD-9-CM: 518.81  11/27/2017 Yes    On Opti-flow--wean as tolerated        COPD/Smoker/Drug user admitted with worsening dyspnea with noted b/l worsening infiltrates -- Pneumonitis from drugs vs PNA vs some CHF  Plan:  (Medical Decision Making)   --given worsening respiratory status will try BIPAP now since on 100% optiflow with saturation now dipping to mid 80's. Get ABG soon, high risk to get on Vent. Currently conversant on 100% optflow, and not using accessory muscles at this time. Nurse reports not coughing up blood  --continue lasix as well, but change back to IV and q12 for now Did void once yesterday but not today. Voided in briefs and will place andino to monitor more accurately. Check BNP  --B/C with [facility] and awaiting final organism on Zithromax, Rocephin, Vancomycin D4  --continue Duoneb, Pulmicort, Mucinex  --continue Solu Medrol 60mg q12h  --Off Eliquis--having hemoptysis and discussed with cardiology. Wll stop Eliquis for now, use Lovenox for DVT prophylaxis and consider only Eliquis 2.5mg BID if felt needed at discharge. DVT appears remote. day 3  --Nicotine patch--states no smoking in 10 days  --UDS positive for cocaine and opiates-She will need to QUIT  --Allergies to non-narcotic pain meds; defer to hospitalist for sole prescriber of pain meds given drug seeking behavior  --continue remaining treatment. Spoke with respiratory/nursing/ICU coordinator and ICU rover. High risk to decompensate  Condition is critical. Time Spent with care is 30 minutes  Will be transferring patient to ICU when bed is available. More than 50% of the time documented was spent in face-to-face contact with the patient and in the care of the patient on the floor/unit where the patient is located.     Elizabeth Kong MD

## 2018-12-13 NOTE — PROGRESS NOTES
Pt. Very agitated, pulling off equipment, stating that she wants to leave AMA.  Dr. Alexandria Rodrigez notified, verbal orders for precedex received

## 2018-12-13 NOTE — PROGRESS NOTES
[facility] CRITICAL CARE OUTREACH NURSE PROGRESS REPORT      SUBJECTIVE: Called to assess patient secondary to nurse and MD concern-increasing O2 requirements. MEWS Score: 3 (12/13/18 0331)  Vitals:    12/12/18 2027 12/12/18 2057 12/12/18 2302 12/13/18 0331   BP:    142/83   Pulse:    97   Resp: (!) 38   (!) 46   Temp:    97.8 °F (36.6 °C)   SpO2: 90% 94% 90% (!) 82%   Weight:          LAB DATA:    Recent Labs     12/12/18  0615 12/11/18  0547 12/10/18  1852 12/10/18  1142    142  --  138   K 3.5 3.8  --  4.0    106  --  106   CO2 26 27  --  24   AGAP 12 9  --  8   * 137*  --  105*   BUN 21 17  --  14   CREA 1.17* 0.97  --  1.12*   GFRAA >60 >60  --  >60   GFRNA 52* >60  --  55*   CA 8.9 9.0  --  9.6   MG 2.2 2.5* 2.6*  --    ALB  --   --   --  3.4*   TP  --   --   --  8.4*   GLOB  --   --   --  5.0*   AGRAT  --   --   --  0.7*   ALT  --   --   --  42        Recent Labs     12/12/18 0615 12/11/18  0547 12/10/18  1142   WBC 13.8* 13.3* 15.8*   HGB 9.6* 9.1* 9.9*   HCT 31.7* 29.2* 32.7*    404 462*          OBJECTIVE: On arrival to room, I found patient to be sitting up in bed on OptiFlow 100%, alert. Pain Assessment  Pain Intensity 1: 0 (12/13/18 0012)  Pain Location 1: Back, Chest, Rib cage  Pain Intervention(s) 1: Medication (see MAR)  Patient Stated Pain Goal: 0    ASSESSMENT:  0410- Called to bedside to assess patient due to increased oxygen needs. Patient is alert and oriented, sitting up in bed with Optiflow 100%, O2 sat 91%. Persistent cough, patient describes her secretions as bloody and thick/hard- hard to expectorate. Lung sound diminished. Dr Lora Liu at bedside- orders for BiPAP.      0415-Patient placed on BiPAP 100%, unable to wean FiO2 from 100% due to decreased sats to 80s. RR 40s. HR 87. Orders to repeat ABG in 30min. Will check in with patient often, awaiting bed in ICU.      PLAN:  Transfer patient to ICU when bed available, until bed available, outreach RN will follow closely.

## 2018-12-13 NOTE — PROGRESS NOTES
Dr. Matilda Cha came to bedside to assess patient. Dr. Matilda Cha states patient can remain on the floor at this time but will be placed on Bipap. Respiratory in the room and placing patient on bipap at this time.

## 2018-12-13 NOTE — PROGRESS NOTES
cxr noted and less dense today and ABG on bipap is fine currently.  See images below    Tony Solano MD

## 2018-12-13 NOTE — PROGRESS NOTES
Administered norco as ordered PRN for moderate pain. Patient reports pain 6/10 in rib cage/chest/back. Will reassess per protocol.

## 2018-12-13 NOTE — PROGRESS NOTES
Date of Outreach Update:  Calista Cross was seen and assessed. Previous Outreach assessment has been reviewed. Follow up ABG: pH 7.42, CO2 51, PO2 97, HCO3 34 on BiPaP 100%. Proceeding with transfer to CCU 3308. Will continue to follow up per outreach protocol.     Signed By:   Kirit Shoemaker RN    December 13, 2018 5:02 AM

## 2018-12-13 NOTE — PROGRESS NOTES
Bedside and Verbal shift change report given to Jose Angel De Jesus (oncoming nurse) by Jadyn Valentino (offgoing nurse). Report included the following information SBAR, Kardex, ED Summary, Procedure Summary, Intake/Output, MAR, Recent Results and Cardiac Rhythm NSR.

## 2018-12-13 NOTE — PROGRESS NOTES
TRANSFER - OUT REPORT:    Verbal report given to Chemo Lewis RN on [facility]  being transferred to [facility] for routine progression of care       Report consisted of patients Situation, Background, Assessment and   Recommendations(SBAR). Information from the following report(s) SBAR, Kardex, Intake/Output, MAR, Recent Results and Cardiac Rhythm NSR  was reviewed with the receiving nurse. Lines:   Peripheral IV Right Antecubital (Active)   Site Assessment Clean, dry, & intact 12/12/2018 11:45 PM   Phlebitis Assessment 0 12/12/2018 11:45 PM   Infiltration Assessment 0 12/12/2018 11:45 PM   Dressing Status Clean, dry, & intact 12/12/2018 11:45 PM   Dressing Type Transparent;Tape 12/12/2018 11:45 PM   Hub Color/Line Status Patent; Flushed 12/12/2018 11:45 PM        Opportunity for questions and clarification was provided.       Patient transported with:  Brian Polk

## 2018-12-13 NOTE — PROGRESS NOTES
Hospitalist Progress Note     Admit Date:  12/10/2018 11:28 AM   Name:  Eric Hedrick   Age:  48 y.o.  :  1965   MRN:  [id number]   PCP:  Zora Meza MD  Treatment Team: Attending Provider: Jorge Rebolledo MD; Consulting Provider: Terence Zambrano MD; Physician: Terence Zambrano MD; Consulting Provider: Beny Correa MD; Care Manager: Baylee Peter RN; Utilization Review: Glenroy Callahan    Subjective:   Louis Hernandez is a 49 yo F admitted  12/10 with acute hypoxic respiratory failure from HCAP vs cocaine pneumonitis. Was started on abx. Pulmonary consulted. She has had drug seeking behavior with narcotics and benzos. Imaging of areas she reports pain, typically spine and ribs, has revealed no etiology in past.  She had decompensation on the floor  and had to be moved to the ICU for bipap. Follow up CXR showed some improvement. 12/3 - seen on bipap. No CP. SOB the same. No fevers. Requesting more pain meds for spine and left lower lateral rib area, always rated 10/10.   Reports of verbal belligerence with nursing regarding demands for more pain meds      Objective:     Patient Vitals for the past 24 hrs:   Temp Pulse Resp BP SpO2   18 1200 98.5 °F (36.9 °C) 93 27 128/67 (!) 88 %   18 1144  92  139/63 96 %   18 1137     99 %   18 1135     99 %   18 1133  87 (!) 37     18 1132  93  (!) 181/98 98 %   18 1108  88 27     18 1101  94  157/67 100 %   18 1057  89  164/82    18 1032  88 (!) 32  96 %   18 1031  95  164/82 100 %   18 1006  86 (!) 34  93 %   18 1000  86  (!) 152/96 94 %   18 0939  88  145/69    18 0931  88 (!) 33 145/69 97 %   18 0900  91 28 144/68 (!) 89 %   18 0831  91 26 161/71 91 %   18 0828     91 %   18 0824     91 %   18 0800  93 27 154/81 92 %   18 0750 99.1 °F (37.3 °C) 91 (!) 33 153/81 92 %   12/13/18 0731  93 (!) 34 147/83 96 %   12/13/18 0701  95 29  100 %   12/13/18 0700  91  150/72 100 %   12/13/18 0631  98  140/84 90 %   12/13/18 0628   (!) 34     12/13/18 0615     100 %   12/13/18 0600  93 (!) 34 141/64 96 %   12/13/18 0545     100 %   12/13/18 0532 98 °F (36.7 °C) 92 (!) 38 167/90 100 %   12/13/18 0526  94   94 %   12/13/18 0331 97.8 °F (36.6 °C) 97 (!) 46 142/83 (!) 82 %   12/12/18 2302     90 %   12/12/18 2057     94 %   12/12/18 2027   (!) 38  90 %   12/12/18 2020   (!) 46     12/12/18 2010 97.9 °F (36.6 °C) 96 (!) 48 147/72    12/12/18 1558     (!) 88 %   12/12/18 1537 98.6 °F (37 °C) 75 20 139/74 95 %     Oxygen Therapy  O2 Sat (%): (!) 88 % (12/13/18 1200)  Pulse via Oximetry: 93 beats per minute (12/13/18 1200)  O2 Device: BIPAP (12/13/18 1200)  O2 Flow Rate (L/min): 55 l/min (12/12/18 2057)  O2 Temperature: 87.8 °F (31 °C) (12/12/18 2057)  FIO2 (%): 80 %(weaned to 70%) (12/13/18 1137)    Intake/Output Summary (Last 24 hours) at 12/13/2018 1328  Last data filed at 12/13/2018 0748  Gross per 24 hour   Intake 120 ml   Output 1625 ml   Net -1505 ml       *Note that automatically entered I/Os may not be accurate; dependent on patient compliance with collection and accurate  by assistants. General:    Well nourished. Alert. bipap  CV:   RRR. No murmur, rub, or gallop. Lungs:   Scattered coarse BS bilat  Extremities: Warm and dry. No cyanosis or edema. Skin:     No rashes or jaundice.    Neuro:  No gross focal deficits    Data Review:  I have reviewed all labs, meds, telemetry events, and studies from the last 24 hours:    Recent Results (from the past 24 hour(s))   POC G3    Collection Time: 12/12/18  8:50 PM   Result Value Ref Range    Device: High Flow Nasal Cannula      FIO2 (POC) 100 %    pH (POC) 7.418 7.35 - 7.45      pCO2 (POC) 48.0 (H) 35 - 45 MMHG    pO2 (POC) 72 (L) 75 - 100 MMHG    HCO3 (POC) 31.0 (H) 22 - 26 MMOL/L    sO2 (POC) 94 (L) 95 - 98 %    Base excess (POC) 6 mmol/L    Allens test (POC) YES      Site RIGHT RADIAL      Patient temp. 98.6      Specimen type (POC) ARTERIAL      Performed by Kelly     CO2, POC 32 MMOL/L    Flow rate (POC) 50.000 L/min    Critical value read back 20:50     Respiratory comment: NurseNotified     COLLECT TIME 2,043     POC G3    Collection Time: 12/13/18  4:58 AM   Result Value Ref Range    Device: BIPAP      FIO2 (POC) 100 %    pH (POC) 7.421 7.35 - 7.45      pCO2 (POC) 51.5 (H) 35 - 45 MMHG    pO2 (POC) 97 75 - 100 MMHG    HCO3 (POC) 33.5 (H) 22 - 26 MMOL/L    sO2 (POC) 98 95 - 98 %    Base excess (POC) 8 mmol/L    Tidal volume 424 ml    Set Rate 8 bpm    PEEP/CPAP (POC) 8 cmH2O    PIP (POC) 14      Allens test (POC) YES      Inspiratory Time 0.9 sec    Site LEFT RADIAL      Patient temp.  98.6      Specimen type (POC) ARTERIAL      Performed by Kwaku     CO2, POC 35 MMOL/L    Spontaneous timed 12      Exhaled minute volume 16.40 L/min    COLLECT TIME 680     METABOLIC PANEL, BASIC    Collection Time: 12/13/18  6:07 AM   Result Value Ref Range    Sodium 141 136 - 145 mmol/L    Potassium 3.7 3.5 - 5.1 mmol/L    Chloride 100 98 - 107 mmol/L    CO2 32 21 - 32 mmol/L    Anion gap 9 7 - 16 mmol/L    Glucose 170 (H) 65 - 100 mg/dL    BUN 25 (H) 6 - 23 MG/DL    Creatinine 0.87 0.6 - 1.0 MG/DL    GFR est AA >60 >60 ml/min/1.73m2    GFR est non-AA >60 >60 ml/min/1.73m2    Calcium 9.3 8.3 - 10.4 MG/DL   MAGNESIUM    Collection Time: 12/13/18  6:07 AM   Result Value Ref Range    Magnesium 2.3 1.8 - 2.4 mg/dL   CBC WITH AUTOMATED DIFF    Collection Time: 12/13/18  6:07 AM   Result Value Ref Range    WBC 15.1 (H) 4.3 - 11.1 K/uL    RBC 3.35 (L) 4.05 - 5.2 M/uL    HGB 9.8 (L) 11.7 - 15.4 g/dL    HCT 31.4 (L) 35.8 - 46.3 %    MCV 93.7 79.6 - 97.8 FL    MCH 29.3 26.1 - 32.9 PG    MCHC 31.2 (L) 31.4 - 35.0 g/dL    RDW 14.1 11.9 - 14.6 %    PLATELET 474 576 - 177 K/uL    MPV 9.5 9.4 - 12.3 FL    ABSOLUTE NRBC 0.03 0.0 - 0.2 K/uL    DF AUTOMATED      NEUTROPHILS 92 (H) 43 - 78 %    LYMPHOCYTES 4 (L) 13 - 44 %    MONOCYTES 4 4.0 - 12.0 %    EOSINOPHILS 0 (L) 0.5 - 7.8 %    BASOPHILS 0 0.0 - 2.0 %    IMMATURE GRANULOCYTES 1 0.0 - 5.0 %    ABS. NEUTROPHILS 14.0 (H) 1.7 - 8.2 K/UL    ABS. LYMPHOCYTES 0.5 0.5 - 4.6 K/UL    ABS. MONOCYTES 0.6 0.1 - 1.3 K/UL    ABS. EOSINOPHILS 0.0 0.0 - 0.8 K/UL    ABS. BASOPHILS 0.0 0.0 - 0.2 K/UL    ABS. IMM. GRANS. 0.1 0.0 - 0.5 K/UL   BNP    Collection Time: 12/13/18  6:07 AM   Result Value Ref Range     pg/mL        All Micro Results     Procedure Component Value Units Date/Time    CULTURE, BLOOD [[id number]] Collected:  12/10/18 1142    Order Status:  Completed Specimen:  Blood Updated:  12/13/18 1138     Special Requests: --        LEFT  Antecubital       Culture result: NO GROWTH 3 DAYS       CULTURE, BLOOD [[id number]]  (Abnormal) Collected:  12/10/18 1142    Order Status:  Completed Specimen:  Blood Updated:  12/13/18 0819     Special Requests: --        RIGHT  Antecubital       GRAM STAIN       GRAM POS COCCI IN CLUSTERS            AEROBIC BOTTLE POSITIVE               RESULTS VERIFIED, PHONED TO AND READ BACK BY            EM HOBSON @ 0006 ON 12/12/18 BY E     Culture result:       STAPHYLOCOCCUS SPECIES, COAGULASE NEGATIVE                  SA target DNA sequence not detected within the sample. Test performed by PCR                  THIS ORGANISM MAY BE INDICATIVE OF CULTURE CONTAMINATION, HOWEVER, CLINICAL CORRELATION NEEDS TO BE EVALUATED, AS EACH CASE IS UNIQUE. No results found for this visit on 12/10/18.     Current Meds:  Current Facility-Administered Medications   Medication Dose Route Frequency    furosemide (LASIX) injection 40 mg  40 mg IntraVENous BID    dilTIAZem (CARDIZEM) IR tablet 30 mg  30 mg Oral AC&HS    HYDROcodone-acetaminophen (NORCO) 5-325 mg per tablet 1 Tab  1 Tab Oral Q4H PRN    guaiFENesin-dextromethorphan (ROBITUSSIN DM) 100-10 mg/5 mL syrup 10 mL  10 mL Oral Q4H    HYDROcodone-homatropine (HYCODAN) 5-1.5 mg/5 mL (5 mL) syrup 5 mL  5 mL Oral Q6H PRN    [START ON 12/14/2018] Vancomycin trough reminder   Other ONCE    benzonatate (TESSALON) capsule 100 mg  100 mg Oral TID PRN    vancomycin (VANCOCIN) 1750 mg in  ml infusion  1,750 mg IntraVENous Q12H    azithromycin (ZITHROMAX) tablet 500 mg  500 mg Oral Q24H    lisinopril (PRINIVIL, ZESTRIL) tablet 10 mg  10 mg Oral DAILY    enoxaparin (LOVENOX) injection 40 mg  40 mg SubCUTAneous DAILY    methylPREDNISolone (PF) (SOLU-MEDROL) injection 60 mg  60 mg IntraVENous Q12H    estradiol (ESTRACE) tablet 0.5 mg  0.5 mg Oral DAILY    FLUoxetine (PROzac) capsule 20 mg  20 mg Oral DAILY    gabapentin (NEURONTIN) capsule 400 mg  400 mg Oral TID    mirtazapine (REMERON) tablet 15 mg  15 mg Oral QHS    nitroglycerin (NITROSTAT) tablet 0.4 mg  0.4 mg SubLINGual Q5MIN PRN    oxybutynin (DITROPAN) tablet 5 mg  5 mg Oral BID    pantoprazole (PROTONIX) tablet 40 mg  40 mg Oral ACB    pravastatin (PRAVACHOL) tablet 40 mg  40 mg Oral QHS    promethazine (PHENERGAN) tablet 25 mg  25 mg Oral Q6H PRN    spironolactone (ALDACTONE) tablet 25 mg  25 mg Oral DAILY    sodium chloride (NS) flush 5-10 mL  5-10 mL IntraVENous Q8H    sodium chloride (NS) flush 5-10 mL  5-10 mL IntraVENous PRN    cefTRIAXone (ROCEPHIN) 1 g in 0.9% sodium chloride (MBP/ADV) 50 mL  1 g IntraVENous Q24H    acetaminophen (TYLENOL) tablet 650 mg  650 mg Oral Q4H PRN    naloxone (NARCAN) injection 0.4 mg  0.4 mg IntraVENous PRN    nicotine (NICODERM CQ) 14 mg/24 hr patch 1 Patch  1 Patch TransDERmal Q24H    albuterol-ipratropium (DUO-NEB) 2.5 MG-0.5 MG/3 ML  3 mL Nebulization Q4HWA RT    budesonide (PULMICORT) 500 mcg/2 ml nebulizer suspension  500 mcg Nebulization BID RT       Other Studies (last 24 hours):  Xr Chest Port    Result Date: 12/13/2018  Clinical history: Respiratory failure. TECHNIQUE: AP portable chest COMPARISON: Yesterday. FINDINGS: Diffuse bilateral groundglass opacities, similar to prior exam. No pneumothorax. Heart is enlarged. IMPRESSION: Bilateral airspace disease, similar to prior exam. Findings likely pulmonary edema or infection. Xr Chest Port    Result Date: 12/12/2018  Portable chest x-ray INDICATION: Respiratory failure COMPARISON: 12/10/2018 FINDINGS: Increasing diffuse lung opacities with probably stable cardiomediastinal silhouette. No pneumothorax. IMPRESSION: Progressive lung opacities, edema versus pneumonia. Assessment and Plan:     Hospital Problems as of 12/13/2018 Date Reviewed: 12/11/2018          Codes Class Noted - Resolved POA    Drug-seeking behavior (Chronic) ICD-10-CM: Z76.5  ICD-9-CM: 305.90  12/13/2018 - Present Yes        Pneumonia ICD-10-CM: J18.9  ICD-9-CM: 491  12/10/2018 - Present Yes        Polysubstance dependence including opioid type drug with complication, episodic abuse ([facility] ICD-10-CM: F19.20  ICD-9-CM: 304.72  12/10/2018 - Present Yes        Chronic systolic heart failure (HCC) (Chronic) ICD-10-CM: I50.22  ICD-9-CM: 428.22  10/18/2018 - Present Yes        HTN (hypertension) (Chronic) ICD-10-CM: I10  ICD-9-CM: 401.9  6/6/2018 - Present Yes    Overview Addendum 7/16/2018  9:53 AM by Kirit Marquez LPN     Last Assessment & Plan:   BP controlled. Continue current medications. Last Assessment & Plan:   BP controlled. Continue current medications. Anemia (Chronic) ICD-10-CM: D64.9  ICD-9-CM: 285.9  5/19/2018 - Present Yes        COPD (chronic obstructive pulmonary disease) (HCC) (Chronic) ICD-10-CM: J44.9  ICD-9-CM: 496  5/19/2018 - Present Yes    Overview Addendum 7/16/2018  9:53 AM by Kirit Marquez LPN     Last Assessment & Plan:   Lungs clear, normal chest x-ray yesterday. Continue inhalers, Singulair, & Flonase.   Take Medrol dose pack as prescribed, discussed adverse effects. Stressed importance of keeping ENT & Pulmonology appointments. May also take Mucinex as needed, drink plenty of water. Follow up if worsening symptoms or concerns. Pt verbalized understanding, agreed with plan. Last Assessment & Plan:   Lungs clear, normal chest x-ray yesterday. Continue inhalers, Singulair, & Flonase. Take Medrol dose pack as prescribed, discussed adverse effects. Stressed importance of keeping ENT & Pulmonology appointments. May also take Mucinex as needed, drink plenty of water. Follow up if worsening symptoms or concerns. Pt verbalized understanding, agreed with plan. Hyperlipidemia (Chronic) ICD-10-CM: E78.5  ICD-9-CM: 272.4  5/19/2018 - Present Yes        History of DVT (deep vein thrombosis) (Chronic) ICD-10-CM: M13.933  ICD-9-CM: V12.51  4/14/2018 - Present Yes        DVT, recurrent, lower extremity, chronic, bilateral (HCC) (Chronic) ICD-10-CM: U29.126  ICD-9-CM: 453.50  3/23/2018 - Present Yes        * (Principal) Acute respiratory failure with hypoxia (HCC) ICD-10-CM: J96.01  ICD-9-CM: 518.81  11/27/2017 - Present Yes        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 ([facility] (Chronic) ICD-10-CM: F14.150  ICD-9-CM: 292.11  11/11/2013 - Present Yes    Overview Signed 6/6/2018  4:25 PM by Don White LPN     Last Assessment & Plan:   - Patient denies. UDS positive. - No beta blocker now or upon discharge. Plan:  · Cont bipap. Pulmonary managing  · Solumedrol and nebs as per pulmonary; no wheezing currently  · Blood cultures with coag neg staph in 1/4 bottles. Probably contaminant. currently on vanc, rocephin, azithro. · BNP only 118. IV lasix per pulm  · Stop scheduled klonopin; state database has no long term outpatient controlled substance prescriptions of any kind for pt. · Reduce PRN norco to 5mg  · Stop PRN ativan  · Holding eliquis due to reports of occasional hemoptysis.   Additional cough suppression ordered with robitussin DM.   Cont PRN hycodan, tessalon  · Psych eval when pt off bipap    DC planning/Dispo:  Pending course  Diet:  DIET CARDIAC  DVT ppx:  SCDs  Risk of decompensation high    Signed:  Solomon Novoa MD

## 2018-12-13 NOTE — PROGRESS NOTES
Called pulmonary in regard to patient O2 sat 82% on 55L/min optiflow. 46 respirations per minute while patient is asleep. Awaiting call back.

## 2018-12-13 NOTE — PROGRESS NOTES
During bedside shift report with Tigre Mojica RN, patient became very agitated, removed monitoring equipment, attempting to get out of bed, said she \"needs to get out of here\", wanting to leave AMA. Precedex gtt increased to 0.7 mcg/kg/hr. Hospitalist michael and MD to come to bedside. Patient remaining in bed with BIPAP 80% in place at this time but refusing monitoring equipment. Friend at bedside with patient.

## 2018-12-13 NOTE — PROGRESS NOTES
Notified by assistant that patient's O2 sat was 72% upon assessment. Patient lethargic, awakens to voice but quickly falls back asleep. Requires constant stimulation to keep awake. 46 respirations per minute. HOB elevated to 45 degrees. Instructed patient on deep breathing but patient replies \"it's hard\" and rolled over to go to sleep. Woke patient back up and instructed again on deep breathing. RT aware at this time. O2 sat increased to 81% with 48 respirations per minute within approximately 3 minutes. Kept patient awake with verbal stimulation. Patient agitated stating \"I don't sleep good during the day. I'm tired so let me sleep please\". Patient O2 sat 90%. Respirations 38 per minute. Paged Dr. Holli Stoddard at approximately 2032 to notify of patient status. Spoke with Dr. Holli Stoddard at approximately 2034 and received new orders for STAT ABG. Respiratory aware. Will follow any other new orders.

## 2018-12-13 NOTE — PROGRESS NOTES
Interdisciplinary team rounds were held 12/13/2018 with the following team members:Care Management, Nursing, Nurse Practitioner, Nutrition, Palliative Care, Pastoral Care, Pharmacy, Physical Therapy, Physician, Respiratory Therapy and Clinical Coordinator and the patient. Plan of care discussed. See clinical pathway and/or care plan for interventions and desired outcomes.

## 2018-12-14 NOTE — PROGRESS NOTES
AMA paperwork signed by Dr. Cary Valdes and patient. Patient verbalizes risks of leaving hospital. Understands that she can return to ER after discharge if she wants medical care. Lines and drains removed. Patient placed in her own clothes, all belongings sent with patient including tablet and phone. Patient's friend, Lukasz Connolly, driving patient home.

## 2018-12-14 NOTE — PROGRESS NOTES
Refusing BIPAP. Refusing to lay in bed, sitting on the side. Placed on optiflow at highest settings. Continues to refuse monitoring equipment.   Awaiting MD.

## 2018-12-14 NOTE — DISCHARGE SUMMARY
Hospitalist Discharge Summary     Patient ID:  Richelle Linares  [id number]  59 y.o.  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 UDS positive. - No beta blocker now or upon discharge. DVT, recurrent, lower extremity, chronic, bilateral ([facility] (3/23/2018)      Anemia (5/19/2018)      COPD (chronic obstructive pulmonary disease) ([facility] (5/19/2018)      Overview: Last Assessment & Plan:       Lungs clear, normal chest x-ray yesterday. Continue inhalers, Singulair,       & Flonase. Take Medrol dose pack as prescribed, discussed adverse       effects. Stressed importance of keeping ENT & Pulmonology appointments. May also take Mucinex as needed, drink plenty of water. Follow up if       worsening symptoms or concerns. Pt verbalized understanding, agreed with       plan. Last Assessment & Plan:       Lungs clear, normal chest x-ray yesterday. Continue inhalers, Singulair,       & Flonase. Take Medrol dose pack as prescribed, discussed adverse       effects. Stressed importance of keeping ENT & Pulmonology appointments. May also take Mucinex as needed, drink plenty of water. Follow up if       worsening symptoms or concerns. Pt verbalized understanding, agreed with       plan.       Hyperlipidemia (5/19/2018)      History of DVT (deep vein thrombosis) (4/14/2018)      HTN (hypertension) (6/6/2018)      Overview: Last Assessment & Plan:       BP controlled. Continue current medications. Last Assessment & Plan:       BP controlled. Continue current medications. Chronic systolic heart failure ([facility] (10/18/2018)      Pneumonia (12/10/2018)      Polysubstance dependence including opioid type drug with complication, episodic abuse ([facility] (12/10/2018)      Drug-seeking behavior (12/13/2018)      Jabari Godoy is a 48 y.o.  female who presents with dyspnea with hypoxia and left shoulder pain. Pt has a PMHx of systolic heart failure with EF 35-40% on CHRISTI 10/22/18, COPD, NSTEMI induced by Cocaine abuse in 2017, DVT/PE on Eliquis, and COPD on 3 liters chronic O2 who presents to the ER with c/o cough and increasing dyspnea over the last week. Pt states she developed cough with yellow sputum about 1 week ago. She denies associated fever. States her dyspnea progessed with drop in O2 saturations today so she decided to seek medical attention. 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 Pt describes achy left upper shoulder chest pain w/o further radiation. She is begging for pain meds the moment I entered the room though appeared more relaxed on her phone just moments before. States she stopped using drugs and stopped smoking about 3 weeks ago. Currently on AIRVO. Hospital Course:  Please refer to the admission H&P for details of presentation. In summary, the patient is admitted with worsening respiratory distress over th past week. Patient's hospital course complicated by increasing hypoxia and worsening oxygen requirements such that she was transferred to the CCU and placed on NIV.  Patient refused to wear BiPap and took it off, she was placed on maximum settings with Optiflow but she is demanding to go home, stating \"I have made my peace with God and I am ok with whatever happens. \" She says she has a follow-up appointment with Dr. Paul Michaud and states \"I live just a few blocks from here and I will be fine, I have my own oxygen at home. \" Risks of leaving against medical advice explained to patient, stated that patient at very high risk of decompensating and that her breathing will likely get very worse to the point that she has already required critical care level of care while she is in the hospital. Patient understands above and still claims that she wants to go home, \"I am well aware of the risks. \" Patient alert, awake, and oriented and is aware of the risks associated with leaving AMA. Patient told to return to ED if her symptoms persist or worsen or if she has any new or unexplained symptoms. All questions answered. AMA paperwork signed and discussed with patient. Significant Diagnostic Studies:       Labs: Results:       Chemistry Recent Labs     12/13/18  0607 12/12/18  0615 12/11/18  0547   * 261* 137*    139 142   K 3.7 3.5 3.8    101 106   CO2 32 26 27   BUN 25* 21 17   CREA 0.87 1.17* 0.97   CA 9.3 8.9 9.0   AGAP 9 12 9      CBC w/Diff Recent Labs     12/13/18  0607 12/12/18  0615 12/11/18  0547   WBC 15.1* 13.8* 13.3*   RBC 3.35* 3.29* 3.10*   HGB 9.8* 9.6* 9.1*   HCT 31.4* 31.7* 29.2*    400 404   GRANS 92* 93* 92*   LYMPH 4* 3* 4*   EOS 0* 0* 0*      Cardiac Enzymes No results for input(s): CPK, CKND1, DENNY in the last 72 hours. No lab exists for component: CKRMB, TROIP   Coagulation No results for input(s): PTP, INR, APTT in the last 72 hours.     No lab exists for component: INREXT    Lipid Panel Lab Results   Component Value Date/Time    Cholesterol, total 145 04/14/2017 04:55 AM    HDL Cholesterol 104 (H) 04/14/2017 04:55 AM    LDL, calculated 28.8 04/14/2017 04:55 AM    VLDL, calculated 12.2 04/14/2017 04:55 AM    Triglyceride 61 04/14/2017 04:55 AM    CHOL/HDL Ratio 1.4 04/14/2017 04:55 AM      BNP No results for input(s): BNPP in the last 72 hours. Liver Enzymes No results for input(s): TP, ALB, TBIL, AP, SGOT, GPT in the last 72 hours. No lab exists for component: DBIL   Thyroid Studies Lab Results   Component Value Date/Time    TSH 2.669 02/19/2012 01:29 PM            Discharge Exam:  Visit Vitals  /76   Pulse 85   Temp 98.5 °F (36.9 °C)   Resp 28   Ht 5' 5\" (1.651 m)   Wt 87.1 kg (192 lb 0.3 oz)   SpO2 100%   BMI 31.95 kg/m²     General appearance: very anxious appearing, sitting up on side of bed with Optiflow on  Lungs: clear to auscultation bilaterally, visibly tachypneic   Heart: regular rate and rhythm, S1, S2 normal, no murmur, click, rub or gallop  Abdomen: soft, non-tender. Bowel sounds normal. No masses,  no organomegaly  Extremities: no cyanosis or edema  Neurologic: Grossly normal    Disposition: patient left AMA  Discharge Condition: critical  Patient Instructions:   Current Discharge Medication List          Activity: Activity as tolerated  Diet: Regular Diet  Wound Care: As directed    Follow-up  ·   Patient left AMA, see above. Patient says that she has a follow-up with Dr. Henry Gong in the morning  Time spent to discharge patient 35 minutes  Signed:   Jose Maria Contreras DO  12/13/2018  7:24 PM

## 2018-12-15 LAB
BACTERIA SPEC CULT: NORMAL
SERVICE CMNT-IMP: NORMAL

## 2018-12-18 LAB
BACTERIA SPEC CULT: NORMAL
SERVICE CMNT-IMP: NORMAL

## 2018-12-20 LAB
BZE SERPL-MCNC: 1413 NG/ML
COCAINE UR-MCNC: 39 NG/ML

## 2018-12-29 DIAGNOSIS — I51.81 TAKOTSUBO CARDIOMYOPATHY: Chronic | ICD-10-CM

## 2018-12-29 RX ORDER — NITROGLYCERIN 0.4 MG/1
0.4 TABLET SUBLINGUAL
Qty: 1 BOTTLE | Refills: 5 | Status: SHIPPED | OUTPATIENT
Start: 2018-12-29 | End: 2020-06-12 | Stop reason: SDUPTHER

## 2019-01-25 ENCOUNTER — ANESTHESIA EVENT (OUTPATIENT)
Dept: SURGERY | Age: 54
End: 2019-01-25
Payer: COMMERCIAL

## 2019-01-25 ENCOUNTER — HOSPITAL ENCOUNTER (OUTPATIENT)
Dept: SURGERY | Age: 54
Discharge: HOME | End: 2019-01-25

## 2019-01-25 PROBLEM — N30.10 IC (INTERSTITIAL CYSTITIS): Status: ACTIVE | Noted: 2019-01-25

## 2019-01-25 NOTE — PERIOP NOTES
Case for Monday 1/28/19 was canceled yesterday and added back on today. Per Leander George RN, Dr Camila Cruz stipulated that a copy of Eliquis hold clearance and Pulmonary Clearance must be received to proceed with surgery. Call made to POA, voicemail left for Mingo Cope MA to Dr Samira Thomas requesting copy of both.

## 2019-01-28 ENCOUNTER — HOSPITAL ENCOUNTER (OUTPATIENT)
Age: 54
Setting detail: OUTPATIENT SURGERY
Discharge: HOME | End: 2019-01-28
Attending: ORTHOPAEDIC SURGERY | Admitting: ORTHOPAEDIC SURGERY
Payer: COMMERCIAL

## 2019-01-28 ENCOUNTER — APPOINTMENT (OUTPATIENT)
Dept: GENERAL RADIOLOGY | Age: 54
End: 2019-01-28
Attending: ORTHOPAEDIC SURGERY
Payer: COMMERCIAL

## 2019-01-28 ENCOUNTER — ANESTHESIA (OUTPATIENT)
Dept: SURGERY | Age: 54
End: 2019-01-28
Payer: COMMERCIAL

## 2019-01-28 VITALS
OXYGEN SATURATION: 100 % | HEART RATE: 85 BPM | WEIGHT: 190 LBS | BODY MASS INDEX: 32.61 KG/M2 | SYSTOLIC BLOOD PRESSURE: 129 MMHG | DIASTOLIC BLOOD PRESSURE: 58 MMHG | TEMPERATURE: 97.9 F | RESPIRATION RATE: 18 BRPM

## 2019-01-28 DIAGNOSIS — M20.5X1 CLAWTOE, ACQUIRED, RIGHT: Primary | ICD-10-CM

## 2019-01-28 LAB — POTASSIUM BLD-SCNC: 3.8 MMOL/L (ref 3.5–5.1)

## 2019-01-28 PROCEDURE — 74011250636 HC RX REV CODE- 250/636: Performed by: ANESTHESIOLOGY

## 2019-01-28 PROCEDURE — 77030006788 HC BLD SAW OSC STRY -B: Performed by: ORTHOPAEDIC SURGERY

## 2019-01-28 PROCEDURE — 77030002933 HC SUT MCRYL J&J -A: Performed by: ORTHOPAEDIC SURGERY

## 2019-01-28 PROCEDURE — 74011000250 HC RX REV CODE- 250

## 2019-01-28 PROCEDURE — 76210000063 HC OR PH I REC FIRST 0.5 HR: Performed by: ORTHOPAEDIC SURGERY

## 2019-01-28 PROCEDURE — 74011250636 HC RX REV CODE- 250/636

## 2019-01-28 PROCEDURE — 77030002916 HC SUT ETHLN J&J -A: Performed by: ORTHOPAEDIC SURGERY

## 2019-01-28 PROCEDURE — 84132 ASSAY OF SERUM POTASSIUM: CPT

## 2019-01-28 PROCEDURE — 76210000021 HC REC RM PH II 0.5 TO 1 HR: Performed by: ORTHOPAEDIC SURGERY

## 2019-01-28 PROCEDURE — 77030018836 HC SOL IRR NACL ICUM -A: Performed by: ORTHOPAEDIC SURGERY

## 2019-01-28 PROCEDURE — 74011000250 HC RX REV CODE- 250: Performed by: ORTHOPAEDIC SURGERY

## 2019-01-28 PROCEDURE — 76060000032 HC ANESTHESIA 0.5 TO 1 HR: Performed by: ORTHOPAEDIC SURGERY

## 2019-01-28 PROCEDURE — 77030010509 HC AIRWY LMA MSK TELE -A: Performed by: ANESTHESIOLOGY

## 2019-01-28 PROCEDURE — 76010000160 HC OR TIME 0.5 TO 1 HR INTENSV-TIER 1: Performed by: ORTHOPAEDIC SURGERY

## 2019-01-28 PROCEDURE — 77030000032 HC CUF TRNQT ZIMM -B: Performed by: ORTHOPAEDIC SURGERY

## 2019-01-28 RX ORDER — MIDAZOLAM HYDROCHLORIDE 1 MG/ML
5 INJECTION, SOLUTION INTRAMUSCULAR; INTRAVENOUS ONCE
Status: DISCONTINUED | OUTPATIENT
Start: 2019-01-28 | End: 2019-01-28 | Stop reason: HOSPADM

## 2019-01-28 RX ORDER — LIDOCAINE HYDROCHLORIDE 20 MG/ML
INJECTION, SOLUTION EPIDURAL; INFILTRATION; INTRACAUDAL; PERINEURAL AS NEEDED
Status: DISCONTINUED | OUTPATIENT
Start: 2019-01-28 | End: 2019-01-28 | Stop reason: HOSPADM

## 2019-01-28 RX ORDER — FENTANYL CITRATE 50 UG/ML
100 INJECTION, SOLUTION INTRAMUSCULAR; INTRAVENOUS ONCE
Status: DISCONTINUED | OUTPATIENT
Start: 2019-01-28 | End: 2019-01-28 | Stop reason: HOSPADM

## 2019-01-28 RX ORDER — HYDROCODONE BITARTRATE AND ACETAMINOPHEN 5; 325 MG/1; MG/1
2 TABLET ORAL AS NEEDED
Status: DISCONTINUED | OUTPATIENT
Start: 2019-01-28 | End: 2019-01-28 | Stop reason: HOSPADM

## 2019-01-28 RX ORDER — SODIUM CHLORIDE 0.9 % (FLUSH) 0.9 %
5-40 SYRINGE (ML) INJECTION AS NEEDED
Status: DISCONTINUED | OUTPATIENT
Start: 2019-01-28 | End: 2019-01-28 | Stop reason: HOSPADM

## 2019-01-28 RX ORDER — BUPIVACAINE HYDROCHLORIDE 2.5 MG/ML
INJECTION, SOLUTION EPIDURAL; INFILTRATION; INTRACAUDAL AS NEEDED
Status: DISCONTINUED | OUTPATIENT
Start: 2019-01-28 | End: 2019-01-28 | Stop reason: HOSPADM

## 2019-01-28 RX ORDER — SODIUM CHLORIDE, SODIUM LACTATE, POTASSIUM CHLORIDE, CALCIUM CHLORIDE 600; 310; 30; 20 MG/100ML; MG/100ML; MG/100ML; MG/100ML
75 INJECTION, SOLUTION INTRAVENOUS CONTINUOUS
Status: DISCONTINUED | OUTPATIENT
Start: 2019-01-28 | End: 2019-01-28 | Stop reason: HOSPADM

## 2019-01-28 RX ORDER — SODIUM CHLORIDE 0.9 % (FLUSH) 0.9 %
5-40 SYRINGE (ML) INJECTION EVERY 8 HOURS
Status: DISCONTINUED | OUTPATIENT
Start: 2019-01-28 | End: 2019-01-28 | Stop reason: HOSPADM

## 2019-01-28 RX ORDER — PROMETHAZINE HYDROCHLORIDE 12.5 MG/1
12.5 TABLET ORAL
Qty: 20 TAB | Refills: 0 | Status: SHIPPED | OUTPATIENT
Start: 2019-01-28 | End: 2019-02-05 | Stop reason: CLARIF

## 2019-01-28 RX ORDER — DEXAMETHASONE SODIUM PHOSPHATE 4 MG/ML
INJECTION, SOLUTION INTRA-ARTICULAR; INTRALESIONAL; INTRAMUSCULAR; INTRAVENOUS; SOFT TISSUE AS NEEDED
Status: DISCONTINUED | OUTPATIENT
Start: 2019-01-28 | End: 2019-01-28 | Stop reason: HOSPADM

## 2019-01-28 RX ORDER — MIDAZOLAM HYDROCHLORIDE 1 MG/ML
2 INJECTION, SOLUTION INTRAMUSCULAR; INTRAVENOUS
Status: COMPLETED | OUTPATIENT
Start: 2019-01-28 | End: 2019-01-28

## 2019-01-28 RX ORDER — PROPOFOL 10 MG/ML
INJECTION, EMULSION INTRAVENOUS AS NEEDED
Status: DISCONTINUED | OUTPATIENT
Start: 2019-01-28 | End: 2019-01-28 | Stop reason: HOSPADM

## 2019-01-28 RX ORDER — OXYCODONE HYDROCHLORIDE 5 MG/1
5 TABLET ORAL
Status: DISCONTINUED | OUTPATIENT
Start: 2019-01-28 | End: 2019-01-28 | Stop reason: HOSPADM

## 2019-01-28 RX ORDER — CEFAZOLIN SODIUM/WATER 2 G/20 ML
2 SYRINGE (ML) INTRAVENOUS ONCE
Status: DISCONTINUED | OUTPATIENT
Start: 2019-01-28 | End: 2019-01-28 | Stop reason: HOSPADM

## 2019-01-28 RX ORDER — FENTANYL CITRATE 50 UG/ML
INJECTION, SOLUTION INTRAMUSCULAR; INTRAVENOUS AS NEEDED
Status: DISCONTINUED | OUTPATIENT
Start: 2019-01-28 | End: 2019-01-28 | Stop reason: HOSPADM

## 2019-01-28 RX ORDER — HYDROMORPHONE HYDROCHLORIDE 2 MG/ML
0.5 INJECTION, SOLUTION INTRAMUSCULAR; INTRAVENOUS; SUBCUTANEOUS
Status: DISCONTINUED | OUTPATIENT
Start: 2019-01-28 | End: 2019-01-28 | Stop reason: HOSPADM

## 2019-01-28 RX ORDER — LIDOCAINE HYDROCHLORIDE 10 MG/ML
0.1 INJECTION INFILTRATION; PERINEURAL AS NEEDED
Status: DISCONTINUED | OUTPATIENT
Start: 2019-01-28 | End: 2019-01-28 | Stop reason: HOSPADM

## 2019-01-28 RX ADMIN — FENTANYL CITRATE 25 MCG: 50 INJECTION, SOLUTION INTRAMUSCULAR; INTRAVENOUS at 11:16

## 2019-01-28 RX ADMIN — MIDAZOLAM HYDROCHLORIDE 2 MG: 2 INJECTION, SOLUTION INTRAMUSCULAR; INTRAVENOUS at 10:56

## 2019-01-28 RX ADMIN — DEXAMETHASONE SODIUM PHOSPHATE 4 MG: 4 INJECTION, SOLUTION INTRA-ARTICULAR; INTRALESIONAL; INTRAMUSCULAR; INTRAVENOUS; SOFT TISSUE at 11:15

## 2019-01-28 RX ADMIN — LIDOCAINE HYDROCHLORIDE 100 MG: 20 INJECTION, SOLUTION EPIDURAL; INFILTRATION; INTRACAUDAL; PERINEURAL at 11:09

## 2019-01-28 RX ADMIN — SODIUM CHLORIDE, SODIUM LACTATE, POTASSIUM CHLORIDE, AND CALCIUM CHLORIDE 75 ML/HR: 600; 310; 30; 20 INJECTION, SOLUTION INTRAVENOUS at 09:11

## 2019-01-28 RX ADMIN — PROPOFOL 200 MG: 10 INJECTION, EMULSION INTRAVENOUS at 11:09

## 2019-01-28 NOTE — BRIEF OP NOTE
BRIEF OPERATIVE NOTE Date of Procedure: 1/28/2019 Preoperative Diagnosis: Other hammer toe(s) (acquired), unspecified foot [M20.40] Postoperative Diagnosis: Other hammer toe(s) (acquired), unspecified foot [M20.40] Procedure(s): RIGHT 4TH PROXIMAL INTERPHALANGEAL RESECTION ARTHROPLASTY / METATARSOPHALANGEAL RELEASE Surgeon(s) and Role: * Samanta Dixon MD - Primary Surgical Assistant: min Surgical Staff: 
Circ-1: Kylie Rodas RN Radiology Technician: Hoang Pitt, RT, R, CT Scrub Tech-1: Doreen Ramal Event Time In Time Out Incision Start 1117 Incision Close 1128 Anesthesia: General  
Estimated Blood Loss: no 
Specimens: * No specimens in log * Findings: no 
Complications: no 
Implants: * No implants in log *

## 2019-01-28 NOTE — ANESTHESIA PREPROCEDURE EVALUATION
Anesthetic History No history of anesthetic complications Review of Systems / Medical History Patient summary reviewed, nursing notes reviewed and pertinent labs reviewed Pulmonary COPD (Chronic bronchitis): mild Shortness of breath (Requiring 3L O2 with exertion) and smoker Asthma : well controlled Neuro/Psych  
 
 
CVA (\"mild stroke\" pt reports 2014- \"left sided weakness and balance is off) Psychiatric history Cardiovascular Hypertension: well controlled Valvular problems/murmurs: tricuspid insufficiency, mitral insufficiency and aortic insufficiency CHF (Early last year developed Takotsubo CM - most recent ECHO (12/17) EF 45%) Exercise tolerance: <4 METS Comments: Echo () - moderate AI, mild TR, mild MR and EF 35-40% GI/Hepatic/Renal 
  
GERD: well controlled Comments: GIB Endo/Other Arthritis Other Findings Comments: Admission 12/17 with pulm edema secondary to voln overload Physical Exam 
 
Airway Mallampati: II 
TM Distance: 4 - 6 cm Neck ROM: normal range of motion Mouth opening: Normal 
 
 Cardiovascular Rhythm: regular Rate: normal 
 
Murmur: Grade 2, Aortic area Dental 
 
Dentition: Edentulous Pulmonary Breath sounds clear to auscultation Abdominal 
GI exam deferred Other Findings Anesthetic Plan ASA: 4 Anesthesia type: general 
 
 
 
 
Induction: Intravenous Anesthetic plan and risks discussed with: Patient

## 2019-01-28 NOTE — DISCHARGE INSTRUCTIONS
INSTRUCTIONS FOLLOWING FOOT SURGERY    ACTIVITY  Elevate foot (feet) for 48 hours. NO ICE   Use crutches as directed by your doctor. No weight bearing on operative foot until full sensation and control returns  Protected light heel pressure weight bearing as tolerated in post op shoe ONLY when fulls control and sensation returns    DIET  Clear liquids until no nausea or vomiting; then light diet for the first day. Advance to regular diet on second day, unless your doctor orders otherwise. Avoid greasy and spicy food today to reduce nausea    PAIN  Take pain pills with food vitaly reduce nausea  Take pain medications as directed by your doctor. Call your doctor if pain is NOT relieved by medication. DO NOT take aspirin or blood thinners until directed by your doctor. DRESSING CARE  Keep clean and dry until follow up appointment. Wrap in plastic when bathing     FOLLOW-UP PHONE [phone] will be made by nursing staff. If you have any problems, call your doctor as needed. CALL YOUR DOCTOR IF YOU HAVE  Excessive bleeding that does not stop after holding mild pressure over the area. Temperature of 101 degrees or above. Redness, excessive swelling or bruising, and/or green or yellow, smelly discharge from incision. Loss of sensation - cold, white or blue toes. AFTER ANESTHESIA  For the first 24 hours and while taking narcotics for pain: DO NOT Drive, Drink Alcoholic beverages, or make important Decisions. Be aware of dizziness following anesthesia and while taking pain medication.     OTHER INSTRUCTIONS    APPOINTMENT DATE/TIME: Please scheduled appointment    YOUR DOCTOR'S PHONE NUMBER [phone]      DISCHARGE SUMMARY from Nurse    PATIENT INSTRUCTIONS:    After general anesthesia or intravenous sedation, for 24 hours or while taking prescription Narcotics:  · Limit your activities  · Do not drive and operate hazardous machinery  · Do not make important personal or business decisions  · Do  not drink alcoholic beverages  · If you have not urinated within 8 hours after discharge, please contact your surgeon on call. *  Please give a list of your current medications to your Primary Care Provider. *  Please update this list whenever your medications are discontinued, doses are      changed, or new medications (including over-the-counter products) are added. *  Please carry medication information at all times in case of emergency situations. These are general instructions for a healthy lifestyle:    No smoking/ No tobacco products/ Avoid exposure to second hand smoke    Surgeon General's Warning:  Quitting smoking now greatly reduces serious risk to your health. Obesity, smoking, and sedentary lifestyle greatly increases your risk for illness    A healthy diet, regular physical exercise & weight monitoring are important for maintaining a healthy lifestyle    You may be retaining fluid if you have a history of heart failure or if you experience any of the following symptoms:  Weight gain of 3 pounds or more overnight or 5 pounds in a week, increased swelling in our hands or feet or shortness of breath while lying flat in bed. Please call your doctor as soon as you notice any of these symptoms; do not wait until your next office visit. Recognize signs and symptoms of STROKE:    F-face looks uneven    A-arms unable to move or move unevenly    S-speech slurred or non-existent    T-time-call 911 as soon as signs and symptoms begin-DO NOT go       Back to bed or wait to see if you get better-TIME IS BRAIN.

## 2019-02-06 ENCOUNTER — ANESTHESIA EVENT (OUTPATIENT)
Dept: SURGERY | Age: 54
End: 2019-02-06
Payer: COMMERCIAL

## 2019-02-07 ENCOUNTER — ANESTHESIA (OUTPATIENT)
Dept: SURGERY | Age: 54
End: 2019-02-07
Payer: COMMERCIAL

## 2019-02-07 ENCOUNTER — HOSPITAL ENCOUNTER (OUTPATIENT)
Age: 54
Setting detail: OUTPATIENT SURGERY
Discharge: HOME | End: 2019-02-07
Attending: UROLOGY | Admitting: UROLOGY
Payer: COMMERCIAL

## 2019-02-07 VITALS
WEIGHT: 190.2 LBS | HEIGHT: 64 IN | RESPIRATION RATE: 18 BRPM | OXYGEN SATURATION: 100 % | HEART RATE: 77 BPM | TEMPERATURE: 97.9 F | DIASTOLIC BLOOD PRESSURE: 71 MMHG | SYSTOLIC BLOOD PRESSURE: 115 MMHG | BODY MASS INDEX: 32.47 KG/M2

## 2019-02-07 DIAGNOSIS — N30.10 IC (INTERSTITIAL CYSTITIS): Primary | ICD-10-CM

## 2019-02-07 LAB — POTASSIUM BLD-SCNC: 3.4 MMOL/L (ref 3.5–5.1)

## 2019-02-07 PROCEDURE — 76060000032 HC ANESTHESIA 0.5 TO 1 HR: Performed by: UROLOGY

## 2019-02-07 PROCEDURE — 74011250637 HC RX REV CODE- 250/637: Performed by: UROLOGY

## 2019-02-07 PROCEDURE — 74011250636 HC RX REV CODE- 250/636: Performed by: NURSE PRACTITIONER

## 2019-02-07 PROCEDURE — 77030039425 HC BLD LARYNG TRULITE DISP TELE -A: Performed by: ANESTHESIOLOGY

## 2019-02-07 PROCEDURE — 77030018832 HC SOL IRR H20 ICUM -A: Performed by: UROLOGY

## 2019-02-07 PROCEDURE — 77030032490 HC SLV COMPR SCD KNE COVD -B: Performed by: UROLOGY

## 2019-02-07 PROCEDURE — 76010000138 HC OR TIME 0.5 TO 1 HR: Performed by: UROLOGY

## 2019-02-07 PROCEDURE — 77030037088 HC TUBE ENDOTRACH ORAL NSL COVD-A: Performed by: ANESTHESIOLOGY

## 2019-02-07 PROCEDURE — 74011250636 HC RX REV CODE- 250/636

## 2019-02-07 PROCEDURE — 84132 ASSAY OF SERUM POTASSIUM: CPT

## 2019-02-07 PROCEDURE — 76210000006 HC OR PH I REC 0.5 TO 1 HR: Performed by: UROLOGY

## 2019-02-07 PROCEDURE — 74011000250 HC RX REV CODE- 250

## 2019-02-07 PROCEDURE — 74011000250 HC RX REV CODE- 250: Performed by: UROLOGY

## 2019-02-07 PROCEDURE — 74011250636 HC RX REV CODE- 250/636: Performed by: ANESTHESIOLOGY

## 2019-02-07 PROCEDURE — 76210000021 HC REC RM PH II 0.5 TO 1 HR: Performed by: UROLOGY

## 2019-02-07 PROCEDURE — 77030019927 HC TBNG IRR CYSTO BAXT -A: Performed by: UROLOGY

## 2019-02-07 RX ORDER — ATROPA BELLADONNA AND OPIUM 16.2; 6 MG/1; MG/1
SUPPOSITORY RECTAL AS NEEDED
Status: DISCONTINUED | OUTPATIENT
Start: 2019-02-07 | End: 2019-02-07 | Stop reason: HOSPADM

## 2019-02-07 RX ORDER — SODIUM CHLORIDE 0.9 % (FLUSH) 0.9 %
5-40 SYRINGE (ML) INJECTION AS NEEDED
Status: DISCONTINUED | OUTPATIENT
Start: 2019-02-07 | End: 2019-02-07 | Stop reason: HOSPADM

## 2019-02-07 RX ORDER — CEFAZOLIN SODIUM/WATER 2 G/20 ML
2 SYRINGE (ML) INTRAVENOUS
Status: COMPLETED | OUTPATIENT
Start: 2019-02-07 | End: 2019-02-07

## 2019-02-07 RX ORDER — BUPIVACAINE HYDROCHLORIDE 5 MG/ML
INJECTION, SOLUTION EPIDURAL; INTRACAUDAL AS NEEDED
Status: DISCONTINUED | OUTPATIENT
Start: 2019-02-07 | End: 2019-02-07 | Stop reason: HOSPADM

## 2019-02-07 RX ORDER — FENTANYL CITRATE 50 UG/ML
INJECTION, SOLUTION INTRAMUSCULAR; INTRAVENOUS AS NEEDED
Status: DISCONTINUED | OUTPATIENT
Start: 2019-02-07 | End: 2019-02-07 | Stop reason: HOSPADM

## 2019-02-07 RX ORDER — OXYCODONE AND ACETAMINOPHEN 10; 325 MG/1; MG/1
1 TABLET ORAL AS NEEDED
Status: DISCONTINUED | OUTPATIENT
Start: 2019-02-07 | End: 2019-02-07 | Stop reason: HOSPADM

## 2019-02-07 RX ORDER — MIDAZOLAM HYDROCHLORIDE 1 MG/ML
2 INJECTION, SOLUTION INTRAMUSCULAR; INTRAVENOUS
Status: COMPLETED | OUTPATIENT
Start: 2019-02-07 | End: 2019-02-07

## 2019-02-07 RX ORDER — HYDROCODONE BITARTRATE AND ACETAMINOPHEN 5; 325 MG/1; MG/1
1-2 TABLET ORAL
Qty: 20 TAB | Refills: 0 | Status: SHIPPED | OUTPATIENT
Start: 2019-02-07 | End: 2019-06-26

## 2019-02-07 RX ORDER — SUCCINYLCHOLINE CHLORIDE 20 MG/ML
INJECTION INTRAMUSCULAR; INTRAVENOUS AS NEEDED
Status: DISCONTINUED | OUTPATIENT
Start: 2019-02-07 | End: 2019-02-07 | Stop reason: HOSPADM

## 2019-02-07 RX ORDER — LIDOCAINE HYDROCHLORIDE 20 MG/ML
INJECTION, SOLUTION EPIDURAL; INFILTRATION; INTRACAUDAL; PERINEURAL AS NEEDED
Status: DISCONTINUED | OUTPATIENT
Start: 2019-02-07 | End: 2019-02-07 | Stop reason: HOSPADM

## 2019-02-07 RX ORDER — OXYCODONE HYDROCHLORIDE 5 MG/1
5 TABLET ORAL
Status: DISCONTINUED | OUTPATIENT
Start: 2019-02-07 | End: 2019-02-07 | Stop reason: HOSPADM

## 2019-02-07 RX ORDER — DEXAMETHASONE SODIUM PHOSPHATE 100 MG/10ML
INJECTION INTRAMUSCULAR; INTRAVENOUS AS NEEDED
Status: DISCONTINUED | OUTPATIENT
Start: 2019-02-07 | End: 2019-02-07 | Stop reason: HOSPADM

## 2019-02-07 RX ORDER — PHENAZOPYRIDINE HYDROCHLORIDE 200 MG/1
200 TABLET, FILM COATED ORAL
Qty: 30 TAB | Refills: 3 | Status: SHIPPED | OUTPATIENT
Start: 2019-02-07 | End: 2019-02-10

## 2019-02-07 RX ORDER — ROCURONIUM BROMIDE 10 MG/ML
INJECTION, SOLUTION INTRAVENOUS AS NEEDED
Status: DISCONTINUED | OUTPATIENT
Start: 2019-02-07 | End: 2019-02-07 | Stop reason: HOSPADM

## 2019-02-07 RX ORDER — SODIUM CHLORIDE 0.9 % (FLUSH) 0.9 %
5-40 SYRINGE (ML) INJECTION EVERY 8 HOURS
Status: DISCONTINUED | OUTPATIENT
Start: 2019-02-07 | End: 2019-02-07 | Stop reason: HOSPADM

## 2019-02-07 RX ORDER — SODIUM CHLORIDE, SODIUM LACTATE, POTASSIUM CHLORIDE, CALCIUM CHLORIDE 600; 310; 30; 20 MG/100ML; MG/100ML; MG/100ML; MG/100ML
75 INJECTION, SOLUTION INTRAVENOUS CONTINUOUS
Status: DISCONTINUED | OUTPATIENT
Start: 2019-02-07 | End: 2019-02-07 | Stop reason: HOSPADM

## 2019-02-07 RX ORDER — PROPOFOL 10 MG/ML
INJECTION, EMULSION INTRAVENOUS AS NEEDED
Status: DISCONTINUED | OUTPATIENT
Start: 2019-02-07 | End: 2019-02-07 | Stop reason: HOSPADM

## 2019-02-07 RX ORDER — HYDROMORPHONE HYDROCHLORIDE 2 MG/ML
0.5 INJECTION, SOLUTION INTRAMUSCULAR; INTRAVENOUS; SUBCUTANEOUS
Status: DISCONTINUED | OUTPATIENT
Start: 2019-02-07 | End: 2019-02-07 | Stop reason: HOSPADM

## 2019-02-07 RX ADMIN — LIDOCAINE HYDROCHLORIDE 100 MG: 20 INJECTION, SOLUTION EPIDURAL; INFILTRATION; INTRACAUDAL; PERINEURAL at 09:55

## 2019-02-07 RX ADMIN — SUCCINYLCHOLINE CHLORIDE 140 MG: 20 INJECTION INTRAMUSCULAR; INTRAVENOUS at 09:55

## 2019-02-07 RX ADMIN — SODIUM CHLORIDE, SODIUM LACTATE, POTASSIUM CHLORIDE, AND CALCIUM CHLORIDE 75 ML/HR: 600; 310; 30; 20 INJECTION, SOLUTION INTRAVENOUS at 08:43

## 2019-02-07 RX ADMIN — MIDAZOLAM HYDROCHLORIDE 2 MG: 2 INJECTION, SOLUTION INTRAMUSCULAR; INTRAVENOUS at 08:44

## 2019-02-07 RX ADMIN — Medication 2 G: at 10:03

## 2019-02-07 RX ADMIN — FENTANYL CITRATE 50 MCG: 50 INJECTION, SOLUTION INTRAMUSCULAR; INTRAVENOUS at 09:54

## 2019-02-07 RX ADMIN — DEXAMETHASONE SODIUM PHOSPHATE 10 MG: 100 INJECTION INTRAMUSCULAR; INTRAVENOUS at 10:01

## 2019-02-07 RX ADMIN — PROPOFOL 170 MG: 10 INJECTION, EMULSION INTRAVENOUS at 09:55

## 2019-02-07 RX ADMIN — ROCURONIUM BROMIDE 5 MG: 10 INJECTION, SOLUTION INTRAVENOUS at 09:55

## 2019-02-07 NOTE — ANESTHESIA PREPROCEDURE EVALUATION
Anesthetic History No history of anesthetic complications Review of Systems / Medical History Patient summary reviewed, nursing notes reviewed and pertinent labs reviewed Pulmonary COPD (Chronic bronchitis): mild Shortness of breath (Requiring 3L O2 with exertion) and smoker Asthma : well controlled Neuro/Psych  
 
 
CVA (\"mild stroke\" pt reports 2014- \"left sided weakness and balance is off) Psychiatric history Cardiovascular Hypertension: well controlled Valvular problems/murmurs: tricuspid insufficiency, mitral insufficiency and aortic insufficiency CHF (Early last year developed Takotsubo CM - most recent ECHO (12/17) EF 45%) CAD Exercise tolerance: <4 METS Comments: Echo () - moderate AI, mild TR, mild MR and EF 35-40% GI/Hepatic/Renal 
  
GERD: well controlled Comments: GIB Endo/Other Arthritis Other Findings Comments: Admission 12/17 with pulm edema secondary to voln overload 
+ cocaine 12/18 Physical Exam 
 
Airway Mallampati: II 
TM Distance: 4 - 6 cm Neck ROM: normal range of motion Mouth opening: Normal 
 
 Cardiovascular Rhythm: regular Rate: normal 
 
 
 
 Dental 
 
Dentition: Edentulous Pulmonary Abdominal 
GI exam deferred Other Findings Anesthetic Plan ASA: 3 Anesthesia type: general 
 
 
 
 
Induction: Intravenous Anesthetic plan and risks discussed with: Patient

## 2019-02-07 NOTE — DISCHARGE INSTRUCTIONS
Patient Education        Cystoscopy: What to Expect at [facility]    A cystoscopy is a procedure that lets a doctor look inside of the bladder and the urethra. The urethra is the tube that carries urine from the bladder to outside the body. The doctor uses a thin, lighted tool called a cystoscope. Your bladder is filled with fluid. This stretches the bladder so that your doctor can look closely at the inside of your bladder. After the cystoscopy, your urethra may be sore at first, and it may burn when you urinate for the first few days after the procedure. You may feel the need to urinate more often, and your urine may be pink. These symptoms should get better in 1 or 2 days. You will probably be able to go back to most of your usual activities in 1 or 2 days. This care sheet gives you a general idea about how long it will take for you to recover. But each person recovers at a different pace. Follow the steps below to get better as quickly as possible. How can you care for yourself at home? Activity    · Rest when you feel tired. Getting enough sleep will help you recover.     · Try to walk each day. Start by walking a little more than you did the day before. Bit by bit, increase the amount you walk. Walking boosts blood flow and helps prevent pneumonia and constipation.     · Avoid strenuous activities, such as bicycle riding, jogging, weight lifting, or aerobic exercise, until your doctor says it is okay.     · Ask your doctor when you can drive again.     · Most people are able to return to work within 1 or 2 days after the procedure.     · You may shower and take baths as usual.     · Ask your doctor when it is okay for you to have sex. Diet    · You can eat your normal diet. If your stomach is upset, try bland, low-fat foods like plain rice, broiled chicken, toast, and yogurt.     · Drink plenty of fluids (unless your doctor tells you not to).    Medicines    · Take pain medicines exactly as directed. ? If the doctor gave you a prescription medicine for pain, take it as prescribed. ? If you are not taking a prescription pain medicine, ask your doctor if you can take an over-the-counter medicine.     · If you think your pain medicine is making you sick to your stomach:  ? Take your medicine after meals (unless your doctor has told you not to). ? Ask your doctor for a different pain medicine.     · If your doctor prescribed antibiotics, take them as directed. Do not stop taking them just because you feel better. You need to take the full course of antibiotics. Follow-up care is a key part of your treatment and safety. Be sure to make and go to all appointments, and call your doctor if you are having problems. It's also a good idea to know your test results and keep a list of the medicines you take. When should you call for help? Call 911 anytime you think you may need emergency care. For example, call if:    · You passed out (lost consciousness).     · You have severe trouble breathing.     · You have sudden chest pain and shortness of breath, or you cough up blood.     · You have severe belly pain.    Call your doctor now or seek immediate medical care if:    · You are sick to your stomach or cannot keep fluids down.     · Your urine is still red or you see blood clots after you have urinated several times.     · You have trouble passing urine or stool, especially if you have pain or swelling in your lower belly.     · You have signs of a blood clot, such as:  ? Pain in your calf, back of the knee, thigh, or groin. ? Redness and swelling in your leg or groin.     · You develop a fever or severe chills.     · You have pain in your back just below your rib cage. This is called flank pain.    Watch closely for changes in your health, and be sure to contact your doctor if:    · You have pain or burning when you urinate.  A burning feeling is normal for a day or two after the test, but call if it does not get better.     · You have a frequent urge to urinate but can pass only small amounts of urine.     · Your urine is pink, red, or cloudy, or smells bad. It is normal for the urine to have a pinkish color for a few days after the test, but call if it does not get better. Where can you learn more? Go to http://maria alejandra-nereyda.info/. Enter R921 in the search box to learn more about \"Cystoscopy: What to Expect at Home. \"  Current as of: March 20, 2018  Content Version: 11.9  © 0326-7604 [employer]. Care instructions adapted under license by [employer] (which disclaims liability or warranty for this information). If you have questions about a medical condition or this instruction, always ask your healthcare professional. [address] any warranty or liability for your use of this information. After general anesthesia or intravenous sedation, for 24 hours or while taking prescription Narcotics:  · Limit your activities  · Do not drive and operate hazardous machinery  · Do not make important personal or business decisions  · Do  not drink alcoholic beverages  · If you have not urinated within 8 hours after discharge, please contact your surgeon on call. *  Please give a list of your current medications to your Primary Care Provider. *  Please update this list whenever your medications are discontinued, doses are      changed, or new medications (including over-the-counter products) are added. *  Please carry medication information at all times in case of emergency situations. These are general instructions for a healthy lifestyle:  No smoking/ No tobacco products/ Avoid exposure to second hand smoke  Surgeon General's Warning:  Quitting smoking now greatly reduces serious risk to your health.   Obesity, smoking, and sedentary lifestyle greatly increases your risk for illness  A healthy diet, regular physical exercise & weight monitoring are important for maintaining a healthy lifestyle    You may be retaining fluid if you have a history of heart failure or if you experience any of the following symptoms:  Weight gain of 3 pounds or more overnight or 5 pounds in a week, increased swelling in our hands or feet or shortness of breath while lying flat in bed. Please call your doctor as soon as you notice any of these symptoms; do not wait until your next office visit. Recognize signs and symptoms of STROKE:  F-face looks uneven  A-arms unable to move or move unevenly  S-speech slurred or non-existent  T-time-call 911 as soon as signs and symptoms begin-DO NOT go       Back to bed or wait to see if you get better-TIME IS BRAIN.

## 2019-02-07 NOTE — ANESTHESIA POSTPROCEDURE EVALUATION
Procedure(s): 
CYSTOSCOPY HYDRODISTENTION. Anesthesia Post Evaluation Multimodal analgesia: multimodal analgesia not used between 6 hours prior to anesthesia start to PACU discharge Patient location during evaluation: PACU Patient participation: complete - patient participated Level of consciousness: awake Pain management: adequate Airway patency: patent Anesthetic complications: no 
Cardiovascular status: acceptable Respiratory status: acceptable Post anesthesia nausea and vomiting:  none Visit Vitals /74 (BP Patient Position: At rest) Pulse 80 Temp 36.6 °C (97.9 °F) Resp 18 Ht 5' 4\" (1.626 m) Wt 86.3 kg (190 lb 3.2 oz) SpO2 100% BMI 32.65 kg/m²

## 2019-02-07 NOTE — BRIEF OP NOTE
BRIEF OPERATIVE NOTE Date of Procedure: 2/7/2019 Preoperative Diagnosis: Interstitial cystitis [N30.10] Postoperative Diagnosis: Interstitial cystitis [N30.10] Procedure(s): 
CYSTOSCOPY HYDRODISTENTION Surgeon(s) and Role: Henok Shore MD - Primary Surgical Assistant: millicent Surgical Staff: 
Circ-1: Julio Garay Event Time In Time Out Incision Start 1013 Incision Close 1026 Anesthesia: General  
Estimated Blood Loss: none Specimens: * No specimens in log * Findings: see op note Complications: none Implants: * No implants in log *

## 2019-02-08 NOTE — OP NOTES
[facility]  OPERATIVE REPORT    Name:  Britton Harris  MR#:  [id number]  :  1965  ACCOUNT #:  [de-identified]  DATE OF SERVICE:  2019    PREOPERATIVE DIAGNOSIS:  Interstitial cystitis. POSTOPERATIVE DIAGNOSIS:  Interstitial cystitis. PROCEDURE PERFORMED:  Cystoscopy and hydrodistention of the  bladder. SURGEON:  Irma Paulino MD    ASSISTANT:  None. ANESTHESIA:  General.    COMPLICATIONS:  None. SPECIMENS REMOVED:  None. IMPLANTS:  None. ESTIMATED BLOOD LOSS:  None. FINDINGS:  Bladder capacity of approximately 950 mL,  glomerulations noted on the trigone after hydrodistention. DESCRIPTION OF PROCEDURE:  The patient was given general  anesthetic, placed in the dorsal lithotomy position. A B  and O suppository was placed in the rectum. She was prepped  and draped in sterile fashion. A 25-Faroese cystoscope was  advanced into the urethra using a video camera with  30-degree lens. The bladder was inspected. The ureteral orifices appeared normal.  The urethra was  normal.  The bladder mucosa is normal.  There were no stones  or tumors. Bladder was distended with the irrigation bag held 80 cm  anterior to the level of the bladder. This was held to stay  for 8 minutes and then allowed to drain. Bladder was  inspected. The capacity was approximately 950 mL. There  were a few glomerulations noted around the ureteral orifices  and on the trigone, but these were not widespread. At this  point, we instilled 30 mL of 0.5% Marcaine into the bladder  and removed the scope. She was taken to recovery room in  stable condition. She will be discharged home. Return to  the office in one month.         Jonathan Stoner MD      [id number]  D:  2019 10:49  T:  2019 19:30  JOB #:  [id number]

## 2019-04-06 ENCOUNTER — HOSPITAL ENCOUNTER (EMERGENCY)
Age: 54
Discharge: LEFT BEFORE DISCHARGE | End: 2019-04-07
Attending: EMERGENCY MEDICINE
Payer: COMMERCIAL

## 2019-04-06 ENCOUNTER — APPOINTMENT (OUTPATIENT)
Dept: GENERAL RADIOLOGY | Age: 54
End: 2019-04-06
Attending: EMERGENCY MEDICINE
Payer: COMMERCIAL

## 2019-04-06 VITALS
HEART RATE: 86 BPM | WEIGHT: 193 LBS | BODY MASS INDEX: 32.95 KG/M2 | SYSTOLIC BLOOD PRESSURE: 98 MMHG | TEMPERATURE: 97.9 F | DIASTOLIC BLOOD PRESSURE: 62 MMHG | HEIGHT: 64 IN | RESPIRATION RATE: 18 BRPM | OXYGEN SATURATION: 99 %

## 2019-04-06 LAB
ALBUMIN SERPL-MCNC: 3.5 G/DL (ref 3.5–5)
ALBUMIN/GLOB SERPL: 0.9 {RATIO} (ref 1.2–3.5)
ALP SERPL-CCNC: 93 U/L (ref 50–136)
ALT SERPL-CCNC: 31 U/L (ref 12–65)
ANION GAP SERPL CALC-SCNC: 9 MMOL/L (ref 7–16)
AST SERPL-CCNC: 47 U/L (ref 15–37)
BASOPHILS # BLD: 0 K/UL (ref 0–0.2)
BASOPHILS NFR BLD: 1 % (ref 0–2)
BILIRUB SERPL-MCNC: 0.2 MG/DL (ref 0.2–1.1)
BUN SERPL-MCNC: 5 MG/DL (ref 6–23)
CALCIUM SERPL-MCNC: 8.9 MG/DL (ref 8.3–10.4)
CHLORIDE SERPL-SCNC: 108 MMOL/L (ref 98–107)
CO2 SERPL-SCNC: 23 MMOL/L (ref 21–32)
CREAT SERPL-MCNC: 1.09 MG/DL (ref 0.6–1)
DIFFERENTIAL METHOD BLD: ABNORMAL
EOSINOPHIL # BLD: 0.1 K/UL (ref 0–0.8)
EOSINOPHIL NFR BLD: 2 % (ref 0.5–7.8)
ERYTHROCYTE [DISTWIDTH] IN BLOOD BY AUTOMATED COUNT: 15.4 % (ref 11.9–14.6)
GLOBULIN SER CALC-MCNC: 4 G/DL (ref 2.3–3.5)
GLUCOSE SERPL-MCNC: 100 MG/DL (ref 65–100)
HCT VFR BLD AUTO: 33.5 % (ref 35.8–46.3)
HGB BLD-MCNC: 10.1 G/DL (ref 11.7–15.4)
IMM GRANULOCYTES # BLD AUTO: 0 K/UL (ref 0–0.5)
IMM GRANULOCYTES NFR BLD AUTO: 0 % (ref 0–5)
LYMPHOCYTES # BLD: 0.9 K/UL (ref 0.5–4.6)
LYMPHOCYTES NFR BLD: 25 % (ref 13–44)
MCH RBC QN AUTO: 26 PG (ref 26.1–32.9)
MCHC RBC AUTO-ENTMCNC: 30.1 G/DL (ref 31.4–35)
MCV RBC AUTO: 86.3 FL (ref 79.6–97.8)
MONOCYTES # BLD: 0.3 K/UL (ref 0.1–1.3)
MONOCYTES NFR BLD: 10 % (ref 4–12)
NEUTS SEG # BLD: 2.1 K/UL (ref 1.7–8.2)
NEUTS SEG NFR BLD: 62 % (ref 43–78)
NRBC # BLD: 0 K/UL (ref 0–0.2)
PLATELET # BLD AUTO: 303 K/UL (ref 150–450)
PLATELET COMMENTS,PCOM: ADEQUATE
PMV BLD AUTO: 10.2 FL (ref 9.4–12.3)
POTASSIUM SERPL-SCNC: 3.3 MMOL/L (ref 3.5–5.1)
PROT SERPL-MCNC: 7.5 G/DL (ref 6.3–8.2)
RBC # BLD AUTO: 3.88 M/UL (ref 4.05–5.2)
RBC MORPH BLD: ABNORMAL
SODIUM SERPL-SCNC: 140 MMOL/L (ref 136–145)
TROPONIN I BLD-MCNC: 0 NG/ML (ref 0.02–0.05)
WBC # BLD AUTO: 3.4 K/UL (ref 4.3–11.1)
WBC MORPH BLD: ABNORMAL

## 2019-04-06 PROCEDURE — 71046 X-RAY EXAM CHEST 2 VIEWS: CPT

## 2019-04-06 PROCEDURE — 93005 ELECTROCARDIOGRAM TRACING: CPT | Performed by: EMERGENCY MEDICINE

## 2019-04-06 PROCEDURE — 75810000275 HC EMERGENCY DEPT VISIT NO LEVEL OF CARE: Performed by: EMERGENCY MEDICINE

## 2019-04-06 PROCEDURE — 85025 COMPLETE CBC W/AUTO DIFF WBC: CPT

## 2019-04-06 PROCEDURE — 80053 COMPREHEN METABOLIC PANEL: CPT

## 2019-04-06 PROCEDURE — 84484 ASSAY OF TROPONIN QUANT: CPT

## 2019-04-07 ENCOUNTER — TELEPHONE (OUTPATIENT)
Dept: CARDIOLOGY | Age: 54
End: 2019-04-07

## 2019-04-07 LAB
ATRIAL RATE: 85 BPM
CALCULATED P AXIS, ECG09: 46 DEGREES
CALCULATED R AXIS, ECG10: 19 DEGREES
CALCULATED T AXIS, ECG11: 62 DEGREES
DIAGNOSIS, 93000: NORMAL
P-R INTERVAL, ECG05: 126 MS
Q-T INTERVAL, ECG07: 438 MS
QRS DURATION, ECG06: 92 MS
QTC CALCULATION (BEZET), ECG08: 521 MS
VENTRICULAR RATE, ECG03: 85 BPM

## 2019-04-08 NOTE — TELEPHONE ENCOUNTER
Pt called to report severe 10/10 pain in her R arm and hand. States that she \"can't do nothing with my hand or lift my arm. \" Does report having arm pain before but not this severe. States that she had not had an injury or fall. Instructed that she should go to ER for evaluation. Pt states that she is headed to [facility] ED now.        Gosia Rodriguez, NP-C

## 2019-04-09 ENCOUNTER — HOSPITAL ENCOUNTER (EMERGENCY)
Age: 54
Discharge: LEFT BEFORE DISCHARGE | End: 2019-04-09
Attending: EMERGENCY MEDICINE
Payer: COMMERCIAL

## 2019-04-09 VITALS
SYSTOLIC BLOOD PRESSURE: 100 MMHG | HEART RATE: 93 BPM | BODY MASS INDEX: 33.13 KG/M2 | DIASTOLIC BLOOD PRESSURE: 63 MMHG | TEMPERATURE: 97.7 F | RESPIRATION RATE: 18 BRPM | OXYGEN SATURATION: 100 % | HEIGHT: 64 IN

## 2019-04-09 PROCEDURE — 75810000275 HC EMERGENCY DEPT VISIT NO LEVEL OF CARE: Performed by: EMERGENCY MEDICINE

## 2019-04-09 NOTE — ED TRIAGE NOTES
PT complains of right arm pain x one week. No known injuries or trauma to arm. Pt wearing a sling on arrival. PMS intact.

## 2019-06-26 ENCOUNTER — HOSPITAL ENCOUNTER (EMERGENCY)
Age: 54
Discharge: HOME | End: 2019-06-26
Attending: EMERGENCY MEDICINE
Payer: COMMERCIAL

## 2019-06-26 ENCOUNTER — APPOINTMENT (OUTPATIENT)
Dept: CT IMAGING | Age: 54
End: 2019-06-26
Attending: EMERGENCY MEDICINE
Payer: COMMERCIAL

## 2019-06-26 VITALS
HEIGHT: 64 IN | BODY MASS INDEX: 31.58 KG/M2 | WEIGHT: 185 LBS | RESPIRATION RATE: 16 BRPM | HEART RATE: 81 BPM | OXYGEN SATURATION: 99 % | TEMPERATURE: 97.6 F | DIASTOLIC BLOOD PRESSURE: 74 MMHG | SYSTOLIC BLOOD PRESSURE: 142 MMHG

## 2019-06-26 DIAGNOSIS — G44.319 ACUTE POST-TRAUMATIC HEADACHE, NOT INTRACTABLE: ICD-10-CM

## 2019-06-26 DIAGNOSIS — S09.90XA CLOSED HEAD INJURY, INITIAL ENCOUNTER: Primary | ICD-10-CM

## 2019-06-26 DIAGNOSIS — S16.1XXA ACUTE CERVICAL MYOFASCIAL STRAIN, INITIAL ENCOUNTER: ICD-10-CM

## 2019-06-26 DIAGNOSIS — R11.2 NON-INTRACTABLE VOMITING WITH NAUSEA, UNSPECIFIED VOMITING TYPE: ICD-10-CM

## 2019-06-26 LAB
ANION GAP SERPL CALC-SCNC: 11 MMOL/L (ref 7–16)
BASOPHILS # BLD: 0 K/UL (ref 0–0.2)
BASOPHILS NFR BLD: 1 % (ref 0–2)
BUN SERPL-MCNC: 12 MG/DL (ref 6–23)
CALCIUM SERPL-MCNC: 8.8 MG/DL (ref 8.3–10.4)
CHLORIDE SERPL-SCNC: 107 MMOL/L (ref 98–107)
CO2 SERPL-SCNC: 20 MMOL/L (ref 21–32)
CREAT SERPL-MCNC: 1.01 MG/DL (ref 0.6–1)
DIFFERENTIAL METHOD BLD: ABNORMAL
EOSINOPHIL # BLD: 0.1 K/UL (ref 0–0.8)
EOSINOPHIL NFR BLD: 1 % (ref 0.5–7.8)
ERYTHROCYTE [DISTWIDTH] IN BLOOD BY AUTOMATED COUNT: 16.8 % (ref 11.9–14.6)
GLUCOSE SERPL-MCNC: 89 MG/DL (ref 65–100)
HCT VFR BLD AUTO: 31.6 % (ref 35.8–46.3)
HGB BLD-MCNC: 10.4 G/DL (ref 11.7–15.4)
IMM GRANULOCYTES # BLD AUTO: 0 K/UL (ref 0–0.5)
IMM GRANULOCYTES NFR BLD AUTO: 0 % (ref 0–5)
LYMPHOCYTES # BLD: 2.2 K/UL (ref 0.5–4.6)
LYMPHOCYTES NFR BLD: 36 % (ref 13–44)
MCH RBC QN AUTO: 28.3 PG (ref 26.1–32.9)
MCHC RBC AUTO-ENTMCNC: 32.9 G/DL (ref 31.4–35)
MCV RBC AUTO: 85.9 FL (ref 79.6–97.8)
MONOCYTES # BLD: 0.6 K/UL (ref 0.1–1.3)
MONOCYTES NFR BLD: 9 % (ref 4–12)
NEUTS SEG # BLD: 3.3 K/UL (ref 1.7–8.2)
NEUTS SEG NFR BLD: 53 % (ref 43–78)
NRBC # BLD: 0 K/UL (ref 0–0.2)
PLATELET # BLD AUTO: 394 K/UL (ref 150–450)
PMV BLD AUTO: 10.3 FL (ref 9.4–12.3)
POTASSIUM SERPL-SCNC: 6.1 MMOL/L (ref 3.5–5.1)
RBC # BLD AUTO: 3.68 M/UL (ref 4.05–5.2)
SODIUM SERPL-SCNC: 138 MMOL/L (ref 136–145)
WBC # BLD AUTO: 6.2 K/UL (ref 4.3–11.1)

## 2019-06-26 PROCEDURE — 74011250637 HC RX REV CODE- 250/637: Performed by: EMERGENCY MEDICINE

## 2019-06-26 PROCEDURE — 85025 COMPLETE CBC W/AUTO DIFF WBC: CPT

## 2019-06-26 PROCEDURE — 99283 EMERGENCY DEPT VISIT LOW MDM: CPT | Performed by: EMERGENCY MEDICINE

## 2019-06-26 PROCEDURE — 70450 CT HEAD/BRAIN W/O DYE: CPT

## 2019-06-26 PROCEDURE — 80048 BASIC METABOLIC PNL TOTAL CA: CPT

## 2019-06-26 RX ORDER — CYCLOBENZAPRINE HCL 10 MG
10 TABLET ORAL
Qty: 15 TAB | Refills: 0 | Status: SHIPPED | OUTPATIENT
Start: 2019-06-26 | End: 2020-06-29

## 2019-06-26 RX ORDER — PROMETHAZINE HYDROCHLORIDE 25 MG/1
25 TABLET ORAL
Qty: 6 TAB | Refills: 0 | Status: SHIPPED | OUTPATIENT
Start: 2019-06-26 | End: 2020-02-10 | Stop reason: CLARIF

## 2019-06-26 RX ORDER — HYDROCODONE BITARTRATE AND ACETAMINOPHEN 10; 325 MG/1; MG/1
1 TABLET ORAL
Status: COMPLETED | OUTPATIENT
Start: 2019-06-26 | End: 2019-06-26

## 2019-06-26 RX ORDER — PROMETHAZINE HYDROCHLORIDE 25 MG/1
25 TABLET ORAL
Status: COMPLETED | OUTPATIENT
Start: 2019-06-26 | End: 2019-06-26

## 2019-06-26 RX ORDER — HYDROCODONE BITARTRATE AND ACETAMINOPHEN 5; 325 MG/1; MG/1
1 TABLET ORAL
Qty: 8 TAB | Refills: 0 | Status: SHIPPED | OUTPATIENT
Start: 2019-06-26 | End: 2019-06-28

## 2019-06-26 RX ADMIN — HYDROCODONE BITARTRATE AND ACETAMINOPHEN 1 TABLET: 10; 325 TABLET ORAL at 19:29

## 2019-06-26 RX ADMIN — PROMETHAZINE HYDROCHLORIDE 25 MG: 25 TABLET ORAL at 19:29
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
## 2019-06-26 NOTE — DISCHARGE INSTRUCTIONS
Rest.  Heating pad or hot water bottle. Pain medication if needed. Call your doctor tomorrow for appointment to recheck. You may require some physical therapy. Recheck sooner for worse or worrisome symptoms. Patient Education        Learning About a Closed Head Injury  What is a closed head injury? A closed head injury happens when your head gets hit hard. The strong force of the blow causes your brain to shake in your skull. This movement can cause the brain to bruise, swell, or tear. Sometimes nerves or blood vessels also get damaged. This can cause bleeding in or around the brain. A concussion is a type of closed head injury. What are the symptoms? If you have a mild concussion, you may have a mild headache or feel \"not quite right. \" These symptoms are common. They usually go away over a few days to 4 weeks. But sometimes after a concussion, you feel like you can't function as well as before the injury. And you have new symptoms. This is called postconcussive syndrome. You may:  · Find it harder to solve problems, think, concentrate, or remember. · Have headaches. · Have changes in your sleep patterns, such as not being able to sleep or sleeping all the time. · Have changes in your personality. · Not be interested in your usual activities. · Feel angry or anxious without a clear reason. · Lose your sense of taste or smell. · Be dizzy, lightheaded, or unsteady. It may be hard to stand or walk. How is a closed head injury treated? Any person who may have a concussion needs to see a doctor. Some people have to stay in the hospital to be watched. Others can go home safely. If you go home, follow your doctor's instructions. He or she will tell you if you need someone to watch you closely for the next 24 hours or longer. Rest is the best treatment. Get plenty of sleep at night. And try to rest during the day. · Avoid activities that are physically or mentally demanding.  These include housework, exercise, and schoolwork. And don't play video games, send text messages, or use the computer. You may need to change your school or work schedule to be able to avoid these activities. · Ask your doctor when it's okay to drive, ride a bike, or operate machinery. · Take an over-the-counter pain medicine, such as acetaminophen (Tylenol), ibuprofen (Advil, Motrin), or naproxen (Aleve). Be safe with medicines. Read and follow all instructions on the label. · Check with your doctor before you use any other medicines for pain. · Do not drink alcohol or use illegal drugs. They can slow recovery. They can also increase your risk of getting a second head injury. Follow-up care is a key part of your treatment and safety. Be sure to make and go to all appointments, and call your doctor if you are having problems. It's also a good idea to know your test results and keep a list of the medicines you take. Where can you learn more? Go to http://maria alejandra-nereyda.info/. Enter E235 in the search box to learn more about \"Learning About a Closed Head Injury. \"  Current as of: Alivia 3, 2018  Content Version: 11.9  © 9435-1288 [employer], [employer]. Care instructions adapted under license by [employer] (which disclaims liability or warranty for this information). If you have questions about a medical condition or this instruction, always ask your healthcare professional. [place] [address] any warranty or liability for your use of this information. Patient Education        Neck Strain: Care Instructions  Your Care Instructions    You have strained the muscles and ligaments in your neck. A sudden, awkward movement can strain the neck. This often occurs with falls or car accidents or during certain sports. Everyday activities like working on a computer or sleeping can also cause neck strain if they force you to hold your neck in an awkward position for a long time.   It is common for neck pain to get worse for a day or two after an injury, but it should start to feel better after that. You may have more pain and stiffness for several days before it gets better. This is expected. It may take a few weeks or longer for it to heal completely. Good home treatment can help you get better faster and avoid future neck problems. Follow-up care is a key part of your treatment and safety. Be sure to make and go to all appointments, and call your doctor if you are having problems. It's also a good idea to know your test results and keep a list of the medicines you take. How can you care for yourself at home? · If you were given a neck brace (cervical collar) to limit neck motion, wear it as instructed for as many days as your doctor tells you to. Do not wear it longer than you were told to. Wearing a brace for too long can make neck stiffness worse and weaken the neck muscles. · You can try using heat or ice to see if it helps. ? Try using a heating pad on a low or medium setting for 15 to 20 minutes every 2 to 3 hours. Try a warm shower in place of one session with the heating pad. You can also buy single-use heat wraps that last up to 8 hours. ? You can also try an ice pack for 10 to 15 minutes every 2 to 3 hours. · Take pain medicines exactly as directed. ? If the doctor gave you a prescription medicine for pain, take it as prescribed. ? If you are not taking a prescription pain medicine, ask your doctor if you can take an over-the-counter medicine. · Gently rub the area to relieve pain and help with blood flow. Do not massage the area if it hurts to do so. · Do not do anything that makes the pain worse. Take it easy for a couple of days. You can do your usual activities if they do not hurt your neck or put it at risk for more stress or injury. · Try sleeping on a special neck pillow. Place it under your neck, not under your head.  Placing a tightly rolled-up towel under your neck while you sleep will also work. If you use a neck pillow or rolled towel, do not use your regular pillow at the same time. · To prevent future neck pain, do exercises to stretch and strengthen your neck and back. Learn how to use good posture, safe lifting techniques, and proper body mechanics. When should you call for help? Call 911 anytime you think you may need emergency care. For example, call if:    · You are unable to move an arm or a leg at all.   [facility] your doctor now or seek immediate medical care if:    · You have new or worse symptoms in your arms, legs, chest, belly, or buttocks. Symptoms may include:  ? Numbness or tingling. ? Weakness. ? Pain.     · You lose bladder or bowel control.    Watch closely for changes in your health, and be sure to contact your doctor if:    · You are not getting better as expected. Where can you learn more? Go to http://maria alejandra-nereyda.info/. Enter M253 in the search box to learn more about \"Neck Strain: Care Instructions. \"  Current as of: September 20, 2018  Content Version: 11.9  © 1720-4281 [employer]. Care instructions adapted under license by [employer] (which disclaims liability or warranty for this information). If you have questions about a medical condition or this instruction, always ask your healthcare professional. [address] any warranty or liability for your use of this information.

## 2019-06-26 NOTE — ED TRIAGE NOTES
Patient reports headaches for 3-4 days. Reports falling last week. States that she has a stiff neck, increased blurry vision and gait disturbances. Patient on blood thinners. Dr Nilesh Aragon in triage. States that she has taken tylenol at home with no relief. Denies diarrhea.  Reports n/v.

## 2019-06-27 NOTE — ED NOTES
I have reviewed discharge instructions with the patient. The patient verbalized understanding. Patient left ED via Discharge Method: ambulatory to Home with friend who is picking her up. Opportunity for questions and clarification provided. Patient given 3 scripts. To continue your aftercare when you leave the hospital, you may receive an automated call from our care team to check in on how you are doing. This is a free service and part of our promise to provide the best care and service to meet your aftercare needs.  If you have questions, or wish to unsubscribe from this service please call [phone]. Thank you for Choosing our [facility] Emergency Department.

## 2019-07-26 ENCOUNTER — HOSPITAL ENCOUNTER (OUTPATIENT)
Dept: LAB | Age: 54
Discharge: HOME | End: 2019-07-26
Payer: COMMERCIAL

## 2019-07-26 DIAGNOSIS — I50.22 CHRONIC SYSTOLIC HEART FAILURE (HCC): Chronic | ICD-10-CM

## 2019-07-26 LAB
ANION GAP SERPL CALC-SCNC: 10 MMOL/L (ref 7–16)
BNP SERPL-MCNC: 127 PG/ML
BUN SERPL-MCNC: 7 MG/DL (ref 6–23)
CALCIUM SERPL-MCNC: 8.9 MG/DL (ref 8.3–10.4)
CHLORIDE SERPL-SCNC: 109 MMOL/L (ref 98–107)
CO2 SERPL-SCNC: 24 MMOL/L (ref 21–32)
CREAT SERPL-MCNC: 1.01 MG/DL (ref 0.6–1)
GLUCOSE SERPL-MCNC: 82 MG/DL (ref 65–100)
POTASSIUM SERPL-SCNC: 3.8 MMOL/L (ref 3.5–5.1)
SODIUM SERPL-SCNC: 143 MMOL/L (ref 136–145)

## 2019-07-26 PROCEDURE — 36415 COLL VENOUS BLD VENIPUNCTURE: CPT

## 2019-07-26 PROCEDURE — 80048 BASIC METABOLIC PNL TOTAL CA: CPT

## 2019-07-26 PROCEDURE — 83880 ASSAY OF NATRIURETIC PEPTIDE: CPT

## 2019-09-16 ENCOUNTER — ANESTHESIA EVENT (OUTPATIENT)
Dept: SURGERY | Age: 54
End: 2019-09-16
Payer: COMMERCIAL

## 2019-09-17 ENCOUNTER — HOSPITAL ENCOUNTER (OUTPATIENT)
Age: 54
Setting detail: OUTPATIENT SURGERY
Discharge: HOME | End: 2019-09-17
Attending: UROLOGY | Admitting: UROLOGY
Payer: COMMERCIAL

## 2019-09-17 ENCOUNTER — ANESTHESIA (OUTPATIENT)
Dept: SURGERY | Age: 54
End: 2019-09-17
Payer: COMMERCIAL

## 2019-09-17 VITALS
TEMPERATURE: 97.5 F | RESPIRATION RATE: 11 BRPM | DIASTOLIC BLOOD PRESSURE: 84 MMHG | SYSTOLIC BLOOD PRESSURE: 125 MMHG | HEART RATE: 65 BPM | OXYGEN SATURATION: 94 %

## 2019-09-17 DIAGNOSIS — N30.10 IC (INTERSTITIAL CYSTITIS): Primary | ICD-10-CM

## 2019-09-17 LAB
ANION GAP SERPL CALC-SCNC: 5 MMOL/L (ref 7–16)
BUN SERPL-MCNC: 9 MG/DL (ref 6–23)
CALCIUM SERPL-MCNC: 8.5 MG/DL (ref 8.3–10.4)
CHLORIDE SERPL-SCNC: 111 MMOL/L (ref 98–107)
CO2 SERPL-SCNC: 25 MMOL/L (ref 21–32)
CREAT SERPL-MCNC: 0.92 MG/DL (ref 0.6–1)
ERYTHROCYTE [DISTWIDTH] IN BLOOD BY AUTOMATED COUNT: 14.6 % (ref 11.9–14.6)
GLUCOSE SERPL-MCNC: 85 MG/DL (ref 65–100)
HCT VFR BLD AUTO: 28.9 % (ref 35.8–46.3)
HGB BLD-MCNC: 9.2 G/DL (ref 11.7–15.4)
MCH RBC QN AUTO: 28.6 PG (ref 26.1–32.9)
MCHC RBC AUTO-ENTMCNC: 31.8 G/DL (ref 31.4–35)
MCV RBC AUTO: 89.8 FL (ref 79.6–97.8)
NRBC # BLD: 0 K/UL (ref 0–0.2)
PLATELET # BLD AUTO: 298 K/UL (ref 150–450)
PMV BLD AUTO: 10 FL (ref 9.4–12.3)
POTASSIUM BLD-SCNC: 3.8 MMOL/L (ref 3.5–5.1)
POTASSIUM SERPL-SCNC: 4.9 MMOL/L (ref 3.5–5.1)
RBC # BLD AUTO: 3.22 M/UL (ref 4.05–5.2)
SODIUM SERPL-SCNC: 141 MMOL/L (ref 136–145)
WBC # BLD AUTO: 4.8 K/UL (ref 4.3–11.1)

## 2019-09-17 PROCEDURE — 74011250636 HC RX REV CODE- 250/636

## 2019-09-17 PROCEDURE — 74011250637 HC RX REV CODE- 250/637: Performed by: ANESTHESIOLOGY

## 2019-09-17 PROCEDURE — 76010000138 HC OR TIME 0.5 TO 1 HR: Performed by: UROLOGY

## 2019-09-17 PROCEDURE — 74011000250 HC RX REV CODE- 250: Performed by: UROLOGY

## 2019-09-17 PROCEDURE — 76210000020 HC REC RM PH II FIRST 0.5 HR: Performed by: UROLOGY

## 2019-09-17 PROCEDURE — 74011250636 HC RX REV CODE- 250/636: Performed by: UROLOGY

## 2019-09-17 PROCEDURE — 85027 COMPLETE CBC AUTOMATED: CPT

## 2019-09-17 PROCEDURE — 74011000250 HC RX REV CODE- 250

## 2019-09-17 PROCEDURE — 74011250637 HC RX REV CODE- 250/637: Performed by: UROLOGY

## 2019-09-17 PROCEDURE — 76210000063 HC OR PH I REC FIRST 0.5 HR: Performed by: UROLOGY

## 2019-09-17 PROCEDURE — 74011250636 HC RX REV CODE- 250/636: Performed by: ANESTHESIOLOGY

## 2019-09-17 PROCEDURE — 77030018832 HC SOL IRR H20 ICUM -A: Performed by: UROLOGY

## 2019-09-17 PROCEDURE — 77030010509 HC AIRWY LMA MSK TELE -A: Performed by: ANESTHESIOLOGY

## 2019-09-17 PROCEDURE — 76060000032 HC ANESTHESIA 0.5 TO 1 HR: Performed by: UROLOGY

## 2019-09-17 PROCEDURE — 77030032490 HC SLV COMPR SCD KNE COVD -B: Performed by: UROLOGY

## 2019-09-17 PROCEDURE — 80048 BASIC METABOLIC PNL TOTAL CA: CPT

## 2019-09-17 PROCEDURE — 84132 ASSAY OF SERUM POTASSIUM: CPT

## 2019-09-17 PROCEDURE — 77030019927 HC TBNG IRR CYSTO BAXT -A: Performed by: UROLOGY

## 2019-09-17 RX ORDER — LIDOCAINE HYDROCHLORIDE 20 MG/ML
INJECTION, SOLUTION EPIDURAL; INFILTRATION; INTRACAUDAL; PERINEURAL AS NEEDED
Status: DISCONTINUED | OUTPATIENT
Start: 2019-09-17 | End: 2019-09-17 | Stop reason: HOSPADM

## 2019-09-17 RX ORDER — LIDOCAINE HYDROCHLORIDE 10 MG/ML
0.1 INJECTION INFILTRATION; PERINEURAL AS NEEDED
Status: DISCONTINUED | OUTPATIENT
Start: 2019-09-17 | End: 2019-09-17 | Stop reason: HOSPADM

## 2019-09-17 RX ORDER — MIDAZOLAM HYDROCHLORIDE 1 MG/ML
2 INJECTION, SOLUTION INTRAMUSCULAR; INTRAVENOUS
Status: COMPLETED | OUTPATIENT
Start: 2019-09-17 | End: 2019-09-17

## 2019-09-17 RX ORDER — OXYCODONE AND ACETAMINOPHEN 5; 325 MG/1; MG/1
1 TABLET ORAL
Qty: 20 TAB | Refills: 0 | Status: SHIPPED | OUTPATIENT
Start: 2019-09-17 | End: 2019-09-24

## 2019-09-17 RX ORDER — SODIUM CHLORIDE, SODIUM LACTATE, POTASSIUM CHLORIDE, CALCIUM CHLORIDE 600; 310; 30; 20 MG/100ML; MG/100ML; MG/100ML; MG/100ML
75 INJECTION, SOLUTION INTRAVENOUS CONTINUOUS
Status: DISCONTINUED | OUTPATIENT
Start: 2019-09-17 | End: 2019-09-17 | Stop reason: HOSPADM

## 2019-09-17 RX ORDER — PHENAZOPYRIDINE HYDROCHLORIDE 200 MG/1
200 TABLET, FILM COATED ORAL
Qty: 30 TAB | Refills: 3 | Status: SHIPPED | OUTPATIENT
Start: 2019-09-17 | End: 2019-09-20

## 2019-09-17 RX ORDER — MIDAZOLAM HYDROCHLORIDE 1 MG/ML
5 INJECTION, SOLUTION INTRAMUSCULAR; INTRAVENOUS ONCE
Status: DISCONTINUED | OUTPATIENT
Start: 2019-09-17 | End: 2019-09-17 | Stop reason: HOSPADM

## 2019-09-17 RX ORDER — HYDROCODONE BITARTRATE AND ACETAMINOPHEN 5; 325 MG/1; MG/1
2 TABLET ORAL AS NEEDED
Status: DISCONTINUED | OUTPATIENT
Start: 2019-09-17 | End: 2019-09-17 | Stop reason: HOSPADM

## 2019-09-17 RX ORDER — DEXAMETHASONE SODIUM PHOSPHATE 4 MG/ML
INJECTION, SOLUTION INTRA-ARTICULAR; INTRALESIONAL; INTRAMUSCULAR; INTRAVENOUS; SOFT TISSUE AS NEEDED
Status: DISCONTINUED | OUTPATIENT
Start: 2019-09-17 | End: 2019-09-17 | Stop reason: HOSPADM

## 2019-09-17 RX ORDER — FENTANYL CITRATE 50 UG/ML
100 INJECTION, SOLUTION INTRAMUSCULAR; INTRAVENOUS ONCE
Status: DISCONTINUED | OUTPATIENT
Start: 2019-09-17 | End: 2019-09-17 | Stop reason: HOSPADM

## 2019-09-17 RX ORDER — BUPIVACAINE HYDROCHLORIDE 5 MG/ML
INJECTION, SOLUTION EPIDURAL; INTRACAUDAL AS NEEDED
Status: DISCONTINUED | OUTPATIENT
Start: 2019-09-17 | End: 2019-09-17 | Stop reason: HOSPADM

## 2019-09-17 RX ORDER — FENTANYL CITRATE 50 UG/ML
INJECTION, SOLUTION INTRAMUSCULAR; INTRAVENOUS AS NEEDED
Status: DISCONTINUED | OUTPATIENT
Start: 2019-09-17 | End: 2019-09-17 | Stop reason: HOSPADM

## 2019-09-17 RX ORDER — CEFAZOLIN SODIUM/WATER 2 G/20 ML
2 SYRINGE (ML) INTRAVENOUS
Status: COMPLETED | OUTPATIENT
Start: 2019-09-17 | End: 2019-09-17

## 2019-09-17 RX ORDER — OXYCODONE HYDROCHLORIDE 5 MG/1
5 TABLET ORAL
Status: COMPLETED | OUTPATIENT
Start: 2019-09-17 | End: 2019-09-17

## 2019-09-17 RX ORDER — ONDANSETRON 2 MG/ML
INJECTION INTRAMUSCULAR; INTRAVENOUS AS NEEDED
Status: DISCONTINUED | OUTPATIENT
Start: 2019-09-17 | End: 2019-09-17

## 2019-09-17 RX ORDER — HYDROMORPHONE HYDROCHLORIDE 2 MG/ML
0.5 INJECTION, SOLUTION INTRAMUSCULAR; INTRAVENOUS; SUBCUTANEOUS
Status: DISCONTINUED | OUTPATIENT
Start: 2019-09-17 | End: 2019-09-17 | Stop reason: HOSPADM

## 2019-09-17 RX ORDER — ATROPA BELLADONNA AND OPIUM 16.2; 6 MG/1; MG/1
SUPPOSITORY RECTAL AS NEEDED
Status: DISCONTINUED | OUTPATIENT
Start: 2019-09-17 | End: 2019-09-17 | Stop reason: HOSPADM

## 2019-09-17 RX ORDER — PROPOFOL 10 MG/ML
INJECTION, EMULSION INTRAVENOUS AS NEEDED
Status: DISCONTINUED | OUTPATIENT
Start: 2019-09-17 | End: 2019-09-17 | Stop reason: HOSPADM

## 2019-09-17 RX ADMIN — Medication 2 G: at 14:39

## 2019-09-17 RX ADMIN — FENTANYL CITRATE 25 MCG: 50 INJECTION, SOLUTION INTRAMUSCULAR; INTRAVENOUS at 14:47

## 2019-09-17 RX ADMIN — OXYCODONE HYDROCHLORIDE 5 MG: 5 TABLET ORAL at 15:39

## 2019-09-17 RX ADMIN — LIDOCAINE HYDROCHLORIDE 100 MG: 20 INJECTION, SOLUTION EPIDURAL; INFILTRATION; INTRACAUDAL; PERINEURAL at 14:36

## 2019-09-17 RX ADMIN — PROPOFOL 150 MG: 10 INJECTION, EMULSION INTRAVENOUS at 14:36

## 2019-09-17 RX ADMIN — DEXAMETHASONE SODIUM PHOSPHATE 4 MG: 4 INJECTION, SOLUTION INTRA-ARTICULAR; INTRALESIONAL; INTRAMUSCULAR; INTRAVENOUS; SOFT TISSUE at 14:42

## 2019-09-17 RX ADMIN — SODIUM CHLORIDE, SODIUM LACTATE, POTASSIUM CHLORIDE, AND CALCIUM CHLORIDE 75 ML/HR: 600; 310; 30; 20 INJECTION, SOLUTION INTRAVENOUS at 13:48

## 2019-09-17 RX ADMIN — MIDAZOLAM HYDROCHLORIDE 2 MG: 2 INJECTION, SOLUTION INTRAMUSCULAR; INTRAVENOUS at 13:48

## 2019-09-17 NOTE — ANESTHESIA POSTPROCEDURE EVALUATION
Procedure(s):  CYSTOSCOPY W/ HYDRODISTENTION. general    Anesthesia Post Evaluation      Multimodal analgesia: multimodal analgesia used between 6 hours prior to anesthesia start to PACU discharge  Patient location during evaluation: bedside  Patient participation: complete - patient participated  Level of consciousness: awake and alert  Pain score: 3  Pain management: adequate  Airway patency: patent  Anesthetic complications: no  Cardiovascular status: acceptable and hemodynamically stable  Respiratory status: acceptable  Hydration status: acceptable  Post anesthesia nausea and vomiting:  none      Vitals Value Taken Time   /83 9/17/2019  3:31 PM   Temp 36.4 °C (97.5 °F) 9/17/2019  3:17 PM   Pulse 68 9/17/2019  3:39 PM   Resp 11 9/17/2019  3:39 PM   SpO2 94 % 9/17/2019  3:39 PM   Vitals shown include unvalidated device data.

## 2019-09-17 NOTE — OP NOTES
[facility]  OPERATIVE REPORT    Name:  Jasper Troibio  MR#:  [id number]  :  1965  ACCOUNT #:  [de-identified]  DATE OF SERVICE:  2019    PREOPERATIVE DIAGNOSIS:  Interstitial cystitis. POSTOPERATIVE DIAGNOSIS:  Interstitial cystitis. PROCEDURE PERFORMED:  Cystoscopy and hydrodistention of bladder. SURGEON:  Carlene Jenkins MD    ASSISTANT:  None. ANESTHESIA:  General.    COMPLICATIONS:  None. SPECIMENS REMOVED:  None. IMPLANTS:  None. ESTIMATED BLOOD LOSS:  None. FINDINGS:  Bladder capacity of 700 mL under anesthesia, moderate glomerulations noted at the bladder neck and trigone after hydrodistention. DESCRIPTION OF PROCEDURE:  The patient was given general anesthetic, placed in dorsal lithotomy position. B and O suppository was placed in the rectum. She was prepped and draped in sterile fashion. A 25-Singaporean cystoscope was passed into the urethra using video camera and 30-degree lens. Bladder mucosa appears normal.  There were no stones or tumors. Both ureteral orifices appeared normal.  The bladder was then stented with the irrigant with the irrigation bag held about 80 cm anterior to the level of the bladder. It was held at full distention for 8 minutes and then allowed to drain. Bladder capacity under anesthesia was approximately 700 mL. Inspection of the bladder mucosa showed no evidence of trauma. There were glomerulations noted on the bladder floor, the trigone and the posterior bladder neck. This was consistent with interstitial cystitis. I then instilled 30 mL of Marcaine into the bladder and removed the scope. She tolerated this well. PLAN:  She will be discharged home. She will return to the office in 1 month.       MD DIANA [email or web address]  D:  2019 15:17  T:  2019 15:23  JOB #:  [id number]

## 2019-09-17 NOTE — ANESTHESIA PREPROCEDURE EVALUATION
Anesthetic History   No history of anesthetic complications            Review of Systems / Medical History  Patient summary reviewed, nursing notes reviewed and pertinent labs reviewed    Pulmonary    COPD (Chronic bronchitis): mild      Shortness of breath (Requiring 3L O2 with exertion) and smoker  Asthma : well controlled       Neuro/Psych       CVA (\"mild stroke\" pt reports 2014- \"left sided weakness and balance is off)  Psychiatric history     Cardiovascular    Hypertension: well controlled  Valvular problems/murmurs: tricuspid insufficiency, mitral insufficiency and aortic insufficiency    CHF (Early last year developed Takotsubo CM - most recent ECHO (12/17) EF 45%)    CAD    Exercise tolerance: <4 METS  Comments: Echo (7-2019) - moderate AI, mild TR, mild MR and,  Moderate global HK, EF 30-35%   GI/Hepatic/Renal     GERD: well controlled           Endo/Other        Arthritis and anemia     Other Findings   Comments: Admission 12/17 with pulm edema secondary to voln overload  + cocaine 3/2019         Physical Exam    Airway  Mallampati: II  TM Distance: 4 - 6 cm  Neck ROM: normal range of motion   Mouth opening: Normal     Cardiovascular    Rhythm: regular  Rate: normal         Dental    Dentition: Edentulous     Pulmonary                 Abdominal  GI exam deferred       Other Findings            Anesthetic Plan    ASA: 3  Anesthesia type: general          Induction: Intravenous  Anesthetic plan and risks discussed with: Patient

## 2019-09-17 NOTE — DISCHARGE INSTRUCTIONS
CYSTOSCOPY    ACTIVITY   · Rest today  · Okay to bathe or shower     DIET  · Clear liquids until no nausea or vomiting; then light diet for the first day. · Drink plenty of liquids. At least 8 glasses of water to help flush out bladder. Limit amount of caffeine. · Advance to regular diet on second day, unless your doctor orders otherwise. · If nausea and vomiting continues, call your doctor. PAIN  · Take pain medication as directed by your doctor. · Call your doctor if pain is NOT relieved by medication. · DO NOT take aspirin or blood thinners until directed by your doctor. CALL YOUR DOCTOR IF  · Expect blood-tinged urine. Call your doctor if it lasts more than 72 hours or if you cannot see through the urine. · Temperature of 101 degrees or above. · Unable to empty bladder. AFTER ANESTHESIA  · For the next 24 hours: DO NOT Drive, drink alcoholic beverages, or Make important decisions. · Be aware of dizziness following anesthesia and while taking pain medications    APPOINTMENT DATE/ TIME Thursday October 17 at 11:00     YOUR DOCTOR'S PHONE NUMBER [phone]      DISCHARGE SUMMARY from Nurse    PATIENT INSTRUCTIONS:    After general anesthesia or intravenous sedation, for 24 hours or while taking prescription Narcotics:  · Limit your activities  · Do not drive and operate hazardous machinery  · Do not make important personal or business decisions  · Do  not drink alcoholic beverages  · If you have not urinated within 8 hours after discharge, please contact your surgeon on call. *  Please give a list of your current medications to your Primary Care Provider. *  Please update this list whenever your medications are discontinued, doses are      changed, or new medications (including over-the-counter products) are added. *  Please carry medication information at all times in case of emergency situations.       These are general instructions for a healthy lifestyle:    No smoking/ No tobacco products/ Avoid exposure to second hand smoke    Surgeon General's Warning:  Quitting smoking now greatly reduces serious risk to your health. Obesity, smoking, and sedentary lifestyle greatly increases your risk for illness    A healthy diet, regular physical exercise & weight monitoring are important for maintaining a healthy lifestyle    You may be retaining fluid if you have a history of heart failure or if you experience any of the following symptoms:  Weight gain of 3 pounds or more overnight or 5 pounds in a week, increased swelling in our hands or feet or shortness of breath while lying flat in bed. Please call your doctor as soon as you notice any of these symptoms; do not wait until your next office visit. Recognize signs and symptoms of STROKE:    F-face looks uneven    A-arms unable to move or move unevenly    S-speech slurred or non-existent    T-time-call 911 as soon as signs and symptoms begin-DO NOT go       Back to bed or wait to see if you get better-TIME IS BRAIN.

## 2019-09-17 NOTE — BRIEF OP NOTE
BRIEF OPERATIVE NOTE    Date of Procedure: 9/17/2019   Preoperative Diagnosis: Interstitial cystitis [N30.10]  Postoperative Diagnosis: Interstitial cystitis [N30.10]    Procedure(s):  CYSTOSCOPY W/ HYDRODISTENTION  Surgeon(s) and Role:     * Sudhir Cameron MD - Primary         Surgical Assistant: none    Surgical Staff:  Circ-1: Catalina Interiano RN  Event Time In Time Out   Incision Start 1448    Incision Close 1501      Anesthesia: General   Estimated Blood Loss: none  Specimens: * No specimens in log *   Findings: see op note   Complications: none  Implants: * No implants in log *

## 2019-09-24 ENCOUNTER — HOSPITAL ENCOUNTER (EMERGENCY)
Age: 54
Discharge: HOME | End: 2019-09-24
Attending: EMERGENCY MEDICINE
Payer: COMMERCIAL

## 2019-09-24 ENCOUNTER — APPOINTMENT (OUTPATIENT)
Dept: GENERAL RADIOLOGY | Age: 54
End: 2019-09-24
Attending: EMERGENCY MEDICINE
Payer: COMMERCIAL

## 2019-09-24 VITALS
WEIGHT: 193 LBS | HEART RATE: 79 BPM | BODY MASS INDEX: 32.95 KG/M2 | DIASTOLIC BLOOD PRESSURE: 58 MMHG | RESPIRATION RATE: 18 BRPM | OXYGEN SATURATION: 100 % | TEMPERATURE: 97.7 F | HEIGHT: 64 IN | SYSTOLIC BLOOD PRESSURE: 102 MMHG

## 2019-09-24 DIAGNOSIS — R60.9 PERIPHERAL EDEMA: Primary | ICD-10-CM

## 2019-09-24 LAB
ALBUMIN SERPL-MCNC: 3.2 G/DL (ref 3.5–5)
ALBUMIN/GLOB SERPL: 0.9 {RATIO} (ref 1.2–3.5)
ALP SERPL-CCNC: 106 U/L (ref 50–136)
ALT SERPL-CCNC: 25 U/L (ref 12–65)
ANION GAP SERPL CALC-SCNC: 6 MMOL/L (ref 7–16)
AST SERPL-CCNC: 40 U/L (ref 15–37)
BASOPHILS # BLD: 0 K/UL (ref 0–0.2)
BASOPHILS NFR BLD: 0 % (ref 0–2)
BILIRUB SERPL-MCNC: 0.2 MG/DL (ref 0.2–1.1)
BNP SERPL-MCNC: 116 PG/ML
BUN SERPL-MCNC: 8 MG/DL (ref 6–23)
CALCIUM SERPL-MCNC: 8.8 MG/DL (ref 8.3–10.4)
CHLORIDE SERPL-SCNC: 110 MMOL/L (ref 98–107)
CO2 SERPL-SCNC: 26 MMOL/L (ref 21–32)
CREAT SERPL-MCNC: 1.3 MG/DL (ref 0.6–1)
DIFFERENTIAL METHOD BLD: ABNORMAL
EOSINOPHIL # BLD: 0 K/UL (ref 0–0.8)
EOSINOPHIL NFR BLD: 1 % (ref 0.5–7.8)
ERYTHROCYTE [DISTWIDTH] IN BLOOD BY AUTOMATED COUNT: 15 % (ref 11.9–14.6)
GLOBULIN SER CALC-MCNC: 3.4 G/DL (ref 2.3–3.5)
GLUCOSE SERPL-MCNC: 139 MG/DL (ref 65–100)
HCT VFR BLD AUTO: 29.3 % (ref 35.8–46.3)
HGB BLD-MCNC: 9.2 G/DL (ref 11.7–15.4)
IMM GRANULOCYTES # BLD AUTO: 0 K/UL (ref 0–0.5)
IMM GRANULOCYTES NFR BLD AUTO: 0 % (ref 0–5)
LYMPHOCYTES # BLD: 1.8 K/UL (ref 0.5–4.6)
LYMPHOCYTES NFR BLD: 39 % (ref 13–44)
MCH RBC QN AUTO: 28.7 PG (ref 26.1–32.9)
MCHC RBC AUTO-ENTMCNC: 31.4 G/DL (ref 31.4–35)
MCV RBC AUTO: 91.3 FL (ref 79.6–97.8)
MONOCYTES # BLD: 0.4 K/UL (ref 0.1–1.3)
MONOCYTES NFR BLD: 8 % (ref 4–12)
NEUTS SEG # BLD: 2.4 K/UL (ref 1.7–8.2)
NEUTS SEG NFR BLD: 52 % (ref 43–78)
NRBC # BLD: 0 K/UL (ref 0–0.2)
PLATELET # BLD AUTO: 269 K/UL (ref 150–450)
PLATELET COMMENTS,PCOM: ADEQUATE
PMV BLD AUTO: 10.1 FL (ref 9.4–12.3)
POTASSIUM SERPL-SCNC: 3.6 MMOL/L (ref 3.5–5.1)
PROT SERPL-MCNC: 6.6 G/DL (ref 6.3–8.2)
RBC # BLD AUTO: 3.21 M/UL (ref 4.05–5.2)
RBC MORPH BLD: ABNORMAL
SODIUM SERPL-SCNC: 142 MMOL/L (ref 136–145)
WBC # BLD AUTO: 4.6 K/UL (ref 4.3–11.1)
WBC MORPH BLD: ABNORMAL

## 2019-09-24 PROCEDURE — 74011250637 HC RX REV CODE- 250/637: Performed by: EMERGENCY MEDICINE

## 2019-09-24 PROCEDURE — 83880 ASSAY OF NATRIURETIC PEPTIDE: CPT

## 2019-09-24 PROCEDURE — 71045 X-RAY EXAM CHEST 1 VIEW: CPT

## 2019-09-24 PROCEDURE — 85025 COMPLETE CBC W/AUTO DIFF WBC: CPT

## 2019-09-24 PROCEDURE — 80053 COMPREHEN METABOLIC PANEL: CPT

## 2019-09-24 PROCEDURE — 74011250636 HC RX REV CODE- 250/636: Performed by: EMERGENCY MEDICINE

## 2019-09-24 PROCEDURE — 96375 TX/PRO/DX INJ NEW DRUG ADDON: CPT | Performed by: EMERGENCY MEDICINE

## 2019-09-24 PROCEDURE — 96374 THER/PROPH/DIAG INJ IV PUSH: CPT | Performed by: EMERGENCY MEDICINE

## 2019-09-24 PROCEDURE — 99283 EMERGENCY DEPT VISIT LOW MDM: CPT | Performed by: EMERGENCY MEDICINE

## 2019-09-24 RX ORDER — FUROSEMIDE 10 MG/ML
80 INJECTION INTRAMUSCULAR; INTRAVENOUS
Status: COMPLETED | OUTPATIENT
Start: 2019-09-24 | End: 2019-09-24

## 2019-09-24 RX ORDER — TRAMADOL HYDROCHLORIDE 50 MG/1
100 TABLET ORAL
Status: DISCONTINUED | OUTPATIENT
Start: 2019-09-24 | End: 2019-09-24 | Stop reason: HOSPADM

## 2019-09-24 RX ORDER — ONDANSETRON 2 MG/ML
4 INJECTION INTRAMUSCULAR; INTRAVENOUS
Status: COMPLETED | OUTPATIENT
Start: 2019-09-24 | End: 2019-09-24

## 2019-09-24 RX ADMIN — TRAMADOL HYDROCHLORIDE 100 MG: 50 TABLET, FILM COATED ORAL at 19:56

## 2019-09-24 RX ADMIN — ONDANSETRON 4 MG: 2 INJECTION INTRAMUSCULAR; INTRAVENOUS at 19:55

## 2019-09-24 RX ADMIN — FUROSEMIDE 80 MG: 10 INJECTION, SOLUTION INTRAMUSCULAR; INTRAVENOUS at 19:54

## 2019-09-24 NOTE — ED PROVIDER NOTES
Chief complaint : Leg edema    HISTORY OF PRESENT ILLNESS :  Location : Bilateral lower legs    Quality : Pitting    Quantity : Up to the knees    Timing : Left leg started swelling 4 hours ago, right leg started swelling last night    Severity : Mild    Alleviating / exacerbating factors : Consistently compliant with her 40 mg of Lasix each morning    Associated Symptoms : Patient reports dyspnea, and sleeping in a recliner for the last 3 nights because she was worried about swelling and being able to breathe, even though the swelling started last night             Past Medical History:   Diagnosis Date    Anemia     blood transfusion 5/2018 per pt    Arrhythmia     palpitations, moderate mitral valve regurge    Arthritis     lower back osteo    Asthma     uses inhalers    Cardiomyopathy     ECHO 11/2017    CHF (congestive heart failure) ([address]     EF 30-35% last echo 7/2019-- followed by dr Ness Dunaway Chronic pain 2010    Coagulation defect [facility] [address]     eliquis    COPD     nebulizer daily and maint inhalers, can not climb 1 flight of stairs (also CHF)  oxygen 3 LPM qhs    Decreased cardiac ejection fraction 11/27/2017    EF 45-50% per echo 11/27/17    Depression     controlled      Diverticulitis     GERD (gastroesophageal reflux disease)     controlled with med     Heart murmur     Hypertension     managed with meds    IBS (irritable bowel syndrome)     IC (interstitial cystitis)     Insomnia     Migraine with aura and without status migrainosus, not intractable 6/17/2016    Mitral valve regurgitation, rheumatic     followed Dr. Lien Penn Moderate aortic regurgitation     per echo 7/2019 in -- followed by dr Ness Dunaway Palpitations 5/16/2016    Psychiatric disorder     anxiety    Requires oxygen therapy     3l/min at hs. prn during the days as needed    Rheumatic aortic insufficiency 5/16/2016    Rheumatic fever as child    pt reports rheumatic fever in childhood    Smoker     started age 23-- smoked 0.5 ppd until 2018-- cut back to 10-2 cig daily per pt    Stroke ([facility] [address]     \"mild stroke\" pt reports 2014- \"left sided weakness and balance is off\"     [facility]     BLE 2012    Vitamin D deficiency        Past Surgical History:   Procedure Laterality Date    BIOPSY OF BREAST, INCISIONAL Left     lymph node , left, patient states her bx neg, but high risk    COLONOSCOPY      8 polyps removed, diverticulitis    COLONOSCOPY N/A 5/21/2018    COLONOSCOPY performed by Ramiro Martinez MD at [address]  8-23-11    bladder dilitation    EGD      HX APPENDECTOMY  2006    HX HEART CATHETERIZATION  5/27/2011    no blockages, no intervention    HX HEART CATHETERIZATION  04/2017    no intervention    HX LAP CHOLECYSTECTOMY  6/6/2011    HX ORTHOPAEDIC Right 07/2019    4th toe -- surg repair    HX NATASHA AND BSO  2004    fibroid tumors, hyst    HX UROLOGICAL  01/2018    cystoscopy with hydrodistention of bladder multiple         Family History:   Problem Relation Age of Onset    Heart Failure Father 76        chf    Heart Disease Father     Diabetes Sister     Thyroid Disease Sister        Social History     Socioeconomic History    Marital status: SINGLE     Spouse name: Not on file    Number of children: Not on file    Years of education: Not on file    Highest education level: Not on file   Occupational History    Not on file   Social Needs    Financial resource strain: Not on file    Food insecurity:     Worry: Not on file     Inability: Not on file    Transportation needs:     Medical: Not on file     Non-medical: Not on file   Tobacco Use    Smoking status: Current Every Day Smoker     Packs/day: 0.25     Years: 30.00     Pack years: 7.50    Smokeless tobacco: Never Used   Substance and Sexual Activity    Alcohol use: No    Drug use: Yes     Types: Cocaine     Comment: last used 11/2018    Sexual activity: Not on file   Lifestyle    Physical activity:     Days per week: Not on file     Minutes per session: Not on file    Stress: Not on file   Relationships    Social connections:     Talks on phone: Not on file     Gets together: Not on file     Attends Rastafari service: Not on file     Active member of club or organization: Not on file     Attends meetings of clubs or organizations: Not on file     Relationship status: Not on file    Intimate partner violence:     Fear of current or ex partner: Not on file     Emotionally abused: Not on file     Physically abused: Not on file     Forced sexual activity: Not on file   Other Topics Concern    Not on file   Social History Narrative    Not on file         ALLERGIES: Aspirin; Bentyl [dicyclomine]; Toradol [ketorolac]; Ultram [tramadol]; and Zofran [ondansetron hcl (pf)]    Review of Systems   Constitutional: Negative for chills and fever. HENT: Negative for rhinorrhea and sore throat. Eyes: Negative for discharge and redness. Respiratory: Positive for shortness of breath. Negative for cough. Cardiovascular: Positive for leg swelling. Negative for chest pain and palpitations. Gastrointestinal: Negative for abdominal pain, nausea and vomiting. Musculoskeletal: Negative for arthralgias and back pain. Skin: Negative for rash. Neurological: Negative for dizziness and headaches. All other systems reviewed and are negative. Vitals:    09/24/19 1836   BP: 91/40   Pulse: 82   Resp: 18   Temp: 97.7 °F (36.5 °C)   SpO2: 98%   Weight: 87.5 kg (193 lb)   Height: 5' 4\" (1.626 m)            Physical Exam   Constitutional: She is oriented to person, place, and time. She appears well-developed and well-nourished. No distress. HENT:   Head: Normocephalic and atraumatic. Eyes: Pupils are equal, round, and reactive to light. Conjunctivae and EOM are normal. Right eye exhibits no discharge. Left eye exhibits no discharge. No scleral icterus. Neck: Normal range of motion. Neck supple.    Cardiovascular: Normal rate, regular rhythm and normal heart sounds. Exam reveals no gallop. No murmur heard. Pulmonary/Chest: Effort normal and breath sounds normal. No respiratory distress. She has no wheezes. She has no rales. Musculoskeletal: Normal range of motion. She exhibits edema (1+, minimal pitting over her tibias). Neurological: She is alert and oriented to person, place, and time. She exhibits normal muscle tone. cni 2-12 grossly   Skin: Skin is warm and dry. She is not diaphoretic. Psychiatric: She has a normal mood and affect. Her behavior is normal.   Nursing note and vitals reviewed. MDM  Number of Diagnoses or Management Options  Diagnosis management comments: Medical decision making note:  Increased peripheral edema the legs, BNP is barely elevated, chest x-ray clear lungs are clear to auscultation  Give 1 IV dose of Lasix here sodium and fluid restriction we will bump her morning Lasix dose for the next few days, to 80 mg  Follow-up with her PCP, and cardiologist.  This concludes the \"medical decision making note\" part of this emergency department visit note.            Procedures

## 2019-09-24 NOTE — ED TRIAGE NOTES
Patient presents with complaints of BLE swelling and pain that began 3 days ago. Pt states she has a hx of CHF, is compliant with Lasix. Patient also states she has been having to sleep sitting up in a chair for 3 days. Patient states productive cough for yellow sputum.

## 2019-09-25 NOTE — DISCHARGE INSTRUCTIONS
Double you r lasix dose from 40m to 80mg Wednesday and Thursday morning  Then go back to ONE tabet each morning starting friady    Follow up with dr Laurel Hernandez and/or dr Christopher Khan. Elevate legs  Tylenol or alleve for pain      Patient Education        Leg and Ankle Edema: Care Instructions  Your Care Instructions  Swelling in the legs, ankles, and feet is called edema. It is common after you sit or stand for a while. Long plane flights or car rides often cause swelling in the legs and feet. You may also have swelling if you have to stand for long periods of time at your job. Problems with the veins in the legs (varicose veins) and changes in hormones can also cause swelling. Sometimes the swelling in the ankles and feet is caused by a more serious problem, such as heart failure, infection, blood clots, or liver or kidney disease. Follow-up care is a key part of your treatment and safety. Be sure to make and go to all appointments, and call your doctor if you are having problems. It's also a good idea to know your test results and keep a list of the medicines you take. How can you care for yourself at home? · If your doctor gave you medicine, take it as prescribed. Call your doctor if you think you are having a problem with your medicine. · Whenever you are resting, raise your legs up. Try to keep the swollen area higher than the level of your heart. · Take breaks from standing or sitting in one position. ? Walk around to increase the blood flow in your lower legs. ? Move your feet and ankles often while you stand, or tighten and relax your leg muscles. · Wear support stockings. Put them on in the morning, before swelling gets worse. · Eat a balanced diet. Lose weight if you need to. · Limit the amount of salt (sodium) in your diet. Salt holds fluid in the body and may increase swelling. When should you call for help? Call 911 anytime you think you may need emergency care.  For example, call if:    · You have symptoms of a blood clot in your lung (called a pulmonary embolism). These may include:  ? Sudden chest pain. ? Trouble breathing. ? Coughing up blood.    Call your doctor now or seek immediate medical care if:    · You have signs of a blood clot, such as:  ? Pain in your calf, back of the knee, thigh, or groin. ? Redness and swelling in your leg or groin.     · You have symptoms of infection, such as:  ? Increased pain, swelling, warmth, or redness. ? Red streaks or pus. ? A fever.    Watch closely for changes in your health, and be sure to contact your doctor if:    · Your swelling is getting worse.     · You have new or worsening pain in your legs.     · You do not get better as expected. Where can you learn more? Go to http://maria alejandra-nereyda.info/. Enter M189 in the search box to learn more about \"Leg and Ankle Edema: Care Instructions. \"  Current as of: June 26, 2019  Content Version: 12.2  © 1139-3395 [employer]. Care instructions adapted under license by [employer] (which disclaims liability or warranty for this information). If you have questions about a medical condition or this instruction, always ask your healthcare professional. [place] [address] any warranty or liability for your use of this information.

## 2019-09-25 NOTE — ED NOTES
I have reviewed discharge instructions with the patient. The patient verbalized understanding. Patient left ED via Discharge Method: ambulatory to Home with (insert name of family/friend, self). Opportunity for questions and clarification provided. Patient given 0 scripts. To continue your aftercare when you leave the hospital, you may receive an automated call from our care team to check in on how you are doing. This is a free service and part of our promise to provide the best care and service to meet your aftercare needs.  If you have questions, or wish to unsubscribe from this service please call [phone]. Thank you for Choosing our [facility] Emergency Department.

## 2019-09-30 NOTE — OP NOTES
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 PROCEDURES PERFORMED:  1. Right fourth proximal interphalangeal joint resection arthroplasty. 2.  Right fourth metatarsophalangeal release with capsulotomy. SURGEON:  Sarah Marr MD         ANESTHESIA:  General anesthesia with LMA. ESTIMATED BLOOD LOSS:  Minimal.        TOURNIQUET TIME:  10 minutes at 250 mmHg. ANTIBIOTIC PROPHYLAXIS:  Ancef given prior to incision. INDICATIONS:  The patient [facility] 57[facility] who sustained a right fourth toe fracture with an acquired clawtoe, has failed conservative therapy and desires surgical treatment. Risks and benefits of procedure include, but not limited to risk of anesthetic complication, myocardial infarction, stroke, death as surgical complications including damage to nerves and blood vessels, risk for infection, incomplete pain relief, risk of malunion, risk of nonunion and need for additional surgery were discussed with the patient. She understands the risks and wishes to proceed at this time. DETAILS OF PROCEDURE:  The patient's operative site was marked with indelible ink in the preop holding area. She was brought to the OR and placed supine and did a preop surgical timeout, the right lower extremity was identified as surgical site, prepped and draped in standard sterile fashion with ChloraPrep solution. A PIP resection arthroplasty of the fourth toe was performed with an elliptical incision. A separate approach to the fourth MTP joints was also performed at that time with capsulotomy and extensor tendon lengthening.   The lesser toe was then pinned using a K-wire in a retrograde fashion and confirmed to be adequately placed on image intensification. The wounds were irrigated and closed using Monocryl and nylon sutures. Sterile dressing was then applied. Anesthesia was discontinued. The patient was subsequently transferred to the recovery bed, taken to the recovery room in satisfactory condition. She appeared to tolerate the procedure well. There were no apparent surgical or anesthetic complications. All needle and sponge counts were correct.       MD LORRIE Og / SOFY  D: 01/28/2019 11:37     T: 01/28/2019 14:06  JOB #: [id number] A&Ox1, NAD. NCAT. PERRL, EOMI. Neck supple, no LAD. Lungs CTAB. +S1S2, RRR, No m/r/g. Abd soft, NT/ND, +BS, no rebound or guarding. Extremities: cap refill <2, pulses in distal extremities 4+, no edema. Skin: right upper posterio-lateral torso with + blanchable erythema with palpable induration approx 1cm in diameter, no fluctuance, discharge, openings. similar induration to right posterior shoulder. pt moving extremities without restrictions. A&Ox1, NAD. NCAT. PERRL, EOMI. Neck supple, no LAD. Lungs CTAB. +S1S2, RRR, No m/r/g. Abd soft, NT/ND, +BS, no rebound or guarding. Extremities: cap refill <2, pulses in distal extremities 4+, no edema. Skin: right upper posterio-lateral torso with + blanchable erythema with palpable induration approx 1cm in diameter, no fluctuance, discharge, openings. similar induration to right posterior shoulder. no vesicles, bullae.  pt moving extremities without restrictions.

## 2019-10-04 ENCOUNTER — HOSPITAL ENCOUNTER (OUTPATIENT)
Dept: LAB | Age: 54
Discharge: HOME | End: 2019-10-04
Payer: COMMERCIAL

## 2019-10-04 DIAGNOSIS — I50.22 CHRONIC SYSTOLIC HEART FAILURE (HCC): Chronic | ICD-10-CM

## 2019-10-04 LAB
ANION GAP SERPL CALC-SCNC: 8 MMOL/L (ref 7–16)
BNP SERPL-MCNC: 143 PG/ML
BUN SERPL-MCNC: 9 MG/DL (ref 6–23)
CALCIUM SERPL-MCNC: 9 MG/DL (ref 8.3–10.4)
CHLORIDE SERPL-SCNC: 109 MMOL/L (ref 98–107)
CO2 SERPL-SCNC: 25 MMOL/L (ref 21–32)
CREAT SERPL-MCNC: 1 MG/DL (ref 0.6–1)
GLUCOSE SERPL-MCNC: 94 MG/DL (ref 65–100)
HCT VFR BLD AUTO: 31.4 % (ref 35.8–46.3)
HGB BLD-MCNC: 9.8 G/DL (ref 11.7–15.4)
POTASSIUM SERPL-SCNC: 3.5 MMOL/L (ref 3.5–5.1)
SODIUM SERPL-SCNC: 142 MMOL/L (ref 136–145)

## 2019-10-04 PROCEDURE — 83880 ASSAY OF NATRIURETIC PEPTIDE: CPT

## 2019-10-04 PROCEDURE — 36415 COLL VENOUS BLD VENIPUNCTURE: CPT

## 2019-10-04 PROCEDURE — 85018 HEMOGLOBIN: CPT

## 2019-10-04 PROCEDURE — 80048 BASIC METABOLIC PNL TOTAL CA: CPT

## 2019-10-21 ENCOUNTER — HOSPITAL ENCOUNTER (EMERGENCY)
Age: 54
Discharge: HOME | End: 2019-10-21
Attending: EMERGENCY MEDICINE
Payer: COMMERCIAL

## 2019-10-21 ENCOUNTER — APPOINTMENT (OUTPATIENT)
Dept: CT IMAGING | Age: 54
End: 2019-10-21
Attending: EMERGENCY MEDICINE
Payer: COMMERCIAL

## 2019-10-21 VITALS
OXYGEN SATURATION: 96 % | DIASTOLIC BLOOD PRESSURE: 73 MMHG | SYSTOLIC BLOOD PRESSURE: 123 MMHG | BODY MASS INDEX: 32.44 KG/M2 | HEART RATE: 82 BPM | TEMPERATURE: 97.6 F | RESPIRATION RATE: 20 BRPM | WEIGHT: 190 LBS | HEIGHT: 64 IN

## 2019-10-21 DIAGNOSIS — R51.9 NONINTRACTABLE HEADACHE, UNSPECIFIED CHRONICITY PATTERN, UNSPECIFIED HEADACHE TYPE: Primary | ICD-10-CM

## 2019-10-21 LAB
ALBUMIN SERPL-MCNC: 3.7 G/DL (ref 3.5–5)
ALBUMIN/GLOB SERPL: 1 {RATIO} (ref 1.2–3.5)
ALP SERPL-CCNC: 108 U/L (ref 50–136)
ALT SERPL-CCNC: 28 U/L (ref 12–65)
ANION GAP SERPL CALC-SCNC: 6 MMOL/L (ref 7–16)
AST SERPL-CCNC: 32 U/L (ref 15–37)
BASOPHILS # BLD: 0 K/UL (ref 0–0.2)
BASOPHILS NFR BLD: 1 % (ref 0–2)
BILIRUB SERPL-MCNC: 0.3 MG/DL (ref 0.2–1.1)
BUN SERPL-MCNC: 15 MG/DL (ref 6–23)
CALCIUM SERPL-MCNC: 9 MG/DL (ref 8.3–10.4)
CHLORIDE SERPL-SCNC: 107 MMOL/L (ref 98–107)
CO2 SERPL-SCNC: 26 MMOL/L (ref 21–32)
CREAT SERPL-MCNC: 1 MG/DL (ref 0.6–1)
CRP SERPL HS-MCNC: 1.8 MG/L
DIFFERENTIAL METHOD BLD: ABNORMAL
EOSINOPHIL # BLD: 0.1 K/UL (ref 0–0.8)
EOSINOPHIL NFR BLD: 1 % (ref 0.5–7.8)
ERYTHROCYTE [DISTWIDTH] IN BLOOD BY AUTOMATED COUNT: 16.4 % (ref 11.9–14.6)
ERYTHROCYTE [SEDIMENTATION RATE] IN BLOOD: 34 MM/HR (ref 0–30)
GLOBULIN SER CALC-MCNC: 3.7 G/DL (ref 2.3–3.5)
GLUCOSE SERPL-MCNC: 84 MG/DL (ref 65–100)
HCT VFR BLD AUTO: 32.9 % (ref 35.8–46.3)
HGB BLD-MCNC: 10.3 G/DL (ref 11.7–15.4)
IMM GRANULOCYTES # BLD AUTO: 0 K/UL (ref 0–0.5)
IMM GRANULOCYTES NFR BLD AUTO: 0 % (ref 0–5)
LYMPHOCYTES # BLD: 2.2 K/UL (ref 0.5–4.6)
LYMPHOCYTES NFR BLD: 35 % (ref 13–44)
MCH RBC QN AUTO: 28.2 PG (ref 26.1–32.9)
MCHC RBC AUTO-ENTMCNC: 31.3 G/DL (ref 31.4–35)
MCV RBC AUTO: 90.1 FL (ref 79.6–97.8)
MONOCYTES # BLD: 0.5 K/UL (ref 0.1–1.3)
MONOCYTES NFR BLD: 8 % (ref 4–12)
NEUTS SEG # BLD: 3.5 K/UL (ref 1.7–8.2)
NEUTS SEG NFR BLD: 56 % (ref 43–78)
NRBC # BLD: 0 K/UL (ref 0–0.2)
PLATELET # BLD AUTO: 330 K/UL (ref 150–450)
PMV BLD AUTO: 9.6 FL (ref 9.4–12.3)
POTASSIUM SERPL-SCNC: 3.5 MMOL/L (ref 3.5–5.1)
PROT SERPL-MCNC: 7.4 G/DL (ref 6.3–8.2)
RBC # BLD AUTO: 3.65 M/UL (ref 4.05–5.2)
SODIUM SERPL-SCNC: 139 MMOL/L (ref 136–145)
WBC # BLD AUTO: 6.3 K/UL (ref 4.3–11.1)

## 2019-10-21 PROCEDURE — 70450 CT HEAD/BRAIN W/O DYE: CPT

## 2019-10-21 PROCEDURE — 80053 COMPREHEN METABOLIC PANEL: CPT

## 2019-10-21 PROCEDURE — 85652 RBC SED RATE AUTOMATED: CPT

## 2019-10-21 PROCEDURE — 74011250636 HC RX REV CODE- 250/636: Performed by: EMERGENCY MEDICINE

## 2019-10-21 PROCEDURE — 96374 THER/PROPH/DIAG INJ IV PUSH: CPT | Performed by: EMERGENCY MEDICINE

## 2019-10-21 PROCEDURE — 86141 C-REACTIVE PROTEIN HS: CPT

## 2019-10-21 PROCEDURE — 85025 COMPLETE CBC W/AUTO DIFF WBC: CPT

## 2019-10-21 PROCEDURE — 99284 EMERGENCY DEPT VISIT MOD MDM: CPT | Performed by: EMERGENCY MEDICINE

## 2019-10-21 RX ORDER — METOCLOPRAMIDE HYDROCHLORIDE 5 MG/ML
10 INJECTION INTRAMUSCULAR; INTRAVENOUS
Status: COMPLETED | OUTPATIENT
Start: 2019-10-21 | End: 2019-10-21

## 2019-10-21 RX ORDER — SODIUM CHLORIDE 9 MG/ML
500 INJECTION, SOLUTION INTRAVENOUS ONCE
Status: COMPLETED | OUTPATIENT
Start: 2019-10-21 | End: 2019-10-21

## 2019-10-21 RX ADMIN — METOCLOPRAMIDE 10 MG: 5 INJECTION, SOLUTION INTRAMUSCULAR; INTRAVENOUS at 09:13

## 2019-10-21 RX ADMIN — SODIUM CHLORIDE 500 ML: 900 INJECTION, SOLUTION INTRAVENOUS at 09:13

## 2019-10-21 NOTE — ED TRIAGE NOTES
Pt presents ambulatory to triage with an unsteady gait. Pt in mild distress. Pt thoughts are scattered and has multiple complains. PT cannot keep her thoughts straight. Pt has sensory deficits to right side that have been going on since Friday. Pt has slurred speech. Pt denies any use of drugs or alcohol. Pt sts she has fallen 7 times in the last two days related to change in balance. Pt has had a productive cough for four days. Pt is on Eliquis. Dr Suly Langston in triage for evaluation and orders.

## 2019-10-21 NOTE — ED PROVIDER NOTES
[facility] Emergency Department  Arrival Date/Time: 10/21/2019  7:14 AM      Rayray Lyons  MRN: [id number]    YOB: 1965   48 y.o. female    [facility] EMERGENCY DEPT ER01/01  Seen on 10/21/2019 @ 8:40 AM      Today's Chief Complaint:   Chief Complaint   Patient presents with    Dizziness    Headache     HPI: 61-year-old female presents to the emergency department with several complaints. She states that she has had a headache for several days. She was seen by her primary care physician on Thursday for the same. From his notes it appears that she has had headaches intermittently for some time. She says her head has been hurting since then. She cannot take aspirin and she was worried about her heart so she did not take any other medicines either    Her head hurts all over. No alleviating. She reports falls. No confusion. No fever. No vomiting    She also reports a history of aortic insufficiency but is not having any chest pain or shortness of breath just \"wanted to let me know\"       HPI    Review of Systems: Review of Systems   Constitutional: Positive for activity change. Negative for appetite change, chills, fatigue and fever. HENT: Negative. Respiratory: Negative. Cardiovascular: Negative. Gastrointestinal: Negative. Negative for nausea and vomiting. Genitourinary: Negative. Musculoskeletal: Negative. Neurological: Positive for headaches. Psychiatric/Behavioral: Negative. Past Medical History: Primary Care Doctor: Viviane Ruff MD  Meds, PMH, PSHx, SocHx at end of this note     Allergies:    Allergies   Allergen Reactions    Aspirin Other (comments)     Inflames IBS per pt    Bentyl [Dicyclomine] Itching    Toradol [Ketorolac] Hives    Ultram [Tramadol] Nausea and Vomiting    Zofran [Ondansetron Hcl (Pf)] Nausea and Vomiting         Key Anti-Platelet Anticoagulant Meds             apixaban (ELIQUIS) 5 mg tablet Take 1 Tab by mouth two (2) times a day. Physical Exam:  Nursing documentation reviewed. Vitals:    10/21/19 0642 10/21/19 1028   BP: 127/77 123/73   Pulse: 89 82   Resp: 20    Temp: 97.6 °F (36.4 °C)    SpO2: 98% 96%    Vital signs were reviewed. Physical Exam   Constitutional: She is oriented to person, place, and time. She appears well-developed and well-nourished. HENT:   Head: Normocephalic and atraumatic. Eyes: Pupils are equal, round, and reactive to light. EOM are normal.   Neck: Normal range of motion. Cardiovascular: Normal rate, regular rhythm and normal heart sounds. Pulmonary/Chest: Breath sounds normal. No stridor. No respiratory distress. Abdominal: She exhibits no mass. There is no tenderness. Musculoskeletal: Normal range of motion. She exhibits no tenderness. Neurological: She is alert and oriented to person, place, and time. Skin: Skin is dry. Capillary refill takes less than 2 seconds. Psychiatric: She has a normal mood and affect. Her behavior is normal.   Nursing note and vitals reviewed. MEDICAL DECISION MAKING:   Differential Diagnosis:    MDM  Number of Diagnoses or Management Options  Diagnosis management comments: 51-year-old female with history of tachycardia suitable cardiomyopathy presents to the emergency department with headache    These have been going on for about a week. No alleviating. She is not really tried any medications at home    She says she is fallen. She feels dizzy. Head CT is negative. Labs are unremarkable. Physical exam is unremarkable.        Amount and/or Complexity of Data Reviewed  Clinical lab tests: ordered and reviewed  Tests in the radiology section of CPT®: reviewed and ordered    Risk of Complications, Morbidity, and/or Mortality  Presenting problems: moderate  Diagnostic procedures: minimal  Management options: moderate          Data/Management:      Lab findings during this visit (only abnormal values will be noted, if no value noted then the result   was normal range):   Recent Results (from the past 48 hour(s))   CRP, HIGH SENSITIVITY    Collection Time: 10/21/19  7:05 AM   Result Value Ref Range    CRP, High sensitivity 1.8 mg/L   SED RATE, AUTOMATED    Collection Time: 10/21/19  7:05 AM   Result Value Ref Range    Sed rate, automated 34 (H) 0 - 30 mm/hr   CBC WITH AUTOMATED DIFF    Collection Time: 10/21/19  7:05 AM   Result Value Ref Range    WBC 6.3 4.3 - 11.1 K/uL    RBC 3.65 (L) 4.05 - 5.2 M/uL    HGB 10.3 (L) 11.7 - 15.4 g/dL    HCT 32.9 (L) 35.8 - 46.3 %    MCV 90.1 79.6 - 97.8 FL    MCH 28.2 26.1 - 32.9 PG    MCHC 31.3 (L) 31.4 - 35.0 g/dL    RDW 16.4 (H) 11.9 - 14.6 %    PLATELET 227 822 - 669 K/uL    MPV 9.6 9.4 - 12.3 FL    ABSOLUTE NRBC 0.00 0.0 - 0.2 K/uL    DF AUTOMATED      NEUTROPHILS 56 43 - 78 %    LYMPHOCYTES 35 13 - 44 %    MONOCYTES 8 4.0 - 12.0 %    EOSINOPHILS 1 0.5 - 7.8 %    BASOPHILS 1 0.0 - 2.0 %    IMMATURE GRANULOCYTES 0 0.0 - 5.0 %    ABS. NEUTROPHILS 3.5 1.7 - 8.2 K/UL    ABS. LYMPHOCYTES 2.2 0.5 - 4.6 K/UL    ABS. MONOCYTES 0.5 0.1 - 1.3 K/UL    ABS. EOSINOPHILS 0.1 0.0 - 0.8 K/UL    ABS. BASOPHILS 0.0 0.0 - 0.2 K/UL    ABS. IMM. GRANS. 0.0 0.0 - 0.5 K/UL   METABOLIC PANEL, COMPREHENSIVE    Collection Time: 10/21/19  7:05 AM   Result Value Ref Range    Sodium 139 136 - 145 mmol/L    Potassium 3.5 3.5 - 5.1 mmol/L    Chloride 107 98 - 107 mmol/L    CO2 26 21 - 32 mmol/L    Anion gap 6 (L) 7 - 16 mmol/L    Glucose 84 65 - 100 mg/dL    BUN 15 6 - 23 MG/DL    Creatinine 1.00 0.6 - 1.0 MG/DL    GFR est AA >60 >60 ml/min/1.73m2    GFR est non-AA >60 >60 ml/min/1.73m2    Calcium 9.0 8.3 - 10.4 MG/DL    Bilirubin, total 0.3 0.2 - 1.1 MG/DL    ALT (SGPT) 28 12 - 65 U/L    AST (SGOT) 32 15 - 37 U/L    Alk.  phosphatase 108 50 - 136 U/L    Protein, total 7.4 6.3 - 8.2 g/dL    Albumin 3.7 3.5 - 5.0 g/dL    Globulin 3.7 (H) 2.3 - 3.5 g/dL    A-G Ratio 1.0 (L) 1.2 - 3.5         Radiology studies during this visit: Ct Head Wo Cont    Result Date: 10/21/2019  Impression: No CT evidence of acute intracranial abnormality. Medications given in the ED:   Medications   metoclopramide HCl (REGLAN) injection 10 mg (10 mg IntraVENous Given 10/21/19 0913)   0.9% sodium chloride infusion 500 mL (0 mL IntraVENous IV Completed 10/21/19 2135)       Recheck and Additional Documentation:  (use .addrecheck  . addsepsis   . addstroke   . addhip  . addhandoff  . addcctime)     Looks well head improved, d/c home     Procedure Documentation:   Procedures     Other ED Course Notes:        Assessment and Plan:      Impression:     ICD-10-CM ICD-9-CM   1.  Nonintractable headache, unspecified chronicity pattern, unspecified headache type R51 784.0       Disposition:      Follow-up:   Follow-up Information     Follow up With Specialties Details Why Contact Info    Angelica Jimenez MD [facility]   [address] [address] [phone]      [facility] EMERGENCY DEPT Emergency Medicine   [place] [id number] [phone]          Discharge Medications:   Discharge Medication List as of 10/21/2019 10:42 AM          Past Medical History:      Past Medical History:   Diagnosis Date    Anemia     blood transfusion 5/2018 per pt    Arrhythmia     palpitations, moderate mitral valve regurge    Arthritis     lower back osteo    Asthma     uses inhalers    Cardiomyopathy     ECHO 11/2017    CHF (congestive heart failure) ([address]     EF 30-35% last echo 7/2019-- followed by dr Perry Chun Chronic pain 2010    Coagulation defect ([address]     eliquis    COPD     nebulizer daily and maint inhalers, can not climb 1 flight of stairs (also CHF)  oxygen 3 LPM qhs    Decreased cardiac ejection fraction 11/27/2017    EF 45-50% per echo 11/27/17    Depression     controlled      Diverticulitis     GERD (gastroesophageal reflux disease)     controlled with med     Heart murmur     Hypertension     managed with meds  IBS (irritable bowel syndrome)     IC (interstitial cystitis)     Insomnia     Migraine with aura and without status migrainosus, not intractable 6/17/2016    Mitral valve regurgitation, rheumatic     followed Dr. Lee Ann Booth Moderate aortic regurgitation     per echo 7/2019 in cc-- followed by dr Eloy Chi Palpitations 5/16/2016    Psychiatric disorder     anxiety    Requires oxygen therapy     3l/min at hs. prn during the days as needed    Rheumatic aortic insufficiency 5/16/2016    Rheumatic fever as child    pt reports rheumatic fever in childhood    Smoker     started age 23-- smoked 0.5 ppd until 2018-- cut back to 10-2 cig daily per pt    Stroke ([address]     \"mild stroke\" pt reports 2014- \"left sided weakness and balance is off\"     Thromboembolus ([address]     BLE 2012    Vitamin D deficiency      Past Surgical History:   Procedure Laterality Date    BIOPSY OF BREAST, INCISIONAL Left     lymph node , left, patient states her bx neg, but high risk    COLONOSCOPY      8 polyps removed, diverticulitis    COLONOSCOPY N/A 5/21/2018    COLONOSCOPY performed by Castillo Henderson MD at [address]  8-23-11    bladder dilitation    EGD      HX APPENDECTOMY  2006    HX HEART CATHETERIZATION  5/27/2011    no blockages, no intervention    HX HEART CATHETERIZATION  04/2017    no intervention    HX LAP CHOLECYSTECTOMY  6/6/2011    HX ORTHOPAEDIC Right 07/2019    4th toe -- surg repair    HX NATASHA AND BSO  2004    fibroid tumors, hyst    HX UROLOGICAL  01/2018    cystoscopy with hydrodistention of bladder multiple     Social History     Tobacco Use    Smoking status: Former Smoker     Packs/day: 0.25     Years: 30.00     Pack years: 7.50    Smokeless tobacco: Never Used   Substance Use Topics    Alcohol use: No    Drug use: Yes     Types: Cocaine     Comment: last used 11/2018     Prior to Admission Medications   Prescriptions Last Dose Informant Patient Reported? Taking?    EPINEPHrine (EPIPEN JR) 0.15 mg/0.3 mL injection   Yes No   Si.15 mg by IntraMUSCular route once as needed. FLUoxetine (PROZAC) 20 mg capsule   Yes No   Sig: Take 40 mg by mouth daily. Nebulizer Accessories kit   No No   Sig: Use tid   Oxygen   Yes No   Sig: nightly. Indications: 3l/min at hs and prn during the day   albuterol (PROVENTIL HFA, VENTOLIN HFA, PROAIR HFA) 90 mcg/actuation inhaler   No No   Sig: Take 2 Puffs by inhalation every six (6) hours as needed for Wheezing. apixaban (ELIQUIS) 5 mg tablet   No No   Sig: Take 1 Tab by mouth two (2) times a day. Patient taking differently: Take 5 mg by mouth two (2) times a day. Stopped 19 per dr Saundra Kanner   budesonide-formoterol [facility]) 160-4.5 mcg/actuation HFAA   No No   Sig: Take 1 Puff by inhalation two (2) times a day. carvedilol (COREG) 12.5 mg tablet   No No   Sig: Take 0.5 Tabs by mouth two (2) times a day. clonazePAM (KLONOPIN) 0.5 mg tablet   No No   Sig: TAKE 1 TABLET BY MOUTH 3 TIMES A DAY. MAX 3/DAY   cyclobenzaprine (FLEXERIL) 10 mg tablet   No No   Sig: Take 1 Tab by mouth three (3) times daily as needed for Muscle Spasm(s). diphenhydrAMINE (BANOPHEN) 50 mg capsule   Yes No   Sig: Take 50 mg by mouth every six (6) hours as needed. estradiol (ESTRACE) 0.5 mg tablet   No No   Sig: Take 1 Tab by mouth daily. Can d/c the estradiol patches   ferrous gluconate 324 mg (37.5 mg iron) tablet   No No   Sig: Take 1 Tab by mouth two (2) times a day. furosemide (LASIX) 40 mg tablet   No No   Sig: Take 1 Tab by mouth daily. gabapentin (NEURONTIN) 400 mg capsule   Yes No   Sig: Take 400 mg by mouth two (2) times a day.   hydrOXYzine pamoate (VISTARIL) 25 mg capsule   Yes No   Sig: Take 1 Cap by mouth two (2) times daily as needed. mirtazapine (REMERON) 15 mg tablet   No No   Sig: Take 1 Tab by mouth nightly. nitroglycerin (NITROSTAT) 0.4 mg SL tablet   No No   Si Tab by SubLINGual route every five (5) minutes as needed for Chest Pain. oxybutynin (DITROPAN) 5 mg tablet   No No   Sig: Take 1 Tab by mouth two (2) times a day. pantoprazole (PROTONIX) 40 mg tablet   No No   Sig: Take 1 Tab by mouth daily. Indications: morning   pravastatin (PRAVACHOL) 40 mg tablet   No No   Sig: Take 1 Tab by mouth nightly. promethazine (PHENERGAN) 25 mg tablet   No No   Sig: Take 1 Tab by mouth every six (6) hours as needed for Nausea. Indications: inflammation of the nose due to an allergy   sacubitril-valsartan (ENTRESTO) 24 mg/26 mg tablet   No No   Sig: Take 1 Tab by mouth two (2) times a day. spironolactone (ALDACTONE) 25 mg tablet   No No   Sig: Take 1 Tab by mouth daily. tiotropium (SPIRIVA WITH HANDIHALER) 18 mcg inhalation capsule   No No   Sig: Take 1 Cap by inhalation daily.       Facility-Administered Medications: None

## 2019-10-21 NOTE — ED NOTES
I have reviewed discharge instructions with the patient. The patient verbalized understanding. Patient left ED via Discharge Method: ambulatory to Home with self    Opportunity for questions and clarification provided. Patient given 0 scripts. To continue your aftercare when you leave the hospital, you may receive an automated call from our care team to check in on how you are doing. This is a free service and part of our promise to provide the best care and service to meet your aftercare needs.  If you have questions, or wish to unsubscribe from this service please call [phone]. Thank you for Choosing our [facility] Emergency Department.

## 2019-10-27 ENCOUNTER — HOSPITAL ENCOUNTER (EMERGENCY)
Age: 54
Discharge: HOME | End: 2019-10-27
Attending: EMERGENCY MEDICINE
Payer: COMMERCIAL

## 2019-10-27 ENCOUNTER — APPOINTMENT (OUTPATIENT)
Dept: GENERAL RADIOLOGY | Age: 54
End: 2019-10-27
Attending: EMERGENCY MEDICINE
Payer: COMMERCIAL

## 2019-10-27 VITALS
HEART RATE: 82 BPM | SYSTOLIC BLOOD PRESSURE: 123 MMHG | HEIGHT: 64 IN | TEMPERATURE: 98 F | BODY MASS INDEX: 32.44 KG/M2 | DIASTOLIC BLOOD PRESSURE: 77 MMHG | OXYGEN SATURATION: 97 % | RESPIRATION RATE: 18 BRPM | WEIGHT: 190 LBS

## 2019-10-27 DIAGNOSIS — M79.674 PAIN IN RIGHT TOE(S): Primary | ICD-10-CM

## 2019-10-27 LAB
ALBUMIN SERPL-MCNC: 3.4 G/DL (ref 3.5–5)
ALBUMIN/GLOB SERPL: 0.9 {RATIO} (ref 1.2–3.5)
ALP SERPL-CCNC: 110 U/L (ref 50–136)
ALT SERPL-CCNC: 37 U/L (ref 12–65)
ANION GAP SERPL CALC-SCNC: 4 MMOL/L (ref 7–16)
AST SERPL-CCNC: 42 U/L (ref 15–37)
BASOPHILS # BLD: 0 K/UL (ref 0–0.2)
BASOPHILS NFR BLD: 1 % (ref 0–2)
BILIRUB SERPL-MCNC: 0.3 MG/DL (ref 0.2–1.1)
BUN SERPL-MCNC: 13 MG/DL (ref 6–23)
CALCIUM SERPL-MCNC: 9.2 MG/DL (ref 8.3–10.4)
CHLORIDE SERPL-SCNC: 110 MMOL/L (ref 98–107)
CO2 SERPL-SCNC: 26 MMOL/L (ref 21–32)
CREAT SERPL-MCNC: 0.92 MG/DL (ref 0.6–1)
CRP SERPL-MCNC: 0.3 MG/DL (ref 0–0.9)
DIFFERENTIAL METHOD BLD: ABNORMAL
EOSINOPHIL # BLD: 0.1 K/UL (ref 0–0.8)
EOSINOPHIL NFR BLD: 2 % (ref 0.5–7.8)
ERYTHROCYTE [DISTWIDTH] IN BLOOD BY AUTOMATED COUNT: 16.6 % (ref 11.9–14.6)
GLOBULIN SER CALC-MCNC: 3.8 G/DL (ref 2.3–3.5)
GLUCOSE SERPL-MCNC: 87 MG/DL (ref 65–100)
HCT VFR BLD AUTO: 32.1 % (ref 35.8–46.3)
HGB BLD-MCNC: 10.2 G/DL (ref 11.7–15.4)
IMM GRANULOCYTES # BLD AUTO: 0 K/UL (ref 0–0.5)
IMM GRANULOCYTES NFR BLD AUTO: 0 % (ref 0–5)
LACTATE BLD-SCNC: 0.92 MMOL/L (ref 0.5–1.9)
LYMPHOCYTES # BLD: 1.7 K/UL (ref 0.5–4.6)
LYMPHOCYTES NFR BLD: 37 % (ref 13–44)
MCH RBC QN AUTO: 29.1 PG (ref 26.1–32.9)
MCHC RBC AUTO-ENTMCNC: 31.8 G/DL (ref 31.4–35)
MCV RBC AUTO: 91.5 FL (ref 79.6–97.8)
MONOCYTES # BLD: 0.4 K/UL (ref 0.1–1.3)
MONOCYTES NFR BLD: 9 % (ref 4–12)
NEUTS SEG # BLD: 2.3 K/UL (ref 1.7–8.2)
NEUTS SEG NFR BLD: 51 % (ref 43–78)
NRBC # BLD: 0 K/UL (ref 0–0.2)
PLATELET # BLD AUTO: 266 K/UL (ref 150–450)
PMV BLD AUTO: 9.8 FL (ref 9.4–12.3)
POTASSIUM SERPL-SCNC: 3.7 MMOL/L (ref 3.5–5.1)
PROT SERPL-MCNC: 7.2 G/DL (ref 6.3–8.2)
RBC # BLD AUTO: 3.51 M/UL (ref 4.05–5.2)
SODIUM SERPL-SCNC: 140 MMOL/L (ref 136–145)
WBC # BLD AUTO: 4.6 K/UL (ref 4.3–11.1)

## 2019-10-27 PROCEDURE — 77030013175 HC SHOE PSTOP DJOR -A

## 2019-10-27 PROCEDURE — 86140 C-REACTIVE PROTEIN: CPT

## 2019-10-27 PROCEDURE — 73630 X-RAY EXAM OF FOOT: CPT

## 2019-10-27 PROCEDURE — 74011250637 HC RX REV CODE- 250/637: Performed by: PHYSICIAN ASSISTANT

## 2019-10-27 PROCEDURE — 83605 ASSAY OF LACTIC ACID: CPT

## 2019-10-27 PROCEDURE — 87040 BLOOD CULTURE FOR BACTERIA: CPT

## 2019-10-27 PROCEDURE — 80053 COMPREHEN METABOLIC PANEL: CPT

## 2019-10-27 PROCEDURE — 85025 COMPLETE CBC W/AUTO DIFF WBC: CPT

## 2019-10-27 PROCEDURE — 99284 EMERGENCY DEPT VISIT MOD MDM: CPT | Performed by: PHYSICIAN ASSISTANT

## 2019-10-27 RX ORDER — HYDROCODONE BITARTRATE AND ACETAMINOPHEN 5; 325 MG/1; MG/1
1 TABLET ORAL
Status: COMPLETED | OUTPATIENT
Start: 2019-10-27 | End: 2019-10-27

## 2019-10-27 RX ADMIN — HYDROCODONE BITARTRATE AND ACETAMINOPHEN 1 TABLET: 5; 325 TABLET ORAL at 12:31

## 2019-10-27 NOTE — DISCHARGE INSTRUCTIONS

## 2019-10-27 NOTE — ED TRIAGE NOTES
Pt walks into triage with c/o of broken toes. Pt states she hit her toes on the corner of her wood cabinets. Pt is able to ambulate on toes.  Pt also fell on resty metal on hand

## 2019-10-27 NOTE — ED NOTES
I have reviewed discharge instructions with the patient. The patient verbalized understanding. Patient left ED via Discharge Method: ambulatory to Home with (insert name of family/friend, self, transport). Opportunity for questions and clarification provided. Patient given 0 scripts. To continue your aftercare when you leave the hospital, you may receive an automated call from our care team to check in on how you are doing. This is a free service and part of our promise to provide the best care and service to meet your aftercare needs.  If you have questions, or wish to unsubscribe from this service please call [phone]. Thank you for Choosing our [facility] Emergency Department.

## 2019-10-27 NOTE — ED PROVIDER NOTES
Patient is here with right fourth and fifth toe pain. She states she ran into a piece of wood furniture at her home yesterday as she was walking. She had surgery on that fourth toe about 4 months ago by Dr. Paola Martinez. She feels like there is swelling to the area. She did ambulate to the room without difficulty. No fever, nausea, vomiting, chest pain, shortness of breath, abdominal pain, dizziness, weakness, dyspnea on exertion, orthopnea, swelling/tingling or weakness to the arms or legs or other new symptoms. The history is provided by the patient. Toe Pain    This is a new problem. The current episode started yesterday. The problem occurs constantly. The problem has not changed since onset. The pain is present in the right toes. The quality of the pain is described as aching. The pain is at a severity of 9/10. The pain is moderate. Associated symptoms include limited range of motion and stiffness. Pertinent negatives include no numbness, no tingling, no itching, no back pain and no neck pain. The symptoms are aggravated by movement, contact and activity. She has tried nothing for the symptoms. There has been a history of trauma.         Past Medical History:   Diagnosis Date    Anemia     blood transfusion 5/2018 per pt    Arrhythmia     palpitations, moderate mitral valve regurge    Arthritis     lower back osteo    Asthma     uses inhalers    Cardiomyopathy     ECHO 11/2017    CHF (congestive heart failure) (HCC)     EF 30-35% last echo 7/2019-- followed by dr Rosa Villarreal Chronic pain 2010    Coagulation defect ([address]     eliquis    COPD     nebulizer daily and maint inhalers, can not climb 1 flight of stairs (also CHF)  oxygen 3 LPM qhs    Decreased cardiac ejection fraction 11/27/2017    EF 45-50% per echo 11/27/17    Depression     controlled      Diverticulitis     GERD (gastroesophageal reflux disease)     controlled with med     Heart murmur     Hypertension     managed with meds    IBS (irritable bowel syndrome)     IC (interstitial cystitis)     Insomnia     Migraine with aura and without status migrainosus, not intractable 6/17/2016    Mitral valve regurgitation, rheumatic     followed Dr. Robbie Rojo Moderate aortic regurgitation     per echo 7/2019 in [facility]-- followed by dr Sousa Mercury Palpitations 5/16/2016    Psychiatric disorder     anxiety    Requires oxygen therapy     3l/min at hs. prn during the days as needed    Rheumatic aortic insufficiency 5/16/2016    Rheumatic fever as child    pt reports rheumatic fever in childhood    Smoker     started age 23-- smoked 0.5 ppd until 2018-- cut back to 10-2 cig daily per pt    Stroke ([address]     \"mild stroke\" pt reports 2014- \"left sided weakness and balance is off\"     Thromboembolus ([address]     BLE 2012    Vitamin D deficiency        Past Surgical History:   Procedure Laterality Date    BIOPSY OF BREAST, INCISIONAL Left     lymph node , left, patient states her bx neg, but high risk    COLONOSCOPY      8 polyps removed, diverticulitis    COLONOSCOPY N/A 5/21/2018    COLONOSCOPY performed by Cuauhtemoc Caro MD at [address]  8-23-11    bladder dilitation    EGD      HX APPENDECTOMY  2006    HX HEART CATHETERIZATION  5/27/2011    no blockages, no intervention    HX HEART CATHETERIZATION  04/2017    no intervention    HX LAP CHOLECYSTECTOMY  6/6/2011    HX ORTHOPAEDIC Right 07/2019    4th toe -- surg repair    HX NATASHA AND BSO  2004    fibroid tumors, hyst    HX UROLOGICAL  01/2018    cystoscopy with hydrodistention of bladder multiple         Family History:   Problem Relation Age of Onset    Heart Failure Father 76        chf    Heart Disease Father     Diabetes Sister     Thyroid Disease Sister        Social History     Socioeconomic History    Marital status: SINGLE     Spouse name: Not on file    Number of children: Not on file    Years of education: Not on file    Highest education level: Not on file Occupational History    Not on file   Social Needs    Financial resource strain: Not on file    Food insecurity:     Worry: Not on file     Inability: Not on file    Transportation needs:     Medical: Not on file     Non-medical: Not on file   Tobacco Use    Smoking status: Former Smoker     Packs/day: 0.25     Years: 30.00     Pack years: 7.50    Smokeless tobacco: Never Used   Substance and Sexual Activity    Alcohol use: No    Drug use: Yes     Types: Cocaine     Comment: last used 11/2018    Sexual activity: Not on file   Lifestyle    Physical activity:     Days per week: Not on file     Minutes per session: Not on file    Stress: Not on file   Relationships    Social connections:     Talks on phone: Not on file     Gets together: Not on file     Attends Gnosticism service: Not on file     Active member of club or organization: Not on file     Attends meetings of clubs or organizations: Not on file     Relationship status: Not on file    Intimate partner violence:     Fear of current or ex partner: Not on file     Emotionally abused: Not on file     Physically abused: Not on file     Forced sexual activity: Not on file   Other Topics Concern    Not on file   Social History Narrative    Not on file         ALLERGIES: Aspirin; Bentyl [dicyclomine]; Toradol [ketorolac]; Ultram [tramadol]; and Zofran [ondansetron hcl (pf)]    Review of Systems   Constitutional: Negative. HENT: Negative. Eyes: Negative. Respiratory: Negative. Cardiovascular: Negative. Gastrointestinal: Negative. Genitourinary: Negative. Musculoskeletal: Positive for stiffness. Negative for back pain and neck pain. Right 4th toe pain   Skin: Negative. Negative for itching. Neurological: Negative. Negative for tingling and numbness. Psychiatric/Behavioral: Negative. All other systems reviewed and are negative.       Vitals:    10/27/19 1058   BP: 120/80   Pulse: 90   Resp: 16   Temp: 98 °F (36.7 °C) SpO2: 96%   Weight: 86.2 kg (190 lb)   Height: 5' 4\" (1.626 m)            Physical Exam   Constitutional: She is oriented to person, place, and time. She appears well-developed and well-nourished. HENT:   Head: Normocephalic and atraumatic. Right Ear: External ear normal.   Left Ear: External ear normal.   Nose: Nose normal.   Mouth/Throat: Oropharynx is clear and moist.   Eyes: Pupils are equal, round, and reactive to light. Conjunctivae and EOM are normal.   Neck: Normal range of motion. Neck supple. Cardiovascular: Normal rate, regular rhythm, normal heart sounds and intact distal pulses. Pulmonary/Chest: Effort normal and breath sounds normal.   Abdominal: Soft. Bowel sounds are normal.   Musculoskeletal:        Feet:    Neurological: She is alert and oriented to person, place, and time. She has normal reflexes. Skin: Skin is warm and dry. Psychiatric: She has a normal mood and affect. Her behavior is normal. Judgment and thought content normal.   Nursing note and vitals reviewed. MDM  Number of Diagnoses or Management Options     Amount and/or Complexity of Data Reviewed  Clinical lab tests: ordered and reviewed  Tests in the radiology section of CPT®: reviewed    Risk of Complications, Morbidity, and/or Mortality  Presenting problems: moderate  Diagnostic procedures: moderate  Management options: moderate           Procedures      The patient was observed in the ED.     Results Reviewed:      Recent Results (from the past 24 hour(s))   CBC WITH AUTOMATED DIFF    Collection Time: 10/27/19 12:10 PM   Result Value Ref Range    WBC 4.6 4.3 - 11.1 K/uL    RBC 3.51 (L) 4.05 - 5.2 M/uL    HGB 10.2 (L) 11.7 - 15.4 g/dL    HCT 32.1 (L) 35.8 - 46.3 %    MCV 91.5 79.6 - 97.8 FL    MCH 29.1 26.1 - 32.9 PG    MCHC 31.8 31.4 - 35.0 g/dL    RDW 16.6 (H) 11.9 - 14.6 %    PLATELET 251 976 - 542 K/uL    MPV 9.8 9.4 - 12.3 FL    ABSOLUTE NRBC 0.00 0.0 - 0.2 K/uL    DF AUTOMATED      NEUTROPHILS 51 43 - 78 % LYMPHOCYTES 37 13 - 44 %    MONOCYTES 9 4.0 - 12.0 %    EOSINOPHILS 2 0.5 - 7.8 %    BASOPHILS 1 0.0 - 2.0 %    IMMATURE GRANULOCYTES 0 0.0 - 5.0 %    ABS. NEUTROPHILS 2.3 1.7 - 8.2 K/UL    ABS. LYMPHOCYTES 1.7 0.5 - 4.6 K/UL    ABS. MONOCYTES 0.4 0.1 - 1.3 K/UL    ABS. EOSINOPHILS 0.1 0.0 - 0.8 K/UL    ABS. BASOPHILS 0.0 0.0 - 0.2 K/UL    ABS. IMM. GRANS. 0.0 0.0 - 0.5 K/UL   METABOLIC PANEL, COMPREHENSIVE    Collection Time: 10/27/19 12:10 PM   Result Value Ref Range    Sodium 140 136 - 145 mmol/L    Potassium 3.7 3.5 - 5.1 mmol/L    Chloride 110 (H) 98 - 107 mmol/L    CO2 26 21 - 32 mmol/L    Anion gap 4 (L) 7 - 16 mmol/L    Glucose 87 65 - 100 mg/dL    BUN 13 6 - 23 MG/DL    Creatinine 0.92 0.6 - 1.0 MG/DL    GFR est AA >60 >60 ml/min/1.73m2    GFR est non-AA >60 >60 ml/min/1.73m2    Calcium 9.2 8.3 - 10.4 MG/DL    Bilirubin, total 0.3 0.2 - 1.1 MG/DL    ALT (SGPT) 37 12 - 65 U/L    AST (SGOT) 42 (H) 15 - 37 U/L    Alk. phosphatase 110 50 - 136 U/L    Protein, total 7.2 6.3 - 8.2 g/dL    Albumin 3.4 (L) 3.5 - 5.0 g/dL    Globulin 3.8 (H) 2.3 - 3.5 g/dL    A-G Ratio 0.9 (L) 1.2 - 3.5     C REACTIVE PROTEIN, QT    Collection Time: 10/27/19 12:10 PM   Result Value Ref Range    C-Reactive protein 0.3 0.0 - 0.9 mg/dL   POC LACTIC ACID    Collection Time: 10/27/19 12:17 PM   Result Value Ref Range    Lactic Acid (POC) 0.92 0.5 - 1.9 mmol/L     XR FOOT RT MIN 3 V   Final Result   IMPRESSION:    1. Fourth proximal phalanx erosion as above. 2. No acute fracture identified. 1:47 PM Spoke with Dr. Evy Powell, he reviewed the patient's XR, that with the blood work do not indicate osteomyelitis at this time. He would like her to follow-up as an outpatient with Dr. Diane Dickson. We can corey tape her toes and put a hard soled shoe on her foot. Patient is stable for discharge and ambulatory out of the ER without difficulty.   I discussed the results of all labs, procedures, radiographs, and treatments with the patient and available family. Treatment plan is agreed upon and the patient is ready for discharge. All voiced understanding of the discharge plan and medication instructions or changes as appropriate. Questions about treatment in the ED were answered. All were encouraged to return should symptoms worsen or new problems develop.

## 2019-10-28 ENCOUNTER — APPOINTMENT (OUTPATIENT)
Dept: CT IMAGING | Age: 54
End: 2019-10-28
Attending: EMERGENCY MEDICINE
Payer: COMMERCIAL

## 2019-10-28 ENCOUNTER — HOSPITAL ENCOUNTER (EMERGENCY)
Age: 54
Discharge: LEFT BEFORE DISCHARGE | End: 2019-10-28
Attending: EMERGENCY MEDICINE
Payer: COMMERCIAL

## 2019-10-28 VITALS
DIASTOLIC BLOOD PRESSURE: 62 MMHG | HEART RATE: 81 BPM | OXYGEN SATURATION: 97 % | TEMPERATURE: 97.5 F | RESPIRATION RATE: 17 BRPM | SYSTOLIC BLOOD PRESSURE: 109 MMHG

## 2019-10-28 DIAGNOSIS — S00.83XA FACIAL CONTUSION, INITIAL ENCOUNTER: ICD-10-CM
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
## 2019-10-28 LAB
ALBUMIN SERPL-MCNC: 3.1 G/DL (ref 3.5–5)
ALBUMIN/GLOB SERPL: 0.8 {RATIO} (ref 1.2–3.5)
ALP SERPL-CCNC: 102 U/L (ref 50–136)
ALT SERPL-CCNC: 34 U/L (ref 12–65)
ANION GAP SERPL CALC-SCNC: 6 MMOL/L (ref 7–16)
AST SERPL-CCNC: 43 U/L (ref 15–37)
ATRIAL RATE: 78 BPM
BASOPHILS # BLD: 0 K/UL (ref 0–0.2)
BASOPHILS NFR BLD: 0 % (ref 0–2)
BILIRUB SERPL-MCNC: 0.3 MG/DL (ref 0.2–1.1)
BNP SERPL-MCNC: 71 PG/ML
BUN SERPL-MCNC: 14 MG/DL (ref 6–23)
CALCIUM SERPL-MCNC: 8.7 MG/DL (ref 8.3–10.4)
CALCULATED P AXIS, ECG09: 64 DEGREES
CALCULATED R AXIS, ECG10: 53 DEGREES
CALCULATED T AXIS, ECG11: -29 DEGREES
CHLORIDE SERPL-SCNC: 109 MMOL/L (ref 98–107)
CO2 SERPL-SCNC: 25 MMOL/L (ref 21–32)
CREAT SERPL-MCNC: 1.05 MG/DL (ref 0.6–1)
DIAGNOSIS, 93000: NORMAL
DIFFERENTIAL METHOD BLD: ABNORMAL
EOSINOPHIL # BLD: 0.1 K/UL (ref 0–0.8)
EOSINOPHIL NFR BLD: 1 % (ref 0.5–7.8)
ERYTHROCYTE [DISTWIDTH] IN BLOOD BY AUTOMATED COUNT: 16.9 % (ref 11.9–14.6)
GLOBULIN SER CALC-MCNC: 3.8 G/DL (ref 2.3–3.5)
GLUCOSE SERPL-MCNC: 102 MG/DL (ref 65–100)
HCT VFR BLD AUTO: 32.6 % (ref 35.8–46.3)
HGB BLD-MCNC: 10.1 G/DL (ref 11.7–15.4)
IMM GRANULOCYTES # BLD AUTO: 0 K/UL (ref 0–0.5)
IMM GRANULOCYTES NFR BLD AUTO: 0 % (ref 0–5)
LYMPHOCYTES # BLD: 2.1 K/UL (ref 0.5–4.6)
LYMPHOCYTES NFR BLD: 42 % (ref 13–44)
MCH RBC QN AUTO: 28.6 PG (ref 26.1–32.9)
MCHC RBC AUTO-ENTMCNC: 31 G/DL (ref 31.4–35)
MCV RBC AUTO: 92.4 FL (ref 79.6–97.8)
MONOCYTES # BLD: 0.5 K/UL (ref 0.1–1.3)
MONOCYTES NFR BLD: 10 % (ref 4–12)
NEUTS SEG # BLD: 2.4 K/UL (ref 1.7–8.2)
NEUTS SEG NFR BLD: 47 % (ref 43–78)
NRBC # BLD: 0 K/UL (ref 0–0.2)
P-R INTERVAL, ECG05: 144 MS
PLATELET # BLD AUTO: 251 K/UL (ref 150–450)
PMV BLD AUTO: 9.8 FL (ref 9.4–12.3)
POTASSIUM SERPL-SCNC: 3.7 MMOL/L (ref 3.5–5.1)
PROT SERPL-MCNC: 6.9 G/DL (ref 6.3–8.2)
Q-T INTERVAL, ECG07: 410 MS
QRS DURATION, ECG06: 84 MS
QTC CALCULATION (BEZET), ECG08: 467 MS
RBC # BLD AUTO: 3.53 M/UL (ref 4.05–5.2)
SODIUM SERPL-SCNC: 140 MMOL/L (ref 136–145)
TROPONIN I BLD-MCNC: 0 NG/ML (ref 0.02–0.05)
VENTRICULAR RATE, ECG03: 78 BPM
WBC # BLD AUTO: 5 K/UL (ref 4.3–11.1)

## 2019-10-28 PROCEDURE — 99284 EMERGENCY DEPT VISIT MOD MDM: CPT | Performed by: EMERGENCY MEDICINE

## 2019-10-28 PROCEDURE — 85025 COMPLETE CBC W/AUTO DIFF WBC: CPT

## 2019-10-28 PROCEDURE — 80053 COMPREHEN METABOLIC PANEL: CPT

## 2019-10-28 PROCEDURE — 93005 ELECTROCARDIOGRAM TRACING: CPT | Performed by: EMERGENCY MEDICINE

## 2019-10-28 PROCEDURE — 83880 ASSAY OF NATRIURETIC PEPTIDE: CPT

## 2019-10-28 PROCEDURE — 84484 ASSAY OF TROPONIN QUANT: CPT

## 2019-10-28 RX ORDER — METOCLOPRAMIDE HYDROCHLORIDE 5 MG/ML
10 INJECTION INTRAMUSCULAR; INTRAVENOUS
Status: DISCONTINUED | OUTPATIENT
Start: 2019-10-28 | End: 2019-10-28 | Stop reason: HOSPADM

## 2019-10-28 NOTE — ED NOTES
Went into room to collect urine on patient. Pt states that she just got a call about a death in the family and that she has to go. Pt removed monitoring equipment and put on shoes. Had pt sign AMA form after explaining that she needed to let us finishing assessing her. Pt signed form and left.

## 2019-10-28 NOTE — ED PROVIDER NOTES
The history is provided by the patient. Fall   The accident occurred 3 to 5 hours ago. The fall occurred while walking. She fell from a height of ground level. She landed on carpet (Side table). The volume of blood lost was minimal. The point of impact was the head. The pain is present in the head. The pain is at a severity of 10/10. She was ambulatory at the scene. There was no entrapment after the fall. There was no drug use involved in the accident. There was no alcohol use involved in the accident. Associated symptoms include nausea, vomiting, headaches and loss of consciousness. Pertinent negatives include no visual change, no fever, no numbness, no abdominal pain, no bowel incontinence, no hematuria, no extremity weakness, no hearing loss, no tingling and no laceration. The risk factors include cardiac disease. The symptoms are aggravated by pressure on injury. She has tried rest for the symptoms. The treatment provided no relief. It is unknown when the patient last had a tetanus shot.         Past Medical History:   Diagnosis Date    Anemia     blood transfusion 5/2018 per pt    Arrhythmia     palpitations, moderate mitral valve regurge    Arthritis     lower back osteo    Asthma     uses inhalers    Cardiomyopathy     ECHO 11/2017    CHF (congestive heart failure) (HCC)     EF 30-35% last echo 7/2019-- followed by dr Rosa Villarreal Chronic pain 2010    Coagulation defect ([address]     eliquis    COPD     nebulizer daily and maint inhalers, can not climb 1 flight of stairs (also CHF)  oxygen 3 LPM qhs    Decreased cardiac ejection fraction 11/27/2017    EF 45-50% per echo 11/27/17    Depression     controlled      Diverticulitis     GERD (gastroesophageal reflux disease)     controlled with med     Heart murmur     Hypertension     managed with meds    IBS (irritable bowel syndrome)     IC (interstitial cystitis)     Insomnia     Migraine with aura and without status migrainosus, not intractable 6/17/2016    Mitral valve regurgitation, rheumatic     followed Dr. Dayan Hoyt Moderate aortic regurgitation     per echo 7/2019 in [facility]-- followed by dr Kate Trammell Palpitations 5/16/2016    Psychiatric disorder     anxiety    Requires oxygen therapy     3l/min at hs. prn during the days as needed    Rheumatic aortic insufficiency 5/16/2016    Rheumatic fever as child    pt reports rheumatic fever in childhood    Smoker     started age 23-- smoked 0.5 ppd until 2018-- cut back to 10-2 cig daily per pt    Stroke ([address]     \"mild stroke\" pt reports 2014- \"left sided weakness and balance is off\"     Thromboembolus ([address]     BLE 2012    Vitamin D deficiency        Past Surgical History:   Procedure Laterality Date    BIOPSY OF BREAST, INCISIONAL Left     lymph node , left, patient states her bx neg, but high risk    COLONOSCOPY      8 polyps removed, diverticulitis    COLONOSCOPY N/A 5/21/2018    COLONOSCOPY performed by Brynn Marie MD at [address]  8-23-11    bladder dilitation    EGD      HX APPENDECTOMY  2006    HX HEART CATHETERIZATION  5/27/2011    no blockages, no intervention    HX HEART CATHETERIZATION  04/2017    no intervention    HX LAP CHOLECYSTECTOMY  6/6/2011    HX ORTHOPAEDIC Right 07/2019    4th toe -- surg repair    HX NATASHA AND BSO  2004    fibroid tumors, hyst    HX UROLOGICAL  01/2018    cystoscopy with hydrodistention of bladder multiple         Family History:   Problem Relation Age of Onset    Heart Failure Father 76        chf    Heart Disease Father     Diabetes Sister     Thyroid Disease Sister        Social History     Socioeconomic History    Marital status: SINGLE     Spouse name: Not on file    Number of children: Not on file    Years of education: Not on file    Highest education level: Not on file   Occupational History    Not on file   Social Needs    Financial resource strain: Not on file    Food insecurity:     Worry: Not on file Inability: Not on file    Transportation needs:     Medical: Not on file     Non-medical: Not on file   Tobacco Use    Smoking status: Former Smoker     Packs/day: 0.25     Years: 30.00     Pack years: 7.50    Smokeless tobacco: Never Used   Substance and Sexual Activity    Alcohol use: No    Drug use: Yes     Types: Cocaine     Comment: last used 11/2018    Sexual activity: Not on file   Lifestyle    Physical activity:     Days per week: Not on file     Minutes per session: Not on file    Stress: Not on file   Relationships    Social connections:     Talks on phone: Not on file     Gets together: Not on file     Attends Hindu service: Not on file     Active member of club or organization: Not on file     Attends meetings of clubs or organizations: Not on file     Relationship status: Not on file    Intimate partner violence:     Fear of current or ex partner: Not on file     Emotionally abused: Not on file     Physically abused: Not on file     Forced sexual activity: Not on file   Other Topics Concern    Not on file   Social History Narrative    Not on file         ALLERGIES: Aspirin; Bentyl [dicyclomine]; Toradol [ketorolac]; Ultram [tramadol]; and Zofran [ondansetron hcl (pf)]    Review of Systems   Constitutional: Negative for chills and fever. Gastrointestinal: Positive for nausea and vomiting. Negative for abdominal pain, bowel incontinence, constipation and diarrhea. Genitourinary: Negative for hematuria. Musculoskeletal: Negative for extremity weakness. Neurological: Positive for loss of consciousness and headaches. Negative for tingling and numbness. All other systems reviewed and are negative. Vitals:    10/28/19 0628 10/28/19 0636 10/28/19 0735 10/28/19 0742   BP: 113/72 102/59  109/62   Pulse: 83  81    Resp: 16  17    Temp: 97.5 °F (36.4 °C)      SpO2: 97%               Physical Exam   Constitutional: She is oriented to person, place, and time.  She appears well-developed and well-nourished. No distress. HENT:   Head: Normocephalic. Head is with abrasion and with contusion. Head is without raccoon's eyes and without Xavier's sign. Right Ear: External ear normal.   Left Ear: External ear normal.   Mouth/Throat: Oropharynx is clear and moist.   Eyes: Pupils are equal, round, and reactive to light. Conjunctivae and EOM are normal.   Neck: Trachea normal, normal range of motion and phonation normal. Neck supple. No spinous process tenderness and no muscular tenderness present. Cardiovascular: Normal rate, regular rhythm, normal heart sounds and intact distal pulses. Pulmonary/Chest: Effort normal and breath sounds normal.   Abdominal: Soft. Bowel sounds are normal. There is no tenderness. Musculoskeletal: Normal range of motion. She exhibits no edema. Neurological: She is alert and oriented to person, place, and time. She has normal strength. No cranial nerve deficit or sensory deficit. Coordination normal.   Skin: Skin is warm and dry. Capillary refill takes less than 2 seconds. No laceration noted. Psychiatric: She has a normal mood and affect. Her speech is normal and behavior is normal. Cognition and memory are normal.   Nursing note and vitals reviewed.        MDM  Number of Diagnoses or Management Options     Amount and/or Complexity of Data Reviewed  Clinical lab tests: ordered and reviewed  Tests in the radiology section of CPT®: ordered  Review and summarize past medical records: yes    Risk of Complications, Morbidity, and/or Mortality  Presenting problems: moderate  Management options: moderate    Patient Progress  Patient progress: stable         Procedures    The patient was observed in the ED. the patient told the medical student that she fell at 3 AM, she told me she fell at 11 PM and knew that she fell at 11 and looked her to watch again when she woke up and it was 11:05 PM.  She told the medical student she fell at 3 AM and her roommate found her and heard her fall. She told me her roommate could never hear her fall in that house and she was asked to call them if she wants some. Prior to evaluation of the minor head injury with a CT of the head due to the patient's complaint of headache and on Eliquis, the patient had to leave due to a death in the family. Patient was warned that if she did have blood upon her brain or around her brain she could die, and was instructed to return if symptoms worsened. On exam, the patient has minimal evidence of trauma, and I think she will be just fine. Results Reviewed:      Recent Results (from the past 24 hour(s))   CBC WITH AUTOMATED DIFF    Collection Time: 10/27/19 12:10 PM   Result Value Ref Range    WBC 4.6 4.3 - 11.1 K/uL    RBC 3.51 (L) 4.05 - 5.2 M/uL    HGB 10.2 (L) 11.7 - 15.4 g/dL    HCT 32.1 (L) 35.8 - 46.3 %    MCV 91.5 79.6 - 97.8 FL    MCH 29.1 26.1 - 32.9 PG    MCHC 31.8 31.4 - 35.0 g/dL    RDW 16.6 (H) 11.9 - 14.6 %    PLATELET 122 836 - 511 K/uL    MPV 9.8 9.4 - 12.3 FL    ABSOLUTE NRBC 0.00 0.0 - 0.2 K/uL    DF AUTOMATED      NEUTROPHILS 51 43 - 78 %    LYMPHOCYTES 37 13 - 44 %    MONOCYTES 9 4.0 - 12.0 %    EOSINOPHILS 2 0.5 - 7.8 %    BASOPHILS 1 0.0 - 2.0 %    IMMATURE GRANULOCYTES 0 0.0 - 5.0 %    ABS. NEUTROPHILS 2.3 1.7 - 8.2 K/UL    ABS. LYMPHOCYTES 1.7 0.5 - 4.6 K/UL    ABS. MONOCYTES 0.4 0.1 - 1.3 K/UL    ABS. EOSINOPHILS 0.1 0.0 - 0.8 K/UL    ABS. BASOPHILS 0.0 0.0 - 0.2 K/UL    ABS. IMM.  GRANS. 0.0 0.0 - 0.5 K/UL   METABOLIC PANEL, COMPREHENSIVE    Collection Time: 10/27/19 12:10 PM   Result Value Ref Range    Sodium 140 136 - 145 mmol/L    Potassium 3.7 3.5 - 5.1 mmol/L    Chloride 110 (H) 98 - 107 mmol/L    CO2 26 21 - 32 mmol/L    Anion gap 4 (L) 7 - 16 mmol/L    Glucose 87 65 - 100 mg/dL    BUN 13 6 - 23 MG/DL    Creatinine 0.92 0.6 - 1.0 MG/DL    GFR est AA >60 >60 ml/min/1.73m2    GFR est non-AA >60 >60 ml/min/1.73m2    Calcium 9.2 8.3 - 10.4 MG/DL    Bilirubin, total 0.3 0.2 - 1.1 MG/DL    ALT (SGPT) 37 12 - 65 U/L    AST (SGOT) 42 (H) 15 - 37 U/L    Alk. phosphatase 110 50 - 136 U/L    Protein, total 7.2 6.3 - 8.2 g/dL    Albumin 3.4 (L) 3.5 - 5.0 g/dL    Globulin 3.8 (H) 2.3 - 3.5 g/dL    A-G Ratio 0.9 (L) 1.2 - 3.5     C REACTIVE PROTEIN, QT    Collection Time: 10/27/19 12:10 PM   Result Value Ref Range    C-Reactive protein 0.3 0.0 - 0.9 mg/dL   POC LACTIC ACID    Collection Time: 10/27/19 12:17 PM   Result Value Ref Range    Lactic Acid (POC) 0.92 0.5 - 1.9 mmol/L   EKG, 12 LEAD, INITIAL    Collection Time: 10/28/19  6:42 AM   Result Value Ref Range    Ventricular Rate 78 BPM    Atrial Rate 78 BPM    P-R Interval 144 ms    QRS Duration 84 ms    Q-T Interval 410 ms    QTC Calculation (Bezet) 467 ms    Calculated P Axis 64 degrees    Calculated R Axis 53 degrees    Calculated T Axis -29 degrees    Diagnosis       !! AGE AND GENDER SPECIFIC ECG ANALYSIS !! Normal sinus rhythm  Cannot rule out Anteroseptal infarct , age undetermined  Abnormal ECG  When compared with ECG of 17-NOV-2014 22:11,  Minimal criteria for Anteroseptal infarct are now Present  ST now depressed in Inferior leads  Nonspecific T wave abnormality now evident in Lateral leads  QT has lengthened  Confirmed by MYRA CERVANTES (), TRISTAN HUTTON ([id number]) on 10/28/2019 7:48:55 AM     CBC WITH AUTOMATED DIFF    Collection Time: 10/28/19  6:53 AM   Result Value Ref Range    WBC 5.0 4.3 - 11.1 K/uL    RBC 3.53 (L) 4.05 - 5.2 M/uL    HGB 10.1 (L) 11.7 - 15.4 g/dL    HCT 32.6 (L) 35.8 - 46.3 %    MCV 92.4 79.6 - 97.8 FL    MCH 28.6 26.1 - 32.9 PG    MCHC 31.0 (L) 31.4 - 35.0 g/dL    RDW 16.9 (H) 11.9 - 14.6 %    PLATELET 161 172 - 437 K/uL    MPV 9.8 9.4 - 12.3 FL    ABSOLUTE NRBC 0.00 0.0 - 0.2 K/uL    DF AUTOMATED      NEUTROPHILS 47 43 - 78 %    LYMPHOCYTES 42 13 - 44 %    MONOCYTES 10 4.0 - 12.0 %    EOSINOPHILS 1 0.5 - 7.8 %    BASOPHILS 0 0.0 - 2.0 %    IMMATURE GRANULOCYTES 0 0.0 - 5.0 %    ABS. NEUTROPHILS 2.4 1.7 - 8.2 K/UL    ABS. LYMPHOCYTES 2.1 0.5 - 4.6 K/UL    ABS. MONOCYTES 0.5 0.1 - 1.3 K/UL    ABS. EOSINOPHILS 0.1 0.0 - 0.8 K/UL    ABS. BASOPHILS 0.0 0.0 - 0.2 K/UL    ABS. IMM. GRANS. 0.0 0.0 - 0.5 K/UL   METABOLIC PANEL, COMPREHENSIVE    Collection Time: 10/28/19  6:53 AM   Result Value Ref Range    Sodium 140 136 - 145 mmol/L    Potassium 3.7 3.5 - 5.1 mmol/L    Chloride 109 (H) 98 - 107 mmol/L    CO2 25 21 - 32 mmol/L    Anion gap 6 (L) 7 - 16 mmol/L    Glucose 102 (H) 65 - 100 mg/dL    BUN 14 6 - 23 MG/DL    Creatinine 1.05 (H) 0.6 - 1.0 MG/DL    GFR est AA >60 >60 ml/min/1.73m2    GFR est non-AA 59 (L) >60 ml/min/1.73m2    Calcium 8.7 8.3 - 10.4 MG/DL    Bilirubin, total 0.3 0.2 - 1.1 MG/DL    ALT (SGPT) 34 12 - 65 U/L    AST (SGOT) 43 (H) 15 - 37 U/L    Alk.  phosphatase 102 50 - 136 U/L    Protein, total 6.9 6.3 - 8.2 g/dL    Albumin 3.1 (L) 3.5 - 5.0 g/dL    Globulin 3.8 (H) 2.3 - 3.5 g/dL    A-G Ratio 0.8 (L) 1.2 - 3.5     POC TROPONIN-I    Collection Time: 10/28/19  6:56 AM   Result Value Ref Range    Troponin-I (POC) 0 (L) 0.02 - 0.05 ng/ml

## 2019-10-28 NOTE — ED TRIAGE NOTES
Patient states she fell tonight and hit her face. Bruising noted to bridge of nose. Reports LOC. States she doesn't know what caused her to fall. Patient with pain in right foot that she was seen about yesterday. Patient's gait is not steady, but ambulatory independently. Patient appears drowsy. States she cannot blow her nose. Patient able to breathe through nose without issues when taking temperature.

## 2019-11-10 ENCOUNTER — APPOINTMENT (OUTPATIENT)
Dept: GENERAL RADIOLOGY | Age: 54
End: 2019-11-10
Attending: EMERGENCY MEDICINE
Payer: COMMERCIAL

## 2019-11-10 ENCOUNTER — HOSPITAL ENCOUNTER (EMERGENCY)
Age: 54
Discharge: HOME | End: 2019-11-10
Attending: EMERGENCY MEDICINE
Payer: COMMERCIAL

## 2019-11-10 VITALS
TEMPERATURE: 97.7 F | DIASTOLIC BLOOD PRESSURE: 80 MMHG | BODY MASS INDEX: 32.44 KG/M2 | OXYGEN SATURATION: 98 % | RESPIRATION RATE: 18 BRPM | WEIGHT: 190 LBS | HEART RATE: 88 BPM | SYSTOLIC BLOOD PRESSURE: 141 MMHG | HEIGHT: 64 IN

## 2019-11-10 DIAGNOSIS — R05.9 COUGH: Primary | ICD-10-CM

## 2019-11-10 PROCEDURE — 99283 EMERGENCY DEPT VISIT LOW MDM: CPT | Performed by: EMERGENCY MEDICINE

## 2019-11-10 PROCEDURE — 74011000250 HC RX REV CODE- 250: Performed by: EMERGENCY MEDICINE

## 2019-11-10 PROCEDURE — 74011636637 HC RX REV CODE- 636/637: Performed by: EMERGENCY MEDICINE

## 2019-11-10 PROCEDURE — 71046 X-RAY EXAM CHEST 2 VIEWS: CPT

## 2019-11-10 PROCEDURE — 77030013140 HC MSK NEB VYRM -A

## 2019-11-10 PROCEDURE — 94640 AIRWAY INHALATION TREATMENT: CPT

## 2019-11-10 PROCEDURE — 74011250637 HC RX REV CODE- 250/637: Performed by: EMERGENCY MEDICINE

## 2019-11-10 RX ORDER — BENZONATATE 100 MG/1
100 CAPSULE ORAL
Qty: 30 CAP | Refills: 0 | Status: SHIPPED | OUTPATIENT
Start: 2019-11-10 | End: 2019-11-17

## 2019-11-10 RX ORDER — PREDNISONE 5 MG/1
TABLET ORAL
Qty: 21 TAB | Refills: 0 | Status: SHIPPED | OUTPATIENT
Start: 2019-11-10 | End: 2020-01-30

## 2019-11-10 RX ORDER — IPRATROPIUM BROMIDE AND ALBUTEROL SULFATE 2.5; .5 MG/3ML; MG/3ML
3 SOLUTION RESPIRATORY (INHALATION)
Status: COMPLETED | OUTPATIENT
Start: 2019-11-10 | End: 2019-11-10

## 2019-11-10 RX ORDER — BENZONATATE 100 MG/1
100 CAPSULE ORAL
Status: COMPLETED | OUTPATIENT
Start: 2019-11-10 | End: 2019-11-10

## 2019-11-10 RX ORDER — PREDNISONE 10 MG/1
40 TABLET ORAL
Status: COMPLETED | OUTPATIENT
Start: 2019-11-10 | End: 2019-11-10

## 2019-11-10 RX ADMIN — PREDNISONE 40 MG: 10 TABLET ORAL at 09:38

## 2019-11-10 RX ADMIN — BENZONATATE 100 MG: 100 CAPSULE ORAL at 09:38

## 2019-11-10 RX ADMIN — IPRATROPIUM BROMIDE AND ALBUTEROL SULFATE 3 ML: .5; 3 SOLUTION RESPIRATORY (INHALATION) at 09:27

## 2019-11-10 NOTE — DISCHARGE INSTRUCTIONS
Patient Education     At present no findings on exam or x-ray of pneumonia  Cough : Tessalon perles  Short prednisone taper     Cough: Care Instructions  Your Care Instructions    A cough is your body's response to something that bothers your throat or airways. Many things can cause a cough. You might cough because of a cold or the flu, bronchitis, or asthma. Smoking, postnasal drip, allergies, and stomach acid that backs up into your throat also can cause coughs. A cough is a symptom, not a disease. Most coughs stop when the cause, such as a cold, goes away. You can take a few steps at home to cough less and feel better. Follow-up care is a key part of your treatment and safety. Be sure to make and go to all appointments, and call your doctor if you are having problems. It's also a good idea to know your test results and keep a list of the medicines you take. How can you care for yourself at home? Drink lots of water and other fluids. This helps thin the mucus and soothes a dry or sore throat. Honey or lemon juice in hot water or tea may ease a dry cough. Take cough medicine as directed by your doctor. Prop up your head on pillows to help you breathe and ease a dry cough. Try cough drops to soothe a dry or sore throat. Cough drops don't stop a cough. Medicine-flavored cough drops are no better than candy-flavored drops or hard candy. Do not smoke. Avoid secondhand smoke. If you need help quitting, talk to your doctor about stop-smoking programs and medicines. These can increase your chances of quitting for good. When should you call for help? Call 911 anytime you think you may need emergency care. For example, call if:    You have severe trouble breathing. Call your doctor now or seek immediate medical care if:    You cough up blood. You have new or worse trouble breathing. You have a new or higher fever. You have a new rash.     Watch closely for changes in your health, and be sure to contact your doctor if:    You cough more deeply or more often, especially if you notice more mucus or a change in the color of your mucus. You have new symptoms, such as a sore throat, an earache, or sinus pain. You do not get better as expected. Where can you learn more? Go to http://maria alejandra-nereyda.info/. Enter D279 in the search box to learn more about \"Cough: Care Instructions. \"  Current as of: June 9, 2019  Content Version: 12.2  © 9146-2830 [employer]. Care instructions adapted under license by [employer] (which disclaims liability or warranty for this information). If you have questions about a medical condition or this instruction, always ask your healthcare professional. [address] any warranty or liability for your use of this information.

## 2019-11-10 NOTE — ED NOTES
I have reviewed discharge instructions with the patient. The patient verbalized understanding. Patient left ED via Discharge Method: ambulatory to Home with self. Opportunity for questions and clarification provided. Patient given 2 scripts. No e-sign. To continue your aftercare when you leave the hospital, you may receive an automated call from our care team to check in on how you are doing. This is a free service and part of our promise to provide the best care and service to meet your aftercare needs.  If you have questions, or wish to unsubscribe from this service please call [phone]. Thank you for Choosing our [facility] Emergency Department.

## 2019-11-10 NOTE — ED PROVIDER NOTES
Try cough. This been present 2 to 3 days. Really not tried any significant over-the-counter medications. States she is a non-smoker. No wheezing. Does have history of reactive airway issues in the past.  Patient also with history of congestive heart failure. No change in dependent edema. The history is provided by the patient. Cough   This is a new problem. The cough is non-productive. There has been no fever. Pertinent negatives include no chest pain, no chills, no rhinorrhea, no sore throat, no nausea, no vomiting and no confusion. She has tried nothing for the symptoms. Her past medical history does not include pneumonia.         Past Medical History:   Diagnosis Date    Anemia     blood transfusion 5/2018 per pt    Arrhythmia     palpitations, moderate mitral valve regurge    Arthritis     lower back osteo    Asthma     uses inhalers    Cardiomyopathy     ECHO 11/2017    CHF (congestive heart failure) (HCC)     EF 30-35% last echo 7/2019-- followed by dr Rosa Villarreal Chronic pain 2010    Coagulation defect ([address]     eliquis    COPD     nebulizer daily and maint inhalers, can not climb 1 flight of stairs (also CHF)  oxygen 3 LPM qhs    Decreased cardiac ejection fraction 11/27/2017    EF 45-50% per echo 11/27/17    Depression     controlled      Diverticulitis     GERD (gastroesophageal reflux disease)     controlled with med     Heart murmur     Hypertension     managed with meds    IBS (irritable bowel syndrome)     IC (interstitial cystitis)     Insomnia     Migraine with aura and without status migrainosus, not intractable 6/17/2016    Mitral valve regurgitation, rheumatic     followed Dr. Carlos Orr Moderate aortic regurgitation     per echo 7/2019 in cc-- followed by dr Rosa Villarreal Palpitations 5/16/2016    Psychiatric disorder     anxiety    Requires oxygen therapy     3l/min at hs. prn during the days as needed    Rheumatic aortic insufficiency 5/16/2016    Rheumatic fever as child pt reports rheumatic fever in childhood    Smoker     started age 23-- smoked 0.5 ppd until 2018-- cut back to 10-2 cig daily per pt    Stroke [facility])     \"mild stroke\" pt reports 2014- \"left sided weakness and balance is off\"     Thromboembolus ([address]     BLE 2012    Vitamin D deficiency        Past Surgical History:   Procedure Laterality Date    BIOPSY OF BREAST, INCISIONAL Left     lymph node , left, patient states her bx neg, but high risk    COLONOSCOPY      8 polyps removed, diverticulitis    COLONOSCOPY N/A 5/21/2018    COLONOSCOPY performed by Kelly Rosado MD at [address]  8-23-11    bladder dilitation    EGD      HX APPENDECTOMY  2006    HX HEART CATHETERIZATION  5/27/2011    no blockages, no intervention    HX HEART CATHETERIZATION  04/2017    no intervention    HX LAP CHOLECYSTECTOMY  6/6/2011    HX ORTHOPAEDIC Right 07/2019    4th toe -- surg repair    HX NATASHA AND BSO  2004    fibroid tumors, hyst    HX UROLOGICAL  01/2018    cystoscopy with hydrodistention of bladder multiple         Family History:   Problem Relation Age of Onset    Heart Failure Father 76        chf    Heart Disease Father     Diabetes Sister     Thyroid Disease Sister        Social History     Socioeconomic History    Marital status: SINGLE     Spouse name: Not on file    Number of children: Not on file    Years of education: Not on file    Highest education level: Not on file   Occupational History    Not on file   Social Needs    Financial resource strain: Not on file    Food insecurity:     Worry: Not on file     Inability: Not on file    Transportation needs:     Medical: Not on file     Non-medical: Not on file   Tobacco Use    Smoking status: Former Smoker     Packs/day: 0.25     Years: 30.00     Pack years: 7.50    Smokeless tobacco: Never Used   Substance and Sexual Activity    Alcohol use: No    Drug use: Yes     Types: Cocaine     Comment: last used 11/2018    Sexual activity: Not on file   Lifestyle    Physical activity:     Days per week: Not on file     Minutes per session: Not on file    Stress: Not on file   Relationships    Social connections:     Talks on phone: Not on file     Gets together: Not on file     Attends Restoration service: Not on file     Active member of club or organization: Not on file     Attends meetings of clubs or organizations: Not on file     Relationship status: Not on file    Intimate partner violence:     Fear of current or ex partner: Not on file     Emotionally abused: Not on file     Physically abused: Not on file     Forced sexual activity: Not on file   Other Topics Concern    Not on file   Social History Narrative    Not on file         ALLERGIES: Aspirin; Bentyl [dicyclomine]; Toradol [ketorolac]; Ultram [tramadol]; and Zofran [ondansetron hcl (pf)]    Review of Systems   Constitutional: Negative for chills. HENT: Negative for rhinorrhea and sore throat. Respiratory: Positive for cough. Cardiovascular: Negative for chest pain, palpitations and leg swelling. Gastrointestinal: Negative. Negative for nausea and vomiting. Psychiatric/Behavioral: Negative for confusion and decreased concentration. All other systems reviewed and are negative. Vitals:    11/10/19 0804 11/10/19 0928 11/10/19 1011   BP: 135/79  141/80   Pulse: 92  88   Resp: 18  18   Temp: 97.8 °F (36.6 °C)  97.7 °F (36.5 °C)   SpO2: 98% 97% 98%   Weight: 86.2 kg (190 lb)     Height: 5' 4\" (1.626 m)              Physical Exam   Constitutional: She appears well-developed and well-nourished. No distress. HENT:   Head: Atraumatic. Right Ear: External ear normal.   Left Ear: External ear normal.   Nose: Nose normal.   Mouth/Throat: Oropharynx is clear and moist.   Eyes: No scleral icterus. Neck: Neck supple. No stridor  Slight audible wheezing that appears to be more upper   Cardiovascular: Normal rate, regular rhythm and intact distal pulses. Pulmonary/Chest: Effort normal. No stridor. No respiratory distress. She has no wheezes. Musculoskeletal: She exhibits no tenderness. Minimal dependent edema   Neurological: She is alert. Skin: Skin is warm and dry. She is not diaphoretic. Psychiatric: Thought content normal.   Nursing note and vitals reviewed. MDM  Number of Diagnoses or Management Options  Cough:   Diagnosis management comments: Slight nonproductive cough x2 to 3 days with no fever and negative CXR.  Quite stable       Amount and/or Complexity of Data Reviewed  Tests in the radiology section of CPT®: reviewed and ordered  Independent visualization of images, tracings, or specimens: yes    Risk of Complications, Morbidity, and/or Mortality  Presenting problems: moderate  Diagnostic procedures: low    Patient Progress  Patient progress: stable         Procedures

## 2019-11-27 ENCOUNTER — HOSPITAL ENCOUNTER (EMERGENCY)
Age: 54
Discharge: HOME | End: 2019-11-27
Attending: EMERGENCY MEDICINE
Payer: COMMERCIAL

## 2019-11-27 ENCOUNTER — APPOINTMENT (OUTPATIENT)
Dept: GENERAL RADIOLOGY | Age: 54
End: 2019-11-27
Attending: EMERGENCY MEDICINE
Payer: COMMERCIAL

## 2019-11-27 VITALS
SYSTOLIC BLOOD PRESSURE: 133 MMHG | DIASTOLIC BLOOD PRESSURE: 67 MMHG | RESPIRATION RATE: 20 BRPM | HEART RATE: 96 BPM | TEMPERATURE: 97.9 F | OXYGEN SATURATION: 97 %

## 2019-11-27 DIAGNOSIS — J41.0 SIMPLE CHRONIC BRONCHITIS (HCC): Primary | ICD-10-CM

## 2019-11-27 PROCEDURE — 71045 X-RAY EXAM CHEST 1 VIEW: CPT

## 2019-11-27 PROCEDURE — 99282 EMERGENCY DEPT VISIT SF MDM: CPT | Performed by: EMERGENCY MEDICINE

## 2019-11-27 RX ORDER — AZITHROMYCIN 250 MG/1
TABLET, FILM COATED ORAL
Qty: 6 TAB | Refills: 0 | Status: SHIPPED | OUTPATIENT
Start: 2019-11-27 | End: 2020-01-03 | Stop reason: ALTCHOICE

## 2019-11-27 NOTE — ED PROVIDER NOTES
60-year-old female with multiple medical problems presenting for 3 days of cough and congestion. The history is provided by the patient. Cough   This is a recurrent problem. The current episode started 2 days ago. The problem occurs constantly. The problem has not changed since onset. The cough is non-productive. There has been no fever. Pertinent negatives include no chest pain, no chills, no sweats, no weight loss, no eye redness, no ear congestion, no ear pain, no headaches, no rhinorrhea, no sore throat, no myalgias, no shortness of breath, no wheezing, no nausea, no vomiting and no confusion. She has tried nothing for the symptoms. The treatment provided no relief. She is a smoker. Her past medical history is significant for bronchitis.         Past Medical History:   Diagnosis Date    Anemia     blood transfusion 5/2018 per pt    Arrhythmia     palpitations, moderate mitral valve regurge    Arthritis     lower back osteo    Asthma     uses inhalers    Cardiomyopathy     ECHO 11/2017    CHF (congestive heart failure) (HCC)     EF 30-35% last echo 7/2019-- followed by dr Perry Chun Chronic pain 2010    Coagulation defect ([address]     eliquis    COPD     nebulizer daily and maint inhalers, can not climb 1 flight of stairs (also CHF)  oxygen 3 LPM qhs    Decreased cardiac ejection fraction 11/27/2017    EF 45-50% per echo 11/27/17    Depression     controlled      Diverticulitis     GERD (gastroesophageal reflux disease)     controlled with med     Heart murmur     Hypertension     managed with meds    IBS (irritable bowel syndrome)     IC (interstitial cystitis)     Insomnia     Migraine with aura and without status migrainosus, not intractable 6/17/2016    Mitral valve regurgitation, rheumatic     followed Dr. Юлия Yates    Moderate aortic regurgitation     per echo 7/2019 in cc-- followed by dr Perry Chun Palpitations 5/16/2016    Psychiatric disorder     anxiety    Requires oxygen therapy 3l/min at hs. prn during the days as needed    Rheumatic aortic insufficiency 5/16/2016    Rheumatic fever as child    pt reports rheumatic fever in childhood    Smoker     started age 23-- smoked 0.5 ppd until 2018-- cut back to 10-2 cig daily per pt    [address]     \"mild stroke\" pt reports 2014- \"left sided weakness and balance is off\"     Thromboembolus ([facility] [address]     BLE 2012    Vitamin D deficiency        Past Surgical History:   Procedure Laterality Date    BIOPSY OF BREAST, INCISIONAL Left     lymph node , left, patient states her bx neg, but high risk    COLONOSCOPY      8 polyps removed, diverticulitis    COLONOSCOPY N/A 5/21/2018    COLONOSCOPY performed by Hubert Dave MD at [address]  8-23-11    bladder dilitation    EGD      HX APPENDECTOMY  2006    HX HEART CATHETERIZATION  5/27/2011    no blockages, no intervention    HX HEART CATHETERIZATION  04/2017    no intervention    HX LAP CHOLECYSTECTOMY  6/6/2011    HX ORTHOPAEDIC Right 07/2019    4th toe -- surg repair    HX NATASHA AND BSO  2004    fibroid tumors, hyst    HX UROLOGICAL  01/2018    cystoscopy with hydrodistention of bladder multiple         Family History:   Problem Relation Age of Onset    Heart Failure Father 76        chf    Heart Disease Father     Diabetes Sister     Thyroid Disease Sister        Social History     Socioeconomic History    Marital status: SINGLE     Spouse name: Not on file    Number of children: Not on file    Years of education: Not on file    Highest education level: Not on file   Occupational History    Not on file   Social Needs    Financial resource strain: Not on file    Food insecurity:     Worry: Not on file     Inability: Not on file    Transportation needs:     Medical: Not on file     Non-medical: Not on file   Tobacco Use    Smoking status: Former Smoker     Packs/day: 0.25     Years: 30.00     Pack years: 7.50    Smokeless tobacco: Never Used   Substance and Sexual Activity    Alcohol use: No    Drug use: Yes     Types: Cocaine     Comment: last used 11/2018    Sexual activity: Not on file   Lifestyle    Physical activity:     Days per week: Not on file     Minutes per session: Not on file    Stress: Not on file   Relationships    Social connections:     Talks on phone: Not on file     Gets together: Not on file     Attends Sikh service: Not on file     Active member of club or organization: Not on file     Attends meetings of clubs or organizations: Not on file     Relationship status: Not on file    Intimate partner violence:     Fear of current or ex partner: Not on file     Emotionally abused: Not on file     Physically abused: Not on file     Forced sexual activity: Not on file   Other Topics Concern    Not on file   Social History Narrative    Not on file         ALLERGIES: Aspirin; Bentyl [dicyclomine]; Toradol [ketorolac]; Ultram [tramadol]; and Zofran [ondansetron hcl (pf)]    Review of Systems   Constitutional: Negative for chills and weight loss. HENT: Negative for ear pain, rhinorrhea and sore throat. Eyes: Negative for redness. Respiratory: Positive for cough. Negative for shortness of breath and wheezing. Cardiovascular: Negative for chest pain. Gastrointestinal: Negative for nausea and vomiting. Musculoskeletal: Negative for myalgias. Neurological: Negative for headaches. Psychiatric/Behavioral: Negative for confusion. All other systems reviewed and are negative. Vitals:    11/27/19 0539   BP: 133/67   Pulse: 96   Resp: 20   Temp: 97.9 °F (36.6 °C)   SpO2: 97%            Physical Exam  Vitals signs and nursing note reviewed. Constitutional:       Appearance: She is well-developed. HENT:      Head: Normocephalic and atraumatic. Eyes:      Extraocular Movements: Extraocular movements intact. Conjunctiva/sclera: Conjunctivae normal.      Pupils: Pupils are equal, round, and reactive to light.    Neck: Musculoskeletal: Normal range of motion and neck supple. Cardiovascular:      Rate and Rhythm: Normal rate and regular rhythm. Pulmonary:      Effort: Pulmonary effort is normal.      Breath sounds: Normal breath sounds. Comments: Wheezing diffusely without any respiratory distress or prolonged expiratory time  Abdominal:      General: Bowel sounds are normal.      Palpations: Abdomen is soft. Musculoskeletal: Normal range of motion. Skin:     General: Skin is warm and dry. Neurological:      Mental Status: She is alert and oriented to person, place, and time. MDM  Number of Diagnoses or Management Options  Simple chronic bronchitis [facility]):   Diagnosis management comments: 61-year-old female with multiple medical problems presenting for dry hacking cough. Patient's chest x-ray appeared clear. Starting her on azithromycin and discharging home.        Amount and/or Complexity of Data Reviewed  Tests in the radiology section of CPT®: ordered and reviewed  Independent visualization of images, tracings, or specimens: yes    Risk of Complications, Morbidity, and/or Mortality  Presenting problems: high  Diagnostic procedures: high  Management options: moderate    Patient Progress  Patient progress: improved         Procedures

## 2019-11-27 NOTE — DISCHARGE INSTRUCTIONS
Use the antibiotics. It is possible this is a virus and will not work but given your medical history we will cover you with the azithromycin. Follow-up with your primary care doctor.

## 2019-11-27 NOTE — ED NOTES
I have reviewed discharge instructions with the patient. The patient verbalized understanding. Patient left ED via Discharge Method: ambulatory to Home with (insert name of family/friend, self, transport family). Opportunity for questions and clarification provided. Patient given 1 scripts. To continue your aftercare when you leave the hospital, you may receive an automated call from our care team to check in on how you are doing. This is a free service and part of our promise to provide the best care and service to meet your aftercare needs.  If you have questions, or wish to unsubscribe from this service please call [phone]. Thank you for Choosing our [address] [place] [address] Emergency Department.

## 2019-11-27 NOTE — ED TRIAGE NOTES
Pt well known to this department. Pt here for cough for two weeks. Pt able to speak in complete sentences without any distress in triage.

## 2019-12-09 ENCOUNTER — HOSPITAL ENCOUNTER (EMERGENCY)
Age: 54
Discharge: HOME | End: 2019-12-09
Attending: EMERGENCY MEDICINE
Payer: COMMERCIAL

## 2019-12-09 ENCOUNTER — APPOINTMENT (OUTPATIENT)
Dept: ULTRASOUND IMAGING | Age: 54
End: 2019-12-09
Attending: EMERGENCY MEDICINE
Payer: COMMERCIAL

## 2019-12-09 VITALS
SYSTOLIC BLOOD PRESSURE: 138 MMHG | OXYGEN SATURATION: 94 % | HEART RATE: 96 BPM | DIASTOLIC BLOOD PRESSURE: 84 MMHG | RESPIRATION RATE: 16 BRPM | TEMPERATURE: 98.3 F

## 2019-12-09 DIAGNOSIS — I80.8 THROMBOPHLEBITIS ARM: Primary | ICD-10-CM

## 2019-12-09 PROCEDURE — 74011250637 HC RX REV CODE- 250/637: Performed by: EMERGENCY MEDICINE

## 2019-12-09 PROCEDURE — 93971 EXTREMITY STUDY: CPT

## 2019-12-09 PROCEDURE — 99283 EMERGENCY DEPT VISIT LOW MDM: CPT | Performed by: EMERGENCY MEDICINE

## 2019-12-09 RX ORDER — HYDROCODONE BITARTRATE AND ACETAMINOPHEN 5; 325 MG/1; MG/1
1 TABLET ORAL ONCE
Status: COMPLETED | OUTPATIENT
Start: 2019-12-09 | End: 2019-12-09

## 2019-12-09 RX ORDER — HYDROCODONE BITARTRATE AND ACETAMINOPHEN 5; 325 MG/1; MG/1
1 TABLET ORAL
Qty: 12 TAB | Refills: 0 | Status: SHIPPED | OUTPATIENT
Start: 2019-12-09 | End: 2019-12-12

## 2019-12-09 RX ADMIN — HYDROCODONE BITARTRATE AND ACETAMINOPHEN 1 TABLET: 5; 325 TABLET ORAL at 10:11

## 2019-12-09 NOTE — ED TRIAGE NOTES
Pt reports that her hand is swelling where she had an IV last month, right hand. Slight swelling visible in triage.      Good radial pulse, good cap refill noted in triage

## 2019-12-09 NOTE — ED PROVIDER NOTES
HPI:  68-year-old female with right wrist and hand swelling and pain. History of prior blood clot and is on Eliquis. Compliant with medication. Was admitted to the hospital for roughly 1 week approximately 1 month ago on discharge. Stated IV was placed in the right wrist at this location of pain. Denies any trauma since. Noticed swelling redness and pain over the past 2 days. Pain travel up to the right elbow. Prior smoker. Denies any chest pain or shortness of breath. No hemoptysis    ROS  Constitutional: No fever, no chills  Skin: no rash  Eye: No vision changes  ENMT: No sore throat  Respiratory: No shortness of breath, no cough  Cardiovascular: No chest pain, no palpitations  Gastrointestinal: No vomiting, no nausea, no diarrhea, no abdominal pain  : No dysuria  MSK: No back pain, no muscle pain, no joint pain  Neuro: No headache, no change in mental status, no numbness, no tingling, no weakness  Psych:   Endocrine:   All other review of systems positive per history of present illness and the above otherwise negative or noncontributory.     Visit Vitals  /84   Pulse 96   Temp 98.3 °F (36.8 °C)   Resp 16   SpO2 94%     Past Medical History:   Diagnosis Date    Anemia     blood transfusion 5/2018 per pt    Arrhythmia     palpitations, moderate mitral valve regurge    Arthritis     lower back osteo    Asthma     uses inhalers    Cardiomyopathy     ECHO 11/2017    CHF (congestive heart failure) ([facility]     EF 30-35% last echo 7/2019-- followed by dr Durwood Lefort Chronic pain 2010    Coagulation defect ([facility]     eliquis    COPD     nebulizer daily and maint inhalers, can not climb 1 flight of stairs (also CHF)  oxygen 3 LPM qhs    Decreased cardiac ejection fraction 11/27/2017    EF 45-50% per echo 11/27/17    Depression     controlled      Diverticulitis     GERD (gastroesophageal reflux disease)     controlled with med     Heart murmur     Hypertension     managed with meds    IBS (irritable bowel syndrome)     IC (interstitial cystitis)     Insomnia     Migraine with aura and without status migrainosus, not intractable 2016    Mitral valve regurgitation, rheumatic     followed Dr. Ronnie Neal Moderate aortic regurgitation     per echo 2019 in [place]-- followed by dr Reena Jones Palpitations 2016    Psychiatric disorder     anxiety    Requires oxygen therapy     3l/min at hs. prn during the days as needed    Rheumatic aortic insufficiency 2016    Rheumatic fever as child    pt reports rheumatic fever in childhood    Smoker     started age 23-- smoked 0.5 ppd until -- cut back to 10-2 cig daily per pt    Stroke ([address]     \"mild stroke\" pt reports - \"left sided weakness and balance is off\"     Thromboembolus ([address]     BLE     Vitamin D deficiency      Past Surgical History:   Procedure Laterality Date    BIOPSY OF BREAST, INCISIONAL Left     lymph node , left, patient states her bx neg, but high risk    COLONOSCOPY      8 polyps removed, diverticulitis    COLONOSCOPY N/A 2018    COLONOSCOPY performed by Milton Puentes MD at [address]  8-23-11    bladder dilitation    EGD      HX APPENDECTOMY  2006    HX HEART CATHETERIZATION  2011    no blockages, no intervention    HX HEART CATHETERIZATION  2017    no intervention    HX LAP CHOLECYSTECTOMY  2011    HX ORTHOPAEDIC Right 2019    4th toe -- surg repair    HX NATASHA AND BSO  2004    fibroid tumors, hyst    HX UROLOGICAL  2018    cystoscopy with hydrodistention of bladder multiple     Prior to Admission Medications   Prescriptions Last Dose Informant Patient Reported? Taking? EPINEPHrine (EPIPEN JR) 0.15 mg/0.3 mL injection   Yes No   Si.15 mg by IntraMUSCular route once as needed. FLUoxetine (PROZAC) 20 mg capsule   Yes No   Sig: Take 40 mg by mouth daily. Nebulizer Accessories kit   No No   Sig: Use tid   Oxygen   Yes No   Sig: nightly.  Indications: 3l/min at hs and prn during the day   albuterol (PROVENTIL HFA, VENTOLIN HFA, PROAIR HFA) 90 mcg/actuation inhaler   No No   Sig: Take 2 Puffs by inhalation every six (6) hours as needed for Wheezing. apixaban (ELIQUIS) 5 mg tablet   No No   Sig: Take 1 Tab by mouth two (2) times a day. Patient taking differently: Take 5 mg by mouth two (2) times a day. Stopped 19 per dr Alfonso Escudero   azithromycin (ZITHROMAX Z-WOLFGANG) 250 mg tablet   No No   Si tablets on the first day then one tablet daily until complete   budesonide-formoterol (SYMBICORT) 160-4.5 mcg/actuation HFAA   No No   Sig: Take 1 Puff by inhalation two (2) times a day. butalbital-acetaminophen-caffeine (FIORICET, ESGIC) -40 mg per tablet   Yes No   carvedilol (COREG) 12.5 mg tablet   No No   Sig: Take 0.5 Tabs by mouth two (2) times a day. clonazePAM (KLONOPIN) 0.5 mg tablet   No No   Sig: TAKE 1 TABLET BY MOUTH 3 TIMES A DAY. MAX 3/DAY   cyclobenzaprine (FLEXERIL) 10 mg tablet   No No   Sig: Take 1 Tab by mouth three (3) times daily as needed for Muscle Spasm(s). diphenhydrAMINE (BANOPHEN) 50 mg capsule   Yes No   Sig: Take 50 mg by mouth every six (6) hours as needed. estradiol (ESTRACE) 0.5 mg tablet   No No   Sig: Take 1 Tab by mouth daily. Can d/c the estradiol patches   ferrous gluconate 324 mg (37.5 mg iron) tablet   No No   Sig: Take 1 Tab by mouth two (2) times a day. furosemide (LASIX) 40 mg tablet   No No   Sig: Take 1 Tab by mouth daily. gabapentin (NEURONTIN) 400 mg capsule   Yes No   Sig: Take 400 mg by mouth two (2) times a day.   hydrOXYzine pamoate (VISTARIL) 25 mg capsule   Yes No   Sig: Take 1 Cap by mouth two (2) times daily as needed. mirtazapine (REMERON) 15 mg tablet   No No   Sig: Take 1 Tab by mouth nightly. nitroglycerin (NITROSTAT) 0.4 mg SL tablet   No No   Si Tab by SubLINGual route every five (5) minutes as needed for Chest Pain.    oxybutynin (DITROPAN) 5 mg tablet   No No   Sig: Take 1 Tab by mouth two (2) times a day. pantoprazole (PROTONIX) 40 mg tablet   No No   Sig: Take 1 Tab by mouth daily. Indications: morning   pravastatin (PRAVACHOL) 40 mg tablet   No No   Sig: Take 1 Tab by mouth nightly. predniSONE (STERAPRED) 5 mg dose pack   No No   Sig: See administration instruction per 5mg dose pack   promethazine (PHENERGAN) 25 mg tablet   No No   Sig: Take 1 Tab by mouth every six (6) hours as needed for Nausea. Indications: inflammation of the nose due to an allergy   sacubitril-valsartan (ENTRESTO) 24 mg/26 mg tablet   No No   Sig: Take 1 Tab by mouth two (2) times a day. spironolactone (ALDACTONE) 25 mg tablet   No No   Sig: Take 1 Tab by mouth daily. tiotropium (SPIRIVA WITH HANDIHALER) 18 mcg inhalation capsule   No No   Sig: Take 1 Cap by inhalation daily. Facility-Administered Medications: None         Adult Exam   General: alert, no acute distress  Head: normocephalic, atraumatic  ENT: moist mucous membranes  Neck: supple, non-tender; full range of motion  Cardiovascular: regular rate and rhythm, normal peripheral perfusion, no edema, equal pulses  Respiratory:  normal respirations; no wheezing, rales or rhonchi  Gastrointestinal: soft, non-tender; no rebound or guarding, no peritoneal signs, no distension  Back: non-tender, full range of motion  Musculoskeletal: normal range of motion, normal strength. Right wrist hand and right lower arm with minimal swelling without associated erythema. Distal pulses intact. Capillary refill less than 2 seconds. Complain of pain to palpation up to the right elbow  Neurological: alert and oriented x 4, no gross focal deficits; normal speech  Psychiatric: cooperative; appropriate mood and affect    MDM:   She had an x-ray on November 27, 2019. No obvious mass noted on the right lungs. Low suspicion for thoracic outlet syndrome. She has no facial swelling. Pain and swelling seems isolated in the right wrist and right forearm. Had IV there.   Will obtain ultrasound for assessment to rule out DVT. Differential includes thrombophlebitis, cellulitis. Low suspicion for fracture. No fall or trauma. Currently on Eliquis for prior blood clot    10:39 AM  Ultrasound negative for any DVT. Appear consistent with thrombophlebitis of the superficial vessel. Already on Eliquis. Will prescribe Norco as needed for pain. Recommend ice and elevate. Recommend follow-up with primary care physician. No neurovascular compromise. Stable for discharge        Duplex Upper Ext Venous Right    Result Date: 12/9/2019  HISTORY: 48years-old Female with arm swelling. EXAM: DUPLEX UPPER EXT VENOUS RIGHT. Upper extremity deep venous ultrasound was performed using grayscale, color Doppler, and spectral Doppler analysis. PRIOR STUDY: None. FINDINGS: The right upper extremity deep veins were evaluated including the internal jugular, subclavian, axillary and brachial veins. The radial and ulnar veins were also evaluated. No evidence of deep venous thrombosis. The right basilic vein was without evidence of thrombus. There was evidence of thrombus in the distal right cephalic vein in the lower arm. There was also evidence of thrombus in a superficial vessel arising off the cephalic vein at the level of the wrist. Other Findings: None. IMPRESSION: No evidence of DVT in the right upper extremity. . Findings of superficial venous thrombosis in the distal cephalic vein and in a superficial vessel arising off the cephalic vein at the level of the wrist. Recommend continued follow-up. Dragon voice recognition software was used to create this note. Although the note has been reviewed and corrected where necessary, additional errors may have been overlooked and remain in the text.

## 2019-12-09 NOTE — DISCHARGE INSTRUCTIONS
Take pain medication as needed. Continue your blood thinner. Follow-up with your doctor for checkup. Elevate your arm while sleeping at night. Return if worsening symptoms.

## 2019-12-09 NOTE — ED NOTES
I have reviewed discharge instructions with the patient. The patient verbalized understanding. Patient left ED via Discharge Method: ambulatory to Home with self    Opportunity for questions and clarification provided. Patient given 1 scripts. To continue your aftercare when you leave the hospital, you may receive an automated call from our care team to check in on how you are doing. This is a free service and part of our promise to provide the best care and service to meet your aftercare needs.  If you have questions, or wish to unsubscribe from this service please call [phone]. Thank you for Choosing our [facility] Emergency Department.

## 2019-12-22 ENCOUNTER — HOSPITAL ENCOUNTER (EMERGENCY)
Age: 54
Discharge: HOME | End: 2019-12-22
Attending: EMERGENCY MEDICINE
Payer: COMMERCIAL

## 2019-12-22 VITALS
SYSTOLIC BLOOD PRESSURE: 99 MMHG | TEMPERATURE: 97.6 F | OXYGEN SATURATION: 95 % | DIASTOLIC BLOOD PRESSURE: 55 MMHG | BODY MASS INDEX: 32.95 KG/M2 | WEIGHT: 193 LBS | HEIGHT: 64 IN | HEART RATE: 85 BPM | RESPIRATION RATE: 18 BRPM

## 2019-12-22 DIAGNOSIS — M79.601 RIGHT ARM PAIN: Primary | ICD-10-CM

## 2019-12-22 DIAGNOSIS — M25.561 ACUTE PAIN OF RIGHT KNEE: ICD-10-CM

## 2019-12-22 PROCEDURE — 99283 EMERGENCY DEPT VISIT LOW MDM: CPT | Performed by: EMERGENCY MEDICINE

## 2019-12-22 NOTE — ED PROVIDER NOTES
[facility] Emergency Department  Arrival Date/Time: 12/22/2019  7:41 AM      Angélica Guzmán  MRN: [id number]    YOB: 1965   48 y.o. female    [facility] EMERGENCY DEPT ERA/ A  Seen on 12/22/2019 @ 7:55 AM      Today's Chief Complaint:   Chief Complaint   Patient presents with    Arm Pain    Knee Pain     HPI: 62-year-old female presents to the emergency department with right arm and right knee pain    She was here several days ago diagnosed with superficial thrombophlebitis. Had a ultrasound of the arm was negative for DVT. She is already on Eliquis. She was advised to take conservative treatments and follow-up with her doctor    This morning it hurt so she came to the emergency room. She also complains of pain in the right knee. She denies any trauma. She states it looks swollen. It is been hurting several mornings. She has not taken anything for it or done anything differently. HPI    Review of Systems: Review of Systems   Constitutional: Negative for fever. Respiratory: Negative for shortness of breath. Musculoskeletal: Positive for arthralgias. Past Medical History: Primary Care Doctor: Dexter Pete MD  Meds, PMH, PSHx, SocHx at end of this note     Allergies: Allergies   Allergen Reactions    Aspirin Other (comments)     Inflames IBS per pt    Bentyl [Dicyclomine] Itching    Toradol [Ketorolac] Hives    Ultram [Tramadol] Nausea and Vomiting    Zofran [Ondansetron Hcl (Pf)] Nausea and Vomiting         Key Anti-Platelet Anticoagulant Meds             apixaban (ELIQUIS) 5 mg tablet Take 1 Tab by mouth two (2) times a day. Physical Exam:  Nursing documentation reviewed. Patient Vitals for the past 24 hrs:   Temp Pulse Resp BP SpO2   12/22/19 0740 97.6 °F (36.4 °C) 92 18 108/68 97 %     Oxygen Therapy  O2 Sat (%): 97 % (12/22/19 0740)  O2 Device: Room air (12/22/19 0740) Vital signs were reviewed.   Physical Exam  Vitals signs and nursing note reviewed. Constitutional:       General: She is not in acute distress. Appearance: Normal appearance. HENT:      Head: Normocephalic. Pulmonary:      Breath sounds: Normal breath sounds. Abdominal:      Tenderness: There is no tenderness. Musculoskeletal:      Right knee: She exhibits normal range of motion, no swelling, no effusion and no bony tenderness. No tenderness found. No medial joint line, no lateral joint line, no MCL and no LCL tenderness noted. Comments: Right arm. No swelling no tenderness no deformity. No palpable cords. MEDICAL DECISION MAKING:   Differential Diagnosis:    MDM  Number of Diagnoses or Management Options  Diagnosis management comments: Right arm no cords no tenderness    Right knee no swelling no deformity no crepitance no effusions. The right knee is stable to varus and valgus stress. No movement with anterior posterior drawer sign. I am able to palpate the knee without pain. I do not believe additional imaging or treatments are necessary at this time. Encourage patient to continue continue to use a warm compress on her arm and ice her knee. Procedure Documentation:   Procedures     Other ED Course Notes:        Assessment and Plan:    Impression: No diagnosis found.   Disposition:      Follow-up:   Follow-up Information    None         Discharge Medications:   Current Discharge Medication List          Past Medical History:      Past Medical History:   Diagnosis Date    Anemia     blood transfusion 5/2018 per pt    Arrhythmia     palpitations, moderate mitral valve regurge    Arthritis     lower back osteo    Asthma     uses inhalers    Cardiomyopathy     ECHO 11/2017    CHF (congestive heart failure) ([facility])     EF 30-35% last echo 7/2019-- followed by dr Melany Angelo Chronic pain 2010    Coagulation defect ([address]     eliquis    COPD     nebulizer daily and maint inhalers, can not climb 1 flight of stairs (also CHF)  oxygen 3 LPM qhs  Decreased cardiac ejection fraction 11/27/2017    EF 45-50% per echo 11/27/17    Depression     controlled      Diverticulitis     GERD (gastroesophageal reflux disease)     controlled with med     Heart murmur     Hypertension     managed with meds    IBS (irritable bowel syndrome)     IC (interstitial cystitis)     Insomnia     Migraine with aura and without status migrainosus, not intractable 6/17/2016    Mitral valve regurgitation, rheumatic     followed Dr. Allyson Mukherjee Moderate aortic regurgitation     per echo 7/2019 in -- followed by dr [place] Rashid Palpitations 5/16/2016    Psychiatric disorder     anxiety    Requires oxygen therapy     3l/min at hs. prn during the days as needed    Rheumatic aortic insufficiency 5/16/2016    Rheumatic fever as child    pt reports rheumatic fever in childhood    Smoker     started age 23-- smoked 0.5 ppd until 2018-- cut back to 10-2 cig daily per pt    Stroke ([address]     \"mild stroke\" pt reports 2014- \"left sided weakness and balance is off\"     Thromboembolus ([address]     BLE 2012    Vitamin D deficiency      Past Surgical History:   Procedure Laterality Date    BIOPSY OF BREAST, INCISIONAL Left     lymph node , left, patient states her bx neg, but high risk    COLONOSCOPY      8 polyps removed, diverticulitis    COLONOSCOPY N/A 5/21/2018    COLONOSCOPY performed by Rayne Crane MD at [address]  8-23-11    bladder dilitation    EGD      HX APPENDECTOMY  2006    HX HEART CATHETERIZATION  5/27/2011    no blockages, no intervention    HX HEART CATHETERIZATION  04/2017    no intervention    HX LAP CHOLECYSTECTOMY  6/6/2011    HX ORTHOPAEDIC Right 07/2019    4th toe -- surg repair    HX NATASHA AND BSO  2004    fibroid tumors, hyst    HX UROLOGICAL  01/2018    cystoscopy with hydrodistention of bladder multiple     Social History     Tobacco Use    Smoking status: Former Smoker     Packs/day: 0.25     Years: 30.00     Pack years: 7.50  Smokeless tobacco: Never Used   Substance Use Topics    Alcohol use: No    Drug use: Yes     Types: Cocaine     Comment: last used 2018     Prior to Admission Medications   Prescriptions Last Dose Informant Patient Reported? Taking? EPINEPHrine (EPIPEN JR) 0.15 mg/0.3 mL injection   Yes No   Si.15 mg by IntraMUSCular route once as needed. FLUoxetine (PROZAC) 20 mg capsule   Yes No   Sig: Take 40 mg by mouth daily. Nebulizer Accessories kit   No No   Sig: Use tid   Oxygen   Yes No   Sig: nightly. Indications: 3l/min at hs and prn during the day   albuterol (PROVENTIL HFA, VENTOLIN HFA, PROAIR HFA) 90 mcg/actuation inhaler   No No   Sig: Take 2 Puffs by inhalation every six (6) hours as needed for Wheezing. apixaban (ELIQUIS) 5 mg tablet   No No   Sig: Take 1 Tab by mouth two (2) times a day. Patient taking differently: Take 5 mg by mouth two (2) times a day. Stopped 19 per dr Alfonso Escudero   azithromycin (ZITHROMAX Z-WOLFGANG) 250 mg tablet   No No   Si tablets on the first day then one tablet daily until complete   budesonide-formoterol (SYMBICORT) 160-4.5 mcg/actuation HFAA   No No   Sig: Take 1 Puff by inhalation two (2) times a day. butalbital-acetaminophen-caffeine (FIORICET, ESGIC) -40 mg per tablet   Yes No   carvedilol (COREG) 12.5 mg tablet   No No   Sig: Take 0.5 Tabs by mouth two (2) times a day. clonazePAM (KLONOPIN) 0.5 mg tablet   No No   Sig: TAKE 1 TABLET BY MOUTH 3 TIMES A DAY. MAX 3/DAY   cyclobenzaprine (FLEXERIL) 10 mg tablet   No No   Sig: Take 1 Tab by mouth three (3) times daily as needed for Muscle Spasm(s). diphenhydrAMINE (BANOPHEN) 50 mg capsule   Yes No   Sig: Take 50 mg by mouth every six (6) hours as needed. estradiol (ESTRACE) 0.5 mg tablet   No No   Sig: Take 1 Tab by mouth daily. Can d/c the estradiol patches   ferrous gluconate 324 mg (37.5 mg iron) tablet   No No   Sig: Take 1 Tab by mouth two (2) times a day.    furosemide (LASIX) 40 mg tablet   No No   Sig: Take 1 Tab by mouth daily. gabapentin (NEURONTIN) 400 mg capsule   Yes No   Sig: Take 400 mg by mouth two (2) times a day.   hydrOXYzine pamoate (VISTARIL) 25 mg capsule   Yes No   Sig: Take 1 Cap by mouth two (2) times daily as needed. mirtazapine (REMERON) 15 mg tablet   No No   Sig: Take 1 Tab by mouth nightly. nitroglycerin (NITROSTAT) 0.4 mg SL tablet   No No   Si Tab by SubLINGual route every five (5) minutes as needed for Chest Pain. oxybutynin (DITROPAN) 5 mg tablet   No No   Sig: Take 1 Tab by mouth two (2) times a day. pantoprazole (PROTONIX) 40 mg tablet   No No   Sig: Take 1 Tab by mouth daily. Indications: morning   pravastatin (PRAVACHOL) 40 mg tablet   No No   Sig: Take 1 Tab by mouth nightly. predniSONE (STERAPRED) 5 mg dose pack   No No   Sig: See administration instruction per 5mg dose pack   promethazine (PHENERGAN) 25 mg tablet   No No   Sig: Take 1 Tab by mouth every six (6) hours as needed for Nausea. Indications: inflammation of the nose due to an allergy   sacubitril-valsartan (ENTRESTO) 24 mg/26 mg tablet   No No   Sig: Take 1 Tab by mouth two (2) times a day. spironolactone (ALDACTONE) 25 mg tablet   No No   Sig: Take 1 Tab by mouth daily. tiotropium (SPIRIVA WITH HANDIHALER) 18 mcg inhalation capsule   No No   Sig: Take 1 Cap by inhalation daily.       Facility-Administered Medications: None

## 2019-12-22 NOTE — DISCHARGE INSTRUCTIONS
Warm compress on the right arm 2-3 times a day for 2-3 days     Use ice on the right knee - - 20-30 minutes 2-3 times a day for 2-3 days

## 2019-12-22 NOTE — ED NOTES
I have reviewed discharge instructions with the patient. The patient verbalized understanding. Patient left ED via Discharge Method: ambulatory to Home with self    Opportunity for questions and clarification provided. Patient given 0 scripts. No e-sign        To continue your aftercare when you leave the hospital, you may receive an automated call from our care team to check in on how you are doing. This is a free service and part of our promise to provide the best care and service to meet your aftercare needs.  If you have questions, or wish to unsubscribe from this service please call [phone]. Thank you for Choosing our [address] [place] [address] Emergency Department.

## 2019-12-28 ENCOUNTER — DOCUMENTATION ONLY (OUTPATIENT)
Dept: CARDIOLOGY | Age: 54
End: 2019-12-28

## 2020-01-11 ENCOUNTER — HOSPITAL ENCOUNTER (EMERGENCY)
Age: 55
Discharge: HOME | End: 2020-01-11
Attending: EMERGENCY MEDICINE
Payer: COMMERCIAL

## 2020-01-11 ENCOUNTER — APPOINTMENT (OUTPATIENT)
Dept: GENERAL RADIOLOGY | Age: 55
End: 2020-01-11
Attending: EMERGENCY MEDICINE
Payer: COMMERCIAL

## 2020-01-11 VITALS
HEART RATE: 86 BPM | DIASTOLIC BLOOD PRESSURE: 69 MMHG | TEMPERATURE: 97.8 F | WEIGHT: 190 LBS | BODY MASS INDEX: 32.44 KG/M2 | SYSTOLIC BLOOD PRESSURE: 126 MMHG | OXYGEN SATURATION: 98 % | RESPIRATION RATE: 16 BRPM | HEIGHT: 64 IN

## 2020-01-11 DIAGNOSIS — M54.2 NECK PAIN: Primary | ICD-10-CM

## 2020-01-11 PROCEDURE — 72040 X-RAY EXAM NECK SPINE 2-3 VW: CPT

## 2020-01-11 PROCEDURE — 99283 EMERGENCY DEPT VISIT LOW MDM: CPT | Performed by: EMERGENCY MEDICINE

## 2020-01-11 RX ORDER — METHOCARBAMOL 750 MG/1
750 TABLET, FILM COATED ORAL 3 TIMES DAILY
Qty: 21 TAB | Refills: 0 | Status: SHIPPED | OUTPATIENT
Start: 2020-01-11 | End: 2020-01-18

## 2020-01-11 NOTE — DISCHARGE INSTRUCTIONS
Use meds as directed along with moist heat or heat patches do exercises as seen on sheet, return to ER if symptoms worsen

## 2020-01-11 NOTE — ED NOTES
I have reviewed discharge instructions with the patient. The patient verbalized understanding. Patient left ED via Discharge Method: ambulatory to Home with friend    Opportunity for questions and clarification provided. Patient given 1 scripts. To continue your aftercare when you leave the hospital, you may receive an automated call from our care team to check in on how you are doing. This is a free service and part of our promise to provide the best care and service to meet your aftercare needs.  If you have questions, or wish to unsubscribe from this service please call [phone]. Thank you for Choosing our [facility] Emergency Department.

## 2020-01-11 NOTE — ED TRIAGE NOTES
Pt states left sided neck pain for weeks. States it was off and on and that it shoots pain into her head. Pt flipping hair and turning head without difficulty in triage. Pt states sometimes this causes eye pain. NAD in triage.

## 2020-01-11 NOTE — ED PROVIDER NOTES
Patient reports left-sided neck pain for the past 3 weeks. States this off and on. Worse with motion. She denies any obvious trauma. Not used anything to try to help with the pain. She does state it intermittently radiates to the left arm. She is afraid it could be her heart. She denies any chest pain or shortness of breath. Patient is very animated and neck freely during interview without distress    The history is provided by the patient. Neck Pain    This is a new problem. Episode onset: 3 weeks. The problem occurs every several days. The problem has not changed since onset. The pain is associated with an unknown factor. There has been no fever. The pain is present in the left side. The quality of the pain is described as cramping. The pain radiates to the left arm. The pain is at a severity of 8/10. The pain is mild. The pain is worse with movement. Pertinent negatives include no chest pain, no headaches, no paresis, no tingling and no weakness. She has tried nothing for the symptoms. The treatment provided no relief.         Past Medical History:   Diagnosis Date    Anemia     blood transfusion 5/2018 per pt    Arrhythmia     palpitations, moderate mitral valve regurge    Arthritis     lower back osteo    Asthma     uses inhalers    Cardiomyopathy     ECHO 11/2017    CHF (congestive heart failure) (HCC)     EF 30-35% last echo 7/2019-- followed by dr Jennifer Nguyen Chronic pain 2010    Coagulation defect ([address]     eliquis    COPD     nebulizer daily and maint inhalers, can not climb 1 flight of stairs (also CHF)  oxygen 3 LPM qhs    Decreased cardiac ejection fraction 11/27/2017    EF 45-50% per echo 11/27/17    Depression     controlled      Diverticulitis     GERD (gastroesophageal reflux disease)     controlled with med     Heart murmur     Hypertension     managed with meds    IBS (irritable bowel syndrome)     IC (interstitial cystitis)     Insomnia     Migraine with aura and without status migrainosus, not intractable 6/17/2016    Mitral valve regurgitation, rheumatic     followed Dr. Trino Foster Moderate aortic regurgitation     per echo 7/2019 in cc-- followed by dr Del Griffin Palpitations 5/16/2016    Psychiatric disorder     anxiety    Requires oxygen therapy     3l/min at hs. prn during the days as needed    Rheumatic aortic insufficiency 5/16/2016    Rheumatic fever as child    pt reports rheumatic fever in childhood    Smoker     started age 23-- smoked 0.5 ppd until 2018-- cut back to 10-2 cig daily per pt    Stroke ([address]     \"mild stroke\" pt reports 2014- \"left sided weakness and balance is off\"     Thromboembolus ([address]     BLE 2012    Vitamin D deficiency        Past Surgical History:   Procedure Laterality Date    COLONOSCOPY      8 polyps removed, diverticulitis    COLONOSCOPY N/A 5/21/2018    COLONOSCOPY performed by Kiki Valdovinos MD at [address]  8-23-11    bladder dilitation    EGD      HX APPENDECTOMY  2006    HX HEART CATHETERIZATION  5/27/2011    no blockages, no intervention    HX HEART CATHETERIZATION  04/2017    no intervention    HX LAP CHOLECYSTECTOMY  6/6/2011    HX ORTHOPAEDIC Right 07/2019    4th toe -- surg repair    HX NATASHA AND BSO  2004    fibroid tumors, hyst    HX UROLOGICAL  01/2018    cystoscopy with hydrodistention of bladder multiple    [place] BIOPSY OF BREAST, INCISIONAL Left     lymph node , left, patient states her bx neg, but high risk         Family History:   Problem Relation Age of Onset    Heart Failure Father 76        chf    Heart Disease Father     Diabetes Sister     Thyroid Disease Sister        Social History     Socioeconomic History    Marital status: SINGLE     Spouse name: Not on file    Number of children: Not on file    Years of education: Not on file    Highest education level: Not on file   Occupational History    Not on file   Social Needs    Financial resource strain: Not on file   Shweeb-Maria Luisa insecurity:     Worry: Not on file     Inability: Not on file    Transportation needs:     Medical: Not on file     Non-medical: Not on file   Tobacco Use    Smoking status: Former Smoker     Packs/day: 0.25     Years: 30.00     Pack years: 7.50    Smokeless tobacco: Never Used   Substance and Sexual Activity    Alcohol use: No    Drug use: Yes     Types: Cocaine     Comment: last used 11/2018    Sexual activity: Not on file   Lifestyle    Physical activity:     Days per week: Not on file     Minutes per session: Not on file    Stress: Not on file   Relationships    Social connections:     Talks on phone: Not on file     Gets together: Not on file     Attends Taoism service: Not on file     Active member of club or organization: Not on file     Attends meetings of clubs or organizations: Not on file     Relationship status: Not on file    Intimate partner violence:     Fear of current or ex partner: Not on file     Emotionally abused: Not on file     Physically abused: Not on file     Forced sexual activity: Not on file   Other Topics Concern    Not on file   Social History Narrative    Not on file         ALLERGIES: Aspirin; Bentyl [dicyclomine]; Toradol [ketorolac]; Ultram [tramadol]; and Zofran [ondansetron hcl (pf)]    Review of Systems   Cardiovascular: Negative for chest pain. Musculoskeletal: Positive for neck pain. Neurological: Negative for tingling, weakness and headaches. All other systems reviewed and are negative. Vitals:    01/11/20 0839   BP: 127/74   Pulse: (!) 101   Resp: 16   Temp: 97.8 °F (36.6 °C)   SpO2: 96%   Weight: 86.2 kg (190 lb)   Height: 5' 4\" (1.626 m)            Physical Exam  Vitals signs and nursing note reviewed. Constitutional:       General: She is not in acute distress. Appearance: Normal appearance. She is well-developed and normal weight. She is not diaphoretic. HENT:      Head: Normocephalic and atraumatic.       Nose: Nose normal.   Eyes: Pupils: Pupils are equal, round, and reactive to light. Neck:      Musculoskeletal: Normal range of motion and neck supple. No neck rigidity. Comments: Patient with full range of motion of the cervical spine. She points to the left lateral side is being tender. It went from the mastoid area down to the clavicle area. No pain into the scapula. She states intermittently pain will go all the way into the left arm, no numbness or tingling   Cardiovascular:      Rate and Rhythm: Normal rate and regular rhythm. Comments: No cp or sob  Pulmonary:      Effort: Pulmonary effort is normal.      Breath sounds: Normal breath sounds. Abdominal:      General: Bowel sounds are normal.      Palpations: Abdomen is soft. Musculoskeletal: Normal range of motion. Skin:     General: Skin is warm. Neurological:      General: No focal deficit present. Mental Status: She is alert and oriented to person, place, and time. Mental status is at baseline.    Psychiatric:         Mood and Affect: Mood normal.         Behavior: Behavior normal.          MDM  Number of Diagnoses or Management Options  Diagnosis management comments: c spine x rays negative  Vital signs normal  Do not feel pain in cardiac in nature  Will give few robaxin for pain and stiffness also use heating pad or heat patches  see pmd for recheck       Amount and/or Complexity of Data Reviewed  Tests in the radiology section of CPT®: ordered and reviewed  Review and summarize past medical records: yes    Risk of Complications, Morbidity, and/or Mortality  Presenting problems: low  Diagnostic procedures: low  Management options: low    Patient Progress  Patient progress: improved         Procedures

## 2020-01-19 ENCOUNTER — HOSPITAL ENCOUNTER (EMERGENCY)
Age: 55
Discharge: HOME | End: 2020-01-19
Attending: EMERGENCY MEDICINE
Payer: COMMERCIAL

## 2020-01-19 ENCOUNTER — APPOINTMENT (OUTPATIENT)
Dept: GENERAL RADIOLOGY | Age: 55
End: 2020-01-19
Attending: EMERGENCY MEDICINE
Payer: COMMERCIAL

## 2020-01-19 VITALS
OXYGEN SATURATION: 96 % | DIASTOLIC BLOOD PRESSURE: 70 MMHG | RESPIRATION RATE: 20 BRPM | TEMPERATURE: 97.8 F | BODY MASS INDEX: 32.44 KG/M2 | SYSTOLIC BLOOD PRESSURE: 125 MMHG | HEIGHT: 64 IN | WEIGHT: 190 LBS | HEART RATE: 100 BPM

## 2020-01-19 DIAGNOSIS — S62.651A CLOSED NONDISPLACED FRACTURE OF MIDDLE PHALANX OF LEFT INDEX FINGER, INITIAL ENCOUNTER: Primary | ICD-10-CM

## 2020-01-19 PROCEDURE — 99283 EMERGENCY DEPT VISIT LOW MDM: CPT

## 2020-01-19 PROCEDURE — 73140 X-RAY EXAM OF FINGER(S): CPT

## 2020-01-19 PROCEDURE — 74011250637 HC RX REV CODE- 250/637: Performed by: EMERGENCY MEDICINE

## 2020-01-19 RX ORDER — HYDROCODONE BITARTRATE AND ACETAMINOPHEN 5; 325 MG/1; MG/1
1 TABLET ORAL ONCE
Status: COMPLETED | OUTPATIENT
Start: 2020-01-19 | End: 2020-01-19

## 2020-01-19 RX ORDER — HYDROCODONE BITARTRATE AND ACETAMINOPHEN 5; 325 MG/1; MG/1
1 TABLET ORAL
Qty: 8 TAB | Refills: 0 | Status: SHIPPED | OUTPATIENT
Start: 2020-01-19 | End: 2020-01-21

## 2020-01-19 RX ADMIN — HYDROCODONE BITARTRATE AND ACETAMINOPHEN 1 TABLET: 5; 325 TABLET ORAL at 08:31

## 2020-01-19 NOTE — ED TRIAGE NOTES
Pt c/o right pointer finger pain. Pt states she hit her finger on bar while chasing her dog. Pt states \"she heard it crack. \" Pt reports swelling and difficulty bending it.

## 2020-01-19 NOTE — DISCHARGE INSTRUCTIONS
Leave splint on until checked by orthopedics. Call them Monday for appointment to recheck. Recheck sooner for numbness. Rest and elevate and cool compresses as well. Tylenol for pain. Prescription pain medicine if needed symptom stronger for a day or 2. Patient Education        Finger Fracture: Care Instructions  Your Care Instructions    Breaks in the bones of the finger usually heal well in about 3 to 4 weeks. The pain and swelling from a broken finger can last for weeks. But it should steadily improve, starting a few days after you break it. It is very important that you wear and take care of the cast or splint exactly as your doctor tells you to so that your finger heals properly and does not end up crooked. Wearing a splint may interfere with your normal activities. Ask for help with daily tasks if you need it. You heal best when you take good care of yourself. Eat a variety of healthy foods, and don't smoke. Follow-up care is a key part of your treatment and safety. Be sure to make and go to all appointments, and call your doctor if you are having problems. It's also a good idea to know your test results and keep a list of the medicines you take. How can you care for yourself at home? · If your doctor put a splint on your finger, wear the splint exactly as directed. Do not remove it until your doctor says that you can. · Keep your hand raised above the level of your heart as much as you can. This will help reduce swelling. · Put ice or a cold pack on your finger for 10 to 20 minutes at a time. Try to do this every 1 to 2 hours for the next 3 days (when you are awake) or until the swelling goes down. Put a thin cloth between the ice and your skin. Keep the splint dry. · Be safe with medicines. Take pain medicines exactly as directed. ? If the doctor gave you a prescription medicine for pain, take it as prescribed.   ? If you are not taking a prescription pain medicine, ask your doctor if you can take an over-the-counter medicine. When should you call for help? Call 911 anytime you think you may need emergency care. For example, call if:    · Your finger is cool or pale or changes color.    Call your doctor now or seek immediate medical care if:    · Your pain gets much worse.     · You have tingling, weakness, or numbness in your finger.     · You have signs of infection, such as:  ? Increased pain, swelling, warmth, or redness. ? Red streaks leading from the area. ? Pus draining from the area. ? Swollen lymph nodes in your neck, armpits, or groin. ? A fever.    Watch closely for changes in your health, and be sure to contact your doctor if:    · Your finger is not steadily improving. Where can you learn more? Go to http://maria alejandra-nereyda.info/. Enter P025 in the search box to learn more about \"Finger Fracture: Care Instructions. \"  Current as of: June 26, 2019  Content Version: 12.2  © 4472-0714 [employer]. Care instructions adapted under license by [employer] (which disclaims liability or warranty for this information). If you have questions about a medical condition or this instruction, always ask your healthcare professional. [address] any warranty or liability for your use of this information.

## 2020-01-19 NOTE — ED PROVIDER NOTES
53-year-old female accidentally struck her right finger directly against some furniture about 8 hours ago. [place] a pop. Increasing pain since. No numbness or weakness. Patient is on anticoagulant. The history is provided by the patient. Finger Pain    This is a new problem. The current episode started 6 to 12 hours ago. The problem occurs constantly. The problem has been gradually worsening. The pain is present in the right fingers. The pain is mild. Associated symptoms include limited range of motion. Pertinent negatives include no numbness and no stiffness. She has tried nothing for the symptoms. There has been a history of trauma.         Past Medical History:   Diagnosis Date    Anemia     blood transfusion 5/2018 per pt    Arrhythmia     palpitations, moderate mitral valve regurge    Arthritis     lower back osteo    Asthma     uses inhalers    Cardiomyopathy     ECHO 11/2017    CHF [facility]congestive heart failure) ([facility])     EF 30-35% last echo 7/2019-- followed by dr Valeria Esquivel Chronic pain 2010    Coagulation defect ([address]     eliquis    COPD     nebulizer daily and maint inhalers, can not climb 1 flight of stairs (also CHF)  oxygen 3 LPM qhs    Decreased cardiac ejection fraction 11/27/2017    EF 45-50% per echo 11/27/17    Depression     controlled      Diverticulitis     GERD (gastroesophageal reflux disease)     controlled with med     Heart murmur     Hypertension     managed with meds    IBS (irritable bowel syndrome)     IC (interstitial cystitis)     Insomnia     Migraine with aura and without status migrainosus, not intractable 6/17/2016    Mitral valve regurgitation, rheumatic     followed Dr. Latisha Boyer Moderate aortic regurgitation     per echo 7/2019 in cc-- followed by dr Valeria Esquivel Palpitations 5/16/2016    Psychiatric disorder     anxiety    Requires oxygen therapy     3l/min at hs. prn during the days as needed    Rheumatic aortic insufficiency 5/16/2016    Rheumatic fever as child    pt reports rheumatic fever in childhood    Smoker     started age 23-- smoked 0.5 ppd until 2018-- cut back to 10-2 cig daily per pt    Stroke [facility])     \"mild stroke\" pt reports 2014- \"left sided weakness and balance is off\"     Thromboembolus ([address]     BLE 2012    Vitamin D deficiency        Past Surgical History:   Procedure Laterality Date    COLONOSCOPY      8 polyps removed, diverticulitis    COLONOSCOPY N/A 5/21/2018    COLONOSCOPY performed by Bryon Olvera MD at [address]  8-23-11    bladder dilitation    EGD      HX APPENDECTOMY  2006    HX HEART CATHETERIZATION  5/27/2011    no blockages, no intervention    HX HEART CATHETERIZATION  04/2017    no intervention    HX LAP CHOLECYSTECTOMY  6/6/2011    HX ORTHOPAEDIC Right 07/2019    4th toe -- surg repair    HX NATASHA AND BSO  2004    fibroid tumors, hyst    HX UROLOGICAL  01/2018    cystoscopy with hydrodistention of bladder multiple    [place] BIOPSY OF BREAST, INCISIONAL Left     lymph node , left, patient states her bx neg, but high risk         Family History:   Problem Relation Age of Onset    Heart Failure Father 76        chf    Heart Disease Father     Diabetes Sister     Thyroid Disease Sister        Social History     Socioeconomic History    Marital status: SINGLE     Spouse name: Not on file    Number of children: Not on file    Years of education: Not on file    Highest education level: Not on file   Occupational History    Not on file   Social Needs    Financial resource strain: Not on file    Food insecurity:     Worry: Not on file     Inability: Not on file    Transportation needs:     Medical: Not on file     Non-medical: Not on file   Tobacco Use    Smoking status: Former Smoker     Packs/day: 0.25     Years: 30.00     Pack years: 7.50    Smokeless tobacco: Never Used   Substance and Sexual Activity    Alcohol use: No    Drug use: Yes     Types: Cocaine     Comment: last used 11/2018  Sexual activity: Not on file   Lifestyle    Physical activity:     Days per week: Not on file     Minutes per session: Not on file    Stress: Not on file   Relationships    Social connections:     Talks on phone: Not on file     Gets together: Not on file     Attends Catholic service: Not on file     Active member of club or organization: Not on file     Attends meetings of clubs or organizations: Not on file     Relationship status: Not on file    Intimate partner violence:     Fear of current or ex partner: Not on file     Emotionally abused: Not on file     Physically abused: Not on file     Forced sexual activity: Not on file   Other Topics Concern    Not on file   Social History Narrative    Not on file         ALLERGIES: Aspirin; Bentyl [dicyclomine]; Toradol [ketorolac]; Ultram [tramadol]; and Zofran [ondansetron hcl (pf)]    Review of Systems   Musculoskeletal: Negative for stiffness. Neurological: Negative for weakness and numbness. Vitals:    01/19/20 0724   BP: 125/70   Pulse: 100   Resp: 20   Temp: 97.8 °F (36.6 °C)   SpO2: 96%   Weight: 86.2 kg (190 lb)   Height: 5' 4\" (1.626 m)            Physical Exam  Vitals signs and nursing note reviewed. Constitutional:       Appearance: She is not ill-appearing. Musculoskeletal:      Right wrist: Normal.        Hands:    Skin:     General: Skin is warm and dry. Neurological:      Mental Status: She is alert.           MDM  Number of Diagnoses or Management Options  Diagnosis management comments: Imaging to rule out fracture       Amount and/or Complexity of Data Reviewed  Tests in the radiology section of CPT®: ordered and reviewed  Independent visualization of images, tracings, or specimens: yes    Risk of Complications, Morbidity, and/or Mortality  Presenting problems: low  Diagnostic procedures: minimal  Management options: low    Patient Progress  Patient progress: stable         Procedures      Avulsion proximal aspect middle phalanx at the volar plate. Will place in a splint.   Orthopedic follow-up

## 2020-01-19 NOTE — ED NOTES
Pt states \"Tell the doctor I would like something for pain and if he can't give it to me now then I can wait and he can give it to me right before I go home\"

## 2020-01-19 NOTE — ED NOTES
I have reviewed discharge instructions with the patient. The patient verbalized understanding. Patient left ED via Discharge Method: ambulatory to Home with self. Opportunity for questions and clarification provided. Patient given 1 scripts. To continue your aftercare when you leave the hospital, you may receive an automated call from our care team to check in on how you are doing. This is a free service and part of our promise to provide the best care and service to meet your aftercare needs.  If you have questions, or wish to unsubscribe from this service please call [phone]. Thank you for Choosing our [facility] Emergency Department.

## 2020-02-06 ENCOUNTER — HOSPITAL ENCOUNTER (OUTPATIENT)
Dept: GENERAL RADIOLOGY | Age: 55
Discharge: HOME | End: 2020-02-06
Payer: COMMERCIAL

## 2020-02-06 DIAGNOSIS — D50.8 OTHER IRON DEFICIENCY ANEMIA: ICD-10-CM

## 2020-02-06 DIAGNOSIS — Z12.11 SCREEN FOR COLON CANCER: ICD-10-CM

## 2020-02-06 DIAGNOSIS — Z13.1 SCREENING FOR DIABETES MELLITUS: ICD-10-CM

## 2020-02-06 DIAGNOSIS — Z13.220 SCREENING FOR LIPOID DISORDERS: ICD-10-CM

## 2020-02-06 DIAGNOSIS — M54.2 NECK PAIN: ICD-10-CM

## 2020-02-06 DIAGNOSIS — Z12.31 SCREENING MAMMOGRAM, ENCOUNTER FOR: ICD-10-CM

## 2020-02-06 DIAGNOSIS — F17.200 NICOTINE DEPENDENCE, UNCOMPLICATED, UNSPECIFIED NICOTINE PRODUCT TYPE: ICD-10-CM

## 2020-02-06 DIAGNOSIS — I10 ESSENTIAL HYPERTENSION: ICD-10-CM

## 2020-02-06 DIAGNOSIS — I50.22 CHRONIC SYSTOLIC HEART FAILURE (HCC): ICD-10-CM

## 2020-02-06 DIAGNOSIS — Z86.718 HISTORY OF DVT (DEEP VEIN THROMBOSIS): ICD-10-CM

## 2020-02-06 PROCEDURE — 72050 X-RAY EXAM NECK SPINE 4/5VWS: CPT

## 2020-02-07 NOTE — PROGRESS NOTES
Her neck x-ray is negative per radiologist; however, the radiologist does mention that she has atherosclerotic changes of her left carotid artery; I would recommend a carotid ultrasound; order placed; additionally, if she would like an ultrasound of her thyroid or anterior neck because she may have noticed any problems of her anterior neck (lumps, etc), please let me know and I can order that as well; thank you, JF  Please send this result note back to me with answers to the above questions

## 2020-02-26 ENCOUNTER — HOSPITAL ENCOUNTER (EMERGENCY)
Age: 55
Discharge: HOME | End: 2020-02-26
Attending: EMERGENCY MEDICINE
Payer: COMMERCIAL

## 2020-02-26 ENCOUNTER — APPOINTMENT (OUTPATIENT)
Dept: GENERAL RADIOLOGY | Age: 55
End: 2020-02-26
Attending: EMERGENCY MEDICINE
Payer: COMMERCIAL

## 2020-02-26 ENCOUNTER — APPOINTMENT (OUTPATIENT)
Dept: CT IMAGING | Age: 55
End: 2020-02-26
Attending: EMERGENCY MEDICINE
Payer: COMMERCIAL

## 2020-02-26 VITALS
OXYGEN SATURATION: 95 % | HEIGHT: 64 IN | RESPIRATION RATE: 22 BRPM | SYSTOLIC BLOOD PRESSURE: 144 MMHG | HEART RATE: 88 BPM | BODY MASS INDEX: 31.58 KG/M2 | TEMPERATURE: 97.7 F | DIASTOLIC BLOOD PRESSURE: 69 MMHG | WEIGHT: 185 LBS

## 2020-02-26 DIAGNOSIS — M54.2 NECK PAIN: Primary | ICD-10-CM

## 2020-02-26 LAB
ALBUMIN SERPL-MCNC: 3.3 G/DL (ref 3.5–5)
ALBUMIN/GLOB SERPL: 1 {RATIO} (ref 1.2–3.5)
ALP SERPL-CCNC: 80 U/L (ref 50–136)
ALT SERPL-CCNC: 24 U/L (ref 12–65)
ANION GAP SERPL CALC-SCNC: 9 MMOL/L (ref 7–16)
AST SERPL-CCNC: 31 U/L (ref 15–37)
ATRIAL RATE: 89 BPM
BASOPHILS # BLD: 0 K/UL (ref 0–0.2)
BASOPHILS NFR BLD: 1 % (ref 0–2)
BILIRUB SERPL-MCNC: 0.3 MG/DL (ref 0.2–1.1)
BUN SERPL-MCNC: 10 MG/DL (ref 6–23)
CALCIUM SERPL-MCNC: 9.1 MG/DL (ref 8.3–10.4)
CALCULATED P AXIS, ECG09: 65 DEGREES
CALCULATED R AXIS, ECG10: 63 DEGREES
CALCULATED T AXIS, ECG11: -55 DEGREES
CHLORIDE SERPL-SCNC: 107 MMOL/L (ref 98–107)
CO2 SERPL-SCNC: 24 MMOL/L (ref 21–32)
CREAT SERPL-MCNC: 1.11 MG/DL (ref 0.6–1)
DIAGNOSIS, 93000: NORMAL
DIFFERENTIAL METHOD BLD: ABNORMAL
EOSINOPHIL # BLD: 0.1 K/UL (ref 0–0.8)
EOSINOPHIL NFR BLD: 1 % (ref 0.5–7.8)
ERYTHROCYTE [DISTWIDTH] IN BLOOD BY AUTOMATED COUNT: 13.7 % (ref 11.9–14.6)
GLOBULIN SER CALC-MCNC: 3.4 G/DL (ref 2.3–3.5)
GLUCOSE SERPL-MCNC: 100 MG/DL (ref 65–100)
HCT VFR BLD AUTO: 29.8 % (ref 35.8–46.3)
HGB BLD-MCNC: 9.4 G/DL (ref 11.7–15.4)
IMM GRANULOCYTES # BLD AUTO: 0 K/UL (ref 0–0.5)
IMM GRANULOCYTES NFR BLD AUTO: 0 % (ref 0–5)
LYMPHOCYTES # BLD: 1.9 K/UL (ref 0.5–4.6)
LYMPHOCYTES NFR BLD: 35 % (ref 13–44)
MCH RBC QN AUTO: 28.8 PG (ref 26.1–32.9)
MCHC RBC AUTO-ENTMCNC: 31.5 G/DL (ref 31.4–35)
MCV RBC AUTO: 91.4 FL (ref 79.6–97.8)
MONOCYTES # BLD: 0.6 K/UL (ref 0.1–1.3)
MONOCYTES NFR BLD: 11 % (ref 4–12)
NEUTS SEG # BLD: 2.8 K/UL (ref 1.7–8.2)
NEUTS SEG NFR BLD: 52 % (ref 43–78)
NRBC # BLD: 0 K/UL (ref 0–0.2)
P-R INTERVAL, ECG05: 142 MS
PLATELET # BLD AUTO: 313 K/UL (ref 150–450)
PMV BLD AUTO: 9.6 FL (ref 9.4–12.3)
POTASSIUM SERPL-SCNC: 3.1 MMOL/L (ref 3.5–5.1)
PROT SERPL-MCNC: 6.7 G/DL (ref 6.3–8.2)
Q-T INTERVAL, ECG07: 332 MS
QRS DURATION, ECG06: 82 MS
QTC CALCULATION (BEZET), ECG08: 403 MS
RBC # BLD AUTO: 3.26 M/UL (ref 4.05–5.2)
SODIUM SERPL-SCNC: 140 MMOL/L (ref 136–145)
TROPONIN I SERPL-MCNC: <0.02 NG/ML (ref 0.02–0.05)
VENTRICULAR RATE, ECG03: 89 BPM
WBC # BLD AUTO: 5.4 K/UL (ref 4.3–11.1)

## 2020-02-26 PROCEDURE — 84484 ASSAY OF TROPONIN QUANT: CPT

## 2020-02-26 PROCEDURE — 93005 ELECTROCARDIOGRAM TRACING: CPT | Performed by: EMERGENCY MEDICINE

## 2020-02-26 PROCEDURE — 85025 COMPLETE CBC W/AUTO DIFF WBC: CPT

## 2020-02-26 PROCEDURE — 99284 EMERGENCY DEPT VISIT MOD MDM: CPT

## 2020-02-26 PROCEDURE — 70450 CT HEAD/BRAIN W/O DYE: CPT

## 2020-02-26 PROCEDURE — 80053 COMPREHEN METABOLIC PANEL: CPT

## 2020-02-26 PROCEDURE — 71046 X-RAY EXAM CHEST 2 VIEWS: CPT

## 2020-02-26 NOTE — ED NOTES
I have reviewed discharge instructions with the patient. The patient verbalized understanding. Patient left ED via Discharge Method: ambulatory to Home with self. Opportunity for questions and clarification provided. Patient given 0 scripts. To continue your aftercare when you leave the hospital, you may receive an automated call from our care team to check in on how you are doing. This is a free service and part of our promise to provide the best care and service to meet your aftercare needs.  If you have questions, or wish to unsubscribe from this service please call [phone]. Thank you for Choosing our [facility] Emergency Department.

## 2020-02-26 NOTE — ED TRIAGE NOTES
Patient states left sided neck pain that started a few weeks ago. States 2 days ago had left arm pain and numbness. States she has also noticed a change in her vision. States her blood pressure has also been elevated for her. States she has been taking her medication as prescribed.

## 2020-02-26 NOTE — ED PROVIDER NOTES
Patient is a 29-year-old female who comes to the emergency department this morning with multiple complaints. She states for several weeks she is having pain in her left neck with no known injury. She states the pain radiates to the left shoulder and down the left arm. She states it also then goes to the left leg. She then also complains of chest pain and headaches. Again all these pains have been going on daily for several weeks. She is quite anxious and keeps changing her story. The history is provided by the patient. Neck Pain    This is a chronic problem. The current episode started more than 1 week ago. The problem occurs daily. The problem has not changed since onset. The pain is associated with an unknown factor. There has been no fever. The pain is present in the left side. The quality of the pain is described as shooting. The pain radiates to the left shoulder. The pain is moderate. The symptoms are aggravated by certain positions. Associated symptoms include chest pain and headaches. Pertinent negatives include no visual change, no bowel incontinence, no bladder incontinence, no leg pain, no paresis, no tingling and no weakness. She has tried nothing for the symptoms. Arm Pain    Associated symptoms include neck pain. Pertinent negatives include no tingling and no back pain.         Past Medical History:   Diagnosis Date    Anemia     blood transfusion 5/2018 per pt    Arrhythmia     palpitations, moderate mitral valve regurge    Arthritis     lower back osteo    Asthma     uses inhalers    Cardiomyopathy     ECHO 11/2017    CHF (congestive heart failure) (HCC)     EF 30-35% last echo 7/2019-- followed by dr Fernandez Kirkland Chronic pain 2010    Coagulation defect ([facility] [address]     eliquis    COPD     nebulizer daily and maint inhalers, can not climb 1 flight of stairs (also CHF)  oxygen 3 LPM qhs    Decreased cardiac ejection fraction 11/27/2017    EF 45-50% per echo 11/27/17    Depression controlled      Diverticulitis     GERD (gastroesophageal reflux disease)     controlled with med     Heart murmur     Hypertension     managed with meds    IBS (irritable bowel syndrome)     IC (interstitial cystitis)     Insomnia     Migraine with aura and without status migrainosus, not intractable 6/17/2016    Mitral valve regurgitation, rheumatic     followed Dr. Samreen Thornton Moderate aortic regurgitation     per echo 7/2019 in -- followed by dr Jennifer Nguyen Palpitations 5/16/2016    Psychiatric disorder     anxiety    Requires oxygen therapy     3l/min at hs. prn during the days as needed    Rheumatic aortic insufficiency 5/16/2016    Rheumatic fever as child    pt reports rheumatic fever in childhood    Smoker     started age 23-- smoked 0.5 ppd until 2018-- cut back to 10-2 cig daily per pt    Stroke ([address]     \"mild stroke\" pt reports 2014- \"left sided weakness and balance is off\"     Thromboembolus ([address]     BLE 2012    Vitamin D deficiency        Past Surgical History:   Procedure Laterality Date    COLONOSCOPY      8 polyps removed, diverticulitis    COLONOSCOPY N/A 5/21/2018    COLONOSCOPY performed by Sesar Valdes MD at [address]  8-23-11    bladder dilitation    EGD      HX APPENDECTOMY  2006    HX HEART CATHETERIZATION  5/27/2011    no blockages, no intervention    HX HEART CATHETERIZATION  04/2017    no intervention    HX LAP CHOLECYSTECTOMY  6/6/2011    HX ORTHOPAEDIC Right 07/2019    4th toe -- surg repair    HX NATASHA AND BSO  2004    fibroid tumors, hyst    HX UROLOGICAL  01/2018    cystoscopy with hydrodistention of bladder multiple    [place] BIOPSY OF BREAST, INCISIONAL Left     lymph node , left, patient states her bx neg, but high risk         Family History:   Problem Relation Age of Onset    Heart Failure Father 76        chf    Heart Disease Father     Diabetes Sister     Thyroid Disease Sister        Social History     Socioeconomic History    Marital status: SINGLE     Spouse name: Not on file    Number of children: Not on file    Years of education: Not on file    Highest education level: Not on file   Occupational History    Not on file   Social Needs    Financial resource strain: Not on file    Food insecurity:     Worry: Not on file     Inability: Not on file    Transportation needs:     Medical: Not on file     Non-medical: Not on file   Tobacco Use    Smoking status: Former Smoker     Packs/day: 0.25     Years: 30.00     Pack years: 7.50    Smokeless tobacco: Never Used   Substance and Sexual Activity    Alcohol use: No    Drug use: Yes     Types: Cocaine     Comment: last used 11/2018    Sexual activity: Not on file   Lifestyle    Physical activity:     Days per week: Not on file     Minutes per session: Not on file    Stress: Not on file   Relationships    Social connections:     Talks on phone: Not on file     Gets together: Not on file     Attends [employer] service: Not on file     Active member of club or organization: Not on file     Attends meetings of clubs or organizations: Not on file     Relationship status: Not on file    Intimate partner violence:     Fear of current or ex partner: Not on file     Emotionally abused: Not on file     Physically abused: Not on file     Forced sexual activity: Not on file   Other Topics Concern    Not on file   Social History Narrative    Not on file         ALLERGIES: Aspirin; Bentyl [dicyclomine]; Toradol [ketorolac]; Ultram [tramadol]; and Zofran [ondansetron hcl (pf)]    Review of Systems   Constitutional: Negative for chills, fatigue and fever. HENT: Negative for congestion, rhinorrhea and sore throat. Eyes: Negative for pain, discharge and visual disturbance. Respiratory: Negative for cough and shortness of breath. Cardiovascular: Positive for chest pain. Negative for palpitations. Gastrointestinal: Negative for abdominal pain, bowel incontinence, diarrhea and nausea. Endocrine: Negative for polydipsia and polyuria. Genitourinary: Negative for bladder incontinence, dysuria, frequency and urgency. Musculoskeletal: Positive for neck pain. Negative for back pain. Skin: Negative for rash. Neurological: Positive for headaches. Negative for tingling, seizures, syncope and weakness. Hematological: Negative. Vitals:    02/26/20 0626   BP: 154/72   Pulse: (!) 102   Resp: 22   Temp: 97.7 °F (36.5 °C)   SpO2: 96%   Weight: 83.9 kg (185 lb)   Height: 5' 4\" (1.626 m)            Physical Exam  Vitals signs and nursing note reviewed. Constitutional:       Appearance: She is well-developed. HENT:      Head: Normocephalic and atraumatic. Nose: Nose normal.      Mouth/Throat:      Mouth: Mucous membranes are moist.      Pharynx: Oropharynx is clear. No oropharyngeal exudate. Eyes:      Conjunctiva/sclera: Conjunctivae normal.      Pupils: Pupils are equal, round, and reactive to light. Neck:      Musculoskeletal: Normal range of motion and neck supple. Muscular tenderness present. Comments: Tender on the left neck. Cardiovascular:      Rate and Rhythm: Normal rate and regular rhythm. Pulmonary:      Effort: Pulmonary effort is normal.      Breath sounds: Normal breath sounds. Chest:      Chest wall: Tenderness present. Abdominal:      Palpations: Abdomen is soft. Tenderness: There is no abdominal tenderness. There is no guarding or rebound. Musculoskeletal: Normal range of motion. General: No deformity. Lymphadenopathy:      Cervical: No cervical adenopathy. Skin:     General: Skin is warm and dry. Capillary Refill: Capillary refill takes less than 2 seconds. Findings: No rash. Neurological:      Mental Status: She is alert and oriented to person, place, and time. GCS: GCS eye subscore is 4. GCS verbal subscore is 5. GCS motor subscore is 6. Cranial Nerves: No cranial nerve deficit, dysarthria or facial asymmetry. Sensory: Sensation is intact. No sensory deficit. Motor: Motor function is intact. No tremor, seizure activity or pronator drift. Coordination: Coordination is intact. Deep Tendon Reflexes: Reflexes are normal and symmetric. Psychiatric:         Mood and Affect: Mood is anxious. MDM  Number of Diagnoses or Management Options  Diagnosis management comments: EKG reviewed by me shows normal sinus rhythm with a rate of 89 no acute ischemic changes. No change in morphology compared to previous  Prior records were reviewed she had a negative heart cath 2 years ago. 7:55 AM  Chest x-ray is negative  CAT scan head negative  Blood work unremarkable    Records reviewed and patient has been seen by her primary care doctor for this chronic pain in the past.  With a negative work-up today I think this is all chronic musculoskeletal pain. Advised to follow-up with her doctor. Voice dictation software was used during the making of this note. This software is not perfect and grammatical and other typographical errors may be present. This note has been proofread, but may still contain errors.   Bowen Seymour MD; 2/26/2020 @7:56 AM   ===================================================================         Amount and/or Complexity of Data Reviewed  Clinical lab tests: ordered and reviewed  Tests in the radiology section of CPT®: ordered and reviewed  Review and summarize past medical records: yes  Independent visualization of images, tracings, or specimens: yes    Risk of Complications, Morbidity, and/or Mortality  Presenting problems: moderate  Diagnostic procedures: low  Management options: low    Patient Progress  Patient progress: stable         Procedures

## 2020-03-05 ENCOUNTER — HOSPITAL ENCOUNTER (OUTPATIENT)
Dept: LAB | Age: 55
Discharge: HOME | End: 2020-03-05
Payer: COMMERCIAL

## 2020-03-05 DIAGNOSIS — D50.8 OTHER IRON DEFICIENCY ANEMIA: ICD-10-CM

## 2020-03-05 LAB
ALBUMIN SERPL-MCNC: 3.3 G/DL (ref 3.5–5)
ALBUMIN/GLOB SERPL: 1 {RATIO} (ref 1.2–3.5)
ALP SERPL-CCNC: 82 U/L (ref 50–136)
ALT SERPL-CCNC: 23 U/L (ref 12–65)
ANION GAP SERPL CALC-SCNC: 4 MMOL/L (ref 7–16)
AST SERPL-CCNC: 28 U/L (ref 15–37)
BASOPHILS # BLD: 0 K/UL (ref 0–0.2)
BASOPHILS NFR BLD: 1 % (ref 0–2)
BILIRUB SERPL-MCNC: 0.2 MG/DL (ref 0.2–1.1)
BUN SERPL-MCNC: 8 MG/DL (ref 6–23)
CALCIUM SERPL-MCNC: 8.8 MG/DL (ref 8.3–10.4)
CHLORIDE SERPL-SCNC: 112 MMOL/L (ref 98–107)
CO2 SERPL-SCNC: 27 MMOL/L (ref 21–32)
CREAT SERPL-MCNC: 1.18 MG/DL (ref 0.6–1)
DIFFERENTIAL METHOD BLD: ABNORMAL
EOSINOPHIL # BLD: 0.1 K/UL (ref 0–0.8)
EOSINOPHIL NFR BLD: 1 % (ref 0.5–7.8)
ERYTHROCYTE [DISTWIDTH] IN BLOOD BY AUTOMATED COUNT: 13.8 % (ref 11.9–14.6)
FERRITIN SERPL-MCNC: 14 NG/ML (ref 8–388)
FOLATE SERPL-MCNC: 13.7 NG/ML (ref 3.1–17.5)
GLOBULIN SER CALC-MCNC: 3.3 G/DL (ref 2.3–3.5)
GLUCOSE SERPL-MCNC: 98 MG/DL (ref 65–100)
HCT VFR BLD AUTO: 30.7 % (ref 35.8–46.3)
HGB BLD-MCNC: 9.6 G/DL (ref 11.7–15.4)
HGB RETIC QN AUTO: 32 PG (ref 29–35)
IMM GRANULOCYTES # BLD AUTO: 0 K/UL (ref 0–0.5)
IMM GRANULOCYTES NFR BLD AUTO: 0 % (ref 0–5)
IMM RETICS NFR: 12.6 % (ref 3–15.9)
IRON SATN MFR SERPL: 10 %
IRON SERPL-MCNC: 41 UG/DL (ref 35–150)
LYMPHOCYTES # BLD: 1.6 K/UL (ref 0.5–4.6)
LYMPHOCYTES NFR BLD: 31 % (ref 13–44)
MCH RBC QN AUTO: 28.6 PG (ref 26.1–32.9)
MCHC RBC AUTO-ENTMCNC: 31.3 G/DL (ref 31.4–35)
MCV RBC AUTO: 91.4 FL (ref 79.6–97.8)
MONOCYTES # BLD: 0.5 K/UL (ref 0.1–1.3)
MONOCYTES NFR BLD: 10 % (ref 4–12)
NEUTS SEG # BLD: 3 K/UL (ref 1.7–8.2)
NEUTS SEG NFR BLD: 57 % (ref 43–78)
NRBC # BLD: 0.02 K/UL (ref 0–0.2)
PLATELET # BLD AUTO: 395 K/UL (ref 150–450)
PMV BLD AUTO: 9.2 FL (ref 9.4–12.3)
POTASSIUM SERPL-SCNC: 3.7 MMOL/L (ref 3.5–5.1)
PROT SERPL-MCNC: 6.6 G/DL (ref 6.3–8.2)
RBC # BLD AUTO: 3.36 M/UL (ref 4.05–5.25)
RETICS # AUTO: 0.06 M/UL (ref 0.03–0.1)
RETICS/RBC NFR AUTO: 1.8 % (ref 0.3–2)
SODIUM SERPL-SCNC: 143 MMOL/L (ref 136–145)
TIBC SERPL-MCNC: 408 UG/DL (ref 250–450)
VIT B12 SERPL-MCNC: 334 PG/ML (ref 193–986)
WBC # BLD AUTO: 5.3 K/UL (ref 4.3–11.1)

## 2020-03-05 PROCEDURE — 85046 RETICYTE/HGB CONCENTRATE: CPT

## 2020-03-05 PROCEDURE — 82746 ASSAY OF FOLIC ACID SERUM: CPT

## 2020-03-05 PROCEDURE — 85025 COMPLETE CBC W/AUTO DIFF WBC: CPT

## 2020-03-05 PROCEDURE — 83540 ASSAY OF IRON: CPT

## 2020-03-05 PROCEDURE — 82607 VITAMIN B-12: CPT

## 2020-03-05 PROCEDURE — 82728 ASSAY OF FERRITIN: CPT

## 2020-03-05 PROCEDURE — 36415 COLL VENOUS BLD VENIPUNCTURE: CPT

## 2020-03-05 PROCEDURE — 80053 COMPREHEN METABOLIC PANEL: CPT

## 2020-03-09 ENCOUNTER — HOSPITAL ENCOUNTER (EMERGENCY)
Age: 55
Discharge: HOME | End: 2020-03-09
Attending: EMERGENCY MEDICINE
Payer: COMMERCIAL

## 2020-03-09 VITALS
WEIGHT: 185 LBS | HEIGHT: 64 IN | DIASTOLIC BLOOD PRESSURE: 72 MMHG | BODY MASS INDEX: 31.58 KG/M2 | HEART RATE: 96 BPM | RESPIRATION RATE: 20 BRPM | OXYGEN SATURATION: 96 % | SYSTOLIC BLOOD PRESSURE: 124 MMHG

## 2020-03-09 DIAGNOSIS — T14.8XXA ABRASION: Primary | ICD-10-CM

## 2020-03-09 PROCEDURE — 99282 EMERGENCY DEPT VISIT SF MDM: CPT

## 2020-03-09 NOTE — DISCHARGE INSTRUCTIONS
Patient Education        Scrapes (Abrasions): Care Instructions  Your Care Instructions  Scrapes (abrasions) are wounds where your skin has been rubbed or torn off. Most scrapes do not go deep into the skin, but some may remove several layers of skin. Scrapes usually don't bleed much, but they may ooze pinkish fluid. Scrapes on the head or face may appear worse than they are. They may bleed a lot because of the good blood supply to this area. Most scrapes heal well and may not need a bandage. They usually heal within 3 to 7 days. A large, deep scrape may take 1 to 2 weeks or longer to heal. A scab may form on some scrapes. Follow-up care is a key part of your treatment and safety. Be sure to make and go to all appointments, and call your doctor if you are having problems. It's also a good idea to know your test results and keep a list of the medicines you take. How can you care for yourself at home? · If your doctor told you how to care for your wound, follow your doctor's instructions. If you did not get instructions, follow this general advice:  ? Wash the scrape with clean water 2 times a day. Don't use hydrogen peroxide or alcohol, which can slow healing. ? You may cover the scrape with a thin layer of petroleum jelly, such as Vaseline, and a nonstick bandage. ? Apply more petroleum jelly and replace the bandage as needed. · Prop up the injured area on a pillow anytime you sit or lie down during the next 3 days. Try to keep it above the level of your heart. This will help reduce swelling. · Be safe with medicines. Take pain medicines exactly as directed. ? If the doctor gave you a prescription medicine for pain, take it as prescribed. ? If you are not taking a prescription pain medicine, ask your doctor if you can take an over-the-counter medicine. When should you call for help?   Call your doctor now or seek immediate medical care if:    · You have signs of infection, such as:  ? Increased pain, swelling, warmth, or redness around the scrape. ? Red streaks leading from the scrape. ? Pus draining from the scrape. ? A fever.     · The scrape starts to bleed, and blood soaks through the bandage. Oozing small amounts of blood is normal.    Watch closely for changes in your health, and be sure to contact your doctor if the scrape is not getting better each day. Where can you learn more? Go to http://maria alejandra-neeryda.info/. Enter A374 in the search box to learn more about \"Scrapes (Abrasions): Care Instructions. \"  Current as of: June 26, 2019  Content Version: 12.2  © 7479-5485 [employer]. Care instructions adapted under license by [employer] (which disclaims liability or warranty for this information). If you have questions about a medical condition or this instruction, always ask your healthcare professional. [address] any warranty or liability for your use of this information.

## 2020-03-09 NOTE — ED TRIAGE NOTES
Pt arrives from home via POV with c/o abrasion on right knee, on the medial aspect. Pt hit the knee on her dog's crate. Pt on blood thinners and was worried about bleeding. Bleeding controlled before triage, wound covered with 4x4 gauze and wrapped with Ray Roc.

## 2020-03-09 NOTE — ED NOTES
I have reviewed discharge instructions with the patient. The patient verbalized understanding. Patient left ED via Discharge Method: ambulatory to Home with self. Opportunity for questions and clarification provided. Patient given 0 scripts. To continue your aftercare when you leave the hospital, you may receive an automated call from our care team to check in on how you are doing. This is a free service and part of our promise to provide the best care and service to meet your aftercare needs.  If you have questions, or wish to unsubscribe from this service please call [phone]. Thank you for Choosing our [facility] Emergency Department.

## 2020-03-09 NOTE — ED PROVIDER NOTES
57-year-old -American female presents with abrasion to right knee. She states she bumped it on a dog cage this morning. Tetanus is up-to-date. No other complaints. Bleeding has been controlled. The history is provided by the patient.    Leg Injury           Past Medical History:   Diagnosis Date    Anemia     blood transfusion 5/2018 per pt    Arrhythmia     palpitations, moderate mitral valve regurge    Arthritis     lower back osteo    Asthma     uses inhalers    Cardiomyopathy     ECHO 11/2017    CHF [facility]congestive [facility] failure) ([facility])     EF 30-35% last echo 7/2019-- followed by dr Kermit Bravo Chronic pain 2010    Coagulation defect ([facility] [address]     eliquis    COPD     nebulizer daily and maint inhalers, can not climb 1 flight of stairs (also CHF)  oxygen 3 LPM qhs    Decreased cardiac ejection fraction 11/27/2017    EF 45-50% per echo 11/27/17    Depression     controlled      Diverticulitis     GERD (gastroesophageal reflux disease)     controlled with med     Heart murmur     Hypertension     managed with meds    IBS (irritable bowel syndrome)     IC (interstitial cystitis)     Insomnia     Migraine with aura and without status migrainosus, not intractable 6/17/2016    Mitral valve regurgitation, rheumatic     followed Dr. Cate De Jesus Moderate aortic regurgitation     per echo 7/2019 in [facility]-- followed by dr Kermit Bravo Palpitations 5/16/2016    Psychiatric disorder     anxiety    Requires oxygen therapy     3l/min at hs. prn during the days as needed    Rheumatic aortic insufficiency 5/16/2016    Rheumatic fever as child    pt reports rheumatic fever in childhood    Smoker     started age 23-- smoked 0.5 ppd until 2018-- cut back to 10-2 cig daily per pt    Stroke [facility])     \"mild stroke\" pt reports 2014- \"left sided weakness and balance is off\"     Thromboembolus ([facility] [address]     BLE 2012    Vitamin D deficiency        Past Surgical History:   Procedure Laterality Date    COLONOSCOPY      8 polyps removed, diverticulitis    COLONOSCOPY N/A 5/21/2018    COLONOSCOPY performed by Poncho Mchugh MD at [address]  8-23-11    bladder dilitation    EGD      HX APPENDECTOMY  2006    HX HEART CATHETERIZATION  5/27/2011    no blockages, no intervention    HX HEART CATHETERIZATION  04/2017    no intervention    HX LAP CHOLECYSTECTOMY  6/6/2011    HX ORTHOPAEDIC Right 07/2019    4th toe -- surg repair    HX NATASHA AND BSO  2004    fibroid tumors, hyst    HX UROLOGICAL  01/2018    cystoscopy with hydrodistention of bladder multiple    [place] BIOPSY OF BREAST, INCISIONAL Left     lymph node , left, patient states her bx neg, but high risk         Family History:   Problem Relation Age of Onset    Heart Failure Father 76        chf    Heart Disease Father     Diabetes Sister     Thyroid Disease Sister        Social History     Socioeconomic History    Marital status: SINGLE     Spouse name: Not on file    Number of children: Not on file    Years of education: Not on file    Highest education level: Not on file   Occupational History    Not on file   Social Needs    Financial resource strain: Not on file    Food insecurity:     Worry: Not on file     Inability: Not on file    Transportation needs:     Medical: Not on file     Non-medical: Not on file   Tobacco Use    Smoking status: Former Smoker     Packs/day: 0.25     Years: 30.00     Pack years: 7.50    Smokeless tobacco: Never Used   Substance and Sexual Activity    Alcohol use: No    Drug use: Yes     Types: Cocaine     Comment: last used 11/2018    Sexual activity: Not on file   Lifestyle    Physical activity:     Days per week: Not on file     Minutes per session: Not on file    Stress: Not on file   Relationships    Social connections:     Talks on phone: Not on file     Gets together: Not on file     Attends [employer] service: Not on file     Active member of club or organization: Not on file     Attends meetings of clubs or organizations: Not on file     Relationship status: Not on file    Intimate partner violence:     Fear of current or ex partner: Not on file     Emotionally abused: Not on file     Physically abused: Not on file     Forced sexual activity: Not on file   Other Topics Concern    Not on file   Social History Narrative    Not on file         ALLERGIES: Aspirin; Bentyl [dicyclomine]; Toradol [ketorolac]; Ultram [tramadol]; and Zofran [ondansetron hcl (pf)]    Review of Systems   Constitutional: Negative for fever. Respiratory: Negative for shortness of breath. Gastrointestinal: Negative for vomiting. Neurological: Negative for headaches. Vitals:    03/09/20 0620   BP: 124/72   Pulse: 96   Resp: 20   SpO2: 96%   Weight: 83.9 kg (185 lb)   Height: 5' 4\" (1.626 m)            Physical Exam  Vitals signs and nursing note reviewed. Constitutional:       General: She is not in acute distress. Appearance: Normal appearance. She is not toxic-appearing. HENT:      Head: Normocephalic and atraumatic. Neck:      Musculoskeletal: Normal range of motion. Cardiovascular:      Rate and Rhythm: Normal rate. Pulmonary:      Effort: Pulmonary effort is normal.   Musculoskeletal: Normal range of motion. Comments: Superficial abrasion medial aspect of right knee. No active bleeding. Skin:     General: Skin is warm and dry. Neurological:      Mental Status: She is alert and oriented to person, place, and time. Psychiatric:         Mood and Affect: Mood normal.         Behavior: Behavior normal.          MDM  Number of Diagnoses or Management Options  Diagnosis management comments: Wound cleaned and dressed. Patient does not require further emergent work-up.     Risk of Complications, Morbidity, and/or Mortality  Presenting problems: minimal  Diagnostic procedures: minimal  Management options: minimal           Procedures

## 2020-03-14 ENCOUNTER — HOSPITAL ENCOUNTER (OUTPATIENT)
Dept: INFUSION THERAPY | Age: 55
Discharge: HOME | End: 2020-03-14
Payer: COMMERCIAL

## 2020-03-14 VITALS
OXYGEN SATURATION: 93 % | DIASTOLIC BLOOD PRESSURE: 71 MMHG | HEART RATE: 93 BPM | TEMPERATURE: 97.8 F | BODY MASS INDEX: 31.21 KG/M2 | RESPIRATION RATE: 18 BRPM | SYSTOLIC BLOOD PRESSURE: 124 MMHG | WEIGHT: 181.8 LBS

## 2020-03-14 DIAGNOSIS — D64.9 ANEMIA, UNSPECIFIED TYPE: Primary | ICD-10-CM

## 2020-03-14 PROCEDURE — 96361 HYDRATE IV INFUSION ADD-ON: CPT

## 2020-03-14 PROCEDURE — 74011250636 HC RX REV CODE- 250/636: Performed by: INTERNAL MEDICINE

## 2020-03-14 PROCEDURE — 96365 THER/PROPH/DIAG IV INF INIT: CPT

## 2020-03-14 RX ORDER — SODIUM CHLORIDE 0.9 % (FLUSH) 0.9 %
10 SYRINGE (ML) INJECTION AS NEEDED
Status: DISCONTINUED | OUTPATIENT
Start: 2020-03-14 | End: 2020-03-15 | Stop reason: HOSPADM

## 2020-03-14 RX ADMIN — SODIUM CHLORIDE 500 ML: 900 INJECTION, SOLUTION INTRAVENOUS at 16:15

## 2020-03-14 RX ADMIN — SODIUM CHLORIDE 750 MG: 900 INJECTION, SOLUTION INTRAVENOUS at 16:33

## 2020-03-14 NOTE — PROGRESS NOTES
Pt arrived ambulatory today at 1546, to receive IV iron. Pt reports \"not feeling well. \"   Upon further evaluation, pt reports a cough for a week since seen by MD, fevers of , and some nausea. Pt given a mask to wear. Neida Au NP made aware. Per NP-pt to have the option of whether to stay and get iron today or reschedule today's dose of Iron for another day. Pt OK to receive IV iron today. Pt tolerated without difficulty. Patient discharged via ambulatory accompanied by self. Instructed to notify physician of any problems, questions or concerns. Allowed opportunity for patient/family to ask questions. Verbalized understanding. Next appointment is March 21 at 1600 with Phillip Del Real.

## 2020-03-18 ENCOUNTER — PATIENT OUTREACH (OUTPATIENT)
Dept: CASE MANAGEMENT | Age: 55
End: 2020-03-18

## 2020-03-18 ENCOUNTER — HOSPITAL ENCOUNTER (EMERGENCY)
Age: 55
Discharge: HOME | End: 2020-03-18
Attending: EMERGENCY MEDICINE
Payer: COMMERCIAL

## 2020-03-18 ENCOUNTER — APPOINTMENT (OUTPATIENT)
Dept: GENERAL RADIOLOGY | Age: 55
End: 2020-03-18
Attending: EMERGENCY MEDICINE
Payer: COMMERCIAL

## 2020-03-18 VITALS
WEIGHT: 185 LBS | TEMPERATURE: 97.7 F | DIASTOLIC BLOOD PRESSURE: 89 MMHG | RESPIRATION RATE: 18 BRPM | HEIGHT: 65 IN | OXYGEN SATURATION: 99 % | HEART RATE: 84 BPM | SYSTOLIC BLOOD PRESSURE: 164 MMHG | BODY MASS INDEX: 30.82 KG/M2

## 2020-03-18 DIAGNOSIS — J20.9 ACUTE BRONCHITIS, UNSPECIFIED ORGANISM: Primary | ICD-10-CM

## 2020-03-18 PROCEDURE — 74011250636 HC RX REV CODE- 250/636: Performed by: EMERGENCY MEDICINE

## 2020-03-18 PROCEDURE — 99282 EMERGENCY DEPT VISIT SF MDM: CPT

## 2020-03-18 PROCEDURE — 96372 THER/PROPH/DIAG INJ SC/IM: CPT

## 2020-03-18 PROCEDURE — 71046 X-RAY EXAM CHEST 2 VIEWS: CPT

## 2020-03-18 RX ORDER — PROMETHAZINE HYDROCHLORIDE 25 MG/1
25 TABLET ORAL
Qty: 23 TAB | Refills: 0 | OUTPATIENT
Start: 2020-03-18 | End: 2020-05-20

## 2020-03-18 RX ORDER — MELOXICAM 15 MG/1
15 TABLET ORAL DAILY
Qty: 20 TAB | Refills: 0 | Status: SHIPPED | OUTPATIENT
Start: 2020-03-18 | End: 2020-05-05

## 2020-03-18 RX ORDER — DOXYCYCLINE HYCLATE 100 MG
100 TABLET ORAL 2 TIMES DAILY
Qty: 20 TAB | Refills: 0 | Status: SHIPPED | OUTPATIENT
Start: 2020-03-18 | End: 2020-03-28

## 2020-03-18 RX ORDER — PROMETHAZINE HYDROCHLORIDE 25 MG/ML
25 INJECTION, SOLUTION INTRAMUSCULAR; INTRAVENOUS
Status: COMPLETED | OUTPATIENT
Start: 2020-03-18 | End: 2020-03-18

## 2020-03-18 RX ADMIN — PROMETHAZINE HYDROCHLORIDE 25 MG: 25 INJECTION INTRAMUSCULAR; INTRAVENOUS at 01:03

## 2020-03-18 NOTE — DISCHARGE INSTRUCTIONS
You must finish all the prescribed antibiotics.   THERE SHOULD BE NO LEFT OVER PILLS!!  Call your doctor/follow up doctor to set up appointment for recheck visit  Push fluids  Return to ER for any worsening symptoms or new problems which may arise

## 2020-03-18 NOTE — ED PROVIDER NOTES
The history is provided by the patient. Cough   This is a recurrent problem. The current episode started 2 days ago. The problem occurs constantly. The problem has been gradually worsening. The cough is productive of sputum. Patient reports a subjective fever - was not measured. Associated symptoms include chills, sweats, myalgias, nausea and vomiting. Pertinent negatives include no chest pain, no ear pain, no headaches, no rhinorrhea, no sore throat, no shortness of breath, no wheezing and no confusion. She has tried nothing for the symptoms. She is not a smoker (Patient quit smoking 8 months ago). Her past medical history is significant for COPD.         Past Medical History:   Diagnosis Date    Anemia     blood transfusion 5/2018 per pt    Arrhythmia     palpitations, moderate mitral valve regurge    Arthritis     lower back osteo    Asthma     uses inhalers    Cardiomyopathy     ECHO 11/2017    CHF (congestive heart failure) (HCC)     EF 30-35% last echo 7/2019-- followed by dr Adonis Xie Chronic pain 2010    Coagulation defect ([address]     eliquis    COPD     nebulizer daily and maint inhalers, can not climb 1 flight of stairs (also CHF)  oxygen 3 LPM qhs    Decreased cardiac ejection fraction 11/27/2017    EF 45-50% per echo 11/27/17    Depression     controlled      Diverticulitis     GERD (gastroesophageal reflux disease)     controlled with med     Heart murmur     Hypertension     managed with meds    IBS (irritable bowel syndrome)     IC (interstitial cystitis)     Insomnia     Migraine with aura and without status migrainosus, not intractable 6/17/2016    Mitral valve regurgitation, rheumatic     followed Dr. Naldo Babcock Moderate aortic regurgitation     per echo 7/2019 in [facility]-- followed by dr Adonis Xie Palpitations 5/16/2016    Psychiatric disorder     anxiety    Requires oxygen therapy     3l/min at hs. prn during the days as needed    Rheumatic aortic insufficiency 5/16/2016    Rheumatic fever as child    pt reports rheumatic fever in childhood    Smoker     started age 23-- smoked 0.5 ppd until 2018-- cut back to 10-2 cig daily per pt    Stroke [facility])     \"mild stroke\" pt reports 2014- \"left sided weakness and balance is off\"     Thromboembolus ([address]     BLE 2012    Vitamin D deficiency        Past Surgical History:   Procedure Laterality Date    COLONOSCOPY      8 polyps removed, diverticulitis    COLONOSCOPY N/A 5/21/2018    COLONOSCOPY performed by Angela Walker MD at [address]  8-23-11    bladder dilitation    EGD      HX APPENDECTOMY  2006    HX HEART CATHETERIZATION  5/27/2011    no blockages, no intervention    HX HEART CATHETERIZATION  04/2017    no intervention    HX LAP CHOLECYSTECTOMY  6/6/2011    HX ORTHOPAEDIC Right 07/2019    4th toe -- surg repair    HX NATASHA AND BSO  2004    fibroid tumors, hyst    HX UROLOGICAL  01/2018    cystoscopy with hydrodistention of bladder multiple    AK BIOPSY OF BREAST, INCISIONAL Left     lymph node , left, patient states her bx neg, but high risk         Family History:   Problem Relation Age of Onset    Heart Failure Father 76        chf    Heart Disease Father     Diabetes Sister     Thyroid Disease Sister        Social History     Socioeconomic History    Marital status: SINGLE     Spouse name: Not on file    Number of children: Not on file    Years of education: Not on file    Highest education level: Not on file   Occupational History    Not on file   Social Needs    Financial resource strain: Not on file    Food insecurity     Worry: Not on file     Inability: Not on file    Transportation needs     Medical: Not on file     Non-medical: Not on file   Tobacco Use    Smoking status: Former Smoker     Packs/day: 0.25     Years: 30.00     Pack years: 7.50    Smokeless tobacco: Never Used   Substance and Sexual Activity    Alcohol use: No    Drug use: Yes     Types: Cocaine     Comment: last used 11/2018    Sexual activity: Not on file   Lifestyle    Physical activity     Days per week: Not on file     Minutes per session: Not on file    Stress: Not on file   Relationships    Social connections     Talks on phone: Not on file     Gets together: Not on file     Attends Baptism service: Not on file     Active member of club or organization: Not on file     Attends meetings of clubs or organizations: Not on file     Relationship status: Not on file    Intimate partner violence     Fear of current or ex partner: Not on file     Emotionally abused: Not on file     Physically abused: Not on file     Forced sexual activity: Not on file   Other Topics Concern    Not on file   Social History Narrative    Not on file         ALLERGIES: Aspirin; Bentyl [dicyclomine]; Toradol [ketorolac]; Ultram [tramadol]; and Zofran [ondansetron hcl (pf)]    Review of Systems   Constitutional: Positive for chills. Negative for fever. HENT: Negative for congestion, ear pain, rhinorrhea and sore throat. Eyes: Negative for photophobia and discharge. Respiratory: Positive for cough. Negative for shortness of breath and wheezing. Cardiovascular: Negative for chest pain and palpitations. Gastrointestinal: Positive for nausea and vomiting. Negative for abdominal pain, constipation and diarrhea. Endocrine: Negative for cold intolerance and heat intolerance. Genitourinary: Negative for dysuria and flank pain. Musculoskeletal: Positive for myalgias. Negative for arthralgias and neck pain. Skin: Negative for rash and wound. Allergic/Immunologic: Negative for environmental allergies and food allergies. Neurological: Negative for syncope and headaches. Hematological: Negative for adenopathy. Does not bruise/bleed easily. Psychiatric/Behavioral: Negative for confusion and dysphoric mood. The patient is not nervous/anxious. All other systems reviewed and are negative.       Vitals:    03/18/20 0025   BP: 164/89 Pulse: 84   Resp: 18   Temp: 97.7 °F (36.5 °C)   SpO2: 99%   Weight: 83.9 kg (185 lb)   Height: 5' 5\" (1.651 m)            Physical Exam  Vitals signs and nursing note reviewed. Constitutional:       General: She is in acute distress. Appearance: Normal appearance. She is well-developed. She is obese. HENT:      Head: Normocephalic and atraumatic. Right Ear: External ear normal.      Left Ear: External ear normal.      Nose: Nose normal.      Mouth/Throat:      Mouth: Mucous membranes are moist.      Pharynx: Oropharynx is clear. No oropharyngeal exudate. Eyes:      Extraocular Movements: Extraocular movements intact. Conjunctiva/sclera: Conjunctivae normal.      Pupils: Pupils are equal, round, and reactive to light. Neck:      Musculoskeletal: Normal range of motion and neck supple. Vascular: No JVD. Cardiovascular:      Rate and Rhythm: Normal rate and regular rhythm. Pulses: Normal pulses. Heart sounds: Normal heart sounds. No murmur. No friction rub. No gallop. Pulmonary:      Effort: Pulmonary effort is normal.      Breath sounds: Normal breath sounds. Abdominal:      General: Bowel sounds are normal. There is no distension. Palpations: Abdomen is soft. There is no mass. Tenderness: There is no abdominal tenderness. Musculoskeletal: Normal range of motion. General: No deformity. Skin:     General: Skin is warm and dry. Capillary Refill: Capillary refill takes less than 2 seconds. Findings: No rash. Neurological:      General: No focal deficit present. Mental Status: She is alert and oriented to person, place, and time. Cranial Nerves: No cranial nerve deficit. Sensory: No sensory deficit. Gait: Gait normal.   Psychiatric:         Mood and Affect: Mood normal.         Speech: Speech normal.         Behavior: Behavior normal.         Thought Content:  Thought content normal.         Judgment: Judgment normal. MDM  Number of Diagnoses or Management Options  Acute bronchitis, unspecified organism: new and requires workup  Diagnosis management comments: 53-year-old female former smoker. Here with cough body aches and subjective fever  Afebrile here in the ER. Heart rate is normal blood pressure is only slightly elevated. Room air sat at 99%    Will check chest x-ray to rule out pneumonia/pneumothorax  Likely bronchitis episode. 1:24 AM  Chest x-ray negative       Amount and/or Complexity of Data Reviewed  Tests in the radiology section of CPT®: ordered and reviewed  Tests in the medicine section of CPT®: ordered and reviewed  Review and summarize past medical records: yes    Risk of Complications, Morbidity, and/or Mortality  Presenting problems: moderate  Diagnostic procedures: low  Management options: low  General comments: Elements of this note have been dictated via voice recognition software. Text and phrases may be limited by the accuracy of the software. The chart has been reviewed, but errors may still be present.       Patient Progress  Patient progress: stable         Procedures

## 2020-03-18 NOTE — ED TRIAGE NOTES
Pt coming in for flu like symptoms. States she has had a headache for 4 days. States \"my skin is green everywhere and my tongue. \" pt states she has felt feverish at home but has not checked her temperature at home. States she has been having body aches for a few days now. Denies being exposed to coronavirus. Denies being around anybody that's been sick. States she did get her flu shot this year. States she hasnt had tylenol since last night.  Pt states she has had a cough and is coughing up white phlegm

## 2020-03-18 NOTE — PROGRESS NOTES
This note will not be viewable in [address] Patient contacted regarding COVID-19 risk. Ambulatory Care Manager contacted the patient by telephone to perform post discharge assessment. Verified name and  with patient as identifiers. ACM Provided introduction to self, and explanation of the ACM role, and reason for call due to risk factors for infection and/or exposure to COVID-19. Symptoms reviewed with patient who verbalized the following symptoms:   Fever    unsure    Fatigue Yes      Pain or aching joints  Yes    Cough Yes   worse  Shortness of breath  Yes   little  Confusion or unusual change in mental status   No   Chills or shaking Yes      Sweating Yes   No   Fast heart rate Yes   Fast breathing Yes      Dizziness/lightheadedness  Yes     Less urine output Yes     Cold, clammy, and pale skin Yes   No  Low body temperature  unknown      Nausea feel like Im gonna throw up. Green on skin  Something is wrong with me and I dont know what it is. Vision is worse. Due to worsening of new symptoms, encounter routed to provider for escalation. Advised to [facility] flu clinic due to worsening flu like symptoms not flu tested in ED. Pt agreeable, lives alone, friend will drive her. Patient has following risk factors of: COPD    ACM reviewed discharge instructions, medical action plan and red flags such as increased shortness of breath, increasing fever and signs of decompensation with patient family caregiver who verbalized understanding. Discussed exposure protocols and quarantine with CDC Guidelines What to do if you are sick with coronavirus disease  patient  was given an opportunity for questions and concerns. The patient agrees to contact the [facility] exposure line, local health department and PCP office for questions related to their healthcare. ACM provided contact information for future reference. Aileen Sher [phone]   provided.     Reviewed and educated patient  on any new and changed medications related to discharge diagnosis        Plan for follow-up call in 1-2 days  based on severity of symptoms and risk factors.

## 2020-03-19 ENCOUNTER — PATIENT OUTREACH (OUTPATIENT)
Dept: CASE MANAGEMENT | Age: 55
End: 2020-03-19

## 2020-03-19 NOTE — PROGRESS NOTES
This note will not be viewable in [address] COVID-19 Screening Follow-up Note     Patient contacted regarding COVID-19 risk. Ambulatory Care Manager contacted the patient by telephone to perform post discharge assessment. Verified name and  with patient as identifiers. ACM Provided introduction to self, and explanation of the ACM role, and reason for call due to risk factors for infection and/or exposure to COVID-19. Symptoms reviewed with patient who verbalized the following symptoms:   Fever     unknown               Fatigue Yes                 Pain or aching joints  Yes    Cough Yes     Shortness of breath  Yes  same    Confusion or unusual change in mental status   No   Chills or shaking Yes                 Sweating  Yes              Fast heart rate   No              Fast breathing   Yes                Dizziness/lightheadedness  Yes              Less urine output   No, color darker orange              Cold, clammy, and pale skin   No  Low body temperature   No                Pt states went to [facility] urgent care on [address] yesterday and diagnosed w/ bronchitis, tested for flu neg. Given round of antibiotics, but  feeling worse today. States she not taking in food or fluids d/t nausea, and plans to return to ED d/t worsening of symptoms and feeling dehydrated. Patient has following risk factors of: COPD      ACM reviewed discharge instructions, medical action plan and red flags such as increased shortness of breath, increasing fever and signs of decompensation with patient who verbalized understanding. Discussed exposure protocols and quarantine with CDC Guidelines What to do if you are sick with coronavirus disease  patient was given an opportunity for questions and concerns. The patient agrees to contact the [facility] exposure line, local health department and PCP office for questions related to their healthcare. ACM provided contact information for future reference. Ant Gray [phone]   provided. Reviewed and educated patient on any new and changed medications related to discharge diagnosis      Plan for follow-up call in 3-5 days based on severity of symptoms and risk factors.

## 2020-03-20 ENCOUNTER — HOSPITAL ENCOUNTER (EMERGENCY)
Age: 55
Discharge: HOME | End: 2020-03-20
Attending: EMERGENCY MEDICINE
Payer: COMMERCIAL

## 2020-03-20 ENCOUNTER — PATIENT OUTREACH (OUTPATIENT)
Dept: CASE MANAGEMENT | Age: 55
End: 2020-03-20

## 2020-03-20 VITALS
RESPIRATION RATE: 16 BRPM | HEIGHT: 65 IN | OXYGEN SATURATION: 97 % | BODY MASS INDEX: 30.82 KG/M2 | TEMPERATURE: 98.3 F | DIASTOLIC BLOOD PRESSURE: 69 MMHG | WEIGHT: 185 LBS | SYSTOLIC BLOOD PRESSURE: 129 MMHG | HEART RATE: 83 BPM

## 2020-03-20 DIAGNOSIS — R11.2 NAUSEA AND VOMITING, INTRACTABILITY OF VOMITING NOT SPECIFIED, UNSPECIFIED VOMITING TYPE: Primary | ICD-10-CM

## 2020-03-20 PROCEDURE — 99282 EMERGENCY DEPT VISIT SF MDM: CPT

## 2020-03-20 RX ORDER — ONDANSETRON 8 MG/1
8 TABLET, ORALLY DISINTEGRATING ORAL
Qty: 10 TAB | Refills: 0 | Status: SHIPPED | OUTPATIENT
Start: 2020-03-20 | End: 2020-05-05 | Stop reason: SINTOL

## 2020-03-20 NOTE — DISCHARGE INSTRUCTIONS
Patient Education        Nausea and Vomiting: Care Instructions  Your Care Instructions    When you are nauseated, you may feel weak and sweaty and notice a lot of saliva in your mouth. Nausea often leads to vomiting. Most of the time you do not need to worry about nausea and vomiting, but they can be signs of other illnesses. Two common causes of nausea and vomiting are stomach flu and food poisoning. Nausea and vomiting from viral stomach flu will usually start to improve within 24 hours. Nausea and vomiting from food poisoning may last from 12 to 48 hours. The doctor has checked you carefully, but problems can develop later. If you notice any problems or new symptoms, get medical treatment right away. Follow-up care is a key part of your treatment and safety. Be sure to make and go to all appointments, and call your doctor if you are having problems. It's also a good idea to know your test results and keep a list of the medicines you take. How can you care for yourself at home? · To prevent dehydration, drink plenty of fluids, enough so that your urine is light yellow or clear like water. Choose water and other caffeine-free clear liquids until you feel better. If you have kidney, heart, or liver disease and have to limit fluids, talk with your doctor before you increase the amount of fluids you drink. · Rest in bed until you feel better. · When you are able to eat, try clear soups, mild foods, and liquids until all symptoms are gone for 12 to 48 hours. Other good choices include dry toast, crackers, cooked cereal, and gelatin dessert, such as Jell-O. When should you call for help? Call 911 anytime you think you may need emergency care. For example, call if:    · You passed out (lost consciousness).    Call your doctor now or seek immediate medical care if:    · You have symptoms of dehydration, such as:  ? Dry eyes and a dry mouth. ? Passing only a little dark urine. ?  Feeling thirstier than usual.   · You have new or worsening belly pain.     · You have a new or higher fever.     · You vomit blood or what looks like coffee grounds.    Watch closely for changes in your health, and be sure to contact your doctor if:    · You have ongoing nausea and vomiting.     · Your vomiting is getting worse.     · Your vomiting lasts longer than 2 days.     · You are not getting better as expected. Where can you learn more? Go to http://maria alejandra-nereyda.info/  Enter H591 in the search box to learn more about \"Nausea and Vomiting: Care Instructions. \"  Current as of: June 26, 2019Content Version: 12.4  © 9321-8553 [employer]. Care instructions adapted under license by [employer] (which disclaims liability or warranty for this information). If you have questions about a medical condition or this instruction, always ask your healthcare professional. [address] any warranty or liability for your use of this information.

## 2020-03-20 NOTE — ED TRIAGE NOTES
Patient arrives to ED from home complaining nausea and coughing. Patient states, \"I am just a hot mess\". Patient states \"I can't keep anything down. I vomit it up\". Patient has multiple complaints.

## 2020-03-20 NOTE — ED NOTES
I have reviewed discharge instructions with the patient. The patient verbalized understanding. Patient left ED via Discharge Method: ambulatory to Home with self. Opportunity for questions and clarification provided. Patient given 1 scripts. Pt in no acute distress at time of d/c        To continue your aftercare when you leave the hospital, you may receive an automated call from our care team to check in on how you are doing. This is a free service and part of our promise to provide the best care and service to meet your aftercare needs.  If you have questions, or wish to unsubscribe from this service please call [phone]. Thank you for Choosing our [facility] Emergency Department.

## 2020-03-20 NOTE — PROGRESS NOTES
CCM received call from patient stating she needed help finding housing resources. Referral sent to ambulatory SW. This note will not be viewable in [address]

## 2020-03-20 NOTE — ED PROVIDER NOTES
59-year-old -American female was seen 2 days ago for cough and diagnosed with bronchitis. She was discharged home on doxycycline and Phenergan. She reports that she is still feeling nauseated and vomiting. No fever or abdominal pain. She has been able to tolerate liquids but states she cannot keep food down. The history is provided by the patient. Nausea    Associated symptoms include headaches, cough and headaches. Pertinent negatives include no fever and no abdominal pain.         Past Medical History:   Diagnosis Date    Anemia     blood transfusion 5/2018 per pt    Arrhythmia     palpitations, moderate mitral valve regurge    Arthritis     lower back osteo    Asthma     uses inhalers    Cardiomyopathy     ECHO 11/2017    CHF [facility]congestive heart failure) ([facility])     EF 30-35% last echo 7/2019-- followed by dr Tamiko Farris Chronic pain 2010    Coagulation defect ([address]     eliquis    COPD     nebulizer daily and maint inhalers, can not climb 1 flight of stairs (also CHF)  oxygen 3 LPM qhs    Decreased cardiac ejection fraction 11/27/2017    EF 45-50% per echo 11/27/17    Depression     controlled      Diverticulitis     GERD (gastroesophageal reflux disease)     controlled with med     Heart murmur     Hypertension     managed with meds    IBS (irritable bowel syndrome)     IC (interstitial cystitis)     Insomnia     Migraine with aura and without status migrainosus, not intractable 6/17/2016    Mitral valve regurgitation, rheumatic     followed Dr. Melvin Rizo Moderate aortic regurgitation     per echo 7/2019 in cc-- followed by dr Tamiko Farris Palpitations 5/16/2016    Psychiatric disorder     anxiety    Requires oxygen therapy     3l/min at hs. prn during the days as needed    Rheumatic aortic insufficiency 5/16/2016    Rheumatic fever as child    pt reports rheumatic fever in childhood    Smoker     started age 23-- smoked 0.5 ppd until 2018-- cut back to 10-2 cig daily per pt    Stroke [facility]     \"mild stroke\" pt reports 2014- \"left sided weakness and balance is off\"     [facility] ([facility]     BLE 2012    Vitamin D deficiency        Past Surgical History:   Procedure Laterality Date    COLONOSCOPY      8 polyps removed, diverticulitis    COLONOSCOPY N/A 5/21/2018    COLONOSCOPY performed by Kayy Camarena MD at [address]  8-23-11    bladder dilitation    EGD      HX APPENDECTOMY  2006    HX HEART CATHETERIZATION  5/27/2011    no blockages, no intervention    HX HEART CATHETERIZATION  04/2017    no intervention    HX LAP CHOLECYSTECTOMY  6/6/2011    HX ORTHOPAEDIC Right 07/2019    4th toe -- surg repair    HX NATASHA AND BSO  2004    fibroid tumors, hyst    HX UROLOGICAL  01/2018    cystoscopy with hydrodistention of bladder multiple    [place] BIOPSY OF BREAST, INCISIONAL Left     lymph node , left, patient states her bx neg, but high risk         Family History:   Problem Relation Age of Onset    Heart Failure Father 76        chf    Heart Disease Father     Diabetes Sister     Thyroid Disease Sister        Social History     Socioeconomic History    Marital status: SINGLE     Spouse name: Not on file    Number of children: Not on file    Years of education: Not on file    Highest education level: Not on file   Occupational History    Not on file   Social Needs    Financial resource strain: Not on file    Food insecurity     Worry: Not on file     Inability: Not on file    Transportation needs     Medical: Not on file     Non-medical: Not on file   Tobacco Use    Smoking status: Former Smoker     Packs/day: 0.25     Years: 30.00     Pack years: 7.50    Smokeless tobacco: Never Used   Substance and Sexual Activity    Alcohol use: No    Drug use: Yes     Types: Cocaine     Comment: last used 11/2018    Sexual activity: Not on file   Lifestyle    Physical activity     Days per week: Not on file     Minutes per session: Not on file    Stress: Not on file Relationships    Social connections     Talks on phone: Not on file     Gets together: Not on file     Attends Mosque service: Not on file     Active member of club or organization: Not on file     Attends meetings of clubs or organizations: Not on file     Relationship status: Not on file    Intimate partner violence     Fear of current or ex partner: Not on file     Emotionally abused: Not on file     Physically abused: Not on file     Forced sexual activity: Not on file   Other Topics Concern    Not on file   Social History Narrative    Not on file         ALLERGIES: Aspirin; Bentyl [dicyclomine]; Toradol [ketorolac]; Ultram [tramadol]; and Zofran [ondansetron hcl (pf)]    Review of Systems   Constitutional: Negative for fever. Respiratory: Positive for cough. Negative for shortness of breath. Cardiovascular: Negative for chest pain. Gastrointestinal: Positive for nausea and vomiting. Negative for abdominal pain. Genitourinary: Negative for dysuria. Musculoskeletal: Negative for back pain and neck pain. Skin: Negative for rash. Neurological: Positive for headaches. Vitals:    03/20/20 0402 03/20/20 0404   BP: 129/69    Pulse:  85   Resp: 16    Temp: 98.3 °F (36.8 °C)    SpO2:  97%   Weight: 83.9 kg (185 lb)    Height: 5' 5\" (1.651 m)             Physical Exam  Vitals signs and nursing note reviewed. Constitutional:       General: She is not in acute distress. Appearance: Normal appearance. She is not toxic-appearing. HENT:      Head: Normocephalic and atraumatic. Nose: Nose normal.      Mouth/Throat:      Mouth: Mucous membranes are moist.      Pharynx: Oropharynx is clear. Eyes:      Conjunctiva/sclera: Conjunctivae normal.      Pupils: Pupils are equal, round, and reactive to light. Neck:      Musculoskeletal: Normal range of motion. Cardiovascular:      Rate and Rhythm: Normal rate and regular rhythm.    Pulmonary:      Effort: Pulmonary effort is normal. Breath sounds: Normal breath sounds. No wheezing. Musculoskeletal: Normal range of motion. Skin:     General: Skin is warm and dry. Neurological:      Mental Status: She is alert and oriented to person, place, and time. Psychiatric:         Mood and Affect: Mood normal.         Behavior: Behavior normal.          MDM  Number of Diagnoses or Management Options  Diagnosis management comments: Patient has normal vital signs. She is afebrile. Exam is normal.  Patient reports that she can tolerate Zofran in spite of it being on her allergy list.  We will switch her to this for better nausea control.     Risk of Complications, Morbidity, and/or Mortality  Presenting problems: low  Diagnostic procedures: low  Management options: low           Procedures

## 2020-03-23 ENCOUNTER — PATIENT OUTREACH (OUTPATIENT)
Dept: CASE MANAGEMENT | Age: 55
End: 2020-03-23

## 2020-03-23 NOTE — PROGRESS NOTES
ALCON ROMERO in receipt of referral from RN CM, Ashlee Jane, that pt needed assist with housing resources. ALCON ROMERO attempted initial outreach. LM for call back on VM.

## 2020-03-23 NOTE — PROGRESS NOTES
RNCM attempted to reach pt regarding ED IRAIDA, no answer. RNCM left  requesting she return call to this RNCM. Will continue attempts to reach pt.

## 2020-03-24 ENCOUNTER — PATIENT OUTREACH (OUTPATIENT)
Dept: CASE MANAGEMENT | Age: 55
End: 2020-03-24

## 2020-03-25 ENCOUNTER — HOSPITAL ENCOUNTER (OUTPATIENT)
Dept: ULTRASOUND IMAGING | Age: 55
Discharge: HOME | End: 2020-03-25
Attending: FAMILY MEDICINE
Payer: COMMERCIAL

## 2020-03-25 ENCOUNTER — HOSPITAL ENCOUNTER (EMERGENCY)
Age: 55
Discharge: HOME | End: 2020-03-25
Attending: EMERGENCY MEDICINE
Payer: COMMERCIAL

## 2020-03-25 ENCOUNTER — APPOINTMENT (OUTPATIENT)
Dept: CT IMAGING | Age: 55
End: 2020-03-25
Attending: NURSE PRACTITIONER
Payer: COMMERCIAL

## 2020-03-25 VITALS
HEART RATE: 74 BPM | RESPIRATION RATE: 16 BRPM | SYSTOLIC BLOOD PRESSURE: 100 MMHG | WEIGHT: 185 LBS | OXYGEN SATURATION: 99 % | TEMPERATURE: 98.6 F | BODY MASS INDEX: 30.82 KG/M2 | DIASTOLIC BLOOD PRESSURE: 53 MMHG | HEIGHT: 65 IN
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
## 2020-03-25 DIAGNOSIS — D50.8 OTHER IRON DEFICIENCY ANEMIA: ICD-10-CM

## 2020-03-25 DIAGNOSIS — I10 HYPERTENSION, ESSENTIAL: ICD-10-CM

## 2020-03-25 DIAGNOSIS — M54.2 PAIN, NECK: ICD-10-CM

## 2020-03-25 DIAGNOSIS — S09.90XA CLOSED HEAD INJURY, INITIAL ENCOUNTER: ICD-10-CM

## 2020-03-25 DIAGNOSIS — N28.9 RENAL DYSFUNCTION: ICD-10-CM

## 2020-03-25 PROCEDURE — 76536 US EXAM OF HEAD AND NECK: CPT

## 2020-03-25 PROCEDURE — 99283 EMERGENCY DEPT VISIT LOW MDM: CPT

## 2020-03-25 PROCEDURE — 70450 CT HEAD/BRAIN W/O DYE: CPT

## 2020-03-25 NOTE — ED NOTES
Patient given 2 blankets. Patient upset because she states this RN \"threw the blankets at me. \"  When in fact, this RN placed the blankets on the bed. Patient requests to speak with supervisor. Supervisor notified.

## 2020-03-25 NOTE — DISCHARGE INSTRUCTIONS
Your CT scan was negative for acute abnormalities. Follow up with your primary care provider for a recheck if symptoms fail to improve or worsen. Return to the emergency department as needed.

## 2020-03-25 NOTE — ED TRIAGE NOTES
Patient reports patient was going to the bathroom and fell. Patient reports LOC. States happened last night. States hit the left side of her head and her chin. Patient states she is on Eliquis.

## 2020-03-25 NOTE — ED PROVIDER NOTES
Patient states last night she slipped and fell around 10 pm. She states she hit the left side of her head on the hardwood floor. She states LOC for apporox 1 minute. She states nausea. She is alert and oriented. She is answering question approprietly. She is in no acute distress at this time. The history is provided by the patient.         Past Medical History:   Diagnosis Date    Anemia     blood transfusion 5/2018 per pt    Arrhythmia     palpitations, moderate mitral valve regurge    Arthritis     lower back osteo    Asthma     uses inhalers    Cardiomyopathy     ECHO 11/2017    [facility]est[facility] failure[facility]     EF 30-35% last echo 7/2019-- followed by dr Dorthula Landau Chronic pain 2010    Coagulation defect ([facility]     eliquis    COPD     nebulizer daily and maint inhalers, can not climb 1 flight of stairs (also CHF)  oxygen 3 LPM qhs    Decreased cardiac ejection fraction 11/27/2017    EF 45-50% per echo 11/27/17    Depression     controlled      Diverticulitis     GERD (gastroesophageal reflux disease)     controlled with med     Heart murmur     Hypertension     managed with meds    IBS (irritable bowel syndrome)     IC (interstitial cystitis)     Insomnia     Migraine with aura and without status migrainosus, not intractable 6/17/2016    Mitral valve regurgitation, rheumatic     followed Dr. Ernesto Sanchez Moderate aortic regurgitation     per echo 7/2019 in cc-- followed by dr Dorthula Landau Palpitations 5/16/2016    Psychiatric disorder     anxiety    Requires oxygen therapy     3l/min at hs. prn during the days as needed    Rheumatic aortic insufficiency 5/16/2016    Rheumatic fever as child    pt reports rheumatic fever in childhood    Smoker     started age 23-- smoked 0.5 ppd until 2018-- cut back to 10-2 cig daily per pt    Stroke [facility])     \"mild stroke\" pt reports 2014- \"left sided weakness and balance is off\"     Thromboembolus ([facility]     BLE 2012    Vitamin D deficiency        Past Surgical History:   Procedure Laterality Date    COLONOSCOPY      8 polyps removed, diverticulitis    COLONOSCOPY N/A 5/21/2018    COLONOSCOPY performed by Carolyn Beckford MD at [address]  8-23-11    bladder dilitation    EGD      HX APPENDECTOMY  2006    HX HEART CATHETERIZATION  5/27/2011    no blockages, no intervention    HX HEART CATHETERIZATION  04/2017    no intervention    HX LAP CHOLECYSTECTOMY  6/6/2011    HX ORTHOPAEDIC Right 07/2019    4th toe -- surg repair    HX NATASHA AND BSO  2004    fibroid tumors, hyst    HX UROLOGICAL  01/2018    cystoscopy with hydrodistention of bladder multiple    [place] BIOPSY OF BREAST, INCISIONAL Left     lymph node , left, patient states her bx neg, but high risk         Family History:   Problem Relation Age of Onset    Heart Failure Father 76        chf    Heart Disease Father     Diabetes Sister     Thyroid Disease Sister        Social History     Socioeconomic History    Marital status: SINGLE     Spouse name: Not on file    Number of children: Not on file    Years of education: Not on file    Highest education level: Not on file   Occupational History    Not on file   Social Needs    Financial resource strain: Not on file    Food insecurity     Worry: Not on file     Inability: Not on file    Transportation needs     Medical: Not on file     Non-medical: Not on file   Tobacco Use    Smoking status: Former Smoker     Packs/day: 0.25     Years: 30.00     Pack years: 7.50    Smokeless tobacco: Never Used   Substance and Sexual Activity    Alcohol use: No    Drug use: Yes     Types: Cocaine     Comment: last used 11/2018    Sexual activity: Not on file   Lifestyle    Physical activity     Days per week: Not on file     Minutes per session: Not on file    Stress: Not on file   Relationships    Social connections     Talks on phone: Not on file     Gets together: Not on file     Attends Hoahaoism service: Not on file     Active member of club or organization: Not on file     Attends meetings of clubs or organizations: Not on file     Relationship status: Not on file    Intimate partner violence     Fear of current or ex partner: Not on file     Emotionally abused: Not on file     Physically abused: Not on file     Forced sexual activity: Not on file   Other Topics Concern    Not on file   Social History Narrative    Not on file         ALLERGIES: Aspirin; Bentyl [dicyclomine]; Toradol [ketorolac]; Ultram [tramadol]; and Zofran [ondansetron hcl (pf)]    Review of Systems   Constitutional: Negative for chills and fever. Eyes: Negative for visual disturbance. Gastrointestinal: Positive for nausea. Musculoskeletal: Negative for arthralgias and myalgias. Neurological: Positive for headaches. Vitals:    03/25/20 0841   BP: 115/66   Pulse: 81   Resp: 16   Temp: 97.8 °F (36.6 °C)   SpO2: 99%   Weight: 83.9 kg (185 lb)   Height: 5' 5\" (1.651 m)            Physical Exam  Vitals signs and nursing note reviewed. Constitutional:       Appearance: Normal appearance. HENT:      Head: Normocephalic and atraumatic. Nose: Nose normal.      Mouth/Throat:      Mouth: Mucous membranes are moist.   Eyes:      Extraocular Movements:      Right eye: No nystagmus. Left eye: No nystagmus. Pupils: Pupils are equal, round, and reactive to light. Neck:      Musculoskeletal: Normal range of motion and neck supple. Cardiovascular:      Rate and Rhythm: Normal rate and regular rhythm. Pulmonary:      Effort: Pulmonary effort is normal.      Breath sounds: Normal breath sounds. Abdominal:      General: Abdomen is flat. Palpations: Abdomen is soft. Skin:     General: Skin is warm and dry. Neurological:      General: No focal deficit present. Mental Status: She is alert and oriented to person, place, and time. GCS: GCS eye subscore is 4. GCS verbal subscore is 5. GCS motor subscore is 6.       Cranial Nerves: Cranial nerves are intact. Sensory: Sensation is intact. Motor: Motor function is intact. Psychiatric:         Attention and Perception: Attention and perception normal.         Mood and Affect: Mood normal.         Behavior: Behavior normal.            Ct Head Wo Cont    Result Date: 3/25/2020  EXAMINATION: HEAD CT WITHOUT CONTRAST 3/25/2020 9:59 AM ACCESSION NUMBER: [id number] INDICATION: fall with head trauma. on blood thinner COMPARISON: Head CT 2/26/2020 TECHNIQUE: Multiple-row detector helical CT examination of the head without intravenous contrast. Radiation dose reduction techniques were used for this study:  Our CT scanners use one or all of the following: Automated exposure control, adjustment of the mA and/or kVp according to patient's size, iterative reconstruction. FINDINGS: Technically difficult exam due to patient motion. The ventricles are within normal limits. There is no midline shift. The basilar cisterns are patent. There is no cerebellar tonsillar ectopia or herniation. There is no evidence of acute intracranial hemorrhage or extra-axial collections. There is no CT evidence of acute infarction. The paranasal sinuses and mastoid air cells are well aerated and clear. No evidence of skull fracture. IMPRESSION: No acute intracranial abnormality. VOICE DICTATED BY: Dr. Shar Guadarrama        [facility]  Number of Diagnoses or Management Options  Closed head injury, initial encounter:   Fall, initial encounter:   Diagnosis management comments: CT negative for acute abnormalities.         Amount and/or Complexity of Data Reviewed  Tests in the radiology section of CPT®: ordered and reviewed    Patient Progress  Patient progress: stable         Procedures

## 2020-03-25 NOTE — PROGRESS NOTES
Thyroid ultrasound per radiologist: The radiologist mentions that her thyroid is hypervascular and may be hyperfunctioning; I would recommend a TSH here at her convenience; also, the radiologist does recommend that she may need a nuclear medicine evaluation of her thyroid; that is typically a's test I do not order; however, I will refer her to an endocrinologist for further evaluation; referral generated; again, she needs to return here for TSH at her convenience; thank you, JF

## 2020-03-25 NOTE — ED NOTES
I have reviewed discharge instructions with the patient. The patient verbalized understanding. Patient left ED via Discharge Method: ambulatory to Home with self. Opportunity for questions and clarification provided. Patient given 0 scripts. To continue your aftercare when you leave the hospital, you may receive an automated call from our care team to check in on how you are doing. This is a free service and part of our promise to provide the best care and service to meet your aftercare needs.  If you have questions, or wish to unsubscribe from this service please call [phone]. Thank you for Choosing our [facility] Emergency Department.

## 2020-03-26 ENCOUNTER — PATIENT OUTREACH (OUTPATIENT)
Dept: CASE MANAGEMENT | Age: 55
End: 2020-03-26

## 2020-03-26 NOTE — PROGRESS NOTES
RNCM attempted to reach pt for HARLEY YAO however pt stated she was online w/ insurance co, requests call back at later time.

## 2020-03-26 NOTE — PROGRESS NOTES
Ambulatory Care Coordination  Social Work Assessment   Date of referral?    Referral from which RN NICK/other source? 3/20/20  NICK Reyes RN CM   Previously referred? If so, reason and brief outcome na   Reason for current referral: Housing issues. Pt states there is mildew in the home. You cant see it or smell it but its there.     Reports the house is making her sick. Pt states she has spoken with the landlord but he wont do anything.     Living situation: Lives alone in rental home     Insurance type? Prescription drug plan? Affordable meds? Obtained where? Manage own meds? Take as directed? VA involved? [employer]. Has drug coverage. Meds affordable. Pt manages her own meds. Obtained at [facility].     NA   Income information (if needed):    Food stamps? SS $ 771/mo   Transportation ? Drives her own vehicle   Housing situation? Housing assistance needed? As above   Sources of Support: Family? Parents, siblings live locally    [address] involved? CLTC aides/pvt  pay aides? na   DME? Nocturnal oxygen    Ambulatory? ADLs  assistance required? Assistive device needed for ambulation? Independent with ambulation and ADLs   Referral to CLTC/Medicaid needed? Has Medicaid   Referral to Medicare Extra Help/LIS program needed? na   Small home repair needed? na   MOW referral?  Adequate nutrition? Reports sometimes not having enough money to buy food. Requested info on food geller    Falls Risk Assessment? na   Depression screening? Pt has a dx of depression    ACP set up? Needs information? deferred   CM Assessed Risk for Readmission:       Patient stated Risk for Readmission:    na   Next steps: 1) Pt to ask family members if she can live with them until she finds another home/apartment  2) ALCON ROMERO will mail to pt a list of [employer] and list of food geller in the community.   3) Inquire about applying for SNAP            PLAN  ALCON ROMERO will follow pt for 30 days   Next steps , as above. Outreach in 1-2 weeks. Update NICK Linares RN CM.     Goals Addressed                 This Visit's Progress     Pt will reside in a safe and healthy living environment        Pt will contact resources/prop mgmt companies on [employer] and inquire about availability, put self on waiting lists  Re eval 4/25/20    Pt will inquire of family members if she can stay with them until she finds alternate housing   Re eval 4/25/20

## 2020-03-26 NOTE — PROGRESS NOTES
RNCM attempted to reach pt regarding ED Transition of Care, no answer. RNCM left voicemail w/ contact information requesting she return call.

## 2020-04-02 ENCOUNTER — PATIENT OUTREACH (OUTPATIENT)
Dept: CASE MANAGEMENT | Age: 55
End: 2020-04-02

## 2020-04-02 NOTE — PROGRESS NOTES
RNCM attempted to reach pt regarding IRAIDA f/u x2, no answer, left vm. RNCM will resolve episode at this time.

## 2020-04-23 ENCOUNTER — PATIENT OUTREACH (OUTPATIENT)
Dept: CASE MANAGEMENT | Age: 55
End: 2020-04-23

## 2020-04-23 NOTE — PROGRESS NOTES
ALCON ROMERO follow up outreach with pt. Pt contacted her landlord and workers have been coming in to the home to remediate mildew issue. Still not completely resolved as COVID 19 has impacted scheduled clean up. Pt declined to speak with family members to see if she could stay with them in the interim. She is in receipt of the list of [employer] and list of food geller in the community. Goals met. Pt contacted Dr. Lashell darnell with request to prescribe Neurontin. MD requested that pt have labs first to check kidney function. ALCON ROMERO messaged MD's MA who contacted pt and instructed her to go to  lab at the [facility]. In addition, pt had an iron infusion scheduled at the [facility] on 3/21. Had cough and SOB at the time. [facility]. Pt wants to reschedule infusion but isn't getting call back from CC. ALCON ROMERO in basketed infusion RNs at [address] [place] [address] to inquire about process of  r/s of infusion. PLAN  Case will be closed after infusion is rescheduled       This note will not be viewable in [employer].

## 2020-04-27 ENCOUNTER — PATIENT OUTREACH (OUTPATIENT)
Dept: CASE MANAGEMENT | Age: 55
End: 2020-04-27

## 2020-04-27 NOTE — PROGRESS NOTES
Per EMR, infusion has been scheduled at the [facility] at 2:30 on 5/4. ALCON ROMERO attempted outreach with pt to confirm appt. LM on  for call back.

## 2020-04-28 ENCOUNTER — PATIENT OUTREACH (OUTPATIENT)
Dept: CASE MANAGEMENT | Age: 55
End: 2020-04-28

## 2020-04-28 NOTE — PROGRESS NOTES
ALCON ROMERO received incoming call from pt. She is aware of her infusion at the [facility] on 5/4 at 2:30 p.m. Has transportation. No other needs/ concerns verbalized. Case will be closed. Dr. Daria Wesley updated.

## 2020-04-29 PROBLEM — E04.2 MULTIPLE THYROID NODULES: Status: ACTIVE | Noted: 2020-04-29

## 2020-05-05 PROBLEM — G44.301 INTRACTABLE POST-TRAUMATIC HEADACHE: Status: ACTIVE | Noted: 2020-05-05

## 2020-05-05 PROBLEM — S06.0X1A CONCUSSION WTH LOSS OF CONSCIOUSNESS OF 30 MINUTES OR LESS: Status: ACTIVE | Noted: 2020-05-05

## 2020-05-05 PROBLEM — R29.3 POSTURAL IMBALANCE: Status: ACTIVE | Noted: 2020-05-05

## 2020-05-15 ENCOUNTER — HOSPITAL ENCOUNTER (OUTPATIENT)
Dept: MRI IMAGING | Age: 55
Discharge: HOME | End: 2020-05-15
Attending: PSYCHIATRY & NEUROLOGY

## 2020-05-20 ENCOUNTER — APPOINTMENT (OUTPATIENT)
Dept: GENERAL RADIOLOGY | Age: 55
End: 2020-05-20
Attending: EMERGENCY MEDICINE
Payer: COMMERCIAL

## 2020-05-20 ENCOUNTER — HOSPITAL ENCOUNTER (EMERGENCY)
Age: 55
Discharge: HOME | End: 2020-05-20
Attending: EMERGENCY MEDICINE | Admitting: EMERGENCY MEDICINE
Payer: COMMERCIAL

## 2020-05-20 VITALS
OXYGEN SATURATION: 98 % | DIASTOLIC BLOOD PRESSURE: 84 MMHG | RESPIRATION RATE: 16 BRPM | HEART RATE: 82 BPM | TEMPERATURE: 98 F | SYSTOLIC BLOOD PRESSURE: 122 MMHG | HEIGHT: 64 IN | WEIGHT: 189 LBS | BODY MASS INDEX: 32.27 KG/M2

## 2020-05-20 DIAGNOSIS — N39.3 STRESS INCONTINENCE (FEMALE) (MALE): ICD-10-CM

## 2020-05-20 DIAGNOSIS — J31.0 RHINITIS, UNSPECIFIED TYPE: Primary | ICD-10-CM

## 2020-05-20 PROCEDURE — 99283 EMERGENCY DEPT VISIT LOW MDM: CPT

## 2020-05-20 PROCEDURE — 74011250637 HC RX REV CODE- 250/637: Performed by: EMERGENCY MEDICINE

## 2020-05-20 PROCEDURE — 71045 X-RAY EXAM CHEST 1 VIEW: CPT

## 2020-05-20 RX ORDER — PROMETHAZINE HYDROCHLORIDE 25 MG/1
12.5 TABLET ORAL
Qty: 10 TAB | Refills: 0 | Status: SHIPPED | OUTPATIENT
Start: 2020-05-20 | End: 2020-06-29

## 2020-05-20 RX ORDER — PROMETHAZINE HYDROCHLORIDE 25 MG/1
25 TABLET ORAL
Status: COMPLETED | OUTPATIENT
Start: 2020-05-20 | End: 2020-05-20

## 2020-05-20 RX ADMIN — PROMETHAZINE HYDROCHLORIDE 25 MG: 25 TABLET ORAL at 23:54

## 2020-05-21 ENCOUNTER — PATIENT OUTREACH (OUTPATIENT)
Dept: CASE MANAGEMENT | Age: 55
End: 2020-05-21

## 2020-05-21 NOTE — ED NOTES
I have reviewed discharge instructions with the patient. The patient verbalized understanding. Patient left ED via Discharge Method: ambulatory to Home with self. Opportunity for questions and clarification provided. Patient given 1 scripts. To continue your aftercare when you leave the hospital, you may receive an automated call from our care team to check in on how you are doing. This is a free service and part of our promise to provide the best care and service to meet your aftercare needs.  If you have questions, or wish to unsubscribe from this service please call [phone]. Thank you for Choosing our [facility] Emergency Department.

## 2020-05-21 NOTE — ED TRIAGE NOTES
Pt arrives ambulatory to triage wearing a mask. Pt states she has been coughing for a week and urinating on herself when she coughs. Pt states she feels SOB, dizziness, lightheaded.

## 2020-05-21 NOTE — ED PROVIDER NOTES
Patient is a 51-year-old female presenting to the emergency department today complaining of nonproductive cough for the last 2 weeks. The patient states that she has not taken her temperature. She says the reason she decided to come to the ER tonight however was because she has had some release of urine with her cough and she has now on her second package of depends. The patient did see her urologist on Monday and had a UA done which was neck inspection. Patient states that she also had a bronchoscopy done 3 weeks ago by her specialist which was normal.  The patient has had a hysterectomy previously and has never done [place] exercises. The patient does follow with urology secondary to her history of IC and gets injections and takes oral medication for her chronic pelvic pain. The patient had no other acute complaints.            Past Medical History:   Diagnosis Date    Anemia     blood transfusion 5/2018 per pt    Arrhythmia     palpitations, moderate mitral valve regurge    Arthritis     lower back osteo    Asthma     uses inhalers    Cardiomyopathy     ECHO 11/2017    CHF (congestive heart failure) (HCC)     EF 30-35% last echo 7/2019-- followed by dr Jennifer Mckeon Chronic pain 2010    Coagulation defect ([address]     eliquis    COPD     nebulizer daily and maint inhalers, can not climb 1 flight of stairs (also CHF)  oxygen 3 LPM qhs    Decreased cardiac ejection fraction 11/27/2017    EF 45-50% per echo 11/27/17    Depression     controlled      Diverticulitis     GERD (gastroesophageal reflux disease)     controlled with med     Heart murmur     Hypertension     managed with meds    IBS (irritable bowel syndrome)     IC (interstitial cystitis)     Insomnia     Migraine with aura and without status migrainosus, not intractable 6/17/2016    Mitral valve regurgitation, rheumatic     followed Dr. Flores Fails Moderate aortic regurgitation     per echo 7/2019 in cc-- followed by dr Johan Barone 5/16/2016    Psychiatric disorder     anxiety    Requires oxygen therapy     3l/min at hs. prn during the days as needed    Rheumatic aortic insufficiency 5/16/2016    Rheumatic fever as child    pt reports rheumatic fever in childhood    Smoker     started age 23-- smoked 0.5 ppd until 2018-- cut back to 10-2 cig daily per pt    Stroke ([facility]     \"mild stroke\" pt reports 2014- \"left sided weakness and balance is off\"     Thromboembolus ([facility]     BLE 2012    Vitamin D deficiency        Past Surgical History:   Procedure Laterality Date    COLONOSCOPY      8 polyps removed, diverticulitis    COLONOSCOPY N/A 5/21/2018    COLONOSCOPY performed by Sheron Moreno MD at [address]  8-23-11    bladder dilitation    EGD      HX APPENDECTOMY  2006    HX HEART CATHETERIZATION  5/27/2011    no blockages, no intervention    HX HEART CATHETERIZATION  04/2017    no intervention    HX LAP CHOLECYSTECTOMY  6/6/2011    HX ORTHOPAEDIC Right 07/2019    4th toe -- surg repair    HX NATASHA AND BSO  2004    fibroid tumors, hyst    HX UROLOGICAL  01/2018    cystoscopy with hydrodistention of bladder multiple    CA BIOPSY OF BREAST, INCISIONAL Left     lymph node , left, patient states her bx neg, but high risk         Family History:   Problem Relation Age of Onset    Heart Failure Father 76        chf    Heart Disease Father     Diabetes Sister     No Known Problems Mother     Thyroid Disease Sister     Kidney Disease Brother     No Known Problems Sister     Seizures Brother        Social History     Socioeconomic History    Marital status: SINGLE     Spouse name: Not on file    Number of children: Not on file    Years of education: Not on file    Highest education level: Not on file   Occupational History    Not on file   Social Needs    Financial resource strain: Not on file    Food insecurity     Worry: Not on file     Inability: Not on file    Transportation needs     Medical: Not on file     Non-medical: Not on file   Tobacco Use    Smoking status: Former Smoker     Packs/day: 0.25     Years: 30.00     Pack years: 7.50    Smokeless tobacco: Never Used   Substance and Sexual Activity    Alcohol use: No    Drug use: Yes     Types: Cocaine     Comment: last used 11/2018    Sexual activity: Not on file   Lifestyle    Physical activity     Days per week: Not on file     Minutes per session: Not on file    Stress: Not on file   Relationships    Social connections     Talks on phone: Not on file     Gets together: Not on file     Attends Anglican service: Not on file     Active member of club or organization: Not on file     Attends meetings of clubs or organizations: Not on file     Relationship status: Not on file    Intimate partner violence     Fear of current or ex partner: Not on file     Emotionally abused: Not on file     Physically abused: Not on file     Forced sexual activity: Not on file   Other Topics Concern    Not on file   Social History Narrative    Not on file         ALLERGIES: Aspirin; Bentyl [dicyclomine]; Toradol [ketorolac]; Ultram [tramadol]; and Zofran [ondansetron hcl (pf)]    Review of Systems   Constitutional: Negative. HENT: Negative. Respiratory: Positive for cough. Gastrointestinal: Negative. Genitourinary: Negative for pelvic pain. Stress incontinence   Musculoskeletal: Negative. Neurological: Negative. Hematological: Negative. Vitals:    05/20/20 2323 05/20/20 2335   BP: 127/80    Pulse: 87    Resp: 16    Temp: 97.7 °F (36.5 °C)    SpO2: 97% 97%   Weight: 85.7 kg (189 lb)    Height: 5' 4\" (1.626 m)             Physical Exam     GENERAL:The patient is well nourished, and well-hydrated. No acute distress  VITAL SIGNS: Heart rate, blood pressure, respiratory rate reviewed as recorded in  nurse's notes  EYES: Pupils reactive. Extraocular motion intact. No conjunctival redness or drainage.   NOSE: Bilateral erythema with thin drainage appreciated. No epistaxis present  MOUTH: Uvula is midline. There is no tonsillar enlargement or exudates appreciated. The patient does have cobblestoning noted in the posterior pharynx. The floor the  mouth is soft and there is no lesions or lacerations normal cavity. NECK: No   tenderness to palpation with enlargement of the submandibular lymph  nodes bilaterally. Trachea midline. LUNGS: No accessory muscle use  CARDIOVASCULAR: Regular rate and rhythm  EXTREMITIES: Pt moving all 4 extremities with out limitations. Normal muscle tone. NEUROLOGIC: Cranial nerve exam reveals face is symmetrical, tongue is midline  speech is clear. No focal deficits noted  SKIN: No rash or petechiae. Good skin turgor palpated. PSYCHIATRIC: Alert and oriented. Appropriate behavior and judgment.       MDM  Number of Diagnoses or Management Options  Diagnosis management comments: Cough, allergies, stress incontinence, pneumonia,       Amount and/or Complexity of Data Reviewed  Tests in the radiology section of CPT®: ordered and reviewed  Tests in the medicine section of CPT®: ordered and reviewed  Review and summarize past medical records: yes  Independent visualization of images, tracings, or specimens: yes           Procedures

## 2020-05-21 NOTE — DISCHARGE INSTRUCTIONS
Start doing [place] exercises at home. If these do not help improve your symptoms you may need to follow-up with GYN. Start taking Claritin or Allegra once daily for a week to see if this decreases her cough. Take the Phenergan at night before going to bed.

## 2020-05-21 NOTE — PROGRESS NOTES
Attempted outreach # 1 to patient for HARLEY YAO for ER visit on 5/20/2020 for cough and stress incontinence   UTR at this time no answer  PLAN:  Will attempt outreach # 2 tomorrow

## 2020-05-22 ENCOUNTER — PATIENT OUTREACH (OUTPATIENT)
Dept: CASE MANAGEMENT | Age: 55
End: 2020-05-22

## 2020-05-22 NOTE — PROGRESS NOTES
Attempted outreach # 2 to patient for HARLEY YAO for ER visit on 5/20/2020 for cough and stress incontinence   UTR at this time no answer  PLAN:  Case closed at this time due to 2 unsuccessful outreach attempts This note will not be viewable in [address]

## 2020-05-26 ENCOUNTER — HOSPITAL ENCOUNTER (OUTPATIENT)
Dept: MRI IMAGING | Age: 55
Discharge: HOME | End: 2020-05-26
Attending: PSYCHIATRY & NEUROLOGY
Payer: COMMERCIAL

## 2020-05-26 DIAGNOSIS — G44.301 INTRACTABLE POST-TRAUMATIC HEADACHE, UNSPECIFIED CHRONICITY PATTERN: ICD-10-CM

## 2020-05-26 PROCEDURE — A9575 INJ GADOTERATE MEGLUMI 0.1ML: HCPCS | Performed by: PSYCHIATRY & NEUROLOGY

## 2020-05-26 PROCEDURE — 74011250636 HC RX REV CODE- 250/636: Performed by: PSYCHIATRY & NEUROLOGY

## 2020-05-26 PROCEDURE — 70553 MRI BRAIN STEM W/O & W/DYE: CPT

## 2020-05-26 RX ORDER — SODIUM CHLORIDE 0.9 % (FLUSH) 0.9 %
10 SYRINGE (ML) INJECTION
Status: COMPLETED | OUTPATIENT
Start: 2020-05-26 | End: 2020-05-26

## 2020-05-26 RX ORDER — GADOTERATE MEGLUMINE 376.9 MG/ML
17 INJECTION INTRAVENOUS
Status: COMPLETED | OUTPATIENT
Start: 2020-05-26 | End: 2020-05-26

## 2020-05-26 RX ADMIN — GADOTERATE MEGLUMINE 17 ML: 376.9 INJECTION INTRAVENOUS at 09:53

## 2020-05-26 RX ADMIN — Medication 10 ML: at 09:54

## 2020-05-27 ENCOUNTER — HOSPITAL ENCOUNTER (OUTPATIENT)
Dept: INFUSION THERAPY | Age: 55
Discharge: HOME | End: 2020-05-27
Payer: COMMERCIAL

## 2020-05-27 VITALS
BODY MASS INDEX: 29.49 KG/M2 | HEART RATE: 77 BPM | RESPIRATION RATE: 18 BRPM | DIASTOLIC BLOOD PRESSURE: 80 MMHG | SYSTOLIC BLOOD PRESSURE: 125 MMHG | TEMPERATURE: 98.2 F | OXYGEN SATURATION: 100 % | WEIGHT: 171.8 LBS

## 2020-05-27 DIAGNOSIS — D64.9 ANEMIA, UNSPECIFIED TYPE: Primary | ICD-10-CM

## 2020-05-27 PROCEDURE — 96361 HYDRATE IV INFUSION ADD-ON: CPT

## 2020-05-27 PROCEDURE — 74011250636 HC RX REV CODE- 250/636: Performed by: INTERNAL MEDICINE

## 2020-05-27 PROCEDURE — 96365 THER/PROPH/DIAG IV INF INIT: CPT

## 2020-05-27 RX ADMIN — SODIUM CHLORIDE 750 MG: 900 INJECTION, SOLUTION INTRAVENOUS at 08:14

## 2020-05-27 RX ADMIN — SODIUM CHLORIDE 500 ML: 900 INJECTION, SOLUTION INTRAVENOUS at 07:43

## 2020-05-27 NOTE — PROGRESS NOTES
Brain MRI is good. There was a little consideration of some facet in neck. Do you have neck symptoms. If so you need a VIRTUAL VISIT to be scheduled with me or with your PCP Dr Gracia Simmonds.          Hazel Randall MD  Consultative Neurology, Neurodiagnostics and Neurotherapeutics  Neuroelectrophysiology, EEG, EMG  [facility]  [address] [facility], [address] [facility]  Phone:  [phone]  Fax:   [phone]

## 2020-05-27 NOTE — PROGRESS NOTES
Arrived to the [facility]. Iron completed. Patient tolerated well. Observed patient x 30 minutes, no reactions. Any issues or concerns during appointment: None. Patient aware no future infusion appointments. Patient to follow up with physician. Discharged ambulatory in stable condition.

## 2020-06-03 ENCOUNTER — APPOINTMENT (OUTPATIENT)
Dept: CT IMAGING | Age: 55
End: 2020-06-03
Attending: EMERGENCY MEDICINE
Payer: COMMERCIAL

## 2020-06-03 ENCOUNTER — HOSPITAL ENCOUNTER (EMERGENCY)
Age: 55
Discharge: HOME | End: 2020-06-03
Attending: EMERGENCY MEDICINE
Payer: COMMERCIAL

## 2020-06-03 VITALS
BODY MASS INDEX: 29.19 KG/M2 | DIASTOLIC BLOOD PRESSURE: 75 MMHG | RESPIRATION RATE: 14 BRPM | SYSTOLIC BLOOD PRESSURE: 114 MMHG | HEART RATE: 104 BPM | HEIGHT: 64 IN | TEMPERATURE: 98.3 F | OXYGEN SATURATION: 96 % | WEIGHT: 171 LBS

## 2020-06-03 DIAGNOSIS — G89.29 OTHER CHRONIC PAIN: ICD-10-CM

## 2020-06-03 DIAGNOSIS — R51.9 NONINTRACTABLE HEADACHE, UNSPECIFIED CHRONICITY PATTERN, UNSPECIFIED HEADACHE TYPE: Primary | ICD-10-CM

## 2020-06-03 PROCEDURE — 96372 THER/PROPH/DIAG INJ SC/IM: CPT

## 2020-06-03 PROCEDURE — 99283 EMERGENCY DEPT VISIT LOW MDM: CPT

## 2020-06-03 PROCEDURE — 74011250637 HC RX REV CODE- 250/637: Performed by: EMERGENCY MEDICINE

## 2020-06-03 PROCEDURE — 70450 CT HEAD/BRAIN W/O DYE: CPT

## 2020-06-03 PROCEDURE — 74011250636 HC RX REV CODE- 250/636: Performed by: EMERGENCY MEDICINE

## 2020-06-03 RX ORDER — PROMETHAZINE HYDROCHLORIDE 25 MG/ML
25 INJECTION, SOLUTION INTRAMUSCULAR; INTRAVENOUS
Status: COMPLETED | OUTPATIENT
Start: 2020-06-03 | End: 2020-06-03

## 2020-06-03 RX ORDER — DIAZEPAM 5 MG/1
5 TABLET ORAL
Status: COMPLETED | OUTPATIENT
Start: 2020-06-03 | End: 2020-06-03

## 2020-06-03 RX ORDER — HYDROXYZINE PAMOATE 50 MG/1
50 CAPSULE ORAL
Qty: 14 CAP | Refills: 0 | Status: SHIPPED | OUTPATIENT
Start: 2020-06-03 | End: 2020-06-17

## 2020-06-03 RX ADMIN — PROMETHAZINE HYDROCHLORIDE 25 MG: 25 INJECTION INTRAMUSCULAR; INTRAVENOUS at 21:05

## 2020-06-03 RX ADMIN — DIAZEPAM 5 MG: 5 TABLET ORAL at 21:05

## 2020-06-03 NOTE — ED TRIAGE NOTES
Pt ambulatory to triage wearing a mask. Pt report 2 knots on her head with headache x a few days. Pt states it is affecting her vision. Pt denies any injury or trauma.

## 2020-06-04 ENCOUNTER — PATIENT OUTREACH (OUTPATIENT)
Dept: CASE MANAGEMENT | Age: 55
End: 2020-06-04

## 2020-06-04 NOTE — ED NOTES
I have reviewed discharge instructions with the patient. The patient verbalized understanding. Patient left ED via Discharge Method: ambulatory to Home with significant other. Opportunity for questions and clarification provided. Patient given 1 scripts. To continue your aftercare when you leave the hospital, you may receive an automated call from our care team to check in on how you are doing. This is a free service and part of our promise to provide the best care and service to meet your aftercare needs.  If you have questions, or wish to unsubscribe from this service please call [phone]. Thank you for Choosing our [facility] Emergency Department.

## 2020-06-04 NOTE — ED PROVIDER NOTES
Patient presents the ER complaint of persistent headache. States she has had a headache for at least 7 days. Reports a fall about 7 days ago. Did hit her head. States she is anticoagulated. Denies any vomiting, reports mild photophobia. Feels that she has \"2 knots\" at the top part of her head. Denies any neck pain or neck stiffness. The history is provided by the patient. Headache    This is a chronic problem. The current episode started more than 1 week ago. The problem has not changed since onset. The quality of the pain is described as sharp and throbbing. The pain is at a severity of 4/10. The pain is mild. Associated symptoms include malaise/fatigue. Pertinent negatives include no palpitations and no vomiting.         Past Medical History:   Diagnosis Date    Anemia     blood transfusion 5/2018 per pt    Arrhythmia     palpitations, moderate mitral valve regurge    Arthritis     lower back osteo    Asthma     uses inhalers    Cardiomyopathy     ECHO 11/2017    CHF (congestive heart failure) (HCC)     EF 30-35% last echo 7/2019-- followed by dr Saad Orlando Chronic pain 2010    Coagulation defect ([address]     eliquis    COPD     nebulizer daily and maint inhalers, can not climb 1 flight of stairs (also CHF)  oxygen 3 LPM qhs    Decreased cardiac ejection fraction 11/27/2017    EF 45-50% per echo 11/27/17    Depression     controlled      Diverticulitis     GERD (gastroesophageal reflux disease)     controlled with med     Heart murmur     Hypertension     managed with meds    IBS (irritable bowel syndrome)     IC (interstitial cystitis)     Insomnia     Migraine with aura and without status migrainosus, not intractable 6/17/2016    Mitral valve regurgitation, rheumatic     followed Dr. Tanisha Yeh Moderate aortic regurgitation     per echo 7/2019 in cc-- followed by dr Nash Both Palpitations 5/16/2016    Psychiatric disorder     anxiety    Requires oxygen therapy     3l/min at hs. prn during the days as needed    Rheumatic aortic insufficiency 5/16/2016    Rheumatic fever as child    pt reports rheumatic fever in childhood    Smoker     started age 23-- smoked 0.5 ppd until 2018-- cut back to 10-2 cig daily per pt    Stroke [facility])     \"mild stroke\" pt reports 2014- \"left sided weakness and balance is off\"     Thromboembolus ([address]     BLE 2012    Vitamin D deficiency        Past Surgical History:   Procedure Laterality Date    COLONOSCOPY      8 polyps removed, diverticulitis    COLONOSCOPY N/A 5/21/2018    COLONOSCOPY performed by Stella Ann MD at [address]  8-23-11    bladder dilitation    EGD      HX APPENDECTOMY  2006    HX HEART CATHETERIZATION  5/27/2011    no blockages, no intervention    HX HEART CATHETERIZATION  04/2017    no intervention    HX LAP CHOLECYSTECTOMY  6/6/2011    HX ORTHOPAEDIC Right 07/2019    4th toe -- surg repair    HX NATASHA AND BSO  2004    fibroid tumors, hyst    HX UROLOGICAL  01/2018    cystoscopy with hydrodistention of bladder multiple    ME BIOPSY OF BREAST, INCISIONAL Left     lymph node , left, patient states her bx neg, but high risk         Family History:   Problem Relation Age of Onset    Heart Failure Father 76        chf    Heart Disease Father     Diabetes Sister     No Known Problems Mother     Thyroid Disease Sister     Kidney Disease Brother     No Known Problems Sister     Seizures Brother        Social History     Socioeconomic History    Marital status: SINGLE     Spouse name: Not on file    Number of children: Not on file    Years of education: Not on file    Highest education level: Not on file   Occupational History    Not on file   Social Needs    Financial resource strain: Not on file    Food insecurity     Worry: Not on file     Inability: Not on file    Transportation needs     Medical: Not on file     Non-medical: Not on file   Tobacco Use    Smoking status: Former Smoker     Packs/day: 0.25 Years: 30.00     Pack years: 7.50    Smokeless tobacco: Never Used   Substance and Sexual Activity    Alcohol use: No    Drug use: Yes     Types: Cocaine     Comment: last used 11/2018    Sexual activity: Not on file   Lifestyle    Physical activity     Days per week: Not on file     Minutes per session: Not on file    Stress: Not on file   Relationships    Social connections     Talks on phone: Not on file     Gets together: Not on file     Attends Voodoo service: Not on file     Active member of club or organization: Not on file     Attends meetings of clubs or organizations: Not on file     Relationship status: Not on file    Intimate partner violence     Fear of current or ex partner: Not on file     Emotionally abused: Not on file     Physically abused: Not on file     Forced sexual activity: Not on file   Other Topics Concern    Not on file   Social History Narrative    Not on file         ALLERGIES: Aspirin; Bentyl [dicyclomine]; Toradol [ketorolac]; Ultram [tramadol]; and Zofran [ondansetron hcl (pf)]    Review of Systems   Constitutional: Positive for malaise/fatigue. Negative for chills and fatigue. Eyes: Negative for photophobia and redness. Respiratory: Negative for apnea, chest tightness and stridor. Cardiovascular: Negative for palpitations and leg swelling. Gastrointestinal: Negative for abdominal pain and vomiting. Musculoskeletal: Negative for back pain and gait problem. Neurological: Positive for headaches. Negative for tremors. Hematological: Negative for adenopathy. Psychiatric/Behavioral: Negative for behavioral problems and confusion. All other systems reviewed and are negative. Vitals:    06/03/20 1846   BP: 119/70   Pulse: (!) 104   Resp: 16   Temp: 98.3 °F (36.8 °C)   SpO2: 97%   Weight: 77.6 kg (171 lb)   Height: 5' 4\" (1.626 m)            Physical Exam  Vitals signs and nursing note reviewed. Constitutional:       Appearance: Normal appearance. HENT:      Head: Normocephalic and atraumatic. Eyes:      Extraocular Movements: Extraocular movements intact. Pupils: Pupils are equal, round, and reactive to light. Neck:      Musculoskeletal: Normal range of motion and neck supple. Cardiovascular:      Rate and Rhythm: Normal rate and regular rhythm. Pulses: Normal pulses. Heart sounds: Normal heart sounds. Pulmonary:      Effort: Pulmonary effort is normal.      Breath sounds: Normal breath sounds. Abdominal:      General: Abdomen is flat. Bowel sounds are normal.      Palpations: Abdomen is soft. Musculoskeletal: Normal range of motion. General: No swelling or tenderness. Skin:     General: Skin is warm and dry. Capillary Refill: Capillary refill takes less than 2 seconds. Neurological:      Mental Status: She is alert. MDM  Number of Diagnoses or Management Options  Diagnosis management comments: Given that she is anticoagulated her reports remote trauma will repeat CT scan of head. Although it appears patient has had a pretty extensive work-up for similar headache and possible postconcussive syndrome symptoms. 10:05 PM  CT scan of head is negative. Discussed with patient results of testing. She has had at least 11 CT scans performed within the past year    She is requesting \"medicine to help her sleep\". She is already on multiple sedating medications. Amount and/or Complexity of Data Reviewed  Tests in the radiology section of CPT®: ordered and reviewed    Risk of Complications, Morbidity, and/or Mortality  Presenting problems: moderate  Diagnostic procedures: low  Management options: low    Patient Progress  Patient progress: stable         Procedures             Voice dictation software was used during the making of this note. This software is not perfect and grammatical and other typographical errors may be present. This note has been proofread, but may still contain errors.   Nae Anthony Janelle Billings, MD; 6/3/2020 @10:05 PM   ===================================================================

## 2020-06-04 NOTE — PROGRESS NOTES
Patient contacted regarding recent discharge and COVID-19 risk. Discussed COVID-19 related testing which was not done at this time. Test results were not done. Ambulatory Care Manager contacted the patient by telephone to perform post discharge assessment. Verified name and  with patient as identifiers. Patient has following risk factors of: heart failure, COPD, asthma, pneumonia and acute respiratory failure. ACM reviewed discharge instructions, medical action plan and red flags related to discharge diagnosis. Reviewed and educated them on any new and changed medications related to discharge diagnosis. Advised obtaining a 90-day supply of all daily and as-needed medications. Education provided regarding infection prevention, and signs and symptoms of COVID-19 and when to seek medical attention with patient who verbalized understanding. Discussed exposure protocols and quarantine from [address] you at higher risk for severe illness  and given an opportunity for questions and concerns. The patient agrees to contact the COVID-19 hotline [phone] or PCP office for questions related to their healthcare. CTN/ACM provided contact information for future reference. From CDC: Are you at higher risk for severe illness?  Wash your hands often.  Avoid close contact (6 feet, which is about two arm lengths) with people who are sick.  Put distance between yourself and other people if COVID-19 is spreading in your community.  Clean and disinfect frequently touched surfaces.  Avoid all cruise travel and non-essential air travel.  Call your healthcare professional if you have concerns about COVID-19 and your underlying condition or if you are sick.     For more information on steps you can take to protect yourself, see CDC's How to Protect Yourself      Patient/family/caregiver given information for Nelson Barone and agrees to enroll no  Patient's preferred e-mail:  NA  Patient's preferred phone number: NA  Based on Loop alert triggers, patient will be contacted by nurse care manager for worsening symptoms. Plan for follow-up call in 7-14 days based on severity of symptoms and risk factors.

## 2020-06-05 ENCOUNTER — PATIENT OUTREACH (OUTPATIENT)
Dept: CASE MANAGEMENT | Age: 55
End: 2020-06-05

## 2020-06-05 NOTE — PROGRESS NOTES
ALCON ROMERO attempted to reach pt several times but got a busy signal each time. ALCON ROMERO was unable to reach pt. ALCON ROMERO will notify NICOLE Rojo CM for follow up next week.

## 2020-06-09 ENCOUNTER — PATIENT OUTREACH (OUTPATIENT)
Dept: CASE MANAGEMENT | Age: 55
End: 2020-06-09

## 2020-06-09 NOTE — PROGRESS NOTES
ALCON ROMERO's second attempt to outreach with ptSabine RICKETTS for call back on voice mail. This note will not be viewable in [address]

## 2020-06-09 NOTE — Clinical Note
Forwarding note. Second unsuccessful attempt at contact. Will try once more. I worked with pt in March and April on housing issues. At that time she did have a vehicle and was able to drive.   Thanks Jaimee Pardo

## 2020-06-10 ENCOUNTER — PATIENT OUTREACH (OUTPATIENT)
Dept: CASE MANAGEMENT | Age: 55
End: 2020-06-10

## 2020-06-10 NOTE — PROGRESS NOTES
Third and final unsuccessful outreach attempt with pt. LM for call back on VM. No further outreaches planned. Dr. Mariam Hinds updated.

## 2020-06-10 NOTE — Clinical Note
Forwarding today's note. Unable to reach x 3 attempts Happy to help in the future . Bruce Bell  Thanks Mindi Mensah

## 2020-06-11 ENCOUNTER — HOSPITAL ENCOUNTER (OUTPATIENT)
Dept: MRI IMAGING | Age: 55
Discharge: HOME | End: 2020-06-11
Attending: PSYCHIATRY & NEUROLOGY
Payer: COMMERCIAL

## 2020-06-11 DIAGNOSIS — R93.7 ABNORMAL MRI, CERVICAL SPINE: ICD-10-CM

## 2020-06-11 PROCEDURE — 72156 MRI NECK SPINE W/O & W/DYE: CPT

## 2020-06-11 PROCEDURE — A9575 INJ GADOTERATE MEGLUMI 0.1ML: HCPCS | Performed by: PSYCHIATRY & NEUROLOGY

## 2020-06-11 PROCEDURE — 74011250636 HC RX REV CODE- 250/636: Performed by: PSYCHIATRY & NEUROLOGY

## 2020-06-11 RX ORDER — SODIUM CHLORIDE 0.9 % (FLUSH) 0.9 %
10 SYRINGE (ML) INJECTION
Status: COMPLETED | OUTPATIENT
Start: 2020-06-11 | End: 2020-06-11

## 2020-06-11 RX ORDER — GADOTERATE MEGLUMINE 376.9 MG/ML
17 INJECTION INTRAVENOUS
Status: COMPLETED | OUTPATIENT
Start: 2020-06-11 | End: 2020-06-11

## 2020-06-11 RX ADMIN — GADOTERATE MEGLUMINE 17 ML: 376.9 INJECTION INTRAVENOUS at 15:05

## 2020-06-11 RX ADMIN — Medication 10 ML: at 15:05

## 2020-06-11 NOTE — PROGRESS NOTES
Her cervical MRI reveals significant findings at multiple levels; she has multiple levels of canal stenoses; she has multiple levels of foraminal stenoses, especially left-sided; per radiologist, she does have edema of the left facet joints of C2 and C3 as well; this could be inflammatory/rheumatologic; Dr. Elaine, her neurologist, has ordered both a sed rate and a CRP on her; she needs to go to his office today to have those labs drawn; I have also generated an urgent referral to neurosurgery, with Dr. Tonya Monroe; thank you, Gege Smith

## 2020-06-12 ENCOUNTER — HOSPITAL ENCOUNTER (OUTPATIENT)
Dept: LAB | Age: 55
Discharge: HOME | End: 2020-06-12
Payer: COMMERCIAL

## 2020-06-12 DIAGNOSIS — R51.9 HEADACHE DISORDER: ICD-10-CM

## 2020-06-12 LAB
CRP SERPL-MCNC: <0.3 MG/DL (ref 0–0.9)
ERYTHROCYTE [SEDIMENTATION RATE] IN BLOOD: 22 MM/HR (ref 0–30)

## 2020-06-12 PROCEDURE — 85652 RBC SED RATE AUTOMATED: CPT

## 2020-06-12 PROCEDURE — 86140 C-REACTIVE PROTEIN: CPT

## 2020-06-12 PROCEDURE — 36415 COLL VENOUS BLD VENIPUNCTURE: CPT

## 2020-06-12 NOTE — PROGRESS NOTES
Patient needs cervical surgery / spine surgery opinion on this. I could not identify if Dr Esperanza Rowe is involved.        Brenda Weems MD  Consultative Neurology, Neurodiagnostics and Neurotherapeutics  Neuroelectrophysiology, EEG, EMG  [facility]  [address] [place] [address], [address] [facility]  Phone:  [phone]  Fax:   [phone]

## 2020-06-12 NOTE — PROGRESS NOTES
Reviewed results and patient verbalizes understanding. Pt states she was \"getting in car\" and having labs done today.

## 2020-06-12 NOTE — PROGRESS NOTES
I discussed w Dr Nicola Moses who has already discussed with Dr Gracia Simmonds. Reviewed. Patient should finish blood tests that were pending and follow recommendations per Cody Moses and Gracia Simmonds.         Hazel Randall MD  Consultative Neurology, Neurodiagnostics and Neurotherapeutics  Neuroelectrophysiology, EEG, EMG  [facility]  [address] [place] [address], [address]   Phone:  [phone]  Fax:   [phone]

## 2020-06-12 NOTE — PROGRESS NOTES
Attempted to call the patient, unable to get through to the patient. Sounded like phone was answered then silence.

## 2020-06-12 NOTE — PROGRESS NOTES
Everardo Elaine, Dr. Scott Hagen is not involved with this patient's care (I called Dr. Scott Hagen late yesterday afternoon as he was on call/unfortunately, I could not get you as you had left the office) - I do not know if my medical assistant has contacted this patient regarding her abnormal cervical MRI findings at this time - I have instructed this patient to have lab work drawn as you have ordered a sed rate and a CRP - I will instruct this patient to go to either your office or to the outpatient center to have these labs drawn; I have urgently placed a neurosurgery referral to Dr. James Ireland; please let me know if you would like for me to refer this patient officially to Dr. Fannie Nunes now; thank you! SAMM Larry Seek - please contact this patient re her cervical MRI results - see my original result note)

## 2020-06-12 NOTE — PROGRESS NOTES
Spoke with Samaria Ramon, emergency contact. Mr. Yenifer Hernandez stated he would give pt msg to call our office.

## 2020-06-12 NOTE — PROGRESS NOTES
Thank you Florinda Richards; please utilize any emergency contacts as this is an urgent MRI result; also, again, she needs to go to Dr. Mindi Pino office or to the outpatient center for Dr. Mindi Pino labs, CRP and sed rate; thank you!  JF

## 2020-06-14 NOTE — PROGRESS NOTES
Normal sed rate and CRP blood tests. Good. She needs to follow through with what PCP Shelley Núñez ordered referral to neurosurgeon.

## 2020-06-17 ENCOUNTER — PATIENT OUTREACH (OUTPATIENT)
Dept: CASE MANAGEMENT | Age: 55
End: 2020-06-17

## 2020-06-18 NOTE — PROGRESS NOTES
RNCM attempts to reach pt for final ED [facility] unsuccessful, messages left, no return call. Note multiple interactions w/ other practitioners in past weeks. No further outreach planned.

## 2020-06-19 NOTE — H&P (VIEW-ONLY)
[facility] NOTE:   History of Present Illness:    CC: Left-sided neck pain, numbness and tingling in the hands bilateral upper extremity pain and problems with balance and clumsiness of the hands. Olivia Brizuela is a 48 y.o. right handed female who presents today for evaluation of left-sided neck pain, numbness and tingling in the hands, bilateral upper extremity pain and problems with balance and clumsiness in the hands. The patient states that since she had a fall approximately 7 to 8 months ago she has been experiencing the aforementioned symptoms of left-sided neck pain with associated numbness and tingling in her hands, bilateral upper extremity pain, problems with balance and clumsiness of the hands. She states that she is noticed that she is dropped objects with her hands and feels that they are not as strong as it used to be. She also endorses problems with balance and states that she has had multiple falls over the course of the past 8 months. She initially was evaluated by Dr. Gauri Wise who referred her to our clinic to be evaluated after she underwent an MRI of her cervical spine. She states that she has worked with physical therapy and undergone pain management valuation with injections none of which of help relieve her symptoms or problems. She currently rates her neck pain and upper extremity pain is a 9 out of 10 on a visual analog pain scale. She does take Eliquis which is prescribed by her cardiologist Dr. William Perry. She is a non-smoker.   She states the symptoms are exacerbated when she flexes her neck forward with her chin to her chest.  The patient has an MRI cervical spine without contrast performed on 6/1/2020 at [address] [place] [address] that demonstrates multilevel cervical degenerative disc disease with a central disc bulge that effaces the ventral CSF space but does not result any significant central stenosis at C3-C4, at C4-C5 there is a broad-based and left eccentric disc osteophyte complex that results in effacement of the ventral CSF space and ventral compression of the cervical cord as well as severe left C4-C5 neuroforaminal stenosis. At C5-C6 there is degenerative disc disease that is broad-based and uncovertebral hypertrophy the results in mild to moderate neuroforaminal stenosis. At C6-C7 there is a broad-based disc osteophyte complex results in some mild to moderate neuroforaminal stenosis bilaterally. There is congenital cervical canal stenosis however there is still CSF signal dorsal to the cord at every level so the compression is not severe. There is some likely facet edema at the C2-C3 joint which may be causing some of the patient's pain however I do not feel this is associated with any of the patient's radicular symptoms.     Past Medical History:   Diagnosis Date    Anemia     blood transfusion 5/2018 per pt    Arrhythmia     palpitations, moderate mitral valve regurge    Arthritis     lower back osteo    Asthma     uses inhalers    Cardiomyopathy     ECHO 11/2017    CHF (congestive heart failure) (HCC)     EF 30-35% last echo 7/2019-- followed by dr Clyde Wick Chronic pain 2010    Coagulation defect ([address]     eliquis    COPD     nebulizer daily and maint inhalers, can not climb 1 flight of stairs (also CHF)  oxygen 3 LPM qhs    Decreased cardiac ejection fraction 11/27/2017    EF 45-50% per echo 11/27/17    Depression     controlled      Diverticulitis     GERD (gastroesophageal reflux disease)     controlled with med     Heart murmur     Hypertension     managed with meds    IBS (irritable bowel syndrome)     IC (interstitial cystitis)     Insomnia     Migraine with aura and without status migrainosus, not intractable 6/17/2016    Mitral valve regurgitation, rheumatic     followed Dr. Nakia Jackson    Moderate aortic regurgitation     per echo 7/2019 in cc-- followed by dr Yousuf Mart 5/16/2016    Psychiatric disorder anxiety    Requires oxygen therapy     3l/min at hs. prn during the days as needed    Rheumatic aortic insufficiency 5/16/2016    Rheumatic fever as child    pt reports rheumatic fever in childhood    Smoker     started age 23-- smoked 0.5 ppd until 2018-- cut back to 10-2 cig daily per pt    Stroke [facility])     \"mild stroke\" pt reports 2014- \"left sided weakness and balance is off\"     Thromboembolus ([address]     BLE 2012    Vitamin D deficiency       Past Surgical History:   Procedure Laterality Date    COLONOSCOPY      8 polyps removed, diverticulitis    COLONOSCOPY N/A 5/21/2018    COLONOSCOPY performed by Gwen Gomez MD at [address]  8-23-11    bladder dilitation    EGD      HX APPENDECTOMY  2006    HX HEART CATHETERIZATION  5/27/2011    no blockages, no intervention    HX HEART CATHETERIZATION  04/2017    no intervention    HX LAP CHOLECYSTECTOMY  6/6/2011    HX ORTHOPAEDIC Right 07/2019    4th toe -- surg repair    HX NATASHA AND BSO  2004    fibroid tumors, hyst    HX UROLOGICAL  01/2018    cystoscopy with hydrodistention of bladder multiple    KS BIOPSY OF BREAST, INCISIONAL Left     lymph node , left, patient states her bx neg, but high risk     Allergies   Allergen Reactions    Aspirin Other (comments)     Inflames IBS per pt    Bentyl [Dicyclomine] Itching    Toradol [Ketorolac] Hives    Ultram [Tramadol] Nausea and Vomiting    Zofran [Ondansetron Hcl (Pf)] Nausea and Vomiting      Family History   Problem Relation Age of Onset    Heart Failure Father 76        chf    Heart Disease Father     Diabetes Sister     No Known Problems Mother     Thyroid Disease Sister     Kidney Disease Brother     No Known Problems Sister     Seizures Brother       Social History     Socioeconomic History    Marital status: SINGLE     Spouse name: Not on file    Number of children: Not on file    Years of education: Not on file    Highest education level: Not on file Occupational History    Not on file   Social Needs    Financial resource strain: Not on file    Food insecurity     Worry: Not on file     Inability: Not on file    Transportation needs     Medical: Not on file     Non-medical: Not on file   Tobacco Use    Smoking status: Former Smoker     Packs/day: 0.25     Years: 30.00     Pack years: 7.50    Smokeless tobacco: Never Used   Substance and Sexual Activity    Alcohol use: No    Drug use: Yes     Types: Cocaine     Comment: last used 11/2018    Sexual activity: Not on file   Lifestyle    Physical activity     Days per week: Not on file     Minutes per session: Not on file    Stress: Not on file   Relationships    Social connections     Talks on phone: Not on file     Gets together: Not on file     Attends Restorationism service: Not on file     Active member of club or organization: Not on file     Attends meetings of clubs or organizations: Not on file     Relationship status: Not on file    Intimate partner violence     Fear of current or ex partner: Not on file     Emotionally abused: Not on file     Physically abused: Not on file     Forced sexual activity: Not on file   Other Topics Concern    Not on file   Social History Narrative    Not on file     Current Outpatient Medications   Medication Sig Dispense Refill    oxybutynin (DITROPAN) 5 mg tablet Take 1 Tab by mouth three (3) times daily. 90 Tab 11    nitroglycerin (NITROSTAT) 0.4 mg SL tablet 1 Tab by SubLINGual route every five (5) minutes as needed for Chest Pain. 1 Bottle 5    gabapentin (NEURONTIN) 400 mg capsule Take 1 cap PO Q 12 hours PRN pain, use sparingly; DO NOT TAKE WITH ANY OTHER MEDICATION(S) THAT MAY CAUSE DROWSINESS; NEVER DRIVE/USE HEAVY MACHINERY TAKING THIS MEDICATION 8 Cap 0    promethazine (PHENERGAN) 25 mg tablet Take 0.5 Tabs by mouth every six (6) hours as needed for Nausea (cough) for up to 20 doses.  10 Tab 0    topiramate (TOPAMAX) 25 mg tablet Take 1 Tab by mouth two (2) times a day. Indications: migraine prevention 60 Tab 2    pantoprazole (PROTONIX) 40 mg tablet Take 1 Tab by mouth daily. Indications: morning 30 Tab 0    carisoprodoL (SOMA) 350 mg tablet TAKE 1 TABLET BY MOUTH 3 TIMES A DAY AS NEEDED      diclofenac (VOLTAREN) 1 % gel APPLY 1 APPLICATION 4 TIMES PER DAY MASSAGE INTO ALL PAINFUL AREAS      sacubitril-valsartan (ENTRESTO) 24 mg/26 mg tablet Take 1 Tab by mouth two (2) times a day. 60 Tab 11    carvediloL (COREG) 12.5 mg tablet Take 0.5 Tabs by mouth two (2) times a day. 60 Tab 11    furosemide (LASIX) 40 mg tablet Take 1 Tab by mouth daily. 30 Tab 0    apixaban (ELIQUIS) 5 mg tablet Take 1 Tab by mouth two (2) times a day. 60 Tab 11    butalbital-acetaminophen-caffeine (FIORICET, ESGIC) -40 mg per tablet   0    ferrous gluconate 324 mg (37.5 mg iron) tablet Take 1 Tab by mouth two (2) times a day. 60 Tab 11    cyclobenzaprine (FLEXERIL) 10 mg tablet Take 1 Tab by mouth three (3) times daily as needed for Muscle Spasm(s). (Patient taking differently: Take 10 mg by mouth three (3) times daily as needed for Muscle Spasm(s).) 15 Tab 0    budesonide-formoterol (SYMBICORT) 160-4.5 mcg/actuation HFAA Take 1 Puff by inhalation two (2) times a day. (Patient taking differently: Take 1 Puff by inhalation two (2) times a day. Indications: bronchospasm prevention with COPD, use on the dos) 1 Inhaler 11    tiotropium (SPIRIVA WITH HANDIHALER) 18 mcg inhalation capsule Take 1 Cap by inhalation daily. (Patient taking differently: Take 1 Cap by inhalation daily. Indications: bronchospasm prevention with COPD) 30 Cap 11    estradiol (ESTRACE) 0.5 mg tablet Take 1 Tab by mouth daily. Can d/c the estradiol patches (Patient taking differently: Take 0.5 mg by mouth daily.) 30 Tab 5    pravastatin (PRAVACHOL) 40 mg tablet Take 1 Tab by mouth nightly. 30 Tab 5    spironolactone (ALDACTONE) 25 mg tablet Take 1 Tab by mouth daily.  60 Tab 11    EPINEPHrine (EPIPEN JR) 0.15 mg/0.3 mL injection 0.15 mg by IntraMUSCular route once as needed.  albuterol (PROVENTIL HFA, VENTOLIN HFA, PROAIR HFA) 90 mcg/actuation inhaler Take 2 Puffs by inhalation every six (6) hours as needed for Wheezing. 1 Inhaler 0    FLUoxetine (PROZAC) 20 mg capsule Take 40 mg by mouth daily. 11    Nebulizer Accessories kit Use tid 50 Kit 6    diphenhydrAMINE (BANOPHEN) 50 mg capsule Take 50 mg by mouth every six (6) hours as needed.  Oxygen nightly. Indications: 3l/min at hs and prn during the day      LORazepam (ATIVAN) 0.5 mg tablet Take 1 Tab by mouth once for 1 dose. Max Daily Amount: 0.5 mg. 2 Tab 0     Patient Active Problem List   Diagnosis Code    Esophageal reflux K21.9    Rheumatic disease of mitral valve I05.9    Degenerative disc disease, lumbar M51.36    Interstitial cystitis N30.10    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 [facility] F14.150    Rheumatic aortic insufficiency I06.1    Palpitations R00.2    Bronchitis, chronic, mucopurulent ([facility]) J41.1    Takotsubo cardiomyopathy I51.81    Carotidynia G90.01    Nicotine dependence F17.200    Acute respiratory failure with hypoxia ([facility]) J96.01    Chronic respiratory failure ([facility]) J96.10    Depression, recurrent ([facility]) F33.9    DVT, recurrent, lower extremity, chronic, bilateral [id number]    Anemia D64.9    COPD (chronic obstructive pulmonary disease) ([facility]) J44.9    Chronic respiratory failure with hypoxia ([facility]) J96.11    Hyperlipidemia F04.3    Diastolic dysfunction U37.79    Fall W19. XXXA    Left shoulder pain M25.512    Abnormal nipple N64.9    Anxiety F41.9    Aortic valve regurgitation I35.1    Erwin's esophagus without dysplasia K22.70    Breast lesion N64.9    Breast pain, left N64.4    Chronic low back pain M54.5, G89.29    Cocaine-induced vascular disorder (HCC) F14.988, I99.9    Colon polyps K63.5    Esophageal dysphagia R13.10    Healthcare maintenance Z00.00  History of DVT (deep vein thrombosis) Z86.718    History of tobacco use, presenting hazards to health Z87.891    HTN (hypertension) I10    Increased risk of breast cancer Z91.89    Intraductal papilloma of left breast D24.2    Irritable bowel syndrome without diarrhea K58.9    Lung nodule R91.1    Intractable migraine with aura with status migrainosus G43. 111    Mild intermittent asthma with (acute) exacerbation J45.21    Mitral valve regurgitation I34.0    NSTEMI (non-ST elevated myocardial infarction) ([facility]) I21.4    Nasal polyps J33.9    Oral phase dysphagia R13.11    Post-operative state Z98.890    Reflux esophagitis K21.0    Right leg pain M79.604    Status post hysterectomy Z90.710    Surgical menopause E89.40    Swelling of both lower extremities M79.89    Vaginal discharge N89.8    Vasomotor symptoms due to menopause N95.1    Encounter for medication management Z79.899    Chronic systolic heart failure ([facility]) I50.22    Bilateral pneumonia J18.9    Pneumonia J18.9    Polysubstance dependence including opioid type drug with complication, episodic abuse ([facility]) F19.20    Drug-seeking behavior Z76.5    IC (interstitial cystitis) N30.10    Multiple thyroid nodules E04.2    Intractable post-traumatic headache G44. [facility] loss of consciousness of 30 minutes or less S06. 0X1A    Postural imbalance R29.3        Review of Systems   Musculoskeletal: Positive for falls, myalgias and neck pain. Neurological: Positive for tingling, sensory change and focal weakness. All other systems reviewed and are negative. Review of Systems    Constitutional:                    No recent weight changes, fever, fatigue, sleep difficulties, loss of appetite   ENT/Mouth:  No hearing loss, ringing in the ears, chronic sinus problem, nose bleeds,sore throat, voice change, hoarseness, swollen glands in neck, or difficulties with chewing and swallowing.    Cardiovascular:  No chest pain/angina pectoris, palpitations, swelling of feet/ankles/hands, or calf pain while walking. Respiratory: No chronic or frequent coughs, spitting up blood, shortness of breath, asthma, or wheezing. Gastrointestinal: No a bdominal pain, heartburn, nausea, vomiting, constipation, or frequent diarrhea     Genitourinary: No frequent urination, burning or painful urination, or blood in urine     Musculoskeletal:   No joint pain,POS stiffness/swelling, weakness of muscles, orPOS muscle pain/cramp     Integument:   No rash/itching     Neurological:  No dizziness/vertigo,POS numbness/tingling sensation, tremors, weakness in limbs, frequent or recurring headaches, memory loss or confusion. Endocrine: No excessive thirst or urination, heat or cold intolerance. Physical Exam:  Vitals:    06/19/20 1106   BP: 122/74   Pulse: 81   Temp: 97.2 °F (36.2 °C)   TempSrc: Temporal   SpO2: 100%   Weight: 171 lb (77.6 kg)   Height: 5' 4\" (1.626 m)   PainSc:   9   PainLoc: Neck   Body mass index is 29.35 kg/m².   General: No acute distress  Head normocephalic and atraumatic  Mood and affect appropriate  CV: Regular rate   Resp: No increased work of breathing  Skin: warm and dry   Awake, alert, and oriented   Speech fluent  Eyes open spontaneously   Midline and paraspinal tenderness to palpation of the cervical spine  No mid-line thoracic, or lumbar tenderness to palpation   Patient with strength exam as follows:   Upper Extremities: Right Left      Deltoid  4- 4-    Biceps  4 4    Triceps 4+ 4+      4- 4-     Possible pain limited strength examination    Lower Extremities:      Hip Flex 5 5    Quads  5 5    Hamstrings 5 5    Dorsiflex 5 5    Plantarflex 5 5    EHL  5 5  Sensation decreased to light touch in the bilateral hands specifically the C6 distribution   Decreased rapid alternating movements bilaterally in the upper extremities  DTRs 2+  No clonus or babinski present   No Weinstein's sign present bilaterally Gait: Wide-based and unsteady gait    Assessment & Plan:  Anel Huynh is a 48 y.o. right handed female who presents today for evaluation of left-sided neck pain, numbness and tingling in the hands, bilateral upper extremity pain and problems with balance and clumsiness in the hands. I have independently reviewed and interpreted the patient'sMRI cervical spine without contrast performed on 6/1/2020 at [facility] that demonstrates multilevel cervical degenerative disc disease with a central disc bulge that effaces the ventral CSF space but does not result any significant central stenosis at C3-C4, at C4-C5 there is a broad-based and left eccentric disc osteophyte complex that results in effacement of the ventral CSF space and ventral compression of the cervical cord as well as severe left C4-C5 neuroforaminal stenosis. At C5-C6 there is degenerative disc disease that is broad-based and uncovertebral hypertrophy the results in mild to moderate neuroforaminal stenosis. At C6-C7 there is a broad-based disc osteophyte complex results in some mild to moderate neuroforaminal stenosis bilaterally. There is congenital cervical canal stenosis however there is still CSF signal dorsal to the cord at every level so the compression is not severe. There is some likely facet edema at the C2-C3 joint which may be causing some of the patient's pain however I do not feel this is associated with any of the patient's radicular symptoms. At this time the patient has signs and symptoms most consistent with cervical myelopathy and cervical radiculopathy. I do believe that some the patient's neck pain may be coming from the C2-C3 facet however her current needs secondary to cervical spinal stenosis and myelopathy need to be addressed at this time. I believe that the C2-C3 joint inflammation would best be addressed with a C3-C3 left-sided facet block.   I recommend proceeding with a C4-C5 and C5-C6 anterior cervical discectomy and fusion for decompression and fusion. Anterior Cervical Fusion Procedures Consent: The relative risks of complication include but are not limited to infection, bleeding, spinal fluid leak, major vascular injury, increased pain, weakness, numbness, non fusion, instrumentation failure, need for re operation, postoperative hematoma that may require surgical evacuation, dysphonia, dysphagia, carotid, jugular, esophogeal and/or tracheal, paralysis, incontinence, impotence, heart attack, stroke and death were discussed with patient  And family members present. They have been provided the opportunity to ask any questions regarding the surgical procedure. The patient and family members present expressed understanding and agree to proceed with surgery. I also discussed with the patient at length that this cervical spinal stenosis and decompression surgery as outlined above via an ACDF is best at treating cervical cord compression and nerve root compression which may help the radicular pain and help with her function and decrease any further spinal cord injury but does not always effectively treat chronic long-term neck pain especially given number of pharmacologic substance that she is on for pain. The patient was counseled at length about the risks of philippe Covid-19 during their perioperative period and any recovery window from their procedure. The patient was made aware that philippe Covid-19  may worsen their prognosis for recovering from their procedure and lend to a higher morbidity and/or mortality risk. All material risks, benefits, and reasonable alternatives including postponing the procedure were discussed. The patient does  wish to proceed with the procedure at this time. ICD-10-CM ICD-9-CM    1. Cervical radiculopathy M54.12 723.4    2. Cervical spinal stenosis M48.02 723.0    3. Degenerative disc disease, cervical M50.30 722.4    4.  Cervical myelopathy (HCC) G95.9 721.1 Giovanni Abdul.  Tr Lesches, [address] [place] [address]

## 2020-06-29 ENCOUNTER — ANESTHESIA EVENT (OUTPATIENT)
Dept: SURGERY | Age: 55
End: 2020-06-29
Payer: COMMERCIAL

## 2020-06-30 ENCOUNTER — HOSPITAL ENCOUNTER (OUTPATIENT)
Age: 55
Setting detail: OUTPATIENT SURGERY
Discharge: HOME | End: 2020-06-30
Attending: UROLOGY | Admitting: UROLOGY
Payer: COMMERCIAL

## 2020-06-30 ENCOUNTER — ANESTHESIA (OUTPATIENT)
Dept: SURGERY | Age: 55
End: 2020-06-30
Payer: COMMERCIAL

## 2020-06-30 VITALS
RESPIRATION RATE: 18 BRPM | DIASTOLIC BLOOD PRESSURE: 64 MMHG | WEIGHT: 183 LBS | OXYGEN SATURATION: 95 % | BODY MASS INDEX: 31.41 KG/M2 | HEART RATE: 70 BPM | TEMPERATURE: 97.8 F | SYSTOLIC BLOOD PRESSURE: 120 MMHG

## 2020-06-30 DIAGNOSIS — N30.10 INTERSTITIAL CYSTITIS: Primary | ICD-10-CM

## 2020-06-30 LAB — POTASSIUM BLD-SCNC: 3.6 MMOL/L (ref 3.5–5.1)

## 2020-06-30 PROCEDURE — 74011250637 HC RX REV CODE- 250/637: Performed by: UROLOGY

## 2020-06-30 PROCEDURE — 77030040361 HC SLV COMPR DVT MDII -B: Performed by: UROLOGY

## 2020-06-30 PROCEDURE — 74011250637 HC RX REV CODE- 250/637: Performed by: ANESTHESIOLOGY

## 2020-06-30 PROCEDURE — 74011000250 HC RX REV CODE- 250: Performed by: NURSE ANESTHETIST, CERTIFIED REGISTERED

## 2020-06-30 PROCEDURE — 84132 ASSAY OF SERUM POTASSIUM: CPT

## 2020-06-30 PROCEDURE — 76010000138 HC OR TIME 0.5 TO 1 HR: Performed by: UROLOGY

## 2020-06-30 PROCEDURE — 77030010509 HC AIRWY LMA MSK TELE -A: Performed by: ANESTHESIOLOGY

## 2020-06-30 PROCEDURE — 77030019927 HC TBNG IRR CYSTO BAXT -A: Performed by: UROLOGY

## 2020-06-30 PROCEDURE — 74011250636 HC RX REV CODE- 250/636: Performed by: NURSE ANESTHETIST, CERTIFIED REGISTERED

## 2020-06-30 PROCEDURE — 74011250636 HC RX REV CODE- 250/636: Performed by: ANESTHESIOLOGY

## 2020-06-30 PROCEDURE — 74011250636 HC RX REV CODE- 250/636: Performed by: UROLOGY

## 2020-06-30 PROCEDURE — 76210000063 HC OR PH I REC FIRST 0.5 HR: Performed by: UROLOGY

## 2020-06-30 PROCEDURE — 74011000250 HC RX REV CODE- 250: Performed by: UROLOGY

## 2020-06-30 PROCEDURE — 76210000020 HC REC RM PH II FIRST 0.5 HR: Performed by: UROLOGY

## 2020-06-30 PROCEDURE — 76060000032 HC ANESTHESIA 0.5 TO 1 HR: Performed by: UROLOGY

## 2020-06-30 PROCEDURE — 77030018832 HC SOL IRR H20 ICUM -A: Performed by: UROLOGY

## 2020-06-30 RX ORDER — PHENAZOPYRIDINE HYDROCHLORIDE 200 MG/1
200 TABLET, FILM COATED ORAL
Qty: 30 TAB | Refills: 3 | Status: SHIPPED | OUTPATIENT
Start: 2020-06-30 | End: 2020-07-03

## 2020-06-30 RX ORDER — ETOMIDATE 2 MG/ML
INJECTION INTRAVENOUS AS NEEDED
Status: DISCONTINUED | OUTPATIENT
Start: 2020-06-30 | End: 2020-06-30 | Stop reason: HOSPADM

## 2020-06-30 RX ORDER — MIDAZOLAM HYDROCHLORIDE 1 MG/ML
2 INJECTION, SOLUTION INTRAMUSCULAR; INTRAVENOUS ONCE
Status: DISCONTINUED | OUTPATIENT
Start: 2020-06-30 | End: 2020-06-30 | Stop reason: HOSPADM

## 2020-06-30 RX ORDER — LIDOCAINE HYDROCHLORIDE 10 MG/ML
0.1 INJECTION INFILTRATION; PERINEURAL AS NEEDED
Status: DISCONTINUED | OUTPATIENT
Start: 2020-06-30 | End: 2020-06-30 | Stop reason: HOSPADM

## 2020-06-30 RX ORDER — SODIUM CHLORIDE, SODIUM LACTATE, POTASSIUM CHLORIDE, CALCIUM CHLORIDE 600; 310; 30; 20 MG/100ML; MG/100ML; MG/100ML; MG/100ML
75 INJECTION, SOLUTION INTRAVENOUS CONTINUOUS
Status: DISCONTINUED | OUTPATIENT
Start: 2020-06-30 | End: 2020-06-30 | Stop reason: HOSPADM

## 2020-06-30 RX ORDER — ALBUTEROL SULFATE 0.83 MG/ML
2.5 SOLUTION RESPIRATORY (INHALATION) AS NEEDED
Status: DISCONTINUED | OUTPATIENT
Start: 2020-06-30 | End: 2020-06-30 | Stop reason: HOSPADM

## 2020-06-30 RX ORDER — CEFAZOLIN SODIUM/WATER 2 G/20 ML
2 SYRINGE (ML) INTRAVENOUS
Status: COMPLETED | OUTPATIENT
Start: 2020-06-30 | End: 2020-06-30

## 2020-06-30 RX ORDER — OXYCODONE HYDROCHLORIDE 5 MG/1
5 TABLET ORAL
Status: DISCONTINUED | OUTPATIENT
Start: 2020-06-30 | End: 2020-06-30 | Stop reason: HOSPADM

## 2020-06-30 RX ORDER — HYDROCODONE BITARTRATE AND ACETAMINOPHEN 5; 325 MG/1; MG/1
1 TABLET ORAL
Qty: 20 TAB | Refills: 0 | Status: SHIPPED | OUTPATIENT
Start: 2020-06-30 | End: 2020-07-08

## 2020-06-30 RX ORDER — DEXAMETHASONE SODIUM PHOSPHATE 4 MG/ML
INJECTION, SOLUTION INTRA-ARTICULAR; INTRALESIONAL; INTRAMUSCULAR; INTRAVENOUS; SOFT TISSUE AS NEEDED
Status: DISCONTINUED | OUTPATIENT
Start: 2020-06-30 | End: 2020-06-30 | Stop reason: HOSPADM

## 2020-06-30 RX ORDER — BUPIVACAINE HYDROCHLORIDE 5 MG/ML
INJECTION, SOLUTION EPIDURAL; INTRACAUDAL AS NEEDED
Status: DISCONTINUED | OUTPATIENT
Start: 2020-06-30 | End: 2020-06-30 | Stop reason: HOSPADM

## 2020-06-30 RX ORDER — LIDOCAINE HYDROCHLORIDE 20 MG/ML
INJECTION, SOLUTION EPIDURAL; INFILTRATION; INTRACAUDAL; PERINEURAL AS NEEDED
Status: DISCONTINUED | OUTPATIENT
Start: 2020-06-30 | End: 2020-06-30 | Stop reason: HOSPADM

## 2020-06-30 RX ORDER — MIDAZOLAM HYDROCHLORIDE 1 MG/ML
2 INJECTION, SOLUTION INTRAMUSCULAR; INTRAVENOUS
Status: DISCONTINUED | OUTPATIENT
Start: 2020-06-30 | End: 2020-06-30 | Stop reason: HOSPADM

## 2020-06-30 RX ORDER — PROPOFOL 10 MG/ML
INJECTION, EMULSION INTRAVENOUS AS NEEDED
Status: DISCONTINUED | OUTPATIENT
Start: 2020-06-30 | End: 2020-06-30 | Stop reason: HOSPADM

## 2020-06-30 RX ORDER — SODIUM CHLORIDE, SODIUM LACTATE, POTASSIUM CHLORIDE, CALCIUM CHLORIDE 600; 310; 30; 20 MG/100ML; MG/100ML; MG/100ML; MG/100ML
50 INJECTION, SOLUTION INTRAVENOUS CONTINUOUS
Status: DISCONTINUED | OUTPATIENT
Start: 2020-06-30 | End: 2020-06-30 | Stop reason: HOSPADM

## 2020-06-30 RX ORDER — HYDROMORPHONE HYDROCHLORIDE 2 MG/ML
0.5 INJECTION, SOLUTION INTRAMUSCULAR; INTRAVENOUS; SUBCUTANEOUS
Status: DISCONTINUED | OUTPATIENT
Start: 2020-06-30 | End: 2020-06-30 | Stop reason: HOSPADM

## 2020-06-30 RX ORDER — ATROPA BELLADONNA AND OPIUM 16.2; 6 MG/1; MG/1
SUPPOSITORY RECTAL AS NEEDED
Status: DISCONTINUED | OUTPATIENT
Start: 2020-06-30 | End: 2020-06-30 | Stop reason: HOSPADM

## 2020-06-30 RX ORDER — FENTANYL CITRATE 50 UG/ML
100 INJECTION, SOLUTION INTRAMUSCULAR; INTRAVENOUS ONCE
Status: DISCONTINUED | OUTPATIENT
Start: 2020-06-30 | End: 2020-06-30 | Stop reason: HOSPADM

## 2020-06-30 RX ORDER — ACETAMINOPHEN 500 MG
1000 TABLET ORAL ONCE
Status: COMPLETED | OUTPATIENT
Start: 2020-06-30 | End: 2020-06-30

## 2020-06-30 RX ADMIN — LIDOCAINE HYDROCHLORIDE 80 MG: 20 INJECTION, SOLUTION EPIDURAL; INFILTRATION; INTRACAUDAL; PERINEURAL at 07:46

## 2020-06-30 RX ADMIN — Medication 2 G: at 07:42

## 2020-06-30 RX ADMIN — PROPOFOL 50 MG: 10 INJECTION, EMULSION INTRAVENOUS at 07:46

## 2020-06-30 RX ADMIN — PROPOFOL 30 MG: 10 INJECTION, EMULSION INTRAVENOUS at 08:08

## 2020-06-30 RX ADMIN — ETOMIDATE 10 MG: 2 INJECTION, SOLUTION INTRAVENOUS at 07:46

## 2020-06-30 RX ADMIN — DEXAMETHASONE SODIUM PHOSPHATE 4 MG: 4 INJECTION, SOLUTION INTRAMUSCULAR; INTRAVENOUS at 07:54

## 2020-06-30 RX ADMIN — ACETAMINOPHEN 1000 MG: 500 TABLET, FILM COATED ORAL at 07:06

## 2020-06-30 RX ADMIN — SODIUM CHLORIDE, SODIUM LACTATE, POTASSIUM CHLORIDE, AND CALCIUM CHLORIDE 75 ML/HR: 600; 310; 30; 20 INJECTION, SOLUTION INTRAVENOUS at 06:15

## 2020-06-30 NOTE — DISCHARGE INSTRUCTIONS
CYSTOSCOPY    ACTIVITY   · As tolerated and as directed by your doctor. · Bathe or shower as directed by your doctor. DIET  · Clear liquids until no nausea or vomiting; then light diet for the first day. · Drink plenty of liquids. At least 8 glasses of water to help flush out bladder. Limit amount of caffeine. · Advance to regular diet on second day, unless your doctor orders otherwise. · If nausea and vomiting continues, call your doctor. PAIN  · Take pain medication as directed by your doctor. · Call your doctor if pain is NOT relieved by medication. · DO NOT take aspirin or blood thinners until directed by your doctor. CALL YOUR DOCTOR IF  · Expect blood-tinged urine. Call your doctor if it lasts more than 72 hours or if you cannot see through the urine. · Temperature of 101 degrees or above. · Unable to empty bladder. AFTER ANESTHESIA  · For the next 24 hours: DO NOT Drive, drink alcoholic beverages, or Make important decisions. · Be aware of dizziness following anesthesia and while taking pain medications    APPOINTMENT DATE/ TIME    YOUR DOCTOR'S PHONE NUMBER      DISCHARGE SUMMARY from Nurse    PATIENT INSTRUCTIONS:    After general anesthesia or intravenous sedation, for 24 hours or while taking prescription Narcotics:  · Limit your activities  · Do not drive and operate hazardous machinery  · Do not make important personal or business decisions  · Do  not drink alcoholic beverages  · If you have not urinated within 8 hours after discharge, please contact your surgeon on call. *  Please give a list of your current medications to your Primary Care Provider. *  Please update this list whenever your medications are discontinued, doses are      changed, or new medications (including over-the-counter products) are added. *  Please carry medication information at all times in case of emergency situations.       These are general instructions for a healthy lifestyle:    No smoking/ No tobacco products/ Avoid exposure to second hand smoke    Surgeon General's Warning:  Quitting smoking now greatly reduces serious risk to your health. Obesity, smoking, and sedentary lifestyle greatly increases your risk for illness    A healthy diet, regular physical exercise & weight monitoring are important for maintaining a healthy lifestyle    You may be retaining fluid if you have a history of heart failure or if you experience any of the following symptoms:  Weight gain of 3 pounds or more overnight or 5 pounds in a week, increased swelling in our hands or feet or shortness of breath while lying flat in bed. Please call your doctor as soon as you notice any of these symptoms; do not wait until your next office visit. Recognize signs and symptoms of STROKE:    F-face looks uneven    A-arms unable to move or move unevenly    S-speech slurred or non-existent    T-time-call 911 as soon as signs and symptoms begin-DO NOT go       Back to bed or wait to see if you get better-TIME IS BRAIN. Advance Care Planning  People with COVID-19 may have no symptoms, mild symptoms, such as fever, cough, and shortness of breath or they may have more severe illness, developing severe and fatal pneumonia. As a result, Advance Care Planning with attention to naming a health care decision maker (someone you trust to make healthcare decisions for you if you could not speak for yourself) and sharing other health care preferences is important BEFORE a possible health crisis. Please contact your Primary Care Provider to discuss Advance Care Planning.      Preventing the Spread of Coronavirus Disease 2019 in Homes and Residential Communities  For the most recent information go to [employer]    Prevention steps for People with confirmed or suspected COVID-19 (including persons under investigation) who do not need to be hospitalized  and   People with confirmed COVID-19 who were hospitalized and determined to be medically stable to go home    Your healthcare provider and public health staff will evaluate whether you can be cared for at home. If it is determined that you do not need to be hospitalized and can be isolated at home, you will be monitored by staff from your local or state health department. You should follow the prevention steps below until a healthcare provider or local or state health department says you can return to your normal activities. Stay home except to get medical care  People who are mildly ill with COVID-19 are able to isolate at home during their illness. You should restrict activities outside your home, except for getting medical care. Do not go to work, school, or public areas. Avoid using public transportation, ride-sharing, or taxis. Separate yourself from other people and animals in your home  People: As much as possible, you should stay in a specific room and away from other people in your home. Also, you should use a separate bathroom, if available. Animals: You should restrict contact with pets and other animals while you are sick with COVID-19, just like you would around other people. Although there have not been reports of pets or other animals becoming sick with COVID-19, it is still recommended that people sick with COVID-19 limit contact with animals until more information is known about the virus. When possible, have another member of your household care for your animals while you are sick. If you are sick with COVID-19, avoid contact with your pet, including petting, snuggling, being kissed or licked, and sharing food. If you must care for your pet or be around animals while you are sick, wash your hands before and after you interact with pets and wear a facemask. Call ahead before visiting your doctor  If you have a medical appointment, call the healthcare provider and tell them that you have or may have COVID-19.  This will help the healthcare providers office take steps to keep other people from getting infected or exposed. Wear a facemask  You should wear a facemask when you are around other people (e.g., sharing a room or vehicle) or pets and before you enter a healthcare providers office. If you are not able to wear a facemask (for example, because it causes trouble breathing), then people who live with you should not stay in the same room with you, or they should wear a facemask if they enter your room. Cover your coughs and sneezes  Cover your mouth and nose with a tissue when you cough or sneeze. Throw used tissues in a lined trash can. Immediately wash your hands with soap and water for at least 20 seconds or, if soap and water are not available, clean your hands with an alcohol-based hand  that contains at least 60% alcohol. Clean your hands often  Wash your hands often with soap and water for at least 20 seconds, especially after blowing your nose, coughing, or sneezing; going to the bathroom; and before eating or preparing food. If soap and water are not readily available, use an alcohol-based hand  with at least 60% alcohol, covering all surfaces of your hands and rubbing them together until they feel dry. Soap and water are the best option if hands are visibly dirty. Avoid touching your eyes, nose, and mouth with unwashed hands. Avoid sharing personal household items  You should not share dishes, drinking glasses, cups, eating utensils, towels, or bedding with other people or pets in your home. After using these items, they should be washed thoroughly with soap and water. Clean all high-touch surfaces everyday  High touch surfaces include counters, tabletops, doorknobs, bathroom fixtures, toilets, phones, keyboards, tablets, and bedside tables. Also, clean any surfaces that may have blood, stool, or body fluids on them. Use a household cleaning spray or wipe, according to the label instructions. Labels contain instructions for safe and effective use of the cleaning product including precautions you should take when applying the product, such as wearing gloves and making sure you have good ventilation during use of the product. Monitor your symptoms  Seek prompt medical attention if your illness is worsening (e.g., difficulty breathing). Before seeking care, call your healthcare provider and tell them that you have, or are being evaluated for, COVID-19. Put on a facemask before you enter the facility. These steps will help the healthcare providers office to keep other people in the office or waiting room from getting infected or exposed. Ask your healthcare provider to call the local or state health department. Persons who are placed under active monitoring or facilitated self-monitoring should follow instructions provided by their local health department or occupational health professionals, as appropriate. When working with your local health department check their available hours. If you have a medical emergency and need to call 911, notify the dispatch personnel that you have, or are being evaluated for COVID-19. If possible, put on a facemask before emergency medical services arrive. Discontinuing home isolation  Patients with confirmed COVID-19 should remain under home isolation precautions until the risk of secondary transmission to others is thought to be low. The decision to discontinue home isolation precautions should be made on a case-by-case basis, in consultation with healthcare providers and state and local health departments.

## 2020-06-30 NOTE — BRIEF OP NOTE
Brief Postoperative Note    Patient: Sherral Brittle  YOB: 1965  MRN: [id number]    Date of Procedure: 6/30/2020     Pre-Op Diagnosis: Interstitial cystitis [N30.10]  Urinary frequency [R35.0]    Post-Op Diagnosis: Same as preoperative diagnosis.       Procedure(s):  CYSTOSCOPY WITH HYDRODISTENTION    Surgeon(s):  Kimani Arthur MD    Surgical Assistant: None    Anesthesia: General     Estimated Blood Loss (mL): Minimal    Complications: None    Specimens: * No specimens in log *     Implants: * No implants in log *    Drains: * No LDAs found *    Findings: see op note    Electronically Signed by Shamika Chambers MD on 6/30/2020 at 8:34 AM

## 2020-06-30 NOTE — ANESTHESIA PREPROCEDURE EVALUATION
Anesthetic History   No history of anesthetic complications            Review of Systems / Medical History  Patient summary reviewed, nursing notes reviewed and pertinent labs reviewed    Pulmonary    COPD (Chronic bronchitis): mild      Shortness of breath (Requiring 3L O2 with exertion) and smoker  Asthma : well controlled       Neuro/Psych       CVA (\"mild stroke\" pt reports 2014- \"left sided weakness and balance is off)  Psychiatric history     Cardiovascular    Hypertension: well controlled  Valvular problems/murmurs: tricuspid insufficiency, mitral insufficiency and aortic insufficiency    CHF    CAD    Exercise tolerance: <4 METS  Comments: Echo (7-2019) - moderate AI, mild TR, mild MR and,  Moderate global HK, EF 30-35%   GI/Hepatic/Renal     GERD: well controlled           Endo/Other        Arthritis and anemia     Other Findings   Comments: + cocaine 3/2019           Physical Exam    Airway  Mallampati: II  TM Distance: 4 - 6 cm  Neck ROM: normal range of motion   Mouth opening: Normal     Cardiovascular    Rhythm: regular  Rate: normal         Dental    Dentition: Edentulous     Pulmonary                 Abdominal  GI exam deferred       Other Findings            Anesthetic Plan    ASA: 3  Anesthesia type: general          Induction: Intravenous  Anesthetic plan and risks discussed with: Patient and Spouse

## 2020-06-30 NOTE — OP NOTES
[facility]  OPERATIVE REPORT    Name:  Christine Heck  MR#:  [id number]  :  1965  ACCOUNT #:  [de-identified]  DATE OF SERVICE:  2020    PREOPERATIVE DIAGNOSIS:  Interstitial cystitis. POSTOPERATIVE DIAGNOSIS:  Interstitial cystitis. PROCEDURE PERFORMED:  Cystoscopy and hydrodistention of the bladder. SURGEON:  Tram Hardin MD    ASSISTANT:  None. ANESTHESIA:  General.    COMPLICATIONS:  None. SPECIMENS REMOVED:  None. IMPLANTS:  None. ESTIMATED BLOOD LOSS:  None. FINDINGS:  Glomerulations noted on the trigone and bladder floor after hydrodistention. DESCRIPTION OF PROCEDURE:  The patient was given general anesthetic and placed in the dorsal lithotomy position. A B and O suppository was placed in the rectum. She was prepped and draped in sterile fashion. A 25-Beninese cystoscope was advanced into the urethra using a video camera and 30-degree lens. The urethra appeared normal.  Bladder showed no abnormalities. Both ureteral orifices appeared normal.    The bladder was distended with irrigation bag held approximately 80 cm anterior to the level of the bladder. It was held distended for 8 minutes and then allowed to drain. The capacity was between 600 and 700 mL. Bladder was inspected after drainage. There were glomerulations noted on the trigone and on the floor bilaterally. There was no evidence of any trauma. The appearance is consistent with interstitial cystitis. I then instilled 30 mL of Marcaine into the bladder. PLAN:  She is to be discharged home. Return to the office in one month.       MD DIANA [id number]  D:  2020 8:46  T:  2020 12:29  JOB #:  [id number]

## 2020-06-30 NOTE — ANESTHESIA POSTPROCEDURE EVALUATION
Procedure(s):  CYSTOSCOPY WITH HYDRODISTENTION. general    Anesthesia Post Evaluation      Multimodal analgesia: multimodal analgesia used between 6 hours prior to anesthesia start to PACU discharge  Patient location during evaluation: PACU  Patient participation: complete - patient participated  Level of consciousness: awake  Pain management: adequate  Airway patency: patent  Anesthetic complications: no  Cardiovascular status: acceptable  Respiratory status: acceptable, spontaneous ventilation and nonlabored ventilation  Hydration status: acceptable  Post anesthesia nausea and vomiting:  none      INITIAL Post-op Vital signs:   Vitals Value Taken Time   /66 6/30/2020  8:52 AM   Temp 36.6 °C (97.8 °F) 6/30/2020  8:28 AM   Pulse 69 6/30/2020  8:55 AM   Resp 27 6/30/2020  8:55 AM   SpO2 94 % 6/30/2020  8:55 AM   Vitals shown include unvalidated device data.

## 2020-06-30 NOTE — H&P
Destini Baker   : 1965       Chief Complaint   Patient presents with    Urinary Frequency     HPI   Destini Baker is a 48 y.o. female   Patient has significant history of interstitial cystitis. She had a hydrodistention  last year which helped her tremendously. At the time of the hydrodistention was performed there was approximately 700 mL bladder capacity. There were glomerulations noted on the bladder floor, the trigone and the posterior bladder neck. Patient is back today reporting that her symptoms have worsened again. She is having increased urinary frequency, burning and pelvic pain. She is using oxybutynin for the frequency and uses Pyridium as often as she can. She denies any fever chills or gross hematuria. She is begging today to have repeat hydrodistention.         Past Medical History:   Diagnosis Date    Anemia     blood transfusion 2018 per pt    Arrhythmia     palpitations, moderate mitral valve regurge    Arthritis     lower back osteo    Asthma     uses inhalers    Cardiomyopathy     ECHO 2017    CHF (congestive heart failure) (HCC)     EF 30-35% last echo 2019-- followed by dr Jayesh Gudino Chronic pain 2010    Coagulation defect ([address]     eliquis    COPD     nebulizer daily and maint inhalers, can not climb 1 flight of stairs (also CHF) oxygen 3 LPM qhs    Decreased cardiac ejection fraction 2017    EF 45-50% per echo 17    Depression     controlled     Diverticulitis     GERD (gastroesophageal reflux disease)     controlled with med     Heart murmur     Hypertension     managed with meds    IBS (irritable bowel syndrome)     IC (interstitial cystitis)     Insomnia     Migraine with aura and without status migrainosus, not intractable 2016    Mitral valve regurgitation, rheumatic     followed Dr. Conda Gaucher    Moderate aortic regurgitation     per echo 2019 in cc-- followed by dr Carolina Reed 2016    Psychiatric disorder     anxiety    Requires oxygen therapy     3l/min at hs. prn during the days as needed    Rheumatic aortic insufficiency 5/16/2016    Rheumatic fever as child    pt reports rheumatic fever in childhood    Smoker     started age 23-- smoked 0.5 ppd until 2018-- cut back to 10-2 cig daily per pt    Stroke ([facility]     \"mild stroke\" pt reports 2014- \"left sided weakness and balance is off\"     Thromboembolus ([facility] [address] [facility]     BLE 2012    Vitamin D deficiency            Past Surgical History:   Procedure Laterality Date    COLONOSCOPY      8 polyps removed, diverticulitis    COLONOSCOPY N/A 5/21/2018    COLONOSCOPY performed by Anabelle Bundy MD at [address]  8-23-11    bladder dilitation    EGD      HX APPENDECTOMY  2006    HX HEART CATHETERIZATION  5/27/2011    no blockages, no intervention    HX HEART CATHETERIZATION  04/2017    no intervention    HX LAP CHOLECYSTECTOMY  6/6/2011    HX ORTHOPAEDIC Right 07/2019    4th toe -- surg repair    HX NATASHA AND BSO  2004    fibroid tumors, hyst    HX UROLOGICAL  01/2018    cystoscopy with hydrodistention of bladder multiple    IA BIOPSY OF BREAST, INCISIONAL Left     lymph node , left, patient states her bx neg, but high risk            Current Outpatient Medications   Medication Sig Dispense Refill    nitroglycerin (NITROSTAT) 0.4 mg SL tablet 1 Tab by SubLINGual route every five (5) minutes as needed for Chest Pain. 1 Bottle 5    gabapentin (NEURONTIN) 400 mg capsule Take 1 cap PO Q 12 hours PRN pain, use sparingly; DO NOT TAKE WITH ANY OTHER MEDICATION(S) THAT MAY CAUSE DROWSINESS; NEVER DRIVE/USE HEAVY MACHINERY TAKING THIS MEDICATION 8 Cap 0    promethazine (PHENERGAN) 25 mg tablet Take 0.5 Tabs by mouth every six (6) hours as needed for Nausea (cough) for up to 20 doses. 10 Tab 0    topiramate (TOPAMAX) 25 mg tablet Take 1 Tab by mouth two (2) times a day.  Indications: migraine prevention 60 Tab 2    pantoprazole (PROTONIX) 40 mg tablet Take 1 Tab by mouth daily. Indications: morning 30 Tab 0    carisoprodoL (SOMA) 350 mg tablet TAKE 1 TABLET BY MOUTH 3 TIMES A DAY AS NEEDED      oxybutynin (DITROPAN) 5 mg tablet Take 1 Tab by mouth three (3) times daily. 90 Tab 11    diclofenac (VOLTAREN) 1 % gel APPLY 1 APPLICATION 4 TIMES PER DAY MASSAGE INTO ALL PAINFUL AREAS      sacubitril-valsartan (ENTRESTO) 24 mg/26 mg tablet Take 1 Tab by mouth two (2) times a day. 60 Tab 11    carvediloL (COREG) 12.5 mg tablet Take 0.5 Tabs by mouth two (2) times a day. 60 Tab 11    furosemide (LASIX) 40 mg tablet Take 1 Tab by mouth daily. 30 Tab 0    apixaban (ELIQUIS) 5 mg tablet Take 1 Tab by mouth two (2) times a day. 60 Tab 11    butalbital-acetaminophen-caffeine (FIORICET, ESGIC) -40 mg per tablet   0    ferrous gluconate 324 mg (37.5 mg iron) tablet Take 1 Tab by mouth two (2) times a day. 60 Tab 11    cyclobenzaprine (FLEXERIL) 10 mg tablet Take 1 Tab by mouth three (3) times daily as needed for Muscle Spasm(s). (Patient taking differently: Take 10 mg by mouth three (3) times daily as needed for Muscle Spasm(s).) 15 Tab 0    budesonide-formoterol (SYMBICORT) 160-4.5 mcg/actuation HFAA Take 1 Puff by inhalation two (2) times a day. (Patient taking differently: Take 1 Puff by inhalation two (2) times a day. Indications: bronchospasm prevention with COPD, use on the dos) 1 Inhaler 11    tiotropium (SPIRIVA WITH HANDIHALER) 18 mcg inhalation capsule Take 1 Cap by inhalation daily. (Patient taking differently: Take 1 Cap by inhalation daily. Indications: bronchospasm prevention with COPD) 30 Cap 11    estradiol (ESTRACE) 0.5 mg tablet Take 1 Tab by mouth daily. Can d/c the estradiol patches (Patient taking differently: Take 0.5 mg by mouth daily.) 30 Tab 5    pravastatin (PRAVACHOL) 40 mg tablet Take 1 Tab by mouth nightly. 30 Tab 5    spironolactone (ALDACTONE) 25 mg tablet Take 1 Tab by mouth daily.  60 Tab 11    EPINEPHrine (EPIPEN JR) 0.15 mg/0.3 mL injection 0.15 mg by IntraMUSCular route once as needed.  albuterol (PROVENTIL HFA, VENTOLIN HFA, PROAIR HFA) 90 mcg/actuation inhaler Take 2 Puffs by inhalation every six (6) hours as needed for Wheezing. 1 Inhaler 0    FLUoxetine (PROZAC) 20 mg capsule Take 40 mg by mouth daily. 11    Nebulizer Accessories kit Use tid 50 Kit 6    diphenhydrAMINE (BANOPHEN) 50 mg capsule Take 50 mg by mouth every six (6) hours as needed.  Oxygen nightly. Indications: 3l/min at hs and prn during the day      LORazepam (ATIVAN) 0.5 mg tablet Take 1 Tab by mouth once for 1 dose.  Max Daily Amount: 0.5 mg. 2 Tab 0           Allergies   Allergen Reactions    Aspirin Other (comments)     Inflames IBS per pt    Bentyl [Dicyclomine] Itching    Toradol [Ketorolac] Hives    Ultram [Tramadol] Nausea and Vomiting    Zofran [Ondansetron Hcl (Pf)] Nausea and Vomiting     Social History           Socioeconomic History    Marital status: SINGLE     Spouse name: Not on file    Number of children: Not on file    Years of education: Not on file    Highest education level: Not on file   Occupational History    Not on file   Social Needs    Financial resource strain: Not on file    Food insecurity     Worry: Not on file     Inability: Not on file    Transportation needs     Medical: Not on file     Non-medical: Not on file   Tobacco Use    Smoking status: Former Smoker     Packs/day: 0.25     Years: 30.00     Pack years: 7.50    Smokeless tobacco: Never Used   Substance and Sexual Activity    Alcohol use: No    Drug use: Yes     Types: Cocaine     Comment: last used 11/2018    Sexual activity: Not on file   Lifestyle    Physical activity     Days per week: Not on file     Minutes per session: Not on file    Stress: Not on file   Relationships    Social connections     Talks on phone: Not on file     Gets together: Not on file     Attends Yazdanism service: Not on file     Active member of club or organization: Not on file     Attends meetings of clubs or organizations: Not on file     Relationship status: Not on file    Intimate partner violence     Fear of current or ex partner: Not on file     Emotionally abused: Not on file     Physically abused: Not on file     Forced sexual activity: Not on file   Other Topics Concern    Not on file   Social History Narrative    Not on file           Family History   Problem Relation Age of Onset    Heart Failure Father 76    chf    Heart Disease Father     Diabetes Sister     No Known Problems Mother     Thyroid Disease Sister     Kidney Disease Brother     No Known Problems Sister     Seizures Brother      Review of Systems   Constitutional: Negative for fever. GI: Negative for nausea. Genitourinary: Positive for frequent urination. PHYSICAL EXAM   Visit Vitals   /83   Pulse 85   Temp 98 °F (36.7 °C) (Temporal)   Wt 171 lb (77.6 kg)   BMI 29.35 kg/m²     General appearance - normal, in no distress   Mental status - alert, oriented to person, place, and time   Chest/Lung- Heart sounds: regular rate and rhythm. Lungs clear to auscultation and normal respiratory effort. Abdomen - soft, nontender with out any rebound tenderness, BS present, no masses. Musculoskeletal - normal gait and station. Assessment and Plan     ICD-10-CM ICD-9-CM    1. Interstitial cystitis N30.10 595.1    2. Urine frequency R35.0 788.41 AMB POC URINALYSIS DIP STICK AUTO W/O MICRO (PGU)   3. Pelvic pain R10.2 [id number]      PLAN:   Patient would like to be scheduled for cystoscopy hydrodistention next available time with Dr. Day Herring. CYSTOSCOPY with bladder hydrodistention. - INFORMED CONSENT: The nature of the cystoscopy and its purpose were discussed with the patient. Alternative testing (or no testing) was reviewed.  Risks include, but are not limited to: bleeding, infection, perforation or tear (urethra, bladder, or ureter), difficulty voiding and urinary retention requiring catheterization and side-effects from anesthesia. The benefits are judged to outweigh the risks and no guarantees of success or outcome were given or implied. No further questions .

## 2020-07-08 ENCOUNTER — HOSPITAL ENCOUNTER (OUTPATIENT)
Dept: SURGERY | Age: 55
Discharge: HOME | End: 2020-07-08
Payer: COMMERCIAL

## 2020-07-08 VITALS
HEIGHT: 64 IN | WEIGHT: 170.6 LBS | TEMPERATURE: 98.4 F | RESPIRATION RATE: 16 BRPM | BODY MASS INDEX: 29.12 KG/M2 | SYSTOLIC BLOOD PRESSURE: 133 MMHG | DIASTOLIC BLOOD PRESSURE: 76 MMHG | OXYGEN SATURATION: 98 %

## 2020-07-08 LAB
ANION GAP SERPL CALC-SCNC: 16 MMOL/L (ref 7–16)
BACTERIA SPEC CULT: NORMAL
BASOPHILS # BLD: 0 K/UL (ref 0–0.2)
BASOPHILS NFR BLD: 0 % (ref 0–2)
BUN SERPL-MCNC: 15 MG/DL (ref 6–23)
CALCIUM SERPL-MCNC: 8.4 MG/DL (ref 8.3–10.4)
CHLORIDE SERPL-SCNC: 114 MMOL/L (ref 98–107)
CO2 SERPL-SCNC: 15 MMOL/L (ref 21–32)
CREAT SERPL-MCNC: 0.92 MG/DL (ref 0.6–1)
DIFFERENTIAL METHOD BLD: ABNORMAL
EOSINOPHIL # BLD: 0 K/UL (ref 0–0.8)
EOSINOPHIL NFR BLD: 0 % (ref 0.5–7.8)
ERYTHROCYTE [DISTWIDTH] IN BLOOD BY AUTOMATED COUNT: 14 % (ref 11.9–14.6)
GLUCOSE SERPL-MCNC: 144 MG/DL (ref 65–100)
HCT VFR BLD AUTO: 36.7 % (ref 35.8–46.3)
HGB BLD-MCNC: 11.4 G/DL (ref 11.7–15.4)
IMM GRANULOCYTES # BLD AUTO: 0 K/UL (ref 0–0.5)
IMM GRANULOCYTES NFR BLD AUTO: 1 % (ref 0–5)
LYMPHOCYTES # BLD: 0.9 K/UL (ref 0.5–4.6)
LYMPHOCYTES NFR BLD: 14 % (ref 13–44)
MCH RBC QN AUTO: 29.5 PG (ref 26.1–32.9)
MCHC RBC AUTO-ENTMCNC: 31.1 G/DL (ref 31.4–35)
MCV RBC AUTO: 95.1 FL (ref 79.6–97.8)
MONOCYTES # BLD: 0.3 K/UL (ref 0.1–1.3)
MONOCYTES NFR BLD: 5 % (ref 4–12)
NEUTS SEG # BLD: 5.4 K/UL (ref 1.7–8.2)
NEUTS SEG NFR BLD: 81 % (ref 43–78)
NRBC # BLD: 0 K/UL (ref 0–0.2)
PLATELET # BLD AUTO: 258 K/UL (ref 150–450)
PMV BLD AUTO: 10.2 FL (ref 9.4–12.3)
POTASSIUM SERPL-SCNC: 3.4 MMOL/L (ref 3.5–5.1)
RBC # BLD AUTO: 3.86 M/UL (ref 4.05–5.2)
SERVICE CMNT-IMP: NORMAL
SODIUM SERPL-SCNC: 145 MMOL/L (ref 136–145)
WBC # BLD AUTO: 6.6 K/UL (ref 4.3–11.1)

## 2020-07-08 PROCEDURE — 80048 BASIC METABOLIC PNL TOTAL CA: CPT

## 2020-07-08 PROCEDURE — 85025 COMPLETE CBC W/AUTO DIFF WBC: CPT

## 2020-07-08 PROCEDURE — 87641 MR-STAPH DNA AMP PROBE: CPT

## 2020-07-08 PROCEDURE — 77030027138 HC INCENT SPIROMETER -A

## 2020-07-08 NOTE — PERIOP NOTES
Patient verified name and     Order for consent not found in EHR and matches case posting; patient verified. Type 2 surgery, assessment complete. Labs per surgeon: CBC, BMP and MRSA/MSSA collected today; Type & Screen signed and held DOS. UA collected 20 and acceptable per protocol  Labs per anesthesia protocol: none  [id number]    Dr Dodie Mondragon notified of case and anesthesia to assess DOS    Patient insisted she watched the spine video at home. When nurse started video and left the room, pt went live on her facebook account throughout entire video. She was on her phone varies times during the assessment and instructions. All spine information, medications and instructions reviewed with patient. Patient informed a Covid swab is required 7 days prior to surgery. The testing center is located at the [address], [place]. For questions or concerns the patient is advised to call [phone]. An appointment is required and the testing clinic is closed from 12-1 for lunch and on weekends. Appointment date/time 20 found in EHR and provided to patient. Hospital approved surgical skin cleanser and instructions given per hospital policy. Patient provided with and instructed on educational handouts including Guide to Surgery, Pain Management, Hand Hygiene, Blood Transfusion Education, and [facility] Anest[facility] Brochure. Patient answered medical/surgical history questions at their best of ability. All prior to admission medications documented in [facility]. Original medication prescription bottle visualized during patient appointment. Patient instructed to hold all vitamins 7 days prior to surgery and NSAIDS 5 days prior to surgery, patient verbalized understanding. Patient teach back successful and patient demonstrates knowledge of instructions.

## 2020-07-08 NOTE — PERIOP NOTES
Recent Results (from the past 12 hour(s))   MSSA/MRSA SC BY PCR, NASAL SWAB    Collection Time: 07/08/20 10:06 AM   Result Value Ref Range    Special Requests: NO SPECIAL REQUESTS      Culture result:        SA target not detected. A MRSA NEGATIVE, SA NEGATIVE test result does not preclude MRSA or SA nasal colonization. CBC WITH AUTOMATED DIFF    Collection Time: 07/08/20 10:07 AM   Result Value Ref Range    WBC 6.6 4.3 - 11.1 K/uL    RBC 3.86 (L) 4.05 - 5.2 M/uL    HGB 11.4 (L) 11.7 - 15.4 g/dL    HCT 36.7 35.8 - 46.3 %    MCV 95.1 79.6 - 97.8 FL    MCH 29.5 26.1 - 32.9 PG    MCHC 31.1 (L) 31.4 - 35.0 g/dL    RDW 14.0 11.9 - 14.6 %    PLATELET 755 592 - 740 K/uL    MPV 10.2 9.4 - 12.3 FL    ABSOLUTE NRBC 0.00 0.0 - 0.2 K/uL    DF AUTOMATED      NEUTROPHILS 81 (H) 43 - 78 %    LYMPHOCYTES 14 13 - 44 %    MONOCYTES 5 4.0 - 12.0 %    EOSINOPHILS 0 (L) 0.5 - 7.8 %    BASOPHILS 0 0.0 - 2.0 %    IMMATURE GRANULOCYTES 1 0.0 - 5.0 %    ABS. NEUTROPHILS 5.4 1.7 - 8.2 K/UL    ABS. LYMPHOCYTES 0.9 0.5 - 4.6 K/UL    ABS. MONOCYTES 0.3 0.1 - 1.3 K/UL    ABS. EOSINOPHILS 0.0 0.0 - 0.8 K/UL    ABS. BASOPHILS 0.0 0.0 - 0.2 K/UL    ABS. IMM. GRANS. 0.0 0.0 - 0.5 K/UL   METABOLIC PANEL, BASIC    Collection Time: 07/08/20 10:07 AM   Result Value Ref Range    Sodium 145 136 - 145 mmol/L    Potassium 3.4 (L) 3.5 - 5.1 mmol/L    Chloride 114 (H) 98 - 107 mmol/L    CO2 15 (L) 21 - 32 mmol/L    Anion gap 16 7 - 16 mmol/L    Glucose 144 (H) 65 - 100 mg/dL    BUN 15 6 - 23 MG/DL    Creatinine 0.92 0.6 - 1.0 MG/DL    GFR est AA >60 >60 ml/min/1.73m2    GFR est non-AA >60 >60 ml/min/1.73m2    Calcium 8.4 8.3 - 10.4 MG/DL        Results reviewed and routed to surgeon. No further action required.

## 2020-07-08 NOTE — PERIOP NOTES
PLEASE CONTINUE TAKING ALL PRESCRIPTION MEDICATIONS UP TO THE DAY OF SURGERY UNLESS OTHERWISE DIRECTED BELOW. DISCONTINUE all vitamins and supplements 7 days prior to surgery. DISCONTINUE Non-Steriodal Anti-Inflammatory (NSAIDS) such as Advil and Aleve 5 days prior to surgery. Home Medications to take  the day of surgery    Inhalers, Carvedilol, Prozac, Gabapentin, Norco if needed, Ditropan, Protonix, Topamax and Medrol           Home Medications   to Hold   Vitamins, Supplements, and Herbals. Non-Steriodal Anti-Inflammatory (NSAIDS) such as Advil and Aleve. Eliquis hold for 3 days, last dose will be on the night 7/12/20        Comments    Please bring Recovery diary, Incentive spirometer, Nitro tablets and rescue inhaler to hospital on the day of surgery. *Visitor policy of 1 visitor per patient discussed. Please do not bring home medications with you on the day of surgery unless otherwise directed by your nurse. If you are instructed to bring home medications, please give them to your nurse as they will be administered by the nursing staff. If you have any questions, [facility] [phone] or [facility] [phone]. A copy of this note was provided to the patient for reference.

## 2020-07-13 ENCOUNTER — ANESTHESIA EVENT (OUTPATIENT)
Dept: SURGERY | Age: 55
End: 2020-07-13
Payer: COMMERCIAL

## 2020-07-14 ENCOUNTER — HOSPITAL ENCOUNTER (OUTPATIENT)
Age: 55
Setting detail: OUTPATIENT SURGERY
Discharge: HOME | End: 2020-07-14
Attending: NEUROLOGICAL SURGERY | Admitting: NEUROLOGICAL SURGERY
Payer: COMMERCIAL

## 2020-07-14 ENCOUNTER — APPOINTMENT (OUTPATIENT)
Dept: GENERAL RADIOLOGY | Age: 55
End: 2020-07-14
Attending: NEUROLOGICAL SURGERY
Payer: COMMERCIAL

## 2020-07-14 ENCOUNTER — ANESTHESIA (OUTPATIENT)
Dept: SURGERY | Age: 55
End: 2020-07-14
Payer: COMMERCIAL

## 2020-07-14 ENCOUNTER — HOME HEALTH ADMISSION (OUTPATIENT)
Dept: HOME HEALTH SERVICES | Facility: HOME HEALTH | Age: 55
End: 2020-07-14
Payer: COMMERCIAL

## 2020-07-14 VITALS
OXYGEN SATURATION: 92 % | TEMPERATURE: 97.6 F | RESPIRATION RATE: 16 BRPM | HEIGHT: 64 IN | HEART RATE: 72 BPM | BODY MASS INDEX: 30.15 KG/M2 | WEIGHT: 176.6 LBS | DIASTOLIC BLOOD PRESSURE: 69 MMHG | SYSTOLIC BLOOD PRESSURE: 137 MMHG

## 2020-07-14 DIAGNOSIS — M50.30 DEGENERATIVE DISC DISEASE, CERVICAL: ICD-10-CM

## 2020-07-14 DIAGNOSIS — M54.12 CERVICAL RADICULOPATHY: ICD-10-CM

## 2020-07-14 DIAGNOSIS — M48.02 CERVICAL SPINAL STENOSIS: ICD-10-CM

## 2020-07-14 DIAGNOSIS — Z86.718 HISTORY OF DVT (DEEP VEIN THROMBOSIS): Chronic | ICD-10-CM

## 2020-07-14 DIAGNOSIS — G95.9 CERVICAL MYELOPATHY (HCC): Primary | ICD-10-CM

## 2020-07-14 LAB
ABO + RH BLD: NORMAL
BLOOD GROUP ANTIBODIES SERPL: NORMAL
SPECIMEN EXP DATE BLD: NORMAL

## 2020-07-14 PROCEDURE — 77030005401 HC CATH RAD ARRO -A: Performed by: ANESTHESIOLOGY

## 2020-07-14 PROCEDURE — 86900 BLOOD TYPING SEROLOGIC ABO: CPT

## 2020-07-14 PROCEDURE — 77030040922 HC BLNKT HYPOTHRM STRY -A: Performed by: ANESTHESIOLOGY

## 2020-07-14 PROCEDURE — 77030040361 HC SLV COMPR DVT MDII -B: Performed by: NEUROLOGICAL SURGERY

## 2020-07-14 PROCEDURE — 77030039155 HC CGE SPN ANT CERV TRITANIUM STRY -G: Performed by: NEUROLOGICAL SURGERY

## 2020-07-14 PROCEDURE — 76010000173 HC OR TIME 3 TO 3.5 HR INTENSV-TIER 1: Performed by: NEUROLOGICAL SURGERY

## 2020-07-14 PROCEDURE — 77030029099 HC BN WAX SSPC -A: Performed by: NEUROLOGICAL SURGERY

## 2020-07-14 PROCEDURE — 74011250636 HC RX REV CODE- 250/636: Performed by: NEUROLOGICAL SURGERY

## 2020-07-14 PROCEDURE — 77030034479 HC ADH SKN CLSR PRINEO J&J -B: Performed by: NEUROLOGICAL SURGERY

## 2020-07-14 PROCEDURE — 74011250636 HC RX REV CODE- 250/636: Performed by: REGISTERED NURSE

## 2020-07-14 PROCEDURE — 77030020268 HC MISC GENERAL SUPPLY: Performed by: NEUROLOGICAL SURGERY

## 2020-07-14 PROCEDURE — 76210000020 HC REC RM PH II FIRST 0.5 HR: Performed by: NEUROLOGICAL SURGERY

## 2020-07-14 PROCEDURE — 77030021678 HC GLIDESCP STAT DISP VERT -B: Performed by: ANESTHESIOLOGY

## 2020-07-14 PROCEDURE — 77030019908 HC STETH ESOPH SIMS -A: Performed by: ANESTHESIOLOGY

## 2020-07-14 PROCEDURE — 77030003029 HC SUT VCRL J&J -B: Performed by: NEUROLOGICAL SURGERY

## 2020-07-14 PROCEDURE — 77030037088 HC TUBE ENDOTRACH ORAL NSL COVD-A: Performed by: ANESTHESIOLOGY

## 2020-07-14 PROCEDURE — 77030013794 HC KT TRNSDUC BLD EDWD -B: Performed by: ANESTHESIOLOGY

## 2020-07-14 PROCEDURE — 77030011267 HC ELECTRD BLD COVD -A: Performed by: NEUROLOGICAL SURGERY

## 2020-07-14 PROCEDURE — 77030028270 HC SRGFL HEMSTAT MTRX J&J -C: Performed by: NEUROLOGICAL SURGERY

## 2020-07-14 PROCEDURE — 74011250636 HC RX REV CODE- 250/636: Performed by: ANESTHESIOLOGY

## 2020-07-14 PROCEDURE — 77030025420 HC BUR NEUR TPS STRY -C: Performed by: NEUROLOGICAL SURGERY

## 2020-07-14 PROCEDURE — 77030002933 HC SUT MCRYL J&J -A: Performed by: NEUROLOGICAL SURGERY

## 2020-07-14 PROCEDURE — 72040 X-RAY EXAM NECK SPINE 2-3 VW: CPT

## 2020-07-14 PROCEDURE — 77030040106 HC FCPS BIPOLAR DISP INLC -D: Performed by: NEUROLOGICAL SURGERY

## 2020-07-14 PROCEDURE — 77030013292 HC BOWL MX PRSM J&J -A: Performed by: ANESTHESIOLOGY

## 2020-07-14 PROCEDURE — 77030034475 HC MISC IMPL SPN: Performed by: NEUROLOGICAL SURGERY

## 2020-07-14 PROCEDURE — 77030012894: Performed by: NEUROLOGICAL SURGERY

## 2020-07-14 PROCEDURE — 77030040830 HC CATH URETH FOL MDII -A: Performed by: NEUROLOGICAL SURGERY

## 2020-07-14 PROCEDURE — 76060000038 HC ANESTHESIA 3.5 TO 4 HR: Performed by: NEUROLOGICAL SURGERY

## 2020-07-14 PROCEDURE — 77030014007 HC SPNG HEMSTAT J&J -B: Performed by: NEUROLOGICAL SURGERY

## 2020-07-14 PROCEDURE — 74011000250 HC RX REV CODE- 250: Performed by: REGISTERED NURSE

## 2020-07-14 PROCEDURE — 77030018842 HC SOL IRR SOD CL 9% BAXT -A: Performed by: NEUROLOGICAL SURGERY

## 2020-07-14 PROCEDURE — 76210000017 HC OR PH I REC 1.5 TO 2 HR: Performed by: NEUROLOGICAL SURGERY

## 2020-07-14 PROCEDURE — 74011000250 HC RX REV CODE- 250: Performed by: NEUROLOGICAL SURGERY

## 2020-07-14 PROCEDURE — 74011250637 HC RX REV CODE- 250/637: Performed by: NEUROLOGICAL SURGERY

## 2020-07-14 PROCEDURE — C1713 ANCHOR/SCREW BN/BN,TIS/BN: HCPCS | Performed by: NEUROLOGICAL SURGERY

## 2020-07-14 DEVICE — BIO DBM PUTTY
Type: IMPLANTABLE DEVICE | Site: SPINE CERVICAL | Status: FUNCTIONAL
Brand: BIO DBM

## 2020-07-14 DEVICE — ANTERIOR CERVICAL CAGE
Type: IMPLANTABLE DEVICE | Site: SPINE CERVICAL | Status: FUNCTIONAL
Brand: TRITANIUM C

## 2020-07-14 RX ORDER — SODIUM CHLORIDE 0.9 % (FLUSH) 0.9 %
5-40 SYRINGE (ML) INJECTION AS NEEDED
Status: DISCONTINUED | OUTPATIENT
Start: 2020-07-14 | End: 2020-07-14 | Stop reason: HOSPADM

## 2020-07-14 RX ORDER — SODIUM CHLORIDE 0.9 % (FLUSH) 0.9 %
5-40 SYRINGE (ML) INJECTION EVERY 8 HOURS
Status: DISCONTINUED | OUTPATIENT
Start: 2020-07-14 | End: 2020-07-14 | Stop reason: HOSPADM

## 2020-07-14 RX ORDER — DEXAMETHASONE SODIUM PHOSPHATE 4 MG/ML
INJECTION, SOLUTION INTRA-ARTICULAR; INTRALESIONAL; INTRAMUSCULAR; INTRAVENOUS; SOFT TISSUE AS NEEDED
Status: DISCONTINUED | OUTPATIENT
Start: 2020-07-14 | End: 2020-07-14 | Stop reason: HOSPADM

## 2020-07-14 RX ORDER — BACITRACIN 50000 [IU]/1
INJECTION, POWDER, FOR SOLUTION INTRAMUSCULAR AS NEEDED
Status: DISCONTINUED | OUTPATIENT
Start: 2020-07-14 | End: 2020-07-14 | Stop reason: HOSPADM

## 2020-07-14 RX ORDER — NEOSTIGMINE METHYLSULFATE 1 MG/ML
INJECTION, SOLUTION INTRAVENOUS AS NEEDED
Status: DISCONTINUED | OUTPATIENT
Start: 2020-07-14 | End: 2020-07-14 | Stop reason: HOSPADM

## 2020-07-14 RX ORDER — OXYCODONE HYDROCHLORIDE 5 MG/1
5 TABLET ORAL
Status: DISCONTINUED | OUTPATIENT
Start: 2020-07-14 | End: 2020-07-14 | Stop reason: HOSPADM

## 2020-07-14 RX ORDER — LIDOCAINE HYDROCHLORIDE 10 MG/ML
0.1 INJECTION INFILTRATION; PERINEURAL AS NEEDED
Status: DISCONTINUED | OUTPATIENT
Start: 2020-07-14 | End: 2020-07-14 | Stop reason: HOSPADM

## 2020-07-14 RX ORDER — SODIUM CHLORIDE, SODIUM LACTATE, POTASSIUM CHLORIDE, CALCIUM CHLORIDE 600; 310; 30; 20 MG/100ML; MG/100ML; MG/100ML; MG/100ML
75 INJECTION, SOLUTION INTRAVENOUS CONTINUOUS
Status: DISCONTINUED | OUTPATIENT
Start: 2020-07-14 | End: 2020-07-14 | Stop reason: HOSPADM

## 2020-07-14 RX ORDER — BUPIVACAINE HYDROCHLORIDE AND EPINEPHRINE 2.5; 5 MG/ML; UG/ML
INJECTION, SOLUTION EPIDURAL; INFILTRATION; INTRACAUDAL; PERINEURAL AS NEEDED
Status: DISCONTINUED | OUTPATIENT
Start: 2020-07-14 | End: 2020-07-14 | Stop reason: HOSPADM

## 2020-07-14 RX ORDER — KETAMINE HYDROCHLORIDE 50 MG/ML
INJECTION, SOLUTION INTRAMUSCULAR; INTRAVENOUS AS NEEDED
Status: DISCONTINUED | OUTPATIENT
Start: 2020-07-14 | End: 2020-07-14 | Stop reason: HOSPADM

## 2020-07-14 RX ORDER — METHYLPREDNISOLONE 4 MG/1
TABLET ORAL
Qty: 1 DOSE PACK | Refills: 0 | Status: SHIPPED | OUTPATIENT
Start: 2020-07-14 | End: 2020-09-21

## 2020-07-14 RX ORDER — VANCOMYCIN HYDROCHLORIDE 1 G/20ML
INJECTION, POWDER, LYOPHILIZED, FOR SOLUTION INTRAVENOUS AS NEEDED
Status: DISCONTINUED | OUTPATIENT
Start: 2020-07-14 | End: 2020-07-14 | Stop reason: HOSPADM

## 2020-07-14 RX ORDER — FENTANYL CITRATE 50 UG/ML
INJECTION, SOLUTION INTRAMUSCULAR; INTRAVENOUS AS NEEDED
Status: DISCONTINUED | OUTPATIENT
Start: 2020-07-14 | End: 2020-07-14 | Stop reason: HOSPADM

## 2020-07-14 RX ORDER — HYDROMORPHONE HYDROCHLORIDE 2 MG/ML
0.5 INJECTION, SOLUTION INTRAMUSCULAR; INTRAVENOUS; SUBCUTANEOUS
Status: DISCONTINUED | OUTPATIENT
Start: 2020-07-14 | End: 2020-07-14 | Stop reason: HOSPADM

## 2020-07-14 RX ORDER — SODIUM CHLORIDE, SODIUM LACTATE, POTASSIUM CHLORIDE, CALCIUM CHLORIDE 600; 310; 30; 20 MG/100ML; MG/100ML; MG/100ML; MG/100ML
100 INJECTION, SOLUTION INTRAVENOUS CONTINUOUS
Status: DISCONTINUED | OUTPATIENT
Start: 2020-07-14 | End: 2020-07-14 | Stop reason: HOSPADM

## 2020-07-14 RX ORDER — MIDAZOLAM HYDROCHLORIDE 1 MG/ML
2 INJECTION, SOLUTION INTRAMUSCULAR; INTRAVENOUS
Status: DISCONTINUED | OUTPATIENT
Start: 2020-07-14 | End: 2020-07-14 | Stop reason: HOSPADM

## 2020-07-14 RX ORDER — PROPOFOL 10 MG/ML
INJECTION, EMULSION INTRAVENOUS AS NEEDED
Status: DISCONTINUED | OUTPATIENT
Start: 2020-07-14 | End: 2020-07-14 | Stop reason: HOSPADM

## 2020-07-14 RX ORDER — ETOMIDATE 2 MG/ML
INJECTION INTRAVENOUS AS NEEDED
Status: DISCONTINUED | OUTPATIENT
Start: 2020-07-14 | End: 2020-07-14 | Stop reason: HOSPADM

## 2020-07-14 RX ORDER — ROCURONIUM BROMIDE 10 MG/ML
INJECTION, SOLUTION INTRAVENOUS AS NEEDED
Status: DISCONTINUED | OUTPATIENT
Start: 2020-07-14 | End: 2020-07-14 | Stop reason: HOSPADM

## 2020-07-14 RX ORDER — GLYCOPYRROLATE 0.2 MG/ML
INJECTION INTRAMUSCULAR; INTRAVENOUS AS NEEDED
Status: DISCONTINUED | OUTPATIENT
Start: 2020-07-14 | End: 2020-07-14 | Stop reason: HOSPADM

## 2020-07-14 RX ORDER — CEFAZOLIN SODIUM/WATER 2 G/20 ML
2 SYRINGE (ML) INTRAVENOUS ONCE
Status: COMPLETED | OUTPATIENT
Start: 2020-07-14 | End: 2020-07-14

## 2020-07-14 RX ORDER — FLUMAZENIL 0.1 MG/ML
0.2 INJECTION INTRAVENOUS
Status: DISCONTINUED | OUTPATIENT
Start: 2020-07-14 | End: 2020-07-14 | Stop reason: HOSPADM

## 2020-07-14 RX ORDER — HYDROCODONE BITARTRATE AND ACETAMINOPHEN 10; 325 MG/1; MG/1
1 TABLET ORAL
Qty: 20 TAB | Refills: 0 | Status: SHIPPED | OUTPATIENT
Start: 2020-07-14 | End: 2020-07-19

## 2020-07-14 RX ORDER — LIDOCAINE HYDROCHLORIDE 20 MG/ML
INJECTION, SOLUTION EPIDURAL; INFILTRATION; INTRACAUDAL; PERINEURAL AS NEEDED
Status: DISCONTINUED | OUTPATIENT
Start: 2020-07-14 | End: 2020-07-14 | Stop reason: HOSPADM

## 2020-07-14 RX ORDER — DIPHENHYDRAMINE HYDROCHLORIDE 50 MG/ML
12.5 INJECTION, SOLUTION INTRAMUSCULAR; INTRAVENOUS
Status: DISCONTINUED | OUTPATIENT
Start: 2020-07-14 | End: 2020-07-14 | Stop reason: HOSPADM

## 2020-07-14 RX ORDER — NALOXONE HYDROCHLORIDE 0.4 MG/ML
0.1 INJECTION, SOLUTION INTRAMUSCULAR; INTRAVENOUS; SUBCUTANEOUS
Status: DISCONTINUED | OUTPATIENT
Start: 2020-07-14 | End: 2020-07-14 | Stop reason: HOSPADM

## 2020-07-14 RX ORDER — CYCLOBENZAPRINE HCL 10 MG
10 TABLET ORAL
Qty: 30 TAB | Refills: 0 | Status: SHIPPED | OUTPATIENT
Start: 2020-07-14 | End: 2020-07-24

## 2020-07-14 RX ADMIN — FENTANYL CITRATE 50 MCG: 50 INJECTION INTRAMUSCULAR; INTRAVENOUS at 08:28

## 2020-07-14 RX ADMIN — KETAMINE HYDROCHLORIDE 10 MG: 50 INJECTION INTRAMUSCULAR; INTRAVENOUS at 09:00

## 2020-07-14 RX ADMIN — HYDROMORPHONE HYDROCHLORIDE 0.5 MG: 2 INJECTION INTRAMUSCULAR; INTRAVENOUS; SUBCUTANEOUS at 11:33

## 2020-07-14 RX ADMIN — PHENYLEPHRINE HYDROCHLORIDE 100 MCG: 10 INJECTION INTRAVENOUS at 09:10

## 2020-07-14 RX ADMIN — PHENYLEPHRINE HYDROCHLORIDE 25 MCG: 10 INJECTION INTRAVENOUS at 08:59

## 2020-07-14 RX ADMIN — Medication 3 MG: at 10:49

## 2020-07-14 RX ADMIN — PHENYLEPHRINE HYDROCHLORIDE 50 MCG: 10 INJECTION INTRAVENOUS at 09:50

## 2020-07-14 RX ADMIN — PHENYLEPHRINE HYDROCHLORIDE 50 MCG: 10 INJECTION INTRAVENOUS at 09:39

## 2020-07-14 RX ADMIN — ROCURONIUM BROMIDE 50 MG: 10 INJECTION, SOLUTION INTRAVENOUS at 08:04

## 2020-07-14 RX ADMIN — HYDROMORPHONE HYDROCHLORIDE 0.5 MG: 2 INJECTION INTRAMUSCULAR; INTRAVENOUS; SUBCUTANEOUS at 11:24

## 2020-07-14 RX ADMIN — FENTANYL CITRATE 50 MCG: 50 INJECTION INTRAMUSCULAR; INTRAVENOUS at 08:02

## 2020-07-14 RX ADMIN — PHENYLEPHRINE HYDROCHLORIDE 50 MCG: 10 INJECTION INTRAVENOUS at 08:37

## 2020-07-14 RX ADMIN — PROPOFOL 50 MG: 10 INJECTION, EMULSION INTRAVENOUS at 08:04

## 2020-07-14 RX ADMIN — LIDOCAINE HYDROCHLORIDE 60 MG: 20 INJECTION, SOLUTION EPIDURAL; INFILTRATION; INTRACAUDAL; PERINEURAL at 08:02

## 2020-07-14 RX ADMIN — Medication 2 G: at 08:15

## 2020-07-14 RX ADMIN — PHENYLEPHRINE HYDROCHLORIDE 50 MCG: 10 INJECTION INTRAVENOUS at 08:53

## 2020-07-14 RX ADMIN — PHENYLEPHRINE HYDROCHLORIDE 50 MCG: 10 INJECTION INTRAVENOUS at 09:05

## 2020-07-14 RX ADMIN — PHENYLEPHRINE HYDROCHLORIDE 20 MCG/MIN: 10 INJECTION INTRAVENOUS at 10:05

## 2020-07-14 RX ADMIN — KETAMINE HYDROCHLORIDE 40 MG: 50 INJECTION INTRAMUSCULAR; INTRAVENOUS at 08:02

## 2020-07-14 RX ADMIN — DEXAMETHASONE SODIUM PHOSPHATE 6 MG: 4 INJECTION, SOLUTION INTRAMUSCULAR; INTRAVENOUS at 10:54

## 2020-07-14 RX ADMIN — GLYCOPYRROLATE 0.4 MG: 0.2 INJECTION, SOLUTION INTRAMUSCULAR; INTRAVENOUS at 10:49

## 2020-07-14 RX ADMIN — DEXAMETHASONE SODIUM PHOSPHATE 4 MG: 4 INJECTION, SOLUTION INTRAMUSCULAR; INTRAVENOUS at 08:30

## 2020-07-14 RX ADMIN — SODIUM CHLORIDE, SODIUM LACTATE, POTASSIUM CHLORIDE, AND CALCIUM CHLORIDE 100 ML/HR: 600; 310; 30; 20 INJECTION, SOLUTION INTRAVENOUS at 06:58

## 2020-07-14 RX ADMIN — ETOMIDATE 16 MG: 2 INJECTION, SOLUTION INTRAVENOUS at 08:04

## 2020-07-14 RX ADMIN — PHENYLEPHRINE HYDROCHLORIDE 50 MCG: 10 INJECTION INTRAVENOUS at 09:33

## 2020-07-14 RX ADMIN — KETAMINE HYDROCHLORIDE 10 MG: 50 INJECTION INTRAMUSCULAR; INTRAVENOUS at 10:06

## 2020-07-14 RX ADMIN — SODIUM CHLORIDE, SODIUM LACTATE, POTASSIUM CHLORIDE, AND CALCIUM CHLORIDE: 600; 310; 30; 20 INJECTION, SOLUTION INTRAVENOUS at 07:44

## 2020-07-14 RX ADMIN — PHENYLEPHRINE HYDROCHLORIDE 50 MCG: 10 INJECTION INTRAVENOUS at 09:02

## 2020-07-14 RX ADMIN — Medication 3 AMPULE: at 06:39

## 2020-07-14 NOTE — OP NOTES
NEUROSURGERY OPERATIVE REPORT:      Patient Name: Conrad Hilliard    Patient MRN: [id number]    Patient YOB: 1965    Date of Procedure: 7/14/2020    Pre-Procedure Diagnosis:   1. Degenerative disc disease C4-C5 and C5-C6  2. Severe Cervical Spinal stenosis C4-C5 and C5-C6   3. Cervical radiculopathy  4. Cervical Myelopathy     Post-Procedure Diagnosis: SAME AS ABOVE      Procedure:   1  C4-C5 and C5-C6  COMPLETE ANTERIOR CERVICAL DISKECTOMY, OSTEOPHYTECTOMY FOR NERVE ROOT AND SPINAL CORD DECOMPRESSION  2. C4-C5 and C5-C6 INTERBODY FIXATION WITH TRITANIUM LORDOTIC DEVICE/GRAFTS, AUTOGRAFT, DBM and BONE MARROW ASPIRATE  3. ANTERIOR PLATE AND SCREW FIXATION FROM C4,C5,C6 K2-OZARK ANTERIOR PLATE AND SCREW SYSTEM    4. INTRAOPERATIVE MICROSCOPE (MICROSURGICAL TECHNIQUE)   5. INTRAOPERATIVE C-ARM FLUOROSCOPY      Anesthesia: General Endotracheal anesthesia      Blood Less: 10 cc     Surgeon: Gold King. Lalito Rivera MD     Anesthesiologist: Pascual Spivey. Ever Naylor MD      Surgical Staff:   See Operative Record      Procedure in Detail:   The patient was transported to the OR and placed under general endotracheal anesthesia. Next, the patient was prepped and draped in a sterile fashion. A surgical pause time-out was performed at the beginning of the case prior to incision confirming procedure, sterility and functionality of instruments, surgical site, stability of patient, and anti-biotic administration. A transverse incision was made in the right anterior cervical area 1 cm above two finger breadths above the clavicle and a subcutaneous flap was elevated. The platysma was opened vertically. The cervical investing fascia was opened along the medial border of the sternocleidomastoid muscle. The fascial planes between the carotid sheath and paratracheal fascia were opened with blunt and sharp dissection. The prevertebral fascia was identified and opened sharply.  The longus coli muscles were cauterized along the medial border. Intraoperative C-arm fluoroscopic imaging was used to identify the appropriate level. The [address] retractor was then placed with the teeth of the retractor blades anchored under the elevated longus coli. Next my attention was turned to the C4-C5 disk space and the disk was incised. The disk material was removed with pituitary rongeurs and curettes. The ventral osteophyte overlying the disc space from the C4 vertebral body was removed with Kerrison rongeursThe cartilaginous endplates were removed with the Wilson Drill. The posterolateral osteophytes were also drilled down and removed with a 1 and 2 mm Kerrison rongeurs. The posterior longitudinal ligament was opened along the width of the spinal canal with the 1 and 2 mm Kerrison rongeur. Generous foraminotomies were created bilaterally. A nerve hook was passed in the epidural space, verifying the dura and nerve were free of compression. Hemostasis was obtained. A 5 mm trial was used and verified to be a good fit for the interbody space. An 5 mm Tritanium (Evelin) lordotic cage/device packed with DBM, locally harvest autograft and soaked in bone marrow aspirate was placed in the intervertebral space. Next, the C5-C6 disk was incised. The disk material was removed with pituitary rongeurs and curettes. The ventral osteophyte overlying the disc space from the C5 vertebral body was removed with Kerrison rongeurs. The intraoperative microscope was then brought in to complete the remainder of the discectomy. The cartilaginous endplates were removed with the Evelin Drill. The posterolateral osteophytes were also drilled down and removed with a 1 and 2 mm Kerrison rongeurs. The posterior longitudinal ligament was opened along the width of the spinal canal with the 1 and 2 mm Kerrison rongeurs. Generous foraminotomies were created bilaterally. A nerve hook was passed in the epidural space, verifying the dura and nerve were free of compression.  Hemostasis was obtained. Hemostasis was achieved and 6 mm trial was used and verified to be a good fit for the interbody space. A 6 mm Tritanium ([place]) lordotic cage/device was packed with DBM, locally harvest autograft and soaked in bone marrow aspirate was placed in the intervertebral space. Next the K2-[place] anterior cervical plate was sized to fit from the vertebral bodies of C4-C6. Two bone screws were then placed in each vertebra (C4, C5 and C6) and locked to the plate with a locking mechanism. The wound was irrigated with copious bacitracin instilled irrigation and vancomycin powder was instilled into the wound. The soft tissue retractor was removed. Hemostasis was obtained. Platysma was closed with 2-0 Vicryl, the superficial subcutaneous layer was closed with with 2-0 Vicryl interrupted sutures, and the skin was closed with a Dermabond Prineo system. The patient tolerated the procedure well and there were no complications. The counts were correct at the end of the case. Disposition: PACU and discharge home from the PACU     Michael Hui, [address] [place] [address]

## 2020-07-14 NOTE — PROGRESS NOTES
Discussed follow-up and social work consult and [address] Referral with charge nurse Jarrod Solano. Orders for Nursing medication management. Discussed with patient's primary nurse Tenzin. Patient's friend Fidel Stockton states that he would be staying with patient and provide any assistance she may need.

## 2020-07-14 NOTE — ANESTHESIA PREPROCEDURE EVALUATION
Anesthetic History   No history of anesthetic complications            Review of Systems / Medical History  Patient summary reviewed and pertinent labs reviewed    Pulmonary    COPD (Chronic bronchitis): mild      Shortness of breath (Requiring 3L O2 with exertion) and smoker  Asthma : well controlled       Neuro/Psych       CVA (\"mild stroke\" pt reports 2014- \"left sided weakness and balance is off)  Psychiatric history     Cardiovascular    Hypertension: well controlled  Valvular problems/murmurs: tricuspid insufficiency, mitral insufficiency and aortic insufficiency    CHF    CAD    Exercise tolerance: <4 METS  Comments: Echo (7-2019) - moderate AI, mild TR, mild MR and,  Moderate global HK, EF 30-35%   GI/Hepatic/Renal     GERD: well controlled           Endo/Other        Arthritis and anemia     Other Findings   Comments: + cocaine 3/2019           Physical Exam    Airway  Mallampati: II  TM Distance: 4 - 6 cm  Neck ROM: normal range of motion   Mouth opening: Normal     Cardiovascular    Rhythm: regular  Rate: normal         Dental    Dentition: Edentulous     Pulmonary                 Abdominal  GI exam deferred       Other Findings            Anesthetic Plan    ASA: 3  Anesthesia type: general    Monitoring Plan: Arterial line      Induction: Intravenous  Anesthetic plan and risks discussed with: Patient and Spouse      Pre-induction a-line

## 2020-07-14 NOTE — INTERVAL H&P NOTE
Update History & Physical    The Patient's History and Physical of June 19, 2020,   This was reviewed with the patient and I examined the patient. There was no change. The surgical site was confirmed by the patient and me. Plan:  The risk, benefits, expected outcome, and alternative to the recommended procedure have been discussed with the patient. Patient understands and wants to proceed with the procedure.     Electronically signed by Carilyn Ahumada, MD on 7/14/2020 at 7:32 AM

## 2020-07-14 NOTE — ANESTHESIA PROCEDURE NOTES
Arterial Line Placement    Start time: 7/14/2020 7:59 AM  End time: 7/14/2020 8:02 AM  Performed by: Vinh Liu MD  Authorized by: Vinh Liu MD     Pre-Procedure  Indications:  Arterial pressure monitoring  Preanesthetic Checklist: patient identified, risks and benefits discussed, anesthesia consent, site marked, patient being monitored, timeout performed and patient being monitored    Timeout Time: 07:59        Procedure:   Prep:  ChloraPrep  Orientation:  Left  Location:  Radial artery  Catheter size:  20 G  Number of attempts:  3  Cont Cardiac Output Sensor: No      Assessment:   Post-procedure:  Line secured and sterile dressing applied  Patient Tolerance:  Patient tolerated the procedure well with no immediate complications

## 2020-07-14 NOTE — DISCHARGE INSTRUCTIONS
[facility]  [address], [place], [address] [facility]  [phone]    Cervical (Neck) Fusion Postoperative Home Instructions    - SHOWERING: You may shower the first day you are home with the dressing on. Do not soak in a tub for at least three weeks. If your dressing gets wet, change it according to the written instructions below. You may remove the dressing in 3 days. Use only soap and water on the incision and pat it dry. Do not scrub the incision.    - WOUND CARE: A small to moderate amount of reddish drainage on the dressing is normal the first 1-2 days after surgery. If your dressing becomes soaked with drainage, let your doctor know. [facility] HANDS BEFORE AND AFTER INCISION CARE.    - SIGNS OF INFECTION: Please notify your physician for any of the following: a temperature greater than 100.5, extreme tenderness at the wound, excessive redness and/or swelling, or large amounts of drainage from the wound. If you think you have a wound infection, call your physician's office immediately.    - SWALLOWING: Don't be alarmed if it seems there is a lump in your throat for several days after surgery- this is normal. Eat only soft foods and drink plenty of fluids until your throat feels better. If you begin having trouble swallowing, call the office immediately. - EATING: [place] foods today, nothing spicy or greasy.    - DRIVING: You may not begin driving until after your visit to the physician's office for a wound and suture check. This is normally 7-10 days after you come home from the hospital.    - MEDICATIONS: You may not take anti-inflammatory medications. These include over-the-counter ibuprofen products such as Motrin, Advil, Aleve and other related medications or prescription medications such as Ultram, Naproxen, Indocin, or Celebrex and others. These medications slow down the bone healing. You may take Tylenol unless your prescribed medication has Tylenol in it.  The pain medicine prescribed may be taken as needed. You should continue taking a stool softener twice a day, drink plenty of water and eat high fiber foods to avoid constipation. This is a common problem patients experience while taking pain medicine.    - DEEP BREATHING EXERCISES: Continue to use your incentive spirometer for deep breathing exercises. - ACTIVITY: Avoid reaching overhead, particularly to lift things, until you have been told that your fusion has healed. Do not lift objects heavier than a coffee cup or a newspaper. You shouldn't lift anything heavier than 2-5 pounds. When lifting, you should bend with your knees and keep your back straight. Sleeping may be difficult and sometimes requires you to change positions frequently; try to sleep with your head elevated 15-30 degrees. You may turn your head gradually from side to side, but avoid placing your chin to your chest or flexing your head backwards. You may remove your MITCHEL hose when consistently walking. You may do steps and inclines in moderation.    - SEXUAL RELATIONS: You may resume sexual relations 2 weeks after your surgery. - WALKING PROGRAM: After your sutures are removed or dissolved, it is very important that you begin a progressive walking program. Walking strengthens the spinal muscles and will help protect your disc and vertebrae. You will need to increase your walking program up to two miles per day over the next four weeks. You should begin slowly with 1/8 to 1/4 of a mile and add to this each day. Please be very consistent with your walking program, as this is a vital part of your recovery.    - SYMPTOMS AFTER SURGERY: Don't be alarmed if you still have some of the same symptoms you had prior to surgery. The nerves often require time to heal after the pressure has been relieved. You may also experience pain between your shoulder blades which is common after this surgery.  The level of pain you experience should improve as your body heals. Please call our office if you have any other questions or problems. Listen to your body; it will tell you if you are overdoing it. Use common sense and take care of yourself! After general anesthesia or intravenous sedation, for 24 hours or while taking prescription Narcotics:  · Limit your activities  · A responsible adult needs to be with you for the next 24 hours  · Do not drive and operate hazardous machinery  · Do not make important personal or business decisions  · Do not drink alcoholic beverages  · If you have not urinated within 8 hours after discharge, please contact your surgeon on call. · If you have sleep apnea and have a CPAP machine, please use it for all naps and sleeping. · Please use caution when taking narcotics and any of your home medications that may cause drowsiness. *  Please give a list of your current medications to your Primary Care Provider. *  Please update this list whenever your medications are discontinued, doses are      changed, or new medications (including over-the-counter products) are added. *  Please carry medication information at all times in case of emergency situations. These are general instructions for a healthy lifestyle:  No smoking/ No tobacco products/ Avoid exposure to second hand smoke  Surgeon General's Warning:  Quitting smoking now greatly reduces serious risk to your health. Obesity, smoking, and sedentary lifestyle greatly increases your risk for illness  A healthy diet, regular physical exercise & weight monitoring are important for maintaining a healthy lifestyle    You may be retaining fluid if you have a history of heart failure or if you experience any of the following symptoms:  Weight gain of 3 pounds or more overnight or 5 pounds in a week, increased swelling in our hands or feet or shortness of breath while lying flat in bed.   Please call your doctor as soon as you notice any of these symptoms; do not wait until your next office visit. ACTIVITY  · As tolerated and as directed by your doctor. · Bathe or shower as directed by your doctor. DIET  · Clear liquids until no nausea or vomiting; then light diet for the first day. · Advance to regular diet on second day, unless your doctor orders otherwise. · If nausea and vomiting continues, call your doctor. PAIN  · Take pain medication as directed by your doctor. · Call your doctor if pain is NOT relieved by medication. · DO NOT take aspirin of blood thinners unless directed by your doctor. DRESSING CARE       CALL YOUR DOCTOR IF   · Excessive bleeding that does not stop after holding pressure over the area  · Temperature of 101 degrees F or above  · Excessive redness, swelling or bruising, and/ or green or yellow, smelly discharge from incision    AFTER ANESTHESIA   · For the first 24 hours: DO NOT Drive, Drink alcoholic beverages, or Make important decisions. · Be aware of dizziness following anesthesia and while taking pain medication. APPOINTMENT DATE/ TIME    YOUR DOCTOR'S PHONE NUMBER       DISCHARGE SUMMARY from Nurse    PATIENT INSTRUCTIONS:    After general anesthesia or intravenous sedation, for 24 hours or while taking prescription Narcotics:  · Limit your activities  · Do not drive and operate hazardous machinery  · Do not make important personal or business decisions  · Do  not drink alcoholic beverages  · If you have not urinated within 8 hours after discharge, please contact your surgeon on call. *  Please give a list of your current medications to your Primary Care Provider. *  Please update this list whenever your medications are discontinued, doses are      changed, or new medications (including over-the-counter products) are added. *  Please carry medication information at all times in case of emergency situations.       These are general instructions for a healthy lifestyle:    No smoking/ No tobacco products/ Avoid exposure to second hand smoke    Surgeon General's Warning:  Quitting smoking now greatly reduces serious risk to your health. Obesity, smoking, and sedentary lifestyle greatly increases your risk for illness    A healthy diet, regular physical exercise & weight monitoring are important for maintaining a healthy lifestyle    You may be retaining fluid if you have a history of heart failure or if you experience any of the following symptoms:  Weight gain of 3 pounds or more overnight or 5 pounds in a week, increased swelling in our hands or feet or shortness of breath while lying flat in bed. Please call your doctor as soon as you notice any of these symptoms; do not wait until your next office visit. Recognize signs and symptoms of STROKE:    F-face looks uneven    A-arms unable to move or move unevenly    S-speech slurred or non-existent    T-time-call 911 as soon as signs and symptoms begin-DO NOT go       Back to bed or wait to see if you get better-TIME IS BRAIN.

## 2020-07-14 NOTE — ANESTHESIA POSTPROCEDURE EVALUATION
Procedure(s):  C4-C5 AND C5 C6 ANTERIOR CERVICAL DISCECTOMY WITH FUSION.     general    Anesthesia Post Evaluation      Multimodal analgesia: multimodal analgesia used between 6 hours prior to anesthesia start to PACU discharge  Patient location during evaluation: PACU  Patient participation: complete - patient participated  Level of consciousness: awake  Pain management: adequate  Airway patency: patent  Anesthetic complications: no  Cardiovascular status: acceptable  Respiratory status: spontaneous ventilation and acceptable  Hydration status: acceptable  Post anesthesia nausea and vomiting:  none      INITIAL Post-op Vital signs:   Vitals Value Taken Time   /90 7/14/2020 12:57 PM   Temp 36.4 °C (97.6 °F) 7/14/2020 11:13 AM   Pulse 71 7/14/2020 12:57 PM   Resp 14 7/14/2020 12:57 PM   SpO2 91 % 7/14/2020 12:57 PM

## 2020-07-14 NOTE — PERIOP NOTES
Family updated at 9:29 AM by Sherlyn Alejo RN. 4 DIGIT SECURITY CODE VERIFIED. SPOKE [address] [facility].

## 2020-07-16 ENCOUNTER — HOME CARE VISIT (OUTPATIENT)
Dept: SCHEDULING | Facility: HOME HEALTH | Age: 55
End: 2020-07-16
Payer: COMMERCIAL

## 2020-07-16 PROCEDURE — 400013 HH SOC

## 2020-07-16 PROCEDURE — G0299 HHS/HOSPICE OF RN EA 15 MIN: HCPCS

## 2020-07-19 PROCEDURE — A4927 NON-STERILE GLOVES: HCPCS

## 2020-07-19 NOTE — PROGRESS NOTES
Patient is being admitted to [facility] services following hospitalization in which patient presented to [facility] on 7/14/2020 for a surgical intervention. Patient underwent a C4-C5 and C5-C6 complete anterior cervical DISKECTOMY, C4-C5 and C5-C6 interbody fusion per Dr. Nayla Carlos. Patient has a PMH of Depression, Cocaine abuse, Bronchitis, IBS, Tobacco use, ACS, Hypertension. Patient lives with her friend Carrie Connolly who is the patients  primary caregiver and very active in the patients care. Patient reports that she is using her IS about \"10 times per day and I get it up to about 1000\". States that she feels as if, \"my throat is swelling, but the doctor told me there would be some swelling after my surgery\". No s/sx respiratory distress. SN educated patient on possible use of using gel ice pack to help with her swelling at her incision site and for any s/sx anaphylaxis  to call 911 immediately with the patient with good recall and teachback. Patient ambulatory today and able to answer all of SN's questions. [facility] referral completed today by this SN per patients request/needs - Electronic Referral for Confirmation #[id number]      No Wound Care needed today as wound dressing in clean, dry and intact with no care needed - no s/sx infection noted - MD office to f/u and remove dressing. SN to leave dressing  intact - will be dressed and managed at MD office follow-up. Plan to have nursing perform weekly visits to perform wound care, Perform education on disease processes and management of disease processes in patients home, monitor and teach on medication, assess safety each visit and education on safety and fall precautions with the following SN frequency: 2w3, 1w6, 6 PRN. Patient refuses need for HHA services at this time. All other  discipline needs assessed and no further needs noted / present at this time for other disciplines per patient request and agreeable.  SN reviewed this Plan of Care with the patient agreeable with [facility] and Plan of Care.

## 2020-07-20 ENCOUNTER — HOME CARE VISIT (OUTPATIENT)
Dept: SCHEDULING | Facility: HOME HEALTH | Age: 55
End: 2020-07-20
Payer: COMMERCIAL

## 2020-07-20 VITALS
HEART RATE: 89 BPM | RESPIRATION RATE: 18 BRPM | DIASTOLIC BLOOD PRESSURE: 86 MMHG | TEMPERATURE: 96.9 F | OXYGEN SATURATION: 97 % | SYSTOLIC BLOOD PRESSURE: 132 MMHG

## 2020-07-20 VITALS
TEMPERATURE: 99.5 F | SYSTOLIC BLOOD PRESSURE: 110 MMHG | BODY MASS INDEX: 30.15 KG/M2 | OXYGEN SATURATION: 97 % | RESPIRATION RATE: 18 BRPM | DIASTOLIC BLOOD PRESSURE: 60 MMHG | HEART RATE: 84 BPM | WEIGHT: 176.59 LBS | HEIGHT: 64 IN

## 2020-07-20 PROCEDURE — G0299 HHS/HOSPICE OF RN EA 15 MIN: HCPCS

## 2020-07-20 NOTE — PROGRESS NOTES
pt reports falling once yesterday and once this morning 7/20/20. pt reports she hit her head, but did not seek emergency medical treatment. pt reports that she has been dizzy and seeing spots in her vision since this morning. This RN reccomeded the pt be seen in the ER to evaluate. pt refused, states \"if it gets worse I'll go later, but I'm sick of the ER\". pt also reports pain in her neck 10/10, states that she needs pain medication re filed. pt also requests a refil of phenergan. upon assessment of pt's neck, incision was open to air, mesh was not in place. pt states the mesh fell off. No drainage or dehissance noted. Called Dr. Lucas Rojo' office and left message with RN at [phone].

## 2020-07-23 ENCOUNTER — HOME CARE VISIT (OUTPATIENT)
Dept: SCHEDULING | Facility: HOME HEALTH | Age: 55
End: 2020-07-23
Payer: COMMERCIAL

## 2020-07-23 VITALS
HEART RATE: 86 BPM | SYSTOLIC BLOOD PRESSURE: 136 MMHG | TEMPERATURE: 97.8 F | RESPIRATION RATE: 16 BRPM | DIASTOLIC BLOOD PRESSURE: 84 MMHG | OXYGEN SATURATION: 98 %

## 2020-07-23 PROCEDURE — G0299 HHS/HOSPICE OF RN EA 15 MIN: HCPCS

## 2020-07-29 ENCOUNTER — HOME CARE VISIT (OUTPATIENT)
Dept: SCHEDULING | Facility: HOME HEALTH | Age: 55
End: 2020-07-29
Payer: COMMERCIAL

## 2020-07-29 VITALS
HEART RATE: 84 BPM | WEIGHT: 185 LBS | TEMPERATURE: 98 F | BODY MASS INDEX: 31.76 KG/M2 | OXYGEN SATURATION: 98 % | RESPIRATION RATE: 16 BRPM | DIASTOLIC BLOOD PRESSURE: 70 MMHG | SYSTOLIC BLOOD PRESSURE: 130 MMHG

## 2020-07-29 PROCEDURE — G0299 HHS/HOSPICE OF RN EA 15 MIN: HCPCS

## 2020-07-31 ENCOUNTER — HOME CARE VISIT (OUTPATIENT)
Dept: SCHEDULING | Facility: HOME HEALTH | Age: 55
End: 2020-07-31
Payer: COMMERCIAL

## 2020-08-02 ENCOUNTER — APPOINTMENT (OUTPATIENT)
Dept: CT IMAGING | Age: 55
End: 2020-08-02
Payer: COMMERCIAL

## 2020-08-02 ENCOUNTER — HOSPITAL ENCOUNTER (EMERGENCY)
Age: 55
Discharge: HOME | End: 2020-08-02
Payer: COMMERCIAL

## 2020-08-02 VITALS
HEART RATE: 81 BPM | OXYGEN SATURATION: 96 % | BODY MASS INDEX: 31.07 KG/M2 | HEIGHT: 64 IN | TEMPERATURE: 98.1 F | SYSTOLIC BLOOD PRESSURE: 117 MMHG | DIASTOLIC BLOOD PRESSURE: 70 MMHG | WEIGHT: 182 LBS | RESPIRATION RATE: 16 BRPM

## 2020-08-02 DIAGNOSIS — G89.18 POST-OP PAIN: Primary | ICD-10-CM

## 2020-08-02 LAB
ALBUMIN SERPL-MCNC: 3.3 G/DL (ref 3.5–5)
ALBUMIN/GLOB SERPL: 0.9 {RATIO} (ref 1.2–3.5)
ALP SERPL-CCNC: 110 U/L (ref 50–136)
ALT SERPL-CCNC: 26 U/L (ref 12–65)
ANION GAP SERPL CALC-SCNC: 8 MMOL/L (ref 7–16)
AST SERPL-CCNC: 36 U/L (ref 15–37)
BASOPHILS # BLD: 0 K/UL (ref 0–0.2)
BASOPHILS NFR BLD: 1 % (ref 0–2)
BILIRUB SERPL-MCNC: 0.4 MG/DL (ref 0.2–1.1)
BUN SERPL-MCNC: 14 MG/DL (ref 6–23)
CALCIUM SERPL-MCNC: 8.5 MG/DL (ref 8.3–10.4)
CHLORIDE SERPL-SCNC: 109 MMOL/L (ref 98–107)
CO2 SERPL-SCNC: 23 MMOL/L (ref 21–32)
CREAT SERPL-MCNC: 1.13 MG/DL (ref 0.6–1)
DIFFERENTIAL METHOD BLD: ABNORMAL
EOSINOPHIL # BLD: 0.1 K/UL (ref 0–0.8)
EOSINOPHIL NFR BLD: 1 % (ref 0.5–7.8)
ERYTHROCYTE [DISTWIDTH] IN BLOOD BY AUTOMATED COUNT: 13.6 % (ref 11.9–14.6)
GLOBULIN SER CALC-MCNC: 3.8 G/DL (ref 2.3–3.5)
GLUCOSE SERPL-MCNC: 88 MG/DL (ref 65–100)
HCT VFR BLD AUTO: 34.4 % (ref 35.8–46.3)
HGB BLD-MCNC: 11 G/DL (ref 11.7–15.4)
IMM GRANULOCYTES # BLD AUTO: 0 K/UL (ref 0–0.5)
IMM GRANULOCYTES NFR BLD AUTO: 0 % (ref 0–5)
LYMPHOCYTES # BLD: 2.1 K/UL (ref 0.5–4.6)
LYMPHOCYTES NFR BLD: 44 % (ref 13–44)
MCH RBC QN AUTO: 30.5 PG (ref 26.1–32.9)
MCHC RBC AUTO-ENTMCNC: 32 G/DL (ref 31.4–35)
MCV RBC AUTO: 95.3 FL (ref 79.6–97.8)
MONOCYTES # BLD: 0.4 K/UL (ref 0.1–1.3)
MONOCYTES NFR BLD: 9 % (ref 4–12)
NEUTS SEG # BLD: 2.2 K/UL (ref 1.7–8.2)
NEUTS SEG NFR BLD: 45 % (ref 43–78)
NRBC # BLD: 0 K/UL (ref 0–0.2)
PLATELET # BLD AUTO: 288 K/UL (ref 150–450)
PMV BLD AUTO: 9.9 FL (ref 9.4–12.3)
POTASSIUM SERPL-SCNC: 3.8 MMOL/L (ref 3.5–5.1)
PROT SERPL-MCNC: 7.1 G/DL (ref 6.3–8.2)
RBC # BLD AUTO: 3.61 M/UL (ref 4.05–5.2)
SODIUM SERPL-SCNC: 140 MMOL/L (ref 136–145)
WBC # BLD AUTO: 4.9 K/UL (ref 4.3–11.1)

## 2020-08-02 PROCEDURE — 99284 EMERGENCY DEPT VISIT MOD MDM: CPT

## 2020-08-02 PROCEDURE — 74011250636 HC RX REV CODE- 250/636

## 2020-08-02 PROCEDURE — 80053 COMPREHEN METABOLIC PANEL: CPT

## 2020-08-02 PROCEDURE — 74011000258 HC RX REV CODE- 258

## 2020-08-02 PROCEDURE — 70491 CT SOFT TISSUE NECK W/DYE: CPT

## 2020-08-02 PROCEDURE — 74011636320 HC RX REV CODE- 636/320

## 2020-08-02 PROCEDURE — 85025 COMPLETE CBC W/AUTO DIFF WBC: CPT

## 2020-08-02 RX ORDER — SODIUM CHLORIDE 0.9 % (FLUSH) 0.9 %
10 SYRINGE (ML) INJECTION
Status: COMPLETED | OUTPATIENT
Start: 2020-08-02 | End: 2020-08-02

## 2020-08-02 RX ORDER — CLINDAMYCIN HYDROCHLORIDE 300 MG/1
300 CAPSULE ORAL 4 TIMES DAILY
Qty: 28 CAP | Refills: 0 | Status: SHIPPED | OUTPATIENT
Start: 2020-08-02 | End: 2020-08-09

## 2020-08-02 RX ORDER — SODIUM CHLORIDE 9 MG/ML
1000 INJECTION, SOLUTION INTRAVENOUS ONCE
Status: COMPLETED | OUTPATIENT
Start: 2020-08-02 | End: 2020-08-02

## 2020-08-02 RX ADMIN — SODIUM CHLORIDE 1000 ML: 9 INJECTION, SOLUTION INTRAVENOUS at 03:50

## 2020-08-02 RX ADMIN — SODIUM CHLORIDE 100 ML: 900 INJECTION, SOLUTION INTRAVENOUS at 05:01

## 2020-08-02 RX ADMIN — IOPAMIDOL 100 ML: 755 INJECTION, SOLUTION INTRAVENOUS at 05:00

## 2020-08-02 RX ADMIN — Medication 10 ML: at 05:00

## 2020-08-02 NOTE — ED TRIAGE NOTES
Pt arrives post cervical spinal fusion on 7/14 for complaint of swelling and draining from surgical site.

## 2020-08-02 NOTE — ED PROVIDER NOTES
57-year-old female with a long history of medical problems and who had a surgical procedure done July 14 by Dr. Mandeep Hernandez:    Procedure:   1  C4-C5 and C5-C6  COMPLETE ANTERIOR CERVICAL DISKECTOMY, OSTEOPHYTECTOMY FOR NERVE ROOT AND SPINAL CORD DECOMPRESSION  2. C4-C5 and C5-C6 INTERBODY FIXATION WITH TRITANIUM LORDOTIC DEVICE/GRAFTS, AUTOGRAFT, DBM and BONE MARROW ASPIRATE  3. ANTERIOR PLATE AND SCREW FIXATION FROM C4,C5,C6 K2-OZARK ANTERIOR PLATE AND SCREW SYSTEM    4. INTRAOPERATIVE MICROSCOPE (MICROSURGICAL TECHNIQUE)   5. INTRAOPERATIVE C-ARM FLUOROSCOPY     She presents tonight complaining of a neck mass. She states is been having problems with this since the surgery has been draining off and on. Her neurosurgeon is aware of it. She just finished antibiotics and still thinks it is getting worse. Post-Op Problem   This is a recurrent problem. The current episode started more than 1 week ago. The problem occurs constantly. The problem has been gradually worsening. Pertinent negatives include no chest pain and no abdominal pain. Nothing aggravates the symptoms. She has tried nothing for the symptoms.         Past Medical History:   Diagnosis Date    Anemia     blood transfusion 5/2018 per pt    Arrhythmia     palpitations, moderate mitral valve regurge    Arthritis     lower back osteo    Asthma     uses inhalers    Cancer ([address]     left breast ca    Cardiomyopathy     ECHO 11/2017    CHF (congestive heart failure) (HCC)     EF 30-35% last echo 7/2019-- followed by dr Candace Chapman Chronic pain 2010    Coagulation defect ([address]     eliquis    COPD     nebulizer daily and maint inhalers, can not climb 1 flight of stairs (also CHF)  oxygen 3 LPM qhs    Decreased cardiac ejection fraction 11/27/2017    EF 45-50% per echo 11/27/17    Depression     controlled      Diverticulitis     GERD (gastroesophageal reflux disease)     controlled with med     Heart murmur     Hypertension     managed with meds    IBS (irritable bowel syndrome)     IC (interstitial cystitis)     Insomnia     Migraine with aura and without status migrainosus, not intractable 6/17/2016    Mitral valve regurgitation, rheumatic     followed Dr. Brien Calderon Moderate aortic regurgitation     per echo 7/2019 in cc-- followed by dr Slava Eduardo Palpitations 5/16/2016    Psychiatric disorder     anxiety    Requires oxygen therapy     3l/min at hs. prn during the days as needed    Rheumatic aortic insufficiency 5/16/2016    Rheumatic fever as child    pt reports rheumatic fever in childhood    Smoker     started age 23-- smoked 0.5 ppd until 2018-- cut back to 10-2 cig daily per pt    Stroke ([address]     \"mild stroke\" pt reports 2014- \"left sided weakness and balance is off\"     Thromboembolus ([address]     BLE 2012    Vitamin D deficiency        Past Surgical History:   Procedure Laterality Date    COLONOSCOPY      8 polyps removed, diverticulitis    COLONOSCOPY N/A 5/21/2018    COLONOSCOPY performed by Jennifer Marquez MD at [address]  8-23-11    bladder dilitation    EGD      HX APPENDECTOMY  2006    HX CERVICAL FUSION  07/14/2020    ACDF C4-6 with Dr. León Smith  5/27/2011    no blockages, no intervention    HX HEART CATHETERIZATION  04/2017    no intervention    HX HEART CATHETERIZATION  2018    no intervention    HX LAP CHOLECYSTECTOMY  6/6/2011    HX ORTHOPAEDIC Right 07/2019    4th toe -- surg repair    HX NATASHA AND BSO  2004    fibroid tumors, hyst    HX UROLOGICAL  01/2018    cystoscopy with hydrodistention of bladder multiple    HX UROLOGICAL  06/30/2020    cysto    [place] BIOPSY OF BREAST, INCISIONAL Left     lymph node , left, patient states her bx neg, but high risk         Family History:   Problem Relation Age of Onset    Heart Failure Father 76        chf    Heart Disease Father     Diabetes Sister     No Known Problems Mother     Thyroid Disease Sister     Kidney Disease Brother  No Known Problems Sister     Seizures Brother        Social History     Socioeconomic History    Marital status: SINGLE     Spouse name: Not on file    Number of children: Not on file    Years of education: Not on file    Highest education level: Not on file   Occupational History    Not on file   Social Needs    Financial resource strain: Not on file    Food insecurity     Worry: Not on file     Inability: Not on file    Transportation needs     Medical: Not on file     Non-medical: Not on file   Tobacco Use    Smoking status: Former Smoker     Packs/day: 0.25     Years: 30.00     Pack years: 7.50     Last attempt to quit: 2020     Years since quittin.5    Smokeless tobacco: Never Used   Substance and Sexual Activity    Alcohol use: No    Drug use: Yes     Types: Cocaine     Comment: last used 2018    Sexual activity: Not on file   Lifestyle    Physical activity     Days per week: Not on file     Minutes per session: Not on file    Stress: Not on file   Relationships    Social connections     Talks on phone: Not on file     Gets together: Not on file     Attends Anabaptism service: Not on file     Active member of club or organization: Not on file     Attends meetings of clubs or organizations: Not on file     Relationship status: Not on file    Intimate partner violence     Fear of current or ex partner: Not on file     Emotionally abused: Not on file     Physically abused: Not on file     Forced sexual activity: Not on file   Other Topics Concern    Not on file   Social History Narrative    Not on file         ALLERGIES: Aspirin; Bentyl [dicyclomine]; Toradol [ketorolac]; Ultram [tramadol]; and Zofran [ondansetron hcl (pf)]    Review of Systems   Constitutional: Negative. Negative for activity change. HENT: Negative. Eyes: Negative. Respiratory: Negative. Cardiovascular: Negative. Negative for chest pain. Gastrointestinal: Negative. Negative for abdominal pain. Genitourinary: Negative. Musculoskeletal: Negative. Skin: Negative. Neurological: Negative. Psychiatric/Behavioral: Negative. All other systems reviewed and are negative. Vitals:    08/02/20 0242   BP: 141/78   Pulse: 85   Resp: 16   Temp: 98.1 °F (36.7 °C)   SpO2: 98%   Weight: 82.6 kg (182 lb)   Height: 5' 4\" (1.626 m)            Physical Exam  Vitals signs and nursing note reviewed. Constitutional:       General: She is not in acute distress. Appearance: She is well-developed. She is not diaphoretic. HENT:      Head: Normocephalic and atraumatic. Right Ear: External ear normal.      Left Ear: External ear normal.      Nose: Nose normal.      Mouth/Throat:      Pharynx: No oropharyngeal exudate. Eyes:      General: No scleral icterus. Right eye: No discharge. Left eye: No discharge. Conjunctiva/sclera: Conjunctivae normal.      Pupils: Pupils are equal, round, and reactive to light. Neck:      Musculoskeletal: Normal range of motion and neck supple. Edema present. Vascular: No JVD. Trachea: No tracheal deviation. Cardiovascular:      Rate and Rhythm: Normal rate and regular rhythm. Pulmonary:      Effort: Pulmonary effort is normal. No respiratory distress. Breath sounds: Normal breath sounds. No stridor. No wheezing. Chest:      Chest wall: No tenderness. Abdominal:      General: Bowel sounds are normal. There is no distension. Palpations: Abdomen is soft. There is no mass. Tenderness: There is no abdominal tenderness. Musculoskeletal: Normal range of motion. General: No tenderness. Skin:     General: Skin is warm and dry. Coloration: Skin is not pale. Findings: No erythema or rash. Neurological:      Mental Status: She is alert and oriented to person, place, and time. Cranial Nerves: No cranial nerve deficit. Psychiatric:         Behavior: Behavior normal.         Thought Content:  Thought content normal.          MDM  Number of Diagnoses or Management Options  Post-op pain:   Diagnosis management comments: Minimal fluid collection at the surgical site no active draining difficult to tell this is gotten larger since this is the first time is been seen in the ER. Postoperative swelling will cover for infection with clindamycin she just finished Keflex given more MRSA coverage have her follow-up with the neurosurgeon as an outpatient.        Amount and/or Complexity of Data Reviewed  Clinical lab tests: ordered and reviewed  Tests in the radiology section of CPT®: reviewed and ordered  Tests in the medicine section of CPT®: ordered and reviewed    Risk of Complications, Morbidity, and/or Mortality  Presenting problems: high  Diagnostic procedures: high  Management options: high    Patient Progress  Patient progress: stable         Procedures

## 2020-08-02 NOTE — ED NOTES
I have reviewed discharge instructions with the patient. The patient verbalized understanding. Patient left ED via Discharge Method: ambulatory to Home. Opportunity for questions and clarification provided. Patient given 1 scripts. To continue your aftercare when you leave the hospital, you may receive an automated call from our care team to check in on how you are doing. This is a free service and part of our promise to provide the best care and service to meet your aftercare needs.  If you have questions, or wish to unsubscribe from this service please call [phone]. Thank you for Choosing our [facility].

## 2020-08-02 NOTE — DISCHARGE INSTRUCTIONS
Patient Education        Acute Pain After Surgery: Care Instructions  Your Care Instructions     It's common to have some pain after surgery. Pain doesn't mean that something is wrong or that the surgery didn't go well. But when the pain is severe, it's important to work with your doctor to manage it. It's also important to be aware of a few facts about pain and pain medicine. · You are the only person who knows what your pain feels like. So be sure to tell your doctor when you are in pain or when the pain changes. Then he or she will know how to adjust your medicines. · Pain is often easier to control right after it starts. So it may be better to take regular doses of pain medicine and not wait until the pain gets bad. · Medicine can help control pain. But this doesn't mean you'll have no pain. Medicine works to keep the pain at a level you can live with. With time, you will feel better. Follow-up care is a key part of your treatment and safety. Be sure to make and go to all appointments, and call your doctor if you are having problems. It's also a good idea to know your test results and keep a list of the medicines you take. How can you care for yourself at home? · Be safe with medicines. Read and follow all instructions on the label. ? If the doctor gave you a prescription medicine for pain, take it as prescribed. ? If you are not taking a prescription pain medicine, ask your doctor if you can take an over-the-counter medicine. · If you take an over-the-counter pain medicine, such as acetaminophen (Tylenol), ibuprofen (Advil, Motrin), or naproxen (Aleve), read and follow all instructions on the label. · Do not take two or more pain medicines at the same time unless the doctor told you to. · Do not drink alcohol while you are taking pain medicines. · Try to walk each day if your doctor recommends it. Start by walking a little more than you did the day before. Bit by bit, increase the amount you walk. Walking increases blood flow. It also helps prevent pneumonia and constipation. · To prevent constipation from opioid pain medicines:  ? Talk to your doctor about a laxative. ? Include fruits, vegetables, beans, and whole grains in your diet each day. These foods are high in fiber. ? Drink plenty of fluids, enough so that your urine is light yellow or clear like water. Drink water, fruit juice, or other drinks that do not contain caffeine or alcohol. If you have kidney, heart, or liver disease and have to limit fluids, talk with your doctor before you increase the amount of fluids you drink. ? Take a fiber supplement, such as Citrucel or Metamucil, every day if needed. Read and follow all instructions on the label. If you take pain medicine for more than a few days, talk to your doctor before you take fiber. When should you call for help? Call your doctor now or seek immediate medical care if:  · Your pain gets worse. · Your pain is not controlled by medicine. Watch closely for changes in your health, and be sure to contact your doctor if you have any problems. Where can you learn more? Go to http://www.gimenez.com/  Enter H549 in the search box to learn more about \"Acute Pain After Surgery: Care Instructions. \"  Current as of: June 26, 2019               Content Version: 12.5  © 2679-0007 [employer]. Care instructions adapted under license by [employer] (which disclaims liability or warranty for this information). If you have questions about a medical condition or this instruction, always ask your healthcare professional. [place] [address] any warranty or liability for your use of this information.

## 2020-08-05 ENCOUNTER — HOME CARE VISIT (OUTPATIENT)
Dept: SCHEDULING | Facility: HOME HEALTH | Age: 55
End: 2020-08-05
Payer: COMMERCIAL

## 2020-08-07 ENCOUNTER — HOME CARE VISIT (OUTPATIENT)
Dept: SCHEDULING | Facility: HOME HEALTH | Age: 55
End: 2020-08-07
Payer: COMMERCIAL

## 2020-08-14 ENCOUNTER — HOME CARE VISIT (OUTPATIENT)
Dept: SCHEDULING | Facility: HOME HEALTH | Age: 55
End: 2020-08-14
Payer: COMMERCIAL

## 2020-08-14 PROCEDURE — G0299 HHS/HOSPICE OF RN EA 15 MIN: HCPCS

## 2020-08-16 ENCOUNTER — HOSPITAL ENCOUNTER (EMERGENCY)
Age: 55
Discharge: HOME | End: 2020-08-16
Attending: EMERGENCY MEDICINE
Payer: COMMERCIAL

## 2020-08-16 ENCOUNTER — APPOINTMENT (OUTPATIENT)
Dept: GENERAL RADIOLOGY | Age: 55
End: 2020-08-16
Attending: EMERGENCY MEDICINE
Payer: COMMERCIAL

## 2020-08-16 VITALS
OXYGEN SATURATION: 99 % | WEIGHT: 183 LBS | DIASTOLIC BLOOD PRESSURE: 73 MMHG | HEIGHT: 64 IN | BODY MASS INDEX: 31.24 KG/M2 | TEMPERATURE: 99.2 F | SYSTOLIC BLOOD PRESSURE: 128 MMHG | HEART RATE: 91 BPM | RESPIRATION RATE: 18 BRPM

## 2020-08-16 VITALS
HEART RATE: 91 BPM | OXYGEN SATURATION: 97 % | RESPIRATION RATE: 14 BRPM | DIASTOLIC BLOOD PRESSURE: 78 MMHG | SYSTOLIC BLOOD PRESSURE: 112 MMHG | TEMPERATURE: 97 F

## 2020-08-16 DIAGNOSIS — M79.642 HAND PAIN, LEFT: Primary | ICD-10-CM

## 2020-08-16 DIAGNOSIS — S63.502A WRIST SPRAIN, LEFT, INITIAL ENCOUNTER: ICD-10-CM

## 2020-08-16 PROCEDURE — 99283 EMERGENCY DEPT VISIT LOW MDM: CPT

## 2020-08-16 PROCEDURE — 73110 X-RAY EXAM OF WRIST: CPT

## 2020-08-16 PROCEDURE — 73130 X-RAY EXAM OF HAND: CPT

## 2020-08-16 RX ORDER — TRAMADOL HYDROCHLORIDE 50 MG/1
50 TABLET ORAL
Qty: 8 TAB | Refills: 0 | Status: SHIPPED | OUTPATIENT
Start: 2020-08-16 | End: 2020-08-19

## 2020-08-16 NOTE — ED PROVIDER NOTES
Patient fell earlier this morning late last night. Injured her left hand and wrist.  Has pain there. Seen previously earlier this morning at [facility]. X-rays negative there. Now here with continued pain and left wrist swelling. The history is provided by the patient. No  was used. Hand Pain    This is a new problem. The current episode started yesterday. The problem occurs constantly. The problem has been gradually worsening. The pain is present in the left hand and left wrist. The quality of the pain is described as aching. The pain is moderate. Associated symptoms include limited range of motion. Pertinent negatives include no numbness, no back pain and no neck pain. The symptoms are aggravated by palpation and movement. She has tried nothing for the symptoms. There has been a history of trauma.         Past Medical History:   Diagnosis Date    Anemia     blood transfusion 5/2018 per pt    Arrhythmia     palpitations, moderate mitral valve regurge    Arthritis     lower back osteo    Asthma     uses inhalers    Cancer ([address]     left breast ca    Cardiomyopathy     ECHO 11/2017    CHF (congestive heart failure) (HCC)     EF 30-35% last echo 7/2019-- followed by dr Durwood Lefort Chronic pain 2010    Coagulation defect ([address]     eliquis    COPD     nebulizer daily and maint inhalers, can not climb 1 flight of stairs (also CHF)  oxygen 3 LPM qhs    Decreased cardiac ejection fraction 11/27/2017    EF 45-50% per echo 11/27/17    Depression     controlled      Diverticulitis     GERD (gastroesophageal reflux disease)     controlled with med     Heart murmur     Hypertension     managed with meds    IBS (irritable bowel syndrome)     IC (interstitial cystitis)     Insomnia     Migraine with aura and without status migrainosus, not intractable 6/17/2016    Mitral valve regurgitation, rheumatic     followed Dr. Raza Walker    Moderate aortic regurgitation     per echo 7/2019 in cc-- followed by dr Lyssa Allen Palpitations 5/16/2016    Psychiatric disorder     anxiety    Requires oxygen therapy     3l/min at hs. prn during the days as needed    Rheumatic aortic insufficiency 5/16/2016    Rheumatic fever as child    pt reports rheumatic fever in childhood    Smoker     started age 23-- smoked 0.5 ppd until 2018-- cut back to 10-2 cig daily per pt    Stroke [facility])     \"mild stroke\" pt reports 2014- \"left sided weakness and balance is off\"     Thromboembolus ([address]     BLE 2012    Vitamin D deficiency        Past Surgical History:   Procedure Laterality Date    COLONOSCOPY      8 polyps removed, diverticulitis    COLONOSCOPY N/A 5/21/2018    COLONOSCOPY performed by Jake Wagner MD at [address]  8-23-11    bladder dilitation    EGD      HX APPENDECTOMY  2006    HX CERVICAL FUSION  07/14/2020    ACDF C4-6 with Dr. Marbella Bledsoe  5/27/2011    no blockages, no intervention    HX HEART CATHETERIZATION  04/2017    no intervention    HX HEART CATHETERIZATION  2018    no intervention    HX LAP CHOLECYSTECTOMY  6/6/2011    HX ORTHOPAEDIC Right 07/2019    4th toe -- surg repair    HX NATASHA AND BSO  2004    fibroid tumors, hyst    HX UROLOGICAL  01/2018    cystoscopy with hydrodistention of bladder multiple    HX UROLOGICAL  06/30/2020    cysto    TX BIOPSY OF BREAST, INCISIONAL Left     lymph node , left, patient states her bx neg, but high risk         Family History:   Problem Relation Age of Onset    Heart Failure Father 76        chf    Heart Disease Father     Diabetes Sister     No Known Problems Mother     Thyroid Disease Sister     Kidney Disease Brother     No Known Problems Sister     Seizures Brother        Social History     Socioeconomic History    Marital status: SINGLE     Spouse name: Not on file    Number of children: Not on file    Years of education: Not on file    Highest education level: Not on file Occupational History    Not on file   Social Needs    Financial resource strain: Not on file    Food insecurity     Worry: Not on file     Inability: Not on file    Transportation needs     Medical: Not on file     Non-medical: Not on file   Tobacco Use    Smoking status: Former Smoker     Packs/day: 0.25     Years: 30.00     Pack years: 7.50     Last attempt to quit: 2020     Years since quittin.5    Smokeless tobacco: Never Used   Substance and Sexual Activity    Alcohol use: No    Drug use: Yes     Types: Cocaine     Comment: last used 2018    Sexual activity: Not on file   Lifestyle    Physical activity     Days per week: Not on file     Minutes per session: Not on file    Stress: Not on file   Relationships    Social connections     Talks on phone: Not on file     Gets together: Not on file     Attends Judaism service: Not on file     Active member of club or organization: Not on file     Attends meetings of clubs or organizations: Not on file     Relationship status: Not on file    Intimate partner violence     Fear of current or ex partner: Not on file     Emotionally abused: Not on file     Physically abused: Not on file     Forced sexual activity: Not on file   Other Topics Concern    Not on file   Social History Narrative    Not on file         ALLERGIES: Aspirin; Bentyl [dicyclomine]; Toradol [ketorolac]; Ultram [tramadol]; and Zofran [ondansetron hcl (pf)]    Review of Systems   Constitutional: Negative for chills and fever. Respiratory: Negative for chest tightness, shortness of breath and wheezing. Cardiovascular: Negative for chest pain and leg swelling. Gastrointestinal: Negative for abdominal pain, diarrhea, nausea and vomiting. Musculoskeletal: Positive for arthralgias and joint swelling. Negative for back pain, gait problem, neck pain and neck stiffness. Skin: Negative for color change and rash. Neurological: Negative for weakness, numbness and headaches. Vitals:    08/16/20 1736   BP: (!) 146/91   Pulse: 95   Resp: 18   Temp: 99.2 °F (37.3 °C)   SpO2: 98%   Weight: 83 kg (183 lb)   Height: 5' 4\" (1.626 m)            Physical Exam  Constitutional:       Appearance: Normal appearance. She is well-developed. Eyes:      Extraocular Movements: Extraocular movements intact. Pupils: Pupils are equal, round, and reactive to light. Neck:      Musculoskeletal: Normal range of motion and neck supple. Cardiovascular:      Rate and Rhythm: Normal rate and regular rhythm. Pulmonary:      Effort: Pulmonary effort is normal.      Breath sounds: Normal breath sounds. Abdominal:      General: Bowel sounds are normal.      Palpations: Abdomen is soft. Tenderness: There is no abdominal tenderness. Musculoskeletal:         General: Swelling and tenderness (left wrist. radial pulse intact. TTP entire hand and wrist. non focal. ) present. Skin:     General: Skin is warm and dry. Neurological:      Mental Status: She is alert and oriented to person, place, and time. MDM  Number of Diagnoses or Management Options  Diagnosis management comments: xrays negative. Will ace wrap and treat at home. Amount and/or Complexity of Data Reviewed  Tests in the radiology section of CPT®: ordered and reviewed    Patient Progress  Patient progress: stable         Procedures          XR Wrist 3+ Vw Left (08/16/2020 3:13 AM EDT)  XR Wrist 3+ Vw Left (08/16/2020 3:13 AM EDT)   Specimen         XR Wrist 3+ Vw Left (08/16/2020 3:13 AM EDT)   Impressions Performed At   No acute osseous abnormality.        Signed by: 8/16/2020 8:13 AM: Helga Dobson          [facility] PACS       XR Wrist 3+ Vw Left (08/16/2020 3:13 AM EDT)   Narrative Performed At   [address] 3+ VW LEFT        COMPARISON:  None.        INDICATION:  JOINT PAIN, WRIST; Indication selected;        TECHNICAL:  4 views left wrist        FINDINGS:    Osseous structures:      No acute fracture or malalignment.      Soft tissues:      No joint effusion or soft tissue swelling.    No radiopaque foreign body.           [facility] PACS       XR Wrist 3+ Vw Left (08/16/2020 3:13 AM EDT)   Procedure Note   Interface, Radiology Results In - 08/16/2020 8:15 AM EDT    XR WRIST 3+ VW LEFT    COMPARISON: None. INDICATION: JOINT PAIN, WRIST; Indication selected;    TECHNICAL: 4 views left wrist    FINDINGS:  Osseous structures:   No acute fracture or malalignment. Soft tissues:   No joint effusion or soft tissue swelling. No radiopaque foreign body. IMPRESSION:  No acute osseous abnormality. Signed by: 8/16/2020 8:13 AM: Sol Huynh        XR WRIST LT AP/LAT/OBL MIN 3V (Final result)   Result time 08/16/20 18:23:48   Final result by Robert Arango MD (08/16/20 18:23:48)                 Impression:     IMPRESSION: No acute findings. Narrative:     History: Fall with left wrist pain     EXAM: Left wrist series     FINDINGS: No acute fracture, dislocation, or additional acute bony abnormality.                       XR HAND LT MIN 3 V (Final result)   Result time 08/16/20 18:35:47   Final result by Robert Arango MD (08/16/20 18:35:47)                 Impression:     IMPRESSION: No acute findings.              Narrative:     History: Left hand pain status post fall     3 views left hand     FINDINGS: No acute fracture, dislocation, or additional acute bony abnormality.                  ECG Results     None

## 2020-08-16 NOTE — ED TRIAGE NOTES
Pt ambulatory to triage wearing a mask. Pt c/o left hand pain following a fall x 4 hours ago. Pt reports she was trying to catch her dog who got out of the yard and she tripped and fell. Pt reports she fell and braced with her left hand. Pt reports swelling to left hand. Pt reports she is unable to move her left wrist or hand. Pt has + PMS in left hand. Pt reports she takes Eliquis for Hx of blood clots.

## 2020-08-16 NOTE — DISCHARGE INSTRUCTIONS
Patient Education        Hand Pain: Care Instructions  Your Care Instructions     Common causes of hand pain are overuse and injuries, such as might happen during sports or home repair projects. Everyday wear and tear, especially as you get older, also can cause hand pain. Most minor hand injuries will heal on their own, and home treatment is usually all you need to do. If you have sudden and severe pain, you may need tests and treatment. Follow-up care is a key part of your treatment and safety. Be sure to make and go to all appointments, and call your doctor if you are having problems. It's also a good idea to know your test results and keep a list of the medicines you take. How can you care for yourself at home? · Take pain medicines exactly as directed. ? If the doctor gave you a prescription medicine for pain, take it as prescribed. ? If you are not taking a prescription pain medicine, ask your doctor if you can take an over-the-counter medicine. · Rest and protect your hand. Take a break from any activity that may cause pain. · Put ice or a cold pack on your hand for 10 to 20 minutes at a time. Put a thin cloth between the ice and your skin. · Prop up the sore hand on a pillow when you ice it or anytime you sit or lie down during the next 3 days. Try to keep it above the level of your heart. This will help reduce swelling. · If your doctor recommends a sling, splint, or elastic bandage to support your hand, wear it as directed. When should you call for help? [id number] anytime you think you may need emergency care. For example, call if:  · Your hand turns cool or pale or changes color. Call your doctor now or seek immediate medical care if:  · You cannot move your hand. · Your hand pops, moves out of its normal position, and then returns to its normal position. · You have signs of infection, such as:  ? Increased pain, swelling, warmth, or redness. ? Red streaks leading from the sore area. ?  Pus draining from a place on your hand. ? A fever. · Your hand feels numb or tingly. Watch closely for changes in your health, and be sure to contact your doctor if:  · Your hand feels unstable when you try to use it. · You do not get better as expected. · You have any new symptoms, such as swelling. · Bruises from an injury to your hand last longer than 2 weeks. Where can you learn more? Go to http://www.gimenez.com/  Enter R273 in the search box to learn more about \"Hand Pain: Care Instructions. \"  Current as of: June 26, 2019               Content Version: 12.5  © 7940-4888 [employer]. Care instructions adapted under license by [employer] (which disclaims liability or warranty for this information). If you have questions about a medical condition or this instruction, always ask your healthcare professional. [place] [address] any warranty or liability for your use of this information. Patient Education        Wrist Sprain: Care Instructions  Your Care Instructions     Your wrist hurts because you have stretched or torn ligaments, which connect the bones in your wrist.  Wrist sprains usually take from 2 to 10 weeks to heal, but some take longer. Usually, the more pain you have, the more severe your wrist sprain is and the longer it will take to heal. You can heal faster and regain strength in your wrist with good home treatment. Follow-up care is a key part of your treatment and safety. Be sure to make and go to all appointments, and call your doctor if you are having problems. It's also a good idea to know your test results and keep a list of the medicines you take. How can you care for yourself at home? · Prop up your arm on a pillow when you ice it or anytime you sit or lie down for the next 3 days. Try to keep your wrist above the level of your heart. This will help reduce swelling.   · Put ice or cold packs on your wrist for 10 to 20 minutes at a time. Try to do this every 1 to 2 hours for the next 3 days (when you are awake) or until the swelling goes down. Put a thin cloth between the ice pack and your skin. · After 2 or 3 days, if your swelling is gone, apply a heating pad set on low or a warm cloth to your wrist. This helps keep your wrist flexible. Some doctors suggest that you go back and forth between hot and cold. · If you have an elastic bandage, keep it on for the next 24 to 36 hours. The bandage should be snug but not so tight that it causes numbness or tingling. To rewrap the wrist, wrap the bandage around the hand a few times, beginning at the fingers. Then wrap it around the hand between the thumb and index finger, ending by circling the wrist several times. · If your doctor gave you a splint or brace, wear it as directed to protect your wrist until it has healed. · Take pain medicines exactly as directed. ? If the doctor gave you a prescription medicine for pain, take it as prescribed. ? If you are not taking a prescription pain medicine, ask your doctor if you can take an over-the-counter medicine. · Try not to use your injured wrist and hand. When should you call for help? Call your doctor now or seek immediate medical care if:  · Your hand or fingers are cool or pale or change color. Watch closely for changes in your health, and be sure to contact your doctor if:  · Your pain gets worse. · Your wrist has not improved after 1 week. Where can you learn more? Go to http://maria alejandra-nereyda.info/  Enter G541 in the search box to learn more about \"Wrist Sprain: Care Instructions. \"  Current as of: March 2, 2020               Content Version: 12.5  © 0765-5944 [employer]. Care instructions adapted under license by [employer] (which disclaims liability or warranty for this information).  If you have questions about a medical condition or this instruction, always ask your healthcare professional. [address] any warranty or liability for your use of this information.

## 2020-08-16 NOTE — ED NOTES
I have reviewed discharge instructions with the patient. The patient verbalized understanding. Patient left ED via ambulatory to home with self. Opportunity for questions and clarification provided. Patient given 1 scripts. To continue your aftercare when you leave the hospital, you may receive an automated call from our care team to check in on how you are doing.  This is a free service and part of our promise to provide the best care and service to meet your aftercare needs. \" If you have questions, or wish to unsubscribe from this service please call [phone].  Thank you for Choosing our [address] [place] [address] Emergency Department.

## 2020-08-18 ENCOUNTER — HOME CARE VISIT (OUTPATIENT)
Dept: SCHEDULING | Facility: HOME HEALTH | Age: 55
End: 2020-08-18
Payer: COMMERCIAL

## 2020-08-28 ENCOUNTER — HOME CARE VISIT (OUTPATIENT)
Dept: HOME HEALTH SERVICES | Facility: HOME HEALTH | Age: 55
End: 2020-08-28
Payer: COMMERCIAL

## 2020-09-01 ENCOUNTER — HOME CARE VISIT (OUTPATIENT)
Dept: SCHEDULING | Facility: HOME HEALTH | Age: 55
End: 2020-09-01
Payer: COMMERCIAL

## 2020-09-01 PROCEDURE — 400013 HH SOC

## 2020-09-01 PROCEDURE — G0299 HHS/HOSPICE OF RN EA 15 MIN: HCPCS

## 2020-09-21 PROBLEM — J96.11 CHRONIC HYPOXEMIC RESPIRATORY FAILURE (HCC): Status: ACTIVE | Noted: 2018-05-19

## 2020-09-21 PROBLEM — M54.12 CERVICAL RADICULOPATHY: Status: ACTIVE | Noted: 2020-09-21

## 2020-09-21 PROBLEM — G89.4 CHRONIC PAIN SYNDROME: Status: ACTIVE | Noted: 2020-09-21

## 2020-09-21 PROBLEM — Z86.69 HISTORY OF MIGRAINE HEADACHES: Status: ACTIVE | Noted: 2020-09-21

## 2020-09-21 PROBLEM — G47.00 INSOMNIA: Status: ACTIVE | Noted: 2020-09-21

## 2020-09-21 PROBLEM — K21.9 GASTROESOPHAGEAL REFLUX DISEASE: Status: ACTIVE | Noted: 2020-09-21

## 2020-09-25 ENCOUNTER — HOSPITAL ENCOUNTER (OUTPATIENT)
Dept: MRI IMAGING | Age: 55
Discharge: HOME | End: 2020-09-25
Attending: NEUROLOGICAL SURGERY
Payer: COMMERCIAL

## 2020-09-25 DIAGNOSIS — M50.30 DEGENERATIVE DISC DISEASE, CERVICAL: ICD-10-CM

## 2020-09-25 DIAGNOSIS — M54.12 CERVICAL RADICULOPATHY: ICD-10-CM

## 2020-09-25 DIAGNOSIS — Z98.890 S/P CERVICAL DISCECTOMY: ICD-10-CM

## 2020-09-25 DIAGNOSIS — M48.02 CERVICAL SPINAL STENOSIS: ICD-10-CM

## 2020-09-25 DIAGNOSIS — Z98.1 S/P CERVICAL SPINAL FUSION: ICD-10-CM

## 2020-09-25 DIAGNOSIS — G95.9 CERVICAL MYELOPATHY (HCC): ICD-10-CM

## 2020-09-25 PROCEDURE — A9575 INJ GADOTERATE MEGLUMI 0.1ML: HCPCS | Performed by: NEUROLOGICAL SURGERY

## 2020-09-25 PROCEDURE — 74011250636 HC RX REV CODE- 250/636: Performed by: NEUROLOGICAL SURGERY

## 2020-09-25 PROCEDURE — 72156 MRI NECK SPINE W/O & W/DYE: CPT

## 2020-09-25 RX ORDER — GADOTERATE MEGLUMINE 376.9 MG/ML
15 INJECTION INTRAVENOUS
Status: COMPLETED | OUTPATIENT
Start: 2020-09-25 | End: 2020-09-25

## 2020-09-25 RX ORDER — SODIUM CHLORIDE 0.9 % (FLUSH) 0.9 %
10 SYRINGE (ML) INJECTION
Status: COMPLETED | OUTPATIENT
Start: 2020-09-25 | End: 2020-09-25

## 2020-09-25 RX ADMIN — Medication 10 ML: at 19:36

## 2020-09-25 RX ADMIN — GADOTERATE MEGLUMINE 15 ML: 376.9 INJECTION INTRAVENOUS at 19:36

## 2020-10-05 ENCOUNTER — APPOINTMENT (OUTPATIENT)
Dept: CT IMAGING | Age: 55
End: 2020-10-05
Attending: EMERGENCY MEDICINE
Payer: COMMERCIAL

## 2020-10-05 ENCOUNTER — HOSPITAL ENCOUNTER (EMERGENCY)
Age: 55
Discharge: HOME | End: 2020-10-06
Attending: EMERGENCY MEDICINE
Payer: COMMERCIAL

## 2020-10-05 DIAGNOSIS — R53.83 FATIGUE, UNSPECIFIED TYPE: Primary | ICD-10-CM

## 2020-10-05 LAB
ALBUMIN SERPL-MCNC: 3.8 G/DL (ref 3.5–5)
ALBUMIN/GLOB SERPL: 0.9 {RATIO} (ref 1.2–3.5)
ALP SERPL-CCNC: 93 U/L (ref 50–130)
ALT SERPL-CCNC: 28 U/L (ref 12–65)
ANION GAP SERPL CALC-SCNC: 8 MMOL/L (ref 7–16)
AST SERPL-CCNC: 54 U/L (ref 15–37)
BASOPHILS # BLD: 0 K/UL (ref 0–0.2)
BASOPHILS NFR BLD: 1 % (ref 0–2)
BILIRUB SERPL-MCNC: 0.4 MG/DL (ref 0.2–1.1)
BUN SERPL-MCNC: 5 MG/DL (ref 6–23)
CALCIUM SERPL-MCNC: 9.7 MG/DL (ref 8.3–10.4)
CHLORIDE SERPL-SCNC: 103 MMOL/L (ref 98–107)
CO2 SERPL-SCNC: 28 MMOL/L (ref 21–32)
CREAT SERPL-MCNC: 0.96 MG/DL (ref 0.6–1)
DIFFERENTIAL METHOD BLD: ABNORMAL
EOSINOPHIL # BLD: 0 K/UL (ref 0–0.8)
EOSINOPHIL NFR BLD: 1 % (ref 0.5–7.8)
ERYTHROCYTE [DISTWIDTH] IN BLOOD BY AUTOMATED COUNT: 12.3 % (ref 11.9–14.6)
GLOBULIN SER CALC-MCNC: 4.1 G/DL (ref 2.3–3.5)
GLUCOSE SERPL-MCNC: 84 MG/DL (ref 65–100)
HCT VFR BLD AUTO: 36.9 % (ref 35.8–46.3)
HGB BLD-MCNC: 12.2 G/DL (ref 11.7–15.4)
IMM GRANULOCYTES # BLD AUTO: 0 K/UL (ref 0–0.5)
IMM GRANULOCYTES NFR BLD AUTO: 1 % (ref 0–5)
LACTATE SERPL-SCNC: 1 MMOL/L (ref 0.4–2)
LYMPHOCYTES # BLD: 1.6 K/UL (ref 0.5–4.6)
LYMPHOCYTES NFR BLD: 38 % (ref 13–44)
MCH RBC QN AUTO: 30.9 PG (ref 26.1–32.9)
MCHC RBC AUTO-ENTMCNC: 33.1 G/DL (ref 31.4–35)
MCV RBC AUTO: 93.4 FL (ref 79.6–97.8)
MONOCYTES # BLD: 0.5 K/UL (ref 0.1–1.3)
MONOCYTES NFR BLD: 12 % (ref 4–12)
NEUTS SEG # BLD: 2.2 K/UL (ref 1.7–8.2)
NEUTS SEG NFR BLD: 47 % (ref 43–78)
NRBC # BLD: 0 K/UL (ref 0–0.2)
PLATELET # BLD AUTO: 235 K/UL (ref 150–450)
PLATELET COMMENTS,PCOM: ADEQUATE
PMV BLD AUTO: 10.7 FL (ref 9.4–12.3)
POTASSIUM SERPL-SCNC: 3.7 MMOL/L (ref 3.5–5.1)
PROT SERPL-MCNC: 7.9 G/DL (ref 6.3–8.2)
RBC # BLD AUTO: 3.95 M/UL (ref 4.05–5.2)
RBC MORPH BLD: ABNORMAL
SODIUM SERPL-SCNC: 139 MMOL/L (ref 136–145)
TROPONIN-HIGH SENSITIVITY: 10.2 PG/ML (ref 0–14)
WBC # BLD AUTO: 4.3 K/UL (ref 4.3–11.1)
WBC MORPH BLD: ABNORMAL

## 2020-10-05 PROCEDURE — 80307 DRUG TEST PRSMV CHEM ANLYZR: CPT

## 2020-10-05 PROCEDURE — 87040 BLOOD CULTURE FOR BACTERIA: CPT

## 2020-10-05 PROCEDURE — 74011250636 HC RX REV CODE- 250/636: Performed by: EMERGENCY MEDICINE

## 2020-10-05 PROCEDURE — 84484 ASSAY OF TROPONIN QUANT: CPT

## 2020-10-05 PROCEDURE — 99284 EMERGENCY DEPT VISIT MOD MDM: CPT

## 2020-10-05 PROCEDURE — 93005 ELECTROCARDIOGRAM TRACING: CPT | Performed by: EMERGENCY MEDICINE

## 2020-10-05 PROCEDURE — 83605 ASSAY OF LACTIC ACID: CPT

## 2020-10-05 PROCEDURE — 85025 COMPLETE CBC W/AUTO DIFF WBC: CPT

## 2020-10-05 PROCEDURE — 70450 CT HEAD/BRAIN W/O DYE: CPT

## 2020-10-05 PROCEDURE — 80053 COMPREHEN METABOLIC PANEL: CPT

## 2020-10-05 RX ADMIN — SODIUM CHLORIDE 1000 ML: 900 INJECTION, SOLUTION INTRAVENOUS at 23:29

## 2020-10-06 VITALS
RESPIRATION RATE: 18 BRPM | BODY MASS INDEX: 30.73 KG/M2 | SYSTOLIC BLOOD PRESSURE: 131 MMHG | HEART RATE: 84 BPM | WEIGHT: 180 LBS | TEMPERATURE: 97.5 F | DIASTOLIC BLOOD PRESSURE: 82 MMHG | HEIGHT: 64 IN | OXYGEN SATURATION: 97 %

## 2020-10-06 LAB
ATRIAL RATE: 84 BPM
CALCULATED P AXIS, ECG09: 62 DEGREES
CALCULATED R AXIS, ECG10: 0 DEGREES
CALCULATED T AXIS, ECG11: -40 DEGREES
DIAGNOSIS, 93000: NORMAL
ETHANOL SERPL-MCNC: <3 MG/DL
P-R INTERVAL, ECG05: 136 MS
Q-T INTERVAL, ECG07: 336 MS
QRS DURATION, ECG06: 84 MS
QTC CALCULATION (BEZET), ECG08: 397 MS
VENTRICULAR RATE, ECG03: 84 BPM

## 2020-10-06 NOTE — ED TRIAGE NOTES
Patient says that she has been having fatigue and extreme weakness and the last time it happened it was due to low hemoglobin. Patient say that she has blood disorder and is on blood thinners. She also says that she fell twice today and hit her head.

## 2020-10-07 ENCOUNTER — PATIENT OUTREACH (OUTPATIENT)
Dept: CASE MANAGEMENT | Age: 55
End: 2020-10-07

## 2020-10-07 NOTE — PROGRESS NOTES
1st Attempt to contact patient for [facility] Covid 19 risk education  call, no answer on mobile number with message left on V/M requesting a return call.  Will attempt to contact patient again within 24 hours

## 2020-10-08 ENCOUNTER — PATIENT OUTREACH (OUTPATIENT)
Dept: CASE MANAGEMENT | Age: 55
End: 2020-10-08

## 2020-10-08 NOTE — PROGRESS NOTES
2nd attempt to contact patient for [facility] 19 education  Call. No answer, on home number  Episode will be closed at this time as [address] has been unsuccessful in reaching the patient.  Will remove myself from the care team.

## 2020-10-09 ENCOUNTER — APPOINTMENT (OUTPATIENT)
Dept: GENERAL RADIOLOGY | Age: 55
End: 2020-10-09
Attending: EMERGENCY MEDICINE
Payer: COMMERCIAL

## 2020-10-09 ENCOUNTER — HOSPITAL ENCOUNTER (EMERGENCY)
Age: 55
Discharge: HOME | End: 2020-10-09
Attending: EMERGENCY MEDICINE
Payer: COMMERCIAL

## 2020-10-09 VITALS
OXYGEN SATURATION: 99 % | BODY MASS INDEX: 31.24 KG/M2 | DIASTOLIC BLOOD PRESSURE: 55 MMHG | RESPIRATION RATE: 22 BRPM | HEIGHT: 64 IN | WEIGHT: 183 LBS | HEART RATE: 106 BPM | TEMPERATURE: 98 F | SYSTOLIC BLOOD PRESSURE: 111 MMHG

## 2020-10-09 DIAGNOSIS — M25.512 PAIN IN JOINT OF LEFT SHOULDER: Primary | ICD-10-CM

## 2020-10-09 LAB
ALBUMIN SERPL-MCNC: 3.6 G/DL (ref 3.5–5)
ALBUMIN/GLOB SERPL: 1 {RATIO} (ref 1.2–3.5)
ALP SERPL-CCNC: 98 U/L (ref 50–136)
ALT SERPL-CCNC: 23 U/L (ref 12–65)
ANION GAP SERPL CALC-SCNC: 7 MMOL/L (ref 7–16)
AST SERPL-CCNC: 28 U/L (ref 15–37)
ATRIAL RATE: 113 BPM
BASOPHILS # BLD: 0 K/UL (ref 0–0.2)
BASOPHILS NFR BLD: 0 % (ref 0–2)
BILIRUB SERPL-MCNC: 0.3 MG/DL (ref 0.2–1.1)
BUN SERPL-MCNC: 8 MG/DL (ref 6–23)
CALCIUM SERPL-MCNC: 9.1 MG/DL (ref 8.3–10.4)
CALCULATED P AXIS, ECG09: 67 DEGREES
CALCULATED R AXIS, ECG10: 12 DEGREES
CALCULATED T AXIS, ECG11: 31 DEGREES
CHLORIDE SERPL-SCNC: 102 MMOL/L (ref 98–107)
CO2 SERPL-SCNC: 29 MMOL/L (ref 21–32)
CREAT SERPL-MCNC: 0.89 MG/DL (ref 0.6–1)
DIAGNOSIS, 93000: NORMAL
DIFFERENTIAL METHOD BLD: ABNORMAL
EOSINOPHIL # BLD: 0 K/UL (ref 0–0.8)
EOSINOPHIL NFR BLD: 0 % (ref 0.5–7.8)
ERYTHROCYTE [DISTWIDTH] IN BLOOD BY AUTOMATED COUNT: 12.4 % (ref 11.9–14.6)
GLOBULIN SER CALC-MCNC: 3.7 G/DL (ref 2.3–3.5)
GLUCOSE SERPL-MCNC: 79 MG/DL (ref 65–100)
HCT VFR BLD AUTO: 39.3 % (ref 35.8–46.3)
HGB BLD-MCNC: 12.8 G/DL (ref 11.7–15.4)
IMM GRANULOCYTES # BLD AUTO: 0 K/UL (ref 0–0.5)
IMM GRANULOCYTES NFR BLD AUTO: 0 % (ref 0–5)
LYMPHOCYTES # BLD: 1.8 K/UL (ref 0.5–4.6)
LYMPHOCYTES NFR BLD: 22 % (ref 13–44)
MCH RBC QN AUTO: 31.1 PG (ref 26.1–32.9)
MCHC RBC AUTO-ENTMCNC: 32.6 G/DL (ref 31.4–35)
MCV RBC AUTO: 95.6 FL (ref 79.6–97.8)
MONOCYTES # BLD: 0.9 K/UL (ref 0.1–1.3)
MONOCYTES NFR BLD: 11 % (ref 4–12)
NEUTS SEG # BLD: 5.4 K/UL (ref 1.7–8.2)
NEUTS SEG NFR BLD: 65 % (ref 43–78)
NRBC # BLD: 0 K/UL (ref 0–0.2)
P-R INTERVAL, ECG05: 128 MS
PLATELET # BLD AUTO: 325 K/UL (ref 150–450)
PMV BLD AUTO: 9.8 FL (ref 9.4–12.3)
POTASSIUM SERPL-SCNC: 3.2 MMOL/L (ref 3.5–5.1)
PROT SERPL-MCNC: 7.3 G/DL (ref 6.3–8.2)
Q-T INTERVAL, ECG07: 362 MS
QRS DURATION, ECG06: 90 MS
QTC CALCULATION (BEZET), ECG08: 496 MS
RBC # BLD AUTO: 4.11 M/UL (ref 4.05–5.2)
SODIUM SERPL-SCNC: 138 MMOL/L (ref 136–145)
TROPONIN-HIGH SENSITIVITY: 12.5 PG/ML (ref 0–14)
TROPONIN-HIGH SENSITIVITY: 14.4 PG/ML (ref 0–14)
VENTRICULAR RATE, ECG03: 113 BPM
WBC # BLD AUTO: 8.2 K/UL (ref 4.3–11.1)

## 2020-10-09 PROCEDURE — 84484 ASSAY OF TROPONIN QUANT: CPT

## 2020-10-09 PROCEDURE — 80053 COMPREHEN METABOLIC PANEL: CPT

## 2020-10-09 PROCEDURE — 85025 COMPLETE CBC W/AUTO DIFF WBC: CPT

## 2020-10-09 PROCEDURE — 96372 THER/PROPH/DIAG INJ SC/IM: CPT

## 2020-10-09 PROCEDURE — 96374 THER/PROPH/DIAG INJ IV PUSH: CPT

## 2020-10-09 PROCEDURE — 74011250636 HC RX REV CODE- 250/636

## 2020-10-09 PROCEDURE — 36415 COLL VENOUS BLD VENIPUNCTURE: CPT

## 2020-10-09 PROCEDURE — 99285 EMERGENCY DEPT VISIT HI MDM: CPT

## 2020-10-09 PROCEDURE — 71045 X-RAY EXAM CHEST 1 VIEW: CPT

## 2020-10-09 PROCEDURE — 93005 ELECTROCARDIOGRAM TRACING: CPT | Performed by: EMERGENCY MEDICINE

## 2020-10-09 RX ORDER — DIPHENHYDRAMINE HYDROCHLORIDE 50 MG/ML
25 INJECTION, SOLUTION INTRAMUSCULAR; INTRAVENOUS
Status: COMPLETED | OUTPATIENT
Start: 2020-10-09 | End: 2020-10-09

## 2020-10-09 RX ORDER — HALOPERIDOL 5 MG/ML
5 INJECTION INTRAMUSCULAR
Status: COMPLETED | OUTPATIENT
Start: 2020-10-09 | End: 2020-10-09

## 2020-10-09 RX ORDER — DIAZEPAM 5 MG/1
5 TABLET ORAL
Qty: 12 TAB | Refills: 0 | Status: SHIPPED | OUTPATIENT
Start: 2020-10-09

## 2020-10-09 RX ADMIN — HALOPERIDOL LACTATE 5 MG: 5 INJECTION, SOLUTION INTRAMUSCULAR at 15:20

## 2020-10-09 RX ADMIN — DIPHENHYDRAMINE HYDROCHLORIDE 25 MG: 50 INJECTION, SOLUTION INTRAMUSCULAR; INTRAVENOUS at 15:21

## 2020-10-09 NOTE — DISCHARGE INSTRUCTIONS
Patient Education     Musculoskeletal Pain: Care Instructions  Your Care Instructions  Different problems with the bones, muscles, nerves, ligaments, and tendons in the body can cause pain. One or more areas of your body may ache or burn. Or they may feel tired, stiff, or sore. The medical term for this type of pain is musculoskeletal pain. It can have many different causes. Sometimes the pain is caused by an injury such as a strain or sprain. Or you might have pain from using one part of your body in the same way over and over again. This is called overuse. In some cases, the cause of the pain is another health problem such as arthritis or fibromyalgia. The doctor will examine you and ask you questions about your health to help find the cause of your pain. Blood tests or imaging tests like an X-ray may also be helpful. But sometimes doctors can't find a cause of the pain. Treatment depends on your symptoms and the cause of the pain, if known. The doctor has checked you carefully, but problems can develop later. If you notice any problems or new symptoms, get medical treatment right away. Follow-up care is a key part of your treatment and safety. Be sure to make and go to all appointments, and call your doctor if you are having problems. It's also a good idea to know your test results and keep a list of the medicines you take. How can you care for yourself at home? · Rest until you feel better. · Do not do anything that makes the pain worse. Return to exercise gradually if you feel better and your doctor says it's okay. · Be safe with medicines. Read and follow all instructions on the label. ¨ If the doctor gave you a prescription medicine for pain, take it as prescribed. ¨ If you are not taking a prescription pain medicine, ask your doctor if you can take an over-the-counter medicine. · Put ice or a cold pack on the area for 10 to 20 minutes at a time to ease pain.  Put a thin cloth between the ice and your skin. When should you call for help? Call your doctor now or seek immediate medical care if:  · You have new pain, or your pain gets worse. · You have new symptoms such as a fever, a rash, or chills. Watch closely for changes in your health, and be sure to contact your doctor if:  · You do not get better as expected. Where can you learn more? Go to [employer]est[employer].be  Enter Q624 in the search box to learn more about \"Musculoskeletal Pain: Care Instructions. \"   © 4353-8749 [employer]. Care instructions adapted under license by [facility] (which disclaims liability or warranty for this information). This care instruction is for use with your licensed healthcare professional. If you have questions about a medical condition or this instruction, always ask your healthcare professional. [address] any warranty or liability for your use of this information.   Content Version: 47.7.656360; Current as of: November 20, 2015

## 2020-10-09 NOTE — ED PROVIDER NOTES
57-year-old female complaining of left-sided chest pain left arm pain. The history is provided by the patient. Chest Pain (Angina)    This is a new problem. The current episode started more than 2 days ago. The problem has not changed since onset. The problem occurs constantly. The pain is associated with normal activity. The pain is at a severity of 10/10. The pain is severe. The quality of the pain is described as burning and exertional. The pain radiates to the left arm and left shoulder. The symptoms are aggravated by movement. Pertinent negatives include no diaphoresis, no exertional chest pressure, no lower extremity edema, no malaise/fatigue, no orthopnea, no shortness of breath and no weakness. She has tried nothing for the symptoms. Risk factors include no risk factors.         Past Medical History:   Diagnosis Date    Anemia     blood transfusion 5/2018 per pt    Arrhythmia     palpitations, moderate mitral valve regurge    Arthritis     lower back osteo    Asthma     uses inhalers    Cancer ([address]     left breast ca; s/p surgery     Cardiomyopathy     ECHO 11/2017    CHF (congestive heart failure) (HCC)     EF 30-35% last echo 7/2019-- followed by dr Tom Nichols Chronic pain 2010    Coagulation defect ([address]     eliquis    COPD     nebulizer daily and maint inhalers, can not climb 1 flight of stairs (also CHF)  oxygen 3 LPM qhs    Decreased cardiac ejection fraction 11/27/2017    Depression     controlled      Diverticulitis     GERD (gastroesophageal reflux disease)     controlled with med     Heart murmur     Hypertension     managed with meds    IBS (irritable bowel syndrome)     IC (interstitial cystitis)     Insomnia     Migraine with aura and without status migrainosus, not intractable 6/17/2016    Mitral valve regurgitation, rheumatic     followed Dr. Long Milian    Moderate aortic regurgitation     per echo 7/2019 in cc-- followed by dr Deandra Lara 5/16/2016    Psychiatric disorder     anxiety    Requires oxygen therapy     3l/min at hs. prn during the days as needed    Rheumatic aortic insufficiency 5/16/2016    Rheumatic fever as child    pt reports rheumatic fever in childhood    Smoker     started age 23-- smoked 0.5 ppd until 2018-- cut back to 10-2 cig daily per pt    Stroke [facility])     \"mild stroke\" pt reports 2014- \"left sided weakness and balance is off\"     Thromboembolus ([address]     BLE 2012    Vitamin D deficiency        Past Surgical History:   Procedure Laterality Date    COLONOSCOPY      8 polyps removed, diverticulitis    COLONOSCOPY N/A 5/21/2018    COLONOSCOPY performed by Shahid Leonardo MD at [address]  8-23-11    bladder dilitation    EGD      HX APPENDECTOMY  2006    HX CERVICAL FUSION  07/14/2020    ACDF C4-6 with Dr. Kristina Starks  5/27/2011    no blockages, no intervention    HX HEART CATHETERIZATION  04/2017    no intervention    HX HEART CATHETERIZATION  2018    no intervention    HX LAP CHOLECYSTECTOMY  6/6/2011    HX ORTHOPAEDIC Right 07/2019    4th toe -- surg repair    HX NATASHA AND BSO  2004    fibroid tumors, hyst; no cervix     HX UROLOGICAL  01/2018    cystoscopy with hydrodistention of bladder multiple    HX UROLOGICAL  06/30/2020    cysto    TX BIOPSY OF BREAST, INCISIONAL Left     lymph node , left, patient states her bx neg, but high risk         Family History:   Problem Relation Age of Onset    Heart Failure Father 76        chf    Heart Disease Father         PPM; secondary to CAD, drugs     Other Father         internal bleeding     Diabetes Sister     Psoriasis Mother     Thyroid Disease Sister         hyper; s/p thyroidectomy     Kidney Disease Brother     No Known Problems Sister     Seizures Brother     Diabetes Maternal Grandmother     Hypertension Maternal Grandmother     Emphysema Maternal Grandfather     Heart Disease Paternal Grandmother     Heart Disease Paternal Grandfather        Social History     Socioeconomic History    Marital status: LEGALLY      Spouse name: Not on file    Number of children: Not on file    Years of education: Not on file    Highest education level: Not on file   Occupational History    Not on file   Social Needs    Financial resource strain: Not on file    Food insecurity     Worry: Not on file     Inability: Not on file    Transportation needs     Medical: Not on file     Non-medical: Not on file   Tobacco Use    Smoking status: Former Smoker     Packs/day: 0.25     Years: 30.00     Pack years: 7.50     Last attempt to quit: 2019     Years since quittin.8    Smokeless tobacco: Never Used    Tobacco comment: since     Substance and Sexual Activity    Alcohol use: No    Drug use: Yes     Types: Cocaine     Comment: last used  2018    Sexual activity: Not Currently   Lifestyle    Physical activity     Days per week: Not on file     Minutes per session: Not on file    Stress: Not on file   Relationships    Social connections     Talks on phone: Not on file     Gets together: Not on file     Attends Synagogue service: Not on file     Active member of club or organization: Not on file     Attends meetings of clubs or organizations: Not on file     Relationship status: Not on file    Intimate partner violence     Fear of current or ex partner: Not on file     Emotionally abused: Not on file     Physically abused: Not on file     Forced sexual activity: Not on file   Other Topics Concern    Not on file   Social History Narrative    Not on file         ALLERGIES: Aspirin; Bentyl [dicyclomine]; Toradol [ketorolac]; Ultram [tramadol]; and Zofran [ondansetron hcl (pf)]    Review of Systems   Constitutional: Negative. Negative for activity change, diaphoresis and malaise/fatigue. HENT: Negative. Eyes: Negative. Respiratory: Negative. Negative for shortness of breath.     Cardiovascular: Positive for chest pain. Negative for orthopnea. Gastrointestinal: Negative. Genitourinary: Negative. Musculoskeletal: Negative. Skin: Negative. Neurological: Negative. Negative for weakness. Psychiatric/Behavioral: Negative. All other systems reviewed and are negative. Vitals:    10/09/20 1327   BP: (!) 146/79   Pulse: (!) 117   Resp: 22   Temp: 98 °F (36.7 °C)   SpO2: 98%   Weight: 83 kg (183 lb)   Height: 5' 4\" (1.626 m)            Physical Exam  Vitals signs and nursing note reviewed. Constitutional:       General: She is not in acute distress. Appearance: Normal appearance. She is well-developed. She is not ill-appearing, toxic-appearing or diaphoretic. HENT:      Head: Normocephalic and atraumatic. No right periorbital erythema or left periorbital erythema. Jaw: There is normal jaw occlusion. Salivary Glands: Right salivary gland is not diffusely enlarged. Left salivary gland is not diffusely enlarged. Right Ear: External ear normal.      Left Ear: External ear normal.      Nose: Nose normal. No congestion or rhinorrhea. Mouth/Throat:      Mouth: Mucous membranes are moist.      Pharynx: No oropharyngeal exudate or posterior oropharyngeal erythema. Eyes:      General: Lids are normal. No scleral icterus. Right eye: No discharge. Left eye: No discharge. Extraocular Movements: Extraocular movements intact. Conjunctiva/sclera: Conjunctivae normal.      Right eye: Right conjunctiva is not injected. Left eye: Left conjunctiva is not injected. Pupils: Pupils are equal, round, and reactive to light. Neck:      Musculoskeletal: Full passive range of motion without pain, normal range of motion and neck supple. Normal range of motion. No erythema, neck rigidity, injury, pain with movement or muscular tenderness. Thyroid: No thyroid mass. Vascular: No JVD.       Trachea: Trachea and phonation normal.   Cardiovascular:      Rate and Rhythm: Normal rate and regular rhythm. Pulses: Normal pulses. Heart sounds: Normal heart sounds. Heart sounds not distant. No murmur. No friction rub. No gallop. Pulmonary:      Effort: Pulmonary effort is normal. No tachypnea, accessory muscle usage, respiratory distress or retractions. Breath sounds: No stridor. No decreased breath sounds, wheezing, rhonchi or rales. Chest:      Chest wall: No tenderness. Abdominal:      General: Abdomen is flat. Bowel sounds are normal. There is no distension. There are no signs of injury. Palpations: Abdomen is soft. There is no fluid wave, mass or pulsatile mass. Tenderness: There is no abdominal tenderness. There is no guarding or rebound. Musculoskeletal: Normal range of motion. General: No swelling, tenderness, deformity or signs of injury. Right lower leg: No edema. Left lower leg: No edema. Lymphadenopathy:      Cervical: No cervical adenopathy. Skin:     General: Skin is warm and dry. Capillary Refill: Capillary refill takes less than 2 seconds. Coloration: Skin is not jaundiced or pale. Findings: No bruising, erythema or rash. Neurological:      General: No focal deficit present. Mental Status: She is alert and oriented to person, place, and time. Mental status is at baseline. GCS: GCS eye subscore is 4. GCS verbal subscore is 5. GCS motor subscore is 6. Cranial Nerves: No dysarthria or facial asymmetry. Sensory: No sensory deficit. Motor: No weakness or tremor. Coordination: Coordination normal.   Psychiatric:         Mood and Affect: Mood is anxious. Behavior: Behavior normal. Behavior is cooperative. Thought Content:  Thought content normal.         Judgment: Judgment normal.          MDM  Number of Diagnoses or Management Options  Pain in joint of left shoulder:   Diagnosis management comments: Patient's pain was mostly located in her left shoulder also had some upper left chest pain seen by cardiology and did not feel that this is cardiac related although they wanted a troponin series done. EKG does not show any acute changes. Patient has pain with movement of her left shoulder. Patient has had surgery on her spine in the past but there is no signs of neuro deficits. We will treat her pain after we will send her home. Close follow-up with her PCP on Monday. Return to the ED over the weekend if any further problems.        Amount and/or Complexity of Data Reviewed  Clinical lab tests: ordered and reviewed  Tests in the radiology section of CPT®: ordered and reviewed  Tests in the medicine section of CPT®: ordered and reviewed  Decide to obtain previous medical records or to obtain history from someone other than the patient: yes  Review and summarize past medical records: yes  Independent visualization of images, tracings, or specimens: yes    Risk of Complications, Morbidity, and/or Mortality  Presenting problems: high  Diagnostic procedures: high  Management options: high    Patient Progress  Patient progress: stable         Procedures

## 2020-10-09 NOTE — ED NOTES
I have reviewed discharge instructions with the patient. The patient verbalized understanding. Patient left ED via Discharge Method: ambulatory to Home with (insert name of family/friend, self, transport FRIEND). Opportunity for questions and clarification provided. Patient given 1 scripts. To continue your aftercare when you leave the hospital, you may receive an automated call from our care team to check in on how you are doing.  This is a free service and part of our promise to provide the best care and service to meet your aftercare needs. \" If you have questions, or wish to unsubscribe from this service please call [phone].  Thank you for Choosing our [facility] Emergency Department.

## 2020-10-09 NOTE — ED TRIAGE NOTES
Patient to triage via wheelchair; mask in place. Patient reports pain under left shoulder blade that comes into her chest. Pain began 3 days ago. Pain is worse with deep inspiration.

## 2020-10-10 LAB
BACTERIA SPEC CULT: NORMAL
SERVICE CMNT-IMP: NORMAL

## 2020-10-19 PROBLEM — M50.30 DDD (DEGENERATIVE DISC DISEASE), CERVICAL: Status: ACTIVE | Noted: 2020-10-19

## 2020-10-19 PROBLEM — I50.20 HEART FAILURE WITH REDUCED EJECTION FRACTION (HCC): Status: ACTIVE | Noted: 2020-10-19

## 2020-10-21 ENCOUNTER — PATIENT OUTREACH (OUTPATIENT)
Dept: CASE MANAGEMENT | Age: 55
End: 2020-10-21

## 2020-10-22 NOTE — PROGRESS NOTES
RNCM attempted to reach pt for ED IRAIDA, poss CCM. No VM, unable to leave a message. No further outreach planned.

## 2020-11-03 ENCOUNTER — HOSPITAL ENCOUNTER (OUTPATIENT)
Dept: MRI IMAGING | Age: 55
Discharge: HOME | End: 2020-11-03
Attending: NEUROLOGICAL SURGERY
Payer: COMMERCIAL

## 2020-11-03 DIAGNOSIS — K62.89 DECREASED RECTAL SPHINCTER TONE: ICD-10-CM

## 2020-11-03 PROCEDURE — 74011250636 HC RX REV CODE- 250/636: Performed by: NEUROLOGICAL SURGERY

## 2020-11-03 PROCEDURE — 72158 MRI LUMBAR SPINE W/O & W/DYE: CPT

## 2020-11-03 PROCEDURE — A9575 INJ GADOTERATE MEGLUMI 0.1ML: HCPCS | Performed by: NEUROLOGICAL SURGERY

## 2020-11-03 PROCEDURE — 72157 MRI CHEST SPINE W/O & W/DYE: CPT

## 2020-11-03 RX ORDER — SODIUM CHLORIDE 0.9 % (FLUSH) 0.9 %
10 SYRINGE (ML) INJECTION
Status: COMPLETED | OUTPATIENT
Start: 2020-11-03 | End: 2020-11-03

## 2020-11-03 RX ORDER — GADOTERATE MEGLUMINE 376.9 MG/ML
17 INJECTION INTRAVENOUS
Status: COMPLETED | OUTPATIENT
Start: 2020-11-03 | End: 2020-11-03

## 2020-11-03 RX ADMIN — Medication 10 ML: at 19:16

## 2020-11-03 RX ADMIN — GADOTERATE MEGLUMINE 17 ML: 376.9 INJECTION INTRAVENOUS at 19:15

## 2020-11-05 ENCOUNTER — HOSPITAL ENCOUNTER (OUTPATIENT)
Dept: NON INVASIVE DIAGNOSTICS | Age: 55
Discharge: HOME | End: 2020-11-05
Attending: INTERNAL MEDICINE
Payer: COMMERCIAL

## 2020-11-05 DIAGNOSIS — I10 ESSENTIAL HYPERTENSION: ICD-10-CM

## 2020-11-05 DIAGNOSIS — I06.1 RHEUMATIC AORTIC INSUFFICIENCY: ICD-10-CM

## 2020-11-05 PROCEDURE — 93306 TTE W/DOPPLER COMPLETE: CPT

## 2020-11-30 ENCOUNTER — APPOINTMENT (OUTPATIENT)
Dept: GENERAL RADIOLOGY | Age: 55
End: 2020-11-30
Attending: EMERGENCY MEDICINE
Payer: COMMERCIAL

## 2020-11-30 ENCOUNTER — HOSPITAL ENCOUNTER (EMERGENCY)
Age: 55
Discharge: LEFT BEFORE DISCHARGE | End: 2020-11-30
Attending: EMERGENCY MEDICINE
Payer: COMMERCIAL

## 2020-11-30 VITALS
TEMPERATURE: 98 F | OXYGEN SATURATION: 98 % | RESPIRATION RATE: 16 BRPM | HEIGHT: 64 IN | DIASTOLIC BLOOD PRESSURE: 79 MMHG | BODY MASS INDEX: 26.29 KG/M2 | SYSTOLIC BLOOD PRESSURE: 121 MMHG | HEART RATE: 82 BPM | WEIGHT: 154 LBS

## 2020-11-30 LAB
ALBUMIN SERPL-MCNC: 3.7 G/DL (ref 3.5–5)
ALBUMIN/GLOB SERPL: 1.2 {RATIO} (ref 1.2–3.5)
ALP SERPL-CCNC: 87 U/L (ref 50–136)
ALT SERPL-CCNC: 28 U/L (ref 12–65)
ANION GAP SERPL CALC-SCNC: 8 MMOL/L (ref 7–16)
AST SERPL-CCNC: 32 U/L (ref 15–37)
BASOPHILS # BLD: 0 K/UL (ref 0–0.2)
BASOPHILS NFR BLD: 1 % (ref 0–2)
BILIRUB SERPL-MCNC: 0.4 MG/DL (ref 0.2–1.1)
BUN SERPL-MCNC: 5 MG/DL (ref 6–23)
CALCIUM SERPL-MCNC: 9.9 MG/DL (ref 8.3–10.4)
CHLORIDE SERPL-SCNC: 108 MMOL/L (ref 98–107)
CO2 SERPL-SCNC: 22 MMOL/L (ref 21–32)
CREAT SERPL-MCNC: 0.82 MG/DL (ref 0.6–1)
DIFFERENTIAL METHOD BLD: ABNORMAL
EOSINOPHIL # BLD: 0 K/UL (ref 0–0.8)
EOSINOPHIL NFR BLD: 1 % (ref 0.5–7.8)
ERYTHROCYTE [DISTWIDTH] IN BLOOD BY AUTOMATED COUNT: 12.3 % (ref 11.9–14.6)
GLOBULIN SER CALC-MCNC: 3.1 G/DL (ref 2.3–3.5)
GLUCOSE SERPL-MCNC: 91 MG/DL (ref 65–100)
HCT VFR BLD AUTO: 36 % (ref 35.8–46.3)
HGB BLD-MCNC: 11.8 G/DL (ref 11.7–15.4)
IMM GRANULOCYTES # BLD AUTO: 0 K/UL (ref 0–0.5)
IMM GRANULOCYTES NFR BLD AUTO: 0 % (ref 0–5)
LYMPHOCYTES # BLD: 1.7 K/UL (ref 0.5–4.6)
LYMPHOCYTES NFR BLD: 38 % (ref 13–44)
MCH RBC QN AUTO: 30.8 PG (ref 26.1–32.9)
MCHC RBC AUTO-ENTMCNC: 32.8 G/DL (ref 31.4–35)
MCV RBC AUTO: 94 FL (ref 79.6–97.8)
MONOCYTES # BLD: 0.5 K/UL (ref 0.1–1.3)
MONOCYTES NFR BLD: 11 % (ref 4–12)
NEUTS SEG # BLD: 2.3 K/UL (ref 1.7–8.2)
NEUTS SEG NFR BLD: 51 % (ref 43–78)
NRBC # BLD: 0 K/UL (ref 0–0.2)
PLATELET # BLD AUTO: 241 K/UL (ref 150–450)
PMV BLD AUTO: 9.9 FL (ref 9.4–12.3)
POTASSIUM SERPL-SCNC: 3.7 MMOL/L (ref 3.5–5.1)
PROT SERPL-MCNC: 6.8 G/DL (ref 6.3–8.2)
RBC # BLD AUTO: 3.83 M/UL (ref 4.05–5.2)
SODIUM SERPL-SCNC: 138 MMOL/L (ref 138–145)
TROPONIN-HIGH SENSITIVITY: 11.7 PG/ML (ref 0–14)
WBC # BLD AUTO: 4.4 K/UL (ref 4.3–11.1)

## 2020-11-30 PROCEDURE — 84484 ASSAY OF TROPONIN QUANT: CPT

## 2020-11-30 PROCEDURE — 75810000275 HC EMERGENCY DEPT VISIT NO LEVEL OF CARE

## 2020-11-30 PROCEDURE — 85025 COMPLETE CBC W/AUTO DIFF WBC: CPT

## 2020-11-30 PROCEDURE — 93005 ELECTROCARDIOGRAM TRACING: CPT | Performed by: EMERGENCY MEDICINE

## 2020-11-30 PROCEDURE — 80053 COMPREHEN METABOLIC PANEL: CPT

## 2020-11-30 NOTE — ED TRIAGE NOTES
Pt ambulatory to triage. Pt thinks she has clot in her lungs. Patient complaining of pain when bending over starting Friday night and pain with inspiration as well as some chest pain.

## 2020-12-01 LAB
ATRIAL RATE: 73 BPM
CALCULATED P AXIS, ECG09: 62 DEGREES
CALCULATED R AXIS, ECG10: 13 DEGREES
CALCULATED T AXIS, ECG11: 50 DEGREES
DIAGNOSIS, 93000: NORMAL
P-R INTERVAL, ECG05: 136 MS
Q-T INTERVAL, ECG07: 464 MS
QRS DURATION, ECG06: 82 MS
QTC CALCULATION (BEZET), ECG08: 511 MS
VENTRICULAR RATE, ECG03: 73 BPM

## 2020-12-10 PROBLEM — G43.019 INTRACTABLE MIGRAINE WITHOUT AURA AND WITHOUT STATUS MIGRAINOSUS: Status: ACTIVE | Noted: 2020-12-10

## 2020-12-10 PROBLEM — T50.905A MEDICATION SIDE EFFECT, INITIAL ENCOUNTER: Status: ACTIVE | Noted: 2020-12-10

## 2020-12-10 PROBLEM — M54.2 CERVICALGIA: Status: ACTIVE | Noted: 2020-12-10

## 2021-03-04 ENCOUNTER — TRANSCRIBE ORDER (OUTPATIENT)
Dept: SCHEDULING | Age: 56
End: 2021-03-04

## 2021-03-04 DIAGNOSIS — Z12.31 VISIT FOR SCREENING MAMMOGRAM: Primary | ICD-10-CM

## 2021-04-14 ENCOUNTER — ANESTHESIA EVENT (OUTPATIENT)
Dept: SURGERY | Age: 56
End: 2021-04-14
Payer: COMMERCIAL

## 2021-04-15 ENCOUNTER — HOSPITAL ENCOUNTER (OUTPATIENT)
Age: 56
Setting detail: OUTPATIENT SURGERY
Discharge: HOME | End: 2021-04-15
Attending: UROLOGY | Admitting: UROLOGY
Payer: COMMERCIAL

## 2021-04-15 ENCOUNTER — ANESTHESIA (OUTPATIENT)
Dept: SURGERY | Age: 56
End: 2021-04-15
Payer: COMMERCIAL

## 2021-04-15 VITALS
SYSTOLIC BLOOD PRESSURE: 117 MMHG | TEMPERATURE: 97.6 F | RESPIRATION RATE: 18 BRPM | DIASTOLIC BLOOD PRESSURE: 59 MMHG | WEIGHT: 147 LBS | BODY MASS INDEX: 25.1 KG/M2 | HEART RATE: 67 BPM | OXYGEN SATURATION: 97 % | HEIGHT: 64 IN

## 2021-04-15 DIAGNOSIS — N30.10 IC (INTERSTITIAL CYSTITIS): Primary | ICD-10-CM

## 2021-04-15 LAB
ANION GAP SERPL CALC-SCNC: 8 MMOL/L (ref 7–16)
BUN SERPL-MCNC: 16 MG/DL (ref 6–23)
CALCIUM SERPL-MCNC: 9 MG/DL (ref 8.3–10.4)
CHLORIDE SERPL-SCNC: 109 MMOL/L (ref 98–107)
CO2 SERPL-SCNC: 22 MMOL/L (ref 21–32)
CREAT BLD-MCNC: 1.13 MG/DL (ref 0.8–1.5)
CREAT SERPL-MCNC: 0.96 MG/DL (ref 0.6–1)
ERYTHROCYTE [DISTWIDTH] IN BLOOD BY AUTOMATED COUNT: 12.8 % (ref 11.9–14.6)
GLUCOSE SERPL-MCNC: 84 MG/DL (ref 65–100)
HCT VFR BLD AUTO: 37.6 % (ref 35.8–46.3)
HGB BLD-MCNC: 12.2 G/DL (ref 11.7–15.4)
MCH RBC QN AUTO: 30.9 PG (ref 26.1–32.9)
MCHC RBC AUTO-ENTMCNC: 32.4 G/DL (ref 31.4–35)
MCV RBC AUTO: 95.2 FL (ref 79.6–97.8)
NRBC # BLD: 0 K/UL (ref 0–0.2)
PLATELET # BLD AUTO: 222 K/UL (ref 150–450)
PMV BLD AUTO: 10.1 FL (ref 9.4–12.3)
POTASSIUM BLD-SCNC: 4.1 MMOL/L (ref 3.5–5.1)
POTASSIUM SERPL-SCNC: 3.9 MMOL/L (ref 3.5–5.1)
RBC # BLD AUTO: 3.95 M/UL (ref 4.05–5.2)
SODIUM SERPL-SCNC: 139 MMOL/L (ref 136–145)
WBC # BLD AUTO: 6 K/UL (ref 4.3–11.1)

## 2021-04-15 PROCEDURE — 74011250636 HC RX REV CODE- 250/636: Performed by: ANESTHESIOLOGY

## 2021-04-15 PROCEDURE — 76010000138 HC OR TIME 0.5 TO 1 HR: Performed by: UROLOGY

## 2021-04-15 PROCEDURE — 85027 COMPLETE CBC AUTOMATED: CPT

## 2021-04-15 PROCEDURE — 74011250637 HC RX REV CODE- 250/637: Performed by: ANESTHESIOLOGY

## 2021-04-15 PROCEDURE — 76060000032 HC ANESTHESIA 0.5 TO 1 HR: Performed by: UROLOGY

## 2021-04-15 PROCEDURE — 74011250637 HC RX REV CODE- 250/637: Performed by: UROLOGY

## 2021-04-15 PROCEDURE — 74011000250 HC RX REV CODE- 250: Performed by: UROLOGY

## 2021-04-15 PROCEDURE — 77030040361 HC SLV COMPR DVT MDII -B: Performed by: UROLOGY

## 2021-04-15 PROCEDURE — 74011000250 HC RX REV CODE- 250: Performed by: NURSE ANESTHETIST, CERTIFIED REGISTERED

## 2021-04-15 PROCEDURE — 74011250636 HC RX REV CODE- 250/636: Performed by: NURSE ANESTHETIST, CERTIFIED REGISTERED

## 2021-04-15 PROCEDURE — 77030019908 HC STETH ESOPH SIMS -A: Performed by: STUDENT IN AN ORGANIZED HEALTH CARE EDUCATION/TRAINING PROGRAM

## 2021-04-15 PROCEDURE — 76210000063 HC OR PH I REC FIRST 0.5 HR: Performed by: UROLOGY

## 2021-04-15 PROCEDURE — 77030019927 HC TBNG IRR CYSTO BAXT -A: Performed by: UROLOGY

## 2021-04-15 PROCEDURE — 74011250636 HC RX REV CODE- 250/636: Performed by: UROLOGY

## 2021-04-15 PROCEDURE — 52260 CYSTOSCOPY AND TREATMENT: CPT | Performed by: UROLOGY

## 2021-04-15 PROCEDURE — 80048 BASIC METABOLIC PNL TOTAL CA: CPT

## 2021-04-15 PROCEDURE — 2709999900 HC NON-CHARGEABLE SUPPLY: Performed by: UROLOGY

## 2021-04-15 PROCEDURE — 84132 ASSAY OF SERUM POTASSIUM: CPT

## 2021-04-15 PROCEDURE — 77030010509 HC AIRWY LMA MSK TELE -A: Performed by: STUDENT IN AN ORGANIZED HEALTH CARE EDUCATION/TRAINING PROGRAM

## 2021-04-15 PROCEDURE — 76210000020 HC REC RM PH II FIRST 0.5 HR: Performed by: UROLOGY

## 2021-04-15 RX ORDER — OXYCODONE HYDROCHLORIDE 5 MG/1
5 TABLET ORAL
Status: COMPLETED | OUTPATIENT
Start: 2021-04-15 | End: 2021-04-15

## 2021-04-15 RX ORDER — PROMETHAZINE HYDROCHLORIDE 25 MG/ML
INJECTION, SOLUTION INTRAMUSCULAR; INTRAVENOUS AS NEEDED
Status: DISCONTINUED | OUTPATIENT
Start: 2021-04-15 | End: 2021-04-15 | Stop reason: HOSPADM

## 2021-04-15 RX ORDER — MIDAZOLAM HYDROCHLORIDE 1 MG/ML
2 INJECTION, SOLUTION INTRAMUSCULAR; INTRAVENOUS
Status: COMPLETED | OUTPATIENT
Start: 2021-04-15 | End: 2021-04-15

## 2021-04-15 RX ORDER — LEVOFLOXACIN 5 MG/ML
500 INJECTION, SOLUTION INTRAVENOUS ONCE
Status: COMPLETED | OUTPATIENT
Start: 2021-04-15 | End: 2021-04-15

## 2021-04-15 RX ORDER — BUPIVACAINE HYDROCHLORIDE 5 MG/ML
INJECTION, SOLUTION EPIDURAL; INTRACAUDAL AS NEEDED
Status: DISCONTINUED | OUTPATIENT
Start: 2021-04-15 | End: 2021-04-15 | Stop reason: HOSPADM

## 2021-04-15 RX ORDER — FENTANYL CITRATE 50 UG/ML
INJECTION, SOLUTION INTRAMUSCULAR; INTRAVENOUS AS NEEDED
Status: DISCONTINUED | OUTPATIENT
Start: 2021-04-15 | End: 2021-04-15 | Stop reason: HOSPADM

## 2021-04-15 RX ORDER — PROPOFOL 10 MG/ML
INJECTION, EMULSION INTRAVENOUS AS NEEDED
Status: DISCONTINUED | OUTPATIENT
Start: 2021-04-15 | End: 2021-04-15 | Stop reason: HOSPADM

## 2021-04-15 RX ORDER — SODIUM CHLORIDE 0.9 % (FLUSH) 0.9 %
5-40 SYRINGE (ML) INJECTION AS NEEDED
Status: DISCONTINUED | OUTPATIENT
Start: 2021-04-15 | End: 2021-04-15 | Stop reason: HOSPADM

## 2021-04-15 RX ORDER — FAMOTIDINE 20 MG/1
20 TABLET, FILM COATED ORAL ONCE
Status: COMPLETED | OUTPATIENT
Start: 2021-04-15 | End: 2021-04-15

## 2021-04-15 RX ORDER — SODIUM CHLORIDE 0.9 % (FLUSH) 0.9 %
5-40 SYRINGE (ML) INJECTION EVERY 8 HOURS
Status: DISCONTINUED | OUTPATIENT
Start: 2021-04-15 | End: 2021-04-15 | Stop reason: HOSPADM

## 2021-04-15 RX ORDER — ATROPA BELLADONNA AND OPIUM 16.2; 6 MG/1; MG/1
SUPPOSITORY RECTAL AS NEEDED
Status: DISCONTINUED | OUTPATIENT
Start: 2021-04-15 | End: 2021-04-15 | Stop reason: HOSPADM

## 2021-04-15 RX ORDER — LIDOCAINE HYDROCHLORIDE 10 MG/ML
0.1 INJECTION INFILTRATION; PERINEURAL AS NEEDED
Status: DISCONTINUED | OUTPATIENT
Start: 2021-04-15 | End: 2021-04-15 | Stop reason: HOSPADM

## 2021-04-15 RX ORDER — SODIUM CHLORIDE 9 MG/ML
25 INJECTION, SOLUTION INTRAVENOUS CONTINUOUS
Status: DISCONTINUED | OUTPATIENT
Start: 2021-04-15 | End: 2021-04-15 | Stop reason: HOSPADM

## 2021-04-15 RX ORDER — NALOXONE HYDROCHLORIDE 0.4 MG/ML
0.1 INJECTION, SOLUTION INTRAMUSCULAR; INTRAVENOUS; SUBCUTANEOUS AS NEEDED
Status: DISCONTINUED | OUTPATIENT
Start: 2021-04-15 | End: 2021-04-15 | Stop reason: HOSPADM

## 2021-04-15 RX ORDER — LIDOCAINE HYDROCHLORIDE 20 MG/ML
INJECTION, SOLUTION EPIDURAL; INFILTRATION; INTRACAUDAL; PERINEURAL AS NEEDED
Status: DISCONTINUED | OUTPATIENT
Start: 2021-04-15 | End: 2021-04-15 | Stop reason: HOSPADM

## 2021-04-15 RX ORDER — FENTANYL CITRATE 50 UG/ML
100 INJECTION, SOLUTION INTRAMUSCULAR; INTRAVENOUS ONCE
Status: COMPLETED | OUTPATIENT
Start: 2021-04-15 | End: 2021-04-15

## 2021-04-15 RX ORDER — SODIUM CHLORIDE, SODIUM LACTATE, POTASSIUM CHLORIDE, CALCIUM CHLORIDE 600; 310; 30; 20 MG/100ML; MG/100ML; MG/100ML; MG/100ML
100 INJECTION, SOLUTION INTRAVENOUS CONTINUOUS
Status: DISCONTINUED | OUTPATIENT
Start: 2021-04-15 | End: 2021-04-15 | Stop reason: HOSPADM

## 2021-04-15 RX ORDER — HYDROMORPHONE HYDROCHLORIDE 1 MG/ML
0.5 INJECTION, SOLUTION INTRAMUSCULAR; INTRAVENOUS; SUBCUTANEOUS
Status: DISCONTINUED | OUTPATIENT
Start: 2021-04-15 | End: 2021-04-15 | Stop reason: HOSPADM

## 2021-04-15 RX ORDER — HYDROCODONE BITARTRATE AND ACETAMINOPHEN 5; 325 MG/1; MG/1
1 TABLET ORAL
Qty: 15 TAB | Refills: 0 | Status: SHIPPED | OUTPATIENT
Start: 2021-04-15 | End: 2021-04-20

## 2021-04-15 RX ORDER — HYDROCODONE BITARTRATE AND ACETAMINOPHEN 7.5; 325 MG/1; MG/1
1 TABLET ORAL AS NEEDED
Status: DISCONTINUED | OUTPATIENT
Start: 2021-04-15 | End: 2021-04-15 | Stop reason: HOSPADM

## 2021-04-15 RX ADMIN — PROPOFOL 60 MG: 10 INJECTION, EMULSION INTRAVENOUS at 18:12

## 2021-04-15 RX ADMIN — OXYCODONE HYDROCHLORIDE 5 MG: 5 TABLET ORAL at 19:24

## 2021-04-15 RX ADMIN — FENTANYL CITRATE 50 MCG: 50 INJECTION INTRAMUSCULAR; INTRAVENOUS at 18:30

## 2021-04-15 RX ADMIN — MIDAZOLAM 2 MG: 1 INJECTION INTRAMUSCULAR; INTRAVENOUS at 17:47

## 2021-04-15 RX ADMIN — LEVOFLOXACIN 500 MG: 5 INJECTION, SOLUTION INTRAVENOUS at 18:15

## 2021-04-15 RX ADMIN — FAMOTIDINE 20 MG: 20 TABLET ORAL at 14:23

## 2021-04-15 RX ADMIN — SODIUM CHLORIDE, SODIUM LACTATE, POTASSIUM CHLORIDE, AND CALCIUM CHLORIDE 100 ML/HR: 600; 310; 30; 20 INJECTION, SOLUTION INTRAVENOUS at 14:00

## 2021-04-15 RX ADMIN — HYDROMORPHONE HYDROCHLORIDE 0.5 MG: 1 INJECTION, SOLUTION INTRAMUSCULAR; INTRAVENOUS; SUBCUTANEOUS at 18:55

## 2021-04-15 RX ADMIN — HYDROMORPHONE HYDROCHLORIDE 0.5 MG: 1 INJECTION, SOLUTION INTRAMUSCULAR; INTRAVENOUS; SUBCUTANEOUS at 19:02

## 2021-04-15 RX ADMIN — PROMETHAZINE HYDROCHLORIDE 6.25 MG: 25 INJECTION INTRAMUSCULAR; INTRAVENOUS at 18:20

## 2021-04-15 RX ADMIN — LIDOCAINE HYDROCHLORIDE 60 MG: 20 INJECTION, SOLUTION EPIDURAL; INFILTRATION; INTRACAUDAL; PERINEURAL at 18:12

## 2021-04-15 RX ADMIN — FENTANYL CITRATE 50 MCG: 0.05 INJECTION, SOLUTION INTRAMUSCULAR; INTRAVENOUS at 17:02

## 2021-04-15 NOTE — ANESTHESIA POSTPROCEDURE EVALUATION
Procedure(s):  CYSTOSCOPY/ HYDRODISTENSION OF THE BLADDER. general    Anesthesia Post Evaluation      Multimodal analgesia: multimodal analgesia used between 6 hours prior to anesthesia start to PACU discharge  Patient location during evaluation: bedside  Patient participation: complete - patient participated  Level of consciousness: awake and alert  Pain management: adequate  Airway patency: patent  Anesthetic complications: no  Cardiovascular status: acceptable  Respiratory status: acceptable  Hydration status: acceptable  Post anesthesia nausea and vomiting:  controlled  Final Post Anesthesia Temperature Assessment:  Normothermia (36.0-37.5 degrees C)      INITIAL Post-op Vital signs:   Vitals Value Taken Time   /65 04/15/21 1907   Temp 36.3 °C (97.3 °F) 04/15/21 1843   Pulse 69 04/15/21 1910   Resp 14 04/15/21 1902   SpO2 98 % 04/15/21 1910   Vitals shown include unvalidated device data.

## 2021-04-15 NOTE — ANESTHESIA PREPROCEDURE EVALUATION
Anesthetic History   No history of anesthetic complications            Review of Systems / Medical History  Patient summary reviewed and pertinent labs reviewed    Pulmonary    COPD (Chronic bronchitis): mild      Shortness of breath (Requiring 3L O2 with exertion) and smoker  Asthma : well controlled       Neuro/Psych       CVA (\"mild stroke\" pt reports 2014- \"left sided weakness and balance is off)  Psychiatric history     Cardiovascular    Hypertension: well controlled  Valvular problems/murmurs (Rheumatic aortic insufficiency  moderate and stable EF and LV size post Takotsubo ): tricuspid insufficiency, mitral insufficiency and aortic insufficiency    CHF    CAD    Exercise tolerance: <4 METS  Comments: Echo (7-2019) - moderate AI, mild TR, mild MR and,  Moderate global HK, EF 30-35%    Decreased functional status but this is her baseline and chronic. No recent changes in her functional status.     GI/Hepatic/Renal     GERD: well controlled           Endo/Other        Arthritis     Other Findings            Physical Exam    Airway  Mallampati: II  TM Distance: 4 - 6 cm  Neck ROM: normal range of motion   Mouth opening: Normal     Cardiovascular    Rhythm: regular  Rate: normal         Dental    Dentition: Edentulous     Pulmonary  Breath sounds clear to auscultation               Abdominal  GI exam deferred       Other Findings            Anesthetic Plan    ASA: 4  Anesthesia type: general          Induction: Intravenous  Anesthetic plan and risks discussed with: Patient and Family

## 2021-04-15 NOTE — BRIEF OP NOTE
Brief Postoperative Note    Patient: Otoniel Bar  YOB: 1965  MRN: [id number]    Date of Procedure: 4/15/2021     Pre-Op Diagnosis: Interstitial cystitis [N30.10]    Post-Op Diagnosis: Same as preoperative diagnosis.       Procedure(s):  CYSTOSCOPY/ HYDRODISTENSION OF THE BLADDER    Surgeon(s):  Katina Calvillo MD    Surgical Assistant: None    Anesthesia: General     Estimated Blood Loss (mL): Minimal    Complications: None    Specimens: * No specimens in log *     Implants: * No implants in log *    Drains: * No LDAs found *    Findings: see op note    Electronically Signed by Zoie Vital MD on 4/15/2021 at 6:37 PM

## 2021-04-15 NOTE — DISCHARGE INSTRUCTIONS
Bladder Augmentation: What to Expect at Home  Your Recovery  Bladder augmentation is surgery to make the bladder larger and improve its ability to stretch. After surgery, your bladder should be able to hold more urine. After surgery, you may feel weak and tired at first. You will probably feel some pain or cramping in your lower belly and need pain medicine for a week or two. Try to avoid heavy lifting and strenuous activities that might put extra pressure on your bladder while you recover. This care sheet gives you a general idea about how long it will take for you to recover. But each person recovers at a different pace. Follow the steps below to get better as quickly as possible. How can you care for yourself at home? Activity  · Rest when you feel tired. Getting enough sleep will help you recover. · Try to walk each day. Start by walking a little more than you did the day before. Bit by bit, increase the amount you walk. Walking boosts blood flow and helps prevent pneumonia and constipation. · Avoid strenuous activities, such as bicycle riding, jogging, weight lifting, or aerobic exercise, for 6 to 8 weeks or until your doctor says it is okay. · For 6 to 8 weeks or until your doctor says it is okay, avoid lifting anything that would make you strain. This may include a child, heavy grocery bags and milk containers, a heavy briefcase or backpack, cat litter or dog food bags, or a vacuum . · Ask your doctor when you can drive again. · You will probably need to take 72 hours off from work. It depends on the type of work you do and how you feel. · You may shower as usual. Pat the cut (incision) dry. Do not take a bath until your doctor tells you it is okay. · Ask your doctor when it is okay for you to have sex. Diet  1. You can eat your normal diet. If your stomach is upset, try bland, low-fat foods like plain rice, broiled chicken, toast, and yogurt.   2. Drink plenty of fluids (unless your doctor tells you not to). 3. You may notice that your bowel movements are not regular right after your surgery. This is common. Try to avoid constipation and straining with bowel movements. You may want to take a fiber supplement every day. If you have not had a bowel movement after a couple of days, ask your doctor about taking a mild laxative. Medicines  1. Your doctor will tell you if and when you can restart your medicines. He or she will also give you instructions about taking any new medicines. 2. If you take blood thinners, such as warfarin (Coumadin), clopidogrel (Plavix), or aspirin, be sure to talk to your doctor. He or she will tell you if and when to start taking those medicines again. Make sure that you understand exactly what your doctor wants you to do. 3. Be safe with medicines. Take pain medicines exactly as directed. 1. If the doctor gave you a prescription medicine for pain, take it as prescribed. 2. If you are not taking a prescription pain medicine, ask your doctor if you can take an over-the-counter medicine. 4. If you think your pain medicine is making you sick to your stomach:  1. Take your medicine after meals (unless your doctor has told you not to). 2. Ask your doctor for a different pain medicine. 5. If your doctor prescribed antibiotics, take them as directed. Do not stop taking them just because you feel better. You need to take the full course of antibiotics. Other instructions  · If your doctor has told you to flush your bladder, follow his or her instructions on how to do this. Follow-up care is a key part of your treatment and safety. Be sure to make and go to all appointments, and call your doctor if you are having problems. It's also a good idea to know your test results and keep a list of the medicines you take. When should you call for help? Call 911 anytime you think you may need emergency care. For example, call if:  · You passed out (lost consciousness).   · You have severe trouble breathing. · You have sudden chest pain and shortness of breath, or you cough up blood. · You have severe pain in your belly. Call your doctor now or seek immediate medical care if:  · You have bright red vaginal bleeding that soaks one or more pads in an hour, or you have large clots. · You are sick to your stomach or cannot keep fluids down. · You have pain that does not get better after you take pain medicine. · You have loose stitches, or your incision comes open. · Your incision is bleeding. · You have vaginal discharge that has increased in amount or smells bad. · Your catheters come out. · Your catheters are not draining urine, even after you flush your bladder. · You have signs of infection, such as:  ¨ Increased pain, swelling, warmth, or redness. ¨ Red streaks leading from the incision. ¨ Pus draining from the incision. ¨ A fever. · You have clots of blood in your urine. · You have trouble passing urine or stool, especially if you have pain or swelling in your lower belly. · You have new or worse pain when you urinate. · You have pain in your back just below your rib cage. This is called flank pain. Watch closely for changes in your health, and be sure to contact your doctor if:  You do not have a bowel movement after taking a laxative. After general anesthesia or intravenous sedation, for 24 hours or while taking prescription Narcotics:  · Limit your activities  · A responsible adult needs to be with you for the next 24 hours  · Do not drive and operate hazardous machinery  · Do not make important personal or business decisions  · Do not drink alcoholic beverages  · If you have not urinated within 8 hours after discharge, and you are experiencing discomfort from urinary retention, please go to the nearest ED. · If you have sleep apnea and have a CPAP machine, please use it for all naps and sleeping.   · Please use caution when taking narcotics and any of your home medications that may cause drowsiness. *  Please give a list of your current medications to your Primary Care Provider. *  Please update this list whenever your medications are discontinued, doses are      changed, or new medications (including over-the-counter products) are added. *  Please carry medication information at all times in case of emergency situations. These are general instructions for a healthy lifestyle:  No smoking/ No tobacco products/ Avoid exposure to second hand smoke  Surgeon General's Warning:  Quitting smoking now greatly reduces serious risk to your health. Obesity, smoking, and sedentary lifestyle greatly increases your risk for illness  A healthy diet, regular physical exercise & weight monitoring are important for maintaining a healthy lifestyle    You may be retaining fluid if you have a history of heart failure or if you experience any of the following symptoms:  Weight gain of 3 pounds or more overnight or 5 pounds in a week, increased swelling in our hands or feet or shortness of breath while lying flat in bed. Please call your doctor as soon as you notice any of these symptoms; do not wait until your next office visit.

## 2021-04-16 NOTE — OP NOTES
[facility]  OPERATIVE REPORT    Name:  Oscar Easton  MR#:  [id number]  :  1965  ACCOUNT #:  [de-identified]  DATE OF SERVICE:  04/15/2021    PREOPERATIVE DIAGNOSIS:  Interstitial cystitis. POSTOPERATIVE DIAGNOSIS:  Interstitial cystitis. PROCEDURE PERFORMED:  Cystoscopy and hydrodistention of the bladder. SURGEON:  Ceci Nickerson MD    ASSISTANT:  None. ANESTHESIA:  General.    COMPLICATIONS:  None. SPECIMENS REMOVED:  None. IMPLANTS:  None. ESTIMATED BLOOD LOSS:  None. FINDINGS:  Bladder capacity of 850 mL, no glomerulations noted after hydrodistention. PROCEDURE:  The patient was given general anesthetic, placed in the dorsal lithotomy position. A B and O suppository was placed in the rectum. I inserted a 25-Georgian cystoscope in the urethra using video camera and 30-degree lens. The urethra was normal.  Bladder shows normal-appearing mucosa without stones or tumors. The ureteral orifices were normal.    Bladder was distended with irrigation bag held 80 cm anterior to the level of the bladder. It was held distended for eight minutes and then allowed to drain. The capacity was approximately 850 mL. The mucosa after drainage appeared normal.  I did not appreciate any glomerulations. At this point, 30 mL of Marcaine was instilled in the bladder and the scope was removed. PLAN:  She will be discharged home and return to the office in one month.       MD DIANA [email or web address]  D:  04/15/2021 18:49  T:  2021 5:45  JOB #:  [id number]

## 2021-04-16 NOTE — PROGRESS NOTES
Spiritual Care visit. Initial Visit, Pre Surgery Consult. Visit and prayer before patient goes to surgery.     Visit by Poncho Oconnell M.Ed., Th.B. ,Staff

## 2021-05-31 ENCOUNTER — HOSPITAL ENCOUNTER (EMERGENCY)
Age: 56
Discharge: HOME | End: 2021-05-31
Attending: EMERGENCY MEDICINE | Admitting: EMERGENCY MEDICINE
Payer: COMMERCIAL

## 2021-05-31 ENCOUNTER — APPOINTMENT (OUTPATIENT)
Dept: GENERAL RADIOLOGY | Age: 56
End: 2021-05-31
Attending: EMERGENCY MEDICINE
Payer: COMMERCIAL

## 2021-05-31 ENCOUNTER — APPOINTMENT (OUTPATIENT)
Dept: ULTRASOUND IMAGING | Age: 56
End: 2021-05-31
Attending: EMERGENCY MEDICINE
Payer: COMMERCIAL

## 2021-05-31 VITALS
WEIGHT: 159 LBS | RESPIRATION RATE: 19 BRPM | HEIGHT: 64 IN | DIASTOLIC BLOOD PRESSURE: 74 MMHG | HEART RATE: 85 BPM | BODY MASS INDEX: 27.14 KG/M2 | SYSTOLIC BLOOD PRESSURE: 131 MMHG | OXYGEN SATURATION: 98 % | TEMPERATURE: 98.5 F

## 2021-05-31 DIAGNOSIS — R11.2 NON-INTRACTABLE VOMITING WITH NAUSEA, UNSPECIFIED VOMITING TYPE: Primary | ICD-10-CM

## 2021-05-31 LAB
ALBUMIN SERPL-MCNC: 3.3 G/DL (ref 3.5–5)
ALBUMIN/GLOB SERPL: 0.9 {RATIO} (ref 1.2–3.5)
ALP SERPL-CCNC: 84 U/L (ref 50–136)
ALT SERPL-CCNC: 24 U/L (ref 12–65)
ANION GAP SERPL CALC-SCNC: 6 MMOL/L (ref 7–16)
AST SERPL-CCNC: 38 U/L (ref 15–37)
ATRIAL RATE: 110 BPM
BASOPHILS # BLD: 0 K/UL (ref 0–0.2)
BASOPHILS NFR BLD: 1 % (ref 0–2)
BILIRUB SERPL-MCNC: 0.4 MG/DL (ref 0.2–1.1)
BUN SERPL-MCNC: 8 MG/DL (ref 6–23)
CALCIUM SERPL-MCNC: 8.1 MG/DL (ref 8.3–10.4)
CALCULATED P AXIS, ECG09: 67 DEGREES
CALCULATED R AXIS, ECG10: -3 DEGREES
CALCULATED T AXIS, ECG11: 47 DEGREES
CHLORIDE SERPL-SCNC: 109 MMOL/L (ref 98–107)
CO2 SERPL-SCNC: 24 MMOL/L (ref 21–32)
CREAT SERPL-MCNC: 0.85 MG/DL (ref 0.6–1)
DIAGNOSIS, 93000: NORMAL
DIFFERENTIAL METHOD BLD: ABNORMAL
EOSINOPHIL # BLD: 0.1 K/UL (ref 0–0.8)
EOSINOPHIL NFR BLD: 1 % (ref 0.5–7.8)
ERYTHROCYTE [DISTWIDTH] IN BLOOD BY AUTOMATED COUNT: 12.3 % (ref 11.9–14.6)
GLOBULIN SER CALC-MCNC: 3.5 G/DL (ref 2.3–3.5)
GLUCOSE SERPL-MCNC: 97 MG/DL (ref 65–100)
HCT VFR BLD AUTO: 35 % (ref 35.8–46.3)
HGB BLD-MCNC: 11 G/DL (ref 11.7–15.4)
IMM GRANULOCYTES # BLD AUTO: 0 K/UL (ref 0–0.5)
IMM GRANULOCYTES NFR BLD AUTO: 0 % (ref 0–5)
LIPASE SERPL-CCNC: 81 U/L (ref 73–393)
LYMPHOCYTES # BLD: 1.5 K/UL (ref 0.5–4.6)
LYMPHOCYTES NFR BLD: 24 % (ref 13–44)
MAGNESIUM SERPL-MCNC: 1.8 MG/DL (ref 1.8–2.4)
MCH RBC QN AUTO: 30.6 PG (ref 26.1–32.9)
MCHC RBC AUTO-ENTMCNC: 31.4 G/DL (ref 31.4–35)
MCV RBC AUTO: 97.5 FL (ref 79.6–97.8)
MONOCYTES # BLD: 0.6 K/UL (ref 0.1–1.3)
MONOCYTES NFR BLD: 10 % (ref 4–12)
NEUTS SEG # BLD: 4 K/UL (ref 1.7–8.2)
NEUTS SEG NFR BLD: 65 % (ref 43–78)
NRBC # BLD: 0 K/UL (ref 0–0.2)
P-R INTERVAL, ECG05: 132 MS
PLATELET # BLD AUTO: 201 K/UL (ref 150–450)
PMV BLD AUTO: 9.8 FL (ref 9.4–12.3)
POTASSIUM SERPL-SCNC: 4.8 MMOL/L (ref 3.5–5.1)
PROT SERPL-MCNC: 6.8 G/DL (ref 6.3–8.2)
Q-T INTERVAL, ECG07: 390 MS
QRS DURATION, ECG06: 90 MS
QTC CALCULATION (BEZET), ECG08: 527 MS
RBC # BLD AUTO: 3.59 M/UL (ref 4.05–5.2)
SODIUM SERPL-SCNC: 139 MMOL/L (ref 136–145)
TROPONIN-HIGH SENSITIVITY: 16.5 PG/ML (ref 0–14)
TROPONIN-HIGH SENSITIVITY: 17.7 PG/ML (ref 0–14)
VENTRICULAR RATE, ECG03: 110 BPM
WBC # BLD AUTO: 6.1 K/UL (ref 4.3–11.1)

## 2021-05-31 PROCEDURE — 80053 COMPREHEN METABOLIC PANEL: CPT

## 2021-05-31 PROCEDURE — 83690 ASSAY OF LIPASE: CPT

## 2021-05-31 PROCEDURE — 83735 ASSAY OF MAGNESIUM: CPT

## 2021-05-31 PROCEDURE — 71046 X-RAY EXAM CHEST 2 VIEWS: CPT

## 2021-05-31 PROCEDURE — 93005 ELECTROCARDIOGRAM TRACING: CPT | Performed by: EMERGENCY MEDICINE

## 2021-05-31 PROCEDURE — 93971 EXTREMITY STUDY: CPT

## 2021-05-31 PROCEDURE — 96374 THER/PROPH/DIAG INJ IV PUSH: CPT

## 2021-05-31 PROCEDURE — 99284 EMERGENCY DEPT VISIT MOD MDM: CPT

## 2021-05-31 PROCEDURE — 84484 ASSAY OF TROPONIN QUANT: CPT

## 2021-05-31 PROCEDURE — 85025 COMPLETE CBC W/AUTO DIFF WBC: CPT

## 2021-05-31 PROCEDURE — 74011250636 HC RX REV CODE- 250/636: Performed by: EMERGENCY MEDICINE

## 2021-05-31 PROCEDURE — 74011250637 HC RX REV CODE- 250/637: Performed by: EMERGENCY MEDICINE

## 2021-05-31 RX ORDER — ONDANSETRON 2 MG/ML
4 INJECTION INTRAMUSCULAR; INTRAVENOUS ONCE
Status: COMPLETED | OUTPATIENT
Start: 2021-05-31 | End: 2021-05-31

## 2021-05-31 RX ORDER — ONDANSETRON 4 MG/1
4 TABLET, ORALLY DISINTEGRATING ORAL
Qty: 12 TABLET | Refills: 0 | Status: SHIPPED | OUTPATIENT
Start: 2021-05-31 | End: 2021-06-29 | Stop reason: SDUPTHER

## 2021-05-31 RX ORDER — SODIUM CHLORIDE 0.9 % (FLUSH) 0.9 %
5-10 SYRINGE (ML) INJECTION EVERY 8 HOURS
Status: DISCONTINUED | OUTPATIENT
Start: 2021-05-31 | End: 2021-05-31 | Stop reason: HOSPADM

## 2021-05-31 RX ORDER — SODIUM CHLORIDE 0.9 % (FLUSH) 0.9 %
5-10 SYRINGE (ML) INJECTION AS NEEDED
Status: DISCONTINUED | OUTPATIENT
Start: 2021-05-31 | End: 2021-05-31 | Stop reason: HOSPADM

## 2021-05-31 RX ORDER — ACETAMINOPHEN 500 MG
1000 TABLET ORAL
Status: COMPLETED | OUTPATIENT
Start: 2021-05-31 | End: 2021-05-31

## 2021-05-31 RX ADMIN — ACETAMINOPHEN 1000 MG: 500 TABLET ORAL at 11:39

## 2021-05-31 RX ADMIN — ONDANSETRON 4 MG: 2 INJECTION INTRAMUSCULAR; INTRAVENOUS at 11:39

## 2021-05-31 NOTE — ED TRIAGE NOTES
Arrives w/ pov. Reports on blood thinners. Reports hx of clot in left leg and reports having redness, purple and black color to back of calf now. Also reports numbness to right arm and right fingers. Also reports chest pain. Reports all this started on Saturday when she woke up.

## 2021-05-31 NOTE — ED PROVIDER NOTES
Patient complains of a red knot on her left leg onset Saturday morning. She then began to have a cough productive of dark phlegm, sharp chest pain, feeling hot and cold and having sweats, hurts to swallow. She has chronic SOB secondary to COPD and CHF and uses 3 liters Oxygen at night. Today she has had nausea with vomiting x 5. No blood in stool or emesis. She is on Eliquis due to DVT in her right leg. Unclear date of this clot. She quit smoking one year ago. She suffers with interstitial cystitis.             Past Medical History:   Diagnosis Date    Anemia     blood transfusion 5/2018 per pt    Arrhythmia     palpitations, moderate mitral valve regurge    Arthritis     lower back osteo    Asthma     uses inhalers    Cancer ([address]     left breast ca; s/p surgery     Cardiomyopathy     ECHO 11/2017    CHF (congestive heart failure) (HCC)     EF 30-35% last echo 7/2019-- followed by dr Naye Porras Chronic pain 2010    Coagulation defect ([address]     eliquis    COPD     nebulizer daily and maint inhalers, can not climb 1 flight of stairs (also CHF)  oxygen 3 LPM qhs    Decreased cardiac ejection fraction 11/27/2017    Depression     controlled      Diverticulitis     GERD (gastroesophageal reflux disease)     controlled with med     Heart murmur     Hypertension     managed with meds    IBS (irritable bowel syndrome)     IC (interstitial cystitis)     Insomnia     Migraine with aura and without status migrainosus, not intractable 6/17/2016    Mitral valve regurgitation, rheumatic     followed Dr. Phani Guevara Moderate aortic regurgitation     per echo 7/2019 in cc-- followed by dr Naye Porras Palpitations 5/16/2016    Psychiatric disorder     anxiety    Requires oxygen therapy     3l/min at hs. prn during the days as needed    Rheumatic aortic insufficiency 5/16/2016    Rheumatic fever as child    pt reports rheumatic fever in childhood    Smoker     started age 23-- smoked 0.5 ppd until 2018-- cut back to 10-2 cig daily per pt    St[address]     \"mild stroke\" pt reports 2014- \"left sided weakness and balance is off\"     Thromboembolus ([facility] [address] [facility]     BLE 2012    Vitamin D deficiency        Past Surgical History:   Procedure Laterality Date    COLONOSCOPY      8 polyps removed, diverticulitis    COLONOSCOPY N/A 5/21/2018    COLONOSCOPY performed by Sandhya Mackay MD at [address]  8-23-11    bladder dilitation    EGD      HX APPENDECTOMY  2006    HX CERVICAL FUSION  07/14/2020    ACDF C4-6 with Dr. Brock Gottlieb  5/27/2011    no blockages, no intervention    HX HEART CATHETERIZATION  04/2017    no intervention    HX HEART CATHETERIZATION  2018    no intervention    HX LAP CHOLECYSTECTOMY  6/6/2011    HX ORTHOPAEDIC Right 07/2019    4th toe -- surg repair    HX NATASHA AND BSO  2004    fibroid tumors, hyst; no cervix     HX UROLOGICAL  01/2018    cystoscopy with hydrodistention of bladder multiple    HX UROLOGICAL  06/30/2020    cysto    CT BIOPSY OF BREAST, INCISIONAL Left     lymph node , left, patient states her bx neg, but high risk         Family History:   Problem Relation Age of Onset    Heart Failure Father 76        chf    Heart Disease Father         PPM; secondary to CAD, drugs     Other Father         internal bleeding     Diabetes Sister     Psoriasis Mother     Thyroid Disease Sister         hyper; s/p thyroidectomy     Kidney Disease Brother     No Known Problems Sister     Seizures Brother     Diabetes Maternal Grandmother     Hypertension Maternal Grandmother     Emphysema Maternal Grandfather     Heart Disease Paternal Grandmother     Heart Disease Paternal Grandfather        Social History     Socioeconomic History    Marital status: LEGALLY      Spouse name: Not on file    Number of children: Not on file    Years of education: Not on file    Highest education level: Not on file   Occupational History    Not on file Tobacco Use    Smoking status: Former Smoker     Packs/day: 0.25     Years: 30.00     Pack years: 7.50     Quit date: 2019     Years since quittin.4    Smokeless tobacco: Never Used    Tobacco comment: since     Substance and Sexual Activity    Alcohol use: No    Drug use: Not Currently    Sexual activity: Not Currently   Other Topics Concern    Not on file   Social History Narrative    Not on file     Social Determinants of Health     Financial Resource Strain:     Difficulty of Paying Living Expenses:    Food Insecurity:     Worried About Running Out of Food in the Last Year:     Ran Out of Food in the Last Year:    Transportation Needs:     Lack of Transportation (Medical):  Lack of Transportation (Non-Medical):    Physical Activity:     Days of Exercise per Week:     Minutes of Exercise per Session:    Stress:     Feeling of Stress :    Social Connections:     Frequency of Communication with Friends and Family:     Frequency of Social Gatherings with Friends and Family:     Attends [employer] Services:     Active Member of Clubs or Organizations:     Attends Club or Organization Meetings:     Marital Status:    Intimate Partner Violence:     Fear of Current or Ex-Partner:     Emotionally Abused:     Physically Abused:     Sexually Abused: ALLERGIES: Aspirin, Bentyl [dicyclomine], Plavix [clopidogrel], Toradol [ketorolac], Ultram [tramadol], and Zofran [ondansetron hcl (pf)]    Review of Systems   Constitutional: Positive for appetite change and chills. HENT: Positive for sore throat. Negative for congestion. Eyes: Negative. Respiratory: Positive for cough and shortness of breath. Cardiovascular: Positive for chest pain. Gastrointestinal: Positive for nausea and vomiting. Negative for abdominal pain, blood in stool and diarrhea. Genitourinary: Negative for dysuria and hematuria.         Has IC but not symptomatic now   Musculoskeletal: Positive for arthralgias and myalgias. Skin: Negative. Neurological: Positive for headaches. Hematological:        Eliquis, DVT in past       Vitals:    05/31/21 1259 05/31/21 1544 05/31/21 1559 05/31/21 1620   BP: 123/68 (!) 144/76 131/74    Pulse: 93 85 85    Resp: 19 21 19    Temp:    98.5 °F (36.9 °C)   SpO2: 98% 97% 98%    Weight:       Height:                Physical Exam  Vitals and nursing note reviewed. Constitutional:       Appearance: She is well-developed. HENT:      Head: Normocephalic and atraumatic. Right Ear: External ear normal.      Left Ear: External ear normal.   Eyes:      General: No scleral icterus. Conjunctiva/sclera: Conjunctivae normal.      Pupils: Pupils are equal, round, and reactive to light. Neck:      Vascular: No JVD. Cardiovascular:      Rate and Rhythm: Normal rate and regular rhythm. Heart sounds: Normal heart sounds. Pulmonary:      Effort: Pulmonary effort is normal.      Breath sounds: Normal breath sounds. Abdominal:      General: Bowel sounds are normal.      Palpations: Abdomen is soft. There is no mass. Tenderness: There is no abdominal tenderness. Musculoskeletal:         General: Normal range of motion. Cervical back: Normal range of motion and neck supple. Right lower leg: No edema. Left lower leg: No edema. Comments: Left leg with a 3 cm nodule medial to left knee. Red. Tender. Hard. Not fluctuant. Skin:     General: Skin is warm and dry. Capillary Refill: Capillary refill takes less than 2 seconds. Neurological:      General: No focal deficit present. Mental Status: She is alert and oriented to person, place, and time.    Psychiatric:         Behavior: Behavior normal.          MDM  Number of Diagnoses or Management Options  Non-intractable vomiting with nausea, unspecified vomiting type  Diagnosis management comments: Vomiting, chest pain, cough  EKG sinus tach rate 110 no STEMI, read by me  CXR clear - I viewed images  Labs reviewed  Zofran 4 mg IV, Tylenol 1 gram po  Sleeping after treatment  US leg no DVT.    Results and instructions discussed with patient  Follow up with PCP  Return with new or worse symptoms  Rx Zofran       Amount and/or Complexity of Data Reviewed  Clinical lab tests: ordered and reviewed  Tests in the radiology section of CPT®: ordered and reviewed  Review and summarize past medical records: yes  Independent visualization of images, tracings, or specimens: yes    Patient Progress  Patient progress: improved         Procedures

## 2021-05-31 NOTE — ED NOTES
I have reviewed discharge instructions with the patient. The patient verbalized understanding. Patient left ED via Discharge Method: ambulatory to Home with significant other     Opportunity for questions and clarification provided. Patient given 1 scripts. To continue your aftercare when you leave the hospital, you may receive an automated call from our care team to check in on how you are doing. This is a free service and part of our promise to provide the best care and service to meet your aftercare needs.  If you have questions, or wish to unsubscribe from this service please call [phone]. Thank you for Choosing our [facility] Emergency Department.

## 2021-06-07 ENCOUNTER — HOSPITAL ENCOUNTER (EMERGENCY)
Age: 56
Discharge: LEFT BEFORE DISCHARGE | End: 2021-06-07
Attending: EMERGENCY MEDICINE
Payer: COMMERCIAL

## 2021-06-07 VITALS
RESPIRATION RATE: 20 BRPM | BODY MASS INDEX: 29.37 KG/M2 | WEIGHT: 172 LBS | HEART RATE: 111 BPM | DIASTOLIC BLOOD PRESSURE: 79 MMHG | SYSTOLIC BLOOD PRESSURE: 128 MMHG | TEMPERATURE: 97.8 F | OXYGEN SATURATION: 98 % | HEIGHT: 64 IN

## 2021-06-07 LAB
ALBUMIN SERPL-MCNC: 3.4 G/DL (ref 3.5–5)
ALBUMIN/GLOB SERPL: 0.9 {RATIO} (ref 1.2–3.5)
ALP SERPL-CCNC: 93 U/L (ref 50–136)
ALT SERPL-CCNC: 28 U/L (ref 12–65)
ANION GAP SERPL CALC-SCNC: 13 MMOL/L (ref 7–16)
AST SERPL-CCNC: 41 U/L (ref 15–37)
BASOPHILS # BLD: 0 K/UL (ref 0–0.2)
BASOPHILS NFR BLD: 0 % (ref 0–2)
BILIRUB SERPL-MCNC: 0.3 MG/DL (ref 0.2–1.1)
BUN SERPL-MCNC: 13 MG/DL (ref 6–23)
CALCIUM SERPL-MCNC: 8.5 MG/DL (ref 8.3–10.4)
CHLORIDE SERPL-SCNC: 109 MMOL/L (ref 98–107)
CO2 SERPL-SCNC: 21 MMOL/L (ref 21–32)
CREAT SERPL-MCNC: 0.99 MG/DL (ref 0.6–1)
DIFFERENTIAL METHOD BLD: ABNORMAL
EOSINOPHIL # BLD: 0.1 K/UL (ref 0–0.8)
EOSINOPHIL NFR BLD: 1 % (ref 0.5–7.8)
ERYTHROCYTE [DISTWIDTH] IN BLOOD BY AUTOMATED COUNT: 12.5 % (ref 11.9–14.6)
GLOBULIN SER CALC-MCNC: 3.9 G/DL (ref 2.3–3.5)
GLUCOSE SERPL-MCNC: 111 MG/DL (ref 65–100)
HCT VFR BLD AUTO: 32.8 % (ref 35.8–46.3)
HGB BLD-MCNC: 10.6 G/DL (ref 11.7–15.4)
IMM GRANULOCYTES # BLD AUTO: 0 K/UL (ref 0–0.5)
IMM GRANULOCYTES NFR BLD AUTO: 0 % (ref 0–5)
LIPASE SERPL-CCNC: 100 U/L (ref 73–393)
LYMPHOCYTES # BLD: 2.6 K/UL (ref 0.5–4.6)
LYMPHOCYTES NFR BLD: 44 % (ref 13–44)
MAGNESIUM SERPL-MCNC: 1.8 MG/DL (ref 1.8–2.4)
MCH RBC QN AUTO: 30.6 PG (ref 26.1–32.9)
MCHC RBC AUTO-ENTMCNC: 32.3 G/DL (ref 31.4–35)
MCV RBC AUTO: 94.8 FL (ref 79.6–97.8)
MONOCYTES # BLD: 0.5 K/UL (ref 0.1–1.3)
MONOCYTES NFR BLD: 9 % (ref 4–12)
NEUTS SEG # BLD: 2.6 K/UL (ref 1.7–8.2)
NEUTS SEG NFR BLD: 46 % (ref 43–78)
NRBC # BLD: 0 K/UL (ref 0–0.2)
PLATELET # BLD AUTO: 288 K/UL (ref 150–450)
PLATELET COMMENTS,PCOM: ADEQUATE
PMV BLD AUTO: 9.5 FL (ref 9.4–12.3)
POTASSIUM SERPL-SCNC: 4.1 MMOL/L (ref 3.5–5.1)
PROT SERPL-MCNC: 7.3 G/DL (ref 6.3–8.2)
RBC # BLD AUTO: 3.46 M/UL (ref 4.05–5.2)
RBC MORPH BLD: ABNORMAL
SODIUM SERPL-SCNC: 143 MMOL/L (ref 136–145)
TROPONIN-HIGH SENSITIVITY: 8.5 PG/ML (ref 0–14)
WBC # BLD AUTO: 5.8 K/UL (ref 4.3–11.1)
WBC MORPH BLD: ABNORMAL

## 2021-06-07 PROCEDURE — 93005 ELECTROCARDIOGRAM TRACING: CPT

## 2021-06-07 PROCEDURE — 99281 EMR DPT VST MAYX REQ PHY/QHP: CPT

## 2021-06-07 PROCEDURE — 83690 ASSAY OF LIPASE: CPT

## 2021-06-07 PROCEDURE — 75810000275 HC EMERGENCY DEPT VISIT NO LEVEL OF CARE

## 2021-06-07 PROCEDURE — 84484 ASSAY OF TROPONIN QUANT: CPT

## 2021-06-07 PROCEDURE — 85025 COMPLETE CBC W/AUTO DIFF WBC: CPT

## 2021-06-07 PROCEDURE — 83735 ASSAY OF MAGNESIUM: CPT

## 2021-06-07 PROCEDURE — 80053 COMPREHEN METABOLIC PANEL: CPT

## 2021-06-07 RX ORDER — SODIUM CHLORIDE 0.9 % (FLUSH) 0.9 %
5-10 SYRINGE (ML) INJECTION AS NEEDED
Status: DISCONTINUED | OUTPATIENT
Start: 2021-06-07 | End: 2021-06-08 | Stop reason: HOSPADM

## 2021-06-07 RX ORDER — SODIUM CHLORIDE 0.9 % (FLUSH) 0.9 %
5-10 SYRINGE (ML) INJECTION EVERY 8 HOURS
Status: DISCONTINUED | OUTPATIENT
Start: 2021-06-07 | End: 2021-06-08 | Stop reason: HOSPADM

## 2021-06-08 LAB
ATRIAL RATE: 103 BPM
CALCULATED P AXIS, ECG09: 60 DEGREES
CALCULATED R AXIS, ECG10: -8 DEGREES
CALCULATED T AXIS, ECG11: 42 DEGREES
DIAGNOSIS, 93000: NORMAL
P-R INTERVAL, ECG05: 132 MS
Q-T INTERVAL, ECG07: 406 MS
QRS DURATION, ECG06: 90 MS
QTC CALCULATION (BEZET), ECG08: 531 MS
VENTRICULAR RATE, ECG03: 103 BPM

## 2021-06-08 NOTE — ED TRIAGE NOTES
Arrives with face mask in place. Reports midsternal non-radiating chest pain, intermittent over last 2 days. Reports CHOUDHURY, fluid retention and reports \"30lb weight gain\" over last 2 days, palpitations, productive cough with gray sputum, nausea. +smoker. Reports compliance with lasix.  Reports instructed to come to ED by cardiologist.

## 2021-07-07 ENCOUNTER — HOSPITAL ENCOUNTER (OUTPATIENT)
Dept: GENERAL RADIOLOGY | Age: 56
Discharge: HOME | End: 2021-07-07
Payer: COMMERCIAL

## 2021-07-07 DIAGNOSIS — M54.12 CERVICAL RADICULOPATHY: ICD-10-CM

## 2021-07-07 PROCEDURE — 72040 X-RAY EXAM NECK SPINE 2-3 VW: CPT

## 2021-07-12 ENCOUNTER — HOSPITAL ENCOUNTER (EMERGENCY)
Age: 56
Discharge: LEFT BEFORE DISCHARGE | End: 2021-07-12
Attending: EMERGENCY MEDICINE
Payer: COMMERCIAL

## 2021-07-12 VITALS
HEIGHT: 64 IN | SYSTOLIC BLOOD PRESSURE: 122 MMHG | TEMPERATURE: 98.5 F | OXYGEN SATURATION: 97 % | DIASTOLIC BLOOD PRESSURE: 66 MMHG | WEIGHT: 170 LBS | HEART RATE: 93 BPM | BODY MASS INDEX: 29.02 KG/M2 | RESPIRATION RATE: 25 BRPM

## 2021-07-12 DIAGNOSIS — Z53.21 PATIENT LEFT WITHOUT BEING SEEN: Primary | ICD-10-CM

## 2021-07-12 LAB
ATRIAL RATE: 94 BPM
CALCULATED P AXIS, ECG09: 75 DEGREES
CALCULATED R AXIS, ECG10: 27 DEGREES
CALCULATED T AXIS, ECG11: 65 DEGREES
DIAGNOSIS, 93000: NORMAL
P-R INTERVAL, ECG05: 136 MS
Q-T INTERVAL, ECG07: 386 MS
QRS DURATION, ECG06: 80 MS
QTC CALCULATION (BEZET), ECG08: 482 MS
VENTRICULAR RATE, ECG03: 94 BPM

## 2021-07-12 PROCEDURE — 93005 ELECTROCARDIOGRAM TRACING: CPT | Performed by: EMERGENCY MEDICINE

## 2021-07-12 PROCEDURE — 75810000275 HC EMERGENCY DEPT VISIT NO LEVEL OF CARE

## 2021-07-12 RX ORDER — SODIUM CHLORIDE 0.9 % (FLUSH) 0.9 %
5-10 SYRINGE (ML) INJECTION EVERY 8 HOURS
Status: DISCONTINUED | OUTPATIENT
Start: 2021-07-12 | End: 2021-07-13 | Stop reason: HOSPADM

## 2021-07-12 RX ORDER — SODIUM CHLORIDE 0.9 % (FLUSH) 0.9 %
5-10 SYRINGE (ML) INJECTION AS NEEDED
Status: DISCONTINUED | OUTPATIENT
Start: 2021-07-12 | End: 2021-07-13 | Stop reason: HOSPADM

## 2021-07-12 NOTE — ED TRIAGE NOTES
Pt arrives via POV c/o sharp chest pain that started an hour ago. Presents tachypneic. Hx of HF and possible aortic stenosis. States [facility] cardiologist and reports is having a cardiac procedure soon, not able to specify. Pt reports CP radiates to \"both of her jaws\".

## 2021-07-14 NOTE — ED PROVIDER NOTES
Chest Pain          Past Medical History:   Diagnosis Date    Anemia     blood transfusion 5/2018 per pt    Arrhythmia     palpitations, moderate mitral valve regurge    Arthritis     lower back osteo    Asthma     uses inhalers    Cancer ([facility] [address] [facility]     left breast ca; s/p surgery     Cardiomyopathy     ECHO 11/2017    CHF (congestive heart failure) (HCC)     EF 30-35% last echo 7/2019-- followed by dr Glenny Mathur Chronic pain 2010    Coagulation defect ([facility]     eliquis    COPD     nebulizer daily and maint inhalers, can not climb 1 flight of stairs (also CHF)  oxygen 3 LPM qhs    Decreased cardiac ejection fraction 11/27/2017    Depression     controlled      Diverticulitis     GERD (gastroesophageal reflux disease)     controlled with med     Heart murmur     Hypertension     managed with meds    IBS (irritable bowel syndrome)     IC (interstitial cystitis)     Insomnia     Migraine with aura and without status migrainosus, not intractable 6/17/2016    Mitral valve regurgitation, rheumatic     followed Dr. Heladio Marley Moderate aortic regurgitation     per echo 7/2019 in -- followed by dr Glenny Mathur Palpitations 5/16/2016    Psychiatric disorder     anxiety    Requires oxygen therapy     3l/min at hs. prn during the days as needed    Rheumatic aortic insufficiency 5/16/2016    Rheumatic fever as child    pt reports rheumatic fever in childhood    Smoker     started age 23-- smoked 0.5 ppd until 2018-- cut back to 10-2 cig daily per pt    Stroke [facility])     \"mild stroke\" pt reports 2014- \"left sided weakness and balance is off\"     Thromboembolus ([facility] [address]     BLE 2012    Vitamin D deficiency        Past Surgical History:   Procedure Laterality Date    COLONOSCOPY      8 polyps removed, diverticulitis    COLONOSCOPY N/A 5/21/2018    COLONOSCOPY performed by Johnathan Jaramillo MD at [address]  8-23-11    bladder dilitation    EGD      HX APPENDECTOMY  2006    HX CERVICAL FUSION 2020    ACDF C4-6 with Dr. Jaxson Cope  2011    no blockages, no intervention    HX HEART CATHETERIZATION  2017    no intervention    HX HEART CATHETERIZATION      no intervention    HX LAP CHOLECYSTECTOMY  2011    HX ORTHOPAEDIC Right 2019    4th toe -- surg repair    HX NATASHA AND BSO  2004    fibroid tumors, hyst; no cervix     HX UROLOGICAL  2018    cystoscopy with hydrodistention of bladder multiple    HX UROLOGICAL  2020    cysto    SD BIOPSY OF BREAST, INCISIONAL Left     lymph node , left, patient states her bx neg, but high risk         Family History:   Problem Relation Age of Onset    Heart Failure Father 76        chf    Heart Disease Father         PPM; secondary to CAD, drugs     Other Father         internal bleeding     Diabetes Sister     Psoriasis Mother     Thyroid Disease Sister         hyper; s/p thyroidectomy     Kidney Disease Brother     No Known Problems Sister     Seizures Brother     Diabetes Maternal Grandmother     Hypertension Maternal Grandmother     Emphysema Maternal Grandfather     Heart Disease Paternal Grandmother     Heart Disease Paternal Grandfather        Social History     Socioeconomic History    Marital status: LEGALLY      Spouse name: Not on file    Number of children: Not on file    Years of education: Not on file    Highest education level: Not on file   Occupational History    Not on file   Tobacco Use    Smoking status: Former Smoker     Packs/day: 0.25     Years: 30.00     Pack years: 7.50     Quit date: 2019     Years since quittin.6    Smokeless tobacco: Never Used    Tobacco comment: since     Substance and Sexual Activity    Alcohol use: No    Drug use: Not Currently    Sexual activity: Not Currently   Other Topics Concern    Not on file   Social History Narrative    Not on file     Social Determinants of Health     Financial Resource Strain:    [facility] Difficulty of Paying Living Expenses:    Food Insecurity:     Worried About Running Out of Food in the Last Year:     [address] in the Last Year:    Transportation Needs:     Lack of Transportation (Medical):  Lack of Transportation (Non-Medical):    Physical Activity:     Days of Exercise per Week:     Minutes of Exercise per Session:    Stress:     Feeling of Stress :    Social Connections:     Frequency of Communication with Friends and Family:     Frequency of Social Gatherings with Friends and Family:     Attends [employer] Services:     Active Member of Clubs or Organizations:     Attends Club or Organization Meetings:     Marital Status:    Intimate Partner Violence:     Fear of Current or Ex-Partner:     Emotionally Abused:     Physically Abused:     Sexually Abused: ALLERGIES: Aspirin, Bentyl [dicyclomine], Plavix [clopidogrel], Toradol [ketorolac], Ultram [tramadol], and Zofran [ondansetron hcl (pf)]    Review of Systems   Cardiovascular: Positive for chest pain.        Vitals:    07/12/21 1740   BP: 122/66   Pulse: 93   Resp: 25   Temp: 98.5 °F (36.9 °C)   SpO2: 97%   Weight: 77.1 kg (170 lb)   Height: 5' 4\" (1.626 m)            Physical Exam     MDM       Procedures

## 2021-08-09 ENCOUNTER — HOSPITAL ENCOUNTER (EMERGENCY)
Age: 56
Discharge: LEFT BEFORE DISCHARGE | End: 2021-08-09
Attending: EMERGENCY MEDICINE
Payer: COMMERCIAL

## 2021-08-09 VITALS
TEMPERATURE: 97.3 F | WEIGHT: 170 LBS | SYSTOLIC BLOOD PRESSURE: 115 MMHG | DIASTOLIC BLOOD PRESSURE: 68 MMHG | BODY MASS INDEX: 29.18 KG/M2 | RESPIRATION RATE: 18 BRPM | HEART RATE: 89 BPM | OXYGEN SATURATION: 98 %

## 2021-08-09 PROCEDURE — 75810000275 HC EMERGENCY DEPT VISIT NO LEVEL OF CARE

## 2021-08-09 NOTE — ED TRIAGE NOTES
Patient presents ambulatory via the lobby with complaints of bumps/sores in her mouth and genitals. Patient states that she has been on antibiotics for 10 days and condition is not improving no oral swelling noted. Patient states her primary care  Is with [facility] and was seen last week but this is worse so coming in for evaluation.

## 2021-08-12 ENCOUNTER — HOSPITAL ENCOUNTER (EMERGENCY)
Age: 56
Discharge: LEFT BEFORE DISCHARGE | End: 2021-08-12
Payer: COMMERCIAL

## 2021-08-12 VITALS
SYSTOLIC BLOOD PRESSURE: 137 MMHG | HEART RATE: 107 BPM | OXYGEN SATURATION: 98 % | DIASTOLIC BLOOD PRESSURE: 76 MMHG | RESPIRATION RATE: 18 BRPM | WEIGHT: 168 LBS | BODY MASS INDEX: 28.68 KG/M2 | HEIGHT: 64 IN | TEMPERATURE: 97.8 F

## 2021-08-12 PROCEDURE — 75810000275 HC EMERGENCY DEPT VISIT NO LEVEL OF CARE

## 2021-08-12 NOTE — ED TRIAGE NOTES
Arrives ambulatory from home for mouth pain. Pt wears dentures. Reports pain to rear roof of mouth and throat . Appears to have some blisters. States has had this problem x past 2 wks; given magic mouthwash by PMD for same but pain getting worse.

## 2021-08-12 NOTE — ED TRIAGE NOTES
Pt arrives via POV c/o mouth pain, states has cysts to the gums. Seen here multiple times. States cysts to the vaginal area as well that is new. Reports drainage.

## 2022-01-10 ENCOUNTER — HOSPITAL ENCOUNTER (OUTPATIENT)
Dept: LAB | Age: 57
Discharge: HOME | End: 2022-01-10
Payer: COMMERCIAL

## 2022-01-10 ENCOUNTER — ANESTHESIA EVENT (OUTPATIENT)
Dept: SURGERY | Age: 57
End: 2022-01-10
Payer: COMMERCIAL

## 2022-01-10 LAB
CREAT SERPL-MCNC: 0.78 MG/DL (ref 0.6–1)
HGB BLD-MCNC: 10.2 G/DL (ref 11.7–15.4)
POTASSIUM SERPL-SCNC: 3.6 MMOL/L (ref 3.5–5.1)

## 2022-01-10 PROCEDURE — 36415 COLL VENOUS BLD VENIPUNCTURE: CPT

## 2022-01-10 PROCEDURE — 84132 ASSAY OF SERUM POTASSIUM: CPT

## 2022-01-10 PROCEDURE — 85018 HEMOGLOBIN: CPT

## 2022-01-10 PROCEDURE — 82565 ASSAY OF CREATININE: CPT

## 2022-01-11 ENCOUNTER — HOSPITAL ENCOUNTER (OUTPATIENT)
Age: 57
Setting detail: OUTPATIENT SURGERY
Discharge: HOME | End: 2022-01-11
Attending: UROLOGY | Admitting: UROLOGY
Payer: COMMERCIAL

## 2022-01-11 ENCOUNTER — ANESTHESIA (OUTPATIENT)
Dept: SURGERY | Age: 57
End: 2022-01-11
Payer: COMMERCIAL

## 2022-01-11 VITALS
WEIGHT: 160 LBS | RESPIRATION RATE: 16 BRPM | HEIGHT: 65 IN | SYSTOLIC BLOOD PRESSURE: 116 MMHG | DIASTOLIC BLOOD PRESSURE: 64 MMHG | HEART RATE: 76 BPM | BODY MASS INDEX: 26.66 KG/M2 | TEMPERATURE: 97.8 F | OXYGEN SATURATION: 95 %

## 2022-01-11 DIAGNOSIS — N30.10 INTERSTITIAL CYSTITIS: Primary | ICD-10-CM

## 2022-01-11 PROCEDURE — 77030010509 HC AIRWY LMA MSK TELE -A: Performed by: STUDENT IN AN ORGANIZED HEALTH CARE EDUCATION/TRAINING PROGRAM

## 2022-01-11 PROCEDURE — 74011250636 HC RX REV CODE- 250/636: Performed by: STUDENT IN AN ORGANIZED HEALTH CARE EDUCATION/TRAINING PROGRAM

## 2022-01-11 PROCEDURE — 76210000006 HC OR PH I REC 0.5 TO 1 HR: Performed by: UROLOGY

## 2022-01-11 PROCEDURE — 74011250637 HC RX REV CODE- 250/637: Performed by: UROLOGY

## 2022-01-11 PROCEDURE — 52260 CYSTOSCOPY AND TREATMENT: CPT | Performed by: UROLOGY

## 2022-01-11 PROCEDURE — 76060000032 HC ANESTHESIA 0.5 TO 1 HR: Performed by: UROLOGY

## 2022-01-11 PROCEDURE — 2709999900 HC NON-CHARGEABLE SUPPLY: Performed by: UROLOGY

## 2022-01-11 PROCEDURE — 77030019927 HC TBNG IRR CYSTO BAXT -A: Performed by: UROLOGY

## 2022-01-11 PROCEDURE — 76210000020 HC REC RM PH II FIRST 0.5 HR: Performed by: UROLOGY

## 2022-01-11 PROCEDURE — 74011000250 HC RX REV CODE- 250: Performed by: UROLOGY

## 2022-01-11 PROCEDURE — 77030019908 HC STETH ESOPH SIMS -A: Performed by: STUDENT IN AN ORGANIZED HEALTH CARE EDUCATION/TRAINING PROGRAM

## 2022-01-11 PROCEDURE — 74011250636 HC RX REV CODE- 250/636: Performed by: UROLOGY

## 2022-01-11 PROCEDURE — 76010000138 HC OR TIME 0.5 TO 1 HR: Performed by: UROLOGY

## 2022-01-11 PROCEDURE — 77030040361 HC SLV COMPR DVT MDII -B: Performed by: UROLOGY

## 2022-01-11 PROCEDURE — 74011000250 HC RX REV CODE- 250: Performed by: STUDENT IN AN ORGANIZED HEALTH CARE EDUCATION/TRAINING PROGRAM

## 2022-01-11 RX ORDER — HYDROMORPHONE HYDROCHLORIDE 2 MG/ML
0.5 INJECTION, SOLUTION INTRAMUSCULAR; INTRAVENOUS; SUBCUTANEOUS
Status: DISCONTINUED | OUTPATIENT
Start: 2022-01-11 | End: 2022-01-11 | Stop reason: HOSPADM

## 2022-01-11 RX ORDER — DIPHENHYDRAMINE HYDROCHLORIDE 50 MG/ML
12.5 INJECTION, SOLUTION INTRAMUSCULAR; INTRAVENOUS
Status: DISCONTINUED | OUTPATIENT
Start: 2022-01-11 | End: 2022-01-11 | Stop reason: HOSPADM

## 2022-01-11 RX ORDER — BUPIVACAINE HYDROCHLORIDE 2.5 MG/ML
INJECTION, SOLUTION EPIDURAL; INFILTRATION; INTRACAUDAL AS NEEDED
Status: DISCONTINUED | OUTPATIENT
Start: 2022-01-11 | End: 2022-01-11 | Stop reason: HOSPADM

## 2022-01-11 RX ORDER — SODIUM CHLORIDE, SODIUM LACTATE, POTASSIUM CHLORIDE, CALCIUM CHLORIDE 600; 310; 30; 20 MG/100ML; MG/100ML; MG/100ML; MG/100ML
100 INJECTION, SOLUTION INTRAVENOUS CONTINUOUS
Status: CANCELLED | OUTPATIENT
Start: 2022-01-11

## 2022-01-11 RX ORDER — FENTANYL CITRATE 50 UG/ML
INJECTION, SOLUTION INTRAMUSCULAR; INTRAVENOUS AS NEEDED
Status: DISCONTINUED | OUTPATIENT
Start: 2022-01-11 | End: 2022-01-11 | Stop reason: HOSPADM

## 2022-01-11 RX ORDER — SODIUM CHLORIDE 0.9 % (FLUSH) 0.9 %
5-40 SYRINGE (ML) INJECTION EVERY 8 HOURS
Status: DISCONTINUED | OUTPATIENT
Start: 2022-01-11 | End: 2022-01-11 | Stop reason: HOSPADM

## 2022-01-11 RX ORDER — SODIUM CHLORIDE 0.9 % (FLUSH) 0.9 %
5-40 SYRINGE (ML) INJECTION AS NEEDED
Status: DISCONTINUED | OUTPATIENT
Start: 2022-01-11 | End: 2022-01-11 | Stop reason: HOSPADM

## 2022-01-11 RX ORDER — LIDOCAINE HYDROCHLORIDE 20 MG/ML
INJECTION, SOLUTION EPIDURAL; INFILTRATION; INTRACAUDAL; PERINEURAL AS NEEDED
Status: DISCONTINUED | OUTPATIENT
Start: 2022-01-11 | End: 2022-01-11 | Stop reason: HOSPADM

## 2022-01-11 RX ORDER — NALOXONE HYDROCHLORIDE 0.4 MG/ML
0.1 INJECTION, SOLUTION INTRAMUSCULAR; INTRAVENOUS; SUBCUTANEOUS AS NEEDED
Status: DISCONTINUED | OUTPATIENT
Start: 2022-01-11 | End: 2022-01-11 | Stop reason: HOSPADM

## 2022-01-11 RX ORDER — SODIUM CHLORIDE, SODIUM LACTATE, POTASSIUM CHLORIDE, CALCIUM CHLORIDE 600; 310; 30; 20 MG/100ML; MG/100ML; MG/100ML; MG/100ML
100 INJECTION, SOLUTION INTRAVENOUS CONTINUOUS
Status: DISCONTINUED | OUTPATIENT
Start: 2022-01-11 | End: 2022-01-11 | Stop reason: HOSPADM

## 2022-01-11 RX ORDER — PROPOFOL 10 MG/ML
INJECTION, EMULSION INTRAVENOUS AS NEEDED
Status: DISCONTINUED | OUTPATIENT
Start: 2022-01-11 | End: 2022-01-11 | Stop reason: HOSPADM

## 2022-01-11 RX ORDER — DEXAMETHASONE SODIUM PHOSPHATE 4 MG/ML
INJECTION, SOLUTION INTRA-ARTICULAR; INTRALESIONAL; INTRAMUSCULAR; INTRAVENOUS; SOFT TISSUE AS NEEDED
Status: DISCONTINUED | OUTPATIENT
Start: 2022-01-11 | End: 2022-01-11 | Stop reason: HOSPADM

## 2022-01-11 RX ORDER — LIDOCAINE HYDROCHLORIDE 10 MG/ML
0.1 INJECTION INFILTRATION; PERINEURAL AS NEEDED
Status: DISCONTINUED | OUTPATIENT
Start: 2022-01-11 | End: 2022-01-11 | Stop reason: HOSPADM

## 2022-01-11 RX ORDER — PROMETHAZINE HYDROCHLORIDE 25 MG/ML
INJECTION, SOLUTION INTRAMUSCULAR; INTRAVENOUS AS NEEDED
Status: DISCONTINUED | OUTPATIENT
Start: 2022-01-11 | End: 2022-01-11 | Stop reason: HOSPADM

## 2022-01-11 RX ORDER — ATROPA BELLADONNA AND OPIUM 16.2; 6 MG/1; MG/1
SUPPOSITORY RECTAL AS NEEDED
Status: DISCONTINUED | OUTPATIENT
Start: 2022-01-11 | End: 2022-01-11 | Stop reason: HOSPADM

## 2022-01-11 RX ORDER — HYDROCODONE BITARTRATE AND ACETAMINOPHEN 5; 325 MG/1; MG/1
1 TABLET ORAL
Qty: 15 TABLET | Refills: 0 | Status: SHIPPED | OUTPATIENT
Start: 2022-01-11 | End: 2022-01-16

## 2022-01-11 RX ORDER — ETOMIDATE 2 MG/ML
INJECTION INTRAVENOUS AS NEEDED
Status: DISCONTINUED | OUTPATIENT
Start: 2022-01-11 | End: 2022-01-11 | Stop reason: HOSPADM

## 2022-01-11 RX ORDER — OXYCODONE HYDROCHLORIDE 5 MG/1
5 TABLET ORAL
Status: DISCONTINUED | OUTPATIENT
Start: 2022-01-11 | End: 2022-01-11 | Stop reason: HOSPADM

## 2022-01-11 RX ORDER — CEFAZOLIN SODIUM/WATER 2 G/20 ML
2 SYRINGE (ML) INTRAVENOUS
Status: COMPLETED | OUTPATIENT
Start: 2022-01-11 | End: 2022-01-11

## 2022-01-11 RX ORDER — FLUMAZENIL 0.1 MG/ML
0.2 INJECTION INTRAVENOUS
Status: DISCONTINUED | OUTPATIENT
Start: 2022-01-11 | End: 2022-01-11 | Stop reason: HOSPADM

## 2022-01-11 RX ADMIN — FENTANYL CITRATE 25 MCG: 50 INJECTION INTRAMUSCULAR; INTRAVENOUS at 08:07

## 2022-01-11 RX ADMIN — Medication 2 G: at 07:51

## 2022-01-11 RX ADMIN — HYDROMORPHONE HYDROCHLORIDE 0.5 MG: 2 INJECTION, SOLUTION INTRAMUSCULAR; INTRAVENOUS; SUBCUTANEOUS at 08:41

## 2022-01-11 RX ADMIN — ETOMIDATE 10 MG: 2 INJECTION, SOLUTION INTRAVENOUS at 07:43

## 2022-01-11 RX ADMIN — DEXAMETHASONE SODIUM PHOSPHATE 4 MG: 4 INJECTION, SOLUTION INTRAMUSCULAR; INTRAVENOUS at 07:49

## 2022-01-11 RX ADMIN — LIDOCAINE HYDROCHLORIDE 60 MG: 20 INJECTION, SOLUTION EPIDURAL; INFILTRATION; INTRACAUDAL; PERINEURAL at 07:42

## 2022-01-11 RX ADMIN — PROPOFOL 50 MG: 10 INJECTION, EMULSION INTRAVENOUS at 07:43

## 2022-01-11 RX ADMIN — SODIUM CHLORIDE, SODIUM LACTATE, POTASSIUM CHLORIDE, AND CALCIUM CHLORIDE 100 ML/HR: 600; 310; 30; 20 INJECTION, SOLUTION INTRAVENOUS at 06:44

## 2022-01-11 RX ADMIN — PROMETHAZINE HYDROCHLORIDE 6.25 MG: 25 INJECTION INTRAMUSCULAR; INTRAVENOUS at 08:20

## 2022-01-11 RX ADMIN — SODIUM CHLORIDE, SODIUM LACTATE, POTASSIUM CHLORIDE, AND CALCIUM CHLORIDE 100 ML/HR: 600; 310; 30; 20 INJECTION, SOLUTION INTRAVENOUS at 07:03

## 2022-01-11 RX ADMIN — PROPOFOL 30 MG: 10 INJECTION, EMULSION INTRAVENOUS at 07:49

## 2022-01-11 RX ADMIN — FENTANYL CITRATE 50 MCG: 50 INJECTION INTRAMUSCULAR; INTRAVENOUS at 07:51

## 2022-01-11 NOTE — H&P
Rochelle Murry  : 1965          Chief Complaint   Patient presents with    Follow-up       p/o cyto bladder problems            HPI      Rochelle Murry is a 50 y.o. female  w long h/o OAB and IC. Previously hydrodistention revealed bladder capacity under anesthesia was approximately 700 mL.  Inspection of the bladder mucosa showed no evidence of trauma.  There were glomerulations noted on the bladder floor, the trigone and the posterior bladder neck.  This was consistent with interstitial cystitis.     She had cysto/hydrodistention . This helped with her pain. She has tried Elavil 10 mg and oxybutynin 5 mg TID with some maintenance of sx. She returns today for intense bladder/pelvic pain and urinary frequency/incontinence. No relief w current medications. No fever, chills, n/v, gross hematuria, or other LUTS.                     Past Medical History:   Diagnosis Date    Anemia       blood transfusion 2018 per pt    Arrhythmia       palpitations, moderate mitral valve regurge    Arthritis       lower back osteo    Asthma       uses inhalers    Cancer [facility])       left breast ca; s/p surgery     Cardiomyopathy       ECHO 2017    CHF (congestive heart failure) (HCC)       EF 30-35% last echo 2019-- followed by dr Mauri Moralez Chronic pain 2010    Coagulation defect ([facility] [address]       eliquis    COPD       nebulizer daily and maint inhalers, can not climb 1 flight of stairs (also CHF)  oxygen 3 LPM qhs    Decreased cardiac ejection fraction 2017    Depression       controlled      Diverticulitis      GERD (gastroesophageal reflux disease)       controlled with med     Heart murmur      Hypertension       managed with meds    IBS (irritable bowel syndrome)      IC (interstitial cystitis)      Insomnia      Migraine with aura and without status migrainosus, not intractable 2016    Mitral valve regurgitation, rheumatic       followed Dr. Binh Oliva aortic regurgitation       per echo 7/2019 in cc-- followed by dr Mando Montanez Palpitations 5/16/2016    Psychiatric disorder       anxiety    Requires oxygen therapy       3l/min at hs. prn during the days as needed    Rheumatic aortic insufficiency 5/16/2016    Rheumatic fever as child     pt reports rheumatic fever in childhood    Smoker       started age 23-- smoked 0.5 ppd until 2018-- cut back to 10-2 cig daily per pt    Stroke [facility])       \"mild stroke\" pt reports 2014- \"left sided weakness and balance is off\"     Thromboembolus ([address]       BLE 2012    Vitamin D deficiency              Past Surgical History:   Procedure Laterality Date    COLONOSCOPY         8 polyps removed, diverticulitis    COLONOSCOPY N/A 5/21/2018     COLONOSCOPY performed by Aris Reynolds MD at [address]   8-23-11     bladder dilitation    EGD        HX APPENDECTOMY   2006    HX CERVICAL FUSION   07/14/2020     ACDF C4-6 with Dr. Dilia Vigil   5/27/2011     no blockages, no intervention    HX HEART CATHETERIZATION   04/2017     no intervention    HX HEART CATHETERIZATION   2018     no intervention    HX LAP CHOLECYSTECTOMY   6/6/2011    HX ORTHOPAEDIC Right 07/2019     4th toe -- surg repair    HX NATASHA AND BSO   2004     fibroid tumors, hyst; no cervix     HX UROLOGICAL   01/2018     cystoscopy with hydrodistention of bladder multiple    HX UROLOGICAL   06/30/2020     cysto    SC BIOPSY OF BREAST, INCISIONAL Left       lymph node , left, patient states her bx neg, but high risk      Current Outpatient Medications   Medication Sig Dispense Refill    isosorbide mononitrate ER (IMDUR) 30 mg tablet Take 1 Tablet by mouth every morning. 30 Tablet 11    carvediloL (COREG) 12.5 mg tablet Take 0.5 Tablets by mouth two (2) times a day. 60 Tablet 11    tiZANidine (ZANAFLEX) 4 mg tablet Take 1 Tablet by mouth three (3) times daily as needed for Muscle Spasm(s).  Indications: muscle spasm, post op muscle spasms 21 Tablet 0    hydrALAZINE (APRESOLINE) 25 mg tablet Take 1 Tablet by mouth three (3) times daily. 90 Tablet 11    tiotropium (Spiriva with HandiHaler) 18 mcg inhalation capsule Take 1 Capsule by inhalation daily. Indications: bronchospasm prevention with COPD 30 Capsule 11    budesonide-formoteroL (Symbicort) 160-4.5 mcg/actuation HFAA Take 1 Puff by inhalation two (2) times a day. Indications: bronchospasm prevention with COPD, use on the dos 1 Inhaler 11    albuterol (PROVENTIL HFA, VENTOLIN HFA, PROAIR HFA) 90 mcg/actuation inhaler Take 2 Puffs by inhalation every six (6) hours as needed for Wheezing. 1 Inhaler 0    ondansetron (Zofran ODT) 4 mg disintegrating tablet 1 Tablet by SubLINGual route every eight (8) hours as needed for Nausea or Vomiting. 12 Tablet 0    tolterodine ER (DETROL-LA) 2 mg ER capsule Take 1 Cap by mouth daily. Increase to 4 mg daily after 4 weeks if needed 90 Cap 3    amitriptyline (ELAVIL) 25 mg tablet Take 1 Tab by mouth nightly. 90 Tab 3    pantoprazole (PROTONIX) 40 mg tablet Take 1 Tab by mouth two (2) times a day. Indications: morning 60 Tab 1    topiramate (TOPAMAX) 50 mg tablet Take 1 Tab by mouth two (2) times a day. Indications: migraine prevention 60 Tab 3    spironolactone (ALDACTONE) 25 mg tablet Take 2 Tabs by mouth daily. 60 Tab 11    Nebulizer & Compressor machine Use with albuterol q4h prn for shortness of breath or wheezing 1 Each 0    Nebulizer Accessories kit use as needed 1 Kit 0    estradioL (ESTRACE) 0.5 mg tablet Take 1 Tab by mouth daily. Can d/c the estradiol patches (Patient taking differently: Take 0.5 mg by mouth daily.) 30 Tab 1    Nebulizer Accessories kit Use with nebulizer (in turn to be used with albuterol q4h prn for shortness of breath or wheezing) 1 Kit 1    diazePAM (Valium) 5 mg tablet Take 1 Tab by mouth three (3) times daily as needed for Anxiety (spasm).  Max Daily Amount: 15 mg. 12 Tab 0    FLUoxetine (PROzac) 20 mg capsule Take 2 Caps by mouth two (2) times a day. 120 Cap 0    mirtazapine (REMERON) 30 mg tablet Take 30 mg by mouth nightly. Indications: major depressive disorder        phenazopyridine (PYRIDIUM) 200 mg tablet Take 200 mg by mouth three (3) times daily as needed for Pain (Painful Urination). Indications: difficult or painful urination, Painful Urination        ammonium lactate (AMLACTIN) 12 % topical cream Apply 1 g to affected area two (2) times a day. Indications: dry skin, Face & Hands        triamcinolone acetonide (KENALOG) 0.1 % topical cream Apply 1 g to affected area two (2) times daily as needed for Itching or Skin Irritation (Applying to body (legs, hands, stomach, back). Indications: inflammation of the skin due to an allergy, Applying to body (legs, hands, stomach, back        apixaban (ELIQUIS) 5 mg tablet Take 1 Tab by mouth two (2) times a day. DO NOT RESUME UNTIL 06/18/2020 (Patient taking differently: Take 5 mg by mouth two (2) times a day.) 60 Tab 11    nitroglycerin (NITROSTAT) 0.4 mg SL tablet 1 Tab by SubLINGual route every five (5) minutes as needed for Chest Pain. 1 Bottle 5    sacubitril-valsartan (ENTRESTO) 24 mg/26 mg tablet Take 1 Tab by mouth two (2) times a day. 60 Tab 11    furosemide (LASIX) 40 mg tablet Take 1 Tab by mouth daily. 30 Tab 0    pravastatin (PRAVACHOL) 40 mg tablet Take 1 Tab by mouth nightly. 30 Tab 5    EPINEPHrine (EPIPEN JR) 0.15 mg/0.3 mL injection 0.15 mg by IntraMUSCular route once as needed.        Oxygen nightly. Indications: 3l/min at hs and prn during the day        fremanezumab-vfrm (Ajovy Syringe) 225 mg/1.5 mL syrg 225 mg by SubCUTAneous route every thirty (30) days.  (Patient not taking: Reported on 10/20/2021) 1 Syringe 11            Allergies   Allergen Reactions    Aspirin Other (comments)       Inflames IBS per pt    Bentyl [Dicyclomine] Itching    Plavix [Clopidogrel] Rash       blisters    Toradol [Ketorolac] Hives  Ultram [Tramadol] Nausea and Vomiting       Abdominal cramps    Zofran [Ondansetron Hcl (Pf)] Nausea and Vomiting      Social History      Socioeconomic History    Marital status: LEGALLY        Spouse name: Not on file    Number of children: Not on file    Years of education: Not on file    Highest education level: Not on file   Occupational History    Not on file   Tobacco Use    Smoking status: Former Smoker       Packs/day: 0.25       Years: 30.00       Pack years: 7.50       Quit date: 2019       Years since quittin.8    Smokeless tobacco: Never Used    Tobacco comment: since     Vaping Use    Vaping Use: Never used   Substance and Sexual Activity    Alcohol use: No    Drug use: Not Currently    Sexual activity: Not Currently   Other Topics Concern    Not on file   Social History Narrative    Not on file      Social Determinants of Health          Financial Resource Strain:     Difficulty of Paying Living Expenses:    Food Insecurity:     Worried About Running Out of Food in the Last Year:     Ran Out of Food in the Last Year:    Transportation Needs:     Lack of Transportation (Medical):      Lack of Transportation (Non-Medical):    Physical Activity:     Days of Exercise per Week:     Minutes of Exercise per Session:    Stress:     Feeling of Stress :    Social Connections:     Frequency of Communication with Friends and Family:     Frequency of Social Gatherings with Friends and Family:     Attends Faith Services:     Active Member of Clubs or Organizations:     Attends Club or Organization Meetings:     Marital Status:    Intimate Partner Violence:     Fear of Current or Ex-Partner:     Emotionally Abused:     Physically Abused:     Sexually Abused:             Family History   Problem Relation Age of Onset    Heart Failure Father 76         chf    Heart Disease Father           PPM; secondary to CAD, drugs     Other Father           internal bleeding     Diabetes Sister      Psoriasis Mother      Thyroid Disease Sister           hyper; s/p thyroidectomy     Kidney Disease Brother      No Known Problems Sister      Seizures Brother      Diabetes Maternal Grandmother      Hypertension Maternal Grandmother      Emphysema Maternal Grandfather      Heart Disease Paternal Grandmother      Heart Disease Paternal Grandfather           Review of Systems  Constitutional: Negative  Respiratory: Respiratory negative  Genitourinary: Positive for urinary burning, flank pain, nocturia, urgency, leakage w/ urge and frequent urination. Musculoskeletal: Positive for back pain.             PHYSICAL EXAM     General appearance - well appearing and in no distress  Mental status - alert, oriented to person, place, and time  Neck - supple, no significant adenopathy   Heart-  Normal S1/S2, No murmurs  Chest/Lung-  Quiet, even and easy respiratory effort without use of accessory muscles, BS clear all lung fields  Neurological - normal speech, no focal findings or movement disorder noted on gross visual exam  Musculoskeletal - normal gait and station  Skin - normal coloration and turgor, no rashes        Assessment and Plan      ICD-10-CM ICD-9-CM     1. IC (interstitial cystitis)  N30.10 595.1 AMB POC URINALYSIS DIP STICK AUTO W/ MICRO (PGU)   2. Pelvic pain  R10.2 [id number]     3. Bladder pain  R39.89 788.99 AMB POC URINALYSIS DIP STICK AUTO W/ MICRO (PGU)      Urine normal. Sx consistent w IC flare. Urogesic blue rx for relief of sx. plan cystoscopy/hydrodistension and its purpose were discussed with the patient. Alternative testing (or no testing) was reviewed with patient. Risks include, but are not limited to: bleeding, infection, perforation or tear (urethra, bladder, ureter), difficulty voiding and urinary retention requiring catheterization and side effects from anesthesia.   The benefits are judged to outweigh the risks and no guarantees of success or outcome were given or implied. The patient verbalized understanding.

## 2022-01-11 NOTE — BRIEF OP NOTE
Brief Postoperative Note    Patient: Yue Gallardo  YOB: 1965  MRN: [id number]    Date of Procedure: 1/11/2022     Pre-Op Diagnosis: IC (interstitial cystitis) [N30.10]  Pelvic pain in female [R10.2]  Bladder pain [R39.89]    Post-Op Diagnosis: Same as preoperative diagnosis.       Procedure(s):  CYSTOSCOPY HYDRODESTENTION    Surgeon(s):  Zelalem Ahmadi MD    Surgical Assistant: None    Anesthesia: General     Estimated Blood Loss (mL): Minimal    Complications: None    Specimens: * No specimens in log *     Implants: * No implants in log *    Drains: * No LDAs found *    Findings: see op note    Electronically Signed by Maine Torres MD on 1/11/2022 at 8:25 AM

## 2022-01-11 NOTE — DISCHARGE INSTRUCTIONS
Your Recovery  Bladder augmentation is surgery to make the bladder larger and improve its ability to stretch. After surgery, your bladder should be able to hold more urine. After surgery, you may feel weak and tired at first. You will probably feel some pain or cramping in your lower belly and need pain medicine for a week or two. Try to avoid heavy lifting and strenuous activities that might put extra pressure on your bladder while you recover. This care sheet gives you a general idea about how long it will take for you to recover. But each person recovers at a different pace. Follow the steps below to get better as quickly as possible. How can you care for yourself at home? Activity  · Rest when you feel tired. Getting enough sleep will help you recover. · Try to walk each day. Start by walking a little more than you did the day before. Bit by bit, increase the amount you walk. Walking boosts blood flow and helps prevent pneumonia and constipation. · Avoid strenuous activities, such as bicycle riding, jogging, weight lifting, or aerobic exercise, for 6 to 8 weeks or until your doctor says it is okay. · For 6 to 8 weeks or until your doctor says it is okay, avoid lifting anything that would make you strain. This may include a child, heavy grocery bags and milk containers, a heavy briefcase or backpack, cat litter or dog food bags, or a vacuum . · Ask your doctor when you can drive again. · You will probably need to take 72 hours off from work. It depends on the type of work you do and how you feel. · You may shower as usual. Pat the cut (incision) dry. Do not take a bath until your doctor tells you it is okay. · Ask your doctor when it is okay for you to have sex. Diet  1. You can eat your normal diet. If your stomach is upset, try bland, low-fat foods like plain rice, broiled chicken, toast, and yogurt. 2. Drink plenty of fluids (unless your doctor tells you not to).   3. You may notice that your bowel movements are not regular right after your surgery. This is common. Try to avoid constipation and straining with bowel movements. You may want to take a fiber supplement every day. If you have not had a bowel movement after a couple of days, ask your doctor about taking a mild laxative. Medicines  1. Your doctor will tell you if and when you can restart your medicines. He or she will also give you instructions about taking any new medicines. 2. If you take blood thinners, such as warfarin (Coumadin), clopidogrel (Plavix), or aspirin, be sure to talk to your doctor. He or she will tell you if and when to start taking those medicines again. Make sure that you understand exactly what your doctor wants you to do. 3. Be safe with medicines. Take pain medicines exactly as directed. 1. If the doctor gave you a prescription medicine for pain, take it as prescribed. 2. If you are not taking a prescription pain medicine, ask your doctor if you can take an over-the-counter medicine. 4. If you think your pain medicine is making you sick to your stomach:  1. Take your medicine after meals (unless your doctor has told you not to). 2. Ask your doctor for a different pain medicine. 5. If your doctor prescribed antibiotics, take them as directed. Do not stop taking them just because you feel better. You need to take the full course of antibiotics. Medication Interaction:  During your procedure you potentially received a medication or medications which may reduce the effectiveness of oral contraceptives. Please consider other forms of contraception for 1 month following your procedure if you are currently using oral contraceptives as your primary form of birth control. In addition to this, we recommend continuing your oral contraceptive as prescribed, unless otherwise instructed by your physician, during this time.     Other instructions  · If your doctor has told you to flush your bladder, follow his or her instructions on how to do this. Follow-up care is a key part of your treatment and safety. Be sure to make and go to all appointments, and call your doctor if you are having problems. It's also a good idea to know your test results and keep a list of the medicines you take. When should you call for help? Call 911 anytime you think you may need emergency care. For example, call if:  · You passed out (lost consciousness). · You have severe trouble breathing. · You have sudden chest pain and shortness of breath, or you cough up blood. · You have severe pain in your belly. Call your doctor now or seek immediate medical care if:  · You have bright red vaginal bleeding that soaks one or more pads in an hour, or you have large clots. · You are sick to your stomach or cannot keep fluids down. · You have pain that does not get better after you take pain medicine. · You have loose stitches, or your incision comes open. · Your incision is bleeding. · You have vaginal discharge that has increased in amount or smells bad. · Your catheters come out. · Your catheters are not draining urine, even after you flush your bladder. · You have signs of infection, such as:  ¨ Increased pain, swelling, warmth, or redness. ¨ Red streaks leading from the incision. ¨ Pus draining from the incision. ¨ A fever. · You have clots of blood in your urine. · You have trouble passing urine or stool, especially if you have pain or swelling in your lower belly. · You have new or worse pain when you urinate. · You have pain in your back just below your rib cage. This is called flank pain. Watch closely for changes in your health, and be sure to contact your doctor if:  You do not have a bowel movement after taking a laxative.     After general anesthesia or intravenous sedation, for 24 hours or while taking prescription Narcotics:  · Limit your activities  · A responsible adult needs to be with you for the next 24 hours  · Do not drive and operate hazardous machinery  · Do not make important personal or business decisions  · Do not drink alcoholic beverages  · If you have not urinated within 8 hours after discharge, and you are experiencing discomfort from urinary retention, please go to the nearest ED. · If you have sleep apnea and have a CPAP machine, please use it for all naps and sleeping. · Please use caution when taking narcotics and any of your home medications that may cause drowsiness. *  Please give a list of your current medications to your Primary Care Provider. *  Please update this list whenever your medications are discontinued, doses are      changed, or new medications (including over-the-counter products) are added. *  Please carry medication information at all times in case of emergency situations. These are general instructions for a healthy lifestyle:  No smoking/ No tobacco products/ Avoid exposure to second hand smoke  Surgeon General's Warning:  Quitting smoking now greatly reduces serious risk to your health. Obesity, smoking, and sedentary lifestyle greatly increases your risk for illness  A healthy diet, regular physical exercise & weight monitoring are important for maintaining a healthy lifestyle    You may be retaining fluid if you have a history of heart failure or if you experience any of the following symptoms:  Weight gain of 3 pounds or more overnight or 5 pounds in a week, increased swelling in our hands or feet or shortness of breath while lying flat in bed. Please call your doctor as soon as you notice any of these symptoms; do not wait until your next office visit.

## 2022-01-11 NOTE — PERIOP NOTES
Pt and friend Bruce Ora given discharge instructions at bedside, both verbalize understanding. All questions answered.

## 2022-01-11 NOTE — OP NOTES
[facility]  OPERATIVE REPORT    Name:  Shawanda Morales  MR#:  [id number]  :  1965  ACCOUNT #:  [de-identified]  DATE OF SERVICE:  2022    PREOPERATIVE DIAGNOSIS:  Interstitial cystitis. POSTOPERATIVE DIAGNOSIS:  Interstitial cystitis. PROCEDURE PERFORMED:  Cystoscopy with hydrodistention of the bladder. SURGEON:  Vin Kingston MD    ASSISTANT:  None. ANESTHESIA:  General.    COMPLICATIONS:  None. SPECIMENS REMOVED:  None. IMPLANTS:  None. ESTIMATED BLOOD LOSS:  None. FINDINGS:  Bladder capacity of approximately 800 mL under anesthesia. Minimal glomerulations noted after hydrodistention. PROCEDURE:  The patient was given general anesthetic, placed in dorsal lithotomy position. A B and O suppository was placed in the rectum. She was prepped and draped in sterile fashion for cystoscopy. A 22-Senegalese cystoscope was advanced into the urethra using video camera and 30-degree lens. The urethra was normal.  Bladder mucosa appeared normal and the ureteral orifices were normal.    Bladder was distended with irrigation bag held 80 cm anterior to the level of the bladder. It was held distended for 8 minutes and then allowed to drain. The capacity on anesthesia was approximately 800 mL. The bladder mucosa after hydrodistention appears predominately normal.  There were some tiny glomerulations noted at the trigone but the overall impression was that there were not many glomerulations. There was no evidence of any bladder trauma. We then instilled 30 mL of Marcaine into the bladder and removed the scope after draining the bladder. PLAN:  She will be discharged home. Return to the office in one month. She may restart her Eliquis tomorrow.       MD DIANA [id number]  D:  2022 8:35  T:  2022 12:53  JOB #:  [id number]

## 2022-01-11 NOTE — PROGRESS NOTES
's pre-procedure visit and prayer with patient as requested.     Gayatri Husain MDiv, BS  Board Certified

## 2022-01-11 NOTE — ANESTHESIA PREPROCEDURE EVALUATION
Anesthetic History   No history of anesthetic complications            Review of Systems / Medical History  Patient summary reviewed and pertinent labs reviewed    Pulmonary    COPD: mild      Shortness of breath (Requiring 3L O2 with exertion and at night) and smoker  Asthma : well controlled       Neuro/Psych       CVA (\"mild stroke\" pt reports 2014- \"left sided weakness and balance is off)  Psychiatric history     Cardiovascular    Hypertension: well controlled  Valvular problems/murmurs (Rheumatic aortic insufficiency  moderate and stable EF and LV size post Takotsubo ): tricuspid insufficiency, mitral insufficiency and aortic insufficiency    CHF    CAD    Exercise tolerance: <4 METS  Comments: Echo (7-2019) - moderate AI, mild TR, mild MR and,  Moderate global HK, EF 30-35%    MPS 2021: . Ef 44%,There is a left ventricular perfusion defect that is large in size present in the apical segment(s) of the anterior, septal and anterolateral wall(s) that is non-reversible. The defect appears to be infarction. Decreased functional status but this is her baseline and chronic.  No recent changes   GI/Hepatic/Renal     GERD: well controlled           Endo/Other        Arthritis     Other Findings              Physical Exam    Airway  Mallampati: II  TM Distance: 4 - 6 cm  Neck ROM: normal range of motion   Mouth opening: Normal     Cardiovascular    Rhythm: regular  Rate: normal         Dental    Dentition: Edentulous     Pulmonary  Breath sounds clear to auscultation               Abdominal  GI exam deferred       Other Findings            Anesthetic Plan    ASA: 4  Anesthesia type: general          Induction: Intravenous  Anesthetic plan and risks discussed with: Patient and Family

## 2022-01-13 ENCOUNTER — HOSPITAL ENCOUNTER (EMERGENCY)
Age: 57
Discharge: LEFT BEFORE DISCHARGE | End: 2022-01-13
Attending: EMERGENCY MEDICINE
Payer: COMMERCIAL

## 2022-01-13 VITALS
WEIGHT: 176 LBS | HEART RATE: 97 BPM | TEMPERATURE: 97.3 F | SYSTOLIC BLOOD PRESSURE: 106 MMHG | BODY MASS INDEX: 29.32 KG/M2 | HEIGHT: 65 IN | RESPIRATION RATE: 18 BRPM | DIASTOLIC BLOOD PRESSURE: 74 MMHG | OXYGEN SATURATION: 99 %

## 2022-01-13 LAB
ALBUMIN SERPL-MCNC: 3.1 G/DL (ref 3.5–5)
ALBUMIN/GLOB SERPL: 1 {RATIO} (ref 1.2–3.5)
ALP SERPL-CCNC: 63 U/L (ref 50–136)
ALT SERPL-CCNC: 32 U/L (ref 12–65)
ANION GAP SERPL CALC-SCNC: 5 MMOL/L (ref 7–16)
AST SERPL-CCNC: 43 U/L (ref 15–37)
BASOPHILS # BLD: 0 K/UL (ref 0–0.2)
BASOPHILS NFR BLD: 1 % (ref 0–2)
BILIRUB SERPL-MCNC: 0.2 MG/DL (ref 0.2–1.1)
BUN SERPL-MCNC: 10 MG/DL (ref 6–23)
CALCIUM SERPL-MCNC: 8.8 MG/DL (ref 8.3–10.4)
CHLORIDE SERPL-SCNC: 106 MMOL/L (ref 98–107)
CO2 SERPL-SCNC: 27 MMOL/L (ref 21–32)
CREAT SERPL-MCNC: 0.74 MG/DL (ref 0.6–1)
DIFFERENTIAL METHOD BLD: ABNORMAL
EOSINOPHIL # BLD: 0 K/UL (ref 0–0.8)
EOSINOPHIL NFR BLD: 1 % (ref 0.5–7.8)
ERYTHROCYTE [DISTWIDTH] IN BLOOD BY AUTOMATED COUNT: 13.1 % (ref 11.9–14.6)
ERYTHROCYTE [SEDIMENTATION RATE] IN BLOOD: 24 MM/HR (ref 0–30)
GLOBULIN SER CALC-MCNC: 3.2 G/DL (ref 2.3–3.5)
GLUCOSE SERPL-MCNC: 105 MG/DL (ref 65–100)
HCT VFR BLD AUTO: 30.8 % (ref 35.8–46.3)
HGB BLD-MCNC: 10 G/DL (ref 11.7–15.4)
IMM GRANULOCYTES # BLD AUTO: 0 K/UL (ref 0–0.5)
IMM GRANULOCYTES NFR BLD AUTO: 0 % (ref 0–5)
LYMPHOCYTES # BLD: 1.9 K/UL (ref 0.5–4.6)
LYMPHOCYTES NFR BLD: 43 % (ref 13–44)
MCH RBC QN AUTO: 30.8 PG (ref 26.1–32.9)
MCHC RBC AUTO-ENTMCNC: 32.5 G/DL (ref 31.4–35)
MCV RBC AUTO: 94.8 FL (ref 79.6–97.8)
MONOCYTES # BLD: 0.5 K/UL (ref 0.1–1.3)
MONOCYTES NFR BLD: 12 % (ref 4–12)
NEUTS SEG # BLD: 2.1 K/UL (ref 1.7–8.2)
NEUTS SEG NFR BLD: 43 % (ref 43–78)
NRBC # BLD: 0 K/UL (ref 0–0.2)
PLATELET # BLD AUTO: 244 K/UL (ref 150–450)
PLATELET COMMENTS,PCOM: ADEQUATE
PMV BLD AUTO: 9.8 FL (ref 9.4–12.3)
POTASSIUM SERPL-SCNC: 4.6 MMOL/L (ref 3.5–5.1)
PROT SERPL-MCNC: 6.3 G/DL (ref 6.3–8.2)
RBC # BLD AUTO: 3.25 M/UL (ref 4.05–5.2)
RBC MORPH BLD: ABNORMAL
SODIUM SERPL-SCNC: 138 MMOL/L (ref 136–145)
WBC # BLD AUTO: 4.5 K/UL (ref 4.3–11.1)
WBC MORPH BLD: ABNORMAL

## 2022-01-13 PROCEDURE — 80053 COMPREHEN METABOLIC PANEL: CPT

## 2022-01-13 PROCEDURE — 85652 RBC SED RATE AUTOMATED: CPT

## 2022-01-13 PROCEDURE — 75810000275 HC EMERGENCY DEPT VISIT NO LEVEL OF CARE

## 2022-01-13 PROCEDURE — 85025 COMPLETE CBC W/AUTO DIFF WBC: CPT

## 2022-01-13 NOTE — ED TRIAGE NOTES
Pt ambulatory to triage. Re[prtss several problems, mainly that shes got \"a stiff neck, pain in my left temple and pain in my left jaw. . like its locking up\" x 3-4 days. Report no relief with medications, reports more painful with certain movements.

## 2022-03-16 NOTE — LETTER
[facility] EMERGENCY DEPT 
[address] [facility] 
[phone] Work/School Note Date: 10/27/2019 To Whom It May concern: 
 
Angélica Guzmán was seen and treated today in the emergency room by the following provider(s): 
Attending Provider: Yen Coelho MD 
Physician Assistant: Laurann Merlin, PA. Angélica Guzmán may return to work on 10/29/19. Sincerely, SHANNAN Moise 
 
 
 

Oriented - self; Oriented - place; Oriented - time

## 2022-03-17 ENCOUNTER — NURSE TRIAGE (OUTPATIENT)
Dept: OTHER | Facility: CLINIC | Age: 57
End: 2022-03-17

## 2022-03-17 NOTE — TELEPHONE ENCOUNTER
Received call from Jeni at [facility] with [employer] Complaint; Patient with Red Flag Complaint requesting to establish care with [facility]. Subjective: Caller states \"Neck swelling\"     Current Symptoms: Neck pain \"for awhile\". Seen at the ED on 1/13/22 for similar. Headache. Pain radiates to left arm and leg at times. Sometimes will have right-sided chest pain. All of these sx have been going on for months. Tingling on foot and loss of sensation on hands. Onset: a few months ago; intermittent    Associated Symptoms: NA    Pain Severity: 10/10; throbbing; intermittent    Temperature: unsure    What has been tried: \"tried everything\"    LMP: NA Pregnant: NA    Recommended disposition: See in Office Today    Care advice provided, patient verbalizes understanding; denies any other questions or concerns; instructed to call back for any new or worsening symptoms. Patient/Caller agrees with recommended disposition; writer provided warm transfer to [facility] for appointment scheduling    Attention Provider: Thank you for allowing me to participate in the care of your patient. The patient was connected to triage in response to information provided to the [facility]. Please do not respond through this encounter as the response is not directed to a shared pool.     Reason for Disposition   Numbness in an arm or hand (i.e., loss of sensation)    Protocols used: NECK PAIN OR STIFFNESS-ADULT-[place]

## 2022-03-18 PROBLEM — D64.9 ANEMIA: Status: ACTIVE | Noted: 2018-05-19

## 2022-03-18 PROBLEM — E78.5 HYPERLIPIDEMIA: Status: ACTIVE | Noted: 2018-05-19

## 2022-03-18 PROBLEM — I50.20 HEART FAILURE WITH REDUCED EJECTION FRACTION (HCC): Status: ACTIVE | Noted: 2020-10-19

## 2022-03-18 PROBLEM — N30.10 IC (INTERSTITIAL CYSTITIS): Status: ACTIVE | Noted: 2019-01-25

## 2022-03-18 PROBLEM — J18.9 BILATERAL PNEUMONIA: Status: ACTIVE | Noted: 2018-11-24

## 2022-03-18 PROBLEM — I10 HTN (HYPERTENSION): Status: ACTIVE | Noted: 2018-06-06

## 2022-03-18 PROBLEM — K22.70 BARRETT'S ESOPHAGUS WITHOUT DYSPLASIA: Status: ACTIVE | Noted: 2017-09-20

## 2022-03-18 PROBLEM — E89.40 SURGICAL MENOPAUSE: Status: ACTIVE | Noted: 2018-03-19

## 2022-03-18 PROBLEM — E04.2 MULTIPLE THYROID NODULES: Status: ACTIVE | Noted: 2020-04-29

## 2022-03-18 PROBLEM — I51.89 DIASTOLIC DYSFUNCTION: Status: ACTIVE | Noted: 2018-05-19

## 2022-03-18 PROBLEM — R29.3 POSTURAL IMBALANCE: Status: ACTIVE | Noted: 2020-05-05

## 2022-03-19 PROBLEM — I34.0 MITRAL VALVE REGURGITATION: Status: ACTIVE | Noted: 2018-06-06

## 2022-03-19 PROBLEM — G47.00 INSOMNIA: Status: ACTIVE | Noted: 2020-09-21

## 2022-03-19 PROBLEM — N64.4 BREAST PAIN, LEFT: Status: ACTIVE | Noted: 2017-10-06

## 2022-03-19 PROBLEM — J96.01 ACUTE RESPIRATORY FAILURE WITH HYPOXIA (HCC): Status: ACTIVE | Noted: 2017-11-27

## 2022-03-19 PROBLEM — J96.11 CHRONIC HYPOXEMIC RESPIRATORY FAILURE (HCC): Status: ACTIVE | Noted: 2018-05-19

## 2022-03-19 PROBLEM — F19.20 POLYSUBSTANCE DEPENDENCE INCLUDING OPIOID TYPE DRUG WITH COMPLICATION, EPISODIC ABUSE (HCC): Status: ACTIVE | Noted: 2018-12-10

## 2022-03-19 PROBLEM — I99.9 COCAINE-INDUCED VASCULAR DISORDER (HCC): Status: ACTIVE | Noted: 2017-05-28

## 2022-03-19 PROBLEM — F33.9 DEPRESSION, RECURRENT (HCC): Status: ACTIVE | Noted: 2018-01-12

## 2022-03-19 PROBLEM — T50.905A MEDICATION SIDE EFFECT, INITIAL ENCOUNTER: Status: ACTIVE | Noted: 2020-12-10

## 2022-03-19 PROBLEM — Z00.00 HEALTHCARE MAINTENANCE: Status: ACTIVE | Noted: 2018-03-16

## 2022-03-19 PROBLEM — F17.200 NICOTINE DEPENDENCE: Status: ACTIVE | Noted: 2017-11-25
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
## 2022-03-19 PROBLEM — K21.9 GASTROESOPHAGEAL REFLUX DISEASE: Status: ACTIVE | Noted: 2020-09-21

## 2022-03-19 PROBLEM — S06.0X1A CONCUSSION WTH LOSS OF CONSCIOUSNESS OF 30 MINUTES OR LESS: Status: ACTIVE | Noted: 2020-05-05

## 2022-03-19 PROBLEM — G44.301 INTRACTABLE POST-TRAUMATIC HEADACHE: Status: ACTIVE | Noted: 2020-05-05

## 2022-03-19 PROBLEM — G90.01 CAROTIDYNIA: Status: ACTIVE | Noted: 2017-10-06

## 2022-03-19 PROBLEM — M54.2 CERVICALGIA: Status: ACTIVE | Noted: 2020-12-10

## 2022-03-19 PROBLEM — G89.4 CHRONIC PAIN SYNDROME: Status: ACTIVE | Noted: 2020-09-21

## 2022-03-19 PROBLEM — J96.10 CHRONIC RESPIRATORY FAILURE (HCC): Status: ACTIVE | Noted: 2017-12-04

## 2022-03-19 PROBLEM — M25.512 LEFT SHOULDER PAIN: Status: ACTIVE | Noted: 2018-05-19

## 2022-03-19 PROBLEM — I05.9 RHEUMATIC DISEASE OF MITRAL VALVE: Status: ACTIVE | Noted: 2017-11-25

## 2022-03-19 PROBLEM — J44.9 COPD (CHRONIC OBSTRUCTIVE PULMONARY DISEASE) (HCC): Status: ACTIVE | Noted: 2018-05-19

## 2022-03-19 PROBLEM — I50.22 CHRONIC SYSTOLIC HEART FAILURE (HCC): Status: ACTIVE | Noted: 2018-10-18

## 2022-03-19 PROBLEM — N89.8 VAGINAL DISCHARGE: Status: ACTIVE | Noted: 2018-03-16

## 2022-03-19 PROBLEM — M54.12 CERVICAL RADICULOPATHY: Status: ACTIVE | Noted: 2020-09-21

## 2022-03-19 PROBLEM — Z90.710 STATUS POST HYSTERECTOMY: Status: ACTIVE | Noted: 2018-03-16

## 2022-03-19 PROBLEM — J18.9 PNEUMONIA: Status: ACTIVE | Noted: 2018-12-10

## 2022-03-19 PROBLEM — I21.4 NSTEMI (NON-ST ELEVATED MYOCARDIAL INFARCTION) (HCC): Status: ACTIVE | Noted: 2017-05-28

## 2022-03-19 PROBLEM — M50.30 DDD (DEGENERATIVE DISC DISEASE), CERVICAL: Status: ACTIVE | Noted: 2020-10-19

## 2022-03-19 PROBLEM — F14.988 COCAINE-INDUCED VASCULAR DISORDER (HCC): Status: ACTIVE | Noted: 2017-05-28

## 2022-03-20 PROBLEM — Z76.5 DRUG-SEEKING BEHAVIOR: Status: ACTIVE | Noted: 2018-12-13

## 2022-03-20 PROBLEM — Z86.718 HISTORY OF DVT (DEEP VEIN THROMBOSIS): Status: ACTIVE | Noted: 2018-04-14

## 2022-03-20 PROBLEM — Z86.69 HISTORY OF MIGRAINE HEADACHES: Status: ACTIVE | Noted: 2020-09-21

## 2022-03-20 PROBLEM — N95.1 VASOMOTOR SYMPTOMS DUE TO MENOPAUSE: Status: ACTIVE | Noted: 2018-05-13

## 2022-03-20 PROBLEM — G43.019 INTRACTABLE MIGRAINE WITHOUT AURA AND WITHOUT STATUS MIGRAINOSUS: Status: ACTIVE | Noted: 2020-12-10

## 2022-03-20 PROBLEM — I51.81 TAKOTSUBO CARDIOMYOPATHY: Status: ACTIVE | Noted: 2017-11-25

## 2022-03-20 PROBLEM — I06.1 RHEUMATIC AORTIC INSUFFICIENCY: Status: ACTIVE | Noted: 2017-11-25

## 2022-03-20 PROBLEM — I35.1 AORTIC VALVE REGURGITATION: Status: ACTIVE | Noted: 2018-06-06

## 2022-03-20 PROBLEM — M79.604 RIGHT LEG PAIN: Status: ACTIVE | Noted: 2018-03-01

## 2022-04-29 PROBLEM — Z13.31 POSITIVE DEPRESSION SCREENING: Status: ACTIVE | Noted: 2022-04-29

## 2022-04-29 PROBLEM — F51.04 CHRONIC INSOMNIA: Status: ACTIVE | Noted: 2020-09-21

## 2022-04-29 PROBLEM — M54.2 NECK PAIN: Status: ACTIVE | Noted: 2022-04-29

## 2022-05-02 ENCOUNTER — APPOINTMENT (OUTPATIENT)
Dept: GENERAL RADIOLOGY | Age: 57
End: 2022-05-02
Attending: EMERGENCY MEDICINE
Payer: COMMERCIAL

## 2022-05-02 ENCOUNTER — HOSPITAL ENCOUNTER (EMERGENCY)
Age: 57
Discharge: HOME | End: 2022-05-02
Attending: EMERGENCY MEDICINE
Payer: COMMERCIAL

## 2022-05-02 VITALS
TEMPERATURE: 97.5 F | SYSTOLIC BLOOD PRESSURE: 120 MMHG | HEIGHT: 64 IN | BODY MASS INDEX: 28.17 KG/M2 | RESPIRATION RATE: 21 BRPM | HEART RATE: 88 BPM | DIASTOLIC BLOOD PRESSURE: 67 MMHG | WEIGHT: 165 LBS | OXYGEN SATURATION: 99 %

## 2022-05-02 DIAGNOSIS — J20.9 ACUTE BRONCHITIS, UNSPECIFIED ORGANISM: Primary | ICD-10-CM

## 2022-05-02 LAB
ALBUMIN SERPL-MCNC: 3.5 G/DL (ref 3.5–5)
ALBUMIN/GLOB SERPL: 0.9 {RATIO} (ref 1.2–3.5)
ALP SERPL-CCNC: 69 U/L (ref 50–136)
ALT SERPL-CCNC: 21 U/L (ref 12–65)
ANION GAP SERPL CALC-SCNC: 10 MMOL/L (ref 7–16)
AST SERPL-CCNC: 30 U/L (ref 15–37)
ATRIAL RATE: 93 BPM
BASOPHILS # BLD: 0 K/UL (ref 0–0.2)
BASOPHILS NFR BLD: 1 % (ref 0–2)
BILIRUB SERPL-MCNC: 0.4 MG/DL (ref 0.2–1.1)
BUN SERPL-MCNC: 12 MG/DL (ref 6–23)
CALCIUM SERPL-MCNC: 9.3 MG/DL (ref 8.3–10.4)
CALCULATED P AXIS, ECG09: 69 DEGREES
CALCULATED R AXIS, ECG10: 12 DEGREES
CALCULATED T AXIS, ECG11: 76 DEGREES
CHLORIDE SERPL-SCNC: 108 MMOL/L (ref 98–107)
CO2 SERPL-SCNC: 22 MMOL/L (ref 21–32)
CREAT SERPL-MCNC: 1 MG/DL (ref 0.6–1)
DIAGNOSIS, 93000: NORMAL
DIFFERENTIAL METHOD BLD: ABNORMAL
EOSINOPHIL # BLD: 0 K/UL (ref 0–0.8)
EOSINOPHIL NFR BLD: 1 % (ref 0.5–7.8)
ERYTHROCYTE [DISTWIDTH] IN BLOOD BY AUTOMATED COUNT: 12.9 % (ref 11.9–14.6)
GLOBULIN SER CALC-MCNC: 4 G/DL (ref 2.3–3.5)
GLUCOSE SERPL-MCNC: 88 MG/DL (ref 65–100)
HCT VFR BLD AUTO: 30.7 % (ref 35.8–46.3)
HGB BLD-MCNC: 9.9 G/DL (ref 11.7–15.4)
IMM GRANULOCYTES # BLD AUTO: 0 K/UL (ref 0–0.5)
IMM GRANULOCYTES NFR BLD AUTO: 0 % (ref 0–5)
LIPASE SERPL-CCNC: 64 U/L (ref 73–393)
LYMPHOCYTES # BLD: 1.7 K/UL (ref 0.5–4.6)
LYMPHOCYTES NFR BLD: 31 % (ref 13–44)
MAGNESIUM SERPL-MCNC: 1.9 MG/DL (ref 1.8–2.4)
MCH RBC QN AUTO: 30.9 PG (ref 26.1–32.9)
MCHC RBC AUTO-ENTMCNC: 32.2 G/DL (ref 31.4–35)
MCV RBC AUTO: 95.9 FL (ref 79.6–97.8)
MONOCYTES # BLD: 0.6 K/UL (ref 0.1–1.3)
MONOCYTES NFR BLD: 10 % (ref 4–12)
NEUTS SEG # BLD: 3.1 K/UL (ref 1.7–8.2)
NEUTS SEG NFR BLD: 58 % (ref 43–78)
NRBC # BLD: 0 K/UL (ref 0–0.2)
P-R INTERVAL, ECG05: 138 MS
PLATELET # BLD AUTO: 314 K/UL (ref 150–450)
PMV BLD AUTO: 9.8 FL (ref 9.4–12.3)
POTASSIUM SERPL-SCNC: 3.6 MMOL/L (ref 3.5–5.1)
PROT SERPL-MCNC: 7.5 G/DL (ref 6.3–8.2)
Q-T INTERVAL, ECG07: 406 MS
QRS DURATION, ECG06: 80 MS
QTC CALCULATION (BEZET), ECG08: 504 MS
RBC # BLD AUTO: 3.2 M/UL (ref 4.05–5.2)
SODIUM SERPL-SCNC: 140 MMOL/L (ref 136–145)
TROPONIN-HIGH SENSITIVITY: 6.3 PG/ML (ref 0–14)
TROPONIN-HIGH SENSITIVITY: 8.2 PG/ML (ref 0–14)
VENTRICULAR RATE, ECG03: 93 BPM
WBC # BLD AUTO: 5.4 K/UL (ref 4.3–11.1)

## 2022-05-02 PROCEDURE — 99285 EMERGENCY DEPT VISIT HI MDM: CPT

## 2022-05-02 PROCEDURE — 84484 ASSAY OF TROPONIN QUANT: CPT

## 2022-05-02 PROCEDURE — 85025 COMPLETE CBC W/AUTO DIFF WBC: CPT

## 2022-05-02 PROCEDURE — 80053 COMPREHEN METABOLIC PANEL: CPT

## 2022-05-02 PROCEDURE — 93005 ELECTROCARDIOGRAM TRACING: CPT | Performed by: EMERGENCY MEDICINE

## 2022-05-02 PROCEDURE — 83690 ASSAY OF LIPASE: CPT

## 2022-05-02 PROCEDURE — 71046 X-RAY EXAM CHEST 2 VIEWS: CPT

## 2022-05-02 PROCEDURE — 83735 ASSAY OF MAGNESIUM: CPT

## 2022-05-02 RX ORDER — SODIUM CHLORIDE 0.9 % (FLUSH) 0.9 %
5-10 SYRINGE (ML) INJECTION EVERY 8 HOURS
Status: DISCONTINUED | OUTPATIENT
Start: 2022-05-02 | End: 2022-05-03 | Stop reason: HOSPADM

## 2022-05-02 RX ORDER — SODIUM CHLORIDE 0.9 % (FLUSH) 0.9 %
5-10 SYRINGE (ML) INJECTION AS NEEDED
Status: DISCONTINUED | OUTPATIENT
Start: 2022-05-02 | End: 2022-05-03 | Stop reason: HOSPADM

## 2022-05-02 RX ORDER — BENZONATATE 100 MG/1
100 CAPSULE ORAL
Qty: 20 CAPSULE | Refills: 0 | Status: SHIPPED | OUTPATIENT
Start: 2022-05-02

## 2022-05-02 NOTE — ED NOTES
Reports chest pain radiating to left shoulder blade and head since this AM. Reports cardiologist sent her to ER.

## 2022-05-03 NOTE — ED NOTES
I have reviewed discharge instructions with the patient. The patient verbalized understanding. Patient left ED via Discharge Method: ambulatory to Home with  self    Opportunity for questions and clarification provided. Patient given 1 scripts. To continue your aftercare when you leave the hospital, you may receive an automated call from our care team to check in on how you are doing. This is a free service and part of our promise to provide the best care and service to meet your aftercare needs.  If you have questions, or wish to unsubscribe from this service please call [phone]. Thank you for Choosing our [address] [place] [address] Emergency Department.

## 2022-05-03 NOTE — ED PROVIDER NOTES
56-year-old female with a history of cocaine abuse, anemia, arrhythmia, breast cancer, cardiomyopathy, congestive heart failure, chronic pain, coagulation defect on Eliquis, COPD, GERD, diverticulitis, depression, hypertension, irritable bowel syndrome, interstitial cystitis, aortic regurgitation, anxiety, thromboembolus, stroke presents with cough, chest pain, and shortness of breath since this morning. She also reports left shoulder pain. Chest pain worse with cough. She does not smoke. No fever or hemoptysis. Chest Pain   Associated symptoms include cough and shortness of breath. Pertinent negatives include no abdominal pain, no back pain, no fever, no headaches, no nausea and no vomiting.         Past Medical History:   Diagnosis Date    Anemia     blood transfusion 5/2018 per pt    Arrhythmia     palpitations, moderate mitral valve regurge    Arthritis     lower back osteo    Asthma     uses inhalers    Cancer ([facility]     left breast ca; s/p surgery     Cardiomyopathy     ECHO 11/5/2020 LVEF 30-35%    CHF (congestive heart failure) ([facility]      followed by dr Gayatri Quarles; ECHO 11/5/2020 EF 30-35% mod to severe AVR; moderate MVR    Chronic pain 2010    Coagulation defect ([address]     eliquis    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 ([facility] [address] [facility] 2013    COPD     nebulizer daily and maint inhalers, can not climb 1 flight of stairs (also CHF)  oxygen 3 LPM qhs    Decreased cardiac ejection fraction 11/27/2017    Depression     controlled      Diverticulitis     GERD (gastroesophageal reflux disease)     pantoprazole- well controlled     Heart murmur     Echo 11/5/20  LVEF 3035%    Hypertension     managed with meds    IBS (irritable bowel syndrome)     IC (interstitial cystitis)     Insomnia     Migraine with aura and without status migrainosus, not intractable 6/17/2016    Mitral valve regurgitation, rheumatic     followed Dr. Álvarez Inch    Moderate aortic regurgitation     per echo 7/2019 in cc-- followed by dr Shea Freeman Palpitations 5/16/2016    Psychiatric disorder     anxiety    Requires oxygen therapy     3l/min at hs. prn during the days as needed    Rheumatic aortic insufficiency 5/16/2016    Rheumatic fever as child    pt reports rheumatic fever in childhood    Smoker     started age 23-- smoked 0.5 ppd until 2018-- cut back to 10-2 cig daily per pt    Stroke [facility]) 2014    \"mild stroke\" pt reports 2014- \"left sided weakness and balance is off\"     Thromboembolus ([address]     BLE 2012       Past Surgical History:   Procedure Laterality Date    COLONOSCOPY      8 polyps removed, diverticulitis    COLONOSCOPY N/A 5/21/2018    COLONOSCOPY performed by Lindsey Rodas MD at [address]  8-23-11    bladder dilitation    EGD      HX APPENDECTOMY  2006    HX CERVICAL FUSION  07/14/2020    ACDF C4-6 with Dr. Nevaeh Bolton  5/27/2011    no blockages, no intervention    HX HEART CATHETERIZATION  04/2017    no intervention    HX HEART CATHETERIZATION  2018    no intervention    HX LAP CHOLECYSTECTOMY  6/6/2011    HX ORTHOPAEDIC Right 07/2019    4th toe -- surg repair    HX NATASHA AND BSO  1997    fibroid tumors, hyst; no cervix     HX UROLOGICAL  01/2018    cystoscopy with hydrodistention of bladder multiple    HX UROLOGICAL  06/30/2020    cysto    [place] BIOPSY OF BREAST, INCISIONAL Left     lymph node , left, patient states her bx neg, but high risk         Family History:   Problem Relation Age of Onset    Heart Failure Father 76        chf    Heart Disease Father         PPM; secondary to CAD, drugs     Other Father         internal bleeding     Diabetes Sister     Psoriasis Mother     Thyroid Disease Sister         hyper; s/p thyroidectomy     Kidney Disease Brother     No Known Problems Sister     Seizures Brother     Diabetes Maternal Grandmother     Hypertension Maternal Grandmother     Emphysema Maternal Grandfather     Heart Disease Paternal Grandmother     Heart Disease Paternal Grandfather        Social History     Socioeconomic History    Marital status: LEGALLY      Spouse name: Not on file    Number of children: Not on file    Years of education: Not on file    Highest education level: Not on file   Occupational History    Not on file   Tobacco Use    Smoking status: Former Smoker     Packs/day: 0.25     Years: 30.00     Pack years: 7.50     Quit date: 2019     Years since quittin.4    Smokeless tobacco: Never Used   Vaping Use    Vaping Use: Never used   Substance and Sexual Activity    Alcohol use: No    Drug use: Not Currently     Types: Cocaine, Opiates     Comment: 10/25/2021 patient denies current drug use     Sexual activity: Not Currently   Other Topics Concern    Not on file   Social History Narrative    Not on file     Social Determinants of Health     Financial Resource Strain:     Difficulty of Paying Living Expenses: Not on file   Food Insecurity:     Worried About [address] in the Last Year: Not on file    [address] in the Last Year: Not on file   Transportation Needs:     Lack of Transportation (Medical): Not on file    Lack of Transportation (Non-Medical):  Not on file   Physical Activity:     Days of Exercise per Week: Not on file    Minutes of Exercise per Session: Not on file   Stress:     Feeling of Stress : Not on file   Social Connections:     Frequency of Communication with Friends and Family: Not on file    Frequency of Social Gatherings with Friends and Family: Not on file    Attends [employer] Services: Not on file    Active Member of Clubs or Organizations: Not on file    Attends Club or Organization Meetings: Not on file    Marital Status: Not on file   Intimate Partner Violence:     Fear of Current or Ex-Partner: Not on file    Emotionally Abused: Not on file    Physically Abused: Not on file    Sexually Abused: Not on file   Housing Stability:     Unable to Pay for Housing in the Last Year: Not on file    Number of Places Lived in the Last Year: Not on file    Unstable Housing in the Last Year: Not on file         ALLERGIES: Aspirin, Bentyl [dicyclomine], Ondansetron hcl, Plavix [clopidogrel], Toradol [ketorolac], Ultram [tramadol], and Zofran [ondansetron hcl (pf)]    Review of Systems   Constitutional: Positive for fatigue. Negative for fever. HENT: Negative for hearing loss. Eyes: Negative for visual disturbance. Respiratory: Positive for cough and shortness of breath. Cardiovascular: Positive for chest pain. Gastrointestinal: Negative for abdominal pain, diarrhea, nausea and vomiting. Musculoskeletal: Negative for back pain. Skin: Negative for rash. Neurological: Negative for headaches. Psychiatric/Behavioral: Negative for confusion. All other systems reviewed and are negative. Vitals:    05/02/22 1656 05/02/22 1928 05/02/22 1943 05/02/22 1958   BP: 116/72 115/75 117/70 (!) 143/79   Pulse: 94 89 88 89   Resp: 16 21     Temp: 97.5 °F (36.4 °C)      SpO2: 98% 98% 98% 98%   Weight: 74.8 kg (165 lb)      Height: 5' 4\" (1.626 m)               Physical Exam  Vitals and nursing note reviewed. Constitutional:       Appearance: Normal appearance. She is well-developed. Comments: somnolent   HENT:      Head: Normocephalic and atraumatic. Nose: Nose normal.      Mouth/Throat:      Mouth: Mucous membranes are moist.   Eyes:      Pupils: Pupils are equal, round, and reactive to light. Cardiovascular:      Rate and Rhythm: Regular rhythm. Heart sounds: Normal heart sounds. Pulmonary:      Effort: Pulmonary effort is normal.      Breath sounds: Normal breath sounds. Abdominal:      Palpations: Abdomen is soft. Tenderness: There is no abdominal tenderness. Musculoskeletal:         General: No deformity. Normal range of motion. Cervical back: Normal range of motion and neck supple.    Skin: General: Skin is warm and dry. Neurological:      General: No focal deficit present. Mental Status: She is alert. Mental status is at baseline. Psychiatric:         Mood and Affect: Mood normal.         Behavior: Behavior normal.          MDM  Number of Diagnoses or Management Options  Acute bronchitis, unspecified organism  Diagnosis management comments: Parts of this document were created using dragon voice recognition software. The chart has been reviewed but errors may still be present. I wore appropriate PPE throughout this patient's ED visit. Bang Renee MD, 9:05 PM    Patient very difficult to arouse. States she is sleeping and is cold and wants to be covered up with blankets. Vital signs stable. No hypoxia. Chest x-ray clear. Low suspicion for cardiac etiology. Likely bronchitis. I discussed the results of all labs, procedures, radiographs, and treatments with the patient and available family. Treatment plan is agreed upon and the patient is ready for discharge. Questions about treatment in the ED and differential diagnosis of presenting condition were answered. Patient was given verbal discharge instructions including, but not limited to, importance of returning to the emergency department for any concern of worsening or continued symptoms. Instructions were given to follow up with a primary care provider or specialist within 1-2 days. Adverse effects of medications, if prescribed, were discussed and patient was advised to refrain from significant physical activity until followed up by primary care physician and to not drive or operate heavy machinery after taking any sedating substances.            Amount and/or Complexity of Data Reviewed  Clinical lab tests: ordered and reviewed (Results for orders placed or performed during the hospital encounter of 05/02/22  -TROPONIN-HIGH SENSITIVITY:        Result                      Value             Ref Range           Troponin-High Sensitiv*     6.3               0 - 14 pg/mL   -CBC WITH AUTOMATED DIFF:        Result                      Value             Ref Range           WBC                         5.4               4.3 - 11.1 K*       RBC                         3.20 (L)          4.05 - 5.2 M*       HGB                         9.9 (L)           11.7 - 15.4 *       HCT                         30.7 (L)          35.8 - 46.3 %       MCV                         95.9              79.6 - 97.8 *       MCH                         30.9              26.1 - 32.9 *       MCHC                        32.2              31.4 - 35.0 *       RDW                         12.9              11.9 - 14.6 %       PLATELET                    314               150 - 450 K/*       MPV                         9.8               9.4 - 12.3 FL       ABSOLUTE NRBC               0.00              0.0 - 0.2 K/*       DF                          AUTOMATED                             NEUTROPHILS                 58                43 - 78 %           LYMPHOCYTES                 31                13 - 44 %           MONOCYTES                   10                4.0 - 12.0 %        EOSINOPHILS                 1                 0.5 - 7.8 %         BASOPHILS                   1                 0.0 - 2.0 %         IMMATURE GRANULOCYTES       0                 0.0 - 5.0 %         ABS. NEUTROPHILS            3.1               1.7 - 8.2 K/*       ABS. LYMPHOCYTES            1.7               0.5 - 4.6 K/*       ABS. MONOCYTES              0.6               0.1 - 1.3 K/*       ABS. EOSINOPHILS            0.0               0.0 - 0.8 K/*       ABS. BASOPHILS              0.0               0.0 - 0.2 K/*       ABS. IMM.  GRANS.            0.0               0.0 - 0.5 K/*  -METABOLIC PANEL, COMPREHENSIVE:        Result                      Value             Ref Range           Sodium                      140               136 - 145 mm*       Potassium                   3.6               3.5 - 5.1 mm*       Chloride                    108 (H)           98 - 107 mmo*       CO2                         22                21 - 32 mmol*       Anion gap                   10                7 - 16 mmol/L       Glucose                     88                65 - 100 mg/*       BUN                         12                6 - 23 MG/DL        Creatinine                  1.00              0.6 - 1.0 MG*       GFR est AA                  >60               >60 ml/min/1*       GFR est non-AA              >60               >60 ml/min/1*       Calcium                     9.3               8.3 - 10.4 M*       Bilirubin, total            0.4               0.2 - 1.1 MG*       ALT (SGPT)                  21                12 - 65 U/L         AST (SGOT)                  30                15 - 37 U/L         Alk.  phosphatase            69                50 - 136 U/L        Protein, total              7.5               6.3 - 8.2 g/*       Albumin                     3.5               3.5 - 5.0 g/*       Globulin                    4.0 (H)           2.3 - 3.5 g/*       A-G Ratio                   0.9 (L)           1.2 - 3.5      -LIPASE:        Result                      Value             Ref Range           Lipase                      64 (L)            73 - 393 U/L   -MAGNESIUM:        Result                      Value             Ref Range           Magnesium                   1.9               1.8 - 2.4 mg*  -TROPONIN-HIGH SENSITIVITY:        Result                      Value             Ref Range           Troponin-High Sensitiv*     8.2               0 - 14 pg/mL   -EKG, 12 LEAD, INITIAL:        Result                      Value             Ref Range           Ventricular Rate            93                BPM                 Atrial Rate                 93                BPM                 P-R Interval                138               ms                  QRS Duration                80                ms                  Q-T Interval                406               ms                  QTC Calculation (Bezet)     504               ms                  Calculated P Axis           69                degrees             Calculated R Axis           12                degrees             Calculated T Axis           76                degrees             Diagnosis                                                     Normal sinus rhythm   Right atrial enlargement   Possible Anteroseptal infarct (cited on or before 26-FEB-2020)   Prolonged QT   Abnormal ECG   When compared with ECG of 12-JUL-2021 17:41,   Nonspecific T wave abnormality, improved in Lateral leads   Confirmed by ST DONALD LEE MD (), ARNOLD SOTELO ([id number]) on 5/2/2022 5:27:16 PM     )  Tests in the radiology section of CPT®: ordered and reviewed (XR CHEST PA LAT    Result Date: 5/2/2022  TWO-VIEW CHEST: CLINICAL HISTORY: CHEST pain. COMPARISON:  May 31, 2021. FINDINGS: PA and lateral chest images demonstrate no acute pneumonic infiltrate or significant pleural fluid collection. The heart size is within normal limits without evidence of congestive heart failure or pneumothorax. The bony thorax appears intact on these views. The lateral image is limited by the patient's inability to raise one arm. There are overlying radiopaque support devices.      NO ACUTE CARDIOPULMONARY DISEASE IDENTIFIED.     )           EKG    Date/Time: 5/2/2022 9:07 PM  Performed by: Tressa Boyce MD  Authorized by: Tressa Boyce MD     ECG reviewed by ED Physician in the absence of a cardiologist: yes    Interpretation:     Interpretation: abnormal    Rate:     ECG rate:  93    ECG rate assessment: normal    Rhythm:     Rhythm: sinus rhythm    Ectopy:     Ectopy: none    Conduction:     Conduction: normal    ST segments:     ST segments:  Non-specific  T waves:     T waves: non-specific    Other findings:     Other findings: AASHISH and prolonged qTc interval

## 2022-05-05 ENCOUNTER — HOSPITAL ENCOUNTER (EMERGENCY)
Age: 57
Discharge: STILL ADMITTED OR ADMINISTRATIVE | End: 2022-05-05

## 2022-05-08 ENCOUNTER — APPOINTMENT (OUTPATIENT)
Dept: CT IMAGING | Age: 57
End: 2022-05-08
Attending: EMERGENCY MEDICINE
Payer: COMMERCIAL

## 2022-05-08 ENCOUNTER — HOSPITAL ENCOUNTER (EMERGENCY)
Age: 57
Discharge: HOME | End: 2022-05-08
Attending: STUDENT IN AN ORGANIZED HEALTH CARE EDUCATION/TRAINING PROGRAM
Payer: COMMERCIAL

## 2022-05-08 ENCOUNTER — APPOINTMENT (OUTPATIENT)
Dept: GENERAL RADIOLOGY | Age: 57
End: 2022-05-08
Attending: PHYSICIAN ASSISTANT
Payer: COMMERCIAL

## 2022-05-08 VITALS
RESPIRATION RATE: 16 BRPM | WEIGHT: 165 LBS | DIASTOLIC BLOOD PRESSURE: 65 MMHG | TEMPERATURE: 97.9 F | SYSTOLIC BLOOD PRESSURE: 112 MMHG | HEIGHT: 64 IN | OXYGEN SATURATION: 98 % | HEART RATE: 90 BPM | BODY MASS INDEX: 28.17 KG/M2

## 2022-05-08 DIAGNOSIS — S06.0X0A CONCUSSION WITHOUT LOSS OF CONSCIOUSNESS, INITIAL ENCOUNTER: Primary | ICD-10-CM

## 2022-05-08 LAB
ALBUMIN SERPL-MCNC: 3.5 G/DL (ref 3.5–5)
ALBUMIN/GLOB SERPL: 0.9 {RATIO} (ref 1.2–3.5)
ALP SERPL-CCNC: 70 U/L (ref 50–136)
ALT SERPL-CCNC: 22 U/L (ref 12–65)
ANION GAP SERPL CALC-SCNC: 7 MMOL/L (ref 7–16)
AST SERPL-CCNC: 23 U/L (ref 15–37)
BASOPHILS # BLD: 0 K/UL (ref 0–0.2)
BASOPHILS NFR BLD MANUAL: 1 % (ref 0–2)
BILIRUB SERPL-MCNC: 0.2 MG/DL (ref 0.2–1.1)
BUN SERPL-MCNC: 16 MG/DL (ref 6–23)
CALCIUM SERPL-MCNC: 8.9 MG/DL (ref 8.3–10.4)
CHLORIDE SERPL-SCNC: 112 MMOL/L (ref 98–107)
CO2 SERPL-SCNC: 21 MMOL/L (ref 21–32)
CREAT SERPL-MCNC: 0.8 MG/DL (ref 0.6–1)
DIFFERENTIAL METHOD BLD: ABNORMAL
EOSINOPHIL # BLD: 0.1 K/UL (ref 0–0.8)
EOSINOPHIL NFR BLD MANUAL: 3 % (ref 1–8)
ERYTHROCYTE [DISTWIDTH] IN BLOOD BY AUTOMATED COUNT: 13 % (ref 11.9–14.6)
GLOBULIN SER CALC-MCNC: 3.9 G/DL (ref 2.3–3.5)
GLUCOSE SERPL-MCNC: 95 MG/DL (ref 65–100)
HCT VFR BLD AUTO: 33.6 % (ref 35.8–46.3)
HGB BLD-MCNC: 10.5 G/DL (ref 11.7–15.4)
LYMPHOCYTES # BLD: 1.4 K/UL (ref 0.5–4.6)
LYMPHOCYTES NFR BLD MANUAL: 47 % (ref 16–44)
MCH RBC QN AUTO: 29.7 PG (ref 26.1–32.9)
MCHC RBC AUTO-ENTMCNC: 31.3 G/DL (ref 31.4–35)
MCV RBC AUTO: 95.2 FL (ref 79.6–97.8)
MONOCYTES # BLD: 0.2 K/UL (ref 0.1–1.3)
MONOCYTES NFR BLD MANUAL: 5 % (ref 3–9)
NEUTS SEG # BLD: 1.4 K/UL (ref 1.7–8.2)
NEUTS SEG NFR BLD MANUAL: 44 % (ref 47–75)
NRBC # BLD: 0 K/UL (ref 0–0.2)
PLATELET # BLD AUTO: 260 K/UL (ref 150–450)
PLATELET COMMENTS,PCOM: ADEQUATE
PMV BLD AUTO: 10.1 FL (ref 9.4–12.3)
POTASSIUM SERPL-SCNC: 3.3 MMOL/L (ref 3.5–5.1)
PROT SERPL-MCNC: 7.4 G/DL (ref 6.3–8.2)
RBC # BLD AUTO: 3.53 M/UL (ref 4.05–5.2)
RBC MORPH BLD: ABNORMAL
SODIUM SERPL-SCNC: 140 MMOL/L (ref 136–145)
TROPONIN-HIGH SENSITIVITY: 6.1 PG/ML (ref 0–14)
TROPONIN-HIGH SENSITIVITY: 8.4 PG/ML (ref 0–14)
WBC # BLD AUTO: 3.1 K/UL (ref 4.3–11.1)

## 2022-05-08 PROCEDURE — 93005 ELECTROCARDIOGRAM TRACING: CPT | Performed by: PHYSICIAN ASSISTANT

## 2022-05-08 PROCEDURE — 70450 CT HEAD/BRAIN W/O DYE: CPT

## 2022-05-08 PROCEDURE — 85025 COMPLETE CBC W/AUTO DIFF WBC: CPT

## 2022-05-08 PROCEDURE — 80053 COMPREHEN METABOLIC PANEL: CPT

## 2022-05-08 PROCEDURE — 71046 X-RAY EXAM CHEST 2 VIEWS: CPT

## 2022-05-08 PROCEDURE — 99285 EMERGENCY DEPT VISIT HI MDM: CPT

## 2022-05-08 PROCEDURE — 93005 ELECTROCARDIOGRAM TRACING: CPT | Performed by: STUDENT IN AN ORGANIZED HEALTH CARE EDUCATION/TRAINING PROGRAM

## 2022-05-08 PROCEDURE — 84484 ASSAY OF TROPONIN QUANT: CPT

## 2022-05-08 PROCEDURE — 72125 CT NECK SPINE W/O DYE: CPT

## 2022-05-08 RX ORDER — ACETAMINOPHEN 500 MG
1000 TABLET ORAL
Status: DISCONTINUED | OUTPATIENT
Start: 2022-05-08 | End: 2022-05-08 | Stop reason: HOSPADM

## 2022-05-08 NOTE — ED TRIAGE NOTES
Pt reports she fell a few days ago, landed on the right side, states she has R sided head pain and neck pain. Pt states she is on a lot of medications and some of them make her dizzy, denies any dosage changes. Pt is on Eliquis for hx of blood clots. Pt left from [facility] yesterday after getting an xray of her knee, pt states she begged them to do a head CT but patient ended up leaving before she was seen by a provider, she also left before being seen at this facility on May 5th. At the end of triage pt reported chest pain to the provider, cardiac orders placed.

## 2022-05-08 NOTE — ED NOTES
I have reviewed discharge instructions with the patient. The patient verbalized understanding. Patient left ED via Discharge Method: ambulatory to Home with self    Opportunity for questions and clarification provided. Patient given 0 scripts. To continue your aftercare when you leave the hospital, you may receive an automated call from our care team to check in on how you are doing. This is a free service and part of our promise to provide the best care and service to meet your aftercare needs.  If you have questions, or wish to unsubscribe from this service please call [phone]. Thank you for Choosing our [facility] Emergency Department.

## 2022-05-08 NOTE — ED PROVIDER NOTES
Patient is a 65-year-old female with a past medical history of CAD, cocaine abuse, and Pe currently on eliquis who presents with complaint of headache nausea and dizziness. She reports on Thursday she was stepping up on a chair to grab something when she lost her balance and landed hitting the top of her head on the floor board. She denies any loss of consciousness. Since then she has had excruciating pain in the top of her head intermittent nausea occasional dizziness and will see black and white spots in her eyes. She did go to [facility] yesterday but left prior to being seen. She came in today hoping to get a scan of her head. The history is provided by the patient. Fall  Associated symptoms include headaches. Pertinent negatives include no chest pain, no abdominal pain and no shortness of breath.         Past Medical History:   Diagnosis Date    Anemia     blood transfusion 5/2018 per pt    Arrhythmia     palpitations, moderate mitral valve regurge    Arthritis     lower back osteo    Asthma     uses inhalers    Cancer ([address]     left breast ca; s/p surgery     Cardiomyopathy     ECHO 11/5/2020 LVEF 30-35%    CHF (congestive heart failure) ([address]      followed by dr Henry Brenner; ECHO 11/5/2020 EF 30-35% mod to severe AVR; moderate MVR    Chronic pain 2010    Coagulation defect ([address]     eliquis    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 ([address] 2013    COPD     nebulizer daily and maint inhalers, can not climb 1 flight of stairs (also CHF)  oxygen 3 LPM qhs    Decreased cardiac ejection fraction 11/27/2017    Depression     controlled      Diverticulitis     GERD (gastroesophageal reflux disease)     pantoprazole- well controlled     Heart murmur     Echo 11/5/20  LVEF 3035%    Hypertension     managed with meds    IBS (irritable bowel syndrome)     IC (interstitial cystitis)     Insomnia     Migraine with aura and without status migrainosus, not intractable 6/17/2016    Mitral valve regurgitation, rheumatic     followed Dr. Stephan Jones Moderate aortic regurgitation     per echo 7/2019 in cc-- followed by dr Watts Counter Palpitations 5/16/2016    Psychiatric disorder     anxiety    Requires oxygen therapy     3l/min at hs. prn during the days as needed    Rheumatic aortic insufficiency 5/16/2016    Rheumatic fever as child    pt reports rheumatic fever in childhood    Smoker     started age 23-- smoked 0.5 ppd until 2018-- cut back to 10-2 cig daily per pt    Stroke [facility]) 2014    \"mild stroke\" pt reports 2014- \"left sided weakness and balance is off\"     Thromboembolus ([address]     BLE 2012       Past Surgical History:   Procedure Laterality Date    COLONOSCOPY      8 polyps removed, diverticulitis    COLONOSCOPY N/A 5/21/2018    COLONOSCOPY performed by Patricia Orozco MD at [address]  8-23-11    bladder dilitation    EGD      HX APPENDECTOMY  2006    HX CERVICAL FUSION  07/14/2020    ACDF C4-6 with Dr. Sandy Navarrete  5/27/2011    no blockages, no intervention    HX HEART CATHETERIZATION  04/2017    no intervention    HX HEART CATHETERIZATION  2018    no intervention    HX LAP CHOLECYSTECTOMY  6/6/2011    HX ORTHOPAEDIC Right 07/2019    4th toe -- surg repair    HX NATASHA AND BSO  1997    fibroid tumors, hyst; no cervix     HX UROLOGICAL  01/2018    cystoscopy with hydrodistention of bladder multiple    HX UROLOGICAL  06/30/2020    cysto    NM BIOPSY OF BREAST, INCISIONAL Left     lymph node , left, patient states her bx neg, but high risk         Family History:   Problem Relation Age of Onset    Heart Failure Father 76        chf    Heart Disease Father         PPM; secondary to CAD, drugs     Other Father         internal bleeding     Diabetes Sister     Psoriasis Mother     Thyroid Disease Sister         hyper; s/p thyroidectomy     Kidney Disease Brother     No Known Problems Sister    Georgi [employer] Brother     Diabetes Maternal Grandmother     Hypertension Maternal Grandmother     Emphysema Maternal Grandfather     Heart Disease Paternal Grandmother     Heart Disease Paternal Grandfather        Social History     Socioeconomic History    Marital status: LEGALLY      Spouse name: Not on file    Number of children: Not on file    Years of education: Not on file    Highest education level: Not on file   Occupational History    Not on file   Tobacco Use    Smoking status: Former Smoker     Packs/day: 0.25     Years: 30.00     Pack years: 7.50     Quit date: 2019     Years since quittin.4    Smokeless tobacco: Never Used   Vaping Use    Vaping Use: Never used   Substance and Sexual Activity    Alcohol use: No    Drug use: Not Currently     Types: Cocaine, Opiates     Comment: 10/25/2021 patient denies current drug use     Sexual activity: Not Currently   Other Topics Concern    Not on file   Social History Narrative    Not on file     Social Determinants of Health     Financial Resource Strain:     Difficulty of Paying Living Expenses: Not on file   Food Insecurity:     Worried About [address] in the Last Year: Not on file    [address] in the Last Year: Not on file   Transportation Needs:     Lack of Transportation (Medical): Not on file    Lack of Transportation (Non-Medical):  Not on file   Physical Activity:     Days of Exercise per Week: Not on file    Minutes of Exercise per Session: Not on file   Stress:     Feeling of Stress : Not on file   Social Connections:     Frequency of Communication with Friends and Family: Not on file    Frequency of Social Gatherings with Friends and Family: Not on file    Attends Restoration Services: Not on file    Active Member of Clubs or Organizations: Not on file    Attends Club or Organization Meetings: Not on file    Marital Status: Not on file   Intimate Partner Violence:     Fear of Current or Ex-Partner: Not on file    Emotionally Abused: Not on file    Physically Abused: Not on file    Sexually Abused: Not on file   Housing Stability:     Unable to Pay for Housing in the Last Year: Not on file    Number of Places Lived in the Last Year: Not on file    Unstable Housing in the Last Year: Not on file         ALLERGIES: Aspirin, Bentyl [dicyclomine], Ondansetron hcl, Plavix [clopidogrel], Toradol [ketorolac], Ultram [tramadol], and Zofran [ondansetron hcl (pf)]    Review of Systems   Constitutional: Negative for chills, fatigue and fever. HENT: Negative for congestion and sore throat. Eyes: Positive for visual disturbance (black and white spots in eyes). Respiratory: Negative for cough and shortness of breath. Cardiovascular: Negative for chest pain. Gastrointestinal: Positive for nausea. Negative for abdominal pain and vomiting. Genitourinary: Negative for dysuria and flank pain. Musculoskeletal: Negative for back pain and neck pain. Neurological: Positive for dizziness and headaches. Negative for light-headedness. Psychiatric/Behavioral: Negative for behavioral problems. Vitals:    05/08/22 1501 05/08/22 1756   BP: 102/67    Pulse: 92    Resp: 18    Temp: 97.9 °F (36.6 °C)    SpO2: 99%    Weight:  74.8 kg (165 lb)   Height:  5' 4\" (1.626 m)            Physical Exam  Vitals and nursing note reviewed. Constitutional:       General: She is not in acute distress. Appearance: She is not ill-appearing or toxic-appearing. HENT:      Head: Normocephalic. Eyes:      Extraocular Movements: Extraocular movements intact. Conjunctiva/sclera: Conjunctivae normal.      Pupils: Pupils are equal, round, and reactive to light. Cardiovascular:      Rate and Rhythm: Normal rate. Pulses: Normal pulses. Pulmonary:      Effort: Pulmonary effort is normal.      Breath sounds: Normal breath sounds. Musculoskeletal:      Cervical back: Normal range of motion.    Skin: General: Skin is warm. Neurological:      Mental Status: She is alert and oriented to person, place, and time. Sensory: No sensory deficit. Motor: No weakness. Coordination: Coordination normal.      Gait: Gait normal.      Comments: CN II through XII grossly intact   Psychiatric:         Mood and Affect: Mood normal.          MDM  Number of Diagnoses or Management Options  Concussion without loss of consciousness, initial encounter  Diagnosis management comments: Patient is a 16-year-old female who presents with complaint of headache and intermittent dizziness after she fell from standing on a chair 4 days ago. She does take Eliquis due to history of PEs. She is afebrile, vital signs within appropriate limits. She appears to have abrasion with some soft tissue swelling to the top of the head otherwise exam unremarkable. Labs Reviewed  CBC WITH AUTOMATED DIFF - Abnormal; Notable for the following components:     WBC                           3.1 (*)                RBC                           3.53 (*)               HGB                           10.5 (*)               HCT                           33.6 (*)               MCHC                          31.3 (*)               NEUTROPHILS                   44 (*)                 LYMPHOCYTES                   47 (*)                 ABS. NEUTROPHILS              1.4 (*)             All other components within normal limits  METABOLIC PANEL, COMPREHENSIVE - Abnormal; Notable for the following components:     Potassium                     3.3 (*)                Chloride                      112 (*)                Globulin                      3.9 (*)                A-G Ratio                     0.9 (*)             All other components within normal limits  TROPONIN-HIGH SENSITIVITY  TROPONIN-HIGH SENSITIVITY    Labs show that her hemoglobin is low but stable at 10, white count slightly low at 3.9 and potassium slightly low at 3.3.       Ct head: NO ACUTE INTRACRANIAL ABNORMALITY IDENTIFIED AT NONCONTRAST CT.  CT cervical spine: 1.  Reversal of normal lordosis status post anterior instrumented fusion at   C4-C6 with no acute bony abnormality identified. 2.  Small central disc protrusion at C3-4. CXR: NO ACUTE CARDIOPULMONARY DISEASE OR BONY ABNORMALITY IDENTIFIED    All results discussed with patient at bedside. Suspect she has a concussion given her history of head injury intermittent headaches dizziness and nausea. Will DC home advised her to rest in a dark room and follow-up closely with her doctor later this week for reevaluation of her symptoms. Discussed reasons to return to the ER. Patient verbalizes understanding and is agreeable to plan.              Procedures

## 2022-05-09 LAB
ATRIAL RATE: 90 BPM
CALCULATED P AXIS, ECG09: 67 DEGREES
CALCULATED R AXIS, ECG10: 40 DEGREES
CALCULATED T AXIS, ECG11: 60 DEGREES
DIAGNOSIS, 93000: NORMAL
P-R INTERVAL, ECG05: 138 MS
Q-T INTERVAL, ECG07: 426 MS
QRS DURATION, ECG06: 82 MS
QTC CALCULATION (BEZET), ECG08: 521 MS
VENTRICULAR RATE, ECG03: 90 BPM

## 2022-06-01 ENCOUNTER — TELEPHONE (OUTPATIENT)
Dept: CARDIOLOGY | Age: 57
End: 2022-06-01

## 2022-06-02 NOTE — TELEPHONE ENCOUNTER
Called cardiology on call service reporting that \"right leg just busted open blood is  Pouring\". Called patient back, seems very irritated when I called an introduced myself. Reports that an area above her right knee started bleeding and she is not sure how that happened. She stated that she is on blood thinners. Advised patient to hold pressure on the opening and if the bleeding did not stop, she should seek medical attention in the ER. Patient hung up the phone.     Cheri Duarte, NAMRATA - [place]  9:09 PM

## 2022-06-03 ENCOUNTER — HOSPITAL ENCOUNTER (EMERGENCY)
Age: 57
Discharge: HOME | End: 2022-06-03
Attending: EMERGENCY MEDICINE

## 2022-06-03 VITALS
WEIGHT: 160 LBS | BODY MASS INDEX: 27.31 KG/M2 | HEART RATE: 84 BPM | RESPIRATION RATE: 16 BRPM | OXYGEN SATURATION: 99 % | DIASTOLIC BLOOD PRESSURE: 80 MMHG | SYSTOLIC BLOOD PRESSURE: 119 MMHG | HEIGHT: 64 IN | TEMPERATURE: 98.5 F

## 2022-06-03 ASSESSMENT — PAIN SCALES - GENERAL: PAINLEVEL_OUTOF10: 9

## 2022-06-03 ASSESSMENT — PAIN - FUNCTIONAL ASSESSMENT: PAIN_FUNCTIONAL_ASSESSMENT: 0-10

## 2022-06-03 NOTE — ED TRIAGE NOTES
Pt to ED ambulatory to triage without difficulty mask in place with c/o various skin problems. Pt states \"my knees are dark\" and states \"my right leg popped open and bled\" pt has small laceration to the right lower shin. No new signs of bleeding at this time. Pt also states migraines and states \"I don't know what is going on with me. \" Pt states \"I just got so many different things wrong with me. \"

## 2022-06-15 NOTE — TELEPHONE ENCOUNTER
Pt missed her physical, but has a follow up scheduled on 6/22. She has been to the ER multiple times since her last OV.         Requested Prescriptions     Pending Prescriptions Disp Refills    Magic Mouthwash (MIRACLE MOUTHWASH) 240 mL 0     Sig: Swish and spit 5 mLs 4 times daily as needed for Irritation Lidocaine:Benadryl:Maalox 1:1:1     Ajith Esparza MA

## 2022-06-20 ENCOUNTER — TELEPHONE (OUTPATIENT)
Dept: CARDIOLOGY CLINIC | Age: 57
End: 2022-06-20

## 2022-06-20 NOTE — TELEPHONE ENCOUNTER
· Very bad pitting edema in both feet and legs to knees for 2 days. · Right lower leg was weeping clear fluid a few days ago. · Feet are so swollen that she cannot wear shoes. · Hands, face, and abdomen are swollen. · Gained 7 lbs in 3 days. · Not urinating as much as usual.   · Increased SOB at rest and with exertion. · Must sit up in chair to sleep because of increased SOB. · Taking lasix 40 mg qd and spironolactone 25 mg qd. · Does not want to increase diuretic dose before seeing Dr. Dawson Dyer. Asks for appointment with Dr. Dawson Dyer. Scheduled next available appointment with Dr. Dawson Dyer tomorrow at 1:30 pm at [facility]. Advised patient to call with any questions or concerns prior to appointment. Patient verbalized understanding.

## 2022-06-21 ENCOUNTER — OFFICE VISIT (OUTPATIENT)
Dept: CARDIOLOGY CLINIC | Age: 57
End: 2022-06-21
Payer: COMMERCIAL

## 2022-06-21 VITALS
SYSTOLIC BLOOD PRESSURE: 118 MMHG | WEIGHT: 143 LBS | HEIGHT: 64 IN | BODY MASS INDEX: 24.41 KG/M2 | DIASTOLIC BLOOD PRESSURE: 70 MMHG

## 2022-06-21 DIAGNOSIS — I50.32 CHRONIC DIASTOLIC CONGESTIVE HEART FAILURE (HCC): ICD-10-CM

## 2022-06-21 DIAGNOSIS — I50.32 CHRONIC DIASTOLIC CONGESTIVE HEART FAILURE (HCC): Primary | ICD-10-CM

## 2022-06-21 PROCEDURE — 99214 OFFICE O/P EST MOD 30 MIN: CPT | Performed by: INTERNAL MEDICINE

## 2022-06-21 RX ORDER — AMITRIPTYLINE HYDROCHLORIDE 25 MG/1
TABLET, FILM COATED ORAL
COMMUNITY
Start: 2022-03-29

## 2022-06-21 NOTE — PROGRESS NOTES
status migrainosus, not intractable 6/17/2016    Mitral valve regurgitation, rheumatic     followed Dr. Ariane Barragan Moderate aortic regurgitation     per echo 7/2019 in cc-- followed by dr Eun Rivera Palpitations 5/16/2016    Psychiatric disorder     anxiety    Requires oxygen therapy     3l/min at hs. prn during the days as needed    Rheumatic aortic insufficiency 5/16/2016    Rheumatic fever as child    pt reports rheumatic fever in childhood    Smoker     started age 23-- smoked 0.5 ppd until 2018-- cut back to 10-2 cig daily per pt    Stroke [facility]) 2014    \"mild stroke\" pt reports 2014- \"left sided weakness and balance is off\"     Thromboembolus ([address]     BLE 2012     Past Surgical History:   Procedure Laterality Date    APPENDECTOMY  2006    BIOPSY OF BREAST, INCISIONAL Left     lymph node , left, patient states her bx neg, but high risk    CARDIAC CATHETERIZATION  2018    no intervention    CARDIAC CATHETERIZATION  04/2017    no intervention    CARDIAC CATHETERIZATION  5/27/2011    no blockages, no intervention    CERVICAL FUSION  07/14/2020    ACDF C4-6 with Dr. Naty Dinero, LAPAROSCOPIC  6/6/2011    COLONOSCOPY      8 polyps removed, diverticulitis    COLONOSCOPY N/A 5/21/2018    COLONOSCOPY performed by Darcie Go MD at [address]  8-23-11    bladder dilitation    ORTHOPEDIC SURGERY Right 07/2019    4th toe -- surg repair    NICKI AND BSO (CERVIX REMOVED)  1997    fibroid tumors, hyst; no cervix     UPPER GASTROINTESTINAL ENDOSCOPY      UROLOGICAL SURGERY  06/30/2020    cysto    UROLOGICAL SURGERY  01/2018    cystoscopy with hydrodistention of bladder multiple     Family History   Problem Relation Age of Onset    Other Father         internal bleeding     Diabetes Sister     Psoriasis Mother     Thyroid Disease Sister         hyper; s/p thyroidectomy     Kidney Disease Brother     No Known Problems Sister     Heart Failure Father 76        chf    Reconciliation   famotidine (PEPCID) 20 MG tablet Take 20 mg by mouth 2 times daily 4/28/22  Yes Ar Automatic Reconciliation   FLUoxetine (PROZAC) 40 MG capsule Take 40 mg by mouth 2 times daily  4/28/22  Yes Ar Automatic Reconciliation   furosemide (LASIX) 40 MG tablet Take 40 mg by mouth daily 1/30/20  Yes Ar Automatic Reconciliation   hydrALAZINE (APRESOLINE) 25 MG tablet Take 25 mg by mouth 3 times daily 6/29/21  Yes Ar Automatic Reconciliation   isosorbide mononitrate (IMDUR) 30 MG extended release tablet Take 30 mg by mouth 9/29/21  Yes Ar Automatic Reconciliation   lidocaine (LIDODERM) 5 % Place 1 patch onto the skin daily 4/28/22  Yes Ar Automatic Reconciliation   lidocaine (LMX) 4 % cream Apply topically 2 times daily as needed 5/2/22  Yes Ar Automatic Reconciliation   loratadine (CLARITIN) 10 MG tablet Take 10 mg by mouth daily 12/20/21  Yes Ar Automatic Reconciliation   Methen-Hyosc-Meth Blue-Na Phos (UROGESIC-BLUE) 81.6 MG TABS Take 1 tablet by mouth 4 times daily as needed 2/15/22  Yes Ar Automatic Reconciliation   mirtazapine (REMERON) 30 MG tablet Take 30 mg by mouth 4/28/22  Yes Ar Automatic Reconciliation   nitroGLYCERIN (NITROSTAT) 0.4 MG SL tablet Place 0.4 mg under the tongue 6/15/20  Yes Ar Automatic Reconciliation   pantoprazole (PROTONIX) 40 MG tablet Take 40 mg by mouth daily 4/28/22  Yes Ar Automatic Reconciliation   pravastatin (PRAVACHOL) 40 MG tablet Take 40 mg by mouth 11/9/18  Yes Ar Automatic Reconciliation   sacubitril-valsartan (ENTRESTO) 24-26 MG per tablet Take 1 tablet by mouth 2 times daily 1/30/20  Yes Ar Automatic Reconciliation   spironolactone (ALDACTONE) 25 MG tablet Take 50 mg by mouth daily 12/6/21  Yes Ar Automatic Reconciliation   temazepam (RESTORIL) 30 MG capsule Take 30 mg by mouth.    Yes Ar Automatic Reconciliation   tiotropium (SPIRIVA) 18 MCG inhalation capsule Inhale 18 mcg into the lungs daily 4/28/22  Yes Ar Automatic Reconciliation   tolterodine (DETROL LA) 2 MG extended release capsule TAKE 1 CAP BY MOUTH DAILY. INCREASE TO 4 MG DAILY AFTER 4 WEEKS IF NEEDED 4/12/22  Yes Ar Automatic Reconciliation   topiramate (TOPAMAX) 50 MG tablet Take 50 mg by mouth 2 times daily 4/28/22  Yes Ar Automatic Reconciliation   triamcinolone (KENALOG) 0.1 % cream Apply 1 g topically 2 times daily as needed   Yes Ar Automatic Reconciliation   Fremanezumab-vfrm (AJOVY) 225 MG/1.5ML SOAJ Inject 225 mg into the skin  Patient not taking: Reported on 6/21/2022 11/9/21   Ar Automatic Reconciliation   gabapentin (NEURONTIN) 400 MG capsule Take 400 mg by mouth 2 times daily. Patient not taking: Reported on 6/21/2022 5/17/21   Ar Automatic Reconciliation         [unfilled]              Wt Readings from Last 3 Encounters:   06/21/22 143 lb (64.9 kg)   06/03/22 160 lb (72.6 kg)   04/28/22 150 lb (68 kg)     BP Readings from Last 3 Encounters:   06/21/22 118/70   06/03/22 119/80   04/28/22 112/62       /70   Ht 5' 4\" (1.626 m)   Wt 143 lb (64.9 kg)   BMI 24.55 kg/m²       Physical Exam  Musculoskeletal:      Right lower leg: Edema present. Left lower leg: Edema present. EKG-    Medical problems and test results were reviewed with the patient today. No results found for any visits on 06/21/22. No results found for: HBA1C, [id number]    Lab Results   Component Value Date    WBC 3.1 05/08/2022    HGB 10.5 05/08/2022    HCT 33.6 05/08/2022     05/08/2022    MCV 95.2 05/08/2022       Lab Results   Component Value Date     05/08/2022    K 3.3 05/08/2022     05/08/2022    CO2 21 05/08/2022    BUN 16 05/08/2022    GFRAA >60 05/08/2022       Lab Results   Component Value Date    ALT 22 05/08/2022       Lab Results   Component Value Date    CHOL 141 06/15/2020    HDL 90 06/15/2020         ASSESSMENT and PLAN    Marylee Arnol was seen today for leg swelling and shortness of breath.     Diagnoses and all orders for this visit:    Chronic diastolic congestive heart failure ([address]  -     Transthoracic echocardiogram (TTE) complete with contrast, bubble, strain, and 3D PRN; Future  -     Basic Metabolic Panel;  Future  -     BNP      She will try increased lasix for 2 days   80mg qam  f/u labs with echo here                         Osei Chahal MD  6/21/2022  2:47 PM

## 2022-06-22 ENCOUNTER — TELEPHONE (OUTPATIENT)
Dept: FAMILY MEDICINE CLINIC | Facility: CLINIC | Age: 57
End: 2022-06-22

## 2022-06-22 NOTE — TELEPHONE ENCOUNTER
----- Message from Corey Herman sent at 6/22/2022  2:08 PM EDT -----  Subject: Message to Provider    QUESTIONS  Information for Provider? Pt had to cancel apt today due to bad traffic/   r/s in july. ---------------------------------------------------------------------------  --------------  Earnestine HAWKINS  What is the best way for the office to contact you? OK to leave message on   voicemail  Preferred Call Back Phone Number? [phone]  ---------------------------------------------------------------------------  --------------  SCRIPT ANSWERS  Relationship to Patient?  Self

## 2022-06-26 LAB
ANION GAP SERPL CALC-SCNC: 13 MMOL/L (ref 7–16)
BUN SERPL-MCNC: 12 MG/DL (ref 6–23)
CALCIUM SERPL-MCNC: 9.4 MG/DL (ref 8.3–10.4)
CHLORIDE SERPL-SCNC: 111 MMOL/L (ref 98–107)
CO2 SERPL-SCNC: 19 MMOL/L (ref 21–32)
CREAT SERPL-MCNC: 1.17 MG/DL (ref 0.6–1)
GLUCOSE SERPL-MCNC: 80 MG/DL (ref 65–100)
POTASSIUM SERPL-SCNC: 5.7 MMOL/L (ref 3.5–5.1)
SODIUM SERPL-SCNC: 143 MMOL/L (ref 136–145)

## 2022-06-27 NOTE — RESULT ENCOUNTER NOTE
Jeanette Painter MD  [address] [place] [address] reviewed and LUCRECIA DIETRICH has appt on 7/20/22 for lab & Echo results

## 2022-07-05 ENCOUNTER — TELEPHONE (OUTPATIENT)
Dept: CARDIOLOGY CLINIC | Age: 57
End: 2022-07-05

## 2022-07-05 DIAGNOSIS — Z79.899 LONG-TERM USE OF HIGH-RISK MEDICATION: Primary | ICD-10-CM

## 2022-07-05 DIAGNOSIS — I50.20 HEART FAILURE WITH REDUCED EJECTION FRACTION (HCC): ICD-10-CM

## 2022-07-05 DIAGNOSIS — I10 HTN (HYPERTENSION): ICD-10-CM

## 2022-07-05 NOTE — PROGRESS NOTES
Patient states that she is taking a steroid but did not bring it in with her. She is unaware or the dose and the type. Will try to see if someone can bring it in from home this morning. 101.1

## 2022-07-05 NOTE — TELEPHONE ENCOUNTER
Advised patient of Dr. Nazario Sy response. Advised patient that she may go to any [facility] [place] [place] outpatient lab for BMP in 1 week. Advised patient to call if symptoms do not improve in a few days or with any further questions or concerns. Patient verbalized understanding.    Orders Placed This Encounter   Procedures    Basic Metabolic Panel     Standing Status:   Future     Standing Expiration Date:   7/5/2023

## 2022-07-05 NOTE — TELEPHONE ENCOUNTER
· Increased pitting edema in both feet and legs to knees 2-3 days. · Unable to wear shoes because feet are so swollen. · Gained 8 lbs overnight. · Increased SOB with any exertion. Has to sit down and rest more frequently than usual because of increased SOB. · Much more tired than usual.   · Has not yet received results of 6/21/22 BMP and BNP. · Patient is scheduled for echo on 7/15/22 and FU with Dr. Heidi No on 7/20/22. · Taking lasix 40 mg and spironolactone 25 mg 2 tabs qd. Patient asks for Dr. Yulissa Davalos recommendations on weight gain and edema.

## 2022-07-06 ENCOUNTER — TELEPHONE (OUTPATIENT)
Dept: CARDIOLOGY CLINIC | Age: 57
End: 2022-07-06

## 2022-07-06 DIAGNOSIS — I50.30 DIASTOLIC CONGESTIVE HEART FAILURE, UNSPECIFIED HF CHRONICITY (HCC): Primary | ICD-10-CM

## 2022-07-06 DIAGNOSIS — I10 HTN (HYPERTENSION): ICD-10-CM

## 2022-07-06 DIAGNOSIS — I50.32 CHRONIC DIASTOLIC CONGESTIVE HEART FAILURE (HCC): ICD-10-CM

## 2022-07-06 DIAGNOSIS — I50.20 HEART FAILURE WITH REDUCED EJECTION FRACTION (HCC): ICD-10-CM

## 2022-07-06 DIAGNOSIS — Z79.899 LONG-TERM USE OF HIGH-RISK MEDICATION: Primary | ICD-10-CM

## 2022-07-06 RX ORDER — SPIRONOLACTONE 25 MG/1
50 TABLET ORAL 2 TIMES DAILY
Qty: 180 TABLET | Refills: 3
Start: 2022-07-06

## 2022-07-06 RX ORDER — FUROSEMIDE 40 MG/1
40 TABLET ORAL 2 TIMES DAILY
Qty: 180 TABLET | Refills: 3
Start: 2022-07-06

## 2022-07-06 NOTE — TELEPHONE ENCOUNTER
Per Dr. Angel Boswell, advised patient to continue lasix 40 mg BID and aldactone 25 mg BID, as advised yesterday. Scheduled SA appointment with Dr. Angel Boswell tomorrow at 11:15 am. Advised patient to go to [facility] ER if she feels she is in distress prior to appointment. Patient verbalized understanding.    Requested Prescriptions     Signed Prescriptions Disp Refills    furosemide (LASIX) 40 MG tablet 180 tablet 3     Sig: Take 1 tablet by mouth 2 times daily     Authorizing Provider: Chip Retana     Ordering User: Daija Pascual spironolactone (ALDACTONE) 25 MG tablet 180 tablet 3     Sig: Take 2 tablets by mouth 2 times daily     Authorizing Provider: Chip Retana     Ordering User: Jaquelyn Frankel

## 2022-07-06 NOTE — TELEPHONE ENCOUNTER
Patient called stating that her swelling has gone above her knees in both legs after doing all of things advised by Dr. Brice Beckwith.  Please call patient at [phone]

## 2022-07-06 NOTE — TELEPHONE ENCOUNTER
Advised patient of Dr. Kandi Frank response. Patient verbalized understanding, but states she is already taking spironolactone 25 mg qd. Per Dr. Mickey Jacobson, advised patient to increase spironolactone 25 mg qd to 2 tabs qd and get labs, tomorrow morning. Patient verbalized understanding.

## 2022-07-07 ENCOUNTER — OFFICE VISIT (OUTPATIENT)
Dept: CARDIOLOGY CLINIC | Age: 57
End: 2022-07-07
Payer: COMMERCIAL

## 2022-07-07 VITALS
BODY MASS INDEX: 24.89 KG/M2 | HEIGHT: 64 IN | SYSTOLIC BLOOD PRESSURE: 118 MMHG | DIASTOLIC BLOOD PRESSURE: 68 MMHG | WEIGHT: 145.8 LBS | HEART RATE: 76 BPM

## 2022-07-07 DIAGNOSIS — I50.20 HEART FAILURE WITH REDUCED EJECTION FRACTION (HCC): ICD-10-CM

## 2022-07-07 DIAGNOSIS — I35.1 NONRHEUMATIC AORTIC VALVE INSUFFICIENCY: ICD-10-CM

## 2022-07-07 DIAGNOSIS — I10 HTN (HYPERTENSION): ICD-10-CM

## 2022-07-07 DIAGNOSIS — Z79.899 LONG-TERM USE OF HIGH-RISK MEDICATION: ICD-10-CM

## 2022-07-07 DIAGNOSIS — I51.89 DIASTOLIC DYSFUNCTION: ICD-10-CM

## 2022-07-07 DIAGNOSIS — I10 PRIMARY HYPERTENSION: ICD-10-CM

## 2022-07-07 DIAGNOSIS — I50.22 CHRONIC SYSTOLIC HEART FAILURE (HCC): Primary | ICD-10-CM

## 2022-07-07 DIAGNOSIS — I50.30 DIASTOLIC CONGESTIVE HEART FAILURE, UNSPECIFIED HF CHRONICITY (HCC): ICD-10-CM

## 2022-07-07 PROCEDURE — 99214 OFFICE O/P EST MOD 30 MIN: CPT | Performed by: INTERNAL MEDICINE

## 2022-07-07 NOTE — PROGRESS NOTES
[facility] CARDIOLOGY  [address], [address]  PHONE: [phone]    NAME: Jocelyne Ratliff  : 1965     HPI  Jocelyne Ratliff is a 49 y.o. female seen for a follow up visit regarding   Congestive Heart Failure, Edema, and Shortness of Breath    She is worked in today with complaints of continued and worsening edema as well as orthopnea . No chest pains       Past Medical History, Past Surgical History, Family history, Social History, and Medications were all reviewed with the patient today and updated as necessary.        [unfilled]  Allergies   Allergen Reactions    Aspirin Other (See Comments)     Inflames IBS per pt    Dicyclomine Itching    Ketorolac Hives    Clopidogrel Rash     blisters    Ondansetron Nausea And Vomiting    Ondansetron Hcl Nausea And Vomiting     Nausea and vomiting     Tramadol Nausea And Vomiting     Abdominal cramps     Past Medical History:   Diagnosis Date    Anemia     blood transfusion 2018 per pt    Arrhythmia     palpitations, moderate mitral valve regurge    Arthritis     lower back osteo    Asthma     uses inhalers    Cancer ([address]     left breast ca; s/p surgery     Cardiomyopathy ([address]     ECHO 2020 LVEF 30-35%    CHF (congestive heart failure) ([address]      followed by dr Melba Tanner; ECHO 2020 EF 30-35% mod to severe AVR; moderate MVR    Chronic pain 2010    Coagulation defect ([address]     eliquis    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 ([address]     Decreased cardiac ejection fraction 2017    Depression     controlled      Diverticulitis     GERD (gastroesophageal reflux disease)     pantoprazole- well controlled     Heart murmur     Echo 20  LVEF 3035%    Hypertension     managed with meds    IBS (irritable bowel syndrome)     IC (interstitial cystitis)     Insomnia     Migraine with aura and without status migrainosus, not intractable 2016  Mitral valve regurgitation, rheumatic     followed Dr. Darleen Landrum Moderate aortic regurgitation     per echo 7/2019 in [place]-- followed by dr Nina Avila Palpitations 5/16/2016    Psychiatric disorder     anxiety    Requires oxygen therapy     3l/min at hs. prn during the days as needed    Rheumatic aortic insufficiency 5/16/2016    Rheumatic fever as child    pt reports rheumatic fever in childhood    Smoker     started age 23-- smoked 0.5 ppd until 2018-- cut back to 10-2 cig daily per pt    Stroke [facility]) 2014    \"mild stroke\" pt reports 2014- \"left sided weakness and balance is off\"     Thromboembolus ([address]     BLE 2012     Past Surgical History:   Procedure Laterality Date    APPENDECTOMY  2006    BIOPSY OF BREAST, INCISIONAL Left     lymph node , left, patient states her bx neg, but high risk    CARDIAC CATHETERIZATION  2018    no intervention    CARDIAC CATHETERIZATION  04/2017    no intervention    CARDIAC CATHETERIZATION  5/27/2011    no blockages, no intervention    CERVICAL FUSION  07/14/2020    ACDF C4-6 with Dr. Jessa Lomax, LAPAROSCOPIC  6/6/2011    COLONOSCOPY      8 polyps removed, diverticulitis    COLONOSCOPY N/A 5/21/2018    COLONOSCOPY performed by Esa Ewing MD at [address]  8-23-11    bladder dilitation    ORTHOPEDIC SURGERY Right 07/2019    4th toe -- surg repair    NICKI AND BSO (CERVIX REMOVED)  1997    fibroid tumors, hyst; no cervix     UPPER GASTROINTESTINAL ENDOSCOPY      UROLOGICAL SURGERY  06/30/2020    cysto    UROLOGICAL SURGERY  01/2018    cystoscopy with hydrodistention of bladder multiple     Family History   Problem Relation Age of Onset    Other Father         internal bleeding     Diabetes Sister     Psoriasis Mother     Thyroid Disease Sister         hyper; s/p thyroidectomy     Kidney Disease Brother     No Known Problems Sister     Heart Failure Father 76        chf    Diabetes Maternal Grandmother     Hypertension Maternal Grandmother     Emphysema Maternal Grandfather     Heart Disease Paternal Grandmother     Heart Disease Paternal Grandfather     Heart Disease Father         PPM; secondary to CAD, drugs     Seizures Brother       Social History     Tobacco Use    Smoking status: Former Smoker     Packs/day: 0.25     Quit date: 2019     Years since quittin.6    Smokeless tobacco: Never Used   Substance Use Topics    Alcohol use: No     Prior to Admission medications    Medication Sig Start Date End Date Taking? Authorizing Provider   furosemide (LASIX) 40 MG tablet Take 1 tablet by mouth 2 times daily 22  Yes Montana Hendricks MD   spironolactone (ALDACTONE) 25 MG tablet Take 2 tablets by mouth 2 times daily 22  Yes Montana Hendricks MD   amitriptyline (ELAVIL) 25 MG tablet TAKE 1 TABLET BY MOUTH EVERY DAY AT NIGHT 3/29/22  Yes Historical Provider, MD   Magic Mouthwash (MIRACLE MOUTHWASH) Swish and spit 5 mLs 4 times daily as needed for Irritation Lidocaine:Benadryl:Maalox 1:1:1 6/15/22  Yes William France III, MD   OXYGEN Nightly. Indications: 3 L/min at hs and prn during the day   Yes Ar Automatic Reconciliation   albuterol sulfate  (90 Base) MCG/ACT inhaler Inhale 2 puffs into the lungs every 6 hours as needed 22  Yes Ar Automatic Reconciliation   ammonium lactate (AMLACTIN) 12 % cream Apply 1 g topically 2 times daily   Yes Ar Automatic Reconciliation   apixaban (ELIQUIS) 5 MG TABS tablet Take 5 mg by mouth 2 times daily 20  Yes Ar Automatic Reconciliation   budesonide-formoterol (SYMBICORT) 160-4.5 MCG/ACT AERO Inhale 1 puff into the lungs 2 times daily 22  Yes Ar Automatic Reconciliation   carvedilol (COREG) 12.5 MG tablet Take 12.5 mg by mouth 2 times daily  21  Yes Ar Automatic Reconciliation   diazePAM (VALIUM) 5 MG tablet Take 5 mg by mouth 3 times daily as needed.  10/9/20  Yes Ar Automatic Reconciliation   EPINEPHrine (EPIPEN JR) 0.15 MG/0.3ML SOAJ Inject 0.15 mg into the muscle once as needed   Yes Ar Automatic Reconciliation   estradiol (ESTRACE) 0.5 MG tablet Take 0.5 mg by mouth daily 10/22/20  Yes Ar Automatic Reconciliation   famotidine (PEPCID) 20 MG tablet Take 20 mg by mouth 2 times daily 4/28/22  Yes Ar Automatic Reconciliation   FLUoxetine (PROZAC) 40 MG capsule Take 40 mg by mouth 2 times daily  4/28/22  Yes Ar Automatic Reconciliation   hydrALAZINE (APRESOLINE) 25 MG tablet Take 25 mg by mouth 3 times daily 6/29/21  Yes Ar Automatic Reconciliation   isosorbide mononitrate (IMDUR) 30 MG extended release tablet Take 30 mg by mouth 9/29/21  Yes Ar Automatic Reconciliation   lidocaine (LIDODERM) 5 % Place 1 patch onto the skin daily 4/28/22  Yes Ar Automatic Reconciliation   lidocaine (LMX) 4 % cream Apply topically 2 times daily as needed 5/2/22  Yes Ar Automatic Reconciliation   loratadine (CLARITIN) 10 MG tablet Take 10 mg by mouth daily 12/20/21  Yes Ar Automatic Reconciliation   Methen-Hyosc-Meth Blue-Na Phos (UROGESIC-BLUE) 81.6 MG TABS Take 1 tablet by mouth 4 times daily as needed 2/15/22  Yes Ar Automatic Reconciliation   mirtazapine (REMERON) 30 MG tablet Take 30 mg by mouth 4/28/22  Yes Ar Automatic Reconciliation   nitroGLYCERIN (NITROSTAT) 0.4 MG SL tablet Place 0.4 mg under the tongue 6/15/20  Yes Ar Automatic Reconciliation   pantoprazole (PROTONIX) 40 MG tablet Take 40 mg by mouth daily 4/28/22  Yes Ar Automatic Reconciliation   pravastatin (PRAVACHOL) 40 MG tablet Take 40 mg by mouth 11/9/18  Yes Ar Automatic Reconciliation   sacubitril-valsartan (ENTRESTO) 24-26 MG per tablet Take 1 tablet by mouth 2 times daily 1/30/20  Yes Ar Automatic Reconciliation   temazepam (RESTORIL) 30 MG capsule Take 30 mg by mouth. Yes Ar Automatic Reconciliation   tiotropium (SPIRIVA) 18 MCG inhalation capsule Inhale 18 mcg into the lungs daily 4/28/22  Yes Ar Automatic Reconciliation   tolterodine (DETROL LA) 2 MG extended release capsule TAKE 1 CAP BY MOUTH DAILY. INCREASE TO 4 MG DAILY AFTER 4 WEEKS IF NEEDED 4/12/22  Yes Ar Automatic Reconciliation   topiramate (TOPAMAX) 50 MG tablet Take 50 mg by mouth 2 times daily 4/28/22  Yes Ar Automatic Reconciliation   triamcinolone (KENALOG) 0.1 % cream Apply 1 g topically 2 times daily as needed   Yes Ar Automatic Reconciliation   Fremanezumab-vfrm (AJOVY) 225 MG/1.5ML SOAJ Inject 225 mg into the skin  Patient not taking: Reported on 6/21/2022 11/9/21   Ar Automatic Reconciliation   gabapentin (NEURONTIN) 400 MG capsule Take 400 mg by mouth 2 times daily. Patient not taking: Reported on 6/21/2022 5/17/21   Ar Automatic Reconciliation         [unfilled]              Wt Readings from Last 3 Encounters:   07/07/22 145 lb 12.8 oz (66.1 kg)   06/21/22 143 lb (64.9 kg)   06/03/22 160 lb (72.6 kg)     BP Readings from Last 3 Encounters:   07/07/22 118/68   06/21/22 118/70   06/03/22 119/80       /68   Pulse 76   Ht 5' 4\" (1.626 m)   Wt 145 lb 12.8 oz (66.1 kg)   BMI 25.03 kg/m²       Physical Exam  Constitutional:       Appearance: Normal appearance. Cardiovascular:      Rate and Rhythm: Normal rate and regular rhythm. Heart sounds: Gallop present. S4 sounds present. Pulmonary:      Breath sounds: Normal breath sounds. Abdominal:      Palpations: Abdomen is soft. Musculoskeletal:      Right lower leg: Edema (1+) present. Left lower leg: Edema (1+ ) present. Skin:     General: Skin is warm and dry. Neurological:      Mental Status: She is alert and oriented to person, place, and time. Mental status is at baseline. EKG-     Medical problems and test results were reviewed with the patient today. No results found for any visits on 07/07/22.       No results found for: HBA1C, [id number]    Lab Results   Component Value Date/Time    WBC 3.1 05/08/2022 03:23 PM    HGB 10.5 05/08/2022 03:23 PM    HCT 33.6 05/08/2022 03:23 PM     05/08/2022 03:23 PM    MCV 95.2 05/08/2022 03:23 PM       Lab Results

## 2022-07-08 LAB
ALBUMIN SERPL-MCNC: 3.6 G/DL (ref 3.5–5)
ALBUMIN/GLOB SERPL: 1.1 {RATIO} (ref 1.2–3.5)
ALP SERPL-CCNC: 60 U/L (ref 50–136)
ALT SERPL-CCNC: 23 U/L (ref 12–65)
ANION GAP SERPL CALC-SCNC: 7 MMOL/L (ref 7–16)
AST SERPL-CCNC: 27 U/L (ref 15–37)
BILIRUB SERPL-MCNC: 0.4 MG/DL (ref 0.2–1.1)
BUN SERPL-MCNC: 12 MG/DL (ref 6–23)
CALCIUM SERPL-MCNC: 9.1 MG/DL (ref 8.3–10.4)
CHLORIDE SERPL-SCNC: 106 MMOL/L (ref 98–107)
CO2 SERPL-SCNC: 28 MMOL/L (ref 21–32)
CREAT SERPL-MCNC: 0.9 MG/DL (ref 0.6–1)
GLOBULIN SER CALC-MCNC: 3.2 G/DL (ref 2.3–3.5)
GLUCOSE SERPL-MCNC: 92 MG/DL (ref 65–100)
NT PRO BNP: 265 PG/ML (ref 5–125)
POTASSIUM SERPL-SCNC: 3.1 MMOL/L (ref 3.5–5.1)
PROT SERPL-MCNC: 6.8 G/DL (ref 6.3–8.2)
SODIUM SERPL-SCNC: 141 MMOL/L (ref 136–145)

## 2022-07-12 ENCOUNTER — TELEPHONE (OUTPATIENT)
Dept: CARDIOLOGY CLINIC | Age: 57
End: 2022-07-12

## 2022-07-12 DIAGNOSIS — I10 HTN (HYPERTENSION): ICD-10-CM

## 2022-07-12 DIAGNOSIS — E87.6 HYPOKALEMIA: ICD-10-CM

## 2022-07-12 DIAGNOSIS — I50.20 HEART FAILURE WITH REDUCED EJECTION FRACTION (HCC): ICD-10-CM

## 2022-07-12 DIAGNOSIS — I50.22 CHRONIC SYSTOLIC HEART FAILURE (HCC): Primary | ICD-10-CM

## 2022-07-12 RX ORDER — POTASSIUM CHLORIDE 750 MG/1
10 TABLET, EXTENDED RELEASE ORAL DAILY
Qty: 30 TABLET | Refills: 5 | Status: SHIPPED | OUTPATIENT
Start: 2022-07-12 | End: 2022-07-13 | Stop reason: SDUPTHER

## 2022-07-12 NOTE — TELEPHONE ENCOUNTER
----- Message from Lorie Lu MD sent at 7/12/2022  9:48 AM EDT -----  Please call and let her know she needs more KCL 10meq bid along with her diuretics   ----- Message -----  From: [facility] [place] [facility] [place] [employer]  Sent: 7/8/2022   4:33 AM EDT  To: Lorie Lu MD

## 2022-07-12 NOTE — TELEPHONE ENCOUNTER
IF she is taking the Aldactone and Lasix as she says she is then she needs to add KCL as well.  We can start with just 10meq a day with a recehck in a week            I think the lasix is having more effect at the kidneys than the Aldactone

## 2022-07-12 NOTE — TELEPHONE ENCOUNTER
Pt states she is taking the Spironolactone & Lasix. She agreds to start KCL 10meq's daily & have a BMP in 1 week after starting potassium.

## 2022-07-13 ENCOUNTER — TELEPHONE (OUTPATIENT)
Dept: CARDIOLOGY CLINIC | Age: 57
End: 2022-07-13

## 2022-07-13 DIAGNOSIS — I50.22 CHRONIC SYSTOLIC HEART FAILURE (HCC): ICD-10-CM

## 2022-07-13 DIAGNOSIS — E87.6 HYPOKALEMIA: ICD-10-CM

## 2022-07-13 RX ORDER — POTASSIUM CHLORIDE 750 MG/1
10 TABLET, EXTENDED RELEASE ORAL DAILY
Qty: 30 TABLET | Refills: 5 | Status: SHIPPED | OUTPATIENT
Start: 2022-07-13

## 2022-07-13 NOTE — TELEPHONE ENCOUNTER
----- Message from Simran Cabrera MD sent at 7/12/2022  9:48 AM EDT -----  Please call and let her know she needs more KCL 10meq bid along with her diuretics   ----- Message -----  From: [facility] [employer] [place] [employer]  Sent: 7/8/2022   4:33 AM EDT  To: Simran Cabrera MD

## 2022-07-15 ENCOUNTER — APPOINTMENT (OUTPATIENT)
Dept: GENERAL RADIOLOGY | Age: 57
End: 2022-07-15
Payer: COMMERCIAL

## 2022-07-15 ENCOUNTER — HOSPITAL ENCOUNTER (EMERGENCY)
Age: 57
Discharge: HOME | End: 2022-07-15
Attending: EMERGENCY MEDICINE
Payer: COMMERCIAL

## 2022-07-15 VITALS
HEART RATE: 97 BPM | HEIGHT: 64 IN | DIASTOLIC BLOOD PRESSURE: 82 MMHG | TEMPERATURE: 97.4 F | RESPIRATION RATE: 18 BRPM | OXYGEN SATURATION: 98 % | BODY MASS INDEX: 26.46 KG/M2 | WEIGHT: 155 LBS | SYSTOLIC BLOOD PRESSURE: 141 MMHG

## 2022-07-15 DIAGNOSIS — S83.92XA SPRAIN OF LEFT KNEE, UNSPECIFIED LIGAMENT, INITIAL ENCOUNTER: ICD-10-CM

## 2022-07-15 DIAGNOSIS — Z71.1 FEARED CONDITION NOT DEMONSTRATED: Primary | ICD-10-CM

## 2022-07-15 PROCEDURE — 73562 X-RAY EXAM OF KNEE 3: CPT

## 2022-07-15 PROCEDURE — 90714 TD VACC NO PRESV 7 YRS+ IM: CPT | Performed by: EMERGENCY MEDICINE

## 2022-07-15 PROCEDURE — 73630 X-RAY EXAM OF FOOT: CPT

## 2022-07-15 PROCEDURE — 6360000002 HC RX W HCPCS: Performed by: EMERGENCY MEDICINE

## 2022-07-15 PROCEDURE — 90471 IMMUNIZATION ADMIN: CPT | Performed by: EMERGENCY MEDICINE

## 2022-07-15 PROCEDURE — 99284 EMERGENCY DEPT VISIT MOD MDM: CPT

## 2022-07-15 RX ORDER — TETANUS AND DIPHTHERIA TOXOIDS ADSORBED 2; 2 [LF]/.5ML; [LF]/.5ML
0.5 INJECTION INTRAMUSCULAR
Status: COMPLETED | OUTPATIENT
Start: 2022-07-15 | End: 2022-07-15

## 2022-07-15 RX ADMIN — TETANUS AND DIPHTHERIA TOXOIDS ADSORBED 0.5 ML: 2; 2 INJECTION INTRAMUSCULAR at 06:59

## 2022-07-15 ASSESSMENT — ENCOUNTER SYMPTOMS
EYE PAIN: 0
EYE REDNESS: 0
SHORTNESS OF BREATH: 0
TROUBLE SWALLOWING: 0
CONSTIPATION: 0
NAUSEA: 0
ABDOMINAL PAIN: 0
COUGH: 0
VOMITING: 0
DIARRHEA: 0
WHEEZING: 0
SINUS PAIN: 0
BACK PAIN: 0
EYE ITCHING: 0

## 2022-07-15 ASSESSMENT — PAIN SCALES - GENERAL: PAINLEVEL_OUTOF10: 7

## 2022-07-15 ASSESSMENT — PAIN - FUNCTIONAL ASSESSMENT: PAIN_FUNCTIONAL_ASSESSMENT: 0-10

## 2022-07-15 ASSESSMENT — PAIN DESCRIPTION - DESCRIPTORS: DESCRIPTORS: SHARP

## 2022-07-15 ASSESSMENT — PAIN DESCRIPTION - ORIENTATION: ORIENTATION: RIGHT;LEFT

## 2022-07-15 NOTE — ED PROVIDER NOTES
[facility] Emergency Department Provider Note                   PCP:                Jonnie Clark MD               Age: 49 y.o. Sex: female       ICD-10-CM    1. Feared condition not demonstrated  Z71.1       2. Sprain of left knee, unspecified ligament, initial encounter  S83. 92XA           DISPOSITION Decision To Discharge 07/15/2022 08:46:14 AM       MDM  Number of Diagnoses or Management Options  Feared condition not demonstrated: new, needed workup  Sprain of left knee, unspecified ligament, initial encounter: new, needed workup  Diagnosis management comments: 37-year-old female patient presents with complaints of concern for glass foreign body in both of her feet she also is insistent that both of her feet and hands have turned green. Patient's nurse at bedside with me during exam and we both concur that there is no evidence of any green discoloration on the patient's hands and feet. Unclear why the patient has this impression of the discoloration. There does not appear to be an acute medical issue that would relate to this. Will check plain images of the feet to attempt to exclude any glass foreign bodies  We will update tetanus based on patient's history       Amount and/or Complexity of Data Reviewed  Tests in the radiology section of CPT®: ordered and reviewed  Tests in the medicine section of CPT®: reviewed and ordered  Decide to obtain previous medical records or to obtain history from someone other than the patient: yes  Review and summarize past medical records: yes  Independent visualization of images, tracings, or specimens: yes    Risk of Complications, Morbidity, and/or Mortality  Presenting problems: low  Diagnostic procedures: low  Management options: low  General comments: Elements of this note have been dictated via voice recognition software. Text and phrases may be limited by the accuracy of the software.   The chart has been reviewed, but errors may still be present. Patient Progress  Patient progress: stable       Orders Placed This Encounter   Procedures    XR FOOT LEFT (MIN 3 VIEWS)    XR FOOT RIGHT (MIN 3 VIEWS)    XR KNEE LEFT (3 VIEWS)        Compa Burks is a 49 y.o. female who presents to the Emergency Department with chief complaint of    Chief Complaint   Patient presents with    Wound Check      77-year-old female patient presents to the ER with complaints of concerns over glass in both of her feet, discoloration of her feet, discoloration of her hands and pain behind her left knee. Patient states that around 11:00 last night a glass broke and she got glass in her feet. She states that she was able to remove at least some of those glass pieces prior to coming to the hospital    Patient denies any other trauma exposures or triggers for her complaints this morning. The history is provided by the patient. Wound Check   She was treated in the ED Today. There has been no treatment since the wound repair. Fever duration: No fever or chills. There has been no drainage from the wound. There is new redness present. There is new swelling present. She has no difficulty moving the affected extremity or digit. All other systems reviewed and are negative. Review of Systems   Constitutional:  Negative for chills, fatigue and fever. HENT:  Negative for ear pain, hearing loss, sinus pain, sneezing and trouble swallowing. Eyes:  Negative for pain, redness and itching. Respiratory:  Negative for cough, shortness of breath and wheezing. Cardiovascular:  Negative for chest pain and palpitations. Gastrointestinal:  Negative for abdominal pain, constipation, diarrhea, nausea and vomiting. Endocrine: Negative for polydipsia, polyphagia and polyuria. Genitourinary:  Negative for dysuria, flank pain, frequency and hematuria. Musculoskeletal:  Positive for arthralgias. Negative for back pain and myalgias. Skin:  Positive for wound. Negative for rash. Allergic/Immunologic: Negative for food allergies and immunocompromised state. Neurological:  Negative for dizziness, syncope, speech difficulty and light-headedness. Hematological:  Negative for adenopathy. Does not bruise/bleed easily. Psychiatric/Behavioral:  Negative for dysphoric mood and self-injury. The patient is not nervous/anxious. All other systems reviewed and are negative.     Past Medical History:   Diagnosis Date    Anemia     blood transfusion 5/2018 per pt    Arrhythmia     palpitations, moderate mitral valve regurge    Arthritis     lower back osteo    Asthma     uses inhalers    Cancer ([address]     left breast ca; s/p surgery     Cardiomyopathy ([address]     ECHO 11/5/2020 LVEF 30-35%    [facility] (congestive heart failure) [facility]      followed by dr Linda Geller; ECHO 11/5/2020 EF 30-35% mod to severe AVR; moderate MVR    Chronic pain 2010    Coagulation defect ([facility]     eliquis    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 ([facility] 2013    Decreased cardiac ejection fraction 11/27/2017    Depression     controlled      Diverticulitis     GERD (gastroesophageal reflux disease)     pantoprazole- well controlled     Heart murmur     Echo 11/5/20  LVEF 3035%    Hypertension     managed with meds    IBS (irritable bowel syndrome)     IC (interstitial cystitis)     Insomnia     Migraine with aura and without status migrainosus, not intractable 6/17/2016    Mitral valve regurgitation, rheumatic     followed Dr. Chantelle Robert    Moderate aortic regurgitation     per echo 7/2019 in -- followed by dr Linda Geller    Palpitations 5/16/2016    Psychiatric disorder     anxiety    Requires oxygen therapy     3l/min at hs. 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    Rheumatic fever as child    pt reports rheumatic fever in childhood    Smoker     started age 23-- smoked 0.5 ppd until 2018-- cut back to 10-2 cig daily per pt    Stroke [facility]) 2014    \"mild stroke\" pt reports 2014- \"left sided weakness and balance is off\"     Thromboembolus ([address]     BLE 2012        Past Surgical History:   Procedure Laterality Date    APPENDECTOMY  2006    BIOPSY OF BREAST, INCISIONAL Left     lymph node , left, patient states her bx neg, but high risk    CARDIAC CATHETERIZATION  2018    no intervention    CARDIAC CATHETERIZATION  04/2017    no intervention    CARDIAC CATHETERIZATION  5/27/2011    no blockages, no intervention    CERVICAL FUSION  07/14/2020    ACDF C4-6 with Dr. Shaylee Mata, LAPAROSCOPIC  6/6/2011    COLONOSCOPY      8 polyps removed, diverticulitis    COLONOSCOPY N/A 5/21/2018    COLONOSCOPY performed by Logan Vila MD at [place] [address]  8-23-11    bladder dilitation    ORTHOPEDIC SURGERY Right 07/2019    4th toe -- surg repair    NICKI AND BSO (CERVIX REMOVED)  1997    fibroid tumors, hyst; no cervix     UPPER GASTROINTESTINAL ENDOSCOPY      UROLOGICAL SURGERY  06/30/2020    cysto    UROLOGICAL SURGERY  01/2018    cystoscopy with hydrodistention of bladder multiple        Family History   Problem Relation Age of Onset    Other Father         internal bleeding     Diabetes Sister     Psoriasis Mother     Thyroid Disease Sister         hyper; s/p thyroidectomy     Kidney Disease Brother     No Known Problems Sister     Heart Failure Father 76        chf    Diabetes Maternal Grandmother     Hypertension Maternal Grandmother     Emphysema Maternal Grandfather     Heart Disease Paternal Grandmother     Heart Disease Paternal Grandfather     Heart Disease Father         PPM; secondary to CAD, drugs     Seizures Brother         Social Connections: Not on file        Allergies   Allergen Reactions    Aspirin Other (See Comments)     Inflames IBS per pt    Dicyclomine Itching    Ketorolac Hives    Clopidogrel Rash     blisters    Ondansetron Nausea And Vomiting    Ondansetron Hcl Nausea And Vomiting     Nausea and vomiting     Tramadol Nausea And Vomiting     Abdominal cramps        Vitals signs and nursing note reviewed. Patient Vitals for the past 4 hrs:   Temp Pulse Resp BP SpO2   07/15/22 0515 97.4 °F (36.3 °C) 97 18 113/70 98 %          Physical Exam  Vitals and nursing note reviewed. Exam conducted with a chaperone present. Constitutional:       General: She is in acute distress. Appearance: Normal appearance. She is normal weight. HENT:      Head: Normocephalic and atraumatic. Nose: Nose normal.      Mouth/Throat:      Mouth: Mucous membranes are moist.      Pharynx: Oropharynx is clear. Eyes:      General: No scleral icterus. Extraocular Movements: Extraocular movements intact. Conjunctiva/sclera: Conjunctivae normal.      Pupils: Pupils are equal, round, and reactive to light. Cardiovascular:      Rate and Rhythm: Normal rate and regular rhythm. Pulses: Normal pulses. Heart sounds: Normal heart sounds. Pulmonary:      Effort: Pulmonary effort is normal. No respiratory distress. Breath sounds: Normal breath sounds. Abdominal:      General: Abdomen is flat. Bowel sounds are normal. There is no distension. Palpations: Abdomen is soft. There is no mass. Tenderness: There is no abdominal tenderness. Musculoskeletal:         General: No deformity or signs of injury. Normal range of motion. Cervical back: Normal range of motion and neck supple. Comments: Left lower extremity reveals normal range of motion at the left knee  There is no evidence of swelling in the left foot or left calf. There is no tenderness to palpation in the left calf Achilles is nontender  Frederic negative  There is no erythema induration or signs of wounds to either lower extremity. Skin:     General: Skin is warm and dry. Capillary Refill: Capillary refill takes less than 2 seconds. Comments: Examination of both hands and both feet reveal no evidence of any color change.   There are no abrasions open wounds or swelling noted to the palms or soles. I see no redness or erythema no evidence of infection  Capillary refill is normal in all 20 of her digits. Neurological:      General: No focal deficit present. Mental Status: She is alert and oriented to person, place, and time. Psychiatric:         Mood and Affect: Mood normal.         Behavior: Behavior normal.         Thought Content: Thought content normal.         Judgment: Judgment normal.        Procedures    ED EKG Interpretation  EKG was interpreted in the absence of a cardiologist.        XR FOOT LEFT (MIN 3 VIEWS)   Final Result   Negative for acute abnormality. Mild hallux obvious deformity. LEFT FOOT 3 view(s). INDICATION: Foot pain. TECHNIQUE: AP and lateral and oblique views. COMPARISON: None. FINDINGS: No fracture, subluxation dislocation. Alignment anatomic. No   periostitis. No erosions. IMPRESSION: Negative            LEFT KNEE 3 view(s). INDICATION: Left knee pain. TECHNIQUE: AP and lateral and oblique views. COMPARISON: None. FINDINGS / IMPRESSION: No fracture, subluxation or dislocation. No periostitis. No erosions. Mild narrowing the medial joint compartment. Soft tissue   swelling over the patella. XR FOOT RIGHT (MIN 3 VIEWS)   Final Result   Negative for acute abnormality. Mild hallux obvious deformity. LEFT FOOT 3 view(s). INDICATION: Foot pain. TECHNIQUE: AP and lateral and oblique views. COMPARISON: None. FINDINGS: No fracture, subluxation dislocation. Alignment anatomic. No   periostitis. No erosions. IMPRESSION: Negative            LEFT KNEE 3 view(s). INDICATION: Left knee pain. TECHNIQUE: AP and lateral and oblique views. COMPARISON: None. FINDINGS / IMPRESSION: No fracture, subluxation or dislocation. No periostitis. No erosions. Mild narrowing the medial joint compartment.   Soft tissue   swelling over the patella. XR KNEE LEFT (3 VIEWS)   Final Result   Negative for acute abnormality. Mild hallux obvious deformity. LEFT FOOT 3 view(s). INDICATION: Foot pain. TECHNIQUE: AP and lateral and oblique views. COMPARISON: None. FINDINGS: No fracture, subluxation dislocation. Alignment anatomic. No   periostitis. No erosions. IMPRESSION: Negative            LEFT KNEE 3 view(s). INDICATION: Left knee pain. TECHNIQUE: AP and lateral and oblique views. COMPARISON: None. FINDINGS / IMPRESSION: No fracture, subluxation or dislocation. No periostitis. No erosions. Mild narrowing the medial joint compartment. Soft tissue   swelling over the patella. Voice dictation software was used during the making of this note. This software is not perfect and grammatical and other typographical errors may be present. This note has not been completely proofread for errors.      Lien Rodrigues MD  07/15/22 [id number]

## 2022-07-15 NOTE — ED NOTES
Pt c/o discoloring to hands and feet after stepping on a jar at home. Pt in hospital socks and removed for inspection.  No s/s of glass or discoloration at this time      Dg ParishUPMC [facility]  07/15/22 [id number]

## 2022-07-15 NOTE — DISCHARGE INSTRUCTIONS
Ice and elevate your knee  Continue all your current medications  Take Tylenol as needed for discomfort   call your doctor to set up a follow-up appointment    Return to ER for any worsening symptoms or new problems which may arise

## 2022-07-15 NOTE — ED NOTES
Patient discharged with instructions. Patient understands discharge instructions.       Qi Daley RN  07/15/22 2033

## 2022-07-15 NOTE — ED TRIAGE NOTES
Patient arrived to the ER after a jar broke on 7/14/22 at 2300. The patient stated that the glass became embedded into the bottom of her feet, the back of her left leg, and the palms of both her hands. The patient states she has swelling, redness, and pain in the back of her left knee. The patient states that her hands are green and yellow. The patient states that her toe nails are also green. The patient states that she used scissors to cut out a some of the glass.

## 2022-07-15 NOTE — ED NOTES
Report to Brian Jason, transfer of care at this time      Abebe Perez, [facility]  07/15/22 0370

## 2022-07-26 ENCOUNTER — TELEPHONE (OUTPATIENT)
Dept: CARDIOLOGY CLINIC | Age: 57
End: 2022-07-26

## 2022-07-26 NOTE — TELEPHONE ENCOUNTER
Spoke with patient. Patient complains of chest pain, neck and jaw. Patient recently diagnosed with COVID.

## 2022-07-26 NOTE — TELEPHONE ENCOUNTER
Left message on voicemail for patient to call this triage nurse. In message, advised patient to go to [facility] ER for immediate evaluation of any persistent chest, neck, or jaw pain.

## 2022-07-26 NOTE — TELEPHONE ENCOUNTER
Patient called and stated she was diagnosis with Covid and pneumonia. She would like to talk to Dr. Phill Mazariegos nurse or medical assistant. Please call and advise.

## 2022-07-27 NOTE — TELEPHONE ENCOUNTER
States she was diagnosed with COVID pneumonia at [facility] ER on 7/24/22, but was released and not given any recommendations or meds. Very tearful. Getting worse. States \"I feel awful\". Increased SOB. Must breathe through mouth. Very bad pain in middle of chest and under left breast.   Severe, pounding headache. \"Burning up\". Feels very hot with increased sweating. Has not checked temperature. Does not have PCP. Feels she needs to go to ER. Advised patient to go to [facility] ER for evaluation, now. Patient verbalized understanding.

## 2022-08-03 ENCOUNTER — TELEPHONE (OUTPATIENT)
Dept: CARDIOLOGY CLINIC | Age: 57
End: 2022-08-03

## 2022-08-03 NOTE — TELEPHONE ENCOUNTER
Patient states she doesn't have primary care right now but needs this prescription filled. The prescription is urea cream 40% (laser)-1 ounce. Patient would like a call if we can or not. Patient states she needs her magic mouthwash too. Please call and advise.

## 2022-08-03 NOTE — TELEPHONE ENCOUNTER
Called in both Rx's & spoke to the pharmacist. II tried contcting pt. No answer & her voicemail is full.

## 2022-08-03 NOTE — TELEPHONE ENCOUNTER
Alejandro Mclean MD  You 36 minutes ago (3:34 PM)     NEIL    we can refill for two months let her know I am retiring and she will definitely need PCP

## 2022-08-10 ENCOUNTER — HOSPITAL ENCOUNTER (EMERGENCY)
Age: 57
Discharge: HOME | End: 2022-08-10
Attending: EMERGENCY MEDICINE
Payer: COMMERCIAL

## 2022-08-10 ENCOUNTER — APPOINTMENT (OUTPATIENT)
Dept: GENERAL RADIOLOGY | Age: 57
End: 2022-08-10
Payer: COMMERCIAL

## 2022-08-10 VITALS
TEMPERATURE: 97.4 F | BODY MASS INDEX: 25.95 KG/M2 | DIASTOLIC BLOOD PRESSURE: 91 MMHG | HEART RATE: 81 BPM | OXYGEN SATURATION: 99 % | HEIGHT: 64 IN | WEIGHT: 152 LBS | RESPIRATION RATE: 18 BRPM | SYSTOLIC BLOOD PRESSURE: 122 MMHG

## 2022-08-10 DIAGNOSIS — R60.0 BILATERAL LOWER EXTREMITY EDEMA: Primary | ICD-10-CM

## 2022-08-10 DIAGNOSIS — R06.02 SHORTNESS OF BREATH: ICD-10-CM

## 2022-08-10 LAB
ALBUMIN SERPL-MCNC: 3.5 G/DL (ref 3.5–5)
ALBUMIN/GLOB SERPL: 1 {RATIO} (ref 1.2–3.5)
ALP SERPL-CCNC: 68 U/L (ref 50–136)
ALT SERPL-CCNC: 25 U/L (ref 12–65)
ANION GAP SERPL CALC-SCNC: 7 MMOL/L (ref 7–16)
AST SERPL-CCNC: 34 U/L (ref 15–37)
BILIRUB SERPL-MCNC: 0.3 MG/DL (ref 0.2–1.1)
BUN SERPL-MCNC: 9 MG/DL (ref 6–23)
CALCIUM SERPL-MCNC: 9 MG/DL (ref 8.3–10.4)
CHLORIDE SERPL-SCNC: 109 MMOL/L (ref 98–107)
CO2 SERPL-SCNC: 24 MMOL/L (ref 21–32)
CREAT SERPL-MCNC: 0.8 MG/DL (ref 0.6–1)
EKG ATRIAL RATE: 80 BPM
EKG DIAGNOSIS: NORMAL
EKG P AXIS: 71 DEGREES
EKG P-R INTERVAL: 138 MS
EKG Q-T INTERVAL: 386 MS
EKG QRS DURATION: 84 MS
EKG QTC CALCULATION (BAZETT): 445 MS
EKG R AXIS: 33 DEGREES
EKG T AXIS: -21 DEGREES
EKG VENTRICULAR RATE: 80 BPM
ERYTHROCYTE [DISTWIDTH] IN BLOOD BY AUTOMATED COUNT: 14.8 % (ref 11.9–14.6)
GLOBULIN SER CALC-MCNC: 3.5 G/DL (ref 2.3–3.5)
GLUCOSE SERPL-MCNC: 112 MG/DL (ref 65–100)
HCT VFR BLD AUTO: 32.3 % (ref 35.8–46.3)
HGB BLD-MCNC: 10.3 G/DL (ref 11.7–15.4)
MCH RBC QN AUTO: 31.9 PG (ref 26.1–32.9)
MCHC RBC AUTO-ENTMCNC: 31.9 G/DL (ref 31.4–35)
MCV RBC AUTO: 100 FL (ref 79.6–97.8)
NRBC # BLD: 0 K/UL (ref 0–0.2)
NT PRO BNP: 400 PG/ML (ref 5–125)
PLATELET # BLD AUTO: 207 K/UL (ref 150–450)
PMV BLD AUTO: 9.8 FL (ref 9.4–12.3)
POTASSIUM SERPL-SCNC: 3.1 MMOL/L (ref 3.5–5.1)
PROT SERPL-MCNC: 7 G/DL (ref 6.3–8.2)
RBC # BLD AUTO: 3.23 M/UL (ref 4.05–5.2)
SODIUM SERPL-SCNC: 140 MMOL/L (ref 136–145)
WBC # BLD AUTO: 3.7 K/UL (ref 4.3–11.1)

## 2022-08-10 PROCEDURE — 83880 ASSAY OF NATRIURETIC PEPTIDE: CPT

## 2022-08-10 PROCEDURE — 96374 THER/PROPH/DIAG INJ IV PUSH: CPT

## 2022-08-10 PROCEDURE — 93005 ELECTROCARDIOGRAM TRACING: CPT | Performed by: EMERGENCY MEDICINE

## 2022-08-10 PROCEDURE — 85027 COMPLETE CBC AUTOMATED: CPT

## 2022-08-10 PROCEDURE — 6360000002 HC RX W HCPCS: Performed by: EMERGENCY MEDICINE

## 2022-08-10 PROCEDURE — 99285 EMERGENCY DEPT VISIT HI MDM: CPT

## 2022-08-10 PROCEDURE — 80053 COMPREHEN METABOLIC PANEL: CPT

## 2022-08-10 PROCEDURE — 71045 X-RAY EXAM CHEST 1 VIEW: CPT

## 2022-08-10 RX ORDER — FUROSEMIDE 10 MG/ML
80 INJECTION INTRAMUSCULAR; INTRAVENOUS
Status: COMPLETED | OUTPATIENT
Start: 2022-08-10 | End: 2022-08-10

## 2022-08-10 RX ADMIN — FUROSEMIDE 80 MG: 10 INJECTION, SOLUTION INTRAMUSCULAR; INTRAVENOUS at 08:34

## 2022-08-10 ASSESSMENT — ENCOUNTER SYMPTOMS
VOMITING: 0
WHEEZING: 0
SHORTNESS OF BREATH: 1
ABDOMINAL PAIN: 0
COUGH: 1

## 2022-08-10 ASSESSMENT — PAIN DESCRIPTION - LOCATION: LOCATION: LEG

## 2022-08-10 ASSESSMENT — PAIN SCALES - GENERAL: PAINLEVEL_OUTOF10: 9

## 2022-08-10 ASSESSMENT — PAIN - FUNCTIONAL ASSESSMENT: PAIN_FUNCTIONAL_ASSESSMENT: 0-10

## 2022-08-10 NOTE — DISCHARGE INSTRUCTIONS
Take an extra dose of Lasix such that you are taking 2 pills in the morning and 1 at night for the next 2 days starting tomorrow. Keep appointment with your cardiologist.  Jf Kramer sooner for worsening shortness of breath fever or other concerns.

## 2022-08-10 NOTE — ED PROVIDER NOTES
[facility] Emergency Department Provider Note                   PCP:                Carroll Thorne MD               Age: 49 y.o. Sex: female     No diagnosis found. DISPOSITION          MDM  Number of Diagnoses or Management Options  Diagnosis management comments: Patient with valve issues and congestive heart failure. With increased swelling in legs. Check screening lab work. Diuresis. Check chest x-ray and assess for congestive heart failure. Symmetric swelling, patient on Eliquis. Doubt DVT. Amount and/or Complexity of Data Reviewed  Clinical lab tests: ordered and reviewed  Tests in the radiology section of CPT®: reviewed and ordered  Tests in the medicine section of CPT®: ordered and reviewed  Review and summarize past medical records: (Recent visit to cardiologist.  Patient on Lasix twice a day, 40 mg. Also on Aldactone.)  Independent visualization of images, tracings, or specimens: yes (My Interpretation EKG reveals sinus rhythm at 80. Poor R wave progression. Nonspecific ST-T changes. No ectopy. Normal QT interval.)    Risk of Complications, Morbidity, and/or Mortality  Presenting problems: moderate  Diagnostic procedures: minimal  Management options: low         Orders Placed This Encounter   Procedures    XR CHEST PORTABLE    CBC    CMP    BNP    EKG 12 Lead    Insert peripheral IV STAT        Radha Gibbs is a 49 y.o. female who presents to the Emergency Department with chief complaint of    Chief Complaint   Patient presents with    Leg Swelling      72-year-old female presents with slightly worse short of breath at baseline. Feels like she is retaining fluid and legs are swelling more over the last 2 days. Bilateral swelling. She does take Eliquis. Has a history of valve regurgitation, both aortic and mitral.  History congestive heart failure. Also history of cholecystectomy in the past.  Treated within the last 2 weeks for COVID.   Main issue today is swelling in her legs. The history is provided by the patient. Shortness of Breath  Severity:  Mild  Onset quality:  Gradual  Duration:  1 day  Timing:  Constant  Progression:  Waxing and waning  Chronicity:  Recurrent  Relieved by:  Oxygen  Worsened by:  Nothing  Associated symptoms: cough    Associated symptoms: no abdominal pain, no chest pain, no fever, no syncope, no vomiting and no wheezing    Risk factors: no hx of PE/DVT        Review of Systems   Constitutional:  Negative for chills and fever. Respiratory:  Positive for cough and shortness of breath. Negative for wheezing. Cardiovascular:  Negative for chest pain and syncope. Gastrointestinal:  Negative for abdominal pain and vomiting. All other systems reviewed and are negative.     Past Medical History:   Diagnosis Date    Anemia     blood transfusion 5/2018 per pt    Arrhythmia     palpitations, moderate mitral valve regurge    Arthritis     lower back osteo    Asthma     uses inhalers    Cancer ([address]     left breast ca; s/p surgery     Cardiomyopathy ([address]     ECHO 11/5/2020 LVEF 30-35%    CHF (congestive heart failure) [facility]      followed by dr Mary Lou So; ECHO 11/5/2020 EF 30-35% mod to severe AVR; moderate MVR    Chronic pain 2010    Coagulation defect ([address]     eliquis    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 ([facility] 2013    Decreased cardiac ejection fraction 11/27/2017    Depression     controlled      Diverticulitis     GERD (gastroesophageal reflux disease)     pantoprazole- well controlled     Heart murmur     Echo 11/5/20  LVEF 3035%    Hypertension     managed with meds    IBS (irritable bowel syndrome)     IC (interstitial cystitis)     Insomnia     Migraine with aura and without status migrainosus, not intractable 6/17/2016    Mitral valve regurgitation, rheumatic     followed Dr. Phill Mazariegos    Moderate aortic regurgitation     per echo 7/2019 in cc-- followed by dr Mary Lou So    Palpitations 5/16/2016    Psychiatric disorder anxiety    Requires oxygen therapy     3l/min at hs. 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    Rheumatic fever as child    pt reports rheumatic fever in childhood    Smoker     started age 23-- smoked 0.5 ppd until 2018-- cut back to 10-2 cig daily per pt    Stroke [facility]) 2014    \"mild stroke\" pt reports 2014- \"left sided weakness and balance is off\"     Thromboembolus ([address]     BLE 2012        Past Surgical History:   Procedure Laterality Date    APPENDECTOMY  2006    BIOPSY OF BREAST, INCISIONAL Left     lymph node , left, patient states her bx neg, but high risk    CARDIAC CATHETERIZATION  2018    no intervention    CARDIAC CATHETERIZATION  04/2017    no intervention    CARDIAC CATHETERIZATION  5/27/2011    no blockages, no intervention    CERVICAL FUSION  07/14/2020    ACDF C4-6 with Dr. Luh Cotter, LAPAROSCOPIC  6/6/2011    COLONOSCOPY      8 polyps removed, diverticulitis    COLONOSCOPY N/A 5/21/2018    COLONOSCOPY performed by Mary Bustamante MD at [facility]  8-23-11    bladder dilitation    ORTHOPEDIC SURGERY Right 07/2019    4th toe -- surg repair    NICKI AND BSO (CERVIX REMOVED)  1997    fibroid tumors, hyst; no cervix     UPPER GASTROINTESTINAL ENDOSCOPY      UROLOGICAL SURGERY  06/30/2020    cysto    UROLOGICAL SURGERY  01/2018    cystoscopy with hydrodistention of bladder multiple        Family History   Problem Relation Age of Onset    Other Father         internal bleeding     Diabetes Sister     Psoriasis Mother     Thyroid Disease Sister         hyper; s/p thyroidectomy     Kidney Disease Brother     No Known Problems Sister     Heart Failure Father 76        chf    Diabetes Maternal Grandmother     Hypertension Maternal Grandmother     Emphysema Maternal Grandfather     Heart Disease Paternal Grandmother     Heart Disease Paternal Grandfather     Heart Disease Father         PPM; secondary to CAD, drugs     Seizures Brother Social History     Socioeconomic History    Marital status: Legally      Spouse name: None    Number of children: None    Years of education: None    Highest education level: None   Tobacco Use    Smoking status: Former     Packs/day: 0.25     Types: Cigarettes     Quit date: 2019     Years since quittin.6    Smokeless tobacco: Never   Substance and Sexual Activity    Alcohol use: No    Drug use: Not Currently     Types: Opiates , Cocaine         Aspirin, Dicyclomine, Ketorolac, Clopidogrel, Ondansetron, Ondansetron hcl, and Tramadol     Previous Medications    ALBUTEROL SULFATE  (90 BASE) MCG/ACT INHALER    Inhale 2 puffs into the lungs every 6 hours as needed    AMITRIPTYLINE (ELAVIL) 25 MG TABLET    TAKE 1 TABLET BY MOUTH EVERY DAY AT NIGHT    AMMONIUM LACTATE (AMLACTIN) 12 % CREAM    Apply 1 g topically 2 times daily    APIXABAN (ELIQUIS) 5 MG TABS TABLET    Take 5 mg by mouth 2 times daily    BUDESONIDE-FORMOTEROL (SYMBICORT) 160-4.5 MCG/ACT AERO    Inhale 1 puff into the lungs 2 times daily    CARVEDILOL (COREG) 12.5 MG TABLET    Take 12.5 mg by mouth 2 times daily     DIAZEPAM (VALIUM) 5 MG TABLET    Take 5 mg by mouth 3 times daily as needed. EPINEPHRINE (EPIPEN JR) 0.15 MG/0.3ML SOAJ    Inject 0.15 mg into the muscle once as needed    ESTRADIOL (ESTRACE) 0.5 MG TABLET    Take 0.5 mg by mouth daily    FAMOTIDINE (PEPCID) 20 MG TABLET    Take 20 mg by mouth 2 times daily    FLUOXETINE (PROZAC) 40 MG CAPSULE    Take 40 mg by mouth 2 times daily     FREMANEZUMAB-VFRM (AJOVY) 225 MG/1.5ML SOAJ    Inject 225 mg into the skin    FUROSEMIDE (LASIX) 40 MG TABLET    Take 1 tablet by mouth 2 times daily    GABAPENTIN (NEURONTIN) 400 MG CAPSULE    Take 400 mg by mouth 2 times daily.     HYDRALAZINE (APRESOLINE) 25 MG TABLET    Take 25 mg by mouth 3 times daily    ISOSORBIDE MONONITRATE (IMDUR) 30 MG EXTENDED RELEASE TABLET    Take 30 mg by mouth    LIDOCAINE (LIDODERM) 5 %    Place 1 patch onto the skin daily    LIDOCAINE (LMX) 4 % CREAM    Apply topically 2 times daily as needed    LORATADINE (CLARITIN) 10 MG TABLET    Take 10 mg by mouth daily    MAGIC MOUTHWASH (MIRACLE MOUTHWASH)    Swish and spit 5 mLs 4 times daily as needed for Irritation Lidocaine:Benadryl:Maalox 1:1:1    METHEN-HYOSC-METH BLUE-NA PHOS (UROGESIC-BLUE) 81.6 MG TABS    Take 1 tablet by mouth 4 times daily as needed    MIRTAZAPINE (REMERON) 30 MG TABLET    Take 30 mg by mouth    NITROGLYCERIN (NITROSTAT) 0.4 MG SL TABLET    Place 0.4 mg under the tongue    OXYGEN    Nightly. Indications: 3 L/min at hs and prn during the day    PANTOPRAZOLE (PROTONIX) 40 MG TABLET    Take 40 mg by mouth daily    POTASSIUM CHLORIDE (KLOR-CON M) 10 MEQ EXTENDED RELEASE TABLET    Take 1 tablet by mouth daily    PRAVASTATIN (PRAVACHOL) 40 MG TABLET    Take 40 mg by mouth    SACUBITRIL-VALSARTAN (ENTRESTO) 24-26 MG PER TABLET    Take 1 tablet by mouth 2 times daily    SPIRONOLACTONE (ALDACTONE) 25 MG TABLET    Take 2 tablets by mouth 2 times daily    TEMAZEPAM (RESTORIL) 30 MG CAPSULE    Take 30 mg by mouth. TIOTROPIUM (SPIRIVA) 18 MCG INHALATION CAPSULE    Inhale 18 mcg into the lungs daily    TOLTERODINE (DETROL LA) 2 MG EXTENDED RELEASE CAPSULE    TAKE 1 CAP BY MOUTH DAILY. INCREASE TO 4 MG DAILY AFTER 4 WEEKS IF NEEDED    TOPIRAMATE (TOPAMAX) 50 MG TABLET    Take 50 mg by mouth 2 times daily    TRIAMCINOLONE (KENALOG) 0.1 % CREAM    Apply 1 g topically 2 times daily as needed        Vitals signs and nursing note reviewed. Patient Vitals for the past 4 hrs:   Temp Pulse Resp BP SpO2   08/10/22 0705 97.4 °F (36.3 °C) 90 18 (!) 115/93 95 %          Physical Exam  Vitals and nursing note reviewed. Constitutional:       Appearance: She is not ill-appearing. HENT:      Head: Normocephalic and atraumatic.       Right Ear: External ear normal.      Left Ear: External ear normal.      Mouth/Throat:      Mouth: Mucous membranes are moist. Pharynx: Oropharynx is clear. Eyes:      General: No scleral icterus. Extraocular Movements: Extraocular movements intact. Pupils: Pupils are equal, round, and reactive to light. Cardiovascular:      Rate and Rhythm: Normal rate and regular rhythm. Pulmonary:      Effort: Pulmonary effort is normal.      Breath sounds: Normal breath sounds. Musculoskeletal:         General: Swelling present. No tenderness. Cervical back: Normal range of motion and neck supple. Right lower leg: Edema present. Left lower leg: Edema present. Comments: 2+ pitting edema bilaterally. Mild tenderness both lower extremities. No cords or asymmetric swelling. Skin:     General: Skin is warm and dry. Neurological:      General: No focal deficit present. Mental Status: She is alert.         Procedures      Labs Reviewed   CBC - Abnormal; Notable for the following components:       Result Value    WBC 3.7 (*)     RBC 3.23 (*)     Hemoglobin 10.3 (*)     Hematocrit 32.3 (*)     .0 (*)     RDW 14.8 (*)     All other components within normal limits   COMPREHENSIVE METABOLIC PANEL - Abnormal; Notable for the following components:    Potassium 3.1 (*)     Chloride 109 (*)     Glucose 112 (*)     Albumin/Globulin Ratio 1.0 (*)     All other components within normal limits   BRAIN NATRIURETIC PEPTIDE - Abnormal; Notable for the following components:    NT Pro- (*)     All other components within normal limits        XR CHEST PORTABLE    (Results Pending)                  Results Include:    Recent Results (from the past 24 hour(s))   CBC    Collection Time: 08/10/22  7:12 AM   Result Value Ref Range    WBC 3.7 (L) 4.3 - 11.1 K/uL    RBC 3.23 (L) 4.05 - 5.2 M/uL    Hemoglobin 10.3 (L) 11.7 - 15.4 g/dL    Hematocrit 32.3 (L) 35.8 - 46.3 %    .0 (H) 79.6 - 97.8 FL    MCH 31.9 26.1 - 32.9 PG    MCHC 31.9 31.4 - 35.0 g/dL    RDW 14.8 (H) 11.9 - 14.6 %    Platelets 724 041 - 267 K/uL    MPV 9.8 9.4 - 12.3 FL    nRBC 0.00 0.0 - 0.2 K/uL   CMP    Collection Time: 08/10/22  7:12 AM   Result Value Ref Range    Sodium 140 136 - 145 mmol/L    Potassium 3.1 (L) 3.5 - 5.1 mmol/L    Chloride 109 (H) 98 - 107 mmol/L    CO2 24 21 - 32 mmol/L    Anion Gap 7 7 - 16 mmol/L    Glucose 112 (H) 65 - 100 mg/dL    BUN 9 6 - 23 MG/DL    Creatinine 0.80 0.6 - 1.0 MG/DL    GFR African American >60 >60 ml/min/1.73m2    GFR Non- >60 >60 ml/min/1.73m2    Calcium 9.0 8.3 - 10.4 MG/DL    Total Bilirubin 0.3 0.2 - 1.1 MG/DL    ALT 25 12 - 65 U/L    AST 34 15 - 37 U/L    Alk Phosphatase 68 50 - 136 U/L    Total Protein 7.0 6.3 - 8.2 g/dL    Albumin 3.5 3.5 - 5.0 g/dL    Globulin 3.5 2.3 - 3.5 g/dL    Albumin/Globulin Ratio 1.0 (L) 1.2 - 3.5     BNP    Collection Time: 08/10/22  7:12 AM   Result Value Ref Range    NT Pro- (H) 5 - 125 PG/ML   EKG 12 Lead    Collection Time: 08/10/22  7:14 AM   Result Value Ref Range    Ventricular Rate 80 BPM    Atrial Rate 80 BPM    P-R Interval 138 ms    QRS Duration 84 ms    Q-T Interval 386 ms    QTc Calculation (Bazett) 445 ms    P Axis 71 degrees    R Axis 33 degrees    T Axis -21 degrees    Diagnosis Normal sinus rhythm      XR KNEE LEFT (3 VIEWS)    Result Date: 7/15/2022  RIGHT FOOT 3 view(s). INDICATION: Foot pain. TECHNIQUE: AP and lateral and oblique views. COMPARISON: None. FINDINGS: Mild hallux valgus deformity. No fracture, subluxation or dislocation. No periostitis. No erosions. Negative for acute abnormality. Mild hallux obvious deformity. LEFT FOOT 3 view(s). INDICATION: Foot pain. TECHNIQUE: AP and lateral and oblique views. COMPARISON: None. FINDINGS: No fracture, subluxation dislocation. Alignment anatomic. No periostitis. No erosions. IMPRESSION: Negative LEFT KNEE 3 view(s). INDICATION: Left knee pain. TECHNIQUE: AP and lateral and oblique views. COMPARISON: None. FINDINGS / IMPRESSION: No fracture, subluxation or dislocation. No periostitis. No erosions. Mild narrowing the medial joint compartment. Soft tissue swelling over the patella. XR FOOT LEFT (MIN 3 VIEWS)    Result Date: 7/15/2022  RIGHT FOOT 3 view(s). INDICATION: Foot pain. TECHNIQUE: AP and lateral and oblique views. COMPARISON: None. FINDINGS: Mild hallux valgus deformity. No fracture, subluxation or dislocation. No periostitis. No erosions. Negative for acute abnormality. Mild hallux obvious deformity. LEFT FOOT 3 view(s). INDICATION: Foot pain. TECHNIQUE: AP and lateral and oblique views. COMPARISON: None. FINDINGS: No fracture, subluxation dislocation. Alignment anatomic. No periostitis. No erosions. IMPRESSION: Negative LEFT KNEE 3 view(s). INDICATION: Left knee pain. TECHNIQUE: AP and lateral and oblique views. COMPARISON: None. FINDINGS / IMPRESSION: No fracture, subluxation or dislocation. No periostitis. No erosions. Mild narrowing the medial joint compartment. Soft tissue swelling over the patella. XR FOOT RIGHT (MIN 3 VIEWS)    Result Date: 7/15/2022  RIGHT FOOT 3 view(s). INDICATION: Foot pain. TECHNIQUE: AP and lateral and oblique views. COMPARISON: None. FINDINGS: Mild hallux valgus deformity. No fracture, subluxation or dislocation. No periostitis. No erosions. Negative for acute abnormality. Mild hallux obvious deformity. LEFT FOOT 3 view(s). INDICATION: Foot pain. TECHNIQUE: AP and lateral and oblique views. COMPARISON: None. FINDINGS: No fracture, subluxation dislocation. Alignment anatomic. No periostitis. No erosions. IMPRESSION: Negative LEFT KNEE 3 view(s). INDICATION: Left knee pain. TECHNIQUE: AP and lateral and oblique views. COMPARISON: None. FINDINGS / IMPRESSION: No fracture, subluxation or dislocation. No periostitis. No erosions. Mild narrowing the medial joint compartment. Soft tissue swelling over the patella.      XR CHEST PORTABLE    Result Date: 8/10/2022  Portable chest x-ray CLINICAL INDICATION: Shortness of breath FINDINGS: Single AP view the chest compared to a similar exam dated 5/8/2022 show the lungs to be expanded and clear. No pleural effusion or pneumothorax. The cardiac silhouette and mediastinum are unremarkable. The bones are normal.     Normal portable chest x-ray. Resting well with good O2 saturations. Will ensure good urine output. Believe then, stable for discharge. Extra dose of Lasix for 2 to 3 days and follow-up with cardiology. Voice dictation software was used during the making of this note. This software is not perfect and grammatical and other typographical errors may be present. This note has not been completely proofread for errors.      Jackie Heart MD  08/10/22 0021       Jackie Heart MD  08/10/22 0359

## 2022-08-10 NOTE — ED NOTES
I have reviewed discharge instructions with the patient. The patient verbalized understanding. Patient left ED via Discharge Method: ambulatory to Home with self. Opportunity for questions and clarification provided. Patient given 0 scripts. To continue your aftercare when you leave the hospital, you may receive an automated call from our care team to check in on how you are doing. This is a free service and part of our promise to provide the best care and service to meet your aftercare needs.  If you have questions, or wish to unsubscribe from this service please call [phone]. Thank you for Choosing our [facility] Emergency Department.         Tab Porter RN  08/10/22 7943

## 2022-08-10 NOTE — ED TRIAGE NOTES
Pt presents with bilateral leg swelling that she first noticed this morning, feels like she is retaining fluid and has a cough. Compliant with medications.

## 2022-08-10 NOTE — ED NOTES
Patient with with 1 urine occurrence. RN unable to assess amount.       Justin Moore, BALDO  08/10/22 8617

## 2022-08-16 ENCOUNTER — HOSPITAL ENCOUNTER (EMERGENCY)
Dept: ULTRASOUND IMAGING | Age: 57
Discharge: HOME | End: 2022-08-19
Payer: COMMERCIAL

## 2022-08-16 ENCOUNTER — HOSPITAL ENCOUNTER (EMERGENCY)
Age: 57
Discharge: LEFT BEFORE DISCHARGE | End: 2022-08-16
Attending: EMERGENCY MEDICINE
Payer: COMMERCIAL

## 2022-08-16 VITALS
DIASTOLIC BLOOD PRESSURE: 68 MMHG | HEIGHT: 64 IN | SYSTOLIC BLOOD PRESSURE: 114 MMHG | HEART RATE: 86 BPM | OXYGEN SATURATION: 95 % | RESPIRATION RATE: 18 BRPM | WEIGHT: 158 LBS | BODY MASS INDEX: 26.98 KG/M2 | TEMPERATURE: 98.1 F

## 2022-08-16 DIAGNOSIS — Z86.718 HISTORY OF DVT (DEEP VEIN THROMBOSIS): Primary | ICD-10-CM

## 2022-08-16 PROCEDURE — 4500000201 HC EMERGENCY DEPT VISIT NO LEVEL OF CARE

## 2022-08-16 PROCEDURE — 93971 EXTREMITY STUDY: CPT

## 2022-08-18 NOTE — ED PROVIDER NOTES
Patient left prior to any provider encounter. I am accessing and completing this note solely to discard the record from [address] [place] [address] [employer] system. It is required from the electronic medical record to assign a physician to these records and I was assigned this for this reason. I had no clinical encounter with this patient and did not otherwise examine the case. No physician billing should be completed. Arnulfo Chase.  Deangelo Carter MD 6:59 AM        Padmini Norwood MD  08/18/22 0989

## 2022-09-01 ENCOUNTER — TELEPHONE (OUTPATIENT)
Dept: UROLOGY | Age: 57
End: 2022-09-01

## 2022-09-01 ENCOUNTER — OFFICE VISIT (OUTPATIENT)
Dept: UROLOGY | Age: 57
End: 2022-09-01
Payer: COMMERCIAL

## 2022-09-01 DIAGNOSIS — N30.10 INTERSTITIAL CYSTITIS (CHRONIC) WITHOUT HEMATURIA: Primary | ICD-10-CM

## 2022-09-01 DIAGNOSIS — R10.2 PELVIC AND PERINEAL PAIN: ICD-10-CM

## 2022-09-01 LAB
BILIRUBIN, URINE, POC: NEGATIVE
BLOOD URINE, POC: NORMAL
GLUCOSE URINE, POC: NEGATIVE
KETONES, URINE, POC: NEGATIVE
LEUKOCYTE ESTERASE, URINE, POC: NEGATIVE
NITRITE, URINE, POC: NEGATIVE
PH, URINE, POC: 7 (ref 4.6–8)
PROTEIN,URINE, POC: NEGATIVE
SPECIFIC GRAVITY, URINE, POC: 1.02 (ref 1–1.03)
URINALYSIS CLARITY, POC: NORMAL
URINALYSIS COLOR, POC: NORMAL
UROBILINOGEN, POC: NORMAL

## 2022-09-01 PROCEDURE — 81003 URINALYSIS AUTO W/O SCOPE: CPT | Performed by: NURSE PRACTITIONER

## 2022-09-01 ASSESSMENT — ENCOUNTER SYMPTOMS
BACK PAIN: 0
ABDOMINAL PAIN: 1

## 2022-09-01 NOTE — PROGRESS NOTES
[facility]  [address]  [place] [address], [address]   [phone]          [place] Kayla Franco  : 1965    Chief Complaint   Patient presents with    Follow-up     IC-Bladder pain          HPI     Joel Roberts is a 49 y.o. female          Past Medical History:   Diagnosis Date    Anemia     blood transfusion 2018 per pt    Arrhythmia     palpitations, moderate mitral valve regurge    Arthritis     lower back osteo    Asthma     uses inhalers    Cancer ([address]     left breast ca; s/p surgery     Cardiomyopathy ([address]     ECHO 2020 LVEF 30-35%    CHF (congestive heart failure) ([address]      followed by dr Marlee South; ECHO 2020 EF 30-35% mod to severe AVR; moderate MVR    Chronic pain 2010    Coagulation defect ([address]     eliquis    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 ([address]     Decreased cardiac ejection fraction 2017    Depression     controlled      Diverticulitis     GERD (gastroesophageal reflux disease)     pantoprazole- well controlled     Heart murmur     Echo 20  LVEF 3035%    Hypertension     managed with meds    IBS (irritable bowel syndrome)     IC (interstitial cystitis)     Insomnia     Migraine with aura and without status migrainosus, not intractable 2016    Mitral valve regurgitation, rheumatic     followed Dr. Sebastian Black Moderate aortic regurgitation     per echo 2019 in [place]-- followed by dr Antonio Parish 2016    Psychiatric disorder     anxiety    Requires oxygen therapy     3l/min at hs. prn during the days as needed    Rheumatic aortic insufficiency 2016    Rheumatic fever as child    pt reports rheumatic fever in childhood    Smoker     started age 23-- smoked 0.5 ppd until 2018-- cut back to 10-2 cig daily per pt    Stroke [facility])     \"mild stroke\" pt reports - \"left sided weakness and balance is off\"     Thromboembolus ([address]     BLE  Past Surgical History:   Procedure Laterality Date    APPENDECTOMY  2006    BIOPSY OF BREAST, INCISIONAL Left     lymph node , left, patient states her bx neg, but high risk    CARDIAC CATHETERIZATION  2018    no intervention    CARDIAC CATHETERIZATION  04/2017    no intervention    CARDIAC CATHETERIZATION  5/27/2011    no blockages, no intervention    CERVICAL FUSION  07/14/2020    ACDF C4-6 with Dr. Alexa Gibbs, LAPAROSCOPIC  6/6/2011    COLONOSCOPY      8 polyps removed, diverticulitis    COLONOSCOPY N/A 5/21/2018    COLONOSCOPY performed by Janae Kerr MD at [address]  8-23-11    bladder dilitation    ORTHOPEDIC SURGERY Right 07/2019    4th toe -- surg repair    NICKI AND BSO (CERVIX REMOVED)  1997    fibroid tumors, hyst; no cervix     UPPER GASTROINTESTINAL ENDOSCOPY      UROLOGICAL SURGERY  06/30/2020    cysto    UROLOGICAL SURGERY  01/2018    cystoscopy with hydrodistention of bladder multiple     Current Outpatient Medications   Medication Sig Dispense Refill    Magic Mouthwash (MIRACLE MOUTHWASH) Swish and spit 5 mLs 4 times daily as needed for Irritation Lidocaine:Benadryl:Maalox 1:1:1 240 mL 1    potassium chloride (KLOR-CON M) 10 MEQ extended release tablet Take 1 tablet by mouth daily 30 tablet 5    furosemide (LASIX) 40 MG tablet Take 1 tablet by mouth 2 times daily 180 tablet 3    spironolactone (ALDACTONE) 25 MG tablet Take 2 tablets by mouth 2 times daily 180 tablet 3    amitriptyline (ELAVIL) 25 MG tablet TAKE 1 TABLET BY MOUTH EVERY DAY AT NIGHT      OXYGEN Nightly.  Indications: 3 L/min at hs and prn during the day      albuterol sulfate  (90 Base) MCG/ACT inhaler Inhale 2 puffs into the lungs every 6 hours as needed      ammonium lactate (AMLACTIN) 12 % cream Apply 1 g topically 2 times daily      apixaban (ELIQUIS) 5 MG TABS tablet Take 5 mg by mouth 2 times daily      budesonide-formoterol (SYMBICORT) 160-4.5 MCG/ACT AERO daily      triamcinolone (KENALOG) 0.1 % cream Apply 1 g topically 2 times daily as needed       No current facility-administered medications for this visit.      Allergies   Allergen Reactions    Aspirin Other (See Comments)     Inflames IBS per pt    Dicyclomine Itching    Ketorolac Hives    Clopidogrel Rash     blisters    Ondansetron Nausea And Vomiting    Ondansetron Hcl Nausea And Vomiting     Nausea and vomiting     Tramadol Nausea And Vomiting     Abdominal cramps     Social History     Socioeconomic History    Marital status: Legally      Spouse name: Not on file    Number of children: Not on file    Years of education: Not on file    Highest education level: Not on file   Occupational History    Not on file   Tobacco Use    Smoking status: Former     Packs/day: 0.25     Types: Cigarettes     Quit date: 2019     Years since quittin.7    Smokeless tobacco: Never   Substance and Sexual Activity    Alcohol use: No    Drug use: Not Currently     Types: Opiates , Cocaine    Sexual activity: Not on file   Other Topics Concern    Not on file   Social History Narrative    Not on file     Social Determinants of Health     Financial Resource Strain: Not on file   Food Insecurity: Not on file   Transportation Needs: Not on file   Physical Activity: Not on file   Stress: Not on file   Social Connections: Not on file   Intimate Partner Violence: Not on file   Housing Stability: Not on file     Family History   Problem Relation Age of Onset    Other Father         internal bleeding     Diabetes Sister     Psoriasis Mother     Thyroid Disease Sister         hyper; s/p thyroidectomy     Kidney Disease Brother     No Known Problems Sister     Heart Failure Father 76        chf    Diabetes Maternal Grandmother     Hypertension Maternal Grandmother     Emphysema Maternal Grandfather     Heart Disease Paternal Grandmother     Heart Disease Paternal Grandfather     Heart Disease Father         PPM; secondary to CAD, drugs     Seizures Brother        Review of Systems  Constitutional:   Negative for fever. GI: Positive for abdominal pain. Genitourinary: Positive for urinary burning. Musculoskeletal:  Negative for back pain. Urinalysis  UA - Dipstick  Results for orders placed or performed in visit on 09/01/22   AMB POC URINALYSIS DIP STICK AUTO W/O MICRO   Result Value Ref Range    Color (UA POC)      Clarity (UA POC)      Glucose, Urine, POC Negative Negative    Bilirubin, Urine, POC Negative Negative    KETONES, Urine, POC Negative Negative    Specific Gravity, Urine, POC 1.020 1.001 - 1.035    Blood (UA POC) Trace Negative    pH, Urine, POC 7.0 4.6 - 8.0    Protein, Urine, POC Negative Negative    Urobilinogen, POC 0.2 mg/dL     Nitrite, Urine, POC Negative Negative    Leukocyte Esterase, Urine, POC Negative Negative       UA - Micro  WBC - 0  RBC - 0  Bacteria - 0  Epith - 0        Assessment and Plan    ICD-10-CM    1.  Interstitial cystitis (chronic) without hematuria  N30.10 AMB POC URINALYSIS DIP STICK AUTO W/O MICRO      2. Pelvic and perineal pain  R10.2 AMB POC URINALYSIS DIP STICK AUTO W/O MICRO

## 2022-10-28 ENCOUNTER — TELEPHONE (OUTPATIENT)
Dept: UROLOGY | Age: 57
End: 2022-10-28

## 2022-10-28 NOTE — TELEPHONE ENCOUNTER
Pt called office and stated she needs an urgent return call from provider, please advise, thank you!

## 2022-11-01 ENCOUNTER — HOSPITAL ENCOUNTER (EMERGENCY)
Age: 57
Discharge: HOME | End: 2022-11-01
Attending: EMERGENCY MEDICINE
Payer: MEDICAID

## 2022-11-01 VITALS
HEART RATE: 76 BPM | BODY MASS INDEX: 24.75 KG/M2 | SYSTOLIC BLOOD PRESSURE: 98 MMHG | WEIGHT: 145 LBS | RESPIRATION RATE: 15 BRPM | DIASTOLIC BLOOD PRESSURE: 55 MMHG | HEIGHT: 64 IN | OXYGEN SATURATION: 96 % | TEMPERATURE: 99 F

## 2022-11-01 DIAGNOSIS — R53.83 OTHER FATIGUE: Primary | ICD-10-CM

## 2022-11-01 DIAGNOSIS — R11.2 NAUSEA AND VOMITING, UNSPECIFIED VOMITING TYPE: ICD-10-CM

## 2022-11-01 LAB
ALBUMIN SERPL-MCNC: 3.1 G/DL (ref 3.5–5)
ALBUMIN/GLOB SERPL: 0.9 {RATIO} (ref 0.4–1.6)
ALP SERPL-CCNC: 66 U/L (ref 50–136)
ALT SERPL-CCNC: 26 U/L (ref 12–65)
ANION GAP SERPL CALC-SCNC: 7 MMOL/L (ref 2–11)
AST SERPL-CCNC: 34 U/L (ref 15–37)
BASOPHILS # BLD: 0 K/UL (ref 0–0.2)
BASOPHILS NFR BLD: 1 % (ref 0–2)
BILIRUB SERPL-MCNC: 0.2 MG/DL (ref 0.2–1.1)
BUN SERPL-MCNC: 14 MG/DL (ref 6–23)
CALCIUM SERPL-MCNC: 8.7 MG/DL (ref 8.3–10.4)
CHLORIDE SERPL-SCNC: 109 MMOL/L (ref 101–110)
CO2 SERPL-SCNC: 22 MMOL/L (ref 21–32)
CREAT SERPL-MCNC: 0.9 MG/DL (ref 0.6–1)
DIFFERENTIAL METHOD BLD: ABNORMAL
EOSINOPHIL # BLD: 0 K/UL (ref 0–0.8)
EOSINOPHIL NFR BLD: 1 % (ref 0.5–7.8)
ERYTHROCYTE [DISTWIDTH] IN BLOOD BY AUTOMATED COUNT: 12.7 % (ref 11.9–14.6)
GLOBULIN SER CALC-MCNC: 3.4 G/DL (ref 2.8–4.5)
GLUCOSE SERPL-MCNC: 85 MG/DL (ref 65–100)
HCT VFR BLD AUTO: 29.3 % (ref 35.8–46.3)
HGB BLD-MCNC: 9.4 G/DL (ref 11.7–15.4)
IMM GRANULOCYTES # BLD AUTO: 0 K/UL (ref 0–0.5)
IMM GRANULOCYTES NFR BLD AUTO: 0 % (ref 0–5)
LIPASE SERPL-CCNC: 89 U/L (ref 73–393)
LYMPHOCYTES # BLD: 1.7 K/UL (ref 0.5–4.6)
LYMPHOCYTES NFR BLD: 44 % (ref 13–44)
MCH RBC QN AUTO: 31.9 PG (ref 26.1–32.9)
MCHC RBC AUTO-ENTMCNC: 32.1 G/DL (ref 31.4–35)
MCV RBC AUTO: 99.3 FL (ref 82–102)
MONOCYTES # BLD: 0.4 K/UL (ref 0.1–1.3)
MONOCYTES NFR BLD: 11 % (ref 4–12)
NEUTS SEG # BLD: 1.6 K/UL (ref 1.7–8.2)
NEUTS SEG NFR BLD: 43 % (ref 43–78)
NRBC # BLD: 0 K/UL (ref 0–0.2)
PLATELET # BLD AUTO: 219 K/UL (ref 150–450)
PMV BLD AUTO: 9.7 FL (ref 9.4–12.3)
POTASSIUM SERPL-SCNC: 3.6 MMOL/L (ref 3.5–5.1)
PROT SERPL-MCNC: 6.5 G/DL (ref 6.3–8.2)
RBC # BLD AUTO: 2.95 M/UL (ref 4.05–5.2)
SODIUM SERPL-SCNC: 138 MMOL/L (ref 133–143)
WBC # BLD AUTO: 3.8 K/UL (ref 4.3–11.1)

## 2022-11-01 PROCEDURE — 2580000003 HC RX 258: Performed by: EMERGENCY MEDICINE

## 2022-11-01 PROCEDURE — 85025 COMPLETE CBC W/AUTO DIFF WBC: CPT

## 2022-11-01 PROCEDURE — 96360 HYDRATION IV INFUSION INIT: CPT

## 2022-11-01 PROCEDURE — 99284 EMERGENCY DEPT VISIT MOD MDM: CPT

## 2022-11-01 PROCEDURE — 80053 COMPREHEN METABOLIC PANEL: CPT

## 2022-11-01 PROCEDURE — 83690 ASSAY OF LIPASE: CPT

## 2022-11-01 RX ORDER — HYOSCYAMINE SULFATE 0.12 MG/1
0.12 TABLET SUBLINGUAL 4 TIMES DAILY PRN
Qty: 40 EACH | Refills: 0 | Status: SHIPPED | OUTPATIENT
Start: 2022-11-01

## 2022-11-01 RX ORDER — ONDANSETRON 8 MG/1
4-8 TABLET, ORALLY DISINTEGRATING ORAL
Qty: 20 TABLET | Refills: 0 | Status: SHIPPED | OUTPATIENT
Start: 2022-11-01

## 2022-11-01 RX ORDER — ONDANSETRON 2 MG/ML
4 INJECTION INTRAMUSCULAR; INTRAVENOUS
Status: DISCONTINUED | OUTPATIENT
Start: 2022-11-01 | End: 2022-11-01

## 2022-11-01 RX ORDER — 0.9 % SODIUM CHLORIDE 0.9 %
1000 INTRAVENOUS SOLUTION INTRAVENOUS
Status: COMPLETED | OUTPATIENT
Start: 2022-11-01 | End: 2022-11-01

## 2022-11-01 RX ADMIN — SODIUM CHLORIDE 1000 ML: 900 INJECTION, SOLUTION INTRAVENOUS at 05:45

## 2022-11-01 ASSESSMENT — PAIN DESCRIPTION - DESCRIPTORS: DESCRIPTORS: SHARP

## 2022-11-01 ASSESSMENT — ENCOUNTER SYMPTOMS
EYE ITCHING: 0
TROUBLE SWALLOWING: 0
CONSTIPATION: 0
EYE PAIN: 0
WHEEZING: 0
SHORTNESS OF BREATH: 0
BACK PAIN: 0
SINUS PAIN: 0
EYE REDNESS: 0
DIARRHEA: 1
ABDOMINAL PAIN: 0
NAUSEA: 1
COUGH: 0
VOMITING: 1

## 2022-11-01 ASSESSMENT — PAIN SCALES - GENERAL: PAINLEVEL_OUTOF10: 9

## 2022-11-01 ASSESSMENT — PAIN - FUNCTIONAL ASSESSMENT: PAIN_FUNCTIONAL_ASSESSMENT: 0-10

## 2022-11-01 ASSESSMENT — PAIN DESCRIPTION - LOCATION: LOCATION: HEAD

## 2022-11-01 NOTE — ED NOTES
Pt asked again if she could provide urine sample, states she is still unable to. Pt instructed to notify someone when able to provide sample. Fluids started.       Saturnino Osuna RN  11/01/22 0903

## 2022-11-01 NOTE — ED NOTES
Pt asked to provide urine sample for a third time, states she does not feel like she can produce sample. Pt to be discharged after fluids, per provider.       Jarek Huerta RN  11/01/22 9809

## 2022-11-01 NOTE — ED PROVIDER NOTES
Emergency Department Provider Note                   PCP:                Abena Zuniga MD               Age: 49 y.o. Sex: female       ICD-10-CM    1. Other fatigue  R53.83       2. Nausea and vomiting, unspecified vomiting type  R11.2           DISPOSITION Decision To Discharge 11/01/2022 06:14:44 AM       MDM  Number of Diagnoses or Management Options  Nausea and vomiting, unspecified vomiting type: new, needed workup  Other fatigue: new, needed workup  Diagnosis management comments: 71-year-old female patient here with complaints of generalized malaise and fatigue  After some questioning she did report some nausea vomiting and diarrhea  No acute distress or fever noted on exam today  Work-up is unremarkable    Patient will be treated symptomatically with Zofran and Levsin  She is encouraged to increase her fluid intake  Further encouraged to make sure she makes contact with her doctors today to discuss follow-up and recheck       Amount and/or Complexity of Data Reviewed  Clinical lab tests: ordered and reviewed  Tests in the medicine section of CPT®: ordered and reviewed  Decide to obtain previous medical records or to obtain history from someone other than the patient: yes  Review and summarize past medical records: yes  Independent visualization of images, tracings, or specimens: yes    Risk of Complications, Morbidity, and/or Mortality  Presenting problems: moderate  Diagnostic procedures: moderate  Management options: moderate  General comments: Elements of this note have been dictated via voice recognition software. Text and phrases may be limited by the accuracy of the software. The chart has been reviewed, but errors may still be present.         Patient Progress  Patient progress: improved             Orders Placed This Encounter   Procedures    Comprehensive Metabolic Panel    Lipase    CBC with Auto Differential    POCT Urine Dipstick        Medications   0.9 % sodium chloride bolus (1,000 mLs IntraVENous New Bag 11/1/22 0545)       New Prescriptions    HYOSCYAMINE SULFATE SL (LEVSIN/SL) 0.125 MG SUBL    Place 0.125 mg under the tongue 4 times daily as needed (Abdominal Cramping/Pain)    ONDANSETRON (ZOFRAN ODT) 8 MG TBDP DISINTEGRATING TABLET    Place 0.5-1 tablets under the tongue every 6-8 hours as needed for Nausea or Vomiting        Candace Strong is a 49 y.o. female who presents to the Emergency Department with chief complaint of    Chief Complaint   Patient presents with    Fatigue      The history is provided by the patient. Fatigue  Severity:  Moderate  Onset quality:  Gradual  Duration:  3 days  Timing:  Constant  Progression:  Waxing and waning  Chronicity:  Recurrent  Context: increased activity    Relieved by:  Drinking fluids  Worsened by: Activity  Ineffective treatments:  None tried  Associated symptoms: diarrhea, nausea and vomiting    Associated symptoms: no abdominal pain, no arthralgias, no chest pain, no cough, no difficulty walking, no dizziness, no drooling, no dysphagia, no dysuria, no fever, no frequency, no myalgias and no shortness of breath      All other systems reviewed and are negative unless otherwise stated in the history of present illness section. Review of Systems   Constitutional:  Positive for fatigue. Negative for chills and fever. HENT:  Negative for drooling, ear pain, hearing loss, sinus pain, sneezing and trouble swallowing. Eyes:  Negative for pain, redness and itching. Respiratory:  Negative for cough, shortness of breath and wheezing. Cardiovascular:  Negative for chest pain and palpitations. Gastrointestinal:  Positive for diarrhea, nausea and vomiting. Negative for abdominal pain, constipation and dysphagia. Endocrine: Negative for polydipsia, polyphagia and polyuria. Genitourinary:  Negative for dysuria, flank pain, frequency and hematuria. Musculoskeletal:  Negative for arthralgias, back pain and myalgias.    Skin: Negative for rash and wound. Allergic/Immunologic: Negative for food allergies and immunocompromised state. Neurological:  Negative for dizziness, syncope, speech difficulty and light-headedness. Hematological:  Negative for adenopathy. Does not bruise/bleed easily. Psychiatric/Behavioral:  Negative for dysphoric mood and self-injury. The patient is not nervous/anxious. All other systems reviewed and are negative.     Past Medical History:   Diagnosis Date    Anemia     blood transfusion 5/2018 per pt    Arrhythmia     palpitations, moderate mitral valve regurge    Arthritis     lower back osteo    Asthma     uses inhalers    Cancer ([address]     left breast ca; s/p surgery     Cardiomyopathy ([address]     ECHO 11/5/2020 LVEF 30-35%    CHF [facility]estive heart failure) ([facility]      followed by dr Nova Valdez; ECHO 11/5/2020 EF 30-35% mod to severe AVR; moderate MVR    Chronic pain 2010    Coagulation defect ([address]     eliquis    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 ([address] 2013    Decreased cardiac ejection fraction 11/27/2017    Depression     controlled      Diverticulitis     GERD (gastroesophageal reflux disease)     pantoprazole- well controlled     Heart murmur     Echo 11/5/20  LVEF 3035%    Hypertension     managed with meds    IBS (irritable bowel syndrome)     IC (interstitial cystitis)     Insomnia     Migraine with aura and without status migrainosus, not intractable 6/17/2016    Mitral valve regurgitation, rheumatic     followed Dr. Aleena Irving    Moderate aortic regurgitation     per echo 7/2019 in -- followed by dr Nova Valdez    Palpitations 5/16/2016    Psychiatric disorder     anxiety    Requires oxygen therapy     3l/min at hs. 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    Rheumatic fever as child    pt reports rheumatic fever in childhood    Smoker     started age 23-- smoked 0.5 ppd until 2018-- cut back to 10-2 cig daily per pt    Stroke ([address] 2014    \"mild stroke\" pt reports 2014- \"left sided weakness and balance is off\"     Thromboembolus ([address]     BLE 2012        Past Surgical History:   Procedure Laterality Date    APPENDECTOMY  2006    BIOPSY OF BREAST, INCISIONAL Left     lymph node , left, patient states her bx neg, but high risk    CARDIAC CATHETERIZATION  2018    no intervention    CARDIAC CATHETERIZATION  04/2017    no intervention    CARDIAC CATHETERIZATION  5/27/2011    no blockages, no intervention    CERVICAL FUSION  07/14/2020    ACDF C4-6 with Dr. Pryor Leventhal, LAPAROSCOPIC  6/6/2011    COLONOSCOPY      8 polyps removed, diverticulitis    COLONOSCOPY N/A 5/21/2018    COLONOSCOPY performed by Rosina Simeon MD at [address] [place] [address]  8-23-11    bladder dilitation    ORTHOPEDIC SURGERY Right 07/2019    4th toe -- surg repair    NICKI AND BSO (CERVIX REMOVED)  1997    fibroid tumors, hyst; no cervix     UPPER GASTROINTESTINAL ENDOSCOPY      UROLOGICAL SURGERY  06/30/2020    cysto    UROLOGICAL SURGERY  01/2018    cystoscopy with hydrodistention of bladder multiple        Family History   Problem Relation Age of Onset    Other Father         internal bleeding     Diabetes Sister     Psoriasis Mother     Thyroid Disease Sister         hyper; s/p thyroidectomy     Kidney Disease Brother     No Known Problems Sister     Heart Failure Father 76        chf    Diabetes Maternal Grandmother     Hypertension Maternal Grandmother     Emphysema Maternal Grandfather     Heart Disease Paternal Grandmother     Heart Disease Paternal Grandfather     Heart Disease Father         PPM; secondary to CAD, drugs     Seizures Brother         Social History     Socioeconomic History    Marital status: Legally      Spouse name: None    Number of children: None    Years of education: None    Highest education level: None   Tobacco Use    Smoking status: Former     Packs/day: 0.25     Types: Cigarettes     Quit date: 12/1/2019     Years since quitting: 2.9    Smokeless tobacco: Never   Substance and Sexual Activity    Alcohol use: No    Drug use: Not Currently     Types: Opiates , Cocaine        Allergies: Aspirin, Dicyclomine, Ketorolac, Clopidogrel, Ondansetron, Ondansetron hcl, and Tramadol    Previous Medications    ALBUTEROL SULFATE  (90 BASE) MCG/ACT INHALER    Inhale 2 puffs into the lungs every 6 hours as needed    AMITRIPTYLINE (ELAVIL) 25 MG TABLET    TAKE 1 TABLET BY MOUTH EVERY DAY AT NIGHT    AMMONIUM LACTATE (AMLACTIN) 12 % CREAM    Apply 1 g topically 2 times daily    APIXABAN (ELIQUIS) 5 MG TABS TABLET    Take 5 mg by mouth 2 times daily    BUDESONIDE-FORMOTEROL (SYMBICORT) 160-4.5 MCG/ACT AERO    Inhale 1 puff into the lungs 2 times daily    CARVEDILOL (COREG) 12.5 MG TABLET    Take 12.5 mg by mouth 2 times daily     DIAZEPAM (VALIUM) 5 MG TABLET    Take 5 mg by mouth 3 times daily as needed. EPINEPHRINE (EPIPEN JR) 0.15 MG/0.3ML SOAJ    Inject 0.15 mg into the muscle once as needed    ESTRADIOL (ESTRACE) 0.5 MG TABLET    Take 0.5 mg by mouth daily    FAMOTIDINE (PEPCID) 20 MG TABLET    Take 20 mg by mouth 2 times daily    FLUOXETINE (PROZAC) 40 MG CAPSULE    Take 40 mg by mouth 2 times daily     FREMANEZUMAB-VFRM (AJOVY) 225 MG/1.5ML SOAJ    Inject 225 mg into the skin    FUROSEMIDE (LASIX) 40 MG TABLET    Take 1 tablet by mouth 2 times daily    GABAPENTIN (NEURONTIN) 400 MG CAPSULE    Take 400 mg by mouth 2 times daily.     HYDRALAZINE (APRESOLINE) 25 MG TABLET    Take 25 mg by mouth 3 times daily    ISOSORBIDE MONONITRATE (IMDUR) 30 MG EXTENDED RELEASE TABLET    Take 30 mg by mouth    LIDOCAINE (LIDODERM) 5 %    Place 1 patch onto the skin daily    LIDOCAINE (LMX) 4 % CREAM    Apply topically 2 times daily as needed    LORATADINE (CLARITIN) 10 MG TABLET    Take 10 mg by mouth daily    MAGIC MOUTHWASH (MIRACLE MOUTHWASH)    Swish and spit 5 mLs 4 times daily as needed for Irritation Lidocaine:Benadryl:Maalox 1:1:1    MIRTAZAPINE (REMERON) 30 MG TABLET    Take 30 mg by mouth    NITROGLYCERIN (NITROSTAT) 0.4 MG SL TABLET    Place 0.4 mg under the tongue    OXYGEN    Nightly. Indications: 3 L/min at hs and prn during the day    PANTOPRAZOLE (PROTONIX) 40 MG TABLET    Take 40 mg by mouth daily    POTASSIUM CHLORIDE (KLOR-CON M) 10 MEQ EXTENDED RELEASE TABLET    Take 1 tablet by mouth daily    PRAVASTATIN (PRAVACHOL) 40 MG TABLET    Take 40 mg by mouth    SACUBITRIL-VALSARTAN (ENTRESTO) 24-26 MG PER TABLET    Take 1 tablet by mouth 2 times daily    SPIRONOLACTONE (ALDACTONE) 25 MG TABLET    Take 2 tablets by mouth 2 times daily    TEMAZEPAM (RESTORIL) 30 MG CAPSULE    Take 30 mg by mouth. TIOTROPIUM (SPIRIVA) 18 MCG INHALATION CAPSULE    Inhale 18 mcg into the lungs daily    TOLTERODINE (DETROL LA) 2 MG EXTENDED RELEASE CAPSULE    TAKE 1 CAP BY MOUTH DAILY. INCREASE TO 4 MG DAILY AFTER 4 WEEKS IF NEEDED    TOPIRAMATE (TOPAMAX) 50 MG TABLET    Take 50 mg by mouth 2 times daily    TRIAMCINOLONE (KENALOG) 0.1 % CREAM    Apply 1 g topically 2 times daily as needed        Vitals signs and nursing note reviewed. Patient Vitals for the past 4 hrs:   Temp Pulse Resp BP SpO2   11/01/22 0545 -- 76 15 (!) 98/55 96 %   11/01/22 0504 99 °F (37.2 °C) 75 14 (!) 117/57 96 %          Physical Exam  Vitals and nursing note reviewed. Constitutional:       General: She is in acute distress. Appearance: Normal appearance. She is normal weight. HENT:      Head: Normocephalic and atraumatic. Right Ear: External ear normal.      Left Ear: External ear normal.      Nose: Nose normal.      Mouth/Throat:      Mouth: Mucous membranes are moist.      Pharynx: Oropharynx is clear. Eyes:      General: No scleral icterus. Extraocular Movements: Extraocular movements intact. Conjunctiva/sclera: Conjunctivae normal.      Pupils: Pupils are equal, round, and reactive to light. Cardiovascular:      Rate and Rhythm: Normal rate and regular rhythm. Pulses: Normal pulses. Heart sounds: Normal heart sounds. Pulmonary:      Effort: Pulmonary effort is normal. No respiratory distress. Breath sounds: Normal breath sounds. Abdominal:      General: Abdomen is flat. Bowel sounds are normal. There is no distension. Palpations: Abdomen is soft. There is no mass. Tenderness: There is no abdominal tenderness. Musculoskeletal:         General: No deformity or signs of injury. Normal range of motion. Cervical back: Normal range of motion and neck supple. Skin:     General: Skin is warm and dry. Capillary Refill: Capillary refill takes less than 2 seconds. Neurological:      General: No focal deficit present. Mental Status: She is alert and oriented to person, place, and time. Psychiatric:         Mood and Affect: Mood normal. Affect is blunt and flat. Speech: Speech normal.         Behavior: Behavior normal. Behavior is cooperative. Thought Content:  Thought content normal.         Cognition and Memory: Cognition and memory normal.         Judgment: Judgment normal.        Procedures    ED EKG Interpretation  EKG was interpreted in the absence of a cardiologist.      Results for orders placed or performed during the hospital encounter of 11/01/22   Comprehensive Metabolic Panel   Result Value Ref Range    Sodium 138 133 - 143 mmol/L    Potassium 3.6 3.5 - 5.1 mmol/L    Chloride 109 101 - 110 mmol/L    CO2 22 21 - 32 mmol/L    Anion Gap 7 2 - 11 mmol/L    Glucose 85 65 - 100 mg/dL    BUN 14 6 - 23 MG/DL    Creatinine 0.90 0.6 - 1.0 MG/DL    Est, Glom Filt Rate >60 >60 ml/min/1.73m2    Calcium 8.7 8.3 - 10.4 MG/DL    Total Bilirubin 0.2 0.2 - 1.1 MG/DL    ALT 26 12 - 65 U/L    AST 34 15 - 37 U/L    Alk Phosphatase 66 50 - 136 U/L    Total Protein 6.5 6.3 - 8.2 g/dL    Albumin 3.1 (L) 3.5 - 5.0 g/dL    Globulin 3.4 2.8 - 4.5 g/dL    Albumin/Globulin Ratio 0.9 0.4 - 1.6     Lipase   Result Value Ref Range    Lipase 89 73 - 393 U/L   CBC with Auto Differential   Result Value Ref Range    WBC 3.8 (L) 4.3 - 11.1 K/uL    RBC 2.95 (L) 4.05 - 5.2 M/uL    Hemoglobin 9.4 (L) 11.7 - 15.4 g/dL    Hematocrit 29.3 (L) 35.8 - 46.3 %    MCV 99.3 82.0 - 102.0 FL    MCH 31.9 26.1 - 32.9 PG    MCHC 32.1 31.4 - 35.0 g/dL    RDW 12.7 11.9 - 14.6 %    Platelets 764 737 - 204 K/uL    MPV 9.7 9.4 - 12.3 FL    nRBC 0.00 0.0 - 0.2 K/uL    Differential Type AUTOMATED      Seg Neutrophils 43 43 - 78 %    Lymphocytes 44 13 - 44 %    Monocytes 11 4.0 - 12.0 %    Eosinophils % 1 0.5 - 7.8 %    Basophils 1 0.0 - 2.0 %    Immature Granulocytes 0 0.0 - 5.0 %    Segs Absolute 1.6 (L) 1.7 - 8.2 K/UL    Absolute Lymph # 1.7 0.5 - 4.6 K/UL    Absolute Mono # 0.4 0.1 - 1.3 K/UL    Absolute Eos # 0.0 0.0 - 0.8 K/UL    Basophils Absolute 0.0 0.0 - 0.2 K/UL    Absolute Immature Granulocyte 0.0 0.0 - 0.5 K/UL        No orders to display                         Voice dictation software was used during the making of this note. This software is not perfect and grammatical and other typographical errors may be present. This note has not been completely proofread for errors.      Chemo Bustamante MD  11/01/22 0520

## 2022-11-01 NOTE — ED NOTES
Lab called to advise CBC needed to be recollected. CBC recollected at this time and resent to lab.       Margarie Meckel, RN  11/01/22 0182

## 2022-11-01 NOTE — ED NOTES
Pt asked to collect urine sample, states she is unable to at this time and will advise when able.       Anastasiya Del Castillo RN  11/01/22 1993

## 2022-11-01 NOTE — DISCHARGE INSTRUCTIONS
Take medications as directed  Be sure to drink plenty of fluids  Call your doctor in the morning for recheck    Return to ER for any worsening symptoms or new problems which may arise

## 2022-11-15 RX ORDER — MIRTAZAPINE 30 MG/1
TABLET, FILM COATED ORAL
Qty: 30 TABLET | Refills: 5 | OUTPATIENT
Start: 2022-11-15

## 2022-12-01 ENCOUNTER — TELEPHONE (OUTPATIENT)
Dept: UROLOGY | Age: 57
End: 2022-12-01

## 2022-12-01 ENCOUNTER — OFFICE VISIT (OUTPATIENT)
Dept: UROLOGY | Age: 57
End: 2022-12-01
Payer: MEDICAID

## 2022-12-01 DIAGNOSIS — N30.10 INTERSTITIAL CYSTITIS (CHRONIC) WITHOUT HEMATURIA: Primary | ICD-10-CM

## 2022-12-01 DIAGNOSIS — K13.70 ORAL LESION: ICD-10-CM

## 2022-12-01 PROCEDURE — 99214 OFFICE O/P EST MOD 30 MIN: CPT | Performed by: NURSE PRACTITIONER

## 2022-12-01 RX ORDER — AMITRIPTYLINE HYDROCHLORIDE 10 MG/1
10 TABLET, FILM COATED ORAL NIGHTLY
Qty: 90 TABLET | Refills: 3 | Status: SHIPPED | OUTPATIENT
Start: 2022-12-01

## 2022-12-01 RX ORDER — AMITRIPTYLINE HYDROCHLORIDE 25 MG/1
25 TABLET, FILM COATED ORAL NIGHTLY
Qty: 90 TABLET | Refills: 3 | Status: SHIPPED | OUTPATIENT
Start: 2022-12-01

## 2022-12-01 ASSESSMENT — ENCOUNTER SYMPTOMS
ABDOMINAL PAIN: 1
BACK PAIN: 0

## 2022-12-01 NOTE — PROGRESS NOTES
[facility]  [address] [place] [address] [address], [address] [facility]  [phone]          Ted Nj  : 1965    Chief Complaint   Patient presents with    Follow-up     IC/Urinary incontinence          HPI     Ted Nj is a 56 y.o. female w long h/o OAB and IC. Previously hydrodistention revealed bladder capacity under anesthesia was approximately 700 mL. Inspection of the bladder mucosa showed no evidence of trauma. There were glomerulations noted on the bladder floor, the trigone and the posterior bladder neck. This was consistent with interstitial cystitis. She had cysto/hydrodistention . This helped with her pain. She has tried Elavil 25 mg and oxybutynin 5 mg TID with some maintenance of sx. She returns today for intense bladder/pelvic pain and urinary frequency/incontinence. No relief w current medications. No fever, chills, n/v, gross hematuria, or other LUTS. She also has an oral lesion she would like me to look at.       Past Medical History:   Diagnosis Date    Anemia     blood transfusion 2018 per pt    Arrhythmia     palpitations, moderate mitral valve regurge    Arthritis     lower back osteo    Asthma     uses inhalers    Cancer ([address]     left breast ca; s/p surgery     Cardiomyopathy ([address]     ECHO 2020 LVEF 30-35%    CHF (congestive heart failure) ([address]      followed by dr Melodie Vail; ECHO 2020 EF 30-35% mod to severe AVR; moderate MVR    Chronic pain     Coagulation defect ([address]     eliquis    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 ([address]     Decreased cardiac ejection fraction 2017    Depression     controlled      Diverticulitis     GERD (gastroesophageal reflux disease)     pantoprazole- well controlled     Heart murmur     Echo 20  LVEF 3035%    Hypertension     managed with meds    IBS (irritable bowel syndrome)     IC (interstitial cystitis)     Insomnia     Migraine with Apply topically 2 times daily as needed      loratadine (CLARITIN) 10 MG tablet Take 10 mg by mouth daily      mirtazapine (REMERON) 30 MG tablet Take 30 mg by mouth      nitroGLYCERIN (NITROSTAT) 0.4 MG SL tablet Place 0.4 mg under the tongue      pantoprazole (PROTONIX) 40 MG tablet Take 40 mg by mouth daily      pravastatin (PRAVACHOL) 40 MG tablet Take 40 mg by mouth      sacubitril-valsartan (ENTRESTO) 24-26 MG per tablet Take 1 tablet by mouth 2 times daily      temazepam (RESTORIL) 30 MG capsule Take 30 mg by mouth. tiotropium (SPIRIVA) 18 MCG inhalation capsule Inhale 18 mcg into the lungs daily      tolterodine (DETROL LA) 2 MG extended release capsule TAKE 1 CAP BY MOUTH DAILY. INCREASE TO 4 MG DAILY AFTER 4 WEEKS IF NEEDED      topiramate (TOPAMAX) 50 MG tablet Take 50 mg by mouth 2 times daily      triamcinolone (KENALOG) 0.1 % cream Apply 1 g topically 2 times daily as needed      Fremanezumab-vfrm (AJOVY) 225 MG/1.5ML SOAJ Inject 225 mg into the skin (Patient not taking: No sig reported)      gabapentin (NEURONTIN) 400 MG capsule Take 400 mg by mouth 2 times daily. (Patient not taking: No sig reported)       No current facility-administered medications for this visit.      Allergies   Allergen Reactions    Aspirin Other (See Comments)     Inflames IBS per pt    Dicyclomine Itching    Ketorolac Hives    Clopidogrel Rash     blisters    Ondansetron Nausea And Vomiting    Ondansetron Hcl Nausea And Vomiting     Nausea and vomiting     Tramadol Nausea And Vomiting     Abdominal cramps     Social History     Socioeconomic History    Marital status: Legally      Spouse name: Not on file    Number of children: Not on file    Years of education: Not on file    Highest education level: Not on file   Occupational History    Not on file   Tobacco Use    Smoking status: Former     Packs/day: 0.25     Types: Cigarettes     Quit date: 12/1/2019     Years since quitting: 3.0    Smokeless tobacco: Never Substance and Sexual Activity    Alcohol use: No    Drug use: Not Currently     Types: Opiates , Cocaine    Sexual activity: Not on file   Other Topics Concern    Not on file   Social History Narrative    Not on file     Social Determinants of Health     Financial Resource Strain: Not on file   Food Insecurity: Not on file   Transportation Needs: Not on file   Physical Activity: Not on file   Stress: Not on file   Social Connections: Not on file   Intimate Partner Violence: Not on file   Housing Stability: Not on file     Family History   Problem Relation Age of Onset    Other Father         internal bleeding     Diabetes Sister     Psoriasis Mother     Thyroid Disease Sister         hyper; s/p thyroidectomy     Kidney Disease Brother     No Known Problems Sister     Heart Failure Father 76        chf    Diabetes Maternal Grandmother     Hypertension Maternal Grandmother     Emphysema Maternal Grandfather     Heart Disease Paternal Grandmother     Heart Disease Paternal Grandfather     Heart Disease Father         PPM; secondary to CAD, drugs     Seizures Brother        Review of Systems  Constitutional:   Negative for fever. GI: Positive for abdominal pain. Genitourinary: Positive for leakage w/ urge. Musculoskeletal:  Negative for back pain.     Urinalysis  UA - Dipstick  Results for orders placed or performed in visit on 09/01/22   AMB POC URINALYSIS DIP STICK AUTO W/O MICRO   Result Value Ref Range    Color (UA POC)      Clarity (UA POC)      Glucose, Urine, POC Negative Negative    Bilirubin, Urine, POC Negative Negative    KETONES, Urine, POC Negative Negative    Specific Gravity, Urine, POC 1.020 1.001 - 1.035    Blood (UA POC) Trace Negative    pH, Urine, POC 7.0 4.6 - 8.0    Protein, Urine, POC Negative Negative    Urobilinogen, POC 0.2 mg/dL     Nitrite, Urine, POC Negative Negative    Leukocyte Esterase, Urine, POC Negative Negative       UA - Micro  WBC - 0  RBC - 0  Bacteria - 0  Epith - 0    PHYSICAL EXAM    General appearance - well appearing and in no distress  Mental status - alert, oriented to person, place, and time  Neck - supple, no significant adenopathy   Heart-  Normal S1/S2, No murmurs  Chest/Lung-  Quiet, even and easy respiratory effort without use of accessory muscles, BS clear all lung fields  Skin - normal coloration and turgor, no rashes  Oral- reddened area on upper R gum, no drainage noted      Assessment and Plan    ICD-10-CM    1. Interstitial cystitis (chronic) without hematuria  N30.10       2. Oral lesion  K13.70         IC- increase elavil to 35 mg PO nightly. The nature of the cystoscopy/hydrodistension and its purpose were discussed with the patient. Alternative testing (or no testing) was reviewed with patient. Risks include, but are not limited to: bleeding, infection, perforation or tear (urethra, bladder, ureter), difficulty voiding and urinary retention requiring catheterization and side effects from anesthesia. The benefits are judged to outweigh the risks and no guarantees of success or outcome were given or implied. The patient verbalized understanding. Will follow up after procedure. Advised to call sooner if needed. Oral lesion- orajel sent to pharmacy for ?irritation. If this persists or worsens, I recommend seeking dental care or going to urgent care. Cookie Delgado, FNP, APRN - CNP  Dr. Padmini Martinez is supervising physician today and he approves plan of care.

## 2022-12-02 NOTE — TELEPHONE ENCOUNTER
Procedures: Procedure(s):   CYSTOSCOPY HYDRODISTENTION   Date: 12/8/2022   Time: 1553   Location: [facility] MAIN OR  CYSTO

## 2022-12-05 ENCOUNTER — DIRECT ADMIT ORDERS (OUTPATIENT)
Dept: UROLOGY | Age: 57
End: 2022-12-05

## 2022-12-05 DIAGNOSIS — N30.10 INTERSTITIAL CYSTITIS (CHRONIC) WITHOUT HEMATURIA: Primary | ICD-10-CM

## 2022-12-08 ENCOUNTER — ANESTHESIA EVENT (OUTPATIENT)
Dept: SURGERY | Age: 57
End: 2022-12-08
Payer: MEDICAID

## 2022-12-08 ENCOUNTER — HOSPITAL ENCOUNTER (OUTPATIENT)
Age: 57
Setting detail: OUTPATIENT SURGERY
Discharge: HOME | End: 2022-12-08
Attending: UROLOGY | Admitting: UROLOGY
Payer: MEDICAID

## 2022-12-08 ENCOUNTER — ANESTHESIA (OUTPATIENT)
Dept: SURGERY | Age: 57
End: 2022-12-08
Payer: MEDICAID

## 2022-12-08 VITALS
SYSTOLIC BLOOD PRESSURE: 121 MMHG | OXYGEN SATURATION: 100 % | WEIGHT: 150 LBS | RESPIRATION RATE: 18 BRPM | TEMPERATURE: 98.1 F | HEART RATE: 64 BPM | BODY MASS INDEX: 25.61 KG/M2 | HEIGHT: 64 IN | DIASTOLIC BLOOD PRESSURE: 66 MMHG

## 2022-12-08 DIAGNOSIS — I73.9 IC (INTERMITTENT CLAUDICATION) (HCC): Primary | ICD-10-CM

## 2022-12-08 DIAGNOSIS — M54.2 NECK PAIN: ICD-10-CM

## 2022-12-08 LAB
APPEARANCE UR: NORMAL
BILIRUB UR QL: NEGATIVE
COLOR UR: NORMAL
GLUCOSE UR STRIP.AUTO-MCNC: NEGATIVE MG/DL
HGB UR QL STRIP: NEGATIVE
KETONES UR QL STRIP.AUTO: NEGATIVE MG/DL
LEUKOCYTE ESTERASE UR QL STRIP.AUTO: NEGATIVE
NITRITE UR QL STRIP.AUTO: NEGATIVE
PH UR STRIP: 8 [PH] (ref 5–9)
PROT UR STRIP-MCNC: NEGATIVE MG/DL
SP GR UR REFRACTOMETRY: 1.02 (ref 1–1.02)
UROBILINOGEN UR QL STRIP.AUTO: 1 EU/DL (ref 0.2–1)

## 2022-12-08 PROCEDURE — 3600000012 HC SURGERY LEVEL 2 ADDTL 15MIN: Performed by: UROLOGY

## 2022-12-08 PROCEDURE — 2500000003 HC RX 250 WO HCPCS: Performed by: UROLOGY

## 2022-12-08 PROCEDURE — 3700000001 HC ADD 15 MINUTES (ANESTHESIA): Performed by: UROLOGY

## 2022-12-08 PROCEDURE — 2500000003 HC RX 250 WO HCPCS: Performed by: NURSE ANESTHETIST, CERTIFIED REGISTERED

## 2022-12-08 PROCEDURE — 7100000011 HC PHASE II RECOVERY - ADDTL 15 MIN: Performed by: UROLOGY

## 2022-12-08 PROCEDURE — 3700000000 HC ANESTHESIA ATTENDED CARE: Performed by: UROLOGY

## 2022-12-08 PROCEDURE — 3600000002 HC SURGERY LEVEL 2 BASE: Performed by: UROLOGY

## 2022-12-08 PROCEDURE — 7100000000 HC PACU RECOVERY - FIRST 15 MIN: Performed by: UROLOGY

## 2022-12-08 PROCEDURE — 7100000001 HC PACU RECOVERY - ADDTL 15 MIN: Performed by: UROLOGY

## 2022-12-08 PROCEDURE — 7100000010 HC PHASE II RECOVERY - FIRST 15 MIN: Performed by: UROLOGY

## 2022-12-08 PROCEDURE — 2580000003 HC RX 258: Performed by: ANESTHESIOLOGY

## 2022-12-08 PROCEDURE — 52260 CYSTOSCOPY AND TREATMENT: CPT | Performed by: UROLOGY

## 2022-12-08 PROCEDURE — 6360000002 HC RX W HCPCS: Performed by: NURSE ANESTHETIST, CERTIFIED REGISTERED

## 2022-12-08 PROCEDURE — 81003 URINALYSIS AUTO W/O SCOPE: CPT

## 2022-12-08 PROCEDURE — 2709999900 HC NON-CHARGEABLE SUPPLY: Performed by: UROLOGY

## 2022-12-08 PROCEDURE — 6360000002 HC RX W HCPCS: Performed by: UROLOGY

## 2022-12-08 RX ORDER — LIDOCAINE HYDROCHLORIDE 10 MG/ML
1 INJECTION, SOLUTION INFILTRATION; PERINEURAL
Status: DISCONTINUED | OUTPATIENT
Start: 2022-12-08 | End: 2022-12-08 | Stop reason: HOSPADM

## 2022-12-08 RX ORDER — PROCHLORPERAZINE EDISYLATE 5 MG/ML
5 INJECTION INTRAMUSCULAR; INTRAVENOUS
Status: DISCONTINUED | OUTPATIENT
Start: 2022-12-08 | End: 2022-12-08 | Stop reason: HOSPADM

## 2022-12-08 RX ORDER — OXYCODONE HYDROCHLORIDE 5 MG/1
5 TABLET ORAL
Status: DISCONTINUED | OUTPATIENT
Start: 2022-12-08 | End: 2022-12-08 | Stop reason: HOSPADM

## 2022-12-08 RX ORDER — HYDROMORPHONE HCL 110MG/55ML
0.5 PATIENT CONTROLLED ANALGESIA SYRINGE INTRAVENOUS EVERY 5 MIN PRN
Status: DISCONTINUED | OUTPATIENT
Start: 2022-12-08 | End: 2022-12-08 | Stop reason: HOSPADM

## 2022-12-08 RX ORDER — DEXAMETHASONE SODIUM PHOSPHATE 4 MG/ML
INJECTION, SOLUTION INTRA-ARTICULAR; INTRALESIONAL; INTRAMUSCULAR; INTRAVENOUS; SOFT TISSUE PRN
Status: DISCONTINUED | OUTPATIENT
Start: 2022-12-08 | End: 2022-12-08 | Stop reason: SDUPTHER

## 2022-12-08 RX ORDER — ONDANSETRON 2 MG/ML
INJECTION INTRAMUSCULAR; INTRAVENOUS PRN
Status: DISCONTINUED | OUTPATIENT
Start: 2022-12-08 | End: 2022-12-08 | Stop reason: SDUPTHER

## 2022-12-08 RX ORDER — MIDAZOLAM HYDROCHLORIDE 2 MG/2ML
2 INJECTION, SOLUTION INTRAMUSCULAR; INTRAVENOUS
Status: DISCONTINUED | OUTPATIENT
Start: 2022-12-08 | End: 2022-12-08 | Stop reason: HOSPADM

## 2022-12-08 RX ORDER — HYDROCODONE BITARTRATE AND ACETAMINOPHEN 5; 325 MG/1; MG/1
1 TABLET ORAL EVERY 6 HOURS PRN
Qty: 12 TABLET | Refills: 0 | Status: SHIPPED | OUTPATIENT
Start: 2022-12-08 | End: 2022-12-11

## 2022-12-08 RX ORDER — SODIUM CHLORIDE, SODIUM LACTATE, POTASSIUM CHLORIDE, CALCIUM CHLORIDE 600; 310; 30; 20 MG/100ML; MG/100ML; MG/100ML; MG/100ML
100 INJECTION, SOLUTION INTRAVENOUS CONTINUOUS
Status: DISCONTINUED | OUTPATIENT
Start: 2022-12-08 | End: 2022-12-08 | Stop reason: HOSPADM

## 2022-12-08 RX ORDER — BUPIVACAINE HYDROCHLORIDE 5 MG/ML
INJECTION, SOLUTION EPIDURAL; INTRACAUDAL PRN
Status: DISCONTINUED | OUTPATIENT
Start: 2022-12-08 | End: 2022-12-08 | Stop reason: HOSPADM

## 2022-12-08 RX ORDER — PROPOFOL 10 MG/ML
INJECTION, EMULSION INTRAVENOUS PRN
Status: DISCONTINUED | OUTPATIENT
Start: 2022-12-08 | End: 2022-12-08 | Stop reason: SDUPTHER

## 2022-12-08 RX ORDER — PHENAZOPYRIDINE HYDROCHLORIDE 100 MG/1
100 TABLET, FILM COATED ORAL 3 TIMES DAILY PRN
Qty: 30 TABLET | Refills: 2 | Status: SHIPPED | OUTPATIENT
Start: 2022-12-08 | End: 2022-12-18

## 2022-12-08 RX ORDER — FENTANYL CITRATE 50 UG/ML
INJECTION, SOLUTION INTRAMUSCULAR; INTRAVENOUS PRN
Status: DISCONTINUED | OUTPATIENT
Start: 2022-12-08 | End: 2022-12-08 | Stop reason: SDUPTHER

## 2022-12-08 RX ORDER — FENTANYL CITRATE 50 UG/ML
100 INJECTION, SOLUTION INTRAMUSCULAR; INTRAVENOUS
Status: DISCONTINUED | OUTPATIENT
Start: 2022-12-08 | End: 2022-12-08 | Stop reason: HOSPADM

## 2022-12-08 RX ORDER — LIDOCAINE HYDROCHLORIDE 20 MG/ML
INJECTION, SOLUTION EPIDURAL; INFILTRATION; INTRACAUDAL; PERINEURAL PRN
Status: DISCONTINUED | OUTPATIENT
Start: 2022-12-08 | End: 2022-12-08 | Stop reason: SDUPTHER

## 2022-12-08 RX ADMIN — ONDANSETRON 4 MG: 2 INJECTION INTRAMUSCULAR; INTRAVENOUS at 13:09

## 2022-12-08 RX ADMIN — FENTANYL CITRATE 50 MCG: 50 INJECTION, SOLUTION INTRAMUSCULAR; INTRAVENOUS at 12:48

## 2022-12-08 RX ADMIN — Medication 2000 MG: at 12:55

## 2022-12-08 RX ADMIN — LIDOCAINE HYDROCHLORIDE 60 MG: 20 INJECTION, SOLUTION EPIDURAL; INFILTRATION; INTRACAUDAL; PERINEURAL at 12:48

## 2022-12-08 RX ADMIN — SODIUM CHLORIDE, POTASSIUM CHLORIDE, SODIUM LACTATE AND CALCIUM CHLORIDE 100 ML/HR: 600; 310; 30; 20 INJECTION, SOLUTION INTRAVENOUS at 10:47

## 2022-12-08 RX ADMIN — FENTANYL CITRATE 25 MCG: 50 INJECTION, SOLUTION INTRAMUSCULAR; INTRAVENOUS at 13:07

## 2022-12-08 RX ADMIN — FENTANYL CITRATE 25 MCG: 50 INJECTION, SOLUTION INTRAMUSCULAR; INTRAVENOUS at 13:06

## 2022-12-08 RX ADMIN — DEXAMETHASONE SODIUM PHOSPHATE 4 MG: 4 INJECTION, SOLUTION INTRAMUSCULAR; INTRAVENOUS at 13:09

## 2022-12-08 RX ADMIN — PROPOFOL 150 MG: 10 INJECTION, EMULSION INTRAVENOUS at 12:48

## 2022-12-08 ASSESSMENT — COPD QUESTIONNAIRES: CAT_SEVERITY: MODERATE

## 2022-12-08 ASSESSMENT — PAIN DESCRIPTION - DESCRIPTORS: DESCRIPTORS: SHARP

## 2022-12-08 ASSESSMENT — LIFESTYLE VARIABLES: SMOKING_STATUS: 1

## 2022-12-08 ASSESSMENT — PAIN - FUNCTIONAL ASSESSMENT
PAIN_FUNCTIONAL_ASSESSMENT: NONE - DENIES PAIN
PAIN_FUNCTIONAL_ASSESSMENT: 0-10

## 2022-12-08 NOTE — H&P
Jose Carolina  : 1965          Chief Complaint   Patient presents with    Follow-up       IC/Urinary incontinence            HPI      Jose Carolina is a 56 y.o. female w long h/o OAB and IC. Previously hydrodistention revealed bladder capacity under anesthesia was approximately 700 mL. Inspection of the bladder mucosa showed no evidence of trauma. There were glomerulations noted on the bladder floor, the trigone and the posterior bladder neck. This was consistent with interstitial cystitis. She had cysto/hydrodistention . This helped with her pain. She has tried Elavil 25 mg and oxybutynin 5 mg TID with some maintenance of sx. She returns today for intense bladder/pelvic pain and urinary frequency/incontinence. No relief w current medications. No fever, chills, n/v, gross hematuria, or other LUTS. She also has an oral lesion she would like me to look at.         Past Medical History        Past Medical History:   Diagnosis Date    Anemia       blood transfusion 2018 per pt    Arrhythmia       palpitations, moderate mitral valve regurge    Arthritis       lower back osteo    Asthma       uses inhalers    Cancer ([address]       left breast ca; s/p surgery     Cardiomyopathy ([address]       ECHO 2020 LVEF 30-35%    CHF (congestive heart failure) ([address]        followed by dr Rusty Meredith; ECHO 2020 EF 30-35% mod to severe AVR; moderate MVR    Chronic pain     Coagulation defect ([address]       eliquis    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 ([address]     Decreased cardiac ejection fraction 2017    Depression       controlled      Diverticulitis      GERD (gastroesophageal reflux disease)       pantoprazole- well controlled     Heart murmur       Echo 20  LVEF 3035%    Hypertension       managed with meds    IBS (irritable bowel syndrome)      IC (interstitial cystitis)      Insomnia      Migraine with aura and without status migrainosus, not intractable 6/17/2016    Mitral valve regurgitation, rheumatic       followed Dr. Jason Srivastava    Moderate aortic regurgitation       per echo 7/2019 in cc-- followed by dr Joanna Cantu    Palpitations 5/16/2016    Psychiatric disorder       anxiety    Requires oxygen therapy       3l/min at hs. 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    Rheumatic fever as child     pt reports rheumatic fever in childhood    Smoker       started age 23-- smoked 0.5 ppd until 2018-- cut back to 10-2 cig daily per pt    Stroke [facility]) 2014     \"mild stroke\" pt reports 2014- \"left sided weakness and balance is off\"     Thromboembolus ([address]       BLE 2012         Past Surgical History         Past Surgical History:   Procedure Laterality Date    APPENDECTOMY   2006    BIOPSY OF BREAST, INCISIONAL Left       lymph node , left, patient states her bx neg, but high risk    CARDIAC CATHETERIZATION   2018     no intervention    CARDIAC CATHETERIZATION   04/2017     no intervention    CARDIAC CATHETERIZATION   5/27/2011     no blockages, no intervention    CERVICAL FUSION   07/14/2020     ACDF C4-6 with Dr. Nirmal Lopez, LAPAROSCOPIC   6/6/2011    COLONOSCOPY         8 polyps removed, diverticulitis    COLONOSCOPY N/A 5/21/2018     COLONOSCOPY performed by Fracisco Quijano MD at [facility]   8-23-11     bladder dilitation    ORTHOPEDIC SURGERY Right 07/2019     4th toe -- surg repair    NICKI AND BSO (CERVIX REMOVED)   1997     fibroid tumors, hyst; no cervix     UPPER GASTROINTESTINAL ENDOSCOPY        UROLOGICAL SURGERY   06/30/2020     cysto    UROLOGICAL SURGERY   01/2018     cystoscopy with hydrodistention of bladder multiple         Current Facility-Administered Medications          Current Outpatient Medications   Medication Sig Dispense Refill    amitriptyline (ELAVIL) 25 MG tablet Take 1 tablet by mouth nightly 90 tablet 3    amitriptyline (ELAVIL) 10 MG tablet Take 1 tablet by mouth nightly Add to 25 mg to total 35 mg 90 tablet 3    benzocaine (ORAJEL) 20 % GEL mucosal gel Take 1 application by mouth 2 times daily as needed for Pain 30 g 0    Hyoscyamine Sulfate SL (LEVSIN/SL) 0.125 MG SUBL Place 0.125 mg under the tongue 4 times daily as needed (Abdominal Cramping/Pain) 40 each 0    ondansetron (ZOFRAN ODT) 8 MG TBDP disintegrating tablet Place 0.5-1 tablets under the tongue every 6-8 hours as needed for Nausea or Vomiting 20 tablet 0    Magic Mouthwash (MIRACLE MOUTHWASH) Swish and spit 5 mLs 4 times daily as needed for Irritation Lidocaine:Benadryl:Maalox 1:1:1 240 mL 1    potassium chloride (KLOR-CON M) 10 MEQ extended release tablet Take 1 tablet by mouth daily 30 tablet 5    furosemide (LASIX) 40 MG tablet Take 1 tablet by mouth 2 times daily 180 tablet 3    spironolactone (ALDACTONE) 25 MG tablet Take 2 tablets by mouth 2 times daily 180 tablet 3    OXYGEN Nightly. Indications: 3 L/min at hs and prn during the day        albuterol sulfate  (90 Base) MCG/ACT inhaler Inhale 2 puffs into the lungs every 6 hours as needed        ammonium lactate (AMLACTIN) 12 % cream Apply 1 g topically 2 times daily        apixaban (ELIQUIS) 5 MG TABS tablet Take 5 mg by mouth 2 times daily        budesonide-formoterol (SYMBICORT) 160-4.5 MCG/ACT AERO Inhale 1 puff into the lungs 2 times daily        carvedilol (COREG) 12.5 MG tablet Take 12.5 mg by mouth 2 times daily         diazePAM (VALIUM) 5 MG tablet Take 5 mg by mouth 3 times daily as needed.         EPINEPHrine (EPIPEN JR) 0.15 MG/0.3ML SOAJ Inject 0.15 mg into the muscle once as needed        estradiol (ESTRACE) 0.5 MG tablet Take 0.5 mg by mouth daily        famotidine (PEPCID) 20 MG tablet Take 20 mg by mouth 2 times daily        FLUoxetine (PROZAC) 40 MG capsule Take 40 mg by mouth 2 times daily         hydrALAZINE (APRESOLINE) 25 MG tablet Take 25 mg by mouth 3 times daily        isosorbide mononitrate (IMDUR) 30 MG extended release tablet Take 30 mg by mouth        lidocaine (LIDODERM) 5 % Place 1 patch onto the skin daily        lidocaine (LMX) 4 % cream Apply topically 2 times daily as needed        loratadine (CLARITIN) 10 MG tablet Take 10 mg by mouth daily        mirtazapine (REMERON) 30 MG tablet Take 30 mg by mouth        nitroGLYCERIN (NITROSTAT) 0.4 MG SL tablet Place 0.4 mg under the tongue        pantoprazole (PROTONIX) 40 MG tablet Take 40 mg by mouth daily        pravastatin (PRAVACHOL) 40 MG tablet Take 40 mg by mouth        sacubitril-valsartan (ENTRESTO) 24-26 MG per tablet Take 1 tablet by mouth 2 times daily        temazepam (RESTORIL) 30 MG capsule Take 30 mg by mouth. tiotropium (SPIRIVA) 18 MCG inhalation capsule Inhale 18 mcg into the lungs daily        tolterodine (DETROL LA) 2 MG extended release capsule TAKE 1 CAP BY MOUTH DAILY. INCREASE TO 4 MG DAILY AFTER 4 WEEKS IF NEEDED        topiramate (TOPAMAX) 50 MG tablet Take 50 mg by mouth 2 times daily        triamcinolone (KENALOG) 0.1 % cream Apply 1 g topically 2 times daily as needed        Fremanezumab-vfrm (AJOVY) 225 MG/1.5ML SOAJ Inject 225 mg into the skin (Patient not taking: No sig reported)        gabapentin (NEURONTIN) 400 MG capsule Take 400 mg by mouth 2 times daily. (Patient not taking: No sig reported)          No current facility-administered medications for this visit.                Allergies   Allergen Reactions    Aspirin Other (See Comments)       Inflames IBS per pt    Dicyclomine Itching    Ketorolac Hives    Clopidogrel Rash       blisters    Ondansetron Nausea And Vomiting    Ondansetron Hcl Nausea And Vomiting       Nausea and vomiting     Tramadol Nausea And Vomiting       Abdominal cramps      Social History               Socioeconomic History    Marital status: Legally        Spouse name: Not on file    Number of children: Not on file    Years of education: Not on file    Highest education level: Not on file Occupational History    Not on file   Tobacco Use    Smoking status: Former       Packs/day: 0.25       Types: Cigarettes       Quit date: 12/1/2019       Years since quitting: 3.0    Smokeless tobacco: Never   Substance and Sexual Activity    Alcohol use: No    Drug use: Not Currently       Types: Opiates , Cocaine    Sexual activity: Not on file   Other Topics Concern    Not on file   Social History Narrative    Not on file      Social Determinants of Health      Financial Resource Strain: Not on file   Food Insecurity: Not on file   Transportation Needs: Not on file   Physical Activity: Not on file   Stress: Not on file   Social Connections: Not on file   Intimate Partner Violence: Not on file   Housing Stability: Not on file         Family History         Family History   Problem Relation Age of Onset    Other Father           internal bleeding     Diabetes Sister      Psoriasis Mother      Thyroid Disease Sister           hyper; s/p thyroidectomy     Kidney Disease Brother      No Known Problems Sister      Heart Failure Father 76         chf    Diabetes Maternal Grandmother      Hypertension Maternal Grandmother      Emphysema Maternal Grandfather      Heart Disease Paternal Grandmother      Heart Disease Paternal Grandfather      Heart Disease Father           PPM; secondary to CAD, drugs     Seizures Brother              Review of Systems  Constitutional:   Negative for fever. GI: Positive for abdominal pain. Genitourinary: Positive for leakage w/ urge. Musculoskeletal:  Negative for back pain.        PHYSICAL EXAM     General appearance - well appearing and in no distress  Mental status - alert, oriented to person, place, and time  Neck - supple, no significant adenopathy   Heart-  Normal S1/S2, No murmurs  Chest/Lung-  Quiet, even and easy respiratory effort without use of accessory muscles, BS clear all lung fields  Skin - normal coloration and turgor, no rashes  Oral- reddened area on upper R gum, no drainage noted        Assessment and Plan      ICD-10-CM     1. Interstitial cystitis (chronic) without hematuria  N30.10         2. Oral lesion  K13.70            IC- increase elavil to 35 mg PO nightly. The nature of the cystoscopy/hydrodistension and its purpose were discussed with the patient. Alternative testing (or no testing) was reviewed with patient. Risks include, but are not limited to: bleeding, infection, perforation or tear (urethra, bladder, ureter), difficulty voiding and urinary retention requiring catheterization and side effects from anesthesia. The benefits are judged to outweigh the risks and no guarantees of success or outcome were given or implied. The patient verbalized understanding. Will follow up after procedure. Advised to call sooner if needed. Oral lesion- orajel sent to pharmacy for ?irritation. If this persists or worsens, I recommend seeking dental care or going to urgent care.

## 2022-12-08 NOTE — ANESTHESIA PRE PROCEDURE
Department of Anesthesiology  Preprocedure Note       Name:  Viviana Baumann   Age:  56 y.o.  :  1965                                          MRN:  [id number]         Date:  2022      Surgeon: Francisco Nichole):  Malu Kennedy MD    Procedure: Procedure(s):  CYSTOSCOPY HYDRODISTENTION    Medications prior to admission:   Prior to Admission medications    Medication Sig Start Date End Date Taking? Authorizing Provider   amitriptyline (ELAVIL) 25 MG tablet Take 1 tablet by mouth nightly 22   NAMRATA Arce CNP   amitriptyline (ELAVIL) 10 MG tablet Take 1 tablet by mouth nightly Add to 25 mg to total 35 mg 22   NAMRATA Arce CNP   benzocaine (ORAJEL) 20 % GEL mucosal gel Take 1 application by mouth 2 times daily as needed for Pain 22  NAMRATA Arce CNP   Hyoscyamine Sulfate SL (LEVSIN/SL) 0.125 MG SUBL Place 0.125 mg under the tongue 4 times daily as needed (Abdominal Cramping/Pain) 22   Sun Junior MD   ondansetron (ZOFRAN ODT) 8 MG TBDP disintegrating tablet Place 0.5-1 tablets under the tongue every 6-8 hours as needed for Nausea or Vomiting 22   Sun Junior MD   Magic Mouthwash (MIRACLE MOUTHWASH) Swish and spit 5 mLs 4 times daily as needed for Irritation Lidocaine:Benadryl:Maalox 1:1:1 8/3/22   Vaibhav Hermosillo MD   potassium chloride (KLOR-CON M) 10 MEQ extended release tablet Take 1 tablet by mouth daily 22   Vaibhav Hermosillo MD   furosemide (LASIX) 40 MG tablet Take 1 tablet by mouth 2 times daily 22   Vaibhav Hermosillo MD   spironolactone (ALDACTONE) 25 MG tablet Take 2 tablets by mouth 2 times daily 22   Vaibhav Hermosillo MD   OXYGEN Nightly.  Indications: 3 L/min at hs and prn during the day    Ar Automatic Reconciliation   albuterol sulfate  (90 Base) MCG/ACT inhaler Inhale 2 puffs into the lungs every 6 hours as needed 22   Ar Automatic Reconciliation   ammonium lactate (AMLACTIN) 12 % cream Apply 1 g topically 2 times daily    Ar Automatic Reconciliation   apixaban (ELIQUIS) 5 MG TABS tablet Take 5 mg by mouth 2 times daily 7/14/20   Ar Automatic Reconciliation   budesonide-formoterol (SYMBICORT) 160-4.5 MCG/ACT AERO Inhale 1 puff into the lungs 2 times daily 4/28/22   Ar Automatic Reconciliation   carvedilol (COREG) 12.5 MG tablet Take 12.5 mg by mouth 2 times daily  9/29/21   Ar Automatic Reconciliation   diazePAM (VALIUM) 5 MG tablet Take 5 mg by mouth 3 times daily as needed. 10/9/20   Ar Automatic Reconciliation   EPINEPHrine (EPIPEN JR) 0.15 MG/0.3ML SOAJ Inject 0.15 mg into the muscle once as needed    Ar Automatic Reconciliation   estradiol (ESTRACE) 0.5 MG tablet Take 0.5 mg by mouth daily 10/22/20   Ar Automatic Reconciliation   famotidine (PEPCID) 20 MG tablet Take 20 mg by mouth 2 times daily  Patient not taking: Reported on 12/8/2022 4/28/22   Ar Automatic Reconciliation   FLUoxetine (PROZAC) 40 MG capsule Take 40 mg by mouth 2 times daily  4/28/22   Ar Automatic Reconciliation   Fremanezumab-vfrm (AJOVY) 225 MG/1.5ML SOAJ Inject 225 mg into the skin  Patient not taking: No sig reported 11/9/21   Ar Automatic Reconciliation   gabapentin (NEURONTIN) 400 MG capsule Take 400 mg by mouth 2 times daily.   Patient not taking: No sig reported 5/17/21   Ar Automatic Reconciliation   hydrALAZINE (APRESOLINE) 25 MG tablet Take 25 mg by mouth 3 times daily 6/29/21   Ar Automatic Reconciliation   isosorbide mononitrate (IMDUR) 30 MG extended release tablet Take 30 mg by mouth 9/29/21   Ar Automatic Reconciliation   lidocaine (LIDODERM) 5 % Place 1 patch onto the skin daily 4/28/22   Ar Automatic Reconciliation   lidocaine (LMX) 4 % cream Apply topically 2 times daily as needed 5/2/22   Ar Automatic Reconciliation   loratadine (CLARITIN) 10 MG tablet Take 10 mg by mouth daily 12/20/21   Ar Automatic Reconciliation   mirtazapine (REMERON) 30 MG tablet Take 30 mg by mouth 4/28/22   Ar Automatic Reconciliation   nitroGLYCERIN (NITROSTAT) 0.4 MG SL tablet Place 0.4 mg under the tongue 6/15/20   Ar Automatic Reconciliation   pantoprazole (PROTONIX) 40 MG tablet Take 40 mg by mouth daily 4/28/22   Ar Automatic Reconciliation   pravastatin (PRAVACHOL) 40 MG tablet Take 40 mg by mouth 11/9/18   Ar Automatic Reconciliation   sacubitril-valsartan (ENTRESTO) 24-26 MG per tablet Take 1 tablet by mouth 2 times daily 1/30/20   Ar Automatic Reconciliation   temazepam (RESTORIL) 30 MG capsule Take 30 mg by mouth. Ar Automatic Reconciliation   tiotropium (SPIRIVA) 18 MCG inhalation capsule Inhale 18 mcg into the lungs daily 4/28/22   Ar Automatic Reconciliation   tolterodine (DETROL LA) 2 MG extended release capsule TAKE 1 CAP BY MOUTH DAILY. INCREASE TO 4 MG DAILY AFTER 4 WEEKS IF NEEDED 4/12/22   Ar Automatic Reconciliation   topiramate (TOPAMAX) 50 MG tablet Take 50 mg by mouth 2 times daily 4/28/22   Ar Automatic Reconciliation   triamcinolone (KENALOG) 0.1 % cream Apply 1 g topically 2 times daily as needed    Ar Automatic Reconciliation       Current medications:    Current Facility-Administered Medications   Medication Dose Route Frequency Provider Last Rate Last Admin    lidocaine 1 % injection 1 mL  1 mL IntraDERmal Once PRN Ryan Ellison MD        fentaNYL (SUBLIMAZE) injection 100 mcg  100 mcg IntraVENous Once PRN Ryan Ellison MD        lactated ringers infusion  100 mL/hr IntraVENous Continuous Ryan Ellison  mL/hr at 12/08/22 1047 100 mL/hr at 12/08/22 1047    midazolam PF (VERSED) injection 2 mg  2 mg IntraVENous Once PRN Ryan Ellison MD        ceFAZolin (ANCEF) 2000 mg in sterile water 20 mL IV syringe  2,000 mg IntraVENous On Call to [address] MD           Allergies:     Allergies   Allergen Reactions    Aspirin Other (See Comments)     Inflames IBS per pt    Dicyclomine Itching    Ketorolac Hives    Clopidogrel Rash     blisters    Ondansetron Nausea And Vomiting    Ondansetron consciousness of 30 minutes or less S06. 0X1A   [address] maintenance Z00.00    Anxiety F41.9    Left shoulder pain M25.512    Acute respiratory failure with hypoxia (HCC) J96.01    Depression, recurrent ([facility]) F33.9    Gastroesophageal reflux disease K21.9    Mitral valve regurgitation I34.0    Status post hysterectomy Z90.710    Breast pain, left N64.4    Colon polyps K63.5    NSTEMI (non-ST elevated myocardial infarction) ([facility]) I21.4    Swelling of both lower extremities M79.89    Nasal polyps J33.9    Right leg pain M79.604    Intraductal papilloma of left breast D24.2    Intractable migraine without aura and without status migrainosus G43.019    Takotsubo cardiomyopathy I51.81    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 ([address] F14.150    Drug-seeking behavior Z76.5    Rheumatic aortic insufficiency I06.1    Vasomotor symptoms due to menopause N95.1    History of migraine headaches Z86.69    Breast lesion N64.9    Aortic valve regurgitation I35.1    History of DVT (deep vein thrombosis) Z86.718    History of tobacco use, presenting hazards to health Z87.891    Post-operative state Z98.890    Neck pain M54.2    Chronic insomnia F51.04    Positive depression screening Z13.31       Past Medical History:        Diagnosis Date    Anemia     blood transfusion 5/2018 per pt    Arrhythmia     palpitations, moderate mitral valve regurge    Arthritis     lower back osteo    Asthma     uses inhalers    Cancer ([address]     left breast ca; s/p surgery     Cardiomyopathy ([address]     ECHO 11/5/2020 LVEF 30-35%    CHF (congestive heart failure) ([address]      followed by dr Kaur Haines; ECHO 11/5/2020 EF 30-35% mod to severe AVR; moderate MVR    Chronic pain 2010    Coagulation defect ([address]     eliquis    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 ([address] 2013    Decreased cardiac ejection fraction 11/27/2017    Depression     controlled      Diverticulitis     GERD (gastroesophageal reflux disease)     pantoprazole- well controlled     Heart murmur     Echo 11/5/20  LVEF 3035%    Hypertension     managed with meds    IBS (irritable bowel syndrome)     IC (interstitial cystitis)     Insomnia     Migraine with aura and without status migrainosus, not intractable 6/17/2016    Mitral valve regurgitation, rheumatic     followed Dr. Ochoa Aus Moderate aortic regurgitation     per echo 7/2019 in cc-- followed by dr Kevyn Adorno Palpitations 5/16/2016    Psychiatric disorder     anxiety    Requires oxygen therapy     3l/min at hs. prn during the days as needed    Rheumatic aortic insufficiency 5/16/2016    Rheumatic fever as child    pt reports rheumatic fever in childhood    Smoker     started age 23-- smoked 0.5 ppd until 2018-- cut back to 10-2 cig daily per pt    Stroke [facility]) 2014    \"mild stroke\" pt reports 2014- \"left sided weakness and balance is off\"     Thromboembolus ([address]     BLE 2012       Past Surgical History:        Procedure Laterality Date    APPENDECTOMY  2006    BIOPSY OF BREAST, INCISIONAL Left     lymph node , left, patient states her bx neg, but high risk    CARDIAC CATHETERIZATION  2018    no intervention    CARDIAC CATHETERIZATION  04/2017    no intervention    CARDIAC CATHETERIZATION  5/27/2011    no blockages, no intervention    CERVICAL FUSION  07/14/2020    ACDF C4-6 with Dr. Rodrigue Gardner, LAPAROSCOPIC  6/6/2011    COLONOSCOPY      8 polyps removed, diverticulitis    COLONOSCOPY N/A 5/21/2018    COLONOSCOPY performed by David Redmond MD at [address]  8-23-11    bladder dilitation    ORTHOPEDIC SURGERY Right 07/2019    4th toe -- surg repair    NICKI AND BSO (CERVIX REMOVED)  1997    fibroid tumors, hyst; no cervix     UPPER GASTROINTESTINAL ENDOSCOPY      UROLOGICAL SURGERY  06/30/2020    cysto    UROLOGICAL SURGERY  01/2018    cystoscopy with hydrodistention of bladder multiple       Social History:    Social History     Tobacco Use    Smoking status: Former     Packs/day: 0.25     Types: Cigarettes     Quit date: 12/1/2019     Years since quitting: 3.0    Smokeless tobacco: Never   Substance Use Topics    Alcohol use: No                                Counseling given: Not Answered      Vital Signs (Current):   Vitals:    12/08/22 1036   BP: (!) 116/57   Pulse: 73   Resp: 20   Temp: 98 °F (36.7 °C)   TempSrc: Skin   SpO2: 98%   Weight: 150 lb (68 kg)   Height: 5' 4\" (1.626 m)                                              BP Readings from Last 3 Encounters:   12/08/22 (!) 116/57   11/01/22 (!) 98/55   08/16/22 114/68       NPO Status: Time of last liquid consumption: 0000                        Time of last solid consumption: 0000                        Date of last liquid consumption: 12/07/22                        Date of last solid food consumption: 12/07/22    BMI:   Wt Readings from Last 3 Encounters:   12/08/22 150 lb (68 kg)   11/01/22 145 lb (65.8 kg)   08/16/22 158 lb (71.7 kg)     Body mass index is 25.75 kg/m².     CBC:   Lab Results   Component Value Date/Time    WBC 3.8 11/01/2022 05:49 AM    RBC 2.95 11/01/2022 05:49 AM    HGB 9.4 11/01/2022 05:49 AM    HCT 29.3 11/01/2022 05:49 AM    MCV 99.3 11/01/2022 05:49 AM    RDW 12.7 11/01/2022 05:49 AM     11/01/2022 05:49 AM       CMP:   Lab Results   Component Value Date/Time     11/01/2022 05:21 AM    K 3.6 11/01/2022 05:21 AM     11/01/2022 05:21 AM    CO2 22 11/01/2022 05:21 AM    BUN 14 11/01/2022 05:21 AM    CREATININE 0.90 11/01/2022 05:21 AM    GFRAA >60 08/10/2022 07:12 AM    AGRATIO 0.9 05/08/2022 03:23 PM    LABGLOM >60 11/01/2022 05:21 AM    GLUCOSE 85 11/01/2022 05:21 AM    PROT 6.5 11/01/2022 05:21 AM    CALCIUM 8.7 11/01/2022 05:21 AM    BILITOT 0.2 11/01/2022 05:21 AM    ALKPHOS 66 11/01/2022 05:21 AM    ALKPHOS 70 05/08/2022 03:23 PM    AST 34 11/01/2022 05:21 AM    ALT 26 11/01/2022 05:21 AM       POC Tests: No results for input(s): POCGLU, POCNA, POCK, POCCL, POCBUN, POCHEMO, POCHCT in the last 72 hours. Coags:   Lab Results   Component Value Date/Time    PROTIME 10.8 09/21/2020 11:19 AM    INR 1.0 09/21/2020 11:19 AM    APTT 35 09/21/2020 11:19 AM       HCG (If Applicable): No results found for: PREGTESTUR, PREGSERUM, HCG, HCGQUANT     ABGs: No results found for: PHART, PO2ART, DBY4OQV, RML0MYD, BEART, B9IXZZWT     Type & Screen (If Applicable):  No results found for: LABABO, LABRH    Drug/Infectious Status (If Applicable):  No results found for: HIV, HEPCAB    COVID-19 Screening (If Applicable):   Lab Results   Component Value Date/Time    COVID19 Performed 01/07/2022 12:00 AM    COVID19 Not Detected 01/07/2022 12:00 AM           Anesthesia Evaluation    Airway: Mallampati: I  TM distance: >3 FB   Neck ROM: full  Mouth opening: > = 3 FB   Dental:    (+) edentulous      Pulmonary:normal exam  breath sounds clear to auscultation  (+) COPD (3 L NC O2 qhs): moderate,  asthma (stable): current smoker                           Cardiovascular:  Exercise tolerance: poor (<4 METS), Generalized fatigue, ambulates unassisted  (+) hypertension:, past MI (2017):, CHF:,         Rhythm: regular  Rate: normal                 ROS comment: Stress test 11/21:  · SPECT: Left ventricular function post-stress was abnormal. Calculated ejection fraction is 44% (normal EF value is equal to or greater than 50%). Left ventricular wall motion was abnormal at stress as described below in the wall scoring diagram.  · SPECT: Left ventricular perfusion is abnormal. Myocardial perfusion imaging defect 1: There is a defect that is large in size present in the apical segment(s) of the anterior, septal and anterolateral wall(s) that is non-reversible. The defect appears to be infarction.   · SPECT: Left ventricular perfusion is abnormal.          Neuro/Psych:   (+) CVA (2014, R sided weakness): residual symptoms, headaches: migraine headaches, depression/anxiety             GI/Hepatic/Renal:   (+) GERD: well controlled,          ROS comment: IC  Dysphagia  . Endo/Other:    (+) blood dyscrasia: anemia:., malignancy/cancer (breast). ROS comment: Cocaine abuse Abdominal:             Vascular: Other Findings:           Anesthesia Plan      general     ASA 4     (Unable to access old anesthesia records.)  Induction: intravenous. Anesthetic plan and risks discussed with patient.                         Boy Mauricio MD   12/8/2022

## 2022-12-08 NOTE — DISCHARGE INSTRUCTIONS
Your Recovery  Bladder augmentation is surgery to make the bladder larger and improve its ability to stretch. After surgery, your bladder should be able to hold more urine. After surgery, you may feel weak and tired at first. You will probably feel some pain or cramping in your lower belly and need pain medicine for a week or two. Try to avoid heavy lifting and strenuous activities that might put extra pressure on your bladder while you recover. This care sheet gives you a general idea about how long it will take for you to recover. But each person recovers at a different pace. Follow the steps below to get better as quickly as possible. How can you care for yourself at home? Activity  Rest when you feel tired. Getting enough sleep will help you recover. Try to walk each day. Start by walking a little more than you did the day before. Bit by bit, increase the amount you walk. Walking boosts blood flow and helps prevent pneumonia and constipation. Avoid strenuous activities, such as bicycle riding, jogging, weight lifting, or aerobic exercise, for 6 to 8 weeks or until your doctor says it is okay. For 6 to 8 weeks or until your doctor says it is okay, avoid lifting anything that would make you strain. This may include a child, heavy grocery bags and milk containers, a heavy briefcase or backpack, cat litter or dog food bags, or a vacuum . Ask your doctor when you can drive again. You will probably need to take 72 hours off from work. It depends on the type of work you do and how you feel. You may shower as usual. Pat the cut (incision) dry. Do not take a bath until your doctor tells you it is okay. Ask your doctor when it is okay for you to have sex. Diet  You can eat your normal diet. If your stomach is upset, try bland, low-fat foods like plain rice, broiled chicken, toast, and yogurt. Drink plenty of fluids (unless your doctor tells you not to).   You may notice that your bowel movements are not regular right after your surgery. This is common. Try to avoid constipation and straining with bowel movements. You may want to take a fiber supplement every day. If you have not had a bowel movement after a couple of days, ask your doctor about taking a mild laxative. Medicines  Your doctor will tell you if and when you can restart your medicines. He or she will also give you instructions about taking any new medicines. If you take blood thinners, such as warfarin (Coumadin), clopidogrel (Plavix), or aspirin, be sure to talk to your doctor. He or she will tell you if and when to start taking those medicines again. Make sure that you understand exactly what your doctor wants you to do. Be safe with medicines. Take pain medicines exactly as directed. If the doctor gave you a prescription medicine for pain, take it as prescribed. If you are not taking a prescription pain medicine, ask your doctor if you can take an over-the-counter medicine. If you think your pain medicine is making you sick to your stomach: Take your medicine after meals (unless your doctor has told you not to). Ask your doctor for a different pain medicine. If your doctor prescribed antibiotics, take them as directed. Do not stop taking them just because you feel better. You need to take the full course of antibiotics. Other instructions  If your doctor has told you to flush your bladder, follow his or her instructions on how to do this. Follow-up care is a key part of your treatment and safety. Be sure to make and go to all appointments, and call your doctor if you are having problems. It's also a good idea to know your test results and keep a list of the medicines you take. When should you call for help? Call 911 anytime you think you may need emergency care. For example, call if:  You passed out (lost consciousness). You have severe trouble breathing. You have sudden chest pain and shortness of breath, or you cough up blood.   You medications are discontinued, doses are      changed, or new medications (including over-the-counter products) are added. *  Please carry medication information at all times in case of emergency situations. These are general instructions for a healthy lifestyle:  No smoking/ No tobacco products/ Avoid exposure to second hand smoke  Surgeon General's Warning:  Quitting smoking now greatly reduces serious risk to your health. Obesity, smoking, and sedentary lifestyle greatly increases your risk for illness  A healthy diet, regular physical exercise & weight monitoring are important for maintaining a healthy lifestyle    You may be retaining fluid if you have a history of heart failure or if you experience any of the following symptoms:  Weight gain of 3 pounds or more overnight or 5 pounds in a week, increased swelling in our hands or feet or shortness of breath while lying flat in bed. Please call your doctor as soon as you notice any of these symptoms; do not wait until your next office visit.

## 2022-12-08 NOTE — BRIEF OP NOTE
Brief Postoperative Note      Patient: Ted Nj  YOB: 1965  MRN: [id number]    Date of Procedure: 12/8/2022    Pre-Op Diagnosis: IC (interstitial cystitis) [N30.10]    Post-Op Diagnosis: Same       Procedure(s):  CYSTOSCOPY HYDRODISTENTION    Surgeon(s):  Oneal Kawasaki., MD    Assistant:  * No surgical staff found *    Anesthesia: General    Estimated Blood Loss (mL): Minimal    Complications: None    Specimens:   * No specimens in log *    Implants:  * No implants in log *      Drains: * No LDAs found *    Findings: see op note    Electronically signed by Valeriy Pedersen MD on 12/8/2022 at 1:16 PM

## 2022-12-08 NOTE — ANESTHESIA POSTPROCEDURE EVALUATION
Department of Anesthesiology  Postprocedure Note    Patient: Saray Delatorre  MRN: [id number]  YOB: 1965  Date of evaluation: 12/8/2022      Procedure Summary     Date: 12/08/22 Room / Location: [facility] OR 01 [facility] / [facility] OR    Anesthesia Start: 7565 Anesthesia Stop: 0171    Procedure: CYSTOSCOPY HYDRODISTENTION (Bladder) Diagnosis:       IC (interstitial cystitis)      (IC (interstitial cystitis) [N30.10])    Providers: Clare Nova MD Responsible Provider: Len Parkinson MD    Anesthesia Type: general ASA Status: 4          Anesthesia Type: No value filed.     Jose Phase I: Jose Score: 8    Jose Phase II:        Anesthesia Post Evaluation    Patient location during evaluation: PACU  Patient participation: complete - patient participated  Level of consciousness: awake and alert  Airway patency: patent  Nausea & Vomiting: no nausea and no vomiting  Complications: no  Cardiovascular status: hemodynamically stable  Respiratory status: acceptable, nonlabored ventilation and spontaneous ventilation  Hydration status: euvolemic  Comments: /73   Pulse 67   Temp 98.3 °F (36.8 °C) (Tympanic)   Resp 18   Ht 5' 4\" (1.626 m)   Wt 150 lb (68 kg)   SpO2 100%   BMI 25.75 kg/m²     Multimodal analgesia pain management approach

## 2022-12-08 NOTE — OP NOTE
[facility]  OPERATIVE REPORT    Name:  Connor Ravi  MR#:  [id number]  :  1965  ACCOUNT #:  [de-identified]  DATE OF SERVICE:  2022    PREOPERATIVE DIAGNOSIS:  Interstitial cystitis. POSTOPERATIVE DIAGNOSIS:  Interstitial cystitis. PROCEDURE PERFORMED:  Cystoscopy with hydrodistention of the bladder. SURGEON:  Patricia Kern MD    ASSISTANT:  None. ANESTHESIA:  General.    COMPLICATIONS:  None. SPECIMENS REMOVED:  None. IMPLANTS:  None. ESTIMATED BLOOD LOSS:  None. FINDINGS:  Bladder capacity approximately 900 mL with moderate amount of glomerulations noted after hydrodistention. PROCEDURE:  The patient was given general anesthetic, placed in the dorsal lithotomy position. She was prepped and draped in sterile fashion. External genitalia looks normal.  I passed a 22-Kiswahili cystoscope into the urethra using video camera and 30-degree lens. The urethra appeared normal.  Bladder mucosa was normal, no tumors or stones noted. The bladder was distended with irrigation bag held 80 cm anterior to the level of the bladder. It was held distended for eight minutes and then allowed to drain. The capacity was 900 mL. Bladder was inspected after drainage. There were moderate glomerulations noted at the trigone and the posterior wall, consistent with interstitial cystitis. We then drained the bladder and instilled 30 mL of Marcaine in the bladder and removed the scope. PLAN:  She will be discharged home. Return to the office in about one month.       Tee Valle MD    [id number]  D:  2022 13:28  T:  2022 14:56  JOB #:  [id number]

## 2022-12-27 RX ORDER — FLUOXETINE HYDROCHLORIDE 20 MG/1
CAPSULE ORAL
Qty: 120 CAPSULE | Refills: 3 | OUTPATIENT
Start: 2022-12-27

## 2023-02-15 ENCOUNTER — TELEPHONE (OUTPATIENT)
Dept: CARDIOLOGY CLINIC | Age: 58
End: 2023-02-15

## 2023-02-15 RX ORDER — ISOSORBIDE MONONITRATE 30 MG/1
30 TABLET, EXTENDED RELEASE ORAL DAILY
Qty: 60 TABLET | Refills: 0 | Status: SHIPPED | OUTPATIENT
Start: 2023-02-15

## 2023-02-15 RX ORDER — CARVEDILOL 12.5 MG/1
12.5 TABLET ORAL 2 TIMES DAILY
Qty: 120 TABLET | Refills: 0 | Status: SHIPPED | OUTPATIENT
Start: 2023-02-15

## 2023-02-15 NOTE — TELEPHONE ENCOUNTER
MEDICATION REFILL REQUEST      Name of Medication:  Isosorbide mononitrate  Dose:  30 mg   Frequency:  take 30 mg by  mouth  Quantity:  ?  Days' supply:  ? MEDICATION REFILL REQUEST      Name of Medication:  Carvedilol  Dose:  12.5 mg   Frequency:  take 12.5 mg by mouth 2 times daily  Quantity:  ?  Days' supply:  ? Pharmacy Name/Location:  [facility]    Pt wants to know if a few days worth of Prozac can be sent in, pt states she does not see her new PCP until 2/20/23.

## 2023-02-15 NOTE — TELEPHONE ENCOUNTER
Requested Prescriptions     Signed Prescriptions Disp Refills    carvedilol (COREG) 12.5 MG tablet 120 tablet 0     Sig: Take 1 tablet by mouth 2 times daily     Authorizing Provider: Asuncion Moody User: Vilma Andrew    isosorbide mononitrate (IMDUR) 30 MG extended release tablet 60 tablet 0     Sig: Take 1 tablet by mouth daily     Authorizing Provider: Asuncion Moody User: Vilma Andrew

## 2023-03-28 DIAGNOSIS — R10.2 PELVIC AND PERINEAL PAIN: ICD-10-CM

## 2023-03-28 DIAGNOSIS — R39.14 FEELING OF INCOMPLETE BLADDER EMPTYING: ICD-10-CM

## 2023-03-28 RX ORDER — AMITRIPTYLINE HYDROCHLORIDE 25 MG/1
TABLET, FILM COATED ORAL
Qty: 30 TABLET | Refills: 11 | Status: SHIPPED | OUTPATIENT
Start: 2023-03-28

## 2023-04-08 ENCOUNTER — APPOINTMENT (OUTPATIENT)
Dept: MRI IMAGING | Age: 58
End: 2023-04-08
Payer: MEDICAID

## 2023-04-08 ENCOUNTER — APPOINTMENT (OUTPATIENT)
Dept: CT IMAGING | Age: 58
End: 2023-04-08
Payer: MEDICAID

## 2023-04-08 ENCOUNTER — HOSPITAL ENCOUNTER (OUTPATIENT)
Age: 58
Setting detail: OBSERVATION
Discharge: HOME | End: 2023-04-10
Attending: EMERGENCY MEDICINE | Admitting: FAMILY MEDICINE
Payer: MEDICAID

## 2023-04-08 ENCOUNTER — APPOINTMENT (OUTPATIENT)
Dept: GENERAL RADIOLOGY | Age: 58
End: 2023-04-08
Payer: MEDICAID

## 2023-04-08 DIAGNOSIS — I63.9 CEREBROVASCULAR ACCIDENT (CVA), UNSPECIFIED MECHANISM (HCC): Primary | ICD-10-CM

## 2023-04-08 PROBLEM — J96.01 ACUTE RESPIRATORY FAILURE WITH HYPOXIA (HCC): Status: RESOLVED | Noted: 2017-11-27 | Resolved: 2023-04-08

## 2023-04-08 PROBLEM — J44.9 COPD (CHRONIC OBSTRUCTIVE PULMONARY DISEASE) (HCC): Status: RESOLVED | Noted: 2018-05-19 | Resolved: 2023-04-08

## 2023-04-08 PROBLEM — G44.301 INTRACTABLE POST-TRAUMATIC HEADACHE: Status: RESOLVED | Noted: 2020-05-05 | Resolved: 2023-04-08

## 2023-04-08 PROBLEM — J96.11 CHRONIC HYPOXEMIC RESPIRATORY FAILURE (HCC): Status: ACTIVE | Noted: 2018-05-19

## 2023-04-08 PROBLEM — T50.905A MEDICATION SIDE EFFECT, INITIAL ENCOUNTER: Status: RESOLVED | Noted: 2020-12-10 | Resolved: 2023-04-08

## 2023-04-08 PROBLEM — I10 HTN (HYPERTENSION): Status: ACTIVE | Noted: 2018-06-06

## 2023-04-08 PROBLEM — G43.019 INTRACTABLE MIGRAINE WITHOUT AURA AND WITHOUT STATUS MIGRAINOSUS: Status: RESOLVED | Noted: 2020-12-10 | Resolved: 2023-04-08

## 2023-04-08 PROBLEM — D64.9 ANEMIA: Status: ACTIVE | Noted: 2018-05-19

## 2023-04-08 PROBLEM — N89.8 VAGINAL DISCHARGE: Status: RESOLVED | Noted: 2018-03-16 | Resolved: 2023-04-08

## 2023-04-08 PROBLEM — M25.512 LEFT SHOULDER PAIN: Status: RESOLVED | Noted: 2018-05-19 | Resolved: 2023-04-08

## 2023-04-08 PROBLEM — I50.20 HEART FAILURE WITH REDUCED EJECTION FRACTION (HCC): Status: ACTIVE | Noted: 2020-10-19

## 2023-04-08 PROBLEM — J18.9 BILATERAL PNEUMONIA: Status: RESOLVED | Noted: 2018-11-24 | Resolved: 2023-04-08

## 2023-04-08 PROBLEM — R29.90 STROKE-LIKE SYMPTOMS: Status: ACTIVE | Noted: 2023-04-08

## 2023-04-08 PROBLEM — J96.10 CHRONIC RESPIRATORY FAILURE (HCC): Chronic | Status: RESOLVED | Noted: 2017-12-04 | Resolved: 2023-04-08

## 2023-04-08 PROBLEM — F17.200 NICOTINE DEPENDENCE: Status: ACTIVE | Noted: 2017-11-25

## 2023-04-08 PROBLEM — Z13.31 POSITIVE DEPRESSION SCREENING: Status: RESOLVED | Noted: 2022-04-29 | Resolved: 2023-04-08
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
## 2023-04-08 PROBLEM — J96.10 CHRONIC RESPIRATORY FAILURE (HCC): Status: ACTIVE | Noted: 2017-12-04

## 2023-04-08 PROBLEM — N64.4 BREAST PAIN, LEFT: Status: RESOLVED | Noted: 2017-10-06 | Resolved: 2023-04-08

## 2023-04-08 PROBLEM — Z76.5 DRUG-SEEKING BEHAVIOR: Status: ACTIVE | Noted: 2018-12-13

## 2023-04-08 PROBLEM — F19.20 POLYSUBSTANCE DEPENDENCE INCLUDING OPIOID TYPE DRUG WITH COMPLICATION, EPISODIC ABUSE (HCC): Status: ACTIVE | Noted: 2018-12-10

## 2023-04-08 PROBLEM — I50.22 CHRONIC SYSTOLIC HEART FAILURE (HCC): Status: RESOLVED | Noted: 2018-10-18 | Resolved: 2023-04-08

## 2023-04-08 PROBLEM — I10 PRIMARY HYPERTENSION: Chronic | Status: ACTIVE | Noted: 2018-06-06

## 2023-04-08 PROBLEM — E78.5 HYPERLIPIDEMIA: Status: ACTIVE | Noted: 2018-05-19

## 2023-04-08 PROBLEM — J44.9 COPD (CHRONIC OBSTRUCTIVE PULMONARY DISEASE) (HCC): Status: ACTIVE | Noted: 2018-05-19

## 2023-04-08 PROBLEM — J18.9 PNEUMONIA: Status: RESOLVED | Noted: 2018-12-10 | Resolved: 2023-04-08

## 2023-04-08 PROBLEM — S06.0X1A CONCUSSION WTH LOSS OF CONSCIOUSNESS OF 30 MINUTES OR LESS: Status: RESOLVED | Noted: 2020-05-05 | Resolved: 2023-04-08

## 2023-04-08 PROBLEM — I51.89 DIASTOLIC DYSFUNCTION: Status: RESOLVED | Noted: 2018-05-19 | Resolved: 2023-04-08

## 2023-04-08 PROBLEM — F33.9 DEPRESSION, RECURRENT (HCC): Status: ACTIVE | Noted: 2018-01-12

## 2023-04-08 PROBLEM — G44.301 INTRACTABLE POST-TRAUMATIC HEADACHE: Status: ACTIVE | Noted: 2020-05-05

## 2023-04-08 PROBLEM — J96.10 CHRONIC RESPIRATORY FAILURE (HCC): Chronic | Status: ACTIVE | Noted: 2017-12-04

## 2023-04-08 LAB
ALBUMIN SERPL-MCNC: 3.5 G/DL (ref 3.5–5)
ALBUMIN/GLOB SERPL: 1.1 (ref 0.4–1.6)
ALP SERPL-CCNC: 77 U/L (ref 50–130)
ALT SERPL-CCNC: 31 U/L (ref 12–65)
AMPHET UR QL SCN: NEGATIVE
ANION GAP SERPL CALC-SCNC: 8 MMOL/L (ref 2–11)
APPEARANCE UR: CLEAR
AST SERPL-CCNC: 52 U/L (ref 15–37)
BACTERIA URNS QL MICRO: NEGATIVE /HPF
BARBITURATES UR QL SCN: NEGATIVE
BASOPHILS # BLD: 0 K/UL (ref 0–0.2)
BASOPHILS NFR BLD: 1 % (ref 0–2)
BENZODIAZ UR QL: NEGATIVE
BILIRUB SERPL-MCNC: 0.4 MG/DL (ref 0.2–1.1)
BILIRUB UR QL: NEGATIVE
BUN SERPL-MCNC: 12 MG/DL (ref 6–23)
CALCIUM SERPL-MCNC: 8.7 MG/DL (ref 8.3–10.4)
CANNABINOIDS UR QL SCN: NEGATIVE
CASTS URNS QL MICRO: NORMAL /LPF
CHLORIDE SERPL-SCNC: 110 MMOL/L (ref 101–110)
CO2 SERPL-SCNC: 21 MMOL/L (ref 21–32)
COCAINE UR QL SCN: POSITIVE
COLOR UR: NORMAL
CREAT SERPL-MCNC: 0.77 MG/DL (ref 0.6–1)
DIFFERENTIAL METHOD BLD: ABNORMAL
EOSINOPHIL # BLD: 0 K/UL (ref 0–0.8)
EOSINOPHIL NFR BLD: 1 % (ref 0.5–7.8)
EPI CELLS #/AREA URNS HPF: NORMAL /HPF
ERYTHROCYTE [DISTWIDTH] IN BLOOD BY AUTOMATED COUNT: 12.6 % (ref 11.9–14.6)
GLOBULIN SER CALC-MCNC: 3.1 G/DL (ref 2.8–4.5)
GLUCOSE SERPL-MCNC: 121 MG/DL (ref 65–100)
GLUCOSE UR STRIP.AUTO-MCNC: NEGATIVE MG/DL
HCT VFR BLD AUTO: 31.6 % (ref 35.8–46.3)
HGB BLD-MCNC: 10.4 G/DL (ref 11.7–15.4)
HGB UR QL STRIP: NEGATIVE
IMM GRANULOCYTES # BLD AUTO: 0 K/UL (ref 0–0.5)
IMM GRANULOCYTES NFR BLD AUTO: 0 % (ref 0–5)
KETONES UR QL STRIP.AUTO: NEGATIVE MG/DL
LEUKOCYTE ESTERASE UR QL STRIP.AUTO: NEGATIVE
LYMPHOCYTES # BLD: 1.8 K/UL (ref 0.5–4.6)
LYMPHOCYTES NFR BLD: 51 % (ref 13–44)
MCH RBC QN AUTO: 31.7 PG (ref 26.1–32.9)
MCHC RBC AUTO-ENTMCNC: 32.9 G/DL (ref 31.4–35)
MCV RBC AUTO: 96.3 FL (ref 82–102)
METHADONE UR QL: NEGATIVE
MONOCYTES # BLD: 0.5 K/UL (ref 0.1–1.3)
MONOCYTES NFR BLD: 14 % (ref 4–12)
MUCOUS THREADS URNS QL MICRO: 0 /LPF
NEUTS SEG # BLD: 1.2 K/UL (ref 1.7–8.2)
NEUTS SEG NFR BLD: 34 % (ref 43–78)
NITRITE UR QL STRIP.AUTO: NEGATIVE
NRBC # BLD: 0 K/UL (ref 0–0.2)
OPIATES UR QL: NEGATIVE
PCP UR QL: NEGATIVE
PH UR STRIP: 5.5 (ref 5–9)
PLATELET # BLD AUTO: 177 K/UL (ref 150–450)
PMV BLD AUTO: 9.8 FL (ref 9.4–12.3)
POTASSIUM SERPL-SCNC: 3 MMOL/L (ref 3.5–5.1)
PROT SERPL-MCNC: 6.6 G/DL (ref 6.3–8.2)
PROT UR STRIP-MCNC: NEGATIVE MG/DL
RBC # BLD AUTO: 3.28 M/UL (ref 4.05–5.2)
RBC #/AREA URNS HPF: NORMAL /HPF
SODIUM SERPL-SCNC: 139 MMOL/L (ref 133–143)
SP GR UR REFRACTOMETRY: 1 (ref 1–1.02)
URINE CULTURE IF INDICATED: NORMAL
UROBILINOGEN UR QL STRIP.AUTO: 0.2 EU/DL (ref 0.2–1)
WBC # BLD AUTO: 3.6 K/UL (ref 4.3–11.1)
WBC URNS QL MICRO: NORMAL /HPF

## 2023-04-08 PROCEDURE — 71045 X-RAY EXAM CHEST 1 VIEW: CPT

## 2023-04-08 PROCEDURE — 6360000002 HC RX W HCPCS: Performed by: EMERGENCY MEDICINE

## 2023-04-08 PROCEDURE — 97535 SELF CARE MNGMENT TRAINING: CPT

## 2023-04-08 PROCEDURE — 70553 MRI BRAIN STEM W/O & W/DYE: CPT

## 2023-04-08 PROCEDURE — 96375 TX/PRO/DX INJ NEW DRUG ADDON: CPT

## 2023-04-08 PROCEDURE — 97530 THERAPEUTIC ACTIVITIES: CPT

## 2023-04-08 PROCEDURE — 99285 EMERGENCY DEPT VISIT HI MDM: CPT

## 2023-04-08 PROCEDURE — 81001 URINALYSIS AUTO W/SCOPE: CPT

## 2023-04-08 PROCEDURE — 80053 COMPREHEN METABOLIC PANEL: CPT

## 2023-04-08 PROCEDURE — 96365 THER/PROPH/DIAG IV INF INIT: CPT

## 2023-04-08 PROCEDURE — G0378 HOSPITAL OBSERVATION PER HR: HCPCS

## 2023-04-08 PROCEDURE — 80307 DRUG TEST PRSMV CHEM ANLYZR: CPT

## 2023-04-08 PROCEDURE — 70450 CT HEAD/BRAIN W/O DYE: CPT

## 2023-04-08 PROCEDURE — A9579 GAD-BASE MR CONTRAST NOS,1ML: HCPCS | Performed by: FAMILY MEDICINE

## 2023-04-08 PROCEDURE — 6360000004 HC RX CONTRAST MEDICATION: Performed by: FAMILY MEDICINE

## 2023-04-08 PROCEDURE — 6370000000 HC RX 637 (ALT 250 FOR IP): Performed by: FAMILY MEDICINE

## 2023-04-08 PROCEDURE — 6360000002 HC RX W HCPCS: Performed by: FAMILY MEDICINE

## 2023-04-08 PROCEDURE — 97165 OT EVAL LOW COMPLEX 30 MIN: CPT

## 2023-04-08 PROCEDURE — 85025 COMPLETE CBC W/AUTO DIFF WBC: CPT

## 2023-04-08 PROCEDURE — 6370000000 HC RX 637 (ALT 250 FOR IP): Performed by: EMERGENCY MEDICINE

## 2023-04-08 PROCEDURE — 2580000003 HC RX 258: Performed by: EMERGENCY MEDICINE

## 2023-04-08 PROCEDURE — 97161 PT EVAL LOW COMPLEX 20 MIN: CPT

## 2023-04-08 RX ORDER — MAGNESIUM SULFATE 1 G/100ML
1000 INJECTION INTRAVENOUS ONCE
Status: COMPLETED | OUTPATIENT
Start: 2023-04-08 | End: 2023-04-08

## 2023-04-08 RX ORDER — ONDANSETRON 2 MG/ML
4 INJECTION INTRAMUSCULAR; INTRAVENOUS EVERY 6 HOURS PRN
Status: DISCONTINUED | OUTPATIENT
Start: 2023-04-08 | End: 2023-04-10 | Stop reason: HOSPADM

## 2023-04-08 RX ORDER — 0.9 % SODIUM CHLORIDE 0.9 %
1000 INTRAVENOUS SOLUTION INTRAVENOUS ONCE
Status: COMPLETED | OUTPATIENT
Start: 2023-04-08 | End: 2023-04-08

## 2023-04-08 RX ORDER — ROSUVASTATIN CALCIUM 20 MG/1
40 TABLET, COATED ORAL NIGHTLY
Status: DISCONTINUED | OUTPATIENT
Start: 2023-04-08 | End: 2023-04-10 | Stop reason: HOSPADM

## 2023-04-08 RX ORDER — POTASSIUM CHLORIDE 20 MEQ/1
40 TABLET, EXTENDED RELEASE ORAL ONCE
Status: COMPLETED | OUTPATIENT
Start: 2023-04-08 | End: 2023-04-08

## 2023-04-08 RX ORDER — FUROSEMIDE 40 MG/1
40 TABLET ORAL 2 TIMES DAILY
Status: DISCONTINUED | OUTPATIENT
Start: 2023-04-08 | End: 2023-04-10 | Stop reason: HOSPADM

## 2023-04-08 RX ORDER — AMITRIPTYLINE HYDROCHLORIDE 25 MG/1
35 TABLET, FILM COATED ORAL NIGHTLY
Status: DISCONTINUED | OUTPATIENT
Start: 2023-04-08 | End: 2023-04-10 | Stop reason: HOSPADM

## 2023-04-08 RX ORDER — DIPHENHYDRAMINE HYDROCHLORIDE 50 MG/ML
25 INJECTION INTRAMUSCULAR; INTRAVENOUS
Status: COMPLETED | OUTPATIENT
Start: 2023-04-08 | End: 2023-04-08

## 2023-04-08 RX ORDER — PROCHLORPERAZINE EDISYLATE 5 MG/ML
10 INJECTION INTRAMUSCULAR; INTRAVENOUS
Status: COMPLETED | OUTPATIENT
Start: 2023-04-08 | End: 2023-04-08

## 2023-04-08 RX ORDER — ALBUTEROL SULFATE 2.5 MG/3ML
2.5 SOLUTION RESPIRATORY (INHALATION) EVERY 6 HOURS PRN
Status: DISCONTINUED | OUTPATIENT
Start: 2023-04-08 | End: 2023-04-10 | Stop reason: HOSPADM

## 2023-04-08 RX ORDER — FLUOXETINE HYDROCHLORIDE 20 MG/1
40 CAPSULE ORAL 2 TIMES DAILY
Status: DISCONTINUED | OUTPATIENT
Start: 2023-04-08 | End: 2023-04-10 | Stop reason: HOSPADM

## 2023-04-08 RX ORDER — CARVEDILOL 6.25 MG/1
12.5 TABLET ORAL 2 TIMES DAILY
Status: DISCONTINUED | OUTPATIENT
Start: 2023-04-08 | End: 2023-04-10

## 2023-04-08 RX ORDER — ASPIRIN 81 MG/1
81 TABLET ORAL DAILY
Status: DISCONTINUED | OUTPATIENT
Start: 2023-04-08 | End: 2023-04-10

## 2023-04-08 RX ORDER — LABETALOL HYDROCHLORIDE 5 MG/ML
10 INJECTION, SOLUTION INTRAVENOUS EVERY 10 MIN PRN
Status: DISCONTINUED | OUTPATIENT
Start: 2023-04-08 | End: 2023-04-10

## 2023-04-08 RX ORDER — POLYETHYLENE GLYCOL 3350 17 G/17G
17 POWDER, FOR SOLUTION ORAL DAILY PRN
Status: DISCONTINUED | OUTPATIENT
Start: 2023-04-08 | End: 2023-04-10 | Stop reason: HOSPADM

## 2023-04-08 RX ORDER — HYDRALAZINE HYDROCHLORIDE 25 MG/1
25 TABLET, FILM COATED ORAL 3 TIMES DAILY
Status: DISCONTINUED | OUTPATIENT
Start: 2023-04-08 | End: 2023-04-10 | Stop reason: HOSPADM

## 2023-04-08 RX ORDER — ISOSORBIDE MONONITRATE 30 MG/1
30 TABLET, EXTENDED RELEASE ORAL DAILY
Status: DISCONTINUED | OUTPATIENT
Start: 2023-04-09 | End: 2023-04-10 | Stop reason: HOSPADM

## 2023-04-08 RX ORDER — ASPIRIN 300 MG/1
300 SUPPOSITORY RECTAL DAILY
Status: DISCONTINUED | OUTPATIENT
Start: 2023-04-08 | End: 2023-04-10

## 2023-04-08 RX ORDER — SPIRONOLACTONE 25 MG/1
50 TABLET ORAL 2 TIMES DAILY
Status: DISCONTINUED | OUTPATIENT
Start: 2023-04-08 | End: 2023-04-10 | Stop reason: HOSPADM

## 2023-04-08 RX ORDER — LORAZEPAM 2 MG/ML
2 INJECTION INTRAMUSCULAR
Status: COMPLETED | OUTPATIENT
Start: 2023-04-08 | End: 2023-04-08

## 2023-04-08 RX ORDER — ONDANSETRON 4 MG/1
4 TABLET, ORALLY DISINTEGRATING ORAL EVERY 8 HOURS PRN
Status: DISCONTINUED | OUTPATIENT
Start: 2023-04-08 | End: 2023-04-10 | Stop reason: HOSPADM

## 2023-04-08 RX ADMIN — APIXABAN 5 MG: 5 TABLET, FILM COATED ORAL at 20:36

## 2023-04-08 RX ADMIN — SODIUM CHLORIDE 1000 ML: 9 INJECTION, SOLUTION INTRAVENOUS at 08:33

## 2023-04-08 RX ADMIN — ROSUVASTATIN 40 MG: 20 TABLET, FILM COATED ORAL at 20:36

## 2023-04-08 RX ADMIN — GADOTERIDOL 14 ML: 279.3 INJECTION, SOLUTION INTRAVENOUS at 12:52

## 2023-04-08 RX ADMIN — FLUOXETINE 40 MG: 20 CAPSULE ORAL at 20:33

## 2023-04-08 RX ADMIN — DIPHENHYDRAMINE HYDROCHLORIDE 25 MG: 50 INJECTION, SOLUTION INTRAMUSCULAR; INTRAVENOUS at 08:36

## 2023-04-08 RX ADMIN — MAGNESIUM SULFATE HEPTAHYDRATE 1000 MG: 1 INJECTION, SOLUTION INTRAVENOUS at 09:17

## 2023-04-08 RX ADMIN — CARVEDILOL 12.5 MG: 6.25 TABLET, FILM COATED ORAL at 20:35

## 2023-04-08 RX ADMIN — PROCHLORPERAZINE EDISYLATE 10 MG: 5 INJECTION INTRAMUSCULAR; INTRAVENOUS at 08:36

## 2023-04-08 RX ADMIN — AMITRIPTYLINE HYDROCHLORIDE 37.5 MG: 25 TABLET, FILM COATED ORAL at 20:34

## 2023-04-08 RX ADMIN — LORAZEPAM 2 MG: 2 INJECTION INTRAMUSCULAR at 12:11

## 2023-04-08 RX ADMIN — POTASSIUM CHLORIDE 40 MEQ: 20 TABLET, EXTENDED RELEASE ORAL at 09:20

## 2023-04-08 RX ADMIN — SACUBITRIL AND VALSARTAN 1 TABLET: 24; 26 TABLET, FILM COATED ORAL at 20:35

## 2023-04-08 ASSESSMENT — ENCOUNTER SYMPTOMS
RESPIRATORY NEGATIVE: 1
NAUSEA: 1
EYES NEGATIVE: 1
DIARRHEA: 1

## 2023-04-08 ASSESSMENT — PAIN DESCRIPTION - LOCATION: LOCATION: HEAD

## 2023-04-08 ASSESSMENT — PAIN SCALES - GENERAL
PAINLEVEL_OUTOF10: 8
PAINLEVEL_OUTOF10: 3
PAINLEVEL_OUTOF10: 8

## 2023-04-08 ASSESSMENT — PAIN - FUNCTIONAL ASSESSMENT: PAIN_FUNCTIONAL_ASSESSMENT: 0-10

## 2023-04-09 PROBLEM — R29.90 STROKE-LIKE SYMPTOMS: Status: RESOLVED | Noted: 2023-04-08 | Resolved: 2023-04-09

## 2023-04-09 PROBLEM — G50.0 TRIGEMINAL NEURALGIA OF LEFT SIDE OF FACE: Status: ACTIVE | Noted: 2023-04-09

## 2023-04-09 LAB
CRP SERPL-MCNC: <0.3 MG/DL (ref 0–0.9)
ERYTHROCYTE [SEDIMENTATION RATE] IN BLOOD: 4 MM/HR (ref 0–30)

## 2023-04-09 PROCEDURE — G0378 HOSPITAL OBSERVATION PER HR: HCPCS

## 2023-04-09 PROCEDURE — 6370000000 HC RX 637 (ALT 250 FOR IP): Performed by: FAMILY MEDICINE

## 2023-04-09 PROCEDURE — 92610 EVALUATE SWALLOWING FUNCTION: CPT

## 2023-04-09 PROCEDURE — 6360000002 HC RX W HCPCS: Performed by: FAMILY MEDICINE

## 2023-04-09 PROCEDURE — 94640 AIRWAY INHALATION TREATMENT: CPT

## 2023-04-09 PROCEDURE — 96376 TX/PRO/DX INJ SAME DRUG ADON: CPT

## 2023-04-09 PROCEDURE — 85652 RBC SED RATE AUTOMATED: CPT

## 2023-04-09 PROCEDURE — 86140 C-REACTIVE PROTEIN: CPT

## 2023-04-09 PROCEDURE — 36415 COLL VENOUS BLD VENIPUNCTURE: CPT

## 2023-04-09 PROCEDURE — 94760 N-INVAS EAR/PLS OXIMETRY 1: CPT

## 2023-04-09 RX ORDER — HYDROXYZINE PAMOATE 25 MG/1
25 CAPSULE ORAL 3 TIMES DAILY PRN
Status: DISCONTINUED | OUTPATIENT
Start: 2023-04-09 | End: 2023-04-10 | Stop reason: HOSPADM

## 2023-04-09 RX ORDER — PROCHLORPERAZINE EDISYLATE 5 MG/ML
10 INJECTION INTRAMUSCULAR; INTRAVENOUS ONCE
Status: COMPLETED | OUTPATIENT
Start: 2023-04-09 | End: 2023-04-09

## 2023-04-09 RX ORDER — ACETAMINOPHEN 325 MG/1
650 TABLET ORAL EVERY 4 HOURS PRN
Status: DISCONTINUED | OUTPATIENT
Start: 2023-04-09 | End: 2023-04-10 | Stop reason: HOSPADM

## 2023-04-09 RX ORDER — CARBAMAZEPINE 200 MG/1
100 TABLET ORAL 2 TIMES DAILY
Status: DISCONTINUED | OUTPATIENT
Start: 2023-04-09 | End: 2023-04-10

## 2023-04-09 RX ORDER — MIRTAZAPINE 15 MG/1
30 TABLET, FILM COATED ORAL NIGHTLY
Status: DISCONTINUED | OUTPATIENT
Start: 2023-04-09 | End: 2023-04-10 | Stop reason: HOSPADM

## 2023-04-09 RX ADMIN — ROSUVASTATIN 40 MG: 20 TABLET, FILM COATED ORAL at 20:48

## 2023-04-09 RX ADMIN — SPIRONOLACTONE 50 MG: 25 TABLET ORAL at 08:35

## 2023-04-09 RX ADMIN — FLUOXETINE 40 MG: 20 CAPSULE ORAL at 08:35

## 2023-04-09 RX ADMIN — MIRTAZAPINE 30 MG: 15 TABLET, FILM COATED ORAL at 20:49

## 2023-04-09 RX ADMIN — PROCHLORPERAZINE EDISYLATE 10 MG: 5 INJECTION INTRAMUSCULAR; INTRAVENOUS at 02:48

## 2023-04-09 RX ADMIN — CARBAMAZEPINE 100 MG: 200 TABLET ORAL at 10:02

## 2023-04-09 RX ADMIN — SACUBITRIL AND VALSARTAN 1 TABLET: 24; 26 TABLET, FILM COATED ORAL at 20:49

## 2023-04-09 RX ADMIN — APIXABAN 5 MG: 5 TABLET, FILM COATED ORAL at 20:48

## 2023-04-09 RX ADMIN — MOMETASONE FUROATE AND FORMOTEROL FUMARATE DIHYDRATE 2 PUFF: 200; 5 AEROSOL RESPIRATORY (INHALATION) at 20:15

## 2023-04-09 RX ADMIN — CARVEDILOL 12.5 MG: 6.25 TABLET, FILM COATED ORAL at 20:50

## 2023-04-09 RX ADMIN — CARBAMAZEPINE 100 MG: 200 TABLET ORAL at 20:50

## 2023-04-09 RX ADMIN — ACETAMINOPHEN 650 MG: 325 TABLET, FILM COATED ORAL at 15:09

## 2023-04-09 RX ADMIN — APIXABAN 5 MG: 5 TABLET, FILM COATED ORAL at 08:34

## 2023-04-09 RX ADMIN — AMITRIPTYLINE HYDROCHLORIDE 37.5 MG: 25 TABLET, FILM COATED ORAL at 20:49

## 2023-04-09 RX ADMIN — HYDROXYZINE PAMOATE 25 MG: 25 CAPSULE ORAL at 15:09

## 2023-04-09 RX ADMIN — MOMETASONE FUROATE AND FORMOTEROL FUMARATE DIHYDRATE 2 PUFF: 200; 5 AEROSOL RESPIRATORY (INHALATION) at 07:44

## 2023-04-09 RX ADMIN — SACUBITRIL AND VALSARTAN 1 TABLET: 24; 26 TABLET, FILM COATED ORAL at 08:34

## 2023-04-09 RX ADMIN — FLUOXETINE 40 MG: 20 CAPSULE ORAL at 20:48

## 2023-04-09 ASSESSMENT — PAIN DESCRIPTION - LOCATION
LOCATION: HEAD

## 2023-04-09 ASSESSMENT — PAIN SCALES - GENERAL
PAINLEVEL_OUTOF10: 6
PAINLEVEL_OUTOF10: 4
PAINLEVEL_OUTOF10: 8
PAINLEVEL_OUTOF10: 5

## 2023-04-09 ASSESSMENT — PAIN DESCRIPTION - DESCRIPTORS
DESCRIPTORS: ACHING
DESCRIPTORS: THROBBING
DESCRIPTORS: ACHING

## 2023-04-09 ASSESSMENT — PAIN DESCRIPTION - ORIENTATION
ORIENTATION: ANTERIOR
ORIENTATION: ANTERIOR

## 2023-04-10 ENCOUNTER — APPOINTMENT (OUTPATIENT)
Dept: CT IMAGING | Age: 58
End: 2023-04-10
Payer: MEDICAID

## 2023-04-10 VITALS
OXYGEN SATURATION: 100 % | HEIGHT: 64 IN | HEART RATE: 85 BPM | SYSTOLIC BLOOD PRESSURE: 135 MMHG | BODY MASS INDEX: 26.46 KG/M2 | WEIGHT: 155 LBS | TEMPERATURE: 97.5 F | RESPIRATION RATE: 15 BRPM | DIASTOLIC BLOOD PRESSURE: 76 MMHG

## 2023-04-10 PROBLEM — I99.9 COCAINE-INDUCED VASCULAR DISORDER (HCC): Chronic | Status: ACTIVE | Noted: 2017-05-28

## 2023-04-10 PROBLEM — F14.988 COCAINE-INDUCED VASCULAR DISORDER (HCC): Chronic | Status: ACTIVE | Noted: 2017-05-28

## 2023-04-10 LAB — CREAT SERPL-MCNC: 0.9 MG/DL (ref 0.6–1)

## 2023-04-10 PROCEDURE — 2580000003 HC RX 258: Performed by: PSYCHIATRY & NEUROLOGY

## 2023-04-10 PROCEDURE — 70498 CT ANGIOGRAPHY NECK: CPT

## 2023-04-10 PROCEDURE — 99221 1ST HOSP IP/OBS SF/LOW 40: CPT | Performed by: PSYCHIATRY & NEUROLOGY

## 2023-04-10 PROCEDURE — 36415 COLL VENOUS BLD VENIPUNCTURE: CPT

## 2023-04-10 PROCEDURE — G0378 HOSPITAL OBSERVATION PER HR: HCPCS

## 2023-04-10 PROCEDURE — 94760 N-INVAS EAR/PLS OXIMETRY 1: CPT

## 2023-04-10 PROCEDURE — 82565 ASSAY OF CREATININE: CPT

## 2023-04-10 PROCEDURE — 6360000004 HC RX CONTRAST MEDICATION: Performed by: PSYCHIATRY & NEUROLOGY

## 2023-04-10 PROCEDURE — 6370000000 HC RX 637 (ALT 250 FOR IP): Performed by: FAMILY MEDICINE

## 2023-04-10 PROCEDURE — 6360000002 HC RX W HCPCS: Performed by: PSYCHIATRY & NEUROLOGY

## 2023-04-10 PROCEDURE — 96376 TX/PRO/DX INJ SAME DRUG ADON: CPT

## 2023-04-10 PROCEDURE — 94640 AIRWAY INHALATION TREATMENT: CPT

## 2023-04-10 RX ORDER — 0.9 % SODIUM CHLORIDE 0.9 %
100 INTRAVENOUS SOLUTION INTRAVENOUS
Status: COMPLETED | OUTPATIENT
Start: 2023-04-10 | End: 2023-04-10

## 2023-04-10 RX ORDER — DIPHENHYDRAMINE HYDROCHLORIDE 50 MG/ML
25 INJECTION INTRAMUSCULAR; INTRAVENOUS ONCE
Status: COMPLETED | OUTPATIENT
Start: 2023-04-10 | End: 2023-04-10

## 2023-04-10 RX ORDER — METOCLOPRAMIDE HYDROCHLORIDE 5 MG/ML
10 INJECTION INTRAMUSCULAR; INTRAVENOUS ONCE
Status: DISCONTINUED | OUTPATIENT
Start: 2023-04-10 | End: 2023-04-10

## 2023-04-10 RX ORDER — SODIUM CHLORIDE 0.9 % (FLUSH) 0.9 %
10 SYRINGE (ML) INJECTION
Status: COMPLETED | OUTPATIENT
Start: 2023-04-10 | End: 2023-04-10

## 2023-04-10 RX ADMIN — DIPHENHYDRAMINE HYDROCHLORIDE 25 MG: 50 INJECTION, SOLUTION INTRAMUSCULAR; INTRAVENOUS at 14:06

## 2023-04-10 RX ADMIN — SODIUM CHLORIDE 100 ML: 9 INJECTION, SOLUTION INTRAVENOUS at 13:48

## 2023-04-10 RX ADMIN — SACUBITRIL AND VALSARTAN 1 TABLET: 24; 26 TABLET, FILM COATED ORAL at 10:19

## 2023-04-10 RX ADMIN — CARVEDILOL 12.5 MG: 6.25 TABLET, FILM COATED ORAL at 10:20

## 2023-04-10 RX ADMIN — IOPAMIDOL 60 ML: 755 INJECTION, SOLUTION INTRAVENOUS at 13:48

## 2023-04-10 RX ADMIN — SPIRONOLACTONE 50 MG: 25 TABLET ORAL at 10:20

## 2023-04-10 RX ADMIN — TIOTROPIUM BROMIDE INHALATION SPRAY 2 PUFF: 3.12 SPRAY, METERED RESPIRATORY (INHALATION) at 08:30

## 2023-04-10 RX ADMIN — FLUOXETINE 40 MG: 20 CAPSULE ORAL at 10:20

## 2023-04-10 RX ADMIN — SODIUM CHLORIDE, PRESERVATIVE FREE 10 ML: 5 INJECTION INTRAVENOUS at 13:48

## 2023-04-10 RX ADMIN — APIXABAN 5 MG: 5 TABLET, FILM COATED ORAL at 10:20

## 2023-04-10 RX ADMIN — MOMETASONE FUROATE AND FORMOTEROL FUMARATE DIHYDRATE 2 PUFF: 200; 5 AEROSOL RESPIRATORY (INHALATION) at 08:30

## 2023-04-10 RX ADMIN — CARBAMAZEPINE 100 MG: 200 TABLET ORAL at 09:00

## 2023-04-10 ASSESSMENT — PAIN DESCRIPTION - LOCATION: LOCATION: HEAD

## 2023-04-10 ASSESSMENT — PAIN SCALES - GENERAL: PAINLEVEL_OUTOF10: 6

## 2023-04-10 NOTE — PROGRESS NOTES
Pt placed on 15 L via Non-rebreather as ordered by Dr. Shona Olivier. Educated pt on placement of oxygen, pt stated understanding of education. 1330- Transport here to take pt to CT. Spoke with CT tech to take pt off of oxygen  at 1335 as ordered. Verbalized understanding.

## 2023-04-10 NOTE — CONSULTS
anxiety    Requires oxygen therapy     3l/min at hs. 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    Rheumatic fever as child    pt reports rheumatic fever in childhood    Smoker     started age 23-- smoked 0.5 ppd until 2018-- cut back to 10-2 cig daily per pt    Stroke [facility]) 2014    \"mild stroke\" pt reports 2014- \"left sided weakness and balance is off\"     Stroke-like symptoms 4/8/2023    Thromboembolus ([address]     BLE 2012    Vaginal discharge 3/16/2018     Past Surgical History:   Procedure Laterality Date    APPENDECTOMY  2006    BIOPSY OF BREAST, INCISIONAL Left     lymph node , left, patient states her bx neg, but high risk    CARDIAC CATHETERIZATION  2018    no intervention    CARDIAC CATHETERIZATION  04/2017    no intervention    CARDIAC CATHETERIZATION  5/27/2011    no blockages, no intervention    CERVICAL FUSION  07/14/2020    ACDF C4-6 with Dr. Jaclyn Boswell, [address]  6/6/2011    COLONOSCOPY      8 polyps removed, diverticulitis    COLONOSCOPY N/A 5/21/2018    COLONOSCOPY performed by Tereso Frederick MD at [place] [address]  8-23-11    bladder dilitation    CYSTOSCOPY N/A 12/8/2022    CYSTOSCOPY HYDRODISTENTION performed by Silverio Cruz MD at . [facility] Right 07/2019    4th toe -- surg repair    NICKI AND BSO (CERVIX REMOVED)  1997    fibroid tumors, hyst; no cervix     UPPER GASTROINTESTINAL ENDOSCOPY      UROLOGICAL SURGERY  06/30/2020    cysto    UROLOGICAL SURGERY  01/2018    cystoscopy with hydrodistention of bladder multiple      Family History   Problem Relation Age of Onset    Other Father         internal bleeding     Diabetes Sister     Psoriasis Mother     Thyroid Disease Sister         hyper; s/p thyroidectomy     Kidney Disease Brother     No Known Problems Sister     Heart Failure Father 76        chf    Diabetes Maternal Grandmother     Hypertension Maternal Grandmother     Emphysema Maternal Grandfather

## 2023-04-10 NOTE — CARE COORDINATION
LOS 2d    Chart reviewed for continued stay. Patient admitted for c/o headache with stroke like symptoms. SLP eval recommending regular diet with thin liquids. Neurology to have consultation today. PT/OT recommending no supportive needs. CM following patient's plan of care.

## 2023-04-10 NOTE — PROGRESS NOTES
Pt is ready for discharge. Education completed. IV removed. Catheter tip intact. Dry dressing applied. Education explained.

## 2023-04-10 NOTE — PROGRESS NOTES
Hospitalist Discharge Summary   Admit Date:  2023  7:50 AM   DC Note date: 4/10/2023  Name:  Adalid Hyatt   Age:  56 y.o. Sex:  female  :  1965   MRN:  [id number]   Room:  [facility]  PCP:  MARTY Bradford    Presenting Complaint: Headache     Initial Admission Diagnosis: Stroke-like symptoms [R29.90]  Cerebrovascular accident (CVA), unspecified mechanism ([address] [I63.9]     Problem List for this Hospitalization (present on admission):    Principal Problem:    Cocaine-induced vascular disorder ([address]  Active Problems:    Intractable migraine with aura with status migrainosus    Primary hypertension    Heart failure with reduced ejection fraction (HCC)    Bronchitis, chronic, mucopurulent ([address]    Chronic hypoxemic respiratory failure ([address]    Anemia    Hyperlipidemia    Fall    Depression, recurrent ([address]  Resolved Problems:    Stroke-like symptoms      Hospital Course:  57F PMHx heart failure, migraines, COPD who presented with headache for 3 weeks. She had nausea, vomiting and headache consistently for 3 weeks. She had chronic loose stools which had been unchanged. She denied fever, chest pain, diaphoresis, other localizable symptoms. She no longer smokes, drinks or uses illicit substances. Her urine was positive for cocaine on admission. She was taking her medications as prescribed. She was admitted for further evaluation management. CT Head, MRI Brain were negative for acute process as detailed below. Neurology was consulted. CTA head, neck was unremarkable. She was given a headache cocktail. She was determiend to be appropriate for discharge 4/10. Disposition: home with outpatient follow up  Diet: ADULT DIET; Regular  Code Status: Full Code    Follow Ups:   Follow-up Information     NAMRATA Irving. Schedule an appointment as soon as possible for a   visit in 2 week(s).     Specialty: Neurology  Why: Routine progression of care  Contact information:  [address]

## 2023-04-10 NOTE — CARE COORDINATION
Patient with discharge orders this day. No supportive needs identified to CM. Patient's family to provide transportation. Patient has met all treatment goals and milestones. CM following until discharged.      ASSESSMENT NOTE    Attending Physician: Gurvinder Watts MD  Admit Problem: Stroke-like symptoms [R29.90]  Cerebrovascular accident (CVA), unspecified mechanism ([facility] [I63.9]  Date/Time of Admission: 4/8/2023  7:50 AM  Problem List:  Patient Active Problem List   Diagnosis    Multiple thyroid nodules    Anemia    Irritable bowel syndrome without diarrhea    Hyperlipidemia    Lung nodule    Chronic low back pain    Primary hypertension    Heart failure with reduced ejection fraction (HCC)    Interstitial cystitis    Bronchitis, chronic, mucopurulent (HCC)    Postural imbalance    Fraga's esophagus without dysplasia    Esophageal dysphagia    Surgical menopause    Cervical radiculopathy    Abnormal nipple    Cervicalgia    Nicotine dependence    Palpitations    Polysubstance dependence including opioid type drug with complication, episodic abuse ([facility]    Chronic hypoxemic respiratory failure (HCC)    DDD (degenerative disc disease), cervical    Fall    Carotidynia    Rheumatic disease of mitral valve    Degenerative disc disease, lumbar    Intractable migraine with aura with status migrainosus    Oral phase dysphagia    Cocaine-induced vascular disorder (HCC)    Chronic pain syndrome    Increased risk of breast cancer    Healthcare maintenance    Anxiety    Depression, recurrent (HCC)    Gastroesophageal reflux disease    Mitral valve regurgitation    Status post hysterectomy    Colon polyps    NSTEMI (non-ST elevated myocardial infarction) (HCC)    Swelling of both lower extremities    Nasal polyps    Right leg pain    Intraductal papilloma of left breast    Takotsubo cardiomyopathy    Drug-seeking behavior    Rheumatic aortic insufficiency    Vasomotor symptoms due to menopause    History of migraine headaches
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
## 2023-05-20 ENCOUNTER — HOSPITAL ENCOUNTER (EMERGENCY)
Age: 58
Discharge: HOME | End: 2023-05-20
Attending: EMERGENCY MEDICINE
Payer: MEDICAID

## 2023-05-20 VITALS
HEIGHT: 64 IN | SYSTOLIC BLOOD PRESSURE: 131 MMHG | RESPIRATION RATE: 18 BRPM | BODY MASS INDEX: 19.63 KG/M2 | OXYGEN SATURATION: 99 % | TEMPERATURE: 98.4 F | HEART RATE: 80 BPM | WEIGHT: 115 LBS | DIASTOLIC BLOOD PRESSURE: 78 MMHG

## 2023-05-20 DIAGNOSIS — J20.9 ACUTE BRONCHITIS, UNSPECIFIED ORGANISM: Primary | ICD-10-CM

## 2023-05-20 PROCEDURE — 94640 AIRWAY INHALATION TREATMENT: CPT

## 2023-05-20 PROCEDURE — 6360000002 HC RX W HCPCS: Performed by: EMERGENCY MEDICINE

## 2023-05-20 PROCEDURE — 6370000000 HC RX 637 (ALT 250 FOR IP): Performed by: EMERGENCY MEDICINE

## 2023-05-20 PROCEDURE — 99283 EMERGENCY DEPT VISIT LOW MDM: CPT

## 2023-05-20 PROCEDURE — 94664 DEMO&/EVAL PT USE INHALER: CPT

## 2023-05-20 RX ORDER — DOXYCYCLINE HYCLATE 100 MG
100 TABLET ORAL 2 TIMES DAILY
Qty: 14 TABLET | Refills: 0 | Status: SHIPPED | OUTPATIENT
Start: 2023-05-20 | End: 2023-05-27

## 2023-05-20 RX ORDER — PREDNISONE 20 MG/1
40 TABLET ORAL DAILY
Qty: 10 TABLET | Refills: 0 | Status: SHIPPED | OUTPATIENT
Start: 2023-05-20 | End: 2023-05-25

## 2023-05-20 RX ORDER — ALBUTEROL SULFATE 2.5 MG/3ML
5 SOLUTION RESPIRATORY (INHALATION)
Status: COMPLETED | OUTPATIENT
Start: 2023-05-20 | End: 2023-05-20

## 2023-05-20 RX ORDER — DOXYCYCLINE HYCLATE 100 MG/1
100 CAPSULE ORAL
Status: COMPLETED | OUTPATIENT
Start: 2023-05-20 | End: 2023-05-20

## 2023-05-20 RX ORDER — PREDNISONE 20 MG/1
40 TABLET ORAL ONCE
Status: COMPLETED | OUTPATIENT
Start: 2023-05-20 | End: 2023-05-20

## 2023-05-20 RX ADMIN — DOXYCYCLINE HYCLATE 100 MG: 100 CAPSULE ORAL at 04:19

## 2023-05-20 RX ADMIN — ALBUTEROL SULFATE 5 MG: 2.5 SOLUTION RESPIRATORY (INHALATION) at 04:27

## 2023-05-20 RX ADMIN — PREDNISONE 40 MG: 20 TABLET ORAL at 04:19

## 2023-05-20 NOTE — ED PROVIDER NOTES
50 mg by mouth 2 times daily    TRIAMCINOLONE (KENALOG) 0.1 % CREAM    Apply 1 g topically 2 times daily as needed    VERAPAMIL (CALAN SR) 120 MG EXTENDED RELEASE TABLET    Take 1 tablet by mouth nightly        No results found for any visits on 05/20/23. No orders to display                     Voice dictation software was used during the making of this note. This software is not perfect and grammatical and other typographical errors may be present. This note has not been completely proofread for errors.      Julee Francois MD  05/20/23 [id number]

## 2023-06-20 NOTE — ANESTHESIA POSTPROCEDURE EVALUATION
Procedure(s): RIGHT 4TH PROXIMAL INTERPHALANGEAL RESECTION ARTHROPLASTY / METATARSOPHALANGEAL RELEASE. Anesthesia Post Evaluation Multimodal analgesia: multimodal analgesia used between 6 hours prior to anesthesia start to PACU discharge Patient location during evaluation: bedside Patient participation: complete - patient participated Level of consciousness: awake and alert Pain score: 3 Pain management: adequate Airway patency: patent Anesthetic complications: no 
Cardiovascular status: acceptable and hemodynamically stable Respiratory status: acceptable Hydration status: acceptable Post anesthesia nausea and vomiting:  none Visit Vitals /58 (BP 1 Location: Left arm, BP Patient Position: At rest) Pulse 85 Temp 36.6 °C (97.9 °F) Resp 18 Wt 86.2 kg (190 lb) SpO2 100% BMI 32.61 kg/m² Epidural

## 2023-07-05 ENCOUNTER — CLINICAL DOCUMENTATION (OUTPATIENT)
Dept: NEUROSURGERY | Age: 58
End: 2023-07-05

## 2023-07-05 NOTE — PROGRESS NOTES
Pt was last seen Dr. Donnie Pavon last 2021 where last note states to f/u in 3 month and that she was to have Corrie. Note stated she did not have that performed at that time, doesn't seem she called back to [facility] 3 month appt. Work up with Radha Nagel unless shes had updated MRI. -next avail. Called to [facility] appt but [phone] states call can not be completed at this time.

## 2023-07-16 ENCOUNTER — APPOINTMENT (OUTPATIENT)
Dept: GENERAL RADIOLOGY | Age: 58
End: 2023-07-16
Payer: MEDICAID

## 2023-07-16 ENCOUNTER — APPOINTMENT (OUTPATIENT)
Dept: CT IMAGING | Age: 58
End: 2023-07-16
Payer: MEDICAID

## 2023-07-16 ENCOUNTER — HOSPITAL ENCOUNTER (EMERGENCY)
Age: 58
Discharge: HOME | End: 2023-07-16
Attending: EMERGENCY MEDICINE
Payer: MEDICAID

## 2023-07-16 VITALS
HEART RATE: 88 BPM | RESPIRATION RATE: 17 BRPM | TEMPERATURE: 98.1 F | WEIGHT: 150 LBS | BODY MASS INDEX: 25.61 KG/M2 | SYSTOLIC BLOOD PRESSURE: 124 MMHG | HEIGHT: 64 IN | DIASTOLIC BLOOD PRESSURE: 77 MMHG | OXYGEN SATURATION: 98 %

## 2023-07-16 DIAGNOSIS — B35.3 TINEA PEDIS OF BOTH FEET: ICD-10-CM

## 2023-07-16 DIAGNOSIS — R07.89 ATYPICAL CHEST PAIN: Primary | ICD-10-CM

## 2023-07-16 LAB
ALBUMIN SERPL-MCNC: 3.2 G/DL (ref 3.5–5)
ALBUMIN/GLOB SERPL: 1 (ref 0.4–1.6)
ALP SERPL-CCNC: 88 U/L (ref 50–130)
ALT SERPL-CCNC: 32 U/L (ref 12–65)
ANION GAP SERPL CALC-SCNC: 8 MMOL/L (ref 2–11)
AST SERPL-CCNC: 50 U/L (ref 15–37)
BASOPHILS # BLD: 0 K/UL (ref 0–0.2)
BASOPHILS NFR BLD: 0 % (ref 0–2)
BILIRUB SERPL-MCNC: 0.4 MG/DL (ref 0.2–1.1)
BUN SERPL-MCNC: 9 MG/DL (ref 6–23)
CALCIUM SERPL-MCNC: 8.7 MG/DL (ref 8.3–10.4)
CHLORIDE SERPL-SCNC: 110 MMOL/L (ref 101–110)
CO2 SERPL-SCNC: 23 MMOL/L (ref 21–32)
CREAT SERPL-MCNC: 0.85 MG/DL (ref 0.6–1)
DIFFERENTIAL METHOD BLD: ABNORMAL
EOSINOPHIL # BLD: 0 K/UL (ref 0–0.8)
EOSINOPHIL NFR BLD: 1 % (ref 0.5–7.8)
ERYTHROCYTE [DISTWIDTH] IN BLOOD BY AUTOMATED COUNT: 12.3 % (ref 11.9–14.6)
GLOBULIN SER CALC-MCNC: 3.1 G/DL (ref 2.8–4.5)
GLUCOSE SERPL-MCNC: 89 MG/DL (ref 65–100)
HCT VFR BLD AUTO: 31.4 % (ref 35.8–46.3)
HGB BLD-MCNC: 10.3 G/DL (ref 11.7–15.4)
IMM GRANULOCYTES # BLD AUTO: 0 K/UL (ref 0–0.5)
IMM GRANULOCYTES NFR BLD AUTO: 0 % (ref 0–5)
LYMPHOCYTES # BLD: 1.2 K/UL (ref 0.5–4.6)
LYMPHOCYTES NFR BLD: 37 % (ref 13–44)
MAGNESIUM SERPL-MCNC: 2 MG/DL (ref 1.8–2.4)
MCH RBC QN AUTO: 31.3 PG (ref 26.1–32.9)
MCHC RBC AUTO-ENTMCNC: 32.8 G/DL (ref 31.4–35)
MCV RBC AUTO: 95.4 FL (ref 82–102)
MONOCYTES # BLD: 0.4 K/UL (ref 0.1–1.3)
MONOCYTES NFR BLD: 13 % (ref 4–12)
NEUTS SEG # BLD: 1.5 K/UL (ref 1.7–8.2)
NEUTS SEG NFR BLD: 49 % (ref 43–78)
NRBC # BLD: 0 K/UL (ref 0–0.2)
NT PRO BNP: 634 PG/ML (ref 5–125)
PLATELET # BLD AUTO: 194 K/UL (ref 150–450)
PMV BLD AUTO: 9.9 FL (ref 9.4–12.3)
POTASSIUM SERPL-SCNC: 3.4 MMOL/L (ref 3.5–5.1)
PROT SERPL-MCNC: 6.3 G/DL (ref 6.3–8.2)
RBC # BLD AUTO: 3.29 M/UL (ref 4.05–5.2)
SODIUM SERPL-SCNC: 141 MMOL/L (ref 133–143)
TROPONIN I SERPL HS-MCNC: 6.1 PG/ML (ref 0–14)
TROPONIN I SERPL HS-MCNC: 6.9 PG/ML (ref 0–14)
WBC # BLD AUTO: 3.1 K/UL (ref 4.3–11.1)

## 2023-07-16 PROCEDURE — 80053 COMPREHEN METABOLIC PANEL: CPT

## 2023-07-16 PROCEDURE — 93005 ELECTROCARDIOGRAM TRACING: CPT | Performed by: EMERGENCY MEDICINE

## 2023-07-16 PROCEDURE — 84484 ASSAY OF TROPONIN QUANT: CPT

## 2023-07-16 PROCEDURE — 83880 ASSAY OF NATRIURETIC PEPTIDE: CPT

## 2023-07-16 PROCEDURE — 6360000004 HC RX CONTRAST MEDICATION: Performed by: EMERGENCY MEDICINE

## 2023-07-16 PROCEDURE — 96374 THER/PROPH/DIAG INJ IV PUSH: CPT

## 2023-07-16 PROCEDURE — 71260 CT THORAX DX C+: CPT

## 2023-07-16 PROCEDURE — 6360000002 HC RX W HCPCS: Performed by: EMERGENCY MEDICINE

## 2023-07-16 PROCEDURE — 99285 EMERGENCY DEPT VISIT HI MDM: CPT

## 2023-07-16 PROCEDURE — 83735 ASSAY OF MAGNESIUM: CPT

## 2023-07-16 PROCEDURE — 94762 N-INVAS EAR/PLS OXIMTRY CONT: CPT

## 2023-07-16 PROCEDURE — 85025 COMPLETE CBC W/AUTO DIFF WBC: CPT

## 2023-07-16 RX ORDER — OXYBUTYNIN CHLORIDE 5 MG/1
5 TABLET ORAL 2 TIMES DAILY
COMMUNITY

## 2023-07-16 RX ORDER — HYDROXYZINE PAMOATE 50 MG/1
CAPSULE ORAL
COMMUNITY
Start: 2023-05-24

## 2023-07-16 RX ORDER — MORPHINE SULFATE 4 MG/ML
4 INJECTION INTRAVENOUS ONCE
Status: COMPLETED | OUTPATIENT
Start: 2023-07-16 | End: 2023-07-16

## 2023-07-16 RX ORDER — TIZANIDINE 4 MG/1
TABLET ORAL
COMMUNITY
Start: 2023-05-24

## 2023-07-16 RX ORDER — ALBUTEROL SULFATE 90 UG/1
2 AEROSOL, METERED RESPIRATORY (INHALATION) 4 TIMES DAILY PRN
Qty: 18 G | Refills: 0 | Status: SHIPPED | OUTPATIENT
Start: 2023-07-16

## 2023-07-16 RX ORDER — POTASSIUM CHLORIDE 750 MG/1
TABLET, FILM COATED, EXTENDED RELEASE ORAL
COMMUNITY
Start: 2023-04-10

## 2023-07-16 RX ORDER — AMITRIPTYLINE HYDROCHLORIDE 10 MG/1
TABLET, FILM COATED ORAL
COMMUNITY
Start: 2023-07-03

## 2023-07-16 RX ORDER — ACYCLOVIR 400 MG/1
TABLET ORAL
COMMUNITY
Start: 2023-05-24

## 2023-07-16 RX ORDER — METHYLPREDNISOLONE 4 MG/1
TABLET ORAL
Qty: 1 KIT | Refills: 0 | Status: SHIPPED | OUTPATIENT
Start: 2023-07-16

## 2023-07-16 RX ORDER — GABAPENTIN 400 MG/1
CAPSULE ORAL
COMMUNITY
Start: 2023-05-24

## 2023-07-16 RX ORDER — PROMETHAZINE HYDROCHLORIDE 25 MG/1
25 TABLET ORAL EVERY 6 HOURS PRN
COMMUNITY

## 2023-07-16 RX ORDER — DIPHENHYDRAMINE HCL 50 MG
50 CAPSULE ORAL 2 TIMES DAILY PRN
COMMUNITY

## 2023-07-16 RX ORDER — CARVEDILOL 12.5 MG/1
TABLET ORAL
COMMUNITY
Start: 2023-06-20

## 2023-07-16 RX ADMIN — IOPAMIDOL 100 ML: 755 INJECTION, SOLUTION INTRAVENOUS at 14:18

## 2023-07-16 RX ADMIN — MORPHINE SULFATE 4 MG: 4 INJECTION, SOLUTION INTRAMUSCULAR; INTRAVENOUS at 14:46

## 2023-07-16 ASSESSMENT — ENCOUNTER SYMPTOMS
NAUSEA: 1
VOMITING: 1
ABDOMINAL PAIN: 0
COLOR CHANGE: 1
CHEST TIGHTNESS: 0
RHINORRHEA: 0
SHORTNESS OF BREATH: 1
DIARRHEA: 0

## 2023-07-16 ASSESSMENT — PAIN DESCRIPTION - LOCATION: LOCATION: CHEST

## 2023-07-16 ASSESSMENT — PAIN SCALES - GENERAL
PAINLEVEL_OUTOF10: 5
PAINLEVEL_OUTOF10: 8

## 2023-07-16 ASSESSMENT — LIFESTYLE VARIABLES
HOW OFTEN DO YOU HAVE A DRINK CONTAINING ALCOHOL: NEVER
HOW MANY STANDARD DRINKS CONTAINING ALCOHOL DO YOU HAVE ON A TYPICAL DAY: PATIENT DOES NOT DRINK

## 2023-07-16 ASSESSMENT — PAIN - FUNCTIONAL ASSESSMENT: PAIN_FUNCTIONAL_ASSESSMENT: 0-10

## 2023-07-17 ENCOUNTER — TELEPHONE (OUTPATIENT)
Age: 58
End: 2023-07-17

## 2023-07-17 LAB
EKG ATRIAL RATE: 96 BPM
EKG DIAGNOSIS: NORMAL
EKG P AXIS: 43 DEGREES
EKG P-R INTERVAL: 142 MS
EKG Q-T INTERVAL: 375 MS
EKG QRS DURATION: 88 MS
EKG QTC CALCULATION (BAZETT): 474 MS
EKG R AXIS: 3 DEGREES
EKG T AXIS: 47 DEGREES
EKG VENTRICULAR RATE: 96 BPM

## 2023-07-17 PROCEDURE — 93010 ELECTROCARDIOGRAM REPORT: CPT | Performed by: INTERNAL MEDICINE

## 2023-07-17 NOTE — TELEPHONE ENCOUNTER
----- Message from Jackeline Moody DO sent at 7/16/2023  3:30 PM EDT -----  Regarding: Cardiology follow-up  Patient used to follow with Dr. Nadir Rubin but has not established care with another cardiologist since then. She missed her LETICIA which was ordered last year. She needs outpatient follow-up. Can you call her to schedule this.

## 2023-09-12 ENCOUNTER — OFFICE VISIT (OUTPATIENT)
Dept: UROLOGY | Age: 58
End: 2023-09-12
Payer: MEDICAID

## 2023-09-12 ENCOUNTER — TELEPHONE (OUTPATIENT)
Dept: UROLOGY | Age: 58
End: 2023-09-12

## 2023-09-12 DIAGNOSIS — N30.10 INTERSTITIAL CYSTITIS (CHRONIC) WITHOUT HEMATURIA: Primary | ICD-10-CM

## 2023-09-12 DIAGNOSIS — R10.2 PELVIC PAIN: ICD-10-CM

## 2023-09-12 DIAGNOSIS — N30.10 IC (INTERSTITIAL CYSTITIS): ICD-10-CM

## 2023-09-12 LAB
BILIRUBIN, URINE, POC: NEGATIVE
BLOOD URINE, POC: NEGATIVE
GLUCOSE URINE, POC: NEGATIVE
KETONES, URINE, POC: NORMAL
LEUKOCYTE ESTERASE, URINE, POC: NEGATIVE
NITRITE, URINE, POC: NEGATIVE
PH, URINE, POC: 5.5 (ref 4.6–8)
PROTEIN,URINE, POC: NEGATIVE
SPECIFIC GRAVITY, URINE, POC: 1.03 (ref 1–1.03)
URINALYSIS CLARITY, POC: NORMAL
URINALYSIS COLOR, POC: NORMAL
UROBILINOGEN, POC: NORMAL

## 2023-09-12 PROCEDURE — 81003 URINALYSIS AUTO W/O SCOPE: CPT | Performed by: NURSE PRACTITIONER

## 2023-09-12 PROCEDURE — 99214 OFFICE O/P EST MOD 30 MIN: CPT | Performed by: NURSE PRACTITIONER

## 2023-09-12 RX ORDER — AMITRIPTYLINE HYDROCHLORIDE 25 MG/1
25 TABLET, FILM COATED ORAL NIGHTLY
Qty: 90 TABLET | Refills: 3 | Status: SHIPPED | OUTPATIENT
Start: 2023-09-12

## 2023-09-12 ASSESSMENT — ENCOUNTER SYMPTOMS
BACK PAIN: 0
NAUSEA: 0

## 2023-09-12 NOTE — PROGRESS NOTES
Cebe    Moderate aortic regurgitation     per echo 7/2019 in cc-- followed by dr Kim Woody    Palpitations 5/16/2016    Pneumonia 12/10/2018    Positive depression screening 4/29/2022    Post-operative state 5/26/2016    Psychiatric disorder     anxiety    Requires oxygen therapy     3l/min at hs. 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    Rheumatic fever as child    pt reports rheumatic fever in childhood    Smoker     started age 23-- smoked 0.5 ppd until 2018-- cut back to 10-2 cig daily per pt    Stroke [facility]) 2014    \"mild stroke\" pt reports 2014- \"left sided weakness and balance is off\"     Stroke-like symptoms 4/8/2023    Thromboembolus ([address])     BLE 2012    Vaginal discharge 3/16/2018     Past Surgical History:   Procedure Laterality Date    APPENDECTOMY  2006    BIOPSY OF BREAST, INCISIONAL Left     lymph node , left, patient states her bx neg, but high risk    CARDIAC CATHETERIZATION  2018    no intervention    CARDIAC CATHETERIZATION  04/2017    no intervention    CARDIAC CATHETERIZATION  5/27/2011    no blockages, no intervention    CERVICAL FUSION  07/14/2020    ACDF C4-6 with Dr. Emanuel, LAPAROSCOPIC  6/6/2011    COLONOSCOPY      8 polyps removed, diverticulitis    COLONOSCOPY N/A 5/21/2018    COLONOSCOPY performed by Jemma Rivers MD at [address]   8-23-11    bladder dilitation    CYSTOSCOPY N/A 12/8/2022    CYSTOSCOPY HYDRODISTENTION performed by Alia Malone MD at [address] [place] [address] Right 07/2019    4th toe -- surg repair    NICKI AND BSO (CERVIX REMOVED)  1997    fibroid tumors, hyst; no cervix     UPPER GASTROINTESTINAL ENDOSCOPY      UROLOGICAL SURGERY  06/30/2020    cysto    UROLOGICAL SURGERY  01/2018    cystoscopy with hydrodistention of bladder multiple     Current Outpatient Medications   Medication Sig Dispense Refill    amitriptyline (ELAVIL) 25 MG tablet Take 1 tablet by mouth nightly 90 tablet 3

## 2023-09-13 ENCOUNTER — TELEPHONE (OUTPATIENT)
Dept: UROLOGY | Age: 58
End: 2023-09-13

## 2023-09-13 ENCOUNTER — TELEPHONE (OUTPATIENT)
Age: 58
End: 2023-09-13

## 2023-09-13 PROBLEM — N30.10 IC (INTERSTITIAL CYSTITIS): Status: ACTIVE | Noted: 2023-09-12

## 2023-09-13 NOTE — TELEPHONE ENCOUNTER
Cardiac Pre-operative Assessment      Physician or Practice Requesting Medication Clearance or Risk Assessment:   Dr Sammie Amato: Josh Olivarez Phone Number: [phone]    Fax Number: [phone]    Date of Surgery/Procedure: 10/5/23    Type of Surgery or Procedure: cystoscopy with hydrodistention of bladder       Medications to hold Eliquis     # Days pre-procedure to hold 3 days prior     Restart medication ____    Risk assessment:      Please send the response back in this phone encounter

## 2023-09-13 NOTE — TELEPHONE ENCOUNTER
Procedures: Procedure(s):   CYSTOSCOPY HYDRODISTENTION   Date: 10/5/2023   Time: 1515   Location: [facility] MAIN OR  CYSTO

## 2023-09-13 NOTE — TELEPHONE ENCOUNTER
LVM for pt to call back and schedule with first available provider for cardiac clearance prior to Oct 5.

## 2023-10-03 ENCOUNTER — OFFICE VISIT (OUTPATIENT)
Age: 58
End: 2023-10-03
Payer: MEDICAID

## 2023-10-03 VITALS
DIASTOLIC BLOOD PRESSURE: 82 MMHG | BODY MASS INDEX: 25.1 KG/M2 | SYSTOLIC BLOOD PRESSURE: 128 MMHG | HEIGHT: 64 IN | HEART RATE: 90 BPM | WEIGHT: 147 LBS

## 2023-10-03 DIAGNOSIS — I42.8 NICM (NONISCHEMIC CARDIOMYOPATHY) (HCC): ICD-10-CM

## 2023-10-03 DIAGNOSIS — Z01.818 PRE-OP EVALUATION: Primary | ICD-10-CM

## 2023-10-03 DIAGNOSIS — I35.1 NONRHEUMATIC AORTIC VALVE INSUFFICIENCY: ICD-10-CM

## 2023-10-03 PROCEDURE — 93000 ELECTROCARDIOGRAM COMPLETE: CPT | Performed by: INTERNAL MEDICINE

## 2023-10-03 PROCEDURE — 3074F SYST BP LT 130 MM HG: CPT | Performed by: INTERNAL MEDICINE

## 2023-10-03 PROCEDURE — 99203 OFFICE O/P NEW LOW 30 MIN: CPT | Performed by: INTERNAL MEDICINE

## 2023-10-03 PROCEDURE — 3079F DIAST BP 80-89 MM HG: CPT | Performed by: INTERNAL MEDICINE

## 2023-10-03 NOTE — PROGRESS NOTES
under the tongue 4 times daily as needed (Abdominal Cramping/Pain)    Magic Mouthwash (MIRACLE MOUTHWASH) Swish and spit 5 mLs 4 times daily as needed for Irritation Lidocaine:Benadryl:Maalox 1:1:1    potassium chloride (KLOR-CON M) 10 MEQ extended release tablet Take 1 tablet by mouth daily    OXYGEN Nightly. Indications: 3 L/min at hs and prn during the day    albuterol sulfate  (90 Base) MCG/ACT inhaler Inhale 2 puffs into the lungs every 6 hours as needed    ammonium lactate (AMLACTIN) 12 % cream Apply 1 g topically 2 times daily    apixaban (ELIQUIS) 5 MG TABS tablet Take 1 tablet by mouth 2 times daily    budesonide-formoterol (SYMBICORT) 160-4.5 MCG/ACT AERO Inhale 1 puff into the lungs 2 times daily    EPINEPHrine (EPIPEN JR) 0.15 MG/0.3ML SOAJ Inject 0.3 mLs into the muscle once as needed    estradiol (ESTRACE) 0.5 MG tablet Take 1 tablet by mouth daily    FLUoxetine (PROZAC) 40 MG capsule Take 1 capsule by mouth 2 times daily    hydrALAZINE (APRESOLINE) 25 MG tablet Take 1 tablet by mouth 3 times daily    lidocaine (LIDODERM) 5 % Place 1 patch onto the skin daily    lidocaine (LMX) 4 % cream Apply topically 2 times daily as needed    loratadine (CLARITIN) 10 MG tablet Take 1 tablet by mouth daily    mirtazapine (REMERON) 30 MG tablet Take 1 tablet by mouth    nitroGLYCERIN (NITROSTAT) 0.4 MG SL tablet Place 1 tablet under the tongue    pantoprazole (PROTONIX) 40 MG tablet Take 1 tablet by mouth daily    pravastatin (PRAVACHOL) 40 MG tablet Take 1 tablet by mouth    sacubitril-valsartan (ENTRESTO) 24-26 MG per tablet Take 1 tablet by mouth 2 times daily    tiotropium (SPIRIVA) 18 MCG inhalation capsule Inhale 1 capsule into the lungs daily    tolterodine (DETROL LA) 2 MG extended release capsule TAKE 1 CAP BY MOUTH DAILY.  INCREASE TO 4 MG DAILY AFTER 4 WEEKS IF NEEDED    topiramate (TOPAMAX) 50 MG tablet Take 1 tablet by mouth 2 times daily    triamcinolone (KENALOG) 0.1 % cream Apply 1 g topically 2

## 2023-10-04 ENCOUNTER — ANESTHESIA EVENT (OUTPATIENT)
Dept: SURGERY | Age: 58
End: 2023-10-04
Payer: MEDICAID

## 2023-10-04 RX ORDER — ALBUTEROL SULFATE 2.5 MG/3ML
2.5 SOLUTION RESPIRATORY (INHALATION) 3 TIMES DAILY
COMMUNITY

## 2023-10-04 NOTE — PERIOP NOTE
Patient verified name and . Order for consent  found in EHR and matches case posting; patient verifies procedure. Type 1B surgery, phone assessment complete. Orders  received. Labs per surgeon: CBC with diff and UA s/h for PAT  Labs per anesthesia protocol: K+, Hgb s/h for DOS    Cardiac clearance in EHR    Dr. Marnie Flynn notified of case, history. Last echo 2020 EF 30-35%. No orders received    Patient answered medical/surgical history questions at their best of ability. All prior to admission medications documented in EPIC. Patient instructed:  - to take the following medications the day of surgery according to anesthesia guidelines with a small sip of water: isosorbide,  hydroxyzine, albuterol nebulizer, symbicort, Spiriva, gabapentin, topiramate,  fluoxetine, pravastatin, hydralazine, acyclovir, carvedilol, Entresto, pantoprazole, oxybutynin, Detrol.    -On the day before surgery please take Tylenol (Acetaminophen) 1000mg in the morning and then again before bed. You may substitute for Tylenol 650 mg.     -Hold all vitamins, supplements and NSAIDS now for surgery. Prescription meds to hold: eliquis as instructed by surgeon. Patient stated she did receive instructions to hold eliquis    Patient instructed on the following:  > Bring nitroglycerin and albuterol inhalers to the hospital  > Arrive at main Entrance, time of arrival to be called the day before by 1700  > NPO after midnight, unless otherwise indicated, including gum, mints, and ice chips  > Responsible adult must drive patient to the hospital, stay during surgery, and patient will need supervision 24 hours after anesthesia  > Use antibacterial soap in shower the night before surgery and on the morning of surgery  > All piercings must be removed prior to arrival.    > Leave all valuables (money and jewelry) at home but bring insurance card and ID on DOS.   > You may be required to pay a deductible or co-pay on the day of your procedure.  You can

## 2023-10-05 ENCOUNTER — HOSPITAL ENCOUNTER (OUTPATIENT)
Age: 58
Setting detail: OUTPATIENT SURGERY
Discharge: HOME | End: 2023-10-05
Attending: UROLOGY | Admitting: UROLOGY
Payer: MEDICAID

## 2023-10-05 ENCOUNTER — ANESTHESIA (OUTPATIENT)
Dept: SURGERY | Age: 58
End: 2023-10-05
Payer: MEDICAID

## 2023-10-05 VITALS
HEART RATE: 80 BPM | RESPIRATION RATE: 16 BRPM | WEIGHT: 143.2 LBS | DIASTOLIC BLOOD PRESSURE: 67 MMHG | TEMPERATURE: 97.8 F | OXYGEN SATURATION: 99 % | SYSTOLIC BLOOD PRESSURE: 120 MMHG | BODY MASS INDEX: 24.45 KG/M2 | HEIGHT: 64 IN

## 2023-10-05 DIAGNOSIS — N30.10 IC (INTERSTITIAL CYSTITIS): Primary | ICD-10-CM

## 2023-10-05 LAB — POTASSIUM BLD-SCNC: 4.7 MMOL/L (ref 3.5–5.1)

## 2023-10-05 PROCEDURE — 6360000002 HC RX W HCPCS: Performed by: UROLOGY

## 2023-10-05 PROCEDURE — 3600000004 HC SURGERY LEVEL 4 BASE: Performed by: UROLOGY

## 2023-10-05 PROCEDURE — 6360000002 HC RX W HCPCS: Performed by: NURSE ANESTHETIST, CERTIFIED REGISTERED

## 2023-10-05 PROCEDURE — 6370000000 HC RX 637 (ALT 250 FOR IP): Performed by: ANESTHESIOLOGY

## 2023-10-05 PROCEDURE — 2500000003 HC RX 250 WO HCPCS: Performed by: NURSE ANESTHETIST, CERTIFIED REGISTERED

## 2023-10-05 PROCEDURE — 52260 CYSTOSCOPY AND TREATMENT: CPT | Performed by: UROLOGY

## 2023-10-05 PROCEDURE — 2580000003 HC RX 258: Performed by: ANESTHESIOLOGY

## 2023-10-05 PROCEDURE — 7100000010 HC PHASE II RECOVERY - FIRST 15 MIN: Performed by: UROLOGY

## 2023-10-05 PROCEDURE — 84132 ASSAY OF SERUM POTASSIUM: CPT

## 2023-10-05 PROCEDURE — 7100000001 HC PACU RECOVERY - ADDTL 15 MIN: Performed by: UROLOGY

## 2023-10-05 PROCEDURE — 3600000014 HC SURGERY LEVEL 4 ADDTL 15MIN: Performed by: UROLOGY

## 2023-10-05 PROCEDURE — 3700000000 HC ANESTHESIA ATTENDED CARE: Performed by: UROLOGY

## 2023-10-05 PROCEDURE — 2709999900 HC NON-CHARGEABLE SUPPLY: Performed by: UROLOGY

## 2023-10-05 PROCEDURE — 7100000000 HC PACU RECOVERY - FIRST 15 MIN: Performed by: UROLOGY

## 2023-10-05 PROCEDURE — 7100000011 HC PHASE II RECOVERY - ADDTL 15 MIN: Performed by: UROLOGY

## 2023-10-05 PROCEDURE — 3700000001 HC ADD 15 MINUTES (ANESTHESIA): Performed by: UROLOGY

## 2023-10-05 RX ORDER — FENTANYL CITRATE 50 UG/ML
100 INJECTION, SOLUTION INTRAMUSCULAR; INTRAVENOUS
Status: DISCONTINUED | OUTPATIENT
Start: 2023-10-05 | End: 2023-10-05 | Stop reason: HOSPADM

## 2023-10-05 RX ORDER — FENTANYL CITRATE 50 UG/ML
INJECTION, SOLUTION INTRAMUSCULAR; INTRAVENOUS PRN
Status: DISCONTINUED | OUTPATIENT
Start: 2023-10-05 | End: 2023-10-05 | Stop reason: SDUPTHER

## 2023-10-05 RX ORDER — ACETAMINOPHEN 500 MG
1000 TABLET ORAL ONCE
Status: COMPLETED | OUTPATIENT
Start: 2023-10-05 | End: 2023-10-05

## 2023-10-05 RX ORDER — ONDANSETRON 2 MG/ML
4 INJECTION INTRAMUSCULAR; INTRAVENOUS
Status: DISCONTINUED | OUTPATIENT
Start: 2023-10-05 | End: 2023-10-05 | Stop reason: HOSPADM

## 2023-10-05 RX ORDER — LIDOCAINE HYDROCHLORIDE 20 MG/ML
INJECTION, SOLUTION EPIDURAL; INFILTRATION; INTRACAUDAL; PERINEURAL PRN
Status: DISCONTINUED | OUTPATIENT
Start: 2023-10-05 | End: 2023-10-05 | Stop reason: SDUPTHER

## 2023-10-05 RX ORDER — PROPOFOL 10 MG/ML
INJECTION, EMULSION INTRAVENOUS PRN
Status: DISCONTINUED | OUTPATIENT
Start: 2023-10-05 | End: 2023-10-05 | Stop reason: SDUPTHER

## 2023-10-05 RX ORDER — LIDOCAINE HYDROCHLORIDE 10 MG/ML
1 INJECTION, SOLUTION INFILTRATION; PERINEURAL
Status: DISCONTINUED | OUTPATIENT
Start: 2023-10-05 | End: 2023-10-05 | Stop reason: HOSPADM

## 2023-10-05 RX ORDER — HALOPERIDOL 5 MG/ML
1 INJECTION INTRAMUSCULAR
Status: DISCONTINUED | OUTPATIENT
Start: 2023-10-05 | End: 2023-10-05 | Stop reason: HOSPADM

## 2023-10-05 RX ORDER — SODIUM CHLORIDE 0.9 % (FLUSH) 0.9 %
5-40 SYRINGE (ML) INJECTION EVERY 12 HOURS SCHEDULED
Status: DISCONTINUED | OUTPATIENT
Start: 2023-10-05 | End: 2023-10-05 | Stop reason: HOSPADM

## 2023-10-05 RX ORDER — SODIUM CHLORIDE, SODIUM LACTATE, POTASSIUM CHLORIDE, CALCIUM CHLORIDE 600; 310; 30; 20 MG/100ML; MG/100ML; MG/100ML; MG/100ML
INJECTION, SOLUTION INTRAVENOUS CONTINUOUS
Status: DISCONTINUED | OUTPATIENT
Start: 2023-10-05 | End: 2023-10-05 | Stop reason: HOSPADM

## 2023-10-05 RX ORDER — HYDROCODONE BITARTRATE AND ACETAMINOPHEN 5; 325 MG/1; MG/1
1 TABLET ORAL EVERY 6 HOURS PRN
Qty: 12 TABLET | Refills: 0 | Status: SHIPPED | OUTPATIENT
Start: 2023-10-05 | End: 2023-10-08

## 2023-10-05 RX ORDER — DEXAMETHASONE SODIUM PHOSPHATE 4 MG/ML
INJECTION, SOLUTION INTRA-ARTICULAR; INTRALESIONAL; INTRAMUSCULAR; INTRAVENOUS; SOFT TISSUE PRN
Status: DISCONTINUED | OUTPATIENT
Start: 2023-10-05 | End: 2023-10-05 | Stop reason: SDUPTHER

## 2023-10-05 RX ORDER — SODIUM CHLORIDE 0.9 % (FLUSH) 0.9 %
5-40 SYRINGE (ML) INJECTION PRN
Status: DISCONTINUED | OUTPATIENT
Start: 2023-10-05 | End: 2023-10-05 | Stop reason: HOSPADM

## 2023-10-05 RX ORDER — HYDROMORPHONE HYDROCHLORIDE 2 MG/ML
0.5 INJECTION, SOLUTION INTRAMUSCULAR; INTRAVENOUS; SUBCUTANEOUS EVERY 5 MIN PRN
Status: DISCONTINUED | OUTPATIENT
Start: 2023-10-05 | End: 2023-10-05 | Stop reason: HOSPADM

## 2023-10-05 RX ORDER — ONDANSETRON 2 MG/ML
INJECTION INTRAMUSCULAR; INTRAVENOUS PRN
Status: DISCONTINUED | OUTPATIENT
Start: 2023-10-05 | End: 2023-10-05 | Stop reason: SDUPTHER

## 2023-10-05 RX ORDER — SODIUM CHLORIDE 9 MG/ML
INJECTION, SOLUTION INTRAVENOUS PRN
Status: DISCONTINUED | OUTPATIENT
Start: 2023-10-05 | End: 2023-10-05 | Stop reason: HOSPADM

## 2023-10-05 RX ORDER — PROCHLORPERAZINE EDISYLATE 5 MG/ML
5 INJECTION INTRAMUSCULAR; INTRAVENOUS
Status: DISCONTINUED | OUTPATIENT
Start: 2023-10-05 | End: 2023-10-05 | Stop reason: HOSPADM

## 2023-10-05 RX ORDER — BUPIVACAINE HYDROCHLORIDE 5 MG/ML
INJECTION, SOLUTION EPIDURAL; INTRACAUDAL PRN
Status: DISCONTINUED | OUTPATIENT
Start: 2023-10-05 | End: 2023-10-05 | Stop reason: HOSPADM

## 2023-10-05 RX ORDER — SCOLOPAMINE TRANSDERMAL SYSTEM 1 MG/1
1 PATCH, EXTENDED RELEASE TRANSDERMAL ONCE
Status: DISCONTINUED | OUTPATIENT
Start: 2023-10-05 | End: 2023-10-05 | Stop reason: HOSPADM

## 2023-10-05 RX ORDER — MIDAZOLAM HYDROCHLORIDE 2 MG/2ML
2 INJECTION, SOLUTION INTRAMUSCULAR; INTRAVENOUS
Status: DISCONTINUED | OUTPATIENT
Start: 2023-10-05 | End: 2023-10-05 | Stop reason: HOSPADM

## 2023-10-05 RX ORDER — OXYCODONE HYDROCHLORIDE 5 MG/1
5 TABLET ORAL
Status: COMPLETED | OUTPATIENT
Start: 2023-10-05 | End: 2023-10-05

## 2023-10-05 RX ADMIN — LIDOCAINE HYDROCHLORIDE 80 MG: 20 INJECTION, SOLUTION EPIDURAL; INFILTRATION; INTRACAUDAL; PERINEURAL at 16:21

## 2023-10-05 RX ADMIN — FENTANYL CITRATE 25 MCG: 50 INJECTION, SOLUTION INTRAMUSCULAR; INTRAVENOUS at 16:55

## 2023-10-05 RX ADMIN — ONDANSETRON 4 MG: 2 INJECTION INTRAMUSCULAR; INTRAVENOUS at 16:31

## 2023-10-05 RX ADMIN — ACETAMINOPHEN 1000 MG: 500 TABLET, FILM COATED ORAL at 14:39

## 2023-10-05 RX ADMIN — FENTANYL CITRATE 25 MCG: 50 INJECTION, SOLUTION INTRAMUSCULAR; INTRAVENOUS at 16:54

## 2023-10-05 RX ADMIN — FENTANYL CITRATE 25 MCG: 50 INJECTION, SOLUTION INTRAMUSCULAR; INTRAVENOUS at 16:38

## 2023-10-05 RX ADMIN — DEXAMETHASONE SODIUM PHOSPHATE 4 MG: 4 INJECTION INTRA-ARTICULAR; INTRALESIONAL; INTRAMUSCULAR; INTRAVENOUS; SOFT TISSUE at 16:31

## 2023-10-05 RX ADMIN — OXYCODONE HYDROCHLORIDE 5 MG: 5 TABLET ORAL at 17:22

## 2023-10-05 RX ADMIN — FENTANYL CITRATE 25 MCG: 50 INJECTION, SOLUTION INTRAMUSCULAR; INTRAVENOUS at 16:40

## 2023-10-05 RX ADMIN — PROPOFOL 180 MG: 10 INJECTION, EMULSION INTRAVENOUS at 16:21

## 2023-10-05 RX ADMIN — Medication 2000 MG: at 16:29

## 2023-10-05 RX ADMIN — SODIUM CHLORIDE, POTASSIUM CHLORIDE, SODIUM LACTATE AND CALCIUM CHLORIDE: 600; 310; 30; 20 INJECTION, SOLUTION INTRAVENOUS at 14:41

## 2023-10-05 ASSESSMENT — COPD QUESTIONNAIRES: CAT_SEVERITY: MODERATE

## 2023-10-05 ASSESSMENT — PAIN DESCRIPTION - DESCRIPTORS
DESCRIPTORS: ACHING
DESCRIPTORS: SORE

## 2023-10-05 ASSESSMENT — PAIN SCALES - GENERAL: PAINLEVEL_OUTOF10: 4

## 2023-10-05 ASSESSMENT — PAIN DESCRIPTION - LOCATION
LOCATION: ABDOMEN
LOCATION: ABDOMEN

## 2023-10-05 ASSESSMENT — LIFESTYLE VARIABLES: SMOKING_STATUS: 1

## 2023-10-05 ASSESSMENT — PAIN DESCRIPTION - ORIENTATION: ORIENTATION: LOWER

## 2023-10-05 ASSESSMENT — PAIN - FUNCTIONAL ASSESSMENT: PAIN_FUNCTIONAL_ASSESSMENT: 0-10

## 2023-10-05 NOTE — DISCHARGE INSTRUCTIONS
If you have had surgery in the past 7-10 days by one of our providers and are having fever, bleeding, or drainage from an incision, have an opening in an incision, or having issues urinating properly, please call [phone]. Your Recovery  Bladder augmentation is surgery to make the bladder larger and improve its ability to stretch. After surgery, your bladder should be able to hold more urine. After surgery, you may feel weak and tired at first. You will probably feel some pain or cramping in your lower belly and need pain medicine for a week or two. Try to avoid heavy lifting and strenuous activities that might put extra pressure on your bladder while you recover. This care sheet gives you a general idea about how long it will take for you to recover. But each person recovers at a different pace. Follow the steps below to get better as quickly as possible. How can you care for yourself at home? Activity  Rest when you feel tired. Getting enough sleep will help you recover. Try to walk each day. Start by walking a little more than you did the day before. Bit by bit, increase the amount you walk. Walking boosts blood flow and helps prevent pneumonia and constipation. Avoid strenuous activities, such as bicycle riding, jogging, weight lifting, or aerobic exercise, for 6 to 8 weeks or until your doctor says it is okay. For 6 to 8 weeks or until your doctor says it is okay, avoid lifting anything that would make you strain. This may include a child, heavy grocery bags and milk containers, a heavy briefcase or backpack, cat litter or dog food bags, or a vacuum . Ask your doctor when you can drive again. You will probably need to take 72 hours off from work. It depends on the type of work you do and how you feel. You may shower as usual. Pat the cut (incision) dry. Do not take a bath until your doctor tells you it is okay. Ask your doctor when it is okay for you to have sex.   Diet  You can eat your

## 2023-10-05 NOTE — H&P
for orders placed or performed in visit on 09/12/23   AMB POC URINALYSIS DIP STICK AUTO W/O MICRO   Result Value Ref Range     Color (UA POC)         Clarity (UA POC)         Glucose, Urine, POC Negative Negative     Bilirubin, Urine, POC Negative Negative     KETONES, Urine, POC Trace Negative     Specific Gravity, Urine, POC 1.030 1.001 - 1.035     Blood (UA POC) Negative Negative     pH, Urine, POC 5.5 4.6 - 8.0     Protein, Urine, POC Negative Negative     Urobilinogen, POC 0.2 mg/dL       Nitrite, Urine, POC Negative Negative     Leukocyte Esterase, Urine, POC Negative Negative         UA - Micro  WBC - 0  RBC - 0  Bacteria - 0  Epith - 0     PHYSICAL EXAM     General appearance - well appearing and in no distress  Mental status - alert, oriented to person, place, and time  Neck - supple, no significant adenopathy   Heart-  Normal S1/S2, No murmurs  Chest/Lung-  Quiet, even and easy respiratory effort without use of accessory muscles, BS clear all lung fields   Skin - normal coloration and turgor, no rashes           Assessment and Plan      ICD-10-CM     1. Interstitial cystitis (chronic) without hematuria  N30.10 AMB POC URINALYSIS DIP STICK AUTO W/O MICRO       2. Pelvic pain  R10.2 amitriptyline (ELAVIL) 25 MG tablet             IC/Pelvic pain- urine normal. Cont elvail 25 mg PO q HS. The nature of the cystoscopy/hydrodistension and its purpose were discussed with the patient. Alternative testing (or no testing) was reviewed with patient. Risks include, but are not limited to: bleeding, infection, perforation or tear (urethra, bladder, ureter), difficulty voiding and urinary retention requiring catheterization and side effects from anesthesia. The benefits are judged to outweigh the risks and no guarantees of success or outcome were given or implied. The patient verbalized understanding. Will follow up after procedure. Advised to call sooner if needed.

## 2023-10-05 NOTE — ANESTHESIA POSTPROCEDURE EVALUATION
Department of Anesthesiology  Postprocedure Note    Patient: Agusto Sanchez  MRN: [id number]  YOB: 1965  Date of evaluation: 10/5/2023      Procedure Summary     Date: 10/05/23 Room / Location: [facility] MAIN OR 01 CYSTO / [facility] MAIN OR    Anesthesia Start: 0188 Anesthesia Stop: 1704    Procedure: CYSTOSCOPY HYDRODISTENTION (Bladder) Diagnosis:       IC (interstitial cystitis)      (IC (interstitial cystitis) [N30.10])    Providers: Alaina Hughes MD Responsible Provider: Radha Serrano MD    Anesthesia Type: General ASA Status: 4          Anesthesia Type: General    Jose Phase I: Jose Score: 6    Jose Phase II:        Anesthesia Post Evaluation    Patient location during evaluation: PACU  Patient participation: complete - patient participated  Level of consciousness: awake and alert  Pain score: 1  Airway patency: patent  Nausea & Vomiting: no nausea  Complications: no  Cardiovascular status: blood pressure returned to baseline and hemodynamically stable  Respiratory status: acceptable  Hydration status: euvolemic  Multimodal analgesia pain management approach  Pain management: adequate and satisfactory to patient

## 2023-10-05 NOTE — BRIEF OP NOTE
Brief Postoperative Note      Patient: Ayse Norton  YOB: 1965  MRN: [id number]    Date of Procedure: 10/5/2023    Pre-Op Diagnosis Codes:     * IC (interstitial cystitis) [N30.10]    Post-Op Diagnosis: Same       Procedure(s):  CYSTOSCOPY HYDRODISTENTION    Surgeon(s):  Cristhian Andre., MD    Assistant:  * No surgical staff found *    Anesthesia: General    Estimated Blood Loss (mL): Minimal    Complications: None    Specimens:   * No specimens in log *    Implants:  * No implants in log *      Drains: * No LDAs found *    Findings: see op note      Electronically signed by Cristhian Andre MD on 10/5/2023 at 4:56 PM

## 2023-10-05 NOTE — ANESTHESIA PROCEDURE NOTES
Airway  Date/Time: 10/5/2023 4:22 PM  Urgency: elective    Airway not difficult    General Information and Staff    Patient location during procedure: OR  Resident/CRNA: NAMRATA Lester CRNA  Performed by: NAMRATA Lester CRNA  Authorized by: Shanell Ivey MD      Indications and Patient Condition  Indications for airway management: anesthesia  Spontaneous Ventilation: absent  Sedation level: deep  Preoxygenated: yes  Patient position: sniffing  Mask difficulty assessment: vent by bag mask    Final Airway Details  Final airway type: supraglottic airway      Successful airway: oropharyngeal  Size 4     Number of attempts at approach: 1    no

## 2023-10-06 NOTE — OP NOTE
[facility]  OPERATIVE REPORT    Name:  Yu Arevalo  MR#:  [id number]  :  1965  ACCOUNT #:  [de-identified]  DATE OF SERVICE:  10/05/2023      PREOPERATIVE DIAGNOSIS:  Interstitial cystitis. POSTOPERATIVE DIAGNOSIS:  Interstitial cystitis. PROCEDURE PERFORMED:  Cystoscopy with hydrodistention of the bladder. SURGEON:  Jacque Talavera MD    ASSISTANT:  None. ANESTHESIA:  General.    COMPLICATIONS:  None. SPECIMENS REMOVED:  None. IMPLANTS:  None. ESTIMATED BLOOD LOSS:  None. FINDINGS:  Bladder capacity of approximately 900 mL, moderate glomerulations noted on the bladder floor and posterior wall after hydrodistention. DESCRIPTION OF PROCEDURE:   The patient was given general anesthetic, placed in the dorsal lithotomy position. She was prepped and draped in a sterile fashion. External genitalia appeared normal.  I passed a 22-Tamazight cystoscope into the urethra using video camera and 30-degree lens. Bladder mucosa appeared normal.  Bladder was distended with irrigation bag held 80 cm anterior to the level of the bladder. It was held distended for approximately eight minutes and then allowed to drain. Bladder was inspected. There was no evidence of trauma. There were glomerulations noted on the trigone and posterior wall consistent with interstitial cystitis. The capacity under anesthesia was approximately 900 mL. I then instilled 30 mL of Marcaine in the bladder and removed the scope. PLAN:  She will be discharged home. She is to return to the office in about one month.       MD MAYKEL [id number]  D:  10/05/2023 17:06  T:  10/06/2023 2:48  JOB #:  [id number]

## 2023-12-04 RX ORDER — AMITRIPTYLINE HYDROCHLORIDE 10 MG/1
TABLET, FILM COATED ORAL
Qty: 30 TABLET | Refills: 3 | Status: SHIPPED | OUTPATIENT
Start: 2023-12-04

## 2024-02-10 ENCOUNTER — HOSPITAL ENCOUNTER (EMERGENCY)
Age: 59
Discharge: HOME | End: 2024-02-11
Payer: MEDICAID

## 2024-02-10 ENCOUNTER — APPOINTMENT (OUTPATIENT)
Dept: GENERAL RADIOLOGY | Age: 59
End: 2024-02-10
Payer: MEDICAID

## 2024-02-10 DIAGNOSIS — U07.1 COVID: Primary | ICD-10-CM

## 2024-02-10 DIAGNOSIS — E87.6 HYPOKALEMIA: ICD-10-CM

## 2024-02-10 LAB
ANION GAP SERPL CALC-SCNC: 7 MMOL/L (ref 2–11)
BASOPHILS # BLD: 0 K/UL (ref 0–0.2)
BASOPHILS NFR BLD: 1 % (ref 0–2)
BUN SERPL-MCNC: 12 MG/DL (ref 6–23)
CALCIUM SERPL-MCNC: 9.3 MG/DL (ref 8.3–10.4)
CHLORIDE SERPL-SCNC: 106 MMOL/L (ref 103–113)
CO2 SERPL-SCNC: 24 MMOL/L (ref 21–32)
CREAT SERPL-MCNC: 1 MG/DL (ref 0.6–1)
DIFFERENTIAL METHOD BLD: ABNORMAL
EOSINOPHIL # BLD: 0 K/UL (ref 0–0.8)
EOSINOPHIL NFR BLD: 0 % (ref 0.5–7.8)
ERYTHROCYTE [DISTWIDTH] IN BLOOD BY AUTOMATED COUNT: 13.7 % (ref 11.9–14.6)
FLUAV RNA SPEC QL NAA+PROBE: NOT DETECTED
FLUBV RNA SPEC QL NAA+PROBE: NOT DETECTED
GLUCOSE SERPL-MCNC: 121 MG/DL (ref 65–100)
HCT VFR BLD AUTO: 36.2 % (ref 35.8–46.3)
HGB BLD-MCNC: 11.8 G/DL (ref 11.7–15.4)
IMM GRANULOCYTES # BLD AUTO: 0 K/UL (ref 0–0.5)
IMM GRANULOCYTES NFR BLD AUTO: 0 % (ref 0–5)
LYMPHOCYTES # BLD: 1 K/UL (ref 0.5–4.6)
LYMPHOCYTES NFR BLD: 40 % (ref 13–44)
MCH RBC QN AUTO: 32.6 PG (ref 26.1–32.9)
MCHC RBC AUTO-ENTMCNC: 32.6 G/DL (ref 31.4–35)
MCV RBC AUTO: 100 FL (ref 82–102)
MONOCYTES # BLD: 0.5 K/UL (ref 0.1–1.3)
MONOCYTES NFR BLD: 18 % (ref 4–12)
NEUTS SEG # BLD: 1 K/UL (ref 1.7–8.2)
NEUTS SEG NFR BLD: 41 % (ref 43–78)
NRBC # BLD: 0 K/UL (ref 0–0.2)
PLATELET # BLD AUTO: 243 K/UL (ref 150–450)
PMV BLD AUTO: 9.3 FL (ref 9.4–12.3)
POTASSIUM SERPL-SCNC: 3.3 MMOL/L (ref 3.5–5.1)
RBC # BLD AUTO: 3.62 M/UL (ref 4.05–5.2)
SARS-COV-2 RDRP RESP QL NAA+PROBE: DETECTED
SODIUM SERPL-SCNC: 137 MMOL/L (ref 136–146)
SOURCE: ABNORMAL
WBC # BLD AUTO: 2.5 K/UL (ref 4.3–11.1)

## 2024-02-10 PROCEDURE — 99285 EMERGENCY DEPT VISIT HI MDM: CPT

## 2024-02-10 PROCEDURE — 71046 X-RAY EXAM CHEST 2 VIEWS: CPT

## 2024-02-10 PROCEDURE — 6360000002 HC RX W HCPCS

## 2024-02-10 PROCEDURE — 93005 ELECTROCARDIOGRAM TRACING: CPT

## 2024-02-10 PROCEDURE — 87502 INFLUENZA DNA AMP PROBE: CPT

## 2024-02-10 PROCEDURE — 6370000000 HC RX 637 (ALT 250 FOR IP)

## 2024-02-10 PROCEDURE — 87635 SARS-COV-2 COVID-19 AMP PRB: CPT

## 2024-02-10 PROCEDURE — 85025 COMPLETE CBC W/AUTO DIFF WBC: CPT

## 2024-02-10 PROCEDURE — 80048 BASIC METABOLIC PNL TOTAL CA: CPT

## 2024-02-10 PROCEDURE — 94640 AIRWAY INHALATION TREATMENT: CPT

## 2024-02-10 RX ORDER — BENZONATATE 200 MG/1
200 CAPSULE ORAL 3 TIMES DAILY PRN
Qty: 15 CAPSULE | Refills: 0 | Status: SHIPPED | OUTPATIENT
Start: 2024-02-10 | End: 2024-02-15

## 2024-02-10 RX ORDER — 0.9 % SODIUM CHLORIDE 0.9 %
1000 INTRAVENOUS SOLUTION INTRAVENOUS ONCE
Status: DISCONTINUED | OUTPATIENT
Start: 2024-02-10 | End: 2024-02-10

## 2024-02-10 RX ORDER — POTASSIUM CHLORIDE 20 MEQ/1
40 TABLET, EXTENDED RELEASE ORAL ONCE
Status: COMPLETED | OUTPATIENT
Start: 2024-02-10 | End: 2024-02-10

## 2024-02-10 RX ORDER — DEXAMETHASONE SODIUM PHOSPHATE 10 MG/ML
6 INJECTION INTRAMUSCULAR; INTRAVENOUS
Status: COMPLETED | OUTPATIENT
Start: 2024-02-10 | End: 2024-02-10

## 2024-02-10 RX ORDER — IPRATROPIUM BROMIDE AND ALBUTEROL SULFATE 2.5; .5 MG/3ML; MG/3ML
1 SOLUTION RESPIRATORY (INHALATION)
Status: COMPLETED | OUTPATIENT
Start: 2024-02-10 | End: 2024-02-10

## 2024-02-10 RX ADMIN — POTASSIUM CHLORIDE 40 MEQ: 1500 TABLET, EXTENDED RELEASE ORAL at 23:03

## 2024-02-10 RX ADMIN — DEXAMETHASONE SODIUM PHOSPHATE 6 MG: 10 INJECTION INTRAMUSCULAR; INTRAVENOUS at 22:03

## 2024-02-10 RX ADMIN — IPRATROPIUM BROMIDE AND ALBUTEROL SULFATE 1 DOSE: 2.5; .5 SOLUTION RESPIRATORY (INHALATION) at 20:39

## 2024-02-11 ENCOUNTER — APPOINTMENT (OUTPATIENT)
Dept: ULTRASOUND IMAGING | Age: 59
End: 2024-02-11
Payer: MEDICAID

## 2024-02-11 VITALS
SYSTOLIC BLOOD PRESSURE: 120 MMHG | WEIGHT: 155 LBS | OXYGEN SATURATION: 99 % | DIASTOLIC BLOOD PRESSURE: 81 MMHG | BODY MASS INDEX: 26.46 KG/M2 | HEIGHT: 64 IN | RESPIRATION RATE: 25 BRPM | HEART RATE: 90 BPM | TEMPERATURE: 98.2 F

## 2024-02-11 LAB
EKG ATRIAL RATE: 96 BPM
EKG DIAGNOSIS: NORMAL
EKG P AXIS: 75 DEGREES
EKG P-R INTERVAL: 136 MS
EKG Q-T INTERVAL: 382 MS
EKG QRS DURATION: 82 MS
EKG QTC CALCULATION (BAZETT): 482 MS
EKG R AXIS: 28 DEGREES
EKG T AXIS: 73 DEGREES
EKG VENTRICULAR RATE: 96 BPM

## 2024-02-11 PROCEDURE — 93971 EXTREMITY STUDY: CPT

## 2024-02-11 PROCEDURE — 93010 ELECTROCARDIOGRAM REPORT: CPT | Performed by: INTERNAL MEDICINE

## 2024-02-11 NOTE — ED PROVIDER NOTES
intact.        Impression    No acute findings in the chest     BMP   Result Value Ref Range    Sodium 137 136 - 146 mmol/L    Potassium 3.3 (L) 3.5 - 5.1 mmol/L    Chloride 106 103 - 113 mmol/L    CO2 24 21 - 32 mmol/L    Anion Gap 7 2 - 11 mmol/L    Glucose 121 (H) 65 - 100 mg/dL    BUN 12 6 - 23 MG/DL    Creatinine 1.00 0.6 - 1.0 MG/DL    Est, Glom Filt Rate >60 >60 ml/min/1.73m2    Calcium 9.3 8.3 - 10.4 MG/DL   CBC with Auto Differential   Result Value Ref Range    WBC 2.5 (L) 4.3 - 11.1 K/uL    RBC 3.62 (L) 4.05 - 5.2 M/uL    Hemoglobin 11.8 11.7 - 15.4 g/dL    Hematocrit 36.2 35.8 - 46.3 %    .0 82 - 102 FL    MCH 32.6 26.1 - 32.9 PG    MCHC 32.6 31.4 - 35.0 g/dL    RDW 13.7 11.9 - 14.6 %    Platelets 243 150 - 450 K/uL    MPV 9.3 (L) 9.4 - 12.3 FL    nRBC 0.00 0.0 - 0.2 K/uL    Differential Type AUTOMATED      Neutrophils % 41 (L) 43 - 78 %    Lymphocytes % 40 13 - 44 %    Monocytes % 18 (H) 4.0 - 12.0 %    Eosinophils % 0 (L) 0.5 - 7.8 %    Basophils % 1 0.0 - 2.0 %    Immature Granulocytes 0 0.0 - 5.0 %    Neutrophils Absolute 1.0 (L) 1.7 - 8.2 K/UL    Lymphocytes Absolute 1.0 0.5 - 4.6 K/UL    Monocytes Absolute 0.5 0.1 - 1.3 K/UL    Eosinophils Absolute 0.0 0.0 - 0.8 K/UL    Basophils Absolute 0.0 0.0 - 0.2 K/UL    Absolute Immature Granulocyte 0.0 0.0 - 0.5 K/UL   EKG 12 Lead   Result Value Ref Range    Ventricular Rate 96 BPM    Atrial Rate 96 BPM    P-R Interval 136 ms    QRS Duration 82 ms    Q-T Interval 382 ms    QTc Calculation (Bazett) 482 ms    P Axis 75 degrees    R Axis 28 degrees    T Axis 73 degrees    Diagnosis       Normal sinus rhythm  Biatrial enlargement  Minimal voltage criteria for LVH, may be normal variant ( [place] product )  Anteroseptal infarct , age undetermined  Abnormal ECG  When compared with ECG of 16-JUL-2023 13:00,  PREVIOUS ECG IS PRESENT     Vascular duplex upper extremity venous left    Narrative    Left upper extremity venous ultrasound    INDICATION: Evaluate

## 2024-02-11 NOTE — ED NOTES
I have reviewed discharge instructions with the patient.  The patient verbalized understanding.    Patient left ED via Discharge Method: ambulatory to Home with self.      Opportunity for questions and clarification provided.       Patient given 1 scripts.         To continue your aftercare when you leave the hospital, you may receive an automated call from our care team to check in on how you are doing.  This is a free service and part of our promise to provide the best care and service to meet your aftercare needs.” If you have questions, or wish to unsubscribe from this service please call [phone].  Thank you for Choosing our [facility] Emergency Department.        Jerica Hernandez RN  02/11/24 [id number]

## 2024-02-11 NOTE — ED NOTES
Report received. Pt remains in bed resting at this time. Respirations even and unlabored. Monitoring ongoing.      Jerica Hernandez, RN  02/11/24 0107

## 2024-02-11 NOTE — ED TRIAGE NOTES
Patient arrives via self from home. Patient states she has had a cough for 12 days and has been coughing up \"black stuff.\" Patient states she also has \"knots\" on the posterior side of her upper left arm that are \"moving\". Patient states she has also had a fever and SHOB.

## 2024-02-11 NOTE — DISCHARGE INSTRUCTIONS
Use Tylenol or ibuprofen as needed for body aches.  Use Tessalon Perles as needed for cough.  Return for any worsening symptoms or concerns.

## 2024-02-24 ASSESSMENT — ENCOUNTER SYMPTOMS
VOMITING: 0
ABDOMINAL PAIN: 0
NAUSEA: 0
SHORTNESS OF BREATH: 1
DIARRHEA: 0
COUGH: 1

## 2024-05-06 ENCOUNTER — HOSPITAL ENCOUNTER (EMERGENCY)
Age: 59
Discharge: HOME | End: 2024-05-06
Attending: EMERGENCY MEDICINE
Payer: MEDICAID

## 2024-05-06 ENCOUNTER — APPOINTMENT (OUTPATIENT)
Dept: GENERAL RADIOLOGY | Age: 59
End: 2024-05-06
Payer: MEDICAID

## 2024-05-06 ENCOUNTER — APPOINTMENT (OUTPATIENT)
Dept: CT IMAGING | Age: 59
End: 2024-05-06
Payer: MEDICAID

## 2024-05-06 VITALS
BODY MASS INDEX: 25.61 KG/M2 | RESPIRATION RATE: 18 BRPM | OXYGEN SATURATION: 100 % | SYSTOLIC BLOOD PRESSURE: 107 MMHG | WEIGHT: 150 LBS | TEMPERATURE: 98.1 F | HEART RATE: 105 BPM | DIASTOLIC BLOOD PRESSURE: 77 MMHG | HEIGHT: 64 IN

## 2024-05-06 DIAGNOSIS — S06.0X1A CONCUSSION WITH LOSS OF CONSCIOUSNESS OF 30 MINUTES OR LESS, INITIAL ENCOUNTER: Primary | ICD-10-CM

## 2024-05-06 DIAGNOSIS — S46.912A STRAIN OF LEFT SHOULDER, INITIAL ENCOUNTER: ICD-10-CM

## 2024-05-06 PROCEDURE — 6370000000 HC RX 637 (ALT 250 FOR IP): Performed by: EMERGENCY MEDICINE

## 2024-05-06 PROCEDURE — 72125 CT NECK SPINE W/O DYE: CPT

## 2024-05-06 PROCEDURE — 73030 X-RAY EXAM OF SHOULDER: CPT

## 2024-05-06 PROCEDURE — 99284 EMERGENCY DEPT VISIT MOD MDM: CPT

## 2024-05-06 PROCEDURE — 73060 X-RAY EXAM OF HUMERUS: CPT

## 2024-05-06 PROCEDURE — 70450 CT HEAD/BRAIN W/O DYE: CPT

## 2024-05-06 RX ORDER — LIDOCAINE 50 MG/G
1 PATCH TOPICAL DAILY
Qty: 30 PATCH | Refills: 0 | Status: SHIPPED | OUTPATIENT
Start: 2024-05-06

## 2024-05-06 RX ORDER — ACETAMINOPHEN 325 MG/1
650 TABLET ORAL
Status: COMPLETED | OUTPATIENT
Start: 2024-05-06 | End: 2024-05-06

## 2024-05-06 RX ADMIN — ACETAMINOPHEN 650 MG: 325 TABLET ORAL at 19:02

## 2024-05-06 ASSESSMENT — LIFESTYLE VARIABLES
HOW MANY STANDARD DRINKS CONTAINING ALCOHOL DO YOU HAVE ON A TYPICAL DAY: PATIENT DOES NOT DRINK
HOW OFTEN DO YOU HAVE A DRINK CONTAINING ALCOHOL: NEVER

## 2024-05-06 ASSESSMENT — ENCOUNTER SYMPTOMS
SHORTNESS OF BREATH: 0
BACK PAIN: 0
ABDOMINAL PAIN: 0

## 2024-05-06 ASSESSMENT — PAIN SCALES - GENERAL: PAINLEVEL_OUTOF10: 8

## 2024-05-06 NOTE — ED PROVIDER NOTES
CHLORIDE (KLOR-CON M) 10 MEQ EXTENDED RELEASE TABLET    Take 1 tablet by mouth daily    PRAVASTATIN (PRAVACHOL) 40 MG TABLET    Take 1 tablet by mouth daily    SACUBITRIL-VALSARTAN (ENTRESTO) 24-26 MG PER TABLET    Take 1 tablet by mouth 2 times daily    TIOTROPIUM (SPIRIVA) 18 MCG INHALATION CAPSULE    Inhale 1 capsule into the lungs daily    TIZANIDINE (ZANAFLEX) 4 MG TABLET    TAKE 1 TABLET (4 MG) BY MOUTH 3 (THREE) TIMES A DAY AS NEEDED FOR MUSCLE SPASMS    TOLTERODINE (DETROL LA) 2 MG EXTENDED RELEASE CAPSULE    TAKE 1 CAP BY MOUTH DAILY. INCREASE TO 4 MG DAILY AFTER 4 WEEKS IF NEEDED    TOPIRAMATE (TOPAMAX) 50 MG TABLET    Take 1 tablet by mouth 2 times daily    TRIAMCINOLONE (KENALOG) 0.1 % CREAM    Apply 1 g topically 2 times daily as needed    VERAPAMIL (CALAN SR) 120 MG EXTENDED RELEASE TABLET    Take 1 tablet by mouth nightly        Results for orders placed or performed during the hospital encounter of 05/06/24   CT HEAD WO CONTRAST    Narrative    Head CT    INDICATION: Fall. On Eliquis.    TECHNIQUE: Multiple 2D axial images obtained through the brain without  intravenous contrast.  Radiation dose reduction techniques were used for this  study:  All CT scans performed at this facility use one or all of the following:  Automated exposure control, adjustment of the mA and/or kVp according to  patient's size, iterative reconstruction.    COMPARISON: None    FINDINGS: No areas of abnormal attenuation are seen in the brain. There is no CT  evidence of acute hemorrhage or infarction. The ventricles are normal in size.  There are no extra-axial fluid collections. No masses are seen. Mild right  chronic frontal sinusitis. There are no bony lesions.      Impression    No CT evidence of acute intracranial abnormality.    Mild right chronic frontal sinusitis.   CT CERVICAL SPINE WO CONTRAST    Narrative    CT of the Cervical Spine    INDICATION: Fall with neck pain    TECHNIQUE: [id number]  axial images were

## 2024-05-06 NOTE — ED NOTES
Patient mobility status  with no difficulty. Provider aware     I have reviewed discharge instructions with the patient.  The patient verbalized understanding.    Patient left ED via Discharge Method: ambulatory to Home with  self .    Opportunity for questions and clarification provided.     Patient given 1 e-scripts.           Nishi Houston LPN  05/06/24 [id number]

## 2024-05-06 NOTE — DISCHARGE INSTRUCTIONS
Tylenol for pain.  Ice to the sore area for 15 minutes every 4 hours while awake for 3 days then change to heat with a heating pad for several more days.  Follow-up with your primary care doctor later this week or early next week if not significantly improving.  May need outpatient physical therapy.  Use the sling for several days up to a week, but be sure to remove your shoulder from the sling several times a day and put it through as much range of motion as you can for at least a few minutes.  Return if any new, worsening or concerning symptoms

## 2024-05-06 NOTE — ED TRIAGE NOTES
Pt presents to triage c/o left shoulder and arm pain. Pt reports she tripped over her dog and fell backward onto her cement patio (+)Head impact, (+)LOC, (+) Thinners (eliquis).

## 2024-09-16 ENCOUNTER — TELEPHONE (OUTPATIENT)
Dept: INTERNAL MEDICINE CLINIC | Facility: CLINIC | Age: 59
End: 2024-09-16

## 2024-10-01 DIAGNOSIS — R10.2 PELVIC PAIN: ICD-10-CM

## 2024-10-01 NOTE — ED NOTES
I have reviewed discharge instructions with the patient. The patient verbalized understanding. Patient left ED via Discharge Method: wheelchair to Home with friend, Bradford Lombardo. Opportunity for questions and clarification provided. Patient given 0 scripts. To continue your aftercare when you leave the hospital, you may receive an automated call from our care team to check in on how you are doing. This is a free service and part of our promise to provide the best care and service to meet your aftercare needs.  If you have questions, or wish to unsubscribe from this service please call [phone]. Thank you for Choosing our [facility] Emergency Department.
normal/clear to auscultation bilaterally

## 2024-10-25 ENCOUNTER — TELEPHONE (OUTPATIENT)
Dept: PRIMARY CARE CLINIC | Facility: CLINIC | Age: 59
End: 2024-10-25

## 2025-01-22 ENCOUNTER — TELEPHONE (OUTPATIENT)
Dept: UROLOGY | Age: 60
End: 2025-01-22

## 2025-04-17 NOTE — ED NOTES
notified of add on bnp
Patient ambulatory to restroom without assistance at this time. Patient with steady gait
Patient returned form CT, bed wet with urine, patient found in room crawling off bed with brief on floor and naked from waist down. Bed cleaned with clean and dry linens placed. Patient placed in clean brief, gown, and blanket. Patient instructed to call for nurse when needing to urinate to catch urine sample.
Pt ambulatory to and from restroom in no distress to collect urine sample. Urine sample pt provided was very clear and water appearing. Pt states, \"Its not water, I swear\". Urine sent to lab. Pt now back to bed watching a movie on her cell phone. Does not appear to be in any distress. Will continue to monitor.
Report given to Vaishnavi Cook RN to assume patient care at this time.
TRANSFER - OUT REPORT: 
 
Verbal report given to [address] Gopi RN (name) on Illene Back  being transferred to 80 (unit) for routine progression of care Report consisted of patients Situation, Background, Assessment and  
Recommendations(SBAR). Information from the following report(s) SBAR was reviewed with the receiving nurse. Lines:  
Peripheral IV 11/24/18 Right Forearm (Active) Opportunity for questions and clarification was provided. Patient transported with: 
 [employer]
Temples.../Clavicles.../Shoulders.../Thigh...

## (undated) DEVICE — SYR 50ML SLIP TIP NSAF LF STRL --

## (undated) DEVICE — SPONGE,NEURO,0.5"X0.5",XR,STRL,10/PK: Brand: MEDLINE

## (undated) DEVICE — SOLUTION IRRIG 3000ML H2O STRL BAG

## (undated) DEVICE — TRAY PREP DRY W/ PREM GLV 2 APPL 6 SPNG 2 UNDPD 1 OVERWRAP

## (undated) DEVICE — GARMENT,MEDLINE,DVT,INT,CALF,MED, GEN2: Brand: MEDLINE

## (undated) DEVICE — INSULATED BLADE ELECTRODE: Brand: EDGE

## (undated) DEVICE — 3M™ COBAN™ NL STERILE NON-LATEX SELF-ADHERENT WRAP, 2082S, 2 IN X 5 YD (5 CM X 4,5 M), 36 ROLLS/CASE: Brand: 3M™ COBAN™

## (undated) DEVICE — CYSTO: Brand: MEDLINE INDUSTRIES, INC.

## (undated) DEVICE — Device

## (undated) DEVICE — GLOVE SURG SZ 8 CRM LTX FREE POLYISOPRENE POLYMER BEAD ANTI

## (undated) DEVICE — SURGIFOAM SPNG SZ 100

## (undated) DEVICE — NDL PRT INJ NSAF BLNT 18GX1.5 --

## (undated) DEVICE — DRAPE,LAP,ADOLESCENT,STERILE: Brand: MEDLINE

## (undated) DEVICE — KENDALL RADIOLUCENT FOAM MONITORING ELECTRODE RECTANGULAR SHAPE: Brand: KENDALL

## (undated) DEVICE — DRAPE TWL SURG 16X26IN BLU ORB04] ALLCARE INC]

## (undated) DEVICE — CYSTO/BLADDER IRRIGATION SET, REGULATING CLAMP

## (undated) DEVICE — PACK SURGICAL PROCEDURE KIT CYSTOSCOPY TOTE

## (undated) DEVICE — (D)PREP SKN CHLRAPRP APPL 26ML -- CONVERT TO ITEM 371833

## (undated) DEVICE — DRAPE SHT 3 QTR PROXIMA 53X77 --

## (undated) DEVICE — 2000CC GUARDIAN II: Brand: GUARDIAN

## (undated) DEVICE — GOWN,REINFORCED,POLY,AURORA,XXLARGE,STR: Brand: MEDLINE

## (undated) DEVICE — NEEDLE HYPO 25GA L1.5IN BLU POLYPR HUB S STL REG BVL STR

## (undated) DEVICE — SUT ETHLN 3-0 18IN PS1 BLK --

## (undated) DEVICE — NON-ADHERING DRESSING: Brand: ADAPTIC®

## (undated) DEVICE — PRECISION THIN, OFFSET (5.5 X 0.38 X 25.0MM)

## (undated) DEVICE — DRAPE XR C ARM 41X74IN LF --

## (undated) DEVICE — DRILL BIT

## (undated) DEVICE — CARBIDE MATCH HEAD

## (undated) DEVICE — KENDALL SCD EXPRESS SLEEVES, KNEE LENGTH, MEDIUM: Brand: KENDALL SCD

## (undated) DEVICE — SKIN PREP TRAY 4 COMPARTM TRAY: Brand: MEDLINE INDUSTRIES, INC.

## (undated) DEVICE — BLOCK BITE AD 60FR W/ VELC STRP ADDRESSES MOST PT AND

## (undated) DEVICE — TUBING, SUCTION, 1/4" X 10', STRAIGHT: Brand: MEDLINE

## (undated) DEVICE — SYSTEM SKIN CLSR 22CM DERMBND PRINEO

## (undated) DEVICE — SYR 5ML 1/5 GRAD LL NSAF LF --

## (undated) DEVICE — SOL IRR SOD CL 0.9% 1000ML BTL --

## (undated) DEVICE — SUTURE VCRL SZ 2-0 L18IN ABSRB UD L26MM CP-2 1/2 CIR REV J762D

## (undated) DEVICE — PAD,NON-ADHERENT,3X8,STERILE,LF,1/PK: Brand: MEDLINE

## (undated) DEVICE — SUTURE MCRYL SZ 4-0 L27IN ABSRB UD L19MM PS-2 1/2 CIR PRIM Y426H

## (undated) DEVICE — TOTAL TRAY, DB, 100% SILI FOLEY, 16FR 10: Brand: MEDLINE

## (undated) DEVICE — CANNULA NSL ORAL AD FOR CAPNOFLEX CO2 O2 AIRLFE

## (undated) DEVICE — CONTAINER,SPECIMEN,O.R.STRL,4.5OZ: Brand: MEDLINE

## (undated) DEVICE — INTENDED FOR TISSUE SEPARATION, AND OTHER PROCEDURES THAT REQUIRE A SHARP SURGICAL BLADE TO PUNCTURE OR CUT.: Brand: BARD-PARKER SAFETY BLADES SIZE 15, STERILE

## (undated) DEVICE — AGENT HEMSTAT 8ML FLX TIP MTRX + DISP SURGIFLO

## (undated) DEVICE — AMD ANTIMICROBIAL GAUZE SPONGES,12 PLY USP TYPE VII, 0.2% POLYHEXAMETHYLENE BIGUANIDE HCI (PHMB): Brand: CURITY

## (undated) DEVICE — SOLUTION IV 1000ML 0.9% SOD CHL

## (undated) DEVICE — SUTURE MCRYL SZ 3-0 L27IN ABSRB UD L19MM PS-2 3/8 CIR PRIM Y427H

## (undated) DEVICE — DRAPE C ARM W54XL84IN MINI FOR OEC 6800

## (undated) DEVICE — SYR 3ML LL TIP 1/10ML GRAD --

## (undated) DEVICE — CODMAN VERSATRU™ STANDARD DISPOSABLE NON-STICK BIPOLAR FORCEPS 8" (20CM), 1.0MM TIP: Brand: CODMAN VERSATRU™

## (undated) DEVICE — GOWN,BREATHABLE SLV,AURORA,LG,STRL: Brand: MEDLINE

## (undated) DEVICE — REM POLYHESIVE ADULT PATIENT RETURN ELECTRODE: Brand: VALLEYLAB

## (undated) DEVICE — INTENDED FOR TISSUE SEPARATION, AND OTHER PROCEDURES THAT REQUIRE A SHARP SURGICAL BLADE TO PUNCTURE OR CUT.: Brand: BARD-PARKER SAFETY BLADES SIZE 10, STERILE

## (undated) DEVICE — SYR 10ML LUER LOK 1/5ML GRAD --

## (undated) DEVICE — GOWN,PREVENTION PLUS,XLN/XL,ST,24/CS: Brand: MEDLINE

## (undated) DEVICE — DRAPE MICSCP DISPOSABLE

## (undated) DEVICE — ZIMMER® STERILE DISPOSABLE TOURNIQUET CUFF WITH PLC, DUAL PORT, SINGLE BLADDER, 18 IN. (46 CM)

## (undated) DEVICE — STRETCH BANDAGE ROLL: Brand: DERMACEA

## (undated) DEVICE — CONNECTOR TBNG OD5-7MM O2 END DISP

## (undated) DEVICE — BONE WAX WHITE: Brand: BONE WAX WHITE

## (undated) DEVICE — BUTTON SWITCH PENCIL BLADE ELECTRODE, HOLSTER: Brand: EDGE

## (undated) DEVICE — SPINE NEURO: Brand: MEDLINE INDUSTRIES, INC.

## (undated) DEVICE — BNDG ELAS COBAN 2INX5YD NS --

## (undated) DEVICE — SPONGE: SPECIALTY PEANUT XR 100/CS: Brand: MEDICAL ACTION INDUSTRIES